# Patient Record
Sex: FEMALE | Race: WHITE
[De-identification: names, ages, dates, MRNs, and addresses within clinical notes are randomized per-mention and may not be internally consistent; named-entity substitution may affect disease eponyms.]

---

## 2017-04-11 ENCOUNTER — HOSPITAL ENCOUNTER (OUTPATIENT)
Dept: HOSPITAL 23 - CIVR | Age: 42
Discharge: HOME | End: 2017-04-11
Payer: COMMERCIAL

## 2017-04-11 DIAGNOSIS — E04.1: Primary | ICD-10-CM

## 2020-07-13 ENCOUNTER — OFFICE VISIT (OUTPATIENT)
Dept: FAMILY MEDICINE CLINIC | Facility: CLINIC | Age: 45
End: 2020-07-13

## 2020-07-13 VITALS
TEMPERATURE: 99.4 F | WEIGHT: 180.4 LBS | OXYGEN SATURATION: 97 % | SYSTOLIC BLOOD PRESSURE: 130 MMHG | HEIGHT: 64 IN | HEART RATE: 87 BPM | DIASTOLIC BLOOD PRESSURE: 94 MMHG | BODY MASS INDEX: 30.8 KG/M2

## 2020-07-13 DIAGNOSIS — Z00.00 ENCOUNTER FOR ANNUAL PHYSICAL EXAM: Primary | ICD-10-CM

## 2020-07-13 DIAGNOSIS — H61.23 BILATERAL IMPACTED CERUMEN: ICD-10-CM

## 2020-07-13 DIAGNOSIS — E89.0 POSTABLATIVE HYPOTHYROIDISM: ICD-10-CM

## 2020-07-13 DIAGNOSIS — E04.1 THYROID NODULE: Primary | ICD-10-CM

## 2020-07-13 DIAGNOSIS — F41.9 ANXIETY: ICD-10-CM

## 2020-07-13 DIAGNOSIS — E78.5 MILD HYPERLIPIDEMIA: ICD-10-CM

## 2020-07-13 DIAGNOSIS — R03.0 ELEVATED BLOOD PRESSURE READING WITHOUT DIAGNOSIS OF HYPERTENSION: ICD-10-CM

## 2020-07-13 DIAGNOSIS — R12 HEARTBURN: ICD-10-CM

## 2020-07-13 DIAGNOSIS — Z13.31 POSITIVE DEPRESSION SCREENING: ICD-10-CM

## 2020-07-13 PROCEDURE — 69209 REMOVE IMPACTED EAR WAX UNI: CPT | Performed by: NURSE PRACTITIONER

## 2020-07-13 PROCEDURE — 99386 PREV VISIT NEW AGE 40-64: CPT | Performed by: NURSE PRACTITIONER

## 2020-07-13 RX ORDER — MULTIPLE VITAMINS W/ MINERALS TAB 9MG-400MCG
1 TAB ORAL DAILY
COMMUNITY
End: 2021-07-30

## 2020-07-13 RX ORDER — ESCITALOPRAM OXALATE 10 MG/1
10 TABLET ORAL DAILY
Qty: 30 TABLET | Refills: 1 | Status: SHIPPED | OUTPATIENT
Start: 2020-07-13 | End: 2020-08-18 | Stop reason: SDUPTHER

## 2020-07-13 RX ORDER — LEVOTHYROXINE SODIUM 112 UG/1
112 TABLET ORAL DAILY
COMMUNITY
End: 2020-08-03 | Stop reason: SDUPTHER

## 2020-07-13 RX ORDER — AMPICILLIN TRIHYDRATE 250 MG
CAPSULE ORAL DAILY
COMMUNITY
End: 2021-07-30

## 2020-07-13 RX ORDER — FAMOTIDINE 20 MG/1
20 TABLET, FILM COATED ORAL 2 TIMES DAILY
Qty: 60 TABLET | Refills: 1 | Status: SHIPPED | OUTPATIENT
Start: 2020-07-13 | End: 2020-11-16

## 2020-07-13 NOTE — PATIENT INSTRUCTIONS
Start Escitalopram 1/2 tablet daily x1 week, then may increase to whole tablet if needed.  Continue to monitor your blood pressure periodically and record results. Call if your blood pressure is consistently greater than 140/90.  Continue to work on healthy diet and exercise.  Follow up pending lab results.  Follow up in 1 month, or sooner if symptoms persist or worsen.

## 2020-07-13 NOTE — PROGRESS NOTES
Subjective   Ursula Kulkarni is a 45 y.o. female.     Chief Complaint   Patient presents with   • Annual Exam     previous pcp dr sarah egan, has not seen in 2 years     • Anxiety     discuss   • Hot Flashes     all the time, chest and neck sweaty    • Hyperlipidemia     has not had labs done in 2 years        History of Present Illness   New patient, here to establish care; previous PCP Dr. 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    Also, c/o anxiety; 2 years ago, her 12 year old step son's mother passed away suddenly due to Legionaire's disease, lisa came home and she did not answer door; once she passed, he had to move to live with father full time, so could not stay at same school; he was doing counseling; once living with them full time he has seemed like a different kid; has been disrepectful and mouthy; she has 2 daughters and she has never let them act the way he dose; this is way he treated his mom per grandmother; step son just diagnosed with type 2 diabetes; trying to work with him on healthy diet; everything is a constant hung; pt frustrated that she finds self not wanting to be around him; loves him; working on getting him into behavioral specialist; pt would like medication to take edge off; has treid CBD oil and helps sleep and  take edge off some; would like alternative; has been on Paxil or Zoloft short term in past when went through divorce; would like low dose; had some GI problems with med in past, see PHQ-9 from today.    Also, c/o hot flashes, all the time, but at night on occasion will wake up soaking wet on chest and neck; after 2nd pregnancy was hot all time and was having palpitations; had labs and diagnosed with Graves disease; was on Metoprolol at the time; had radioactive iodine treatment; BP got too low on Metoprolol so stopped taking; monitors BP on occasion and typically runs 120s/70s; does not have menses due to IUD.    F/U mild Hyperlipidemia: no recent labs.    F/U Hypothyroidism: takes Levothyroxine daily 112 mcg daily, typically on empty stomach; has had some missed doses; no labs for 2 years; working on trying to get health back in order; had thyroid US in [id number] and nodule had unchanged from previous, may have been having trouble swallowing at the time.    The following portions of the patient's history were reviewed and updated as appropriate: allergies, current medications, past family history, past medical history, past social history, past surgical history and problem list.    Current Outpatient Medications on File Prior to Visit   Medication Sig   • Cinnamon 500 MG capsule Take  by mouth Daily.   • Garlic 10 MG capsule Take  by mouth.   • levothyroxine (SYNTHROID, LEVOTHROID) 112 MCG tablet Take 112 mcg by mouth Daily.   • LORATADINE PO Take 10 mg by mouth Daily.   • Multiple Vitamins-Minerals (MULTIVITAMIN WITH MINERALS) tablet tablet Take 1 tablet by mouth Daily.   • Turmeric (QC TUMERIC COMPLEX PO) Take  by mouth.     No current facility-administered medications on file prior to visit.        Past Medical History:   Diagnosis Date   • ADD (attention deficit disorder)    • Hyperlipidemia    • Hypothyroidism    • Melanoma of back (CMS/HCC)        Past Surgical History:   Procedure Laterality Date   • D&C  CERVICAL BIOPSY     • WISDOM TOOTH EXTRACTION         Family History   Problem Relation Age of Onset   • Heart disease Father    • Heart attack Father        Social History     Socioeconomic History   • Marital status:      Spouse name: Not on file   • Number of children: Not on file   • Years of education: Not on file   • Highest education level: Not on file   Tobacco Use   • Smoking status: Never Smoker   • Smokeless tobacco: Never Used   Substance and Sexual Activity   • Alcohol use: Yes     Alcohol/week: 1.0 standard drinks     Types: 1 Glasses of wine per week     Frequency: 2-4 times a month     Binge frequency: Monthly   • Drug use: Never   • Sexual activity: Yes     Partners: Male     Comment:         Review of Systems   Constitutional: Positive for fatigue. Negative for appetite change, chills, fever, unexpected weight gain and unexpected weight loss.   HENT: Positive for trouble swallowing (feels like gets choked easier than used to; food seems to get stuck in throat; will have to drink liquids to go down at times). Negative for ear pain (some in right ear this morning), postnasal drip, rhinorrhea, sinus pressure and sore throat.    Eyes: Negative for blurred vision and pain.   Respiratory: Negative for apnea (no snoring), cough, chest tightness and shortness of breath.    Cardiovascular: Negative for chest pain and leg swelling. Palpitations: on occasion, not sure if related to anxiety; has flutter sensation during episodes; drinks 1 cup coffee daily    Gastrointestinal: Positive for indigestion (has heartburn after eats randomly, no OTC treatment). Negative for abdominal pain, constipation, diarrhea and GERD.   Endocrine: Positive for heat intolerance. Negative for cold intolerance and polydipsia.   Genitourinary: Negative for dysuria and frequency.   Musculoskeletal: Negative for arthralgias, back pain and neck pain.   Skin: Negative for rash and skin lesions.   Neurological: Negative for  "dizziness, syncope and headache.   Hematological: Does not bruise/bleed easily.   Psychiatric/Behavioral: Negative for suicidal ideas.       Objective   Vitals:    07/13/20 1059   BP: 130/94   BP Location: Left arm   Patient Position: Sitting   Cuff Size: Adult   Pulse: 87   Temp: 99.4 °F (37.4 °C)   SpO2: 97%   Weight: 81.8 kg (180 lb 6.4 oz)   Height: 161.3 cm (63.5\")     Body mass index is 31.46 kg/m².    Physical Exam   Constitutional: She is oriented to person, place, and time. She appears well-developed and well-nourished. No distress.   HENT:   Head: Normocephalic and atraumatic.   Right Ear: External ear normal. cerumen impaction is present.  Left Ear: External ear normal. An impacted cerumen is present.  Nose: Nose normal. Right sinus exhibits no maxillary sinus tenderness and no frontal sinus tenderness. Left sinus exhibits no maxillary sinus tenderness and no frontal sinus tenderness.   Mouth/Throat: Uvula is midline, oropharynx is clear and moist and mucous membranes are normal. No posterior oropharyngeal erythema.   Eyes: Pupils are equal, round, and reactive to light. Conjunctivae and EOM are normal.   Neck: Normal range of motion. Neck supple. Carotid bruit is not present. No tracheal deviation present. No thyromegaly present.   Cardiovascular: Normal rate, regular rhythm, normal heart sounds and intact distal pulses.   No murmur heard.  Pulmonary/Chest: Effort normal and breath sounds normal. No respiratory distress.   Abdominal: Soft. Bowel sounds are normal. There is no hepatosplenomegaly. There is no tenderness.   Musculoskeletal: Normal range of motion. She exhibits no edema.        Cervical back: She exhibits no bony tenderness.        Thoracic back: She exhibits no bony tenderness.        Lumbar back: She exhibits no bony tenderness.   Lymphadenopathy:     She has no cervical adenopathy.   Neurological: She is alert and oriented to person, place, and time. She has normal strength and normal " reflexes. No cranial nerve deficit. Coordination and gait normal.   Skin: Skin is warm and dry. No rash noted. She is not diaphoretic.   Psychiatric: She has a normal mood and affect. Her behavior is normal. Judgment and thought content normal. Cognition and memory are normal.   Nursing note and vitals reviewed.        Ear Cerumen Removal  Date/Time: 7/13/2020 12:12 PM  Performed by: Phuong Wild APRN  Authorized by: Phuong Wild APRN     Anesthesia:  Local Anesthetic: none  Ceruminolytics applied: Ceruminolytics applied prior to the procedure.  Location details: left ear and right ear  Patient tolerance: Patient tolerated the procedure well with no immediate complications  Comments: Right TM clear, bony landmarks visualized, mild fluid behind TM; left TM clear, bony landmarks visualized  Procedure type: irrigation   Sedation:  Patient sedated: no          Assessment/Plan .  Problem List Items Addressed This Visit     Hypothyroidism    Relevant Medications    levothyroxine (SYNTHROID, LEVOTHROID) 112 MCG tablet    Other Relevant Orders    TSH    T4, Free    Anxiety    Current Assessment & Plan     Start Escitalopram 1/2 tablet daily x1-2 weeks, then may increase to whole tablet if needed.         Relevant Medications    escitalopram (Lexapro) 10 MG tablet    Mild hyperlipidemia    Current Assessment & Plan     Continue to work on healthy diet and exercise.         Relevant Orders    Lipid Panel With LDL / HDL Ratio    Heartburn    Relevant Medications    famotidine (Pepcid) 20 MG tablet    Positive depression screening    Current Assessment & Plan     Continue exercise.         Bilateral impacted cerumen    Relevant Orders    Ear Cerumen Removal      Other Visit Diagnoses     Encounter for annual physical exam    -  Primary    Relevant Orders    Comprehensive Metabolic Panel    CBC & Differential    Lipid Panel With LDL / HDL Ratio          Return in about 1 month (around 8/13/2020) for Recheck.or sooner if  symptoms persist or worsen.  Impression: Health maintenance visit.  Currently, eats a fairly healthy diet and has a regular exercise routine.  Cervical cancer screening: UTD.  Breast cancer screening: UTD.  Colorectal cancer screening: not indicated.   Screening lab work includes: CMP, lipid.  Immunizations: needs Tdap, declines today; risks and benefits of immunizations were discussed with patient.  Patient was advised to be evaluated by ophthalmology and dentist.  Advice and education were given regarding nutrition and aerobic exercise.  Has had missed doses of Levothyroxine; will recheck labs in 4-6 weeks and continue current dose of Levothyroxine as long as not over treated.  Discussed AE/BBW with Escitalopram.  Will consider referral to ENT if dysphagia persists.  Patient will drop off previous records.

## 2020-07-13 NOTE — ASSESSMENT & PLAN NOTE
Continue to monitor your blood pressure periodically and record results. Call if your blood pressure is consistently greater than 140/90.

## 2020-07-14 LAB
ALBUMIN SERPL-MCNC: 4.9 G/DL (ref 3.8–4.8)
ALBUMIN/GLOB SERPL: 2 {RATIO} (ref 1.2–2.2)
ALP SERPL-CCNC: 77 IU/L (ref 39–117)
ALT SERPL-CCNC: 28 IU/L (ref 0–32)
AST SERPL-CCNC: 23 IU/L (ref 0–40)
BASOPHILS # BLD AUTO: 0.1 X10E3/UL (ref 0–0.2)
BASOPHILS NFR BLD AUTO: 1 %
BILIRUB SERPL-MCNC: 0.7 MG/DL (ref 0–1.2)
BUN SERPL-MCNC: 21 MG/DL (ref 6–24)
BUN/CREAT SERPL: 20 (ref 9–23)
CALCIUM SERPL-MCNC: 9.5 MG/DL (ref 8.7–10.2)
CHLORIDE SERPL-SCNC: 101 MMOL/L (ref 96–106)
CHOLEST SERPL-MCNC: 210 MG/DL (ref 100–199)
CO2 SERPL-SCNC: 23 MMOL/L (ref 20–29)
CREAT SERPL-MCNC: 1.04 MG/DL (ref 0.57–1)
EOSINOPHIL # BLD AUTO: 0.3 X10E3/UL (ref 0–0.4)
EOSINOPHIL NFR BLD AUTO: 3 %
ERYTHROCYTE [DISTWIDTH] IN BLOOD BY AUTOMATED COUNT: 12.3 % (ref 11.7–15.4)
GLOBULIN SER CALC-MCNC: 2.5 G/DL (ref 1.5–4.5)
GLUCOSE SERPL-MCNC: 85 MG/DL (ref 65–99)
HCT VFR BLD AUTO: 43.4 % (ref 34–46.6)
HDLC SERPL-MCNC: 52 MG/DL
HGB BLD-MCNC: 14.9 G/DL (ref 11.1–15.9)
IMM GRANULOCYTES # BLD AUTO: 0 X10E3/UL (ref 0–0.1)
IMM GRANULOCYTES NFR BLD AUTO: 0 %
LDLC SERPL CALC-MCNC: 134 MG/DL (ref 0–99)
LDLC/HDLC SERPL: 2.6 RATIO (ref 0–3.2)
LYMPHOCYTES # BLD AUTO: 2.3 X10E3/UL (ref 0.7–3.1)
LYMPHOCYTES NFR BLD AUTO: 28 %
MCH RBC QN AUTO: 31.8 PG (ref 26.6–33)
MCHC RBC AUTO-ENTMCNC: 34.3 G/DL (ref 31.5–35.7)
MCV RBC AUTO: 93 FL (ref 79–97)
MONOCYTES # BLD AUTO: 0.4 X10E3/UL (ref 0.1–0.9)
MONOCYTES NFR BLD AUTO: 5 %
NEUTROPHILS # BLD AUTO: 5.1 X10E3/UL (ref 1.4–7)
NEUTROPHILS NFR BLD AUTO: 63 %
PLATELET # BLD AUTO: 244 X10E3/UL (ref 150–450)
POTASSIUM SERPL-SCNC: 4.3 MMOL/L (ref 3.5–5.2)
PROT SERPL-MCNC: 7.4 G/DL (ref 6–8.5)
RBC # BLD AUTO: 4.69 X10E6/UL (ref 3.77–5.28)
SODIUM SERPL-SCNC: 139 MMOL/L (ref 134–144)
T4 FREE SERPL-MCNC: 1.55 NG/DL (ref 0.82–1.77)
TRIGL SERPL-MCNC: 118 MG/DL (ref 0–149)
TSH SERPL DL<=0.005 MIU/L-ACNC: 5.74 UIU/ML (ref 0.45–4.5)
VLDLC SERPL CALC-MCNC: 24 MG/DL (ref 5–40)
WBC # BLD AUTO: 8.1 X10E3/UL (ref 3.4–10.8)

## 2020-07-31 ENCOUNTER — HOSPITAL ENCOUNTER (OUTPATIENT)
Dept: ULTRASOUND IMAGING | Facility: HOSPITAL | Age: 45
Discharge: HOME | End: 2020-07-31
Admitting: NURSE PRACTITIONER

## 2020-07-31 DIAGNOSIS — E04.1 THYROID NODULE: ICD-10-CM

## 2020-07-31 PROCEDURE — 76536 US EXAM OF HEAD AND NECK: CPT

## 2020-08-03 DIAGNOSIS — E04.1 THYROID NODULE: Primary | ICD-10-CM

## 2020-08-03 DIAGNOSIS — E89.0 POSTABLATIVE HYPOTHYROIDISM: ICD-10-CM

## 2020-08-03 DIAGNOSIS — E04.1 THYROID NODULE: ICD-10-CM

## 2020-08-03 DIAGNOSIS — E78.5 MILD HYPERLIPIDEMIA: Primary | ICD-10-CM

## 2020-08-03 RX ORDER — LEVOTHYROXINE SODIUM 112 UG/1
112 TABLET ORAL DAILY
Qty: 30 TABLET | Refills: 0 | Status: SHIPPED | OUTPATIENT
Start: 2020-08-03 | End: 2020-09-15 | Stop reason: ALTCHOICE

## 2020-08-18 ENCOUNTER — TELEMEDICINE (OUTPATIENT)
Dept: FAMILY MEDICINE CLINIC | Facility: CLINIC | Age: 45
End: 2020-08-18

## 2020-08-18 DIAGNOSIS — R03.0 ELEVATED BLOOD PRESSURE READING WITHOUT DIAGNOSIS OF HYPERTENSION: ICD-10-CM

## 2020-08-18 DIAGNOSIS — F41.9 ANXIETY: Primary | ICD-10-CM

## 2020-08-18 DIAGNOSIS — E89.0 POSTABLATIVE HYPOTHYROIDISM: ICD-10-CM

## 2020-08-18 DIAGNOSIS — R12 HEARTBURN: ICD-10-CM

## 2020-08-18 DIAGNOSIS — Z13.31 POSITIVE DEPRESSION SCREENING: ICD-10-CM

## 2020-08-18 PROCEDURE — 99213 OFFICE O/P EST LOW 20 MIN: CPT | Performed by: NURSE PRACTITIONER

## 2020-08-18 RX ORDER — ESCITALOPRAM OXALATE 10 MG/1
10 TABLET ORAL DAILY
Qty: 90 TABLET | Refills: 0 | Status: SHIPPED | OUTPATIENT
Start: 2020-08-18 | End: 2020-09-21 | Stop reason: ALTCHOICE

## 2020-08-18 NOTE — PROGRESS NOTES
[place] Kulkarni is a 45 y.o. female.     Chief Complaint   Patient presents with   • Anxiety     follow up   • Hypertension   • Heartburn     You have chosen to receive care through a telehealth visit.  Do you consent to use a video/audio connection for your medical care today? Yes    This was an audio and video enabled telemedicine encounter using [employer].    History of Present Illness   Patient presents for follow up anxiety: started Escitalopram daily and has been helping; has started trying to work out and eat healthier; has lost about 10 pounds; mild irritability, but for most part, Escitalopram has been working well; happy with current dose; has been working out at least 4 times per week; occasional trouble falling and staying asleep, typically only if has had caffeine later in day; no SI/HI; see PHQ-9 from today.    F/U elevated BP: has been monitoring BP; BP has been running 120s/70s; no orthostasis; no swelling; no headaches.    F/U dysphagia/heartburn: started Pepcid daily for heartburn and has been working well; no problems swallowing since started medication.    F/U Hypothyroidism: has been taking Levothyroxine more consistently; has only missed one dose since LOV.    The following portions of the patient's history were reviewed and updated as appropriate: allergies, current medications, past family history, past medical history, past social history, past surgical history and problem list.    Current Outpatient Medications on File Prior to Visit   Medication Sig   • Cinnamon 500 MG capsule Take  by mouth Daily.   • famotidine (Pepcid) 20 MG tablet Take 1 tablet by mouth 2 (Two) Times a Day.   • Garlic 10 MG capsule Take  by mouth.   • levothyroxine (SYNTHROID, LEVOTHROID) 112 MCG tablet Take 1 tablet by mouth Daily.   • LORATADINE PO Take 10 mg by mouth Daily.   • Multiple Vitamins-Minerals (MULTIVITAMIN WITH MINERALS) tablet tablet Take 1 tablet by mouth Daily.   • Turmeric (QC  TUMERIC COMPLEX PO) Take  by mouth.   • [DISCONTINUED] escitalopram (Lexapro) 10 MG tablet Take 1 tablet by mouth Daily.     No current facility-administered medications on file prior to visit.        Past Medical History:   Diagnosis Date   • ADD (attention deficit disorder)    • Hyperlipidemia    • Hypothyroidism    • Melanoma of back (CMS/HCC)    • Positive depression screening 7/13/2020       Past Surgical History:   Procedure Laterality Date   • D&C CERVICAL BIOPSY     • WISDOM TOOTH EXTRACTION         Family History   Problem Relation Age of Onset   • Heart disease Father    • Heart attack Father        Social History     Socioeconomic History   • Marital status:      Spouse name: Not on file   • Number of children: Not on file   • Years of education: Not on file   • Highest education level: Not on file   Tobacco Use   • Smoking status: Never Smoker   • Smokeless tobacco: Never Used   Substance and Sexual Activity   • Alcohol use: Yes     Alcohol/week: 1.0 standard drinks     Types: 1 Glasses of wine per week     Frequency: 2-4 times a month     Binge frequency: Monthly   • Drug use: Never   • Sexual activity: Yes     Partners: Male     Comment:         Review of Systems   Constitutional: Negative for activity change, fatigue, unexpected weight gain and unexpected weight loss.   Eyes: Negative for blurred vision.   Respiratory: Negative for cough, chest tightness and shortness of breath.    Cardiovascular: Negative for chest pain, palpitations and leg swelling.   Gastrointestinal: Negative for abdominal pain, GERD and indigestion.   Skin: Negative for rash.   Neurological: Negative for dizziness and headache.       PHQ-9 Depression Screening  Little interest or pleasure in doing things? 0   Feeling down, depressed, or hopeless? 0   Trouble falling or staying asleep, or sleeping too much? 1   Feeling tired or having little energy? 0   Poor appetite or overeating? 0   Feeling bad about yourself -  or that you are a failure or have let yourself or your family down? 0   Trouble concentrating on things, such as reading the newspaper or watching television? 0   Moving or speaking so slowly that other people could have noticed? Or the opposite - being so fidgety or restless that you have been moving around a lot more than usual? 0   Thoughts that you would be better off dead, or of hurting yourself in some way? 0   PHQ-9 Total Score 1   If you checked off any problems, how difficult have these problems made it for you to do your work, take care of things at home, or get along with other people?         Objective   There were no vitals filed for this visit.  There is no height or weight on file to calculate BMI.    Physical Exam   Constitutional: She is oriented to person, place, and time. She appears well-developed and well-nourished. No distress.   HENT:   Head: Normocephalic.   Eyes: Conjunctivae are normal.   Neck: Normal range of motion. Neck supple.   Pulmonary/Chest: Effort normal. No accessory muscle usage. No respiratory distress.   Neurological: She is alert and oriented to person, place, and time.   Skin: She is not diaphoretic.   Psychiatric: She has a normal mood and affect. Her behavior is normal. Judgment and thought content normal. Cognition and memory are normal.       Lab Results   Component Value Date    WBC 8.1 07/13/2020    RBC 4.69 07/13/2020    HGB 14.9 07/13/2020    HCT 43.4 07/13/2020    MCV 93 07/13/2020    MCH 31.8 07/13/2020    MCHC 34.3 07/13/2020    RDW 12.3 07/13/2020     07/13/2020    NEUTRORELPCT 63 07/13/2020    LYMPHORELPCT 28 07/13/2020    MONORELPCT 5 07/13/2020    EOSRELPCT 3 07/13/2020    BASORELPCT 1 07/13/2020    NEUTROABS 5.1 07/13/2020    LYMPHSABS 2.3 07/13/2020    MONOSABS 0.4 07/13/2020    EOSABS 0.3 07/13/2020    BASOSABS 0.1 07/13/2020     Lab Results   Component Value Date    BUN 21 07/13/2020    CREATININE 1.04 (H) 07/13/2020    EGFRIFNONA 66 07/13/2020     EGFRIFAFRI 76 07/13/2020    BCR 20 07/13/2020    K 4.3 07/13/2020    CO2 23 07/13/2020    CALCIUM 9.5 07/13/2020    PROTENTOTREF 7.4 07/13/2020    ALBUMIN 4.9 (H) 07/13/2020    LABIL2 2.0 07/13/2020    AST 23 07/13/2020    ALT 28 07/13/2020      Lab Results   Component Value Date    CHLPL 210 (H) 07/13/2020    TRIG 118 07/13/2020    HDL 52 07/13/2020    VLDL 24 07/13/2020     (H) 07/13/2020     Lab Results   Component Value Date    TSH 5.740 (H) 07/13/2020       Assessment/Plan .  Problem List Items Addressed This Visit     Hypothyroidism    Relevant Orders    TSH    T4, Free    Anxiety - Primary    Current Assessment & Plan     Improving with Escitalopram.         Relevant Medications    escitalopram (Lexapro) 10 MG tablet    Heartburn    Current Assessment & Plan     Resolved with Pepcid daily.         Elevated blood pressure reading without diagnosis of hypertension    Current Assessment & Plan     BP has been normal when has checked.         RESOLVED: Positive depression screening        Come in for thyroid labs in about 1 month.  Return in about 3 months (around 11/18/2020) for Recheck.or sooner if problems or concerns.    Time spent doing video visit, reviewing, discussing, answering questions, and educating patient was 15 minutes.

## 2020-08-18 NOTE — PATIENT INSTRUCTIONS
Come in for lab only to recheck thyroid labs in about 1 month.  Continue to monitor your blood pressure periodically and record results.  Continue to work on healthy diet and exercise.  Follow up pending lab results.  Follow up in 3-4 months, or sooner if problems or concerns.

## 2020-09-14 ENCOUNTER — LAB (OUTPATIENT)
Dept: FAMILY MEDICINE CLINIC | Facility: CLINIC | Age: 45
End: 2020-09-14

## 2020-09-14 DIAGNOSIS — E89.0 POSTABLATIVE HYPOTHYROIDISM: ICD-10-CM

## 2020-09-15 DIAGNOSIS — E89.0 POSTABLATIVE HYPOTHYROIDISM: Primary | ICD-10-CM

## 2020-09-15 LAB
T4 FREE SERPL-MCNC: 1.2 NG/DL (ref 0.82–1.77)
TSH SERPL DL<=0.005 MIU/L-ACNC: 5.71 UIU/ML (ref 0.45–4.5)

## 2020-09-15 RX ORDER — LEVOTHYROXINE SODIUM 0.12 MG/1
125 TABLET ORAL DAILY
Qty: 90 TABLET | Refills: 0 | Status: SHIPPED | OUTPATIENT
Start: 2020-09-15 | End: 2020-12-18

## 2020-09-21 DIAGNOSIS — F41.9 ANXIETY: Primary | ICD-10-CM

## 2020-09-21 RX ORDER — ESCITALOPRAM OXALATE 20 MG/1
20 TABLET ORAL DAILY
Qty: 30 TABLET | Refills: 1 | Status: SHIPPED | OUTPATIENT
Start: 2020-09-21 | End: 2020-12-14

## 2020-11-16 DIAGNOSIS — R12 HEARTBURN: ICD-10-CM

## 2020-11-16 RX ORDER — FAMOTIDINE 20 MG/1
TABLET, FILM COATED ORAL
Qty: 60 TABLET | Refills: 1 | Status: SHIPPED | OUTPATIENT
Start: 2020-11-16 | End: 2021-02-17

## 2020-12-14 DIAGNOSIS — F41.9 ANXIETY: ICD-10-CM

## 2020-12-14 RX ORDER — ESCITALOPRAM OXALATE 20 MG/1
TABLET ORAL
Qty: 30 TABLET | Refills: 0 | Status: SHIPPED | OUTPATIENT
Start: 2020-12-14 | End: 2020-12-24 | Stop reason: SDUPTHER

## 2020-12-18 DIAGNOSIS — E89.0 POSTABLATIVE HYPOTHYROIDISM: ICD-10-CM

## 2020-12-18 RX ORDER — LEVOTHYROXINE SODIUM 0.12 MG/1
TABLET ORAL
Qty: 90 TABLET | Refills: 0 | Status: SHIPPED | OUTPATIENT
Start: 2020-12-18 | End: 2020-12-24 | Stop reason: SDUPTHER

## 2020-12-23 ENCOUNTER — OFFICE VISIT (OUTPATIENT)
Dept: FAMILY MEDICINE CLINIC | Facility: CLINIC | Age: 45
End: 2020-12-23

## 2020-12-23 VITALS
HEART RATE: 71 BPM | HEIGHT: 64 IN | OXYGEN SATURATION: 100 % | TEMPERATURE: 97.5 F | SYSTOLIC BLOOD PRESSURE: 122 MMHG | DIASTOLIC BLOOD PRESSURE: 76 MMHG | BODY MASS INDEX: 28.9 KG/M2 | WEIGHT: 169.3 LBS

## 2020-12-23 DIAGNOSIS — F41.9 ANXIETY: ICD-10-CM

## 2020-12-23 DIAGNOSIS — E89.0 POSTABLATIVE HYPOTHYROIDISM: Primary | ICD-10-CM

## 2020-12-23 DIAGNOSIS — R03.0 ELEVATED BLOOD PRESSURE READING WITHOUT DIAGNOSIS OF HYPERTENSION: ICD-10-CM

## 2020-12-23 DIAGNOSIS — J30.9 ALLERGIC RHINITIS, UNSPECIFIED SEASONALITY, UNSPECIFIED TRIGGER: ICD-10-CM

## 2020-12-23 PROBLEM — H61.23 BILATERAL IMPACTED CERUMEN: Status: RESOLVED | Noted: 2020-07-13 | Resolved: 2020-12-23

## 2020-12-23 PROCEDURE — 99214 OFFICE O/P EST MOD 30 MIN: CPT | Performed by: NURSE PRACTITIONER

## 2020-12-23 NOTE — PROGRESS NOTES
Washington Kulkarni is a 45 y.o. female.     Chief Complaint   Patient presents with   • Hypothyroidism     follow up        History of Present Illness   Patient presents for follow up Hypothyroidism: takes Levothyroxine daily on empty stomach; thinks may have missed a dose a few days ago, but otherwise has taken consistently; some nights was waking up sweaty on upper chest; in last few weeks has been hot all night long; no more sweating at night, just hot all night; previously hot all day and night before started Levothyroxine; now mostly just at night; may be related to hormones.    F/U anxiety: takes Escitalopram daily and works well most days; happy with current dose; no problems with sleep; no SI/HI.    F/U elevated BP: has been monitoring BP, will be a little increased on occasion; BP typically runs 130s/80s; notes BP will be higher on days has eaten high salt foods; no headaches other than occasional tension headaches; no swelling; rare orthostasis, has history of vertigo; has lost some weight watching diet and working out.    The following portions of the patient's history were reviewed and updated as appropriate: allergies, current medications, past family history, past medical history, past social history, past surgical history and problem list.    Current Outpatient Medications on File Prior to Visit   Medication Sig   • Cinnamon 500 MG capsule Take  by mouth Daily.   • escitalopram (LEXAPRO) 20 MG tablet TAKE ONE TABLET BY MOUTH DAILY   • famotidine (PEPCID) 20 MG tablet TAKE ONE TABLET BY MOUTH TWICE A DAY (Patient taking differently: Take 20 mg by mouth Daily.)   • Garlic 10 MG capsule Take  by mouth.   • levothyroxine (SYNTHROID, LEVOTHROID) 125 MCG tablet TAKE ONE TABLET BY MOUTH DAILY   • LORATADINE PO Take 10 mg by mouth Daily.   • Multiple Vitamins-Minerals (MULTIVITAMIN WITH MINERALS) tablet tablet Take 1 tablet by mouth Daily.   • Turmeric (QC TUMERIC COMPLEX PO) Take  by mouth.     No  current facility-administered medications on file prior to visit.        Past Medical History:   Diagnosis Date   • ADD (attention deficit disorder)    • Bilateral impacted cerumen 7/13/2020   • Hyperlipidemia    • Hypothyroidism    • Melanoma of back (CMS/HCC)    • Positive depression screening 7/13/2020       Past Surgical History:   Procedure Laterality Date   • D&C CERVICAL BIOPSY     • WISDOM TOOTH EXTRACTION         Family History   Problem Relation Age of Onset   • Heart disease Father    • Heart attack Father        Social History     Socioeconomic History   • Marital status:      Spouse name: Not on file   • Number of children: Not on file   • Years of education: Not on file   • Highest education level: Not on file   Tobacco Use   • Smoking status: Never Smoker   • Smokeless tobacco: Never Used   Substance and Sexual Activity   • Alcohol use: Yes     Alcohol/week: 1.0 standard drinks     Types: 1 Glasses of wine per week     Frequency: 2-4 times a month     Binge frequency: Monthly   • Drug use: Never   • Sexual activity: Yes     Partners: Male     Comment:         Review of Systems   Constitutional: Negative for appetite change, fatigue, unexpected weight gain and unexpected weight loss.   HENT: Negative for ear pain (occasional ear aches), sore throat and trouble swallowing (no problems since has been taking Pepcid, only taking once daily at this point).    Eyes: Negative for blurred vision and pain.   Respiratory: Negative for cough, chest tightness and shortness of breath.    Cardiovascular: Negative for chest pain and palpitations.   Gastrointestinal: Negative for abdominal pain, blood in stool, constipation, diarrhea, GERD and indigestion.   Endocrine: Negative for polydipsia.   Genitourinary: Negative for dysuria and frequency.   Skin: Negative for rash.   Neurological: Negative for syncope.   Hematological: Does not bruise/bleed easily.   Psychiatric/Behavioral: Negative for suicidal  "ideas.       Objective   Vitals:    12/23/20 1356   BP: 122/76   BP Location: Left arm   Patient Position: Sitting   Cuff Size: Adult   Pulse: 71   Temp: 97.5 °F (36.4 °C)   SpO2: 100%   Weight: 76.8 kg (169 lb 4.8 oz)   Height: 161.3 cm (63.5\")     Body mass index is 29.52 kg/m².    Physical Exam  Vitals signs and nursing note reviewed.   Constitutional:       General: She is not in acute distress.     Appearance: She is well-developed and well-groomed. She is not diaphoretic.   HENT:      Head: Normocephalic.      Right Ear: External ear normal. Right ear middle ear effusion: mild. Tympanic membrane is not erythematous.      Left Ear: External ear normal. Left ear middle ear effusion: mild. Tympanic membrane is not erythematous.      Nose: Nose normal.      Right Sinus: No maxillary sinus tenderness or frontal sinus tenderness.      Left Sinus: No maxillary sinus tenderness or frontal sinus tenderness.      Mouth/Throat:      Mouth: Mucous membranes are moist.      Pharynx: Oropharynx is clear. No oropharyngeal exudate or posterior oropharyngeal erythema.   Eyes:      Conjunctiva/sclera: Conjunctivae normal.   Neck:      Musculoskeletal: Normal range of motion and neck supple.      Vascular: No carotid bruit.   Cardiovascular:      Rate and Rhythm: Normal rate and regular rhythm.      Pulses: Normal pulses.      Heart sounds: Normal heart sounds. No murmur.   Pulmonary:      Effort: Pulmonary effort is normal. No respiratory distress.      Breath sounds: Normal breath sounds.   Abdominal:      General: Bowel sounds are normal.      Palpations: Abdomen is soft. There is no hepatomegaly or splenomegaly.      Tenderness: There is no abdominal tenderness.   Musculoskeletal:      Right lower leg: No edema.      Left lower leg: No edema.   Lymphadenopathy:      Cervical: No cervical adenopathy.   Skin:     General: Skin is warm and dry.      Findings: No rash.   Neurological:      Mental Status: She is alert and " oriented to person, place, and time.      Gait: Gait is intact.   Psychiatric:         Mood and Affect: Mood normal.         Behavior: Behavior normal.         Thought Content: Thought content normal.         Cognition and Memory: Cognition normal.         Judgment: Judgment normal.         Lab Results   Component Value Date    WBC 8.1 07/13/2020    RBC 4.69 07/13/2020    HGB 14.9 07/13/2020    HCT 43.4 07/13/2020    MCV 93 07/13/2020    MCH 31.8 07/13/2020    MCHC 34.3 07/13/2020    RDW 12.3 07/13/2020     07/13/2020    NEUTRORELPCT 63 07/13/2020    LYMPHORELPCT 28 07/13/2020    MONORELPCT 5 07/13/2020    EOSRELPCT 3 07/13/2020    BASORELPCT 1 07/13/2020    NEUTROABS 5.1 07/13/2020    LYMPHSABS 2.3 07/13/2020    MONOSABS 0.4 07/13/2020    EOSABS 0.3 07/13/2020    BASOSABS 0.1 07/13/2020     Lab Results   Component Value Date    BUN 21 07/13/2020    CREATININE 1.04 (H) 07/13/2020    EGFRIFNONA 66 07/13/2020    EGFRIFAFRI 76 07/13/2020    BCR 20 07/13/2020    K 4.3 07/13/2020    CO2 23 07/13/2020    CALCIUM 9.5 07/13/2020    PROTENTOTREF 7.4 07/13/2020    ALBUMIN 4.9 (H) 07/13/2020    LABIL2 2.0 07/13/2020    AST 23 07/13/2020    ALT 28 07/13/2020      Lab Results   Component Value Date    CHLPL 210 (H) 07/13/2020    TRIG 118 07/13/2020    HDL 52 07/13/2020    VLDL 24 07/13/2020     (H) 07/13/2020     Lab Results   Component Value Date    TSH 5.710 (H) 09/14/2020       Assessment/Plan .  Problem List Items Addressed This Visit     Hypothyroidism - Primary    Relevant Orders    TSH    T4, Free    Basic Metabolic Panel    Anxiety    Current Assessment & Plan     Stable on Escitalopram daily.         Elevated blood pressure reading without diagnosis of hypertension    Current Assessment & Plan     Continue to monitor blood pressure and record results.  Call if your blood pressure is consistently greater than 140/90.         Relevant Orders    Basic Metabolic Panel    Allergic rhinitis    Current Assessment  & Plan     Resume daily allergy medicine.               Return in about 7 months (around 7/23/2021) for Annual physical, Recheck.or sooner if problems or concerns.  Will send refill of Escitalopram and Levothyroxine to local pharmacy for 90 day supply pending lab results.

## 2020-12-23 NOTE — PATIENT INSTRUCTIONS
Resume daily allergy medicine.  Continue to monitor your blood pressure periodically and record results.  Continue to work on healthy diet and exercise.  Follow up pending lab results.  Follow up in 6-7 months for physical with fasting labs, or sooner if problems or concerns.

## 2020-12-24 DIAGNOSIS — E89.0 POSTABLATIVE HYPOTHYROIDISM: ICD-10-CM

## 2020-12-24 DIAGNOSIS — F41.9 ANXIETY: ICD-10-CM

## 2020-12-24 LAB
BUN SERPL-MCNC: 3 MG/DL (ref 6–24)
BUN/CREAT SERPL: 3 (ref 9–23)
CALCIUM SERPL-MCNC: 9.5 MG/DL (ref 8.7–10.2)
CHLORIDE SERPL-SCNC: 104 MMOL/L (ref 96–106)
CO2 SERPL-SCNC: 22 MMOL/L (ref 20–29)
CREAT SERPL-MCNC: 0.92 MG/DL (ref 0.57–1)
GLUCOSE SERPL-MCNC: 82 MG/DL (ref 65–99)
POTASSIUM SERPL-SCNC: 3.9 MMOL/L (ref 3.5–5.2)
SODIUM SERPL-SCNC: 140 MMOL/L (ref 134–144)
T4 FREE SERPL-MCNC: 1.52 NG/DL (ref 0.82–1.77)
TSH SERPL DL<=0.005 MIU/L-ACNC: 2.39 UIU/ML (ref 0.45–4.5)

## 2020-12-24 RX ORDER — ESCITALOPRAM OXALATE 20 MG/1
20 TABLET ORAL DAILY
Qty: 90 TABLET | Refills: 1 | Status: SHIPPED | OUTPATIENT
Start: 2020-12-24 | End: 2021-07-29

## 2020-12-24 RX ORDER — LEVOTHYROXINE SODIUM 0.12 MG/1
125 TABLET ORAL DAILY
Qty: 90 TABLET | Refills: 1 | Status: SHIPPED | OUTPATIENT
Start: 2020-12-24 | End: 2021-07-30 | Stop reason: SDUPTHER

## 2020-12-24 NOTE — ASSESSMENT & PLAN NOTE
Continue to monitor blood pressure and record results.  Call if your blood pressure is consistently greater than 140/90.

## 2021-02-13 DIAGNOSIS — R12 HEARTBURN: ICD-10-CM

## 2021-02-17 RX ORDER — FAMOTIDINE 20 MG/1
20 TABLET, FILM COATED ORAL 2 TIMES DAILY PRN
Qty: 180 TABLET | Refills: 1 | Status: SHIPPED | OUTPATIENT
Start: 2021-02-17 | End: 2021-10-07 | Stop reason: SDUPTHER

## 2021-04-06 ENCOUNTER — BULK ORDERING (OUTPATIENT)
Dept: CASE MANAGEMENT | Facility: OTHER | Age: 46
End: 2021-04-06

## 2021-04-06 DIAGNOSIS — Z23 IMMUNIZATION DUE: ICD-10-CM

## 2021-07-28 DIAGNOSIS — F41.9 ANXIETY: ICD-10-CM

## 2021-07-29 RX ORDER — ESCITALOPRAM OXALATE 20 MG/1
TABLET ORAL
Qty: 90 TABLET | Refills: 1 | Status: SHIPPED | OUTPATIENT
Start: 2021-07-29 | End: 2021-07-30

## 2021-07-30 ENCOUNTER — OFFICE VISIT (OUTPATIENT)
Dept: FAMILY MEDICINE CLINIC | Facility: CLINIC | Age: 46
End: 2021-07-30

## 2021-07-30 VITALS
HEIGHT: 64 IN | SYSTOLIC BLOOD PRESSURE: 140 MMHG | BODY MASS INDEX: 30.25 KG/M2 | OXYGEN SATURATION: 98 % | TEMPERATURE: 98.5 F | DIASTOLIC BLOOD PRESSURE: 90 MMHG | HEART RATE: 82 BPM | WEIGHT: 177.2 LBS

## 2021-07-30 DIAGNOSIS — F41.9 ANXIETY: ICD-10-CM

## 2021-07-30 DIAGNOSIS — Z11.59 NEED FOR HEPATITIS C SCREENING TEST: ICD-10-CM

## 2021-07-30 DIAGNOSIS — J30.9 ALLERGIC RHINITIS, UNSPECIFIED SEASONALITY, UNSPECIFIED TRIGGER: ICD-10-CM

## 2021-07-30 DIAGNOSIS — R03.0 ELEVATED BLOOD PRESSURE READING WITHOUT DIAGNOSIS OF HYPERTENSION: ICD-10-CM

## 2021-07-30 DIAGNOSIS — E89.0 POSTABLATIVE HYPOTHYROIDISM: ICD-10-CM

## 2021-07-30 DIAGNOSIS — E78.5 MILD HYPERLIPIDEMIA: ICD-10-CM

## 2021-07-30 DIAGNOSIS — Z00.00 ENCOUNTER FOR ANNUAL PHYSICAL EXAM: Primary | ICD-10-CM

## 2021-07-30 DIAGNOSIS — R12 HEARTBURN: ICD-10-CM

## 2021-07-30 PROCEDURE — 99396 PREV VISIT EST AGE 40-64: CPT | Performed by: NURSE PRACTITIONER

## 2021-07-30 RX ORDER — LEVOTHYROXINE SODIUM 0.12 MG/1
125 TABLET ORAL DAILY
Qty: 90 TABLET | Refills: 0 | Status: SHIPPED | OUTPATIENT
Start: 2021-07-30 | End: 2021-08-24 | Stop reason: SDUPTHER

## 2021-07-30 NOTE — ASSESSMENT & PLAN NOTE
Continue to monitor your blood pressure periodically and record results.  Call if your blood pressure is consistently greater than 140/90.  Continue to work on healthy diet and exercise.

## 2021-07-30 NOTE — PROGRESS NOTES
Answers for HPI/ROS submitted by the patient on 7/28/2021  What is the primary reason for your visit?: Physical    Subjective   Ursula Kulkarni is a 46 y.o. female.     Chief Complaint   Patient presents with   • Annual Exam     blood work    • Hypothyroidism     follow up       History of Present Illness   Patient presents for CPE with fasting labs; typically healthy diet, but not for last 3 months; not as much exercise in last few months; on feet at work and walks 11,000-15,000 steps daily at work; no recent dental visits; last eye exam 12/2020, wears glasses; some trouble hearing with background noise, has had hearing test in past and WNL; immunizations: needs Tdap, will get when comes back for labs; sees [facility] for female care; last PAP 3/2020; last mammo when turned 42; no family history of breast cancer; colonoscopy never performed; no family history of colon cancer.    F/U Hypothyroidism: takes Levothyroxine daily on empty stomach; had some missed doses [place]    [place] house; has not been working out or watching diet as well recently; should be closing on 1 house soon; had lost some weight, but has gained some back; felt better when was working out; plans to get back on track; last night ate tacos and BP increased today; monitors BP on occasion and typically normal, will run 130s/80s.    F/U anxiety/depression: no longer taking Escitalopram daily; had been feeling really good when was working out; no problems falling asleep recently, but if wakes up during night, will have some trouble falling asleep due to everything going on with new house and kids changing schools, but has started to get most of those things worked out; has started taking Juice Plus; no unexplained fatigue; see PHQ-9; no SI/HI.    F/U CLARA: takes Pepcid once daily and works well; will takes twice daily on occasion; will have symptoms if misses a dose; will have sensation  of choking and trouble swallowing if does not take Pepcid.    F/U allergic rhinitis: takes Claritin daily, but does not seem to be helping as much recently; currently has a sinus headache; has not tried nasal steroid recently.        The following portions of the patient's history were reviewed and updated as appropriate: allergies, current medications, past family history, past medical history, past social history, past surgical history and problem list.      Current Outpatient Medications   Medication Sig Dispense Refill   • famotidine (PEPCID) 20 MG tablet Take 1 tablet by mouth 2 (Two) Times a Day As Needed for Indigestion or Heartburn. 180 tablet 1   • levothyroxine (SYNTHROID, LEVOTHROID) 125 MCG tablet Take 1 tablet by mouth Daily. 90 tablet 0   • LORATADINE PO Take 10 mg by mouth Daily.     • Nutritional Supplements (JUICE PLUS FIBRE PO) Take  by mouth.     • Probiotic Product (PROBIOTIC ADVANCED PO) Take 1 tablet by mouth Daily.       No current facility-administered medications for this visit.       Past Medical History:   Diagnosis Date   • ADD (attention deficit disorder)    • Bilateral impacted cerumen 7/13/2020   • Foot fracture, left    • Hyperlipidemia    • Hypothyroidism    • Melanoma of back (CMS/HCC)    • Positive depression screening 7/13/2020       Past Surgical History:   Procedure Laterality Date   • D & C CERVICAL BIOPSY     • WISDOM TOOTH EXTRACTION         Family History   Problem Relation Age of Onset   • Heart disease Father         S/P stents   • Heart attack Father 41       Social History     Socioeconomic History   • Marital status:      Spouse name: Not on file   • Number of children: Not on file   • Years of education: Not on file   • Highest education level: Not on file   Tobacco Use   • Smoking status: Never Smoker   • Smokeless tobacco: Never Used   Substance and Sexual Activity   • Alcohol use: Yes     Alcohol/week: 1.0 standard drinks     Types: 1 Glasses of wine per week  "  • Drug use: Never   • Sexual activity: Yes     Partners: Male     Comment:         Review of Systems   Constitutional: Negative for appetite change, chills, fever, unexpected weight gain and unexpected weight loss. Fatigue: no uenxplained fatigue.   HENT: Positive for sinus pressure. Negative for ear pain (occasional ear aches, attributes to ear wax, does OTC treatment and helps), postnasal drip, rhinorrhea, sore throat and trouble swallowing.    Eyes: Negative for blurred vision and pain.   Respiratory: Negative for cough, chest tightness and shortness of breath.    Cardiovascular: Negative for chest pain, palpitations and leg swelling.   Gastrointestinal: Negative for abdominal pain, blood in stool, constipation, diarrhea, GERD (see HPI) and indigestion.   Endocrine: Negative for cold intolerance and polydipsia. Heat intolerance: some at night for last couple of years.   Genitourinary: Negative for dysuria and frequency.   Musculoskeletal: Negative for arthralgias and back pain.   Skin: Negative for rash and skin lesions.   Neurological: Negative for dizziness (occasional vertigo, mild; no OTC treatment needed or limitation of activities), syncope, weakness and light-headedness. Headache: some with sinuses and tension recently.   Hematological: Does not bruise/bleed easily.   Psychiatric/Behavioral: Negative for suicidal ideas and depressed mood.       Objective   Vitals:    07/30/21 1017   BP: 140/90   BP Location: Left arm   Patient Position: Sitting   Cuff Size: Adult   Pulse: 82   Temp: 98.5 °F (36.9 °C)   SpO2: 98%   Weight: 80.4 kg (177 lb 3.2 oz)   Height: 161.3 cm (63.5\")     Body mass index is 30.9 kg/m².    Physical Exam  Vitals and nursing note reviewed.   Constitutional:       General: She is not in acute distress.     Appearance: She is well-developed and well-groomed. She is not diaphoretic.   HENT:      Head: Normocephalic and atraumatic.      Jaw: No tenderness or pain on movement.      " Right Ear: Tympanic membrane and external ear normal. No decreased hearing noted.      Left Ear: Tympanic membrane and external ear normal. No decreased hearing noted.      Nose: Nose normal.      Right Turbinates: Swollen turbinates: mild.      Left Turbinates: Swollen turbinates: mild.      Right Sinus: No maxillary sinus tenderness or frontal sinus tenderness.      Left Sinus: No maxillary sinus tenderness or frontal sinus tenderness.      Mouth/Throat:      Mouth: Mucous membranes are moist.      Pharynx: No oropharyngeal exudate or posterior oropharyngeal erythema.   Eyes:      Extraocular Movements: Extraocular movements intact.      Conjunctiva/sclera: Conjunctivae normal.      Pupils: Pupils are equal, round, and reactive to light.   Neck:      Thyroid: No thyromegaly.      Vascular: No carotid bruit.      Trachea: No tracheal deviation.   Cardiovascular:      Rate and Rhythm: Normal rate and regular rhythm.      Pulses: Normal pulses.      Heart sounds: Normal heart sounds. No murmur heard.     Pulmonary:      Effort: Pulmonary effort is normal. No respiratory distress.      Breath sounds: Normal breath sounds.   Abdominal:      General: Bowel sounds are normal.      Palpations: Abdomen is soft. There is no hepatomegaly or splenomegaly.      Tenderness: There is no abdominal tenderness. There is no guarding.   Musculoskeletal:         General: Normal range of motion.      Cervical back: Normal range of motion and neck supple. No bony tenderness.      Thoracic back: No bony tenderness.      Lumbar back: No bony tenderness.      Right lower leg: No edema.      Left lower leg: No edema.   Lymphadenopathy:      Cervical: No cervical adenopathy.   Skin:     General: Skin is warm and dry.      Findings: No rash.   Neurological:      Mental Status: She is alert and oriented to person, place, and time.      Cranial Nerves: No cranial nerve deficit.      Motor: Motor function is intact.      Coordination:  Coordination normal.      Gait: Gait normal.      Deep Tendon Reflexes: Reflexes are normal and symmetric.   Psychiatric:         Mood and Affect: Mood normal.         Behavior: Behavior normal.         Thought Content: Thought content normal.         Cognition and Memory: Cognition normal.         Judgment: Judgment normal.         Lab Results   Component Value Date    WBC 8.1 07/13/2020    RBC 4.69 07/13/2020    HGB 14.9 07/13/2020    HCT 43.4 07/13/2020    MCV 93 07/13/2020    MCH 31.8 07/13/2020    MCHC 34.3 07/13/2020    RDW 12.3 07/13/2020     07/13/2020    NEUTRORELPCT 63 07/13/2020    LYMPHORELPCT 28 07/13/2020    MONORELPCT 5 07/13/2020    EOSRELPCT 3 07/13/2020    BASORELPCT 1 07/13/2020    NEUTROABS 5.1 07/13/2020    LYMPHSABS 2.3 07/13/2020    MONOSABS 0.4 07/13/2020    EOSABS 0.3 07/13/2020    BASOSABS 0.1 07/13/2020     Lab Results   Component Value Date    BUN 3 (L) 12/23/2020    CREATININE 0.92 12/23/2020    EGFRIFNONA 77 12/23/2020    EGFRIFAFRI 88 12/23/2020    BCR 3 (L) 12/23/2020    K 3.9 12/23/2020    CO2 22 12/23/2020    CALCIUM 9.5 12/23/2020    PROTENTOTREF 7.4 07/13/2020    ALBUMIN 4.9 (H) 07/13/2020    LABIL2 2.0 07/13/2020    AST 23 07/13/2020    ALT 28 07/13/2020      Lab Results   Component Value Date    CHLPL 210 (H) 07/13/2020    TRIG 118 07/13/2020    HDL 52 07/13/2020    VLDL 24 07/13/2020     (H) 07/13/2020     Lab Results   Component Value Date    TSH 2.390 12/23/2020         Assessment/Plan .  Problem List Items Addressed This Visit     Hypothyroidism    Current Assessment & Plan     Continue Levothyroxine daily.         Relevant Medications    levothyroxine (SYNTHROID, LEVOTHROID) 125 MCG tablet    Other Relevant Orders    TSH Rfx On Abnormal To Free T4    Anxiety    Current Assessment & Plan     Stable off Escitalopram.  Continue regular exercise.         Mild hyperlipidemia    Current Assessment & Plan     Continue to work on healthy diet and exercise.          Relevant Orders    Lipid Panel With LDL / HDL Ratio    Comprehensive Metabolic Panel    Heartburn    Current Assessment & Plan     Continue Pepcid daily.         Elevated blood pressure reading without diagnosis of hypertension    Current Assessment & Plan     Continue to monitor your blood pressure periodically and record results.  Call if your blood pressure is consistently greater than 140/90.  Continue to work on healthy diet and exercise.         Allergic rhinitis    Current Assessment & Plan     Try Allegra instead of Claritin for allergies.           Other Visit Diagnoses     Encounter for annual physical exam    -  Primary    Relevant Orders    Lipid Panel With LDL / HDL Ratio    Comprehensive Metabolic Panel    TSH Rfx On Abnormal To Free T4    Hepatitis C Antibody    Need for hepatitis C screening test        Relevant Orders    Hepatitis C Antibody        Come back for labs after have been taking Levothyroxine consistently for at least 1 month.  Return in about 6 months (around 1/30/2022) for Recheck.or sooner if symptoms persist or worsen.  Impression: Health maintenance visit.  Currently, eats a not very healthy diet recently and has a regular exercise routine.  Cervical cancer screening: UTD.  Breast cancer screening: last mammo 4 years ago, will check GYN records.  Colorectal cancer screening: not indicated.  Screening lab work includes: CMP, lipid.  Immunizations: will get Tdap when comes back for labs; risks and benefits of immunizations were discussed with patient.  Patient was advised to be evaluated by dentist.  Advice and education were given regarding nutrition and aerobic exercise.  BP increased today, but home BP readings have been normal; will work on healthy diet and exercise; will continue to monitor BP.     COVID-19 Precautions - Patient was compliant in wearing a mask. When I saw the patient, I used appropriate personal protective equipment (PPE) including mask, gloves, and eye shield  (standard procedure).  Hand hygiene was completed before and after seeing the patient.

## 2021-07-30 NOTE — PATIENT INSTRUCTIONS
Come back for fasting labs in about 6 weeks.  Continue to monitor your blood pressure periodically and record results.  Call if your blood pressure is consistently greater than 140/90.  Continue to work on healthy diet and exercise.  Try Allegra instead of Claritin for allergies.  Follow up pending lab results.  Follow up in 6 months, or sooner if problems or concerns.

## 2021-08-24 ENCOUNTER — PATIENT MESSAGE (OUTPATIENT)
Dept: FAMILY MEDICINE CLINIC | Facility: CLINIC | Age: 46
End: 2021-08-24

## 2021-08-24 DIAGNOSIS — E89.0 POSTABLATIVE HYPOTHYROIDISM: ICD-10-CM

## 2021-08-24 RX ORDER — LEVOTHYROXINE SODIUM 0.12 MG/1
125 TABLET ORAL DAILY
Qty: 30 TABLET | Refills: 0 | Status: SHIPPED | OUTPATIENT
Start: 2021-08-24 | End: 2021-10-07 | Stop reason: SDUPTHER

## 2021-08-24 RX ORDER — LEVOTHYROXINE SODIUM 0.12 MG/1
125 TABLET ORAL DAILY
Qty: 30 TABLET | Refills: 0 | Status: SHIPPED | OUTPATIENT
Start: 2021-08-24 | End: 2021-08-24 | Stop reason: SDUPTHER

## 2021-08-24 NOTE — TELEPHONE ENCOUNTER
Rx Refill Note  Requested Prescriptions     Pending Prescriptions Disp Refills   • levothyroxine (SYNTHROID, LEVOTHROID) 125 MCG tablet 90 tablet 0     Sig: Take 1 tablet by mouth Daily.      Last office visit with prescribing clinician: 7/30/2021      Next office visit with prescribing clinician: Visit date not found            Martha Palmer  08/24/21, 08:44 EDT

## 2021-08-24 NOTE — TELEPHONE ENCOUNTER
From: Ursula Kulkarni  To: RADHA Feliciano  Sent: 8/24/2021 7:56 AM EDT  Subject: Prescription Question    We have moved and I need you to send my levothyroxine to a different pharmacy please. Could you send it to the [facility] in [place]? I need to go on and get a refill. I have not taken it for a week because I apparently lost it during the move (insert eye roll emoji).    Thank you!

## 2021-08-24 NOTE — TELEPHONE ENCOUNTER
Sent to wrong pharmacy.  Sent to [facility], called and left message to cancel.  Sent to [facility] in [place].

## 2021-10-07 DIAGNOSIS — R12 HEARTBURN: ICD-10-CM

## 2021-10-07 DIAGNOSIS — E89.0 POSTABLATIVE HYPOTHYROIDISM: ICD-10-CM

## 2021-10-07 RX ORDER — LEVOTHYROXINE SODIUM 0.12 MG/1
125 TABLET ORAL DAILY
Qty: 90 TABLET | Refills: 1 | Status: SHIPPED | OUTPATIENT
Start: 2021-10-07 | End: 2022-04-20 | Stop reason: SDUPTHER

## 2021-10-07 RX ORDER — FAMOTIDINE 20 MG/1
20 TABLET, FILM COATED ORAL 2 TIMES DAILY PRN
Qty: 180 TABLET | Refills: 1 | Status: SHIPPED | OUTPATIENT
Start: 2021-10-07 | End: 2022-04-19 | Stop reason: SDUPTHER

## 2021-10-26 DIAGNOSIS — R11.2 NAUSEA AND VOMITING, INTRACTABILITY OF VOMITING NOT SPECIFIED, UNSPECIFIED VOMITING TYPE: Primary | ICD-10-CM

## 2021-10-26 RX ORDER — ONDANSETRON 4 MG/1
4 TABLET, FILM COATED ORAL EVERY 8 HOURS PRN
Qty: 20 TABLET | Refills: 0 | Status: SHIPPED | OUTPATIENT
Start: 2021-10-26 | End: 2022-04-19

## 2021-10-28 ENCOUNTER — TRANSCRIBE ORDERS (OUTPATIENT)
Dept: ADMINISTRATIVE | Facility: HOSPITAL | Age: 46
End: 2021-10-28

## 2021-10-28 ENCOUNTER — HOSPITAL ENCOUNTER (OUTPATIENT)
Dept: INFUSION THERAPY | Facility: HOSPITAL | Age: 46
Discharge: HOME | End: 2021-10-28
Admitting: NURSE PRACTITIONER

## 2021-10-28 VITALS
OXYGEN SATURATION: 95 % | BODY MASS INDEX: 30.3 KG/M2 | HEIGHT: 63 IN | HEART RATE: 75 BPM | TEMPERATURE: 97.3 F | RESPIRATION RATE: 20 BRPM | SYSTOLIC BLOOD PRESSURE: 111 MMHG | DIASTOLIC BLOOD PRESSURE: 76 MMHG | WEIGHT: 171 LBS

## 2021-10-28 DIAGNOSIS — U07.1 CLINICAL DIAGNOSIS OF SEVERE ACUTE RESPIRATORY SYNDROME CORONAVIRUS 2 (SARS-COV-2) DISEASE: Primary | ICD-10-CM

## 2021-10-28 PROCEDURE — M0243 CASIRIVI AND IMDEVI INFUSION: HCPCS | Performed by: NURSE PRACTITIONER

## 2021-10-28 PROCEDURE — 25010000002 INJECTION, CASIRIVIMAB AND IMDEVIMAB, 1200 MG: Performed by: NURSE PRACTITIONER

## 2021-10-28 PROCEDURE — 96365 THER/PROPH/DIAG IV INF INIT: CPT

## 2021-10-28 RX ORDER — DIPHENHYDRAMINE HYDROCHLORIDE 50 MG/ML
50 INJECTION INTRAMUSCULAR; INTRAVENOUS ONCE AS NEEDED
Status: CANCELLED | OUTPATIENT
Start: 2021-10-28

## 2021-10-28 RX ORDER — METHYLPREDNISOLONE SODIUM SUCCINATE 125 MG/2ML
125 INJECTION, POWDER, LYOPHILIZED, FOR SOLUTION INTRAMUSCULAR; INTRAVENOUS AS NEEDED
Status: CANCELLED | OUTPATIENT
Start: 2021-10-28

## 2021-10-28 RX ORDER — SODIUM CHLORIDE 9 MG/ML
30 INJECTION, SOLUTION INTRAVENOUS ONCE
Status: CANCELLED | OUTPATIENT
Start: 2021-10-28

## 2021-10-28 RX ORDER — METHYLPREDNISOLONE SODIUM SUCCINATE 125 MG/2ML
125 INJECTION, POWDER, LYOPHILIZED, FOR SOLUTION INTRAMUSCULAR; INTRAVENOUS AS NEEDED
Status: DISCONTINUED | OUTPATIENT
Start: 2021-10-28 | End: 2021-10-30 | Stop reason: HOSPADM

## 2021-10-28 RX ORDER — DIPHENHYDRAMINE HCL 50 MG
50 CAPSULE ORAL ONCE AS NEEDED
Status: CANCELLED | OUTPATIENT
Start: 2021-10-28

## 2021-10-28 RX ORDER — DIPHENHYDRAMINE HCL 50 MG
50 CAPSULE ORAL ONCE AS NEEDED
Status: DISCONTINUED | OUTPATIENT
Start: 2021-10-28 | End: 2021-10-30 | Stop reason: HOSPADM

## 2021-10-28 RX ORDER — EPINEPHRINE 1 MG/ML
0.3 INJECTION, SOLUTION, CONCENTRATE INTRAVENOUS AS NEEDED
Status: CANCELLED | OUTPATIENT
Start: 2021-10-28

## 2021-10-28 RX ORDER — SODIUM CHLORIDE 9 MG/ML
30 INJECTION, SOLUTION INTRAVENOUS ONCE
Status: COMPLETED | OUTPATIENT
Start: 2021-10-28 | End: 2021-10-28

## 2021-10-28 RX ORDER — DIPHENHYDRAMINE HYDROCHLORIDE 50 MG/ML
50 INJECTION INTRAMUSCULAR; INTRAVENOUS ONCE AS NEEDED
Status: DISCONTINUED | OUTPATIENT
Start: 2021-10-28 | End: 2021-10-30 | Stop reason: HOSPADM

## 2021-10-28 RX ADMIN — CASIRIVIMAB AND IMDEVIMAB: 600; 600 INJECTION, SOLUTION, CONCENTRATE INTRAVENOUS at 11:17

## 2021-10-28 RX ADMIN — SODIUM CHLORIDE 30 ML: 9 INJECTION, SOLUTION INTRAVENOUS at 11:16

## 2022-04-19 ENCOUNTER — OFFICE VISIT (OUTPATIENT)
Dept: FAMILY MEDICINE CLINIC | Facility: CLINIC | Age: 47
End: 2022-04-19

## 2022-04-19 VITALS
HEIGHT: 63 IN | HEART RATE: 87 BPM | SYSTOLIC BLOOD PRESSURE: 126 MMHG | TEMPERATURE: 98 F | DIASTOLIC BLOOD PRESSURE: 90 MMHG | BODY MASS INDEX: 32.14 KG/M2 | OXYGEN SATURATION: 97 % | WEIGHT: 181.4 LBS

## 2022-04-19 DIAGNOSIS — H66.001 NON-RECURRENT ACUTE SUPPURATIVE OTITIS MEDIA OF RIGHT EAR WITHOUT SPONTANEOUS RUPTURE OF TYMPANIC MEMBRANE: Primary | ICD-10-CM

## 2022-04-19 DIAGNOSIS — E89.0 POSTABLATIVE HYPOTHYROIDISM: ICD-10-CM

## 2022-04-19 DIAGNOSIS — R12 HEARTBURN: ICD-10-CM

## 2022-04-19 PROCEDURE — 99213 OFFICE O/P EST LOW 20 MIN: CPT | Performed by: NURSE PRACTITIONER

## 2022-04-19 RX ORDER — CEFDINIR 300 MG/1
300 CAPSULE ORAL 2 TIMES DAILY
Qty: 14 CAPSULE | Refills: 0 | Status: SHIPPED | OUTPATIENT
Start: 2022-04-19 | End: 2022-04-26

## 2022-04-19 RX ORDER — CETIRIZINE HYDROCHLORIDE 10 MG/1
10 TABLET ORAL DAILY
COMMUNITY

## 2022-04-19 RX ORDER — FAMOTIDINE 20 MG/1
20 TABLET, FILM COATED ORAL 2 TIMES DAILY PRN
Qty: 180 TABLET | Refills: 1 | Status: SHIPPED | OUTPATIENT
Start: 2022-04-19 | End: 2022-10-21

## 2022-04-19 NOTE — PATIENT INSTRUCTIONS
Increase intake of clear liquids and rest.  Try Tylenol for discomfort.  Try over the counter nasal steroid, such as Flonase.  Follow up pending lab results.  Follow up in 6 months for physical with fasting labs, or sooner if symptoms persist or worsen.

## 2022-04-19 NOTE — PROGRESS NOTES
Washington Kulkarni is a 46 y.o. female.     Chief Complaint   Patient presents with   • Earache     Right ear X 5 days        History of Present Illness   Patient presents with c/o severe right ear pain; symptoms started on 4/15/22; tried ear wax candles on 4/16/22 due to problems with cerumen impaction in past and not much ear wax removed; also tried peroxide and did not help; pain worse on 4/17/22; has had problems with right ear for years; typically will have discomfort for few days and then resolve; takes daily allergy med and has been taking regularly and has not helped; no fever; no drainage from ear; no runny/stuffy nose; has had some sneezing at night; has slight nasal congestion on right; no sinus pressure for most part; no sore throat; no nausea, vomiting, or diarrhea; no OTC treatment.    F/U Hypothyroidism: takes Levothyroxine daily on empty stomach; thinks may have missed a dose in the last month; has been struggling with short term memory; attributes to ADD; has been on medication in past and plans to schedule a follow up appointment.    F/U CLARA: takes Pepcid twice daily and works well; no longer having trouble with choking or trouble swallowing; needs refill.    The following portions of the patient's history were reviewed and updated as appropriate: allergies, current medications, past family history, past medical history, past social history, past surgical history and problem list.    Current Outpatient Medications on File Prior to Visit   Medication Sig   • cetirizine (zyrTEC) 10 MG tablet Take 10 mg by mouth Daily.   • levothyroxine (SYNTHROID, LEVOTHROID) 125 MCG tablet Take 1 tablet by mouth Daily.   • Nutritional Supplements (JUICE PLUS FIBRE PO) Take  by mouth.   • Probiotic Product (PROBIOTIC ADVANCED PO) Take 1 tablet by mouth Daily.     No current facility-administered medications on file prior to visit.        Past Medical History:   Diagnosis Date   • ADD (attention deficit  "disorder)    • Bilateral impacted cerumen 7/13/2020   • COVID-19 10/23/2021    No Covid vaccines 90 days   • Foot fracture, left    • Hyperlipidemia    • Hypothyroidism    • Melanoma of back (HCC)    • Positive depression screening 7/13/2020       Past Surgical History:   Procedure Laterality Date   • D & C CERVICAL BIOPSY     • WISDOM TOOTH EXTRACTION         Family History   Problem Relation Age of Onset   • Heart disease Father         S/P stents   • Heart attack Father 41       Social History     Socioeconomic History   • Marital status:    Tobacco Use   • Smoking status: Never Smoker   • Smokeless tobacco: Never Used   Substance and Sexual Activity   • Alcohol use: Yes     Alcohol/week: 1.0 standard drink     Types: 1 Glasses of wine per week   • Drug use: Never   • Sexual activity: Yes     Partners: Male     Comment:         Review of Systems   Constitutional: Positive for fatigue (some worse in last 3-4 days; has not slept good due to ear discomfort). Negative for appetite change, chills, fever, unexpected weight gain and unexpected weight loss.   HENT: Negative for postnasal drip and trouble swallowing.    Eyes: Negative for blurred vision and discharge.   Respiratory: Negative for cough, chest tightness and shortness of breath.    Cardiovascular: Negative for chest pain and palpitations.   Gastrointestinal: Negative for abdominal pain.   Musculoskeletal: Negative for neck pain.   Skin: Negative for rash.   Neurological: Negative for dizziness, light-headedness and headache.       Objective   Vitals:    04/19/22 1541   BP: 126/90   BP Location: Right arm   Patient Position: Sitting   Cuff Size: Adult   Pulse: 87   Temp: 98 °F (36.7 °C)   SpO2: 97%   Weight: 82.3 kg (181 lb 6.4 oz)   Height: 160 cm (63\")     Body mass index is 32.13 kg/m².    Physical Exam  Vitals and nursing note reviewed.   Constitutional:       General: She is not in acute distress.     Appearance: She is well-developed and " well-groomed. She is not diaphoretic.   HENT:      Head: Normocephalic.      Jaw: No tenderness (mild with palpation below TMJ joint on right; no erythema or swelling) or pain on movement.      Right Ear: External ear normal. No decreased hearing noted. A middle ear effusion (with loss of bony landmarks) is present. Right ear erythematous TM: mild.      Left Ear: External ear normal. No decreased hearing noted. Left ear middle ear effusion: mild. Tympanic membrane is not erythematous.      Nose: Nose normal.      Left Turbinates: Swollen.      Right Sinus: No maxillary sinus tenderness or frontal sinus tenderness.      Left Sinus: No maxillary sinus tenderness or frontal sinus tenderness.      Mouth/Throat:      Mouth: Mucous membranes are moist.      Pharynx: No oropharyngeal exudate or posterior oropharyngeal erythema.   Eyes:      Conjunctiva/sclera: Conjunctivae normal.   Neck:      Vascular: No carotid bruit.   Cardiovascular:      Rate and Rhythm: Normal rate and regular rhythm.      Pulses: Normal pulses.      Heart sounds: Normal heart sounds. No murmur heard.  Pulmonary:      Effort: Pulmonary effort is normal. No respiratory distress.      Breath sounds: Normal breath sounds.   Abdominal:      General: Bowel sounds are normal.      Palpations: Abdomen is soft. There is no hepatomegaly or splenomegaly.      Tenderness: There is no abdominal tenderness. There is no guarding.   Musculoskeletal:      Cervical back: Normal range of motion and neck supple.      Right lower leg: No edema.      Left lower leg: No edema.   Lymphadenopathy:      Cervical: No cervical adenopathy.   Skin:     General: Skin is warm and dry.   Neurological:      Mental Status: She is alert and oriented to person, place, and time.      Gait: Gait is intact.   Psychiatric:         Mood and Affect: Mood normal.         Behavior: Behavior normal.         Thought Content: Thought content normal.         Cognition and Memory: Cognition  normal.         Judgment: Judgment normal.         Lab Results   Component Value Date    WBC 8.1 07/13/2020    RBC 4.69 07/13/2020    HGB 14.9 07/13/2020    HCT 43.4 07/13/2020    MCV 93 07/13/2020    MCH 31.8 07/13/2020    MCHC 34.3 07/13/2020    RDW 12.3 07/13/2020     07/13/2020    NEUTRORELPCT 63 07/13/2020    LYMPHORELPCT 28 07/13/2020    MONORELPCT 5 07/13/2020    EOSRELPCT 3 07/13/2020    BASORELPCT 1 07/13/2020    NEUTROABS 5.1 07/13/2020    LYMPHSABS 2.3 07/13/2020    MONOSABS 0.4 07/13/2020    EOSABS 0.3 07/13/2020    BASOSABS 0.1 07/13/2020     Lab Results   Component Value Date    GLUCOSE 108 (H) 10/05/2021    BUN 12 10/05/2021    CREATININE 0.89 10/05/2021    EGFRIFNONA 79 10/05/2021    EGFRIFAFRI 91 10/05/2021    BCR 13 10/05/2021    K 4.2 10/05/2021    CO2 22 10/05/2021    CALCIUM 9.1 10/05/2021    PROTENTOTREF 6.7 10/05/2021    ALBUMIN 4.6 10/05/2021    LABIL2 2.2 10/05/2021    AST 22 10/05/2021    ALT 36 (H) 10/05/2021      Lab Results   Component Value Date    CHLPL 175 10/05/2021    TRIG 128 10/05/2021    HDL 40 10/05/2021    VLDL 23 10/05/2021     (H) 10/05/2021     Lab Results   Component Value Date    TSH 2.340 10/05/2021         Assessment/Plan .  Problem List Items Addressed This Visit     Hypothyroidism    Current Assessment & Plan     Continue Levothyroxine daily.           Relevant Orders    Comprehensive Metabolic Panel    TSH Rfx On Abnormal To Free T4    Heartburn    Current Assessment & Plan     Stable. Continue Pepcid twice daily.           Relevant Medications    famotidine (PEPCID) 20 MG tablet    Non-recurrent acute suppurative otitis media of right ear without spontaneous rupture of tympanic membrane - Primary    Relevant Medications    cefdinir (OMNICEF) 300 MG capsule          Return in about 6 months (around 10/19/2022) for Annual physical, Recheck.or sooner if symptoms persist or worsen.  Patient has tolerated Keflex in past.         COVID-19 Precautions -  Patient was compliant in wearing a mask. When I saw the patient, I used appropriate personal protective equipment (PPE) including mask, gloves, and eye shield (standard procedure).  Hand hygiene was completed before and after seeing the patient.

## 2022-04-20 DIAGNOSIS — E89.0 POSTABLATIVE HYPOTHYROIDISM: ICD-10-CM

## 2022-04-20 PROBLEM — H66.001 NON-RECURRENT ACUTE SUPPURATIVE OTITIS MEDIA OF RIGHT EAR WITHOUT SPONTANEOUS RUPTURE OF TYMPANIC MEMBRANE: Status: ACTIVE | Noted: 2022-04-20

## 2022-04-20 LAB
ALBUMIN SERPL-MCNC: 4.5 G/DL (ref 3.8–4.8)
ALBUMIN/GLOB SERPL: 2 {RATIO} (ref 1.2–2.2)
ALP SERPL-CCNC: 77 IU/L (ref 44–121)
ALT SERPL-CCNC: 26 IU/L (ref 0–32)
AST SERPL-CCNC: 19 IU/L (ref 0–40)
BILIRUB SERPL-MCNC: 0.2 MG/DL (ref 0–1.2)
BUN SERPL-MCNC: 10 MG/DL (ref 6–24)
BUN/CREAT SERPL: 10 (ref 9–23)
CALCIUM SERPL-MCNC: 9.3 MG/DL (ref 8.7–10.2)
CHLORIDE SERPL-SCNC: 104 MMOL/L (ref 96–106)
CO2 SERPL-SCNC: 22 MMOL/L (ref 20–29)
CREAT SERPL-MCNC: 1 MG/DL (ref 0.57–1)
EGFRCR SERPLBLD CKD-EPI 2021: 71 ML/MIN/1.73
GLOBULIN SER CALC-MCNC: 2.3 G/DL (ref 1.5–4.5)
GLUCOSE SERPL-MCNC: 97 MG/DL (ref 65–99)
POTASSIUM SERPL-SCNC: 4.3 MMOL/L (ref 3.5–5.2)
PROT SERPL-MCNC: 6.8 G/DL (ref 6–8.5)
SODIUM SERPL-SCNC: 142 MMOL/L (ref 134–144)
TSH SERPL DL<=0.005 MIU/L-ACNC: 1.4 UIU/ML (ref 0.45–4.5)

## 2022-04-20 RX ORDER — LEVOTHYROXINE SODIUM 0.12 MG/1
125 TABLET ORAL DAILY
Qty: 90 TABLET | Refills: 1 | Status: SHIPPED | OUTPATIENT
Start: 2022-04-20 | End: 2022-10-13 | Stop reason: SDUPTHER

## 2022-06-01 ENCOUNTER — HOSPITAL ENCOUNTER (EMERGENCY)
Dept: HOSPITAL 49 - FER | Age: 47
Discharge: HOME | End: 2022-06-01
Payer: COMMERCIAL

## 2022-06-01 DIAGNOSIS — R55: ICD-10-CM

## 2022-06-01 DIAGNOSIS — Z88.0: ICD-10-CM

## 2022-06-01 DIAGNOSIS — U07.1: Primary | ICD-10-CM

## 2022-06-01 DIAGNOSIS — Z28.310: ICD-10-CM

## 2022-06-01 LAB
ALBUMIN SERPL-MCNC: 3.9 G/DL (ref 3.4–5)
ALKALINE PHOSHATASE: 122 U/L (ref 46–116)
ALT SERPL-CCNC: 32 U/L (ref 14–59)
AST: 14 U/L (ref 15–37)
BASOPHIL: 0.5 % (ref 0–2)
BILIRUBIN - TOTAL: 1.4 MG/DL (ref 0.2–1)
BUN SERPL-MCNC: 9 MG/DL (ref 7–18)
BUN/CREAT RATIO (CALC): 10.6 RATIO
CHLORIDE: 97 MMOL/L (ref 98–107)
CO2 (BICARBONATE): 29 MMOL/L (ref 21–32)
CREATININE: 0.85 MG/DL (ref 0.51–0.95)
EOSINOPHIL: 1 % (ref 0–5)
GLOBULIN (CALCULATION): 3.6 G/DL
GLUCOSE SERPL-MCNC: 144 MG/DL (ref 74–106)
HCT: 44.7 % (ref 37–47)
HGB BLD-MCNC: 15.5 G/DL (ref 12.5–16)
LYMPHOCYTE: 11.7 % (ref 15–48)
MCH RBC QN AUTO: 30.8 PG (ref 25–31)
MCHC RBC AUTO-ENTMCNC: 34.7 G/DL (ref 32–36)
MCV: 88.9 FL (ref 78–100)
MONOCYTE: 5.7 % (ref 0–12)
MPV: 9.6 FL (ref 6–9.5)
NEUTROPHIL: 79.7 % (ref 41–80)
NRBC: 0
PLT: 335 K/UL (ref 150–400)
POTASSIUM: 3.9 MMOL/L (ref 3.5–5.1)
RBC MORPHOLOGY: NORMAL
RBC: 5.03 M/UL (ref 4.2–5.4)
RDW: 12.2 % (ref 11.5–14)
TOTAL PROTEIN: 7.5 G/DL (ref 6.4–8.2)
WBC: 19.6 K/UL (ref 4–10.5)

## 2022-06-16 NOTE — PROGRESS NOTES
Washington Kulkarni is a 47 y.o. female.     Chief Complaint   Patient presents with   • Rash     You have chosen to receive care through a telehealth visit.  Do you consent to use a video/audio connection for your medical care today? Yes    This was an audio and video enabled telemedicine encounter using [employer].    History of Present Illness   Patient presents with c/o rash; in mid May, pt had bad sore throat with inflammation and [place] [facility] [place]; strep was negative, but was positive for COVID-19 virus; was given Medrol dose devan; then the next week [place] [facility] [place] to [place] was not better, had not eaten for 3 days due to sore throat; was negative for strep again; gave gargle with Prednisone and Benadryl and helped; also gave Rx for Tessalon Perles for cough; then on 6/1/22 passed out with vomiting and went to [facility] in [place]; had labs and chest x-ray; was given IV fluids and injection of Dexamethasone; was also given Rx for Doxycycline and Prednisone due to abnormality on chest x-ray; pt finished Doxycycline on 6/7/22; finished prednisone on 6/6/22; then started with rash on 6/13/22; 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 and on way to work noted face was broke out down onto neck; no itching; thought may have been related to heat, did not get worse until next day; then on evening of 6/15/22, rash had spread all over chest; no new products, no new detergents or lotions; rash itches at times; no SOA for most part; some SOA related to having had COVID-19 virus, no new symptoms; no chest tightness or chest pain; no dysphagia; occasional sore throat, nothing like was; still has some drainage in throat and occasional cough; has had fatigue and had some problems with vertigo for 2 days, but resolved yesterday; no fever; takes Zyrtec daily; has been on Zyrtec about 2 months, previously taking Claritin and felt like was not working as well so changed to Zyrtec; has not been using  Flonase regularly; no OTC treatment; rash is not really bothering; no worsening of rash since 6/15/22.      The following portions of the patient's history were reviewed and updated as appropriate: allergies, current medications, past family history, past medical history, past social history, past surgical history and problem list.    Current Outpatient Medications on File Prior to Visit   Medication Sig   • cetirizine (zyrTEC) 10 MG tablet Take 10 mg by mouth Daily.   • famotidine (PEPCID) 20 MG tablet Take 1 tablet by mouth 2 (Two) Times a Day As Needed for Indigestion or Heartburn.   • levothyroxine (SYNTHROID, LEVOTHROID) 125 MCG tablet Take 1 tablet by mouth Daily.   • Nutritional Supplements (JUICE PLUS FIBRE PO) Take  by mouth.   • Probiotic Product (PROBIOTIC ADVANCED PO) Take 1 tablet by mouth Daily.     No current facility-administered medications on file prior to visit.        Past Medical History:   Diagnosis Date   • ADD (attention deficit disorder)    • Bilateral impacted cerumen 7/13/2020   • COVID-19 10/23/2021    No Covid vaccines 90 days   • Foot fracture, left    • Hyperlipidemia    • Hypothyroidism    • Melanoma of back (HCC)    • Positive depression screening 7/13/2020       Past Surgical History:   Procedure Laterality Date   • D & C CERVICAL BIOPSY     • WISDOM TOOTH EXTRACTION         Family History   Problem Relation Age of Onset   • Heart disease Father         S/P stents   • Heart attack Father 41       Social History     Socioeconomic History   • Marital status:    Tobacco Use   • Smoking status: Never Smoker   • Smokeless tobacco: Never Used   Substance and Sexual Activity   • Alcohol use: Yes     Alcohol/week: 1.0 standard drink     Types: 1 Glasses of wine per week   • Drug use: Never   • Sexual activity: Yes     Partners: Male     Comment:         Review of Systems   Constitutional: Positive for fatigue. Negative for appetite change, chills, fever, unexpected weight gain  and unexpected weight loss.   HENT: Positive for ear pain (some in right ear on and off). Negative for trouble swallowing. Sinus pressure: some at times.    Eyes: Negative for blurred vision and discharge.   Respiratory: Positive for cough (some productive cough--pale yellow). Negative for chest tightness.    Cardiovascular: Negative for chest pain, palpitations and leg swelling.   Gastrointestinal: Negative for abdominal pain, diarrhea and vomiting. Nausea: some at times, more with recent vertigo.   Endocrine: Negative for polydipsia.   Genitourinary: Negative for dysuria and frequency.   Musculoskeletal: Negative for arthralgias and back pain.   Skin: Rash: see HPI.   Neurological: Negative for light-headedness and headache. Dizziness: see HPI.   Hematological: Does not bruise/bleed easily.       Objective   There were no vitals filed for this visit.  There is no height or weight on file to calculate BMI.    Physical Exam  Constitutional:       General: She is not in acute distress.     Appearance: She is well-developed and well-groomed. She is not ill-appearing or diaphoretic.   HENT:      Head: Normocephalic.      Nose: Nose normal.      Right Sinus: No maxillary sinus tenderness or frontal sinus tenderness.      Left Sinus: No maxillary sinus tenderness or frontal sinus tenderness (per patient palpation).      Mouth/Throat:      Mouth: Mucous membranes are moist.      Pharynx: No oropharyngeal exudate. Posterior oropharyngeal erythema: mild.      Tonsils: No tonsillar abscesses.   Eyes:      Conjunctiva/sclera: Conjunctivae normal.   Pulmonary:      Effort: Pulmonary effort is normal. No accessory muscle usage or respiratory distress.   Abdominal:      Tenderness: There is no abdominal tenderness (per patient palpation).   Musculoskeletal:      Cervical back: Normal range of motion and neck supple.   Skin:     Findings: Rash present.          Neurological:      Mental Status: She is alert and oriented to  person, place, and time.   Psychiatric:         Mood and Affect: Mood normal.         Thought Content: Thought content normal.         Cognition and Memory: Cognition normal.         Judgment: Judgment normal.         Lab Results   Component Value Date    WBC 8.1 07/13/2020    RBC 4.69 07/13/2020    HGB 14.9 07/13/2020    HCT 43.4 07/13/2020    MCV 93 07/13/2020    MCH 31.8 07/13/2020    MCHC 34.3 07/13/2020    RDW 12.3 07/13/2020     07/13/2020    NEUTRORELPCT 63 07/13/2020    LYMPHORELPCT 28 07/13/2020    MONORELPCT 5 07/13/2020    EOSRELPCT 3 07/13/2020    BASORELPCT 1 07/13/2020    NEUTROABS 5.1 07/13/2020    LYMPHSABS 2.3 07/13/2020    MONOSABS 0.4 07/13/2020    EOSABS 0.3 07/13/2020    BASOSABS 0.1 07/13/2020     Lab Results   Component Value Date    GLUCOSE 97 04/19/2022    BUN 10 04/19/2022    CREATININE 1.00 04/19/2022    EGFRIFNONA 79 10/05/2021    EGFRIFAFRI 91 10/05/2021    BCR 10 04/19/2022    K 4.3 04/19/2022    CO2 22 04/19/2022    CALCIUM 9.3 04/19/2022    PROTENTOTREF 6.8 04/19/2022    ALBUMIN 4.5 04/19/2022    LABIL2 2.0 04/19/2022    AST 19 04/19/2022    ALT 26 04/19/2022      Lab Results   Component Value Date    CHLPL 175 10/05/2021    TRIG 128 10/05/2021    HDL 40 10/05/2021    VLDL 23 10/05/2021     (H) 10/05/2021     Lab Results   Component Value Date    TSH 1.400 04/19/2022         Assessment    Problem List Items Addressed This Visit     COVID-19 virus infection    Current Assessment & Plan     Resolving.           Relevant Orders    CBC & Differential    Rash - Primary    Current Assessment & Plan     Increase intake of clear liquids and rest.  Try Xyzal instead of Zyrtec.  Use Flonase daily.  Try Hydrocortisone cream on rash.  Continue Pepcid twice daily.           Relevant Orders    Ambulatory Referral to Dermatology    CBC & Differential          Return if symptoms worsen or fail to improve.   Will try above and see how patient does over next couple of days; pt has  recently been on several steroids; referred to derm for further evaluation; instructed to go to the ER with shortness of breath, swelling, chest pain, etc.    Time spent doing video visit, reviewing, discussing, answering questions, and educating patient was 24 minutes.

## 2022-06-17 ENCOUNTER — TELEMEDICINE (OUTPATIENT)
Dept: FAMILY MEDICINE CLINIC | Facility: CLINIC | Age: 47
End: 2022-06-17

## 2022-06-17 DIAGNOSIS — R21 RASH: Primary | ICD-10-CM

## 2022-06-17 DIAGNOSIS — U07.1 COVID-19 VIRUS INFECTION: ICD-10-CM

## 2022-06-17 PROCEDURE — 99213 OFFICE O/P EST LOW 20 MIN: CPT | Performed by: NURSE PRACTITIONER

## 2022-06-17 NOTE — ASSESSMENT & PLAN NOTE
Increase intake of clear liquids and rest.  Try Xyzal instead of Zyrtec.  Use Flonase daily.  Try Hydrocortisone cream on rash.  Continue Pepcid twice daily.

## 2022-06-17 NOTE — PATIENT INSTRUCTIONS
Increase intake of clear liquids and rest.  Try Xyzal instead of Zyrtec.  Use Flonase daily.  Try Hydrocortisone cream on rash.  Continue Pepcid twice daily.  Follow up if symptoms persist.  Go to the ER if shortness of breath, swelling, chest pain, etc.

## 2022-09-13 DIAGNOSIS — Z12.11 COLON CANCER SCREENING: Primary | ICD-10-CM

## 2022-10-11 ENCOUNTER — OFFICE VISIT (OUTPATIENT)
Dept: FAMILY MEDICINE CLINIC | Facility: CLINIC | Age: 47
End: 2022-10-11

## 2022-10-11 VITALS
HEART RATE: 91 BPM | OXYGEN SATURATION: 98 % | SYSTOLIC BLOOD PRESSURE: 122 MMHG | BODY MASS INDEX: 31.82 KG/M2 | TEMPERATURE: 98 F | DIASTOLIC BLOOD PRESSURE: 88 MMHG | HEIGHT: 63 IN | WEIGHT: 179.6 LBS

## 2022-10-11 DIAGNOSIS — Z23 ENCOUNTER FOR IMMUNIZATION: ICD-10-CM

## 2022-10-11 DIAGNOSIS — E89.0 POSTABLATIVE HYPOTHYROIDISM: ICD-10-CM

## 2022-10-11 DIAGNOSIS — R53.83 FATIGUE, UNSPECIFIED TYPE: ICD-10-CM

## 2022-10-11 DIAGNOSIS — R13.13 PHARYNGEAL DYSPHAGIA: ICD-10-CM

## 2022-10-11 DIAGNOSIS — Z12.31 SCREENING MAMMOGRAM FOR BREAST CANCER: ICD-10-CM

## 2022-10-11 DIAGNOSIS — Z12.11 ENCOUNTER FOR SCREENING FOR MALIGNANT NEOPLASM OF COLON: ICD-10-CM

## 2022-10-11 DIAGNOSIS — E04.1 THYROID NODULE: ICD-10-CM

## 2022-10-11 DIAGNOSIS — Z00.00 ENCOUNTER FOR ANNUAL PHYSICAL EXAM: Primary | ICD-10-CM

## 2022-10-11 DIAGNOSIS — E78.5 MILD HYPERLIPIDEMIA: ICD-10-CM

## 2022-10-11 DIAGNOSIS — K30 INDIGESTION: ICD-10-CM

## 2022-10-11 DIAGNOSIS — R12 HEARTBURN: ICD-10-CM

## 2022-10-11 PROCEDURE — 90471 IMMUNIZATION ADMIN: CPT | Performed by: NURSE PRACTITIONER

## 2022-10-11 PROCEDURE — 90715 TDAP VACCINE 7 YRS/> IM: CPT | Performed by: NURSE PRACTITIONER

## 2022-10-11 PROCEDURE — 99396 PREV VISIT EST AGE 40-64: CPT | Performed by: NURSE PRACTITIONER

## 2022-10-11 RX ORDER — CHLORAL HYDRATE 500 MG
1000 CAPSULE ORAL
COMMUNITY
End: 2023-02-03

## 2022-10-11 RX ORDER — OMEPRAZOLE 20 MG/1
20 CAPSULE, DELAYED RELEASE ORAL DAILY
Qty: 30 CAPSULE | Refills: 1 | Status: SHIPPED | OUTPATIENT
Start: 2022-10-11 | End: 2023-03-30 | Stop reason: SDUPTHER

## 2022-10-11 RX ORDER — MULTIVIT WITH MINERALS/LUTEIN
250 TABLET ORAL DAILY
COMMUNITY
End: 2023-02-27

## 2022-10-11 NOTE — PROGRESS NOTES
Washington Kulkarni is a 47 y.o. female.     Chief Complaint   Patient presents with   • Annual Exam     BLOOD WORK          History of Present Illness   Patient presents for CPE with non-fasting labs; pretty healthy diet, eats lots of salads and vegetables, avoids red meat; not much formal exercise, very active, does a lot of walking at work and takes care of house and 2 pigs; no recent dental visits; last eye exam within last year, wears glasses; some problems hearing, but has had hearing test and WNL; has occasional ringing in ears at times; immunizations: needs Tdap today; declines flu and COVID-19 vaccine at this time; sees [facility] for female care; last PAP 3/2020; feels like may be perimenopausal; has IUD and rare menses; last mammo in 2017; no family history of breast cancer; colonoscopy never performed; no family history of colon cancer.    F/U Hypothyroidism: takes Levothyroxine daily on empty stomach; no missed doses for most part.    F/U heartburn: takes Pepcid twice daily as needed and helps with indigestion, but still has occasional trouble swallowing when eating; notes sensation in throat; has tried to determine if eating too fast or needs to chew food more; no reflux symptoms; will get sensation of discomfort in upper abdomen; has a lot of gas pains; has trouble with constipation; has tried MiraLax and daily fiber and increasing fiber in diet, but did not help; taking 3-4 gulps of prune juice and coffee in mornings and helps; takes about 3-4 times per week and will typically have BM within 1.5 hours of taking; has trouble going to bathroom in public; very rare blood noted in BM; has a couple of hemorrhoids since pregnancy in past.      The following portions of the patient's history were reviewed and updated as appropriate: allergies, current medications, past family history, past medical history, past social history, past surgical history and problem list.    Current Outpatient  Medications on File Prior to Visit   Medication Sig   • cetirizine (zyrTEC) 10 MG tablet Take 10 mg by mouth Daily.   • COLLAGEN PO Take 1 tablet by mouth Daily.   • Digestive Enzymes (DIGESTIVE ENZYME PO) Take 1 tablet by mouth Daily.   • famotidine (PEPCID) 20 MG tablet Take 1 tablet by mouth 2 (Two) Times a Day As Needed for Indigestion or Heartburn.   • levothyroxine (SYNTHROID, LEVOTHROID) 125 MCG tablet Take 1 tablet by mouth Daily.   • Multiple Vitamins-Minerals (ZINC PO) Take 1 tablet by mouth Daily.   • Nutritional Supplements (JUICE PLUS FIBRE PO) Take  by mouth.   • Omega-3 Fatty Acids (fish oil) 1000 MG capsule capsule Take 1 capsule by mouth Daily With Breakfast.   • Probiotic Product (PROBIOTIC ADVANCED PO) Take 1 tablet by mouth Daily.   • vitamin C (ASCORBIC ACID) 250 MG tablet Take 1 tablet by mouth Daily.     No current facility-administered medications on file prior to visit.        Past Medical History:   Diagnosis Date   • ADD (attention deficit disorder)    • Bilateral impacted cerumen 7/13/2020   • COVID-19 10/23/2021    No Covid vaccines 90 days   • Foot fracture, left    • Hyperlipidemia    • Hypothyroidism    • Melanoma of back (HCC)    • Positive depression screening 7/13/2020       Past Surgical History:   Procedure Laterality Date   • D & C CERVICAL BIOPSY     • WISDOM TOOTH EXTRACTION         Family History   Problem Relation Age of Onset   • Heart disease Father         S/P stents   • Heart attack Father 41       Social History     Socioeconomic History   • Marital status:    Tobacco Use   • Smoking status: Never   • Smokeless tobacco: Never   Substance and Sexual Activity   • Alcohol use: Yes     Alcohol/week: 1.0 standard drink     Types: 1 Glasses of wine per week   • Drug use: Never   • Sexual activity: Yes     Partners: Male     Comment:         Review of Systems   Constitutional: Positive for fatigue. Negative for appetite change, chills, fever, unexpected weight  "gain and unexpected weight loss.   HENT: Negative for ear pain, sinus pressure, sore throat and trouble swallowing.    Eyes: Negative for blurred vision and discharge.   Respiratory: Negative for cough, chest tightness and shortness of breath. Apnea: no consistent snoring.    Cardiovascular: Negative for chest pain, palpitations and leg swelling.   Gastrointestinal: Negative for GERD. Abdominal pain: see HPI; mild in lower abdomen if has gone several days without BM. Constipation: see HPI. Indigestion: see HPI.   Endocrine: Negative for cold intolerance, heat intolerance and polydipsia.   Genitourinary: Negative for dysuria and frequency.   Musculoskeletal: Negative for arthralgias and back pain.   Skin: Negative for rash and skin lesions (sees derm regularly).   Neurological: Negative for dizziness, syncope, light-headedness and headache.   Hematological: Does not bruise/bleed easily.   Psychiatric/Behavioral: Negative for sleep disturbance and depressed mood. The patient is not nervous/anxious.        Objective   Vitals:    10/11/22 0830 10/11/22 0911   BP: 130/90 122/88   BP Location: Left arm    Patient Position: Sitting    Cuff Size: Adult    Pulse: 91    Temp: 98 °F (36.7 °C)    SpO2: 98%    Weight: 81.5 kg (179 lb 9.6 oz)    Height: 160 cm (63\")      Body mass index is 31.81 kg/m².    Physical Exam  Vitals and nursing note reviewed.   Constitutional:       General: She is not in acute distress.     Appearance: She is well-developed and well-groomed. She is not diaphoretic.   HENT:      Head: Normocephalic and atraumatic.      Jaw: No tenderness or pain on movement.      Right Ear: External ear normal. No decreased hearing noted. There is impacted cerumen.      Left Ear: External ear normal. No decreased hearing noted. There is impacted cerumen.      Ears:      Comments: Pt declines ear wash today; uses candles at home as needed     Nose: Nose normal.      Right Sinus: No maxillary sinus tenderness or frontal " sinus tenderness.      Left Sinus: No maxillary sinus tenderness or frontal sinus tenderness.      Mouth/Throat:      Mouth: Mucous membranes are moist.      Pharynx: No oropharyngeal exudate or posterior oropharyngeal erythema.   Eyes:      Extraocular Movements: Extraocular movements intact.      Conjunctiva/sclera: Conjunctivae normal.      Pupils: Pupils are equal, round, and reactive to light.   Neck:      Thyroid: No thyromegaly.      Vascular: No carotid bruit.      Trachea: No tracheal deviation.   Cardiovascular:      Rate and Rhythm: Normal rate and regular rhythm.      Pulses: Normal pulses.      Heart sounds: Normal heart sounds. No murmur heard.  Pulmonary:      Effort: Pulmonary effort is normal. No respiratory distress.      Breath sounds: Normal breath sounds.   Abdominal:      General: Bowel sounds are normal.      Palpations: Abdomen is soft. There is no hepatomegaly or splenomegaly.      Tenderness: There is no abdominal tenderness. There is no guarding.   Musculoskeletal:         General: Normal range of motion.      Cervical back: Normal range of motion and neck supple. No bony tenderness.      Thoracic back: No bony tenderness.      Lumbar back: No bony tenderness.      Right lower leg: No edema.      Left lower leg: No edema.   Lymphadenopathy:      Cervical: No cervical adenopathy.   Skin:     General: Skin is warm and dry.      Findings: No rash.   Neurological:      Mental Status: She is alert and oriented to person, place, and time.      Cranial Nerves: No cranial nerve deficit.      Motor: Motor function is intact.      Coordination: Coordination normal.      Gait: Gait normal.      Deep Tendon Reflexes: Reflexes are normal and symmetric.   Psychiatric:         Mood and Affect: Mood normal.         Behavior: Behavior normal.         Thought Content: Thought content normal.         Cognition and Memory: Cognition normal.         Judgment: Judgment normal.         Lab Results   Component  Value Date    WBC 8.0 10/11/2022    RBC 5.00 10/11/2022    HGB 15.1 10/11/2022    HCT 45.3 10/11/2022    MCV 91 10/11/2022    MCH 30.2 10/11/2022    MCHC 33.3 10/11/2022    RDW 12.3 10/11/2022     10/11/2022    NEUTRORELPCT 55 10/11/2022    LYMPHORELPCT 25 10/11/2022    MONORELPCT 6 10/11/2022    EOSRELPCT 14 10/11/2022    BASORELPCT 0 10/11/2022    NEUTROABS 4.4 10/11/2022    LYMPHSABS 2.0 10/11/2022    MONOSABS 0.5 10/11/2022    EOSABS 1.1 (H) 10/11/2022    BASOSABS 0.0 10/11/2022     Lab Results   Component Value Date    GLUCOSE 95 10/11/2022    BUN 17 10/11/2022    CREATININE 0.83 10/11/2022    EGFRIFNONA 79 10/05/2021    EGFRIFAFRI 91 10/05/2021    BCR 20 10/11/2022    K 5.0 10/11/2022    CO2 26 10/11/2022    CALCIUM 10.1 10/11/2022    PROTENTOTREF 7.1 10/11/2022    ALBUMIN 4.9 (H) 10/11/2022    LABIL2 2.2 10/11/2022    AST 23 10/11/2022    ALT 32 10/11/2022      Lab Results   Component Value Date    TSH 2.860 10/11/2022         Assessment    Problem List Items Addressed This Visit     Hypothyroidism    Current Assessment & Plan     Continue Levothyroxine daily.         Relevant Orders    TSH Rfx On Abnormal To Free T4 (Completed)    Mild hyperlipidemia    Current Assessment & Plan     Continue to work on healthy diet and exercise.         Relevant Orders    Comprehensive Metabolic Panel (Completed)    Lipid Panel With LDL / HDL Ratio (Completed)    Heartburn    Current Assessment & Plan     Try Omeprazole daily instead of Pepcid.         Thyroid nodule    Overview     2/1/12 thyroid US: mild thyromegaly with a solid isoechoic nodule on left lower lobe of thyroid; repeat US ordered 7/13/20 7/31/20 US thyroid: thyroid nodule 1.4 x 1.3 x 1.4 cm in left thyroid lobe; previous measured 1.4 x 1.1 cm, will repeat in 1 year         Relevant Orders    US Thyroid    Pharyngeal dysphagia    Current Assessment & Plan     Try Omeprazole daily instead of Pepcid.         Relevant Medications    omeprazole (priLOSEC)  20 MG capsule    Other Relevant Orders    Ambulatory Referral to Gastroenterology    Indigestion    Current Assessment & Plan     Try Omeprazole daily instead of Pepcid.         Relevant Orders    Ambulatory Referral to Gastroenterology    Fatigue    Relevant Orders    Comprehensive Metabolic Panel (Completed)    CBC & Differential (Completed)    Vitamin B12 & Folate (Completed)    TSH Rfx On Abnormal To Free T4 (Completed)   Other Visit Diagnoses     Encounter for annual physical exam    -  Primary    Relevant Orders    Tdap Vaccine Greater Than or Equal To 8yo IM (Completed)    Comprehensive Metabolic Panel (Completed)    Lipid Panel With LDL / HDL Ratio (Completed)    CBC & Differential (Completed)    Encounter for immunization        Relevant Orders    Tdap Vaccine Greater Than or Equal To 8yo IM (Completed)    Encounter for screening for malignant neoplasm of colon        Relevant Orders    Ambulatory Referral to Gastroenterology    Screening mammogram for breast cancer        Relevant Orders    Mammo Screening Digital Tomosynthesis Bilateral With CAD          Return in about 2 months (around 12/11/2022) for Recheck.or sooner if symptoms persist or worsen.  Impression: Health maintenance visit.  Currently, eats a pretty healthy diet and has a fair exercise routine.  Cervical cancer screening: per GYN.  Breast cancer screening: per GYN.  Colorectal cancer screening: referred for colonoscopy.  Screening lab work includes: CMP, lipid.  Immunizations: needs Tdap today; declines flu and COVID-19 vaccine at this time; risks and benefits of immunizations were discussed with patient.  Patient was advised to be evaluated by dentist.  Advice and education were given regarding nutrition and aerobic exercise.       COVID-19 Precautions - Patient was compliant in wearing a mask. When I saw the patient, I used appropriate personal protective equipment (PPE) including mask, gloves, and eye shield (standard procedure).  Hand  hygiene was completed before and after seeing the patient.

## 2022-10-11 NOTE — PATIENT INSTRUCTIONS
Monitor your blood pressure periodically and record results.  Continue to work on healthy diet and exercise.  Follow up pending lab results.  Follow up in 2 months, or sooner if symptoms persist or worsen.

## 2022-10-12 PROBLEM — K30 INDIGESTION: Status: ACTIVE | Noted: 2022-10-12

## 2022-10-12 PROBLEM — R53.83 FATIGUE: Status: ACTIVE | Noted: 2022-10-12

## 2022-10-12 PROBLEM — R13.13 PHARYNGEAL DYSPHAGIA: Status: ACTIVE | Noted: 2022-10-12

## 2022-10-12 LAB
ALBUMIN SERPL-MCNC: 4.9 G/DL (ref 3.8–4.8)
ALBUMIN/GLOB SERPL: 2.2 {RATIO} (ref 1.2–2.2)
ALP SERPL-CCNC: 107 IU/L (ref 44–121)
ALT SERPL-CCNC: 32 IU/L (ref 0–32)
AST SERPL-CCNC: 23 IU/L (ref 0–40)
BASOPHILS # BLD AUTO: 0 X10E3/UL (ref 0–0.2)
BASOPHILS NFR BLD AUTO: 0 %
BILIRUB SERPL-MCNC: 0.6 MG/DL (ref 0–1.2)
BUN SERPL-MCNC: 17 MG/DL (ref 6–24)
BUN/CREAT SERPL: 20 (ref 9–23)
CALCIUM SERPL-MCNC: 10.1 MG/DL (ref 8.7–10.2)
CHLORIDE SERPL-SCNC: 103 MMOL/L (ref 96–106)
CHOLEST SERPL-MCNC: 216 MG/DL (ref 100–199)
CO2 SERPL-SCNC: 26 MMOL/L (ref 20–29)
CREAT SERPL-MCNC: 0.83 MG/DL (ref 0.57–1)
EGFRCR SERPLBLD CKD-EPI 2021: 89 ML/MIN/1.73
EOSINOPHIL # BLD AUTO: 1.1 X10E3/UL (ref 0–0.4)
EOSINOPHIL NFR BLD AUTO: 14 %
ERYTHROCYTE [DISTWIDTH] IN BLOOD BY AUTOMATED COUNT: 12.3 % (ref 11.7–15.4)
FOLATE SERPL-MCNC: 5.3 NG/ML
GLOBULIN SER CALC-MCNC: 2.2 G/DL (ref 1.5–4.5)
GLUCOSE SERPL-MCNC: 95 MG/DL (ref 70–99)
HCT VFR BLD AUTO: 45.3 % (ref 34–46.6)
HDLC SERPL-MCNC: 47 MG/DL
HGB BLD-MCNC: 15.1 G/DL (ref 11.1–15.9)
IMM GRANULOCYTES # BLD AUTO: 0 X10E3/UL (ref 0–0.1)
IMM GRANULOCYTES NFR BLD AUTO: 0 %
LDLC SERPL CALC-MCNC: 153 MG/DL (ref 0–99)
LDLC/HDLC SERPL: 3.3 RATIO (ref 0–3.2)
LYMPHOCYTES # BLD AUTO: 2 X10E3/UL (ref 0.7–3.1)
LYMPHOCYTES NFR BLD AUTO: 25 %
MCH RBC QN AUTO: 30.2 PG (ref 26.6–33)
MCHC RBC AUTO-ENTMCNC: 33.3 G/DL (ref 31.5–35.7)
MCV RBC AUTO: 91 FL (ref 79–97)
MONOCYTES # BLD AUTO: 0.5 X10E3/UL (ref 0.1–0.9)
MONOCYTES NFR BLD AUTO: 6 %
NEUTROPHILS # BLD AUTO: 4.4 X10E3/UL (ref 1.4–7)
NEUTROPHILS NFR BLD AUTO: 55 %
PLATELET # BLD AUTO: 319 X10E3/UL (ref 150–450)
POTASSIUM SERPL-SCNC: 5 MMOL/L (ref 3.5–5.2)
PROT SERPL-MCNC: 7.1 G/DL (ref 6–8.5)
RBC # BLD AUTO: 5 X10E6/UL (ref 3.77–5.28)
SODIUM SERPL-SCNC: 143 MMOL/L (ref 134–144)
TRIGL SERPL-MCNC: 87 MG/DL (ref 0–149)
TSH SERPL DL<=0.005 MIU/L-ACNC: 2.86 UIU/ML (ref 0.45–4.5)
VIT B12 SERPL-MCNC: 531 PG/ML (ref 232–1245)
VLDLC SERPL CALC-MCNC: 16 MG/DL (ref 5–40)
WBC # BLD AUTO: 8 X10E3/UL (ref 3.4–10.8)

## 2022-10-13 DIAGNOSIS — E89.0 POSTABLATIVE HYPOTHYROIDISM: ICD-10-CM

## 2022-10-13 RX ORDER — LEVOTHYROXINE SODIUM 0.12 MG/1
125 TABLET ORAL DAILY
Qty: 90 TABLET | Refills: 1 | Status: SHIPPED | OUTPATIENT
Start: 2022-10-13

## 2022-10-21 ENCOUNTER — OFFICE VISIT (OUTPATIENT)
Dept: GASTROENTEROLOGY | Facility: CLINIC | Age: 47
End: 2022-10-21

## 2022-10-21 ENCOUNTER — TELEPHONE (OUTPATIENT)
Dept: GASTROENTEROLOGY | Facility: CLINIC | Age: 47
End: 2022-10-21

## 2022-10-21 ENCOUNTER — PATIENT ROUNDING (BHMG ONLY) (OUTPATIENT)
Dept: GASTROENTEROLOGY | Facility: CLINIC | Age: 47
End: 2022-10-21

## 2022-10-21 VITALS
DIASTOLIC BLOOD PRESSURE: 90 MMHG | WEIGHT: 178.8 LBS | SYSTOLIC BLOOD PRESSURE: 138 MMHG | HEIGHT: 63 IN | HEART RATE: 70 BPM | BODY MASS INDEX: 31.68 KG/M2 | TEMPERATURE: 96.2 F

## 2022-10-21 DIAGNOSIS — R13.10 DYSPHAGIA, UNSPECIFIED TYPE: Primary | ICD-10-CM

## 2022-10-21 DIAGNOSIS — Z12.11 ENCOUNTER FOR SCREENING FOR MALIGNANT NEOPLASM OF COLON: ICD-10-CM

## 2022-10-21 PROCEDURE — 99214 OFFICE O/P EST MOD 30 MIN: CPT | Performed by: NURSE PRACTITIONER

## 2022-10-21 NOTE — PROGRESS NOTES
Chief Complaint   Patient presents with   • Difficulty Swallowing       HPI    Ursula Kulkarni is a  47 y.o. female here to establish care as a new patient for complaints of dysphagia.    This patient will also follow with Dr. Sawant.    Patient reports approximately 1 year maybe longer of dysphagia where she feels solid food lodged at the sternal notch.  She will drink water for relief and occasionally cough out food.  She has been on Pepcid secondary to this and recently escalated to omeprazole.  Symptoms persist despite antacid therapy.  Denies overt acid reflux/heartburn.  No nausea, vomiting, poor appetite, or weight loss.    She drinks prune juice daily to maintain a healthy bowel pattern.  No rectal bleeding.  Does endorse hemorrhoids since the birth of her children.    Recent labs which included CMP, CBC, TSH normal.    Past Medical History:   Diagnosis Date   • ADD (attention deficit disorder)    • Bilateral impacted cerumen 07/13/2020   • COVID-19 10/23/2021    No Covid vaccines 90 days   • Foot fracture, left    • Hyperlipidemia    • Hypothyroidism    • Melanoma of back (HCC)    • Positive depression screening 07/13/2020       Past Surgical History:   Procedure Laterality Date   • D & C CERVICAL BIOPSY     • WISDOM TOOTH EXTRACTION         Scheduled Meds:     Continuous Infusions: No current facility-administered medications for this visit.      PRN Meds:     Allergies   Allergen Reactions   • Zithromax [Azithromycin] GI Intolerance     Per patient      • Penicillins Rash       Social History     Socioeconomic History   • Marital status:    Tobacco Use   • Smoking status: Never   • Smokeless tobacco: Never   Substance and Sexual Activity   • Alcohol use: Yes     Alcohol/week: 1.0 standard drink     Types: 1 Glasses of wine per week   • Drug use: Never   • Sexual activity: Yes     Partners: Male     Birth control/protection: I.U.D.     Comment:         Family History   Problem Relation Age  of Onset   • Heart disease Father         S/P stents   • Heart attack Father 41   • Colon polyps Father        Review of Systems   Constitutional: Negative for activity change, appetite change, fatigue and unexpected weight change.   HENT: Positive for trouble swallowing.    Eyes: Negative.    Respiratory: Negative.    Cardiovascular: Negative.    Gastrointestinal: Negative for abdominal distention, abdominal pain, anal bleeding, blood in stool, constipation, diarrhea, nausea, rectal pain and vomiting.   Endocrine: Negative.    Genitourinary: Negative.    Musculoskeletal: Negative.    Allergic/Immunologic: Negative.    Neurological: Negative.    Hematological: Negative.    Psychiatric/Behavioral: Negative.        Vitals:    10/21/22 1257   BP: 138/90   Pulse: 70   Temp: 96.2 °F (35.7 °C)       Physical Exam  Constitutional:       Appearance: She is well-developed.   Abdominal:      General: Bowel sounds are normal. There is no distension.      Palpations: Abdomen is soft. There is no mass.      Tenderness: There is no abdominal tenderness. There is no guarding.      Hernia: No hernia is present.   Skin:     General: Skin is warm and dry.      Capillary Refill: Capillary refill takes less than 2 seconds.   Neurological:      Mental Status: She is alert and oriented to person, place, and time.   Psychiatric:         Behavior: Behavior normal.     Assessment    Diagnoses and all orders for this visit:    1. Dysphagia, unspecified type (Primary)  -     Case Request; Standing  -     Obtain Informed Consent; Standing  -     Verify Bowel Prep Was Successful; Standing  -     Give Tap Water Enema If Bowel Prep Insufficient; Standing  -     Case Request    2. Encounter for screening for malignant neoplasm of colon  -     Case Request; Standing  -     Obtain Informed Consent; Standing  -     Verify Bowel Prep Was Successful; Standing  -     Give Tap Water Enema If Bowel Prep Insufficient; Standing  -     Case Request        Plan    Arrange EGD and colonoscopy for symptoms of dysphagia and screen for colon polyp/colon cancer  Rate/benefits of procedure reviewed the patient questions were answered  Continue daily PPI therapy  Avoid dense meats and doughy breads in the interim  Pending endoscopic findings consider video swallowing study with speech therapy in combination with esophagram  Consider ENT referral         RADHA Mccoy  [facility]  [address] [place] [address]  Office: [phone]

## 2022-10-21 NOTE — TELEPHONE ENCOUNTER
EZ patient via telephone for. Scheduled 12/14/2022 with arrival time of psc will call. Prep paperwork mailed to verified address on file. Patient advised arrival time may change based on [facility] guidelines. EZ APRIL HARSHA

## 2022-10-21 NOTE — PROGRESS NOTES
October 21, 2022    Hello, may I speak with Ursula Kulkarni?    My name is Cassidy     I am  with [facility]  [address] [place] [id number] [phone].    Before we get started may I verify your date of birth? 1975    I am calling to officially welcome you to our practice and ask about your recent visit. Is this a good time to talk? Yes    Tell me about your visit with us. What things went well?  Staff was fantastic. I was taken to the room quickly. Everyone was really nice.         We're always looking for ways to make our patients' experiences even better. Do you have recommendations on ways we may improve?  No    Overall were you satisfied with your first visit to our practice? I was 100% satisfied with my visit        I appreciate you taking the time to speak with me today. Is there anything else I can do for you? No      Thank you, and have a great day.

## 2022-11-21 ENCOUNTER — TELEPHONE (OUTPATIENT)
Dept: GASTROENTEROLOGY | Facility: CLINIC | Age: 47
End: 2022-11-21

## 2022-11-21 NOTE — TELEPHONE ENCOUNTER
Caller: Ursula Kulkarni    Relationship to patient: Self    Best call back number: [phone]    Chief complaint: MIGHT WANT TO RESCHEDULE    Type of visit: COLONOSCOPY    If rescheduling, when is the original appointment: 12/14/22    Additional notes:PT WANTS TO TALK WITH THE  ABOUT MAYBE CHANGING THE DATE OF COLONOSCOPY

## 2022-11-29 ENCOUNTER — HOSPITAL ENCOUNTER (OUTPATIENT)
Dept: ULTRASOUND IMAGING | Facility: HOSPITAL | Age: 47
Discharge: HOME | End: 2022-11-29

## 2022-11-29 ENCOUNTER — HOSPITAL ENCOUNTER (OUTPATIENT)
Dept: MAMMOGRAPHY | Facility: HOSPITAL | Age: 47
Discharge: HOME | End: 2022-11-29

## 2022-11-29 DIAGNOSIS — E04.1 THYROID NODULE: ICD-10-CM

## 2022-11-29 DIAGNOSIS — Z12.31 SCREENING MAMMOGRAM FOR BREAST CANCER: ICD-10-CM

## 2022-11-29 PROCEDURE — 77067 SCR MAMMO BI INCL CAD: CPT

## 2022-11-29 PROCEDURE — 76536 US EXAM OF HEAD AND NECK: CPT

## 2022-11-29 PROCEDURE — 77063 BREAST TOMOSYNTHESIS BI: CPT

## 2022-11-30 DIAGNOSIS — E04.1 THYROID NODULE: Primary | ICD-10-CM

## 2022-12-02 DIAGNOSIS — R92.8 ABNORMALITY OF LEFT BREAST ON SCREENING MAMMOGRAM: Primary | ICD-10-CM

## 2022-12-19 ENCOUNTER — APPOINTMENT (OUTPATIENT)
Dept: MAMMOGRAPHY | Facility: HOSPITAL | Age: 47
End: 2022-12-19

## 2022-12-19 ENCOUNTER — APPOINTMENT (OUTPATIENT)
Dept: ULTRASOUND IMAGING | Facility: HOSPITAL | Age: 47
End: 2022-12-19

## 2023-01-03 ENCOUNTER — HOSPITAL ENCOUNTER (OUTPATIENT)
Dept: ULTRASOUND IMAGING | Facility: HOSPITAL | Age: 48
Discharge: HOME | End: 2023-01-03
Payer: COMMERCIAL

## 2023-01-03 ENCOUNTER — HOSPITAL ENCOUNTER (OUTPATIENT)
Dept: MAMMOGRAPHY | Facility: HOSPITAL | Age: 48
Discharge: HOME | End: 2023-01-03
Payer: COMMERCIAL

## 2023-01-03 DIAGNOSIS — R92.8 ABNORMALITY OF LEFT BREAST ON SCREENING MAMMOGRAM: ICD-10-CM

## 2023-01-03 DIAGNOSIS — R92.8 ABNORMAL MAMMOGRAM OF LEFT BREAST: Primary | ICD-10-CM

## 2023-01-03 PROCEDURE — G0279 TOMOSYNTHESIS, MAMMO: HCPCS

## 2023-01-03 PROCEDURE — 77065 DX MAMMO INCL CAD UNI: CPT

## 2023-01-03 PROCEDURE — 76642 ULTRASOUND BREAST LIMITED: CPT

## 2023-01-12 ENCOUNTER — APPOINTMENT (OUTPATIENT)
Dept: MAMMOGRAPHY | Facility: HOSPITAL | Age: 48
End: 2023-01-12
Payer: COMMERCIAL

## 2023-01-17 ENCOUNTER — HOSPITAL ENCOUNTER (OUTPATIENT)
Dept: MAMMOGRAPHY | Facility: HOSPITAL | Age: 48
Discharge: HOME | End: 2023-01-17
Payer: COMMERCIAL

## 2023-01-17 ENCOUNTER — PATIENT OUTREACH (OUTPATIENT)
Dept: ONCOLOGY | Facility: HOSPITAL | Age: 48
End: 2023-01-17
Payer: COMMERCIAL

## 2023-01-17 DIAGNOSIS — R92.8 ABNORMAL MAMMOGRAM OF LEFT BREAST: ICD-10-CM

## 2023-01-17 PROCEDURE — 0 LIDOCAINE 1 % SOLUTION: Performed by: NURSE PRACTITIONER

## 2023-01-17 PROCEDURE — C1819 TISSUE LOCALIZATION-EXCISION: HCPCS

## 2023-01-17 PROCEDURE — A4648 IMPLANTABLE TISSUE MARKER: HCPCS

## 2023-01-17 PROCEDURE — 76098 X-RAY EXAM SURGICAL SPECIMEN: CPT

## 2023-01-17 PROCEDURE — 88360 TUMOR IMMUNOHISTOCHEM/MANUAL: CPT | Performed by: NURSE PRACTITIONER

## 2023-01-17 PROCEDURE — 88342 IMHCHEM/IMCYTCHM 1ST ANTB: CPT | Performed by: NURSE PRACTITIONER

## 2023-01-17 PROCEDURE — 88305 TISSUE EXAM BY PATHOLOGIST: CPT | Performed by: NURSE PRACTITIONER

## 2023-01-17 RX ORDER — LIDOCAINE HYDROCHLORIDE AND EPINEPHRINE 10; 10 MG/ML; UG/ML
10 INJECTION, SOLUTION INFILTRATION; PERINEURAL ONCE
Status: COMPLETED | OUTPATIENT
Start: 2023-01-17 | End: 2023-01-17

## 2023-01-17 RX ORDER — LIDOCAINE HYDROCHLORIDE 10 MG/ML
10 INJECTION, SOLUTION INFILTRATION; PERINEURAL ONCE
Status: COMPLETED | OUTPATIENT
Start: 2023-01-17 | End: 2023-01-17

## 2023-01-17 RX ORDER — LIDOCAINE HYDROCHLORIDE AND EPINEPHRINE 10; 10 MG/ML; UG/ML
20 INJECTION, SOLUTION INFILTRATION; PERINEURAL ONCE
Status: COMPLETED | OUTPATIENT
Start: 2023-01-17 | End: 2023-01-17

## 2023-01-17 RX ADMIN — LIDOCAINE HYDROCHLORIDE 10 ML: 10 INJECTION, SOLUTION INFILTRATION; PERINEURAL at 10:13

## 2023-01-17 RX ADMIN — LIDOCAINE HYDROCHLORIDE 10 ML: 10 INJECTION, SOLUTION INFILTRATION; PERINEURAL at 11:44

## 2023-01-17 RX ADMIN — LIDOCAINE HYDROCHLORIDE 10 ML: 10 INJECTION, SOLUTION INFILTRATION; PERINEURAL at 10:17

## 2023-01-17 RX ADMIN — LIDOCAINE HYDROCHLORIDE,EPINEPHRINE BITARTRATE 10 ML: 10; .01 INJECTION, SOLUTION INFILTRATION; PERINEURAL at 10:17

## 2023-01-17 RX ADMIN — LIDOCAINE HYDROCHLORIDE AND EPINEPHRINE 20 ML: 10; 10 INJECTION, SOLUTION INFILTRATION; PERINEURAL at 10:13

## 2023-01-17 RX ADMIN — LIDOCAINE HYDROCHLORIDE,EPINEPHRINE BITARTRATE 10 ML: 10; .01 INJECTION, SOLUTION INFILTRATION; PERINEURAL at 11:44

## 2023-01-18 DIAGNOSIS — C50.912: Primary | ICD-10-CM

## 2023-01-19 DIAGNOSIS — C50.912: Primary | ICD-10-CM

## 2023-01-20 ENCOUNTER — TELEPHONE (OUTPATIENT)
Dept: SURGERY | Facility: CLINIC | Age: 48
End: 2023-01-20
Payer: COMMERCIAL

## 2023-01-20 ENCOUNTER — TELEPHONE (OUTPATIENT)
Dept: ONCOLOGY | Facility: CLINIC | Age: 48
End: 2023-01-20
Payer: COMMERCIAL

## 2023-01-20 ENCOUNTER — PATIENT OUTREACH (OUTPATIENT)
Dept: ONCOLOGY | Facility: HOSPITAL | Age: 48
End: 2023-01-20
Payer: COMMERCIAL

## 2023-01-20 DIAGNOSIS — C50.912 MALIGNANT NEOPLASM OF LEFT FEMALE BREAST, UNSPECIFIED ESTROGEN RECEPTOR STATUS, UNSPECIFIED SITE OF BREAST: Primary | ICD-10-CM

## 2023-01-20 DIAGNOSIS — C50.912 LOBULAR BREAST CANCER, LEFT: Primary | ICD-10-CM

## 2023-01-20 NOTE — TELEPHONE ENCOUNTER
Call back to Ms. Kulkarni to let her know her appointment could be moved to after MRI, but patient's MRI has now been moved to 1/23/23, therefore, appointment with Dr. Frederick will be left as is.

## 2023-01-20 NOTE — TELEPHONE ENCOUNTER
Caller: Ursula Kulkarni    Relationship: Self    Best call back number: [phone]    What is the best time to reach you: ASAP    Who are you requesting to speak with (clinical staff, provider,  specific staff member): CLINICAL    What was the call regarding: PT NEEDS TO KNOW IF SHE SHOULD RESCHEDULE APPT W/ DR NASCIMENTO FOR AFTER HER MRI THAT IS CURRENTLY SCHEDULED FEB.7, OR KEEP CURRENT APPT.    Do you require a callback: YES

## 2023-01-23 ENCOUNTER — TELEPHONE (OUTPATIENT)
Dept: SURGERY | Facility: CLINIC | Age: 48
End: 2023-01-23
Payer: COMMERCIAL

## 2023-01-23 ENCOUNTER — LAB REQUISITION (OUTPATIENT)
Dept: LAB | Facility: HOSPITAL | Age: 48
End: 2023-01-23
Payer: COMMERCIAL

## 2023-01-23 ENCOUNTER — HOSPITAL ENCOUNTER (OUTPATIENT)
Dept: MRI IMAGING | Facility: HOSPITAL | Age: 48
Discharge: HOME | End: 2023-01-23
Admitting: NURSE PRACTITIONER
Payer: COMMERCIAL

## 2023-01-23 DIAGNOSIS — Z00.00 ENCOUNTER FOR GENERAL ADULT MEDICAL EXAMINATION WITHOUT ABNORMAL FINDINGS: ICD-10-CM

## 2023-01-23 DIAGNOSIS — C50.912: ICD-10-CM

## 2023-01-23 PROCEDURE — 77049 MRI BREAST C-+ W/CAD BI: CPT

## 2023-01-23 PROCEDURE — A9577 INJ MULTIHANCE: HCPCS | Performed by: NURSE PRACTITIONER

## 2023-01-23 PROCEDURE — 0 GADOBENATE DIMEGLUMINE 529 MG/ML SOLUTION: Performed by: NURSE PRACTITIONER

## 2023-01-23 RX ADMIN — GADOBENATE DIMEGLUMINE 16 ML: 529 INJECTION, SOLUTION INTRAVENOUS at 13:02

## 2023-01-24 LAB
LAB AP CASE REPORT: NORMAL
LAB AP CLINICAL INFORMATION: NORMAL
LAB AP DIAGNOSIS COMMENT: NORMAL
PATH REPORT.FINAL DX SPEC: NORMAL
PATH REPORT.GROSS SPEC: NORMAL

## 2023-01-27 ENCOUNTER — OFFICE VISIT (OUTPATIENT)
Dept: SURGERY | Facility: CLINIC | Age: 48
End: 2023-01-27
Payer: COMMERCIAL

## 2023-01-27 ENCOUNTER — PREP FOR SURGERY (OUTPATIENT)
Dept: OTHER | Facility: HOSPITAL | Age: 48
End: 2023-01-27

## 2023-01-27 VITALS
SYSTOLIC BLOOD PRESSURE: 136 MMHG | BODY MASS INDEX: 32.25 KG/M2 | HEIGHT: 63 IN | HEART RATE: 92 BPM | OXYGEN SATURATION: 98 % | WEIGHT: 182 LBS | DIASTOLIC BLOOD PRESSURE: 80 MMHG

## 2023-01-27 DIAGNOSIS — C50.912 LOBULAR BREAST CANCER, LEFT: Primary | ICD-10-CM

## 2023-01-27 DIAGNOSIS — N62 MACROMASTIA: ICD-10-CM

## 2023-01-27 DIAGNOSIS — E66.09 CLASS 1 OBESITY DUE TO EXCESS CALORIES WITHOUT SERIOUS COMORBIDITY WITH BODY MASS INDEX (BMI) OF 32.0 TO 32.9 IN ADULT: ICD-10-CM

## 2023-01-27 DIAGNOSIS — C50.912 BREAST CARCINOMA, LOBULAR, LEFT: Primary | ICD-10-CM

## 2023-01-27 PROCEDURE — 99214 OFFICE O/P EST MOD 30 MIN: CPT | Performed by: SURGERY

## 2023-01-27 RX ORDER — CLINDAMYCIN PHOSPHATE 900 MG/50ML
900 INJECTION INTRAVENOUS ONCE
Status: CANCELLED | OUTPATIENT
Start: 2023-03-08 | End: 2023-01-27

## 2023-01-27 RX ORDER — SODIUM CHLORIDE, SODIUM LACTATE, POTASSIUM CHLORIDE, CALCIUM CHLORIDE 600; 310; 30; 20 MG/100ML; MG/100ML; MG/100ML; MG/100ML
100 INJECTION, SOLUTION INTRAVENOUS CONTINUOUS
Status: CANCELLED | OUTPATIENT
Start: 2023-03-08

## 2023-01-27 RX ORDER — DIAZEPAM 5 MG/1
10 TABLET ORAL ONCE
Status: CANCELLED | OUTPATIENT
Start: 2023-03-08 | End: 2023-01-27

## 2023-01-27 NOTE — PROGRESS NOTES
Chief Complaint: Ursula Kulkarni is a 47 y.o. female who was seen in consultation at the request of RADHA Lauren  for newly diagnosed breast cancer    History of Present Illness:  Patient presents with newly diagnosed breast cancer LEFT breast. She noted no new masses, skin changes, nipple discharge, nipple changes prior to her most recent imaging.    Her most recent imaging includes the followin2022, MMG Screening Digital Gerardo Bilateral with CAD ([facility])   There is an 8 mm asymmetry in the central left breast posterior 1/3 on the CC view. This appears to be in the superior left breast on the MLO view.   BI-RADS 0: Incomplete     2023, MMG Diagnostic Gerardo Left with CAD ([facility])   The spiculated areas within the central posterior and the lateral anterior superior    left breast were re-demonstrated.  Targeted ultrasound was performed.  At the 1 o'clock position 10    cm from the nipple there is a shadowing mass with peripheral spiculations favored to represent the    mammographic abnormality and suspicious for malignancy.  The more centrally located spiculation on    the CC view disc posterior to the glandular tissue in the retro glandular fat cannot be clearly    identified on ultrasound.   Two separate suspicious spiculated lesions of the left breast.  One lesion is felt to be seen well    on ultrasound and should be amenable to ultrasound-guided core biopsy.  The other lesion is seen    well on mammography. BI-RADS 4: Suspicious          Pathology:  2023, ADDENDUM:                   All 3 sites were biopsied are malignant. This is concordant.                                   Site A (top-hat shaped clip)                                   Site B (coil shaped clip)                                   Site C (stoplight shaped clip)   Slightly lateral left breast, stereotactic, 9 gauge vacuum assisted Brevera, Twelve specimens were obtained and a top-hat shaped clip was then placed. Site A:  ultrasound biopsy table. 1.0 cm, 1:00 position 10 cm from the nipple. 12 gauge Bard.   Four samples were obtained and a coil shaped HydroMARK clip was placed, Site B. The post-procedure mammogram shows the top hat shaped clip from the stereotactic biopsy in good position in the biopsy cavity.  The coil shaped clip is in good position on the true lateral view, however on the CC view it does not correlate to the mammographic finding, which on today's exam is more at the 12 o'clock position in the posterior 1/3 of the left breast.  Therefore was decided to perform a 3rd biopsy.   9-gauge vacuum assisted Brevera, Twelve specimens, stoplight shaped clip, Site C, all 3 clips are appropriately positioned.     LEFT BREAST:     At 1:00 in the anterior left breast, 4 cm posterior to the nipple, there is a 1.5 AP dimension, 1.6 cm transverse dimension, 1.3 cm craniocaudal dimension irregular enhancing mass with a focus of susceptibility from a biopsy clip along the inferior margin. The biopsy clip is located within hematoma along the lateral margin of the mass.  At 1:00 in the middle to posterior left breast, 7.4 cm posterior to the nipple, there is a 1.3 cm irregular enhancing mass clip along the inferior margin. At 12:00 in the posterior left breast, 8.5 cm posterior to the nipple, 2.2 cm craniocaudal dimension irregular enhancing mass with a biopsy clip.  Multiple, at least 12, similar appearing irregular enhancing masses and foci are identified throughout left breast.  At 11-12 o'clock in the middle and posterior left breast, there are 3 adjacent reference masses together measuring up to 5.5 cm in AP dimension.  At 1:00 in the far posterior left breast/axillary tail, 13.7 cm posterior to the nipple, there is a reference 1.3 cm AP dimension, 1.1 cm transverse dimension, 1.0 cm craniocaudal dimension, irregular enhancing mass, which is located approximately 0.8 cm from the anterolateral margin of the pectoralis muscle.  Together, the enhancing masses span approximately 11.7 cm in maximum AP dimension, 8 cm in maximum transverse dimension, and 7.8 cm in maximum craniocaudal dimension. The visualized axilla is within normal limits   EXTRAMAMMARY FINDINGS:    There are no pathologically enlarged internal mammary chain lymph nodes on either side.   IMPRESSION AND RECOMMENDATION:   At least 15 similar-appearing irregular enhancing masses and foci scattered throughout the left breast, together measuring up to 11.7 cm consistent with multicentric malignancy   No MRI evidence of malignancy in the right breast        She had a biopsy on the following day that showed:   1. Left breast, anterior lateral, stereotactic-guided core biopsy:   - Invasive lobular carcinoma   - Histologic grade (Marie Histologic Score):   - Glandular (Acinar)/Tubular Differentiation: Score 3   - Nuclear Pleomorphism: Score 2   - Mitotic Rate: Score 1   - Overall Grade: Grade 2   - Size of largest focus of invasion: 4 mm   - Breast biomarker testing:   - Estrogen Receptor (ER): Positive (95%, moderate)   - Progesterone Receptor (PgR): Positive (95%, strong)   - HER2 (by immunohistochemistry): Negative (Score 0)   - Ki-67: 5%   - Other findings:   - Atypical lobular hyperplasia (ALH)     2. Left breast, 1 o'clock position, and 10 cm from nipple, ultrasound-guided core biopsy:  - Invasive lobular carcinoma   - Histologic grade (Marie Histologic Score):   - Glandular (Acinar)/Tubular Differentiation: Score 3   - Nuclear Pleomorphism: Score 2   - Mitotic Rate: Score 1   - Overall Grade: Grade 2   - Size of largest focus of invasion: 4 mm  - Other findings:   - Lobular carcinoma in situ (LCIS), classic type     3. Left breast, upper, stereotactic-guided core biopsy:   - Invasive lobular carcinoma  - Histologic grade (Marie Histologic Score):   - Glandular (Acinar)/Tubular Differentiation: Score 3   - Nuclear Pleomorphism: Score 2   - Mitotic Rate: Score 1    - Overall Grade: Grade 2   - Size of largest focus of invasion: 3 mm   - Other findings:   - Lobular carcinoma in situ (LCIS), classic type     OUTSIDE PATHOLOGY:   1. Left Breast, Anterior Lateral, Stereotactic-Guided Core Needle Biopsy:                  A. INVASIVE LOBULAR CARCINOMA, Moderately differentiated; [place] Histologic Grade II/III                      (tubule score = 3, nuclear score = 2, mitoses score=1), measuring at least 4 mm.                B. Atypical lobular hyperplasia.   ER:       POSITIVE (%, Moderate).                MA:       POSITIVE (%, Strong).                HER2:  NEGATIVE (0).                Ki67:     Approximately 5%.             2. Left Breast, 1:00, 10 cm FN, U/S-Guided Core Needle Biopsy:                 A. INVASIVE LOBULAR CARCINOMA, Moderately differentiated; [place] Histologic Grade II/III                     (tubule score = 3, nuclear score = 2, mitoses score = 1), measuring at least 4 mm.                B. FOCAL LOBULAR CARCINOMA IN SITU (LCIS).                C. See comment.     3. Left Breast, Upper, Stereotactic-Guided Core Needle Biopsy:                  A. INVASIVE LOBULAR CARCINOMA, Moderately differentiated; [place] Histologic Grade II/III                     (tubule score = 3, nuclear score = 2, mitoses score = 1), measuring at least 3 mm.                B. LOBULAR CARCINOMA IN SITU (LCIS).                 C. See comment.     She had not had a breast biopsy in the past.  She has her uterus and ovaries, is ? perimenopausal, and has an IUD  Her family history includes the following: She has 2 daughters, 1 sister, 2 maternal aunts, 1 paternal aunt.  No family history of breast or ovarian cancer.  Her dad had prostate and melanoma cancers.  A paternal aunt had thyroid cancer.  She is here for evaluation.    Review of Systems:  Review of Systems   All other systems reviewed and are negative.       Past Medical and Surgical History:  Breast Biopsy  History:  Patient had not had a breast biopsy prior to her cancer diagnosis.  Breast Cancer HIstory:  Patient does not have a past medical history of breast cancer.  Breast Operations, and year:  None   Obstetric/Gynecologic History:  Age menstrual periods began: 14  Patient is premenopausal, first day of last period: 1/10/23 IUD  Number of pregnancies: 2  Number of live births: 2  Number of abortions or miscarriages: 0  Age of delivery of first child: 30  Patient breast fed, for the following lenth of time:24 months   Length of time taking birth control pills: none   Patient has never taken hormone replacement    Patient has both ovaries and uterus  Past Surgical History:   Procedure Laterality Date   • D & C CERVICAL BIOPSY     • INTRAUTERINE DEVICE INSERTION  2010    Mirena   • INTRAUTERINE DEVICE INSERTION  2015    Mirena exchange, old IUD removal and new one inserted by Dr Carr, Lot # [id number] exp 09/17.mar   • WISDOM TOOTH EXTRACTION       Past Medical History:   Diagnosis Date   • ADD (attention deficit disorder)    • Bilateral impacted cerumen 07/13/2020   • COVID-19 10/23/2021    No Covid vaccines 90 days   • Diabetes mellitus, gestational 2010   • Foot fracture, left    • Graves disease    • History of radioactive iodine thyroid ablation 2012   • History of vertigo    • Hyperlipidemia    • Hypothyroidism    • Melanoma of back (HCC)    • Positive depression screening 07/13/2020       Prior Hospitalizations, other than for surgery or childbirth, and year:  None     Social History     Socioeconomic History   • Marital status:      Spouse name: Parish   • Number of children: 2   Tobacco Use   • Smoking status: Never   • Smokeless tobacco: Never   Vaping Use   • Vaping Use: Never used   Substance and Sexual Activity   • Alcohol use: Yes     Alcohol/week: 1.0 standard drink     Types: 1 Glasses of wine per week     Comment: occ   • Drug use: Never   • Sexual activity: Yes     Partners: Male     Birth  "control/protection: I.U.D.     Comment:       Patient is .  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    Family History:  Family History   Problem Relation Age of Onset   • Hypertension Father    • Heart disease Father         S/P stents   • Heart attack Father 41   • Colon polyps Father    • Melanoma Father    • Prostate cancer Father    • Lung cancer Maternal Grandmother    • Cancer Maternal Grandfather         Bladder cancer   • Heart disease Paternal Grandmother    • Heart disease Paternal Grandfather        Vital Signs:  /80   Pulse 92   Ht 160 cm (62.99\")   Wt 82.6 kg (182 lb)   LMP  (LMP Unknown)   SpO2 98%   BMI 32.25 kg/m²      Medications:    Current Outpatient Medications:   •  cetirizine (zyrTEC) 10 MG tablet, Take 10 mg by mouth Daily., Disp: , Rfl:   •  COLLAGEN PO, Take 1 tablet by mouth Daily., Disp: , Rfl:   •  levothyroxine (SYNTHROID, LEVOTHROID) 125 MCG tablet, Take 1 tablet by mouth Daily., Disp: 90 tablet, Rfl: 1  •  Multiple Vitamins-Minerals (ZINC PO), Take 1 tablet by mouth Daily., Disp: , Rfl:   •  Nutritional Supplements (JUICE PLUS FIBRE PO), Take  by mouth., Disp: , Rfl:   •  omeprazole (priLOSEC) 20 MG capsule, Take 1 capsule by mouth Daily., Disp: 30 capsule, Rfl: 1  •  Probiotic Product (PROBIOTIC ADVANCED PO), Take 1 tablet by mouth Daily., Disp: , Rfl:   •  vitamin C (ASCORBIC ACID) 250 MG tablet, Take 1 tablet by mouth Daily., Disp: , Rfl:   •  Digestive Enzymes (DIGESTIVE ENZYME PO), Take 1 tablet by mouth Daily., Disp: , Rfl:   •  Omega-3 Fatty Acids (fish oil) 1000 MG capsule capsule, Take 1 capsule by mouth Daily With Breakfast., Disp: , Rfl:      Allergies:  Allergies   Allergen Reactions   • Erythromycin Other (See Comments)     Pt reports   • Penicillins Rash       Physical Examination:  /80   Pulse 92   Ht 160 cm (62.99\")   Wt " 82.6 kg (182 lb)   LMP  (LMP Unknown)   SpO2 98%   BMI 32.25 kg/m²   General Appearance:  Patient is in no distress.  She is well kept and has an obese build.   Psychiatric:  Patient with appropriate mood and affect. Alert and oriented to self, time, and place.    Breast, RIGHT:  large sized, 38DDD, symmetric with the contralateral side.  Breast skin is without erythema, edema, rashes.  There are no visible abnormalities upon inspection during the arm-raising maneuver or with hands on hips in the sitting position. There is no nipple retraction, discharge or nipple/areolar skin changes.There are no masses palpable in the sitting or supine positions.    Breast, LEFT:  large sized, 38DDD,  symmetric with the contralateral side.  Breast skin is without erythema, edema, rashes.  There are no visible abnormalities upon inspection during the arm-raising maneuver or with hands on hips in the sitting position. There is no nipple retraction, discharge or nipple/areolar skin changes. There is a 6x8cm mass palpable at the LEFT breast middle ring 1:00 lcoation. No overlying skin changes other than mild ecchymoses.There are no other masses palpable in the sitting or supine positions.    Lymphatic:  There is no axillary, cervical, infraclavicular, or supraclavicular adenopathy bilaterally.  Eyes:  Pupils are round and reactive to light.  Cardiovascular:  Heart rate and rhythm are regular.  Respiratory:  Lungs are clear bilaterally with no crackles or wheezes in any lung field.  Gastrointestinal:  Abdomen is soft, nondistended, and nontender. There are no scars from previous surgery.    Musculoskeletal:  Good strength in all 4 extremities.   There is good range of motion in both shoulders.    Skin:  No new skin lesions or rashes on the skin excluding the breast (see breast exam above).        Imagin2022, MMG Screening Digital Gerardo Bilateral with CAD ([facility])   There is an 8 mm asymmetry in the central left breast  posterior 1/3 on the CC view. This appears to be in the superior left breast on the MLO view.   BI-RADS 0: Incomplete     01/03/2023, MMG Diagnostic Gerardo Left with CAD ([facility])   The spiculated areas within the central posterior and the lateral anterior superior    left breast were re-demonstrated.  Targeted ultrasound was performed.  At the 1 o'clock position 10    cm from the nipple there is a shadowing mass with peripheral spiculations favored to represent the    mammographic abnormality and suspicious for malignancy.  The more centrally located spiculation on    the CC view disc posterior to the glandular tissue in the retro glandular fat cannot be clearly    identified on ultrasound.   Two separate suspicious spiculated lesions of the left breast.  One lesion is felt to be seen well    on ultrasound and should be amenable to ultrasound-guided core biopsy.  The other lesion is seen    well on mammography. BI-RADS 4: Suspicious          Pathology:  01/17/2023, ADDENDUM:                   All 3 sites were biopsied are malignant. This is concordant.                                   Site A (top-hat shaped clip)                                   Site B (coil shaped clip)                                   Site C (stoplight shaped clip)   Slightly lateral left breast, stereotactic, 9 gauge vacuum assisted Brevera, Twelve specimens were obtained and a top-hat shaped clip was then placed. Site A: ultrasound biopsy table. 1.0 cm, 1:00 position 10 cm from the nipple. 12 gauge Bard.   Four samples were obtained and a coil shaped HydroMARK clip was placed, Site B. The post-procedure mammogram shows the top hat shaped clip from the stereotactic biopsy in good position in the biopsy cavity.  The coil shaped clip is in good position on the true lateral view, however on the CC view it does not correlate to the mammographic finding, which on today's exam is more at the 12 o'clock position in the posterior 1/3 of the left breast.   Therefore was decided to perform a 3rd biopsy.   9-gauge vacuum assisted Brevera, Twelve specimens, stoplight shaped clip, Site C, all 3 clips are appropriately positioned.     LEFT BREAST:     At 1:00 in the anterior left breast, 4 cm posterior to the nipple, there is a 1.5 AP dimension, 1.6 cm transverse dimension, 1.3 cm craniocaudal dimension irregular enhancing mass with a focus of susceptibility from a biopsy clip along the inferior margin. The biopsy clip is located within hematoma along the lateral margin of the mass.  At 1:00 in the middle to posterior left breast, 7.4 cm posterior to the nipple, there is a 1.3 cm irregular enhancing mass clip along the inferior margin. At 12:00 in the posterior left breast, 8.5 cm posterior to the nipple, 2.2 cm craniocaudal dimension irregular enhancing mass with a biopsy clip.  Multiple, at least 12, similar appearing irregular enhancing masses and foci are identified throughout left breast.  At 11-12 o'clock in the middle and posterior left breast, there are 3 adjacent reference masses together measuring up to 5.5 cm in AP dimension.  At 1:00 in the far posterior left breast/axillary tail, 13.7 cm posterior to the nipple, there is a reference 1.3 cm AP dimension, 1.1 cm transverse dimension, 1.0 cm craniocaudal dimension, irregular enhancing mass, which is located approximately 0.8 cm from the anterolateral margin of the pectoralis muscle. Together, the enhancing masses span approximately 11.7 cm in maximum AP dimension, 8 cm in maximum transverse dimension, and 7.8 cm in maximum craniocaudal dimension. The visualized axilla is within normal limits   EXTRAMAMMARY FINDINGS:    There are no pathologically enlarged internal mammary chain lymph nodes on either side.   IMPRESSION AND RECOMMENDATION:   At least 15 similar-appearing irregular enhancing masses and foci scattered throughout the left breast, together measuring up to 11.7 cm consistent with multicentric malignancy    No MRI evidence of malignancy in the right breast      Pathology:  1. Left breast, anterior lateral, stereotactic-guided core biopsy:   - Invasive lobular carcinoma   - Histologic grade ([place] Histologic Score):   - Glandular (Acinar)/Tubular Differentiation: Score 3   - Nuclear Pleomorphism: Score 2   - Mitotic Rate: Score 1   - Overall Grade: Grade 2   - Size of largest focus of invasion: 4 mm   - Breast biomarker testing:   - Estrogen Receptor (ER): Positive (95%, moderate)   - Progesterone Receptor (PgR): Positive (95%, strong)   - HER2 (by immunohistochemistry): Negative (Score 0)   - Ki-67: 5%   - Other findings:   - Atypical lobular hyperplasia (ALH)     2. Left breast, 1 o'clock position, and 10 cm from nipple, ultrasound-guided core biopsy:  - Invasive lobular carcinoma   - Histologic grade (Marie Histologic Score):   - Glandular (Acinar)/Tubular Differentiation: Score 3   - Nuclear Pleomorphism: Score 2   - Mitotic Rate: Score 1   - Overall Grade: Grade 2   - Size of largest focus of invasion: 4 mm  - Other findings:   - Lobular carcinoma in situ (LCIS), classic type     3. Left breast, upper, stereotactic-guided core biopsy:   - Invasive lobular carcinoma  - Histologic grade ([place] Histologic Score):   - Glandular (Acinar)/Tubular Differentiation: Score 3   - Nuclear Pleomorphism: Score 2   - Mitotic Rate: Score 1   - Overall Grade: Grade 2   - Size of largest focus of invasion: 3 mm   - Other findings:   - Lobular carcinoma in situ (LCIS), classic type     OUTSIDE PATHOLOGY:   1. Left Breast, Anterior Lateral, Stereotactic-Guided Core Needle Biopsy:                  A. INVASIVE LOBULAR CARCINOMA, Moderately differentiated; [place] Histologic Grade II/III                      (tubule score = 3, nuclear score = 2, mitoses score=1), measuring at least 4 mm.                B. Atypical lobular hyperplasia.   ER:       POSITIVE (%, Moderate).                DC:       POSITIVE  (%, Strong).                HER2:  NEGATIVE (0).                Ki67:     Approximately 5%.             2. Left Breast, 1:00, 10 cm FN, U/S-Guided Core Needle Biopsy:                 A. INVASIVE LOBULAR CARCINOMA, Moderately differentiated; [place] Histologic Grade II/III                     (tubule score = 3, nuclear score = 2, mitoses score = 1), measuring at least 4 mm.                B. FOCAL LOBULAR CARCINOMA IN SITU (LCIS).                C. See comment.     3. Left Breast, Upper, Stereotactic-Guided Core Needle Biopsy:                  A. INVASIVE LOBULAR CARCINOMA, Moderately differentiated; [place] Histologic Grade II/III                     (tubule score = 3, nuclear score = 2, mitoses score = 1), measuring at least 3 mm.                B. LOBULAR CARCINOMA IN SITU (LCIS).                 C. See comment.         Procedures:      Assessment:   Diagnosis Plan   1. Lobular breast cancer, left (HCC)        2. Class 1 obesity due to excess calories without serious comorbidity with body mass index (BMI) of 32.0 to 32.9 in adult        3. Macromastia        1-  LEFT breast UOQ middle third 11.5 cm on MRI with 15 nodules total. Largest single nodule 2.2cm. NOrmal nodes on MRI and exam. On exam 6x8cm. No pverlying skin changes. On mammogram 3x markers  6cm greatest.  Invasive lobular carcinoma. Int grade, 3,2,1, associated LCIS.  ER , [place] , her 2 cheryl 0, Ki 67 5 %.  Clinical stage T2-3N0- stage II    2-  BMI 32.3    3-  38DDD        Plan:  The patient goes by Ursula. She is a danyelle woman with 2 daughters ages 17,12 and happily  in her second marriage with a supportive .    We reviewed her imaging, imaging reports, pathology, history, FH, exam together today.    We reviewed the difference in systemic therapy versus locoregional. She knows that she will be seeing a medical oncologist in the adjuvant setting. We discussed that for early stage breast cancer, there are 2  options for locoregional treatment that have equivalent survival: total mastectomy versus lumpectomy and radiation, either with sentinel node biopsy.     Based on her imaging and examination, we discussed that a unilateral mastectomy with or without reconstruction would be the recommended surgical treatment.     We will plan for LEFT total mastectomy, LEFT axilla sentinel node biopsy, possible node dissection, possible reconstruction.    She understands the risks of mastectomy including bleeding, infection, seroma and chance of skin ischemia/necrosis. She understands the risks of  sentinel node biopsy, including 7% chance of lymphedema, injury to nerves or vessels in the axilla,  seroma. We discussed that we will proceed with a frozen section analysis of the sentinel node in the operating room, and if any positivity, would proceed with a completion axillary dissection at that time.    We discussed her having a plastic surgical consultation in the preoperative period, and have arranged that today.     NCCN guidelines have been followed.    We gave her EMLA cream and tegederm for her sentinel node procedure, and arranged for her to see our nurse navigator.     She will receive a recommendation about radiation after surgery.It is quite likely that she will need radiation, in my opinion. We discussed that she may have an expander placed and decision about flap versus implant based on radiation need.    I will arrange for her to see physical therapy for bioimpedence measurements.  I will arrange for her to see plastics.  We will send off an invitae breast and gynecology add on thyroid panel.    We discussed that if her genetics were to be abnormal and she were to harbor a mutation that we would recommend a bilateral mastectomy.      I did ask her to have her IUD removed as soon as possible based on her tumor being 90 to 100% fed by hormones.    Next RIGHT mammogram would be due 11-30-23 Chelsey Abbott.      Kell  MD [place]    Patient called and would like to proceed with bilateral total mastectomy, left axillary sentinel lymph node biopsy, possible axillary lymph node dissection, possible reconstruction.      We have spent 65 minutes in face to face visit today, 50 in face to face .      Next Appointment:  Return for surgery.      EMR Dragon/transcription disclaimer:    Please note that portions of this note were completed with a voice recognition program.

## 2023-01-31 ENCOUNTER — TELEPHONE (OUTPATIENT)
Dept: SURGERY | Facility: CLINIC | Age: 48
End: 2023-01-31
Payer: COMMERCIAL

## 2023-01-31 NOTE — TELEPHONE ENCOUNTER
Akira Rutherford at Dr. Stone's pt consult Tues 2/14/23 at 1:30 pm.    Spoke with pt who vu.    MEB

## 2023-02-01 ENCOUNTER — TELEPHONE (OUTPATIENT)
Dept: SURGERY | Facility: HOSPITAL | Age: 48
End: 2023-02-01
Payer: COMMERCIAL

## 2023-02-01 DIAGNOSIS — C50.912: Primary | ICD-10-CM

## 2023-02-01 NOTE — TELEPHONE ENCOUNTER
[facility] breast [facility] gynecology add on pancreas and prostate- 2-1-23 report resulted as a VUS in [facility] [id number]  - no mutation  We will let her know and arrange for genetics

## 2023-02-02 ENCOUNTER — TELEPHONE (OUTPATIENT)
Dept: SURGERY | Facility: CLINIC | Age: 48
End: 2023-02-02
Payer: COMMERCIAL

## 2023-02-02 NOTE — TELEPHONE ENCOUNTER
I let her know that her genetics showed no mutations.   That there was a variant that is of no clinical significance for her surgery but that we'd like the genetics folks to discuss with her at her convenience.

## 2023-02-03 ENCOUNTER — CONSULT (OUTPATIENT)
Dept: ONCOLOGY | Facility: CLINIC | Age: 48
End: 2023-02-03
Payer: COMMERCIAL

## 2023-02-03 ENCOUNTER — LAB (OUTPATIENT)
Dept: LAB | Facility: HOSPITAL | Age: 48
End: 2023-02-03
Payer: COMMERCIAL

## 2023-02-03 VITALS
OXYGEN SATURATION: 99 % | TEMPERATURE: 97.3 F | RESPIRATION RATE: 18 BRPM | DIASTOLIC BLOOD PRESSURE: 87 MMHG | WEIGHT: 178.6 LBS | HEIGHT: 63 IN | BODY MASS INDEX: 31.64 KG/M2 | SYSTOLIC BLOOD PRESSURE: 123 MMHG | HEART RATE: 77 BPM

## 2023-02-03 DIAGNOSIS — C50.412 MALIGNANT NEOPLASM OF UPPER-OUTER QUADRANT OF LEFT BREAST IN FEMALE, ESTROGEN RECEPTOR POSITIVE: Primary | ICD-10-CM

## 2023-02-03 DIAGNOSIS — C50.919 MALIGNANT NEOPLASM OF FEMALE BREAST, UNSPECIFIED ESTROGEN RECEPTOR STATUS, UNSPECIFIED LATERALITY, UNSPECIFIED SITE OF BREAST: Primary | ICD-10-CM

## 2023-02-03 DIAGNOSIS — Z17.0 MALIGNANT NEOPLASM OF UPPER-OUTER QUADRANT OF LEFT BREAST IN FEMALE, ESTROGEN RECEPTOR POSITIVE: Primary | ICD-10-CM

## 2023-02-03 LAB
BASOPHILS # BLD AUTO: 0.07 10*3/MM3 (ref 0–0.2)
BASOPHILS NFR BLD AUTO: 0.7 % (ref 0–1.5)
DEPRECATED RDW RBC AUTO: 39.2 FL (ref 37–54)
EOSINOPHIL # BLD AUTO: 0.28 10*3/MM3 (ref 0–0.4)
EOSINOPHIL NFR BLD AUTO: 2.8 % (ref 0.3–6.2)
ERYTHROCYTE [DISTWIDTH] IN BLOOD BY AUTOMATED COUNT: 12.2 % (ref 12.3–15.4)
HCT VFR BLD AUTO: 44.6 % (ref 34–46.6)
HGB BLD-MCNC: 15.7 G/DL (ref 12–15.9)
IMM GRANULOCYTES # BLD AUTO: 0.04 10*3/MM3 (ref 0–0.05)
IMM GRANULOCYTES NFR BLD AUTO: 0.4 % (ref 0–0.5)
LYMPHOCYTES # BLD AUTO: 3.31 10*3/MM3 (ref 0.7–3.1)
LYMPHOCYTES NFR BLD AUTO: 33.5 % (ref 19.6–45.3)
MCH RBC QN AUTO: 30.7 PG (ref 26.6–33)
MCHC RBC AUTO-ENTMCNC: 35.2 G/DL (ref 31.5–35.7)
MCV RBC AUTO: 87.1 FL (ref 79–97)
MONOCYTES # BLD AUTO: 0.6 10*3/MM3 (ref 0.1–0.9)
MONOCYTES NFR BLD AUTO: 6.1 % (ref 5–12)
NEUTROPHILS NFR BLD AUTO: 5.58 10*3/MM3 (ref 1.7–7)
NEUTROPHILS NFR BLD AUTO: 56.5 % (ref 42.7–76)
NRBC BLD AUTO-RTO: 0 /100 WBC (ref 0–0.2)
PLATELET # BLD AUTO: 211 10*3/MM3 (ref 140–450)
PMV BLD AUTO: 10.8 FL (ref 6–12)
RBC # BLD AUTO: 5.12 10*6/MM3 (ref 3.77–5.28)
WBC NRBC COR # BLD: 9.88 10*3/MM3 (ref 3.4–10.8)

## 2023-02-03 PROCEDURE — 85025 COMPLETE CBC W/AUTO DIFF WBC: CPT

## 2023-02-03 PROCEDURE — 99205 OFFICE O/P NEW HI 60 MIN: CPT | Performed by: INTERNAL MEDICINE

## 2023-02-03 PROCEDURE — 36415 COLL VENOUS BLD VENIPUNCTURE: CPT

## 2023-02-03 NOTE — PROGRESS NOTES
Subjective     REASON FOR CONSULTATION: Newly diagnosed left breast cancer  Provide an opinion on any further workup or treatment                             REQUESTING PHYSICIAN: Dr. Kell Hi    RECORDS OBTAINED:  Records of the patients history including those obtained from the referring provider were reviewed and summarized in detail.    HISTORY OF PRESENT ILLNESS:  The patient is a 47 y.o. year old female who is here for an opinion about the above issue.    There is no family history of breast or ovarian cancer.  Her father had prostate cancer and melanoma.  A paternal aunt had thyroid cancer.    History of Present Illness   Patient is a 47-year-old female who has been recently diagnosed with left breast cancer.  Patient has been compliant with her mammograms.    Details are as follows.    November 29, 2022: Screening bilateral mammogram showed 8 mm asymmetry in the central left breast posterior one third.  This appears to be in the superior left breast on the MLO view.  Right breast was negative.  A left diagnostic mammogram and ultrasound was recommended.    January January 3, 2023: Left breast diagnostic mammogram showed the spiculated areas within the central posterior and lateral anterior superior left breast where redemonstrated.  Targeted ultrasound was done.  At 1 o'clock position 10 cm from the nipple there is a shadowing mass with peripheral spiculations which is concordant with the mammographic abnormality and suspicious for malignancy.  The more centrally located spiculation on the cc view posterior to the glandular tissue cannot be clearly identified on the ultrasound.    Impression was 2 separate suspicious spiculated lesion in the left breast.  One of the lesions is seen well on the ultrasound and should be amenable to ultrasound-guided core biopsy.  The other lesion which was seen in the CC projection was amicable to stereotactic guided tissue sampling.    January 3, 2023 patient  underwent ultrasound-guided left breast biopsy    The ultrasound-guided biopsy of left breast 10 cm from the nipple at 1 o'clock position showed invasive lobular carcinoma moderately differentiated with a [place] grade of 2 and score of 6 measuring 4 mm.  There was focal lobular carcinoma in situ.    Left breast anterior lateral stereotactic core needle biopsy showed invasive lobular carcinoma moderately differentiated grade 2 with a [place] score of 6 measuring 4 mm.  There was atypical lobular hyperplasia.  Estrogen receptor was %, progesterone receptor was %, HER2/cheryl 0, Ki-67 5%.    Left breast upper stereotactic guided core biopsy showed invasive lobular carcinoma moderately differentiated grade 2 with Marie score of 6 with measuring 3 mm with lobular carcinoma in situ present.  I do not see the ER/GA or HER2 on the ultrasound-guided biopsy of the left breast lesion as well as the left upper stereotactic biopsy.  We will check with pathologist    January 23, 2023: Patient underwent  MRI of the breast bilateral    Right breast: Negative    Left breast: At 1:00 anterior left breast 4 cm from the nipple there is a 1.5 x 1.6 x 1.3 cm irregular enhancing mass which is biopsy-proven malignancy.  The biopsy clip is located within 0.9 x 1 x 1.1 cm postbiopsy hematoma along the inferior margin of the mass.  And there is additional hematoma along the lateral margin of the mass which measures 1.2 x 1.9 x 0.9 cm.    At 1:00 in the middle to posterior left breast 7.4 cm from the nipple there is a 1.3 x 1 cm x 1 cm irregular enhancing mass mass with a focus from a biopsy clip along the inferior margin which also represents the biopsy-proven malignancy    At 12:00 in the posterior left breast 8.5 cm from the nipple there is a 1.9 x 1.8 x 2.2 cm irregular enhancing mass with a corresponding 2.9 cm biopsy cavity with a biopsy clip which represents the biopsy-proven malignancy    In addition there are  multiple at least 12 similar-appearing irregular enhancing masses and foci are identified throughout the left breast predominantly involving the upper breast but also involving the lower outer quadrant.  At 11-12 o'clock in the middle and posterior left breast there are 3 adjacent reference masses together measuring 5.5 cm but individually measuring 1.2 x 0.9 x 1 cm.  At 1:00 in the far posterior left breast/axillary tail 13.7 cm posterior to the nipple is a 1.3 x 1.1 x 1 cm irregular enhancing mass which is located 0.8 cm from the anterolateral margin of the pectoralis Muscle.  Together the enhancing masses span approximately 11.7 x 8 x 7.8 cm.  There is no suspicious enhancement in the left nipple or chest wall.  Focal areas of skin enhancement likely reflect skin entry point from recent biopsies.  There are no pathologically enlarged internal mammary chain lymph nodes.    Impression    .  At least 15 similar-appearing irregular enhancing masses and foci  scattered throughout the left breast, together measuring up to 11.7 cm  in maximum dimension, encompass 3 sites of biopsy-proven malignancy and  are consistent with multicentric malignancy. Oncologic and surgical  management are recommended.  2.  No MRI evidence of malignancy in the right breast.     Patient has seen Dr. Hi with plans to do left mastectomy with sentinel lymph node surgery mid March    Past Medical History:   Diagnosis Date   • ADD (attention deficit disorder)    • Bilateral impacted cerumen 07/13/2020   • COVID-19 10/23/2021    No Covid vaccines 90 days   • Diabetes mellitus, gestational 2010   • Foot fracture, left    • Graves disease    • History of radioactive iodine thyroid ablation 2012   • History of vertigo    • Hyperlipidemia    • Hypothyroidism    • Melanoma of back (HCC)    • Positive depression screening 07/13/2020        Past Surgical History:   Procedure Laterality Date   • D & C CERVICAL BIOPSY     • INTRAUTERINE DEVICE  INSERTION  2010    Mirena   • INTRAUTERINE DEVICE INSERTION  2015    Mirena exchange, old IUD removal and new one inserted by Dr Carr, Lot # [id number] exp 09/17.mar   • WISDOM TOOTH EXTRACTION          Current Outpatient Medications on File Prior to Visit   Medication Sig Dispense Refill   • cetirizine (zyrTEC) 10 MG tablet Take 10 mg by mouth Daily.     • COLLAGEN PO Take 1 tablet by mouth Daily.     • levothyroxine (SYNTHROID, LEVOTHROID) 125 MCG tablet Take 1 tablet by mouth Daily. 90 tablet 1   • Multiple Vitamins-Minerals (ZINC PO) Take 1 tablet by mouth Daily.     • Nutritional Supplements (JUICE PLUS FIBRE PO) Take  by mouth.     • omeprazole (priLOSEC) 20 MG capsule Take 1 capsule by mouth Daily. 30 capsule 1   • Probiotic Product (PROBIOTIC ADVANCED PO) Take 1 tablet by mouth Daily.     • vitamin C (ASCORBIC ACID) 250 MG tablet Take 1 tablet by mouth Daily.     • [DISCONTINUED] Digestive Enzymes (DIGESTIVE ENZYME PO) Take 1 tablet by mouth Daily.     • [DISCONTINUED] Omega-3 Fatty Acids (fish oil) 1000 MG capsule capsule Take 1 capsule by mouth Daily With Breakfast.       No current facility-administered medications on file prior to visit.        ALLERGIES:    Allergies   Allergen Reactions   • Erythromycin Other (See Comments)     Pt reports   • Penicillins Rash        Social History     Socioeconomic History   • Marital status:      Spouse name: Parish   • Number of children: 2   Tobacco Use   • Smoking status: Never   • Smokeless tobacco: Never   Vaping Use   • Vaping Use: Never used   Substance and Sexual Activity   • Alcohol use: Yes     Alcohol/week: 1.0 standard drink     Types: 1 Glasses of wine per week     Comment: occ   • Drug use: Never   • Sexual activity: Yes     Partners: Male     Birth control/protection: I.U.D.     Comment:          Family History   Problem Relation Age of Onset   • Hypertension Father    • Heart disease Father         S/P stents   • Heart attack Father 41   •  "Colon polyps Father    • Melanoma Father    • Prostate cancer Father    • Lung cancer Maternal Grandmother    • Cancer Maternal Grandfather         Bladder cancer   • Heart disease Paternal Grandmother    • Heart disease Paternal Grandfather       Family history: Maternal great aunt had breast cancer, unsure of the age otherwise there is no malignancy in the family    Past medical history is consistent with Graves' disease    OB/GYN history:  Age of menarche: 14  Patient is premenopausal and had an IUD which has been now removed   2 para 2 no miscarriages  Age at first childbirth 30  Patient did breast-feed  Unsure of the birth control pills            Review of Systems   Constitutional: Negative for appetite change, chills, diaphoresis, fatigue, fever and unexpected weight change.   HENT: Negative for hearing loss, sore throat and trouble swallowing.    Respiratory: Negative for cough, chest tightness, shortness of breath and wheezing.    Cardiovascular: Negative for chest pain, palpitations and leg swelling.   Gastrointestinal: Negative for abdominal distention, abdominal pain, constipation, diarrhea, nausea and vomiting.   Genitourinary: Negative for dysuria, frequency, hematuria and urgency.   Musculoskeletal: Negative for joint swelling.        No muscle weakness.   Skin: Negative for rash and wound.   Neurological: Negative for seizures, syncope, speech difficulty, weakness, numbness and headaches.   Hematological: Negative for adenopathy. Does not bruise/bleed easily.   Psychiatric/Behavioral: Negative for behavioral problems, confusion and suicidal ideas.          Objective     Vitals:    23 1111   BP: 123/87   Pulse: 77   Resp: 18   Temp: 97.3 °F (36.3 °C)   TempSrc: Temporal   SpO2: 99%   Weight: 81 kg (178 lb 9.6 oz)   Height: 160 cm (62.99\")   PainSc: 0-No pain     Current Status 2/3/2023   ECOG score 0       Physical Exam      CONSTITUTIONAL:  Vital signs reviewed.  No distress, looks " comfortable.  EYES:  Conjunctivae and lids unremarkable.  PERRLA  EARS,NOSE,MOUTH,THROAT:  Ears and nose appear unremarkable.  Lips, teeth, gums appear unremarkable.  RESPIRATORY:  Normal respiratory effort.  Lungs clear to auscultation bilaterally.  BREAST: Right breast: No skin changes, no evidence of breast mass, no nipple discharge, no evidence of any right axillary adenopathy or right supraclavicular adenopathy  Left breast: No evidence of any skin changes, no evidence of any left breast mass and no evidence of left nipple discharge as well as no left axillary adenopathy or left supraclavicular adenopathy.  Hypercalcemia  CARDIOVASCULAR:  Normal S1, S2.  No murmurs rubs or gallops.  No significant lower extremity edema.  GASTROINTESTINAL: Abdomen appears unremarkable.  Nontender.  No hepatomegaly.  No splenomegaly.  LYMPHATIC:  No cervical, supraclavicular, axillary lymphadenopathy.  SKIN:  Warm.  No rashes.  PSYCHIATRIC:  Normal judgment and insight.  Normal mood and affect.    RECENT LABS:  Hematology WBC   Date Value Ref Range Status   02/03/2023 9.88 3.40 - 10.80 10*3/mm3 Final   10/11/2022 8.0 3.4 - 10.8 x10E3/uL Final     RBC   Date Value Ref Range Status   02/03/2023 5.12 3.77 - 5.28 10*6/mm3 Final   10/11/2022 5.00 3.77 - 5.28 x10E6/uL Final     Hemoglobin   Date Value Ref Range Status   02/03/2023 15.7 12.0 - 15.9 g/dL Final     Hematocrit   Date Value Ref Range Status   02/03/2023 44.6 34.0 - 46.6 % Final     Platelets   Date Value Ref Range Status   02/03/2023 211 140 - 450 10*3/mm3 Final          Assessment & Plan     *Invasive lobular carcinoma of the left breast recently diagnosed  · 11/29/2022 bilateral screening mammogram showed 8 mm asymmetry in the left breast posterior one third.  · January 3, 2023: Left diagnostic mammogram and ultrasound showed 2 separate suspicious spiculated lesion in the left breast one of the lesion was seen well on the ultrasound and could be amenable for  ultrasound-guided biopsy and the second lesion was seen on mammography and hence stereotactic guided biopsy was recommended.  So this was at 1 o'clock position 10 cm from the nipple there was a shadowing mass with peripheral spiculations which was concerning for malignancy.  The more centrally located spiculation was not seen on the ultrasound  · January 17, 2023:  · 1.  Left breast anterolateral stereotactic core biopsy is consistent with invasive lobular carcinoma, grade 2 with Marie score of 6, 4 mm ER 95%, LA 95%, HER2/cheryl 0 with Ki-67 of 5%.  Also there was atypical lobular hyperplasia  ·   · 2.  Left breast 1 o'clock position 10 cm from the nipple ultrasound-guided core biopsy showed invasive lobular carcinoma, grade 2, 4 mm with lobular carcinoma in situ  ·   · 3.  Left breast upper stereotactic core guided core biopsy is positive for invasive lobular carcinoma grade 2 and the size is 3 mm with lobular carcinoma in situ.    · Discussed with the pathologist at [facility] with plans to do ER/LA and HER2 on the 2 other lesions to make sure its not a heterogeneous tumor.  On discussing with the pathologist all of the 3 lesions look exactly the same.  · January 23, 2023: MRI of the bilateral breast: Right breast negative.  Left breast 1 o'clock position there is 1.5 x 1.6 x 1.3 cm irregular mass, there is hematoma around that.  At 1:00 middle posterior left breast there is a 1.3 x 1 x 1 cm irregular enhancing mass.  At 12:00 posterior left breast 8.5 cm from the nipple is a 1.9 x 1.8 x 2.2 cm lesion.  Multiple at least 12 similar-appearing irregular enhancing masses are present.  At the 11 to 12 o'clock position in the middle and posterior left breast there are 3 adjacent reference masses measuring 5.5 cm  · January 27, 2023: Patient was seen by Dr. Hi with plans to do a left mastectomy with sentinel lymph node surgery.  Patient has never had any lymph nodes in the axilla patient will be scheduled  in the middle of March.  · We will plan to bring her back end of March to make a decision about further treatment options  · We discussed the multidisciplinary approach in this patient's and I also discussed in length that that invasive lobular carcinomas do benefit from endocrine therapy and discussed in length about side effects of all endocrine therapies  · Pending the results of the surgery she may need to be a candidate for chemotherapy as well and subsequently radiation if the tumor is large locally  · Patient is keen on getting bilateral oophorectomy after her breast surgery is done and we discussed about medical oophorectomy with Lupron as well    *S/p  IUD removal    *History of Graves' disease for which she was followed by endocrinology and had to take iodine treatments  Currently stable    Plan  · Reviewed the results of mammogram, MRI and pathology in length with the patient  · Patient is being scheduled for left mastectomy with sentinel lymph node by Dr. Hi mid-March  · Will await the final pathology to make further decisions of chemotherapy/endocrine therapy  · Will likely need referral to radiation in future pending the size of the tumor and lymph node involvement    I spent 60 total minutes, face-to-face, caring for Ursula today.  Greater than 50% of this time involved counseling and/or coordination of care as documented within this note.    MD Dr. Kell Sandoval

## 2023-02-08 ENCOUNTER — TELEPHONE (OUTPATIENT)
Dept: SURGERY | Facility: CLINIC | Age: 48
End: 2023-02-08
Payer: COMMERCIAL

## 2023-02-08 PROBLEM — C50.912: Status: ACTIVE | Noted: 2023-02-08

## 2023-02-08 NOTE — TELEPHONE ENCOUNTER
emla 30 g tube no refills  Dis #1  Apply topically to affected nipple, outer edge of affected nipple and cover day of surgery before leaving home.     [facility] [address] [place] [address] LILIANA LIPSCOMB [facility] - [phone]  - [phone] FX Phone:  [phone]   Fax:  [phone]

## 2023-02-08 NOTE — TELEPHONE ENCOUNTER
Sx Wed 3/8/23 at 8 am, arrival 5:15 am.    SI @ 7:00 am.    PAT Mon 2/27/23 at 8:00 am.    Post Op Mon 3/20/23 at 8:30 am.    Spoke with pt clarence torres.    Mailing sx packet today.    MEB

## 2023-02-09 ENCOUNTER — HOSPITAL ENCOUNTER (OUTPATIENT)
Dept: PHYSICAL THERAPY | Facility: HOSPITAL | Age: 48
Setting detail: THERAPIES SERIES
Discharge: HOME | End: 2023-02-09
Payer: COMMERCIAL

## 2023-02-09 DIAGNOSIS — Z91.89 AT RISK FOR LYMPHEDEMA: Primary | ICD-10-CM

## 2023-02-09 DIAGNOSIS — Z01.818 PREOPERATIVE EXAMINATION: ICD-10-CM

## 2023-02-09 PROCEDURE — 93702 BIS XTRACELL FLUID ANALYSIS: CPT

## 2023-02-09 PROCEDURE — 97161 PT EVAL LOW COMPLEX 20 MIN: CPT

## 2023-02-09 PROCEDURE — 97535 SELF CARE MNGMENT TRAINING: CPT

## 2023-02-09 NOTE — THERAPY EVALUATION
Physical Therapy Lymphedema Initial Evaluation  [facility]     Patient Name: Ursula Kulkarni  : 1975  MRN: [id number]  Today's Date: 2023      Visit Date: 2023    Visit Dx:    ICD-10-CM ICD-9-CM   1. At risk for lymphedema  Z91.89 V49.89   2. Preoperative examination  Z01.81 V72.84       Patient Active Problem List   Diagnosis   • Hypothyroidism   • Anxiety   • Mild hyperlipidemia   • Heartburn   • Elevated blood pressure reading without diagnosis of hypertension   • Thyroid nodule   • Allergic rhinitis   • COVID-19 virus infection   • Non-recurrent acute suppurative otitis media of right ear without spontaneous rupture of tympanic membrane   • Rash   • Pharyngeal dysphagia   • Indigestion   • Fatigue   • Abnormal mammogram of left breast   • Malignant neoplasm of upper-outer quadrant of left breast in female, estrogen receptor positive (HCC)   • Breast carcinoma, lobular, left (HCC)        Past Medical History:   Diagnosis Date   • ADD (attention deficit disorder)    • Bilateral impacted cerumen 2020   • COVID-19 10/23/2021    No Covid vaccines 90 days   • Diabetes mellitus, gestational    • Foot fracture, left    • Graves disease    • History of radioactive iodine thyroid ablation    • History of vertigo    • Hyperlipidemia    • Hypothyroidism    • Melanoma of back (HCC)    • Positive depression screening 2020        Past Surgical History:   Procedure Laterality Date   • D & C CERVICAL BIOPSY     • INTRAUTERINE DEVICE INSERTION      Mirena   • INTRAUTERINE DEVICE INSERTION      Mirena exchange, old IUD removal and new one inserted by Dr Carr, Lot # [id number] exp .mar   • WISDOM TOOTH EXTRACTION         Visit Dx:    ICD-10-CM ICD-9-CM   1. At risk for lymphedema  Z91.89 V49.89   2. Preoperative examination  Z01.818 V72.84        Patient History     Row Name 23 1100             History    Chief Complaint --  none  -LB      Date Current Problem(s) Began  03/08/23  -LB      Brief Description of Current Complaint Pt scheduled for B mastectomy, L SLNB, possible axillary dissection 3/8/23  with Dr. Hi. 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 Pt plans to have reeconstruction but unsure as to final plans. She had recent fall on LUE and is sore. ROM is WFL, no other injury to LUE.  -LB      Hand Dominance right-handed  -LB      Patient seeing anyone else for problem(s)? yes  -LB      History of Previous Related Injuries none  -LB         Pain     Is your sleep disturbed? No  -LB         Fall Risk Assessment    Any falls in the past year: Yes  -LB      Number of falls reported in the last 12 months 1  -LB      Factors that contributed to the fall: Slippery surface  -LB      Does patient have a fear of falling No  -LB         Services    Prior Rehab/Home Health Experiences No  -LB      Are you currently receiving Home Health services No  -LB      Do you plan to receive Home Health services in the near future No  -LB         Daily Activities    Primary Language English  -LB      How does patient learn best? Listening;Reading;Demonstration  -LB      Barriers to learning None  -LB      Pt Participated in POC and Goals Yes  -LB         Safety    Are you being hurt, hit, or frightened by anyone at home or in your life? No  -LB      Are you being neglected by a caregiver No  -LB      Have you had any of the following issues with N/A  -LB            User Key  (r) = Recorded By, (t) = Taken By, (c) = Cosigned By    Initials Name Provider Type    LB Rocio Daniels, [place] Physical Therapist                 Lymphedema     Row Name 02/09/23 1100             Subjective Pain    Able to rate subjective pain? yes  -LB      Pre-Treatment Pain Level 2  -LB         Subjective Comments    Subjective Comments My elbow is sore, otherwise no issues.  -LB         Lymphedema Assessment    Lymphedema Classification LUE:;at risk/stage 0  -LB       Lymphedema Cancer Related Sx bilateral;modified radical mastectomy  -LB      Lymphedema Surgery Comments upcoming sx 3/8  -LB         General ROM    GENERAL ROM COMMENTS BUE WFL  -LB         MMT (Manual Muscle Testing)    General MMT Comments BUE WFL  -LB         Lymphedema Edema Assessment    Edema Assessment Comment no obvious edema  -LB         Skin Changes/Observations    Skin Observations Comment pre-operative  -LB         Lymphedema Sensation    Lymphedema Sensation Comments intact  -LB         Lymphedema Pulses/Capillary Refill    Lymph Pulses Capillary Refill Comments normal  -LB         LUE Quick Girth (cm)    Axilla 36.4 cm  -LB      Mid upper arm 32.3 cm  -LB      Elbow 25.8 cm  -LB      Mid forearm 24.5 cm  -LB      Wrist crease 16 cm  -LB      Web space 19.2 cm  -LB      Met-heads 19 cm  -LB      Other 1 --  length 44  -LB      LUE Quick Girth Total 173.2  -LB         Compression/Skin Care    Compression/Skin Care Comments discussed compression garment for prevention  -LB         L-Dex Bioimpedence Screening    L-Dex Measurement Extremity LUE  -LB      L-Dex Patient Position Standing  -LB      L-Dex UE Dominate Side Right  -LB      L-Dex UE At Risk Side Left  -LB      L-Dex UE Pre Surgical Value Yes  -LB      L-Dex UE Score 2.4  -LB      L-Dex UE Baseline Score 2.4  -LB      L-Dex UE Value Change 0  -LB      L-Dex UE Comment WNL  -LB      $ L-Dex Charge yes  -LB            User Key  (r) = Recorded By, (t) = Taken By, (c) = Cosigned By    Initials Name Provider Type    Rocio Biggs PT Physical Therapist                                Therapy Education  Education Details: discussed s/s of lymphedema, discussed bioimpedance results and interpretation, compression garment for prevention, post-op rehab expectations  Given: Symptoms/condition management, Edema management  Program: New  How Provided: Verbal  Provided to: Patient  Level of Understanding: Verbalized  15996 - PT Self Care/Mgmt Minutes:  15       OP Exercises     Row Name 02/09/23 1100             Subjective Comments    Subjective Comments My elbow is sore, otherwise no issues.  -LB         Subjective Pain    Able to rate subjective pain? yes  -LB      Pre-Treatment Pain Level 2  -LB            User Key  (r) = Recorded By, (t) = Taken By, (c) = Cosigned By    Initials Name Provider Type    Rocio Biggs, PT Physical Therapist                             PT OP Goals     Row Name 02/09/23 1100          Long Term Goals    LTG Date to Achieve 03/11/23  -LB     LTG 1 Pt will maintain LDex bioimpedance WNL.  -LB     LTG 1 Progress New  -LB     LTG 2 Pt will demonstrate full AROM BUE post-operatively.  -LB     LTG 2 Progress New  -LB     LTG 3 Pt will acquire appropriately fitted compression garment and verbalize appropriate wear schedule.  -LB     LTG 3 Progress New  -LB        Time Calculation    PT Goal Re-Cert Due Date 05/10/23  -LB           User Key  (r) = Recorded By, (t) = Taken By, (c) = Cosigned By    Initials Name Provider Type    Rocio Biggs, PT Physical Therapist                 PT Assessment/Plan     Row Name 02/09/23 1124          PT Assessment    Functional Limitations Other (comment)  at risk for lymphedema  -LB     Impairments Impaired flexibility;Impaired lymphatic circulation  -LB     Assessment Comments Pt is 48 yo female who presents for pre-operative examination. She is scheduled for B mastectomy, SLNB, possible axillary dissection 3/8/23 with Dr. Hi. She plans to have reconstruction. She is R handed and works at [email or web address]. She has full AROM and normal strength BUE. We obtained baseline bioimpedance LDex measurements with score of 2.4 and measured circumferentially to order compression garment for prevention of lymphedema. Pt's self reported QuickDASh disability score is 4% disability. She is appropriate for continued skilled PT services to address deficits and monitor for lymphedema post-operatively.   -LB     Rehab Potential Good  -LB     Patient/caregiver participated in establishment of treatment plan and goals Yes  -LB     Patient would benefit from skilled therapy intervention Yes  -LB        PT Plan    PT Frequency Other (comment)  -LB     Predicted Duration of Therapy Intervention (PT) return post-operatively and assess  -LB     Planned CPT's? PT EVAL LOW COMPLEXITY: 21054;PT RE-EVAL: 83272;PT THER PROC EA 15 MIN: 85613;PT THER ACT EA 15 MIN: 04674;PT MANUAL THERAPY EA 15 MIN: 17509;PT SELF CARE/HOME MGMT/TRAIN EA 15: 40959;PT BIS XTRACELL FLUID ANALYSIS: 18454  -LB     PT Plan Comments repeat bioimpedance, compression garment fit?, assess UE ROM, post-op concerns  -LB           User Key  (r) = Recorded By, (t) = Taken By, (c) = Cosigned By    Initials Name Provider Type    Rocio Biggs, [place] Physical Therapist                   Outcome Measure Options: Quick DASH  Quick DASH  Open a tight or new jar.: No Difficulty  Do heavy household chores (e.g., wash walls, wash floors): No Difficulty  Carry a shopping bag or briefcase: No Difficulty  Wash your back: No Difficulty  Use a knife to cut food: No Difficulty  Recreational activities in which you take some force or impact through your arm, should or hand (e.g. golf, hammering, tennis, etc.): No Difficulty  During the past week, to what extent has your arm, shoulder, or hand problem interfered with your normal social activites with family, friends, neighbors or groups?: Not at all  During the past week, were you limited in your work or other regular daily activities as a result of your arm, shoulder or hand problem?: Not limited at all  Arm, Shoulder, or hand pain: Mild  Tingling (pins and needles) in your arm, shoulder, or hand: Mild  During the past week, how much difficulty have you had sleeping because of the pain in your arm, shoulder or hand?: No difficulty  Number of Questions Answered: 11  Quick DASH Score: 4.55         Time Calculation:   Start  Time: 1045  Stop Time: 1130  Time Calculation (min): 45 min  Total Timed Code Minutes- PT: 15 minute(s)  Timed Charges  22379 - PT Self Care/Mgmt Minutes: 15  Total Minutes  Timed Charges Total Minutes: 15   Total Minutes: 15   Therapy Charges for Today     Code Description Service Date Service Provider Modifiers Qty    27124893516 HC PT BIS XTRACELL FLUID ANALYSIS 2/9/2023 Rocio Daniels, PT  1    39482615049 HC PT SELF CARE/MGMT/TRAIN EA 15 MIN 2/9/2023 Rocio Daniels, PT GP 1    33128991925 HC PT EVAL LOW COMPLEXITY 1 2/9/2023 Rocio Daniels, PT GP 1          PT G-Codes  Outcome Measure Options: Quick DASH  Quick DASH Score: 4.55         Rocio Daniels PT  2/9/2023

## 2023-02-10 ENCOUNTER — PATIENT OUTREACH (OUTPATIENT)
Dept: OTHER | Facility: HOSPITAL | Age: 48
End: 2023-02-10
Payer: COMMERCIAL

## 2023-02-10 NOTE — PROGRESS NOTES
Referral received from Dr. Hi's office. I called Ms. Kulkarni and introduced myself and navigational services. She stated the consult with Dr. Hi went well and she is leaning toward a bilateral mastectomy with reconstruction. She has a good understanding of her pathology and treatment options. She was able to verbalize teach back on her plan of care.      She stated she has a wonderful support system with  , family, and [place] family. She stated she feels comfortable talking to them about needs or issues.      She stated she has some financial concerns and was interested in a financial aid application. That will be mailed. Otherwise she has no resource needs or ongoing concerns at this time.      She stated she has been anxious about her diagnosis and we discussed that can be normal. Also discussed we have support options if the need arises. She was thankful for the information.      We discussed integrative therapies and [facility]. She will received a navigation folder with the following information in the mail:     [facility], Cancer and Restorative Exercise (CARE), [employer] Exercise program, Guide for the Newly Diagnosed, Bioimpedance, [facility], Massage Therapy, Reiki Therapy, [employer] [place], Cancer Nutrition, and Survivorship [facility].     She verbalized appreciation for navigational services and she has my contact information and will call with any questions that arise.

## 2023-02-15 LAB
CYTO UR: NORMAL
LAB AP CASE REPORT: NORMAL
LAB AP CLINICAL INFORMATION: NORMAL
LAB AP SPECIAL STAINS: NORMAL
Lab: NORMAL
PATH REPORT.ADDENDUM SPEC: NORMAL
PATH REPORT.FINAL DX SPEC: NORMAL
PATH REPORT.GROSS SPEC: NORMAL

## 2023-02-17 ENCOUNTER — LAB (OUTPATIENT)
Dept: LAB | Facility: HOSPITAL | Age: 48
End: 2023-02-17
Payer: COMMERCIAL

## 2023-02-17 ENCOUNTER — OFFICE VISIT (OUTPATIENT)
Dept: ONCOLOGY | Facility: CLINIC | Age: 48
End: 2023-02-17
Payer: COMMERCIAL

## 2023-02-17 VITALS
DIASTOLIC BLOOD PRESSURE: 95 MMHG | TEMPERATURE: 97.7 F | SYSTOLIC BLOOD PRESSURE: 131 MMHG | RESPIRATION RATE: 18 BRPM | WEIGHT: 178.8 LBS | BODY MASS INDEX: 31.68 KG/M2 | HEIGHT: 63 IN | HEART RATE: 81 BPM | OXYGEN SATURATION: 97 %

## 2023-02-17 DIAGNOSIS — Z17.0 MALIGNANT NEOPLASM OF UPPER-OUTER QUADRANT OF LEFT BREAST IN FEMALE, ESTROGEN RECEPTOR POSITIVE: ICD-10-CM

## 2023-02-17 DIAGNOSIS — C50.412 MALIGNANT NEOPLASM OF UPPER-OUTER QUADRANT OF LEFT BREAST IN FEMALE, ESTROGEN RECEPTOR POSITIVE: Primary | ICD-10-CM

## 2023-02-17 DIAGNOSIS — Z17.0 MALIGNANT NEOPLASM OF UPPER-OUTER QUADRANT OF LEFT BREAST IN FEMALE, ESTROGEN RECEPTOR POSITIVE: Primary | ICD-10-CM

## 2023-02-17 DIAGNOSIS — C50.412 MALIGNANT NEOPLASM OF UPPER-OUTER QUADRANT OF LEFT BREAST IN FEMALE, ESTROGEN RECEPTOR POSITIVE: ICD-10-CM

## 2023-02-17 LAB
ALBUMIN SERPL-MCNC: 4.5 G/DL (ref 3.5–5.2)
ALBUMIN/GLOB SERPL: 1.7 G/DL (ref 1.1–2.4)
ALP SERPL-CCNC: 102 U/L (ref 38–116)
ALT SERPL W P-5'-P-CCNC: 46 U/L (ref 0–33)
ANION GAP SERPL CALCULATED.3IONS-SCNC: 12.1 MMOL/L (ref 5–15)
AST SERPL-CCNC: 32 U/L (ref 0–32)
BASOPHILS # BLD AUTO: 0.03 10*3/MM3 (ref 0–0.2)
BASOPHILS NFR BLD AUTO: 0.6 % (ref 0–1.5)
BILIRUB SERPL-MCNC: 0.5 MG/DL (ref 0.2–1.2)
BUN SERPL-MCNC: 11 MG/DL (ref 6–20)
BUN/CREAT SERPL: 12.5 (ref 7.3–30)
CALCIUM SPEC-SCNC: 9.5 MG/DL (ref 8.5–10.2)
CHLORIDE SERPL-SCNC: 104 MMOL/L (ref 98–107)
CO2 SERPL-SCNC: 23.9 MMOL/L (ref 22–29)
CREAT SERPL-MCNC: 0.88 MG/DL (ref 0.6–1.1)
DEPRECATED RDW RBC AUTO: 39.8 FL (ref 37–54)
EGFRCR SERPLBLD CKD-EPI 2021: 82.7 ML/MIN/1.73
EOSINOPHIL # BLD AUTO: 0.18 10*3/MM3 (ref 0–0.4)
EOSINOPHIL NFR BLD AUTO: 3.4 % (ref 0.3–6.2)
ERYTHROCYTE [DISTWIDTH] IN BLOOD BY AUTOMATED COUNT: 12.4 % (ref 12.3–15.4)
GLOBULIN UR ELPH-MCNC: 2.6 GM/DL (ref 1.8–3.5)
GLUCOSE SERPL-MCNC: 108 MG/DL (ref 74–124)
HCT VFR BLD AUTO: 40.9 % (ref 34–46.6)
HGB BLD-MCNC: 14.2 G/DL (ref 12–15.9)
IMM GRANULOCYTES # BLD AUTO: 0.03 10*3/MM3 (ref 0–0.05)
IMM GRANULOCYTES NFR BLD AUTO: 0.6 % (ref 0–0.5)
LYMPHOCYTES # BLD AUTO: 1.96 10*3/MM3 (ref 0.7–3.1)
LYMPHOCYTES NFR BLD AUTO: 37.4 % (ref 19.6–45.3)
MCH RBC QN AUTO: 30.6 PG (ref 26.6–33)
MCHC RBC AUTO-ENTMCNC: 34.7 G/DL (ref 31.5–35.7)
MCV RBC AUTO: 88.1 FL (ref 79–97)
MONOCYTES # BLD AUTO: 0.42 10*3/MM3 (ref 0.1–0.9)
MONOCYTES NFR BLD AUTO: 8 % (ref 5–12)
NEUTROPHILS NFR BLD AUTO: 2.62 10*3/MM3 (ref 1.7–7)
NEUTROPHILS NFR BLD AUTO: 50 % (ref 42.7–76)
NRBC BLD AUTO-RTO: 0 /100 WBC (ref 0–0.2)
PLATELET # BLD AUTO: 239 10*3/MM3 (ref 140–450)
PMV BLD AUTO: 9.7 FL (ref 6–12)
POTASSIUM SERPL-SCNC: 4.3 MMOL/L (ref 3.5–4.7)
PROT SERPL-MCNC: 7.1 G/DL (ref 6.3–8)
RBC # BLD AUTO: 4.64 10*6/MM3 (ref 3.77–5.28)
SODIUM SERPL-SCNC: 140 MMOL/L (ref 134–145)
T4 FREE SERPL-MCNC: 1.56 NG/DL (ref 0.93–1.7)
TSH SERPL DL<=0.05 MIU/L-ACNC: 1.87 UIU/ML (ref 0.27–4.2)
WBC NRBC COR # BLD: 5.24 10*3/MM3 (ref 3.4–10.8)

## 2023-02-17 PROCEDURE — 36415 COLL VENOUS BLD VENIPUNCTURE: CPT

## 2023-02-17 PROCEDURE — 99215 OFFICE O/P EST HI 40 MIN: CPT | Performed by: INTERNAL MEDICINE

## 2023-02-17 PROCEDURE — 80050 GENERAL HEALTH PANEL: CPT

## 2023-02-17 PROCEDURE — 84439 ASSAY OF FREE THYROXINE: CPT | Performed by: INTERNAL MEDICINE

## 2023-02-17 RX ORDER — LIDOCAINE AND PRILOCAINE 25; 25 MG/G; MG/G
1 CREAM TOPICAL TAKE AS DIRECTED
COMMUNITY
Start: 2023-02-08 | End: 2023-03-09 | Stop reason: HOSPADM

## 2023-02-17 NOTE — PROGRESS NOTES
Subjective     REASON FOR follow up  1.  Invasive lobular carcinoma multifocal,  · Left breast anterolateral stereotactic biopsy invasive lobular carcinoma, [place] score of 6, grade 2, 4 mm, ER 95%, GA 95%, HER2/cheryl 0 negative with Ki-67 of 5%.  Positive for atypical lobular hyperplasia  · Left breast 1 o'clock position 10 cm from the nipple ultrasound-guided biopsy consistent with invasive lobular carcinoma grade 2 Marie score of 6, 4 mm with lobular carcinoma in situ, ER 90%, %, HER2/cheryl 0  · Left breast upper stereotactic guided core biopsy invasive lobular carcinoma grade 2 Marie score of six 3 mm with lobular carcinoma in situ, ER 90%, %, HER2/cheryl equivocal 2+, FISH shows her to 5.7 with CEP 1.6 and ratio HER2 nu/KEIRA-17 ratio of 3.5.  It is amplified.                                 HISTORY OF PRESENT ILLNESS:      Patient is a 47-year-old female with multifocal invasive lobular carcinoma who on screening mammogram showed 8 mm asymmetry in the central left breast posterior one third.  In the cc view.  It appears to be superior left breast on the MLO view.  There was also architectural distortion in the lateral left breast one third on the left cc view.    On diagnostic mammogram the spiculated areas within the central posterior and the lateral anterior superior left breast where redemonstrated.  Targeted ultrasound was done.  At 1 o'clock position 10 cm from the nipple there is a mass with spiculations which is suspicious.  The more centrally located spiculation on the cc view.  The more centrally located spiculation on the cc view cannot be clearly identified on ultrasound.  So there impression was there were 2 separate suspicious spiculated lesion in the left breast.  1 was seen well on the ultrasound and amenable to ultrasound-guided biopsy the other is seen well on mammography and a stereotactic guided biopsy suggested.    Patient subsequently underwent stereotactic biopsy of 1  lesion and ultrasound-guided biopsy of the 1 cm mass at 1 o'clock position 10 cm from the nipple.  A total of 3 different areas of the left breast were biopsied and specimens were sent to pathology.  The third biopsy was performed at 12 o'clock position in the posterior one third of the left breast.  All of the 3 sites were consistent with invasive lobular carcinoma at site A, B, and C.    MRI of the breast showed at 1 o'clock position of the left breast anteriorly 4 cm posterior to the nipple there is a 1.5 x 1.6 x 1.3 cm irregular enhancing mass.  There is a small hematoma along the inferior margin of the mass.  An additional hematoma along the lateral margin of the mass which is measuring 1.2 x 0.9 x 0.9 cm    At 1:00 in the middle to posterior left breast 7.4 cm posterior to the nipple there is a 1.3 x 1 x 1 cm enhancing mass with a biopsy clip.    At 12:00 posterior left breast 8.5 cm posterior to the nipple there is a 1.5 x 1.8 x 2.2 cm irregular enhancing mass with a corresponding 2.9 cm biopsy cavity with a clip which represents the biopsy site malignancy    Multiple at least 12 similar-appearing irregular enhancing masses and foci are identified throughout the left breast predominantly involving the upper breast but also involving the lower outer quadrant.  At 11-12 o'clock in the middle and posterior left breast there is a 3 adjacent reference masses measuring 5.5 cm in AP dimension and individually measuring 1.2 x 0.9 x 1 cm.  At 1:00 in the far posterior left breast axillary tail 13.7 cm from the nipple there is a 1.3 x 1.1 x 1 cm craniocaudal dimension irregular enhancing mass which is located 0.8 cm from the anterolateral margin of the pectoralis muscle.     Per the MRI at least 15 similar-appearing irregular enhancing masses and foci scattered throughout the left breast together measuring 11.7 cm in maximum dimension which encompasses 3 sites of biopsy-proven malignancy and a consistent with  multicentric malignancy.  MRI of the right breast is negative    [facility]AE genetic testing showed variation of uncertain significance of LUCAS  Gene.    Patient was suggested left total mastectomy, left axillary sentinel lymph node biopsy possible node dissection and possible reconstruction.    However patient called Dr. Hi and decided to go for upfront bilateral total mastectomy, left axillary sentinel lymph node biopsy and possible axillary lymph node dissection and reconstruction.    Oncology history:  Patient is a 47-year-old female who has been recently diagnosed with left breast cancer.  Patient has been compliant with her mammograms.    There is no family history of breast or ovarian cancer.  Her father had prostate cancer and melanoma.  A paternal aunt had thyroid cancer.      Details are as follows.    November 29, 2022: Screening bilateral mammogram showed 8 mm asymmetry in the central left breast posterior one third.  This appears to be in the superior left breast on the MLO view.  Right breast was negative.  A left diagnostic mammogram and ultrasound was recommended.    January January 3, 2023: Left breast diagnostic mammogram showed the spiculated areas within the central posterior and lateral anterior superior left breast where redemonstrated.  Targeted ultrasound was done.  At 1 o'clock position 10 cm from the nipple there is a shadowing mass with peripheral spiculations which is concordant with the mammographic abnormality and suspicious for malignancy.  The more centrally located spiculation on the cc view posterior to the glandular tissue cannot be clearly identified on the ultrasound.    Impression was 2 separate suspicious spiculated lesion in the left breast.  One of the lesions is seen well on the ultrasound and should be amenable to ultrasound-guided core biopsy.  The other lesion which was seen in the CC projection was amicable to stereotactic guided tissue sampling.    January 3, 2023  patient underwent ultrasound-guided left breast biopsy    The ultrasound-guided biopsy of left breast 10 cm from the nipple at 1 o'clock position showed invasive lobular carcinoma moderately differentiated with a Marie grade of 2 and score of 6 measuring 4 mm.  There was focal lobular carcinoma in situ.    Left breast anterior lateral stereotactic core needle biopsy showed invasive lobular carcinoma moderately differentiated grade 2 with a Marie score of 6 measuring 4 mm.  There was atypical lobular hyperplasia.  Estrogen receptor was %, progesterone receptor was %, HER2/cheryl 0, Ki-67 5%.    Left breast upper stereotactic guided core biopsy showed invasive lobular carcinoma moderately differentiated grade 2 with [place] score of 6 with measuring 3 mm with lobular carcinoma in situ present.  I do not see the ER/AK or HER2 on the ultrasound-guided biopsy of the left breast lesion as well as the left upper stereotactic biopsy.  We will check with pathologist    January 23, 2023: Patient underwent  MRI of the breast bilateral    Right breast: Negative    Left breast: At 1:00 anterior left breast 4 cm from the nipple there is a 1.5 x 1.6 x 1.3 cm irregular enhancing mass which is biopsy-proven malignancy.  The biopsy clip is located within 0.9 x 1 x 1.1 cm postbiopsy hematoma along the inferior margin of the mass.  And there is additional hematoma along the lateral margin of the mass which measures 1.2 x 1.9 x 0.9 cm.    At 1:00 in the middle to posterior left breast 7.4 cm from the nipple there is a 1.3 x 1 cm x 1 cm irregular enhancing mass mass with a focus from a biopsy clip along the inferior margin which also represents the biopsy-proven malignancy    At 12:00 in the posterior left breast 8.5 cm from the nipple there is a 1.9 x 1.8 x 2.2 cm irregular enhancing mass with a corresponding 2.9 cm biopsy cavity with a biopsy clip which represents the biopsy-proven malignancy    In addition there  are multiple at least 12 similar-appearing irregular enhancing masses and foci are identified throughout the left breast predominantly involving the upper breast but also involving the lower outer quadrant.  At 11-12 o'clock in the middle and posterior left breast there are 3 adjacent reference masses together measuring 5.5 cm but individually measuring 1.2 x 0.9 x 1 cm.  At 1:00 in the far posterior left breast/axillary tail 13.7 cm posterior to the nipple is a 1.3 x 1.1 x 1 cm irregular enhancing mass which is located 0.8 cm from the anterolateral margin of the pectoralis Muscle.  Together the enhancing masses span approximately 11.7 x 8 x 7.8 cm.  There is no suspicious enhancement in the left nipple or chest wall.  Focal areas of skin enhancement likely reflect skin entry point from recent biopsies.  There are no pathologically enlarged internal mammary chain lymph nodes.    Impression    .  At least 15 similar-appearing irregular enhancing masses and foci  scattered throughout the left breast, together measuring up to 11.7 cm  in maximum dimension, encompass 3 sites of biopsy-proven malignancy and  are consistent with multicentric malignancy. Oncologic and surgical  management are recommended.  2.  No MRI evidence of malignancy in the right breast.     Patient has seen Dr. Hi with plans to do left mastectomy with sentinel lymph node surgery mid March    Past Medical History:   Diagnosis Date   • ADD (attention deficit disorder)    • Bilateral impacted cerumen 07/13/2020   • COVID-19 10/23/2021    No Covid vaccines 90 days   • Diabetes mellitus, gestational 2010   • Foot fracture, left    • Graves disease    • History of radioactive iodine thyroid ablation 2012   • History of vertigo    • Hyperlipidemia    • Hypothyroidism    • Melanoma of back (HCC)    • Positive depression screening 07/13/2020        Past Surgical History:   Procedure Laterality Date   • D & C CERVICAL BIOPSY  01/2006   • INTRAUTERINE  DEVICE INSERTION  2010    Mirena   • INTRAUTERINE DEVICE INSERTION  2015    Mirena exchange, old IUD removal and new one inserted by Dr Carr, Lot # [id number] 09/17.mar   • WISDOM TOOTH EXTRACTION          Current Outpatient Medications on File Prior to Visit   Medication Sig Dispense Refill   • cetirizine (zyrTEC) 10 MG tablet Take 10 mg by mouth Daily.     • COLLAGEN PO Take 1 tablet by mouth Daily.     • levothyroxine (SYNTHROID, LEVOTHROID) 125 MCG tablet Take 1 tablet by mouth Daily. 90 tablet 1   • lidocaine-prilocaine (EMLA) 2.5-2.5 % cream APPLY TOPICALLY ONCE FOR 1 DOSE THE MORNING OF SURGERY.     • Multiple Vitamins-Minerals (ZINC PO) Take 1 tablet by mouth Daily.     • Nutritional Supplements (JUICE PLUS FIBRE PO) Take  by mouth.     • omeprazole (priLOSEC) 20 MG capsule Take 1 capsule by mouth Daily. 30 capsule 1   • Probiotic Product (PROBIOTIC ADVANCED PO) Take 1 tablet by mouth Daily.     • vitamin C (ASCORBIC ACID) 250 MG tablet Take 1 tablet by mouth Daily.       No current facility-administered medications on file prior to visit.        ALLERGIES:    Allergies   Allergen Reactions   • Erythromycin Other (See Comments)     Pt reports   • Penicillins Rash        Social History     Socioeconomic History   • Marital status:      Spouse name: Parish   • Number of children: 2   Tobacco Use   • Smoking status: Never   • Smokeless tobacco: Never   Vaping Use   • Vaping Use: Never used   Substance and Sexual Activity   • Alcohol use: Yes     Alcohol/week: 1.0 standard drink     Types: 1 Glasses of wine per week     Comment: occ   • Drug use: Never   • Sexual activity: Yes     Partners: Male     Birth control/protection: I.U.D.     Comment:          Family History   Problem Relation Age of Onset   • Hyperlipidemia Father    • Arthritis Father    • Hypertension Father    • Heart disease Father         S/P stents   • Heart attack Father 41   • Colon polyps Father    • Melanoma Father    •  Prostate cancer Father    • Hyperlipidemia Sister    • Hypertension Sister    • Heart attack Sister    • Thyroid cancer Paternal Aunt    • Lung cancer Maternal Grandmother    • Cancer Maternal Grandfather         Bladder cancer   • Lung cancer Maternal Grandfather    • Heart disease Paternal Grandmother    • Cancer Paternal Grandfather         Bladder   • Heart disease Paternal Grandfather       Family history: Maternal great aunt had breast cancer, unsure of the age .  Father had melanoma and prostate cancer, paternal aunt had thyroid cancer, paternal grandfather had bladder cancer maternal grandfather had bladder cancer maternal grandmother had lung cancer    Past medical history is consistent with Graves' disease    OB/GYN history:  Age of menarche: 14  Patient is premenopausal and had an IUD which has been now removed   2 para 2 no miscarriages  Age at first childbirth 30  Patient did breast-feed 24 months  Length of taking birth control pills none              Review of Systems   Constitutional: Negative for appetite change, chills, diaphoresis, fatigue, fever and unexpected weight change.   HENT: Negative for hearing loss, sore throat and trouble swallowing.    Respiratory: Negative for cough, chest tightness, shortness of breath and wheezing.    Cardiovascular: Negative for chest pain, palpitations and leg swelling.   Gastrointestinal: Negative for abdominal distention, abdominal pain, constipation, diarrhea, nausea and vomiting.   Genitourinary: Negative for dysuria, frequency, hematuria and urgency.   Musculoskeletal: Negative for joint swelling.        No muscle weakness.   Skin: Negative for rash and wound.   Neurological: Negative for seizures, syncope, speech difficulty, weakness, numbness and headaches.   Hematological: Negative for adenopathy. Does not bruise/bleed easily.   Psychiatric/Behavioral: Negative for behavioral problems, confusion and suicidal ideas.       I have reviewed and  "confirmed the accuracy of the ROS as documented by the MA/LPN/RN Kathrin Frederick MD        Objective     Vitals:    02/17/23 0810   BP: 131/95   Pulse: 81   Resp: 18   Temp: 97.7 °F (36.5 °C)   TempSrc: Temporal   SpO2: 97%   Weight: 81.1 kg (178 lb 12.8 oz)   Height: 160 cm (62.99\")   PainSc: 0-No pain     Current Status 2/17/2023   ECOG score 0       Physical Exam      CONSTITUTIONAL:  Vital signs reviewed.  No distress, looks comfortable.  EYES:  Conjunctivae and lids unremarkable.  PERRLA  EARS,NOSE,MOUTH,THROAT:  Ears and nose appear unremarkable.  Lips, teeth, gums appear unremarkable.  RESPIRATORY:  Normal respiratory effort.  Lungs clear to auscultation bilaterally.  BREAST: Right breast: No skin changes, no evidence of breast mass, no nipple discharge, no evidence of any right axillary adenopathy or right supraclavicular adenopathy  Left breast: No evidence of any skin changes, no evidence of any left breast mass and no evidence of left nipple discharge as well as no left axillary adenopathy or left supraclavicular adenopathy.    There is a 6 x 8 cm mass palpable at the left breast middle ring 1 o'clock position per Dr. Hi, though it is vague and difficult to appreciate    CARDIOVASCULAR:  Normal S1, S2.  No murmurs rubs or gallops.  No significant lower extremity edema.  GASTROINTESTINAL: Abdomen appears unremarkable.  Nontender.  No hepatomegaly.  No splenomegaly.  LYMPHATIC:  No cervical, supraclavicular, axillary lymphadenopathy.  SKIN:  Warm.  No rashes.  PSYCHIATRIC:  Normal judgment and insight.  Normal mood and affect.  I have reexamined the patient and the results are consistent with the previously documented exam. Kathrin Frederick MD         RECENT LABS:  Hematology WBC   Date Value Ref Range Status   02/17/2023 5.24 3.40 - 10.80 10*3/mm3 Final   10/11/2022 8.0 3.4 - 10.8 x10E3/uL Final     RBC   Date Value Ref Range Status   02/17/2023 4.64 3.77 - 5.28 10*6/mm3 Final   10/11/2022 5.00 3.77 - " 5.28 x10E6/uL Final     Hemoglobin   Date Value Ref Range Status   02/17/2023 14.2 12.0 - 15.9 g/dL Final     Hematocrit   Date Value Ref Range Status   02/17/2023 40.9 34.0 - 46.6 % Final     Platelets   Date Value Ref Range Status   02/17/2023 239 140 - 450 10*3/mm3 Final          Assessment & Plan     *Invasive lobular carcinoma of the left breast recently diagnosed, multifocal with 3 lesions biopsied in the left breast.  Patient has multicentric disease with 15 similar-appearing irregular enhancing masses in the left breast together measuring 11.7 cm in maximum dimension which encompasses 3 of the 3 biopsy proven malignancies.  All the 3 very invasive lobular carcinoma, grade 2 with [place] score of 6 but 2 of the lesions where ER/IN positive HER2 negative and the third lesion was ER/IN positive HER2 2+ with FISH showing HER2 amplified.      1.  Estrogen receptor 95%, progesterone receptor 95%, HER2/cheryl 0, Ki-67 5%    2 left breast 10 o'clock position 10 cm from the nipple ultrasound core guided biopsy showed ER 90%, %, HER2/cheryl 0    3.  Left breast upper stereotactic core biopsy showed estrogen receptor 90%, progesterone receptor 100%, HER2/cheryl equivocal 2+  FISH testing showed her to as 5.7 with a KEIRA 17 of 1.6 with a ratio HER2/KEIRA 17 of        · 11/29/2022 bilateral screening mammogram showed 8 mm asymmetry in the left breast posterior one third.  · January 3, 2023: Left diagnostic mammogram and ultrasound showed 2 separate suspicious spiculated lesion in the left breast one of the lesion was seen well on the ultrasound and could be amenable for ultrasound-guided biopsy and the second lesion was seen on mammography and hence stereotactic guided biopsy was recommended.  So this was at 1 o'clock position 10 cm from the nipple there was a shadowing mass with peripheral spiculations which was concerning for malignancy.  The more centrally located spiculation was not seen on the ultrasound  · January 17,  2023:  · 1.  Left breast anterolateral stereotactic core biopsy is consistent with invasive lobular carcinoma, grade 2 with [place] score of 6, 4 mm ER 95%, DE 95%, HER2/cheryl 0 with Ki-67 of 5%.  Also there was atypical lobular hyperplasia    · 2.  Left breast 1 o'clock position 10 cm from the nipple ultrasound-guided core biopsy showed invasive lobular carcinoma, grade 2, 4 mm with lobular carcinoma in situ    · 3.  Left breast upper stereotactic core guided core biopsy is positive for invasive lobular carcinoma grade 2 and the size is 3 mm with lobular carcinoma in situ.    · Discussed with the pathologist at [facility] with plans to do ER/DE and HER2 on the 2 other lesions to make sure its not a heterogeneous tumor.  On discussing with the pathologist all of the 3 lesions look exactly the same.  · January 23, 2023: MRI of the bilateral breast: Right breast negative.  Left breast 1 o'clock position there is 1.5 x 1.6 x 1.3 cm irregular mass, there is hematoma around that.  At 1:00 middle posterior left breast there is a 1.3 x 1 x 1 cm irregular enhancing mass.  At 12:00 posterior left breast 8.5 cm from the nipple is a 1.9 x 1.8 x 2.2 cm lesion.  Multiple at least 12 similar-appearing irregular enhancing masses are present.  At the 11 to 12 o'clock position in the middle and posterior left breast there are 3 adjacent reference masses measuring 5.5 cm  · January 27, 2023: Patient was seen by Dr. Hi with plans to do a left mastectomy with sentinel lymph node surgery.  Patient has never had any lymph nodes in the axilla patient will be scheduled in the middle of March.  · INV[facility]AE genetic testing showed variation of uncertain significance of LUCAS gene  · We discussed the multidisciplinary approach in this patient's and I also discussed in length that that invasive lobular carcinomas do benefit from endocrine therapy and discussed in length about side effects of all endocrine therapies  · Pending the  results of the surgery she may need to be a candidate for chemotherapy as well and subsequently radiation if the tumor is large locally  · Patient is keen on getting bilateral oophorectomy after her breast surgery is done and we discussed about medical oophorectomy with Lupron as well    *S/p  IUD removal    *History of Graves' disease for which she was followed by endocrinology and had to take iodine treatments  Currently stable    Plan  · Reviewed the results of the ER MD HER2 on the 3 biopsies.  · 2 of the biopsies are ER/MD positive HER2/cheryl negative.  But one of the biopsy is ER positive MD positive but HER2/cheryl 2+ equivocal and FISH shows that HER2 is amplified with HER2 of 5.7, with a ratio of HER2 KEIRA 17 of 3.5.  · Patient is a heterogeneous tumor though not common for invasive lobular carcinomas to be HER2 amplified.  · Patient was to be scheduled for left total mastectomy with sentinel lymph node and possible axillary dissection and reconstruction but patient called Dr. Hi back and preferred bilateral total mastectomy with left axillary sentinel lymph node/axillary dissection  · We will present her in the breast cancer conference next Friday.  · Patient is scheduled for surgery March 8, 2023.  · Follow-up with me February 24 , and we will present her in the breast cancer conference on February 24.    I spent 50 total minutes, face-to-face, caring for Ursula today.  Greater than 50% of this time involved counseling and/or coordination of care as documented within this note.              MD Dr. Kell Sandoval

## 2023-02-24 ENCOUNTER — OFFICE VISIT (OUTPATIENT)
Dept: ONCOLOGY | Facility: CLINIC | Age: 48
End: 2023-02-24
Payer: COMMERCIAL

## 2023-02-24 VITALS
SYSTOLIC BLOOD PRESSURE: 130 MMHG | RESPIRATION RATE: 18 BRPM | BODY MASS INDEX: 31.59 KG/M2 | HEART RATE: 73 BPM | WEIGHT: 178.3 LBS | HEIGHT: 63 IN | TEMPERATURE: 97.1 F | DIASTOLIC BLOOD PRESSURE: 69 MMHG | OXYGEN SATURATION: 98 %

## 2023-02-24 DIAGNOSIS — C50.912 MALIGNANT NEOPLASM OF LEFT BREAST IN FEMALE, ESTROGEN RECEPTOR POSITIVE, UNSPECIFIED SITE OF BREAST: Primary | ICD-10-CM

## 2023-02-24 DIAGNOSIS — Z17.0 MALIGNANT NEOPLASM OF LEFT BREAST IN FEMALE, ESTROGEN RECEPTOR POSITIVE, UNSPECIFIED SITE OF BREAST: Primary | ICD-10-CM

## 2023-02-24 PROCEDURE — 99215 OFFICE O/P EST HI 40 MIN: CPT | Performed by: INTERNAL MEDICINE

## 2023-02-24 NOTE — PROGRESS NOTES
Subjective     REASON FOR follow up  1.  Invasive lobular carcinoma multifocal,  · Left breast anterolateral stereotactic biopsy invasive lobular carcinoma, [place] score of 6, grade 2, 4 mm, ER 95%, SC 95%, HER2/cheyrl 0 negative with Ki-67 of 5%.  Positive for atypical lobular hyperplasia  · Left breast 1 o'clock position 10 cm from the nipple ultrasound-guided biopsy consistent with invasive lobular carcinoma grade 2 Marie score of 6, 4 mm with lobular carcinoma in situ, ER 90%, %, HER2/cheryl 0  · Left breast upper stereotactic guided core biopsy invasive lobular carcinoma grade 2 Marie score of six 3 mm with lobular carcinoma in situ, ER 90%, %, HER2/cheryl equivocal 2+, FISH shows her to 5.7 with CEP 1.6 and ratio HER2 nu/KEIRA-17 ratio of 3.5.  It is amplified.                                 HISTORY OF PRESENT ILLNESS:      Patient is a 47-year-old female with multifocal invasive lobular carcinoma who on screening mammogram showed 8 mm asymmetry in the central left breast posterior one third.  In the cc view.  It appears to be superior left breast on the MLO view.  There was also architectural distortion in the lateral left breast one third on the left cc view.    On diagnostic mammogram the spiculated areas within the central posterior and the lateral anterior superior left breast where redemonstrated.  Targeted ultrasound was done.  At 1 o'clock position 10 cm from the nipple there is a mass with spiculations which is suspicious.  The more centrally located spiculation on the cc view.  The more centrally located spiculation on the cc view cannot be clearly identified on ultrasound.  So there impression was there were 2 separate suspicious spiculated lesion in the left breast.  1 was seen well on the ultrasound and amenable to ultrasound-guided biopsy the other is seen well on mammography and a stereotactic guided biopsy suggested.    Patient subsequently underwent stereotactic biopsy of 1  lesion and ultrasound-guided biopsy of the 1 cm mass at 1 o'clock position 10 cm from the nipple.  A total of 3 different areas of the left breast were biopsied and specimens were sent to pathology.  The third biopsy was performed at 12 o'clock position in the posterior one third of the left breast.  All of the 3 sites were consistent with invasive lobular carcinoma at site A, B, and C.    MRI of the breast showed at 1 o'clock position of the left breast anteriorly 4 cm posterior to the nipple there is a 1.5 x 1.6 x 1.3 cm irregular enhancing mass.  There is a small hematoma along the inferior margin of the mass.  An additional hematoma along the lateral margin of the mass which is measuring 1.2 x 0.9 x 0.9 cm    At 1:00 in the middle to posterior left breast 7.4 cm posterior to the nipple there is a 1.3 x 1 x 1 cm enhancing mass with a biopsy clip.    At 12:00 posterior left breast 8.5 cm posterior to the nipple there is a 1.5 x 1.8 x 2.2 cm irregular enhancing mass with a corresponding 2.9 cm biopsy cavity with a clip which represents the biopsy site malignancy    Multiple at least 12 similar-appearing irregular enhancing masses and foci are identified throughout the left breast predominantly involving the upper breast but also involving the lower outer quadrant.  At 11-12 o'clock in the middle and posterior left breast there is a 3 adjacent reference masses measuring 5.5 cm in AP dimension and individually measuring 1.2 x 0.9 x 1 cm.  At 1:00 in the far posterior left breast axillary tail 13.7 cm from the nipple there is a 1.3 x 1.1 x 1 cm craniocaudal dimension irregular enhancing mass which is located 0.8 cm from the anterolateral margin of the pectoralis muscle.     Per the MRI at least 15 similar-appearing irregular enhancing masses and foci scattered throughout the left breast together measuring 11.7 cm in maximum dimension which encompasses 3 sites of biopsy-proven malignancy and a consistent with  multicentric malignancy.  MRI of the right breast is negative    INVITAE genetic testing showed variation of uncertain significance of LUCAS  Gene.    Patient was suggested left total mastectomy, left axillary sentinel lymph node biopsy possible node dissection and possible reconstruction.    However patient called Dr. Hi and decided to go for upfront bilateral total mastectomy, left axillary sentinel lymph node biopsy and possible axillary lymph node dissection and reconstruction.    Interval history: I presented the case in the breast cancer conference today.  Patient had multiple nodules per MRI on the left breast Dr. Todd looked at it and felt there were multiple nodules and the largest one was 2.2 cm.  The discussion was to go ahead and upfront do the surgery and subsequently treat with chemo/ endocrine therapy as needed.  The left breast MRI findings are slightly atypical and that it is not contiguous involvement over 11.5 cm area but multiple nodules.  Surgical pathology will clarify.  If it is 1 big lesion or multiple small nodules      Oncology history:  Patient is a 47-year-old female who has been recently diagnosed with left breast cancer.  Patient has been compliant with her mammograms.    There is no family history of breast or ovarian cancer.  Her father had prostate cancer and melanoma.  A paternal aunt had thyroid cancer.      Details are as follows.    November 29, 2022: Screening bilateral mammogram showed 8 mm asymmetry in the central left breast posterior one third.  This appears to be in the superior left breast on the MLO view.  Right breast was negative.  A left diagnostic mammogram and ultrasound was recommended.    January January 3, 2023: Left breast diagnostic mammogram showed the spiculated areas within the central posterior and lateral anterior superior left breast where redemonstrated.  Targeted ultrasound was done.  At 1 o'clock position 10 cm from the nipple there is a shadowing  mass with peripheral spiculations which is concordant with the mammographic abnormality and suspicious for malignancy.  The more centrally located spiculation on the cc view posterior to the glandular tissue cannot be clearly identified on the ultrasound.    Impression was 2 separate suspicious spiculated lesion in the left breast.  One of the lesions is seen well on the ultrasound and should be amenable to ultrasound-guided core biopsy.  The other lesion which was seen in the CC projection was amicable to stereotactic guided tissue sampling.    January 3, 2023 patient underwent ultrasound-guided left breast biopsy    The ultrasound-guided biopsy of left breast 10 cm from the nipple at 1 o'clock position showed invasive lobular carcinoma moderately differentiated with a Marie grade of 2 and score of 6 measuring 4 mm.  There was focal lobular carcinoma in situ.    Left breast anterior lateral stereotactic core needle biopsy showed invasive lobular carcinoma moderately differentiated grade 2 with a Marie score of 6 measuring 4 mm.  There was atypical lobular hyperplasia.  Estrogen receptor was %, progesterone receptor was %, HER2/cheryl 0, Ki-67 5%.    Left breast upper stereotactic guided core biopsy showed invasive lobular carcinoma moderately differentiated grade 2 with Marie score of 6 with measuring 3 mm with lobular carcinoma in situ present.  I do not see the ER/MN or HER2 on the ultrasound-guided biopsy of the left breast lesion as well as the left upper stereotactic biopsy.  We will check with pathologist    January 23, 2023: Patient underwent  MRI of the breast bilateral    Right breast: Negative    Left breast: At 1:00 anterior left breast 4 cm from the nipple there is a 1.5 x 1.6 x 1.3 cm irregular enhancing mass which is biopsy-proven malignancy.  The biopsy clip is located within 0.9 x 1 x 1.1 cm postbiopsy hematoma along the inferior margin of the mass.  And there is additional  hematoma along the lateral margin of the mass which measures 1.2 x 1.9 x 0.9 cm.    At 1:00 in the middle to posterior left breast 7.4 cm from the nipple there is a 1.3 x 1 cm x 1 cm irregular enhancing mass mass with a focus from a biopsy clip along the inferior margin which also represents the biopsy-proven malignancy    At 12:00 in the posterior left breast 8.5 cm from the nipple there is a 1.9 x 1.8 x 2.2 cm irregular enhancing mass with a corresponding 2.9 cm biopsy cavity with a biopsy clip which represents the biopsy-proven malignancy    In addition there are multiple at least 12 similar-appearing irregular enhancing masses and foci are identified throughout the left breast predominantly involving the upper breast but also involving the lower outer quadrant.  At 11-12 o'clock in the middle and posterior left breast there are 3 adjacent reference masses together measuring 5.5 cm but individually measuring 1.2 x 0.9 x 1 cm.  At 1:00 in the far posterior left breast/axillary tail 13.7 cm posterior to the nipple is a 1.3 x 1.1 x 1 cm irregular enhancing mass which is located 0.8 cm from the anterolateral margin of the pectoralis Muscle.  Together the enhancing masses span approximately 11.7 x 8 x 7.8 cm.  There is no suspicious enhancement in the left nipple or chest wall.  Focal areas of skin enhancement likely reflect skin entry point from recent biopsies.  There are no pathologically enlarged internal mammary chain lymph nodes.    Impression    .  At least 15 similar-appearing irregular enhancing masses and foci  scattered throughout the left breast, together measuring up to 11.7 cm  in maximum dimension, encompass 3 sites of biopsy-proven malignancy and  are consistent with multicentric malignancy. Oncologic and surgical  management are recommended.  2.  No MRI evidence of malignancy in the right breast.     Patient has seen Dr. Hi with plans to do left mastectomy with sentinel lymph node surgery mid  March    Past Medical History:   Diagnosis Date   • ADD (attention deficit disorder)    • Bilateral impacted cerumen 07/13/2020   • COVID-19 10/23/2021    No Covid vaccines 90 days   • Diabetes mellitus, gestational 2010   • Foot fracture, left    • Graves disease    • History of radioactive iodine thyroid ablation 2012   • History of vertigo    • Hyperlipidemia    • Hypothyroidism    • Melanoma of back (HCC)    • Positive depression screening 07/13/2020        Past Surgical History:   Procedure Laterality Date   • D & C CERVICAL BIOPSY  01/2006   • INTRAUTERINE DEVICE INSERTION  2010    Mirena   • INTRAUTERINE DEVICE INSERTION  2015    Mirena exchange, old IUD removal and new one inserted by Dr Carr, Lot # [id number] exp 09/17.mar   • WISDOM TOOTH EXTRACTION          Current Outpatient Medications on File Prior to Visit   Medication Sig Dispense Refill   • cetirizine (zyrTEC) 10 MG tablet Take 10 mg by mouth Daily.     • COLLAGEN PO Take 1 tablet by mouth Daily.     • levothyroxine (SYNTHROID, LEVOTHROID) 125 MCG tablet Take 1 tablet by mouth Daily. 90 tablet 1   • lidocaine-prilocaine (EMLA) 2.5-2.5 % cream APPLY TOPICALLY ONCE FOR 1 DOSE THE MORNING OF SURGERY.     • Multiple Vitamins-Minerals (ZINC PO) Take 1 tablet by mouth Daily.     • Nutritional Supplements (JUICE PLUS FIBRE PO) Take  by mouth.     • omeprazole (priLOSEC) 20 MG capsule Take 1 capsule by mouth Daily. 30 capsule 1   • Probiotic Product (PROBIOTIC ADVANCED PO) Take 1 tablet by mouth Daily.     • vitamin C (ASCORBIC ACID) 250 MG tablet Take 1 tablet by mouth Daily.       No current facility-administered medications on file prior to visit.        ALLERGIES:    Allergies   Allergen Reactions   • Erythromycin Other (See Comments)     Pt reports   • Penicillins Rash        Social History     Socioeconomic History   • Marital status:      Spouse name: Parish   • Number of children: 2   Tobacco Use   • Smoking status: Never   • Smokeless tobacco:  Never   Vaping Use   • Vaping Use: Never used   Substance and Sexual Activity   • Alcohol use: Yes     Alcohol/week: 1.0 standard drink     Types: 1 Glasses of wine per week     Comment: occ   • Drug use: Never   • Sexual activity: Yes     Partners: Male     Birth control/protection: I.U.D.     Comment:          Family History   Problem Relation Age of Onset   • Hyperlipidemia Father    • Arthritis Father    • Hypertension Father    • Heart disease Father         S/P stents   • Heart attack Father 41   • Colon polyps Father    • Melanoma Father    • Prostate cancer Father    • Hyperlipidemia Sister    • Hypertension Sister    • Heart attack Sister    • Thyroid cancer Paternal Aunt    • Lung cancer Maternal Grandmother    • Cancer Maternal Grandfather         Bladder cancer   • Lung cancer Maternal Grandfather    • Heart disease Paternal Grandmother    • Cancer Paternal Grandfather         Bladder   • Heart disease Paternal Grandfather       Family history: Maternal great aunt had breast cancer, unsure of the age .  Father had melanoma and prostate cancer, paternal aunt had thyroid cancer, paternal grandfather had bladder cancer maternal grandfather had bladder cancer maternal grandmother had lung cancer    Past medical history is consistent with Graves' disease    OB/GYN history:  Age of menarche: 14  Patient is premenopausal and had an IUD which has been now removed   2 para 2 no miscarriages  Age at first childbirth 30  Patient did breast-feed 24 months  Length of taking birth control pills none              Review of Systems   Constitutional: Negative for appetite change, chills, diaphoresis, fatigue, fever and unexpected weight change.   HENT: Negative for hearing loss, sore throat and trouble swallowing.    Respiratory: Negative for cough, chest tightness, shortness of breath and wheezing.    Cardiovascular: Negative for chest pain, palpitations and leg swelling.   Gastrointestinal: Negative for  "abdominal distention, abdominal pain, constipation, diarrhea, nausea and vomiting.   Genitourinary: Negative for dysuria, frequency, hematuria and urgency.   Musculoskeletal: Negative for joint swelling.        No muscle weakness.   Skin: Negative for rash and wound.   Neurological: Negative for seizures, syncope, speech difficulty, weakness, numbness and headaches.   Hematological: Negative for adenopathy. Does not bruise/bleed easily.   Psychiatric/Behavioral: Negative for behavioral problems, confusion and suicidal ideas.       I have reviewed and confirmed the accuracy of the ROS as documented by the MA/LPN/RN Kathrin Frederick MD        Objective     Vitals:    02/24/23 0808   BP: 130/69   Pulse: 73   Resp: 18   Temp: 97.1 °F (36.2 °C)   TempSrc: Temporal   SpO2: 98%   Weight: 80.9 kg (178 lb 4.8 oz)   Height: 160 cm (62.99\")   PainSc: 0-No pain     Current Status 2/24/2023   ECOG score 0       Physical Exam      CONSTITUTIONAL:  Vital signs reviewed.  No distress, looks comfortable.  EYES:  Conjunctivae and lids unremarkable.  PERRLA  EARS,NOSE,MOUTH,THROAT:  Ears and nose appear unremarkable.  Lips, teeth, gums appear unremarkable.  RESPIRATORY:  Normal respiratory effort.  Lungs clear to auscultation bilaterally.  BREAST: Right breast: No skin changes, no evidence of breast mass, no nipple discharge, no evidence of any right axillary adenopathy or right supraclavicular adenopathy  Left breast: It is difficult to measure a mass in the left breast as it is very weak and I cannot differentiate if it is breast tissue and I cannot feel the edges.  But there is resistance in the upper outer quadrant of the left breast slightly more compared to the rest of the breast no evidence of any skin changes, no evidence of any left breast mass and no evidence of left nipple discharge as well as no left axillary adenopathy or left supraclavicular adenopathy.        CARDIOVASCULAR:  Normal S1, S2.  No murmurs rubs or gallops.  " No significant lower extremity edema.  GASTROINTESTINAL: Abdomen appears unremarkable.  Nontender.  No hepatomegaly.  No splenomegaly.  LYMPHATIC:  No cervical, supraclavicular, axillary lymphadenopathy.  SKIN:  Warm.  No rashes.  PSYCHIATRIC:  Normal judgment and insight.  Normal mood and affect.  I have reexamined the patient and the results are consistent with the previously documented exam. Kathrin Frederick MD         RECENT LABS:  Hematology WBC   Date Value Ref Range Status   02/17/2023 5.24 3.40 - 10.80 10*3/mm3 Final   10/11/2022 8.0 3.4 - 10.8 x10E3/uL Final     RBC   Date Value Ref Range Status   02/17/2023 4.64 3.77 - 5.28 10*6/mm3 Final   10/11/2022 5.00 3.77 - 5.28 x10E6/uL Final     Hemoglobin   Date Value Ref Range Status   02/17/2023 14.2 12.0 - 15.9 g/dL Final     Hematocrit   Date Value Ref Range Status   02/17/2023 40.9 34.0 - 46.6 % Final     Platelets   Date Value Ref Range Status   02/17/2023 239 140 - 450 10*3/mm3 Final          Assessment & Plan     *Invasive lobular carcinoma of the left breast recently diagnosed, multifocal with 3 lesions biopsied in the left breast.  Patient has multicentric disease with 15 similar-appearing irregular enhancing masses in the left breast together measuring 11.7 cm in maximum dimension which encompasses 3 of the 3 biopsy proven malignancies.  All the 3 very invasive lobular carcinoma, grade 2 with Marie score of 6 but 2 of the lesions where ER/NY positive HER2 negative and the third lesion was ER/NY positive HER2 2+ with FISH showing HER2 amplified.      1.  Estrogen receptor 95%, progesterone receptor 95%, HER2/cheryl 0, Ki-67 5%    2 left breast 10 o'clock position 10 cm from the nipple ultrasound core guided biopsy showed ER 90%, %, HER2/cheryl 0    3.  Left breast upper stereotactic core biopsy showed estrogen receptor 90%, progesterone receptor 100%, HER2/cheryl equivocal 2+  FISH testing showed her to as 5.7 with a KEIRA 17 of 1.6 with a ratio HER2/KEIRA  17 of        · 11/29/2022 bilateral screening mammogram showed 8 mm asymmetry in the left breast posterior one third.  · January 3, 2023: Left diagnostic mammogram and ultrasound showed 2 separate suspicious spiculated lesion in the left breast one of the lesion was seen well on the ultrasound and could be amenable for ultrasound-guided biopsy and the second lesion was seen on mammography and hence stereotactic guided biopsy was recommended.  So this was at 1 o'clock position 10 cm from the nipple there was a shadowing mass with peripheral spiculations which was concerning for malignancy.  The more centrally located spiculation was not seen on the ultrasound  · January 17, 2023:  · 1.  Left breast anterolateral stereotactic core biopsy is consistent with invasive lobular carcinoma, grade 2 with [place] score of 6, 4 mm ER 95%, ME 95%, HER2/cheryl 0 with Ki-67 of 5%.  Also there was atypical lobular hyperplasia    · 2.  Left breast 1 o'clock position 10 cm from the nipple ultrasound-guided core biopsy showed invasive lobular carcinoma, grade 2, 4 mm with lobular carcinoma in situ    · 3.  Left breast upper stereotactic core guided core biopsy is positive for invasive lobular carcinoma grade 2 and the size is 3 mm with lobular carcinoma in situ.    · Discussed with the pathologist at [facility] with plans to do ER/ME and HER2 on the 2 other lesions to make sure its not a heterogeneous tumor.  On discussing with the pathologist all of the 3 lesions look exactly the same.  · January 23, 2023: MRI of the bilateral breast: Right breast negative.  Left breast 1 o'clock position there is 1.5 x 1.6 x 1.3 cm irregular mass, there is hematoma around that.  At 1:00 middle posterior left breast there is a 1.3 x 1 x 1 cm irregular enhancing mass.  At 12:00 posterior left breast 8.5 cm from the nipple is a 1.9 x 1.8 x 2.2 cm lesion.  Multiple at least 12 similar-appearing irregular enhancing masses are present.  At the 11 to  12 o'clock position in the middle and posterior left breast there are 3 adjacent reference masses measuring 5.5 cm  · January 27, 2023: Patient was seen by Dr. Hi with plans to do a left mastectomy with sentinel lymph node surgery.  Patient has never had any lymph nodes in the axilla patient will be scheduled in the middle of March.  · INVITAE genetic testing showed variation of uncertain significance of LUCAS gene  · We discussed the multidisciplinary approach in this patient's and I also discussed in length that that invasive lobular carcinomas do benefit from endocrine therapy and discussed in length about side effects of all endocrine therapies  · Pending the results of the surgery she may need to be a candidate for chemotherapy as well and subsequently radiation if the tumor is large locally  · Patient is keen on getting bilateral oophorectomy after her breast surgery is done and we discussed about medical oophorectomy with Lupron as well  · February 24, 2023: Presented patient in the breast cancer conference.  Even though it appears to be a heterogeneous tumor, since it is difficult to really appreciate by clinical exam and the fact that it is invasive lobular carcinoma, it was felt best to do upfront surgery.    *S/p  IUD removal    *History of Graves' disease for which she was followed by endocrinology and had to take iodine treatments  Currently stable    Plan  · Reviewed the results of the ER MD HER2 on the 3 biopsies.  · 2 of the biopsies are ER/MD positive HER2/cheryl negative.  But one of the biopsy is ER positive MD positive but HER2/cheryl 2+ equivocal and FISH shows that HER2 is amplified with HER2 of 5.7, with a ratio of HER2 KEIRA 17 of 3.5.  · Patient is a heterogeneous tumor though not common for invasive lobular carcinomas to be HER2 amplified.  · Patient was to be scheduled for left total mastectomy with sentinel lymph node and possible axillary dissection and reconstruction but patient called   [place] back and preferred bilateral total mastectomy with left axillary sentinel lymph node/axillary dissection  · Patient was presented in the breast cancer conference February 24, 2023 and consensus was to go ahead with surgery upfront.  Further recommendations will be done based on what is seen at pathology.  · Patient is scheduled for surgery March 8, 2023.  · Follow-up with me March 31, 2023 for evaluation after surgery.  When further recommendations will be made.    I spent 40 total minutes, face-to-face, caring for Ursula today.  Greater than 50% of this time involved counseling and/or coordination of care as documented within this note.    MD Dr. Kell Sandoval

## 2023-02-27 ENCOUNTER — PRE-ADMISSION TESTING (OUTPATIENT)
Dept: PREADMISSION TESTING | Facility: HOSPITAL | Age: 48
End: 2023-02-27
Payer: COMMERCIAL

## 2023-02-27 ENCOUNTER — HOSPITAL ENCOUNTER (OUTPATIENT)
Dept: GENERAL RADIOLOGY | Facility: HOSPITAL | Age: 48
Discharge: HOME | End: 2023-02-27
Payer: COMMERCIAL

## 2023-02-27 ENCOUNTER — TELEPHONE (OUTPATIENT)
Dept: SURGERY | Facility: CLINIC | Age: 48
End: 2023-02-27
Payer: COMMERCIAL

## 2023-02-27 VITALS
TEMPERATURE: 98.2 F | OXYGEN SATURATION: 98 % | HEIGHT: 64 IN | HEART RATE: 78 BPM | SYSTOLIC BLOOD PRESSURE: 132 MMHG | DIASTOLIC BLOOD PRESSURE: 85 MMHG | RESPIRATION RATE: 18 BRPM | WEIGHT: 180 LBS | BODY MASS INDEX: 30.73 KG/M2

## 2023-02-27 DIAGNOSIS — C50.912 BREAST CARCINOMA, LOBULAR, LEFT: ICD-10-CM

## 2023-02-27 LAB
B-HCG UR QL: NEGATIVE
QT INTERVAL: 386 MS

## 2023-02-27 PROCEDURE — 93010 ELECTROCARDIOGRAM REPORT: CPT | Performed by: INTERNAL MEDICINE

## 2023-02-27 PROCEDURE — 93005 ELECTROCARDIOGRAM TRACING: CPT

## 2023-02-27 PROCEDURE — 71046 X-RAY EXAM CHEST 2 VIEWS: CPT

## 2023-02-27 PROCEDURE — 81025 URINE PREGNANCY TEST: CPT

## 2023-02-27 RX ORDER — CHLORHEXIDINE GLUCONATE 500 MG/1
1 CLOTH TOPICAL TAKE AS DIRECTED
COMMUNITY
End: 2023-03-09 | Stop reason: HOSPADM

## 2023-02-27 NOTE — TELEPHONE ENCOUNTER
[facility] Dr Frederick-  She has checked the her 2 cheryl on 2 other lesions and the third was triple positive. The second was triple negative.    She agreed with surgery upfront.

## 2023-02-28 ENCOUNTER — TELEPHONE (OUTPATIENT)
Dept: SURGERY | Facility: CLINIC | Age: 48
End: 2023-02-28
Payer: COMMERCIAL

## 2023-02-28 NOTE — TELEPHONE ENCOUNTER
Clinicals sent for review for added right breast mastectomy:  Nat fax: [phone]  Nat provider line: [phone]    CMA

## 2023-03-08 ENCOUNTER — ANESTHESIA EVENT (OUTPATIENT)
Dept: PERIOP | Facility: HOSPITAL | Age: 48
End: 2023-03-08
Payer: COMMERCIAL

## 2023-03-08 ENCOUNTER — HOSPITAL ENCOUNTER (OUTPATIENT)
Dept: NUCLEAR MEDICINE | Facility: HOSPITAL | Age: 48
Discharge: HOME | End: 2023-03-08
Payer: COMMERCIAL

## 2023-03-08 ENCOUNTER — ANESTHESIA (OUTPATIENT)
Dept: PERIOP | Facility: HOSPITAL | Age: 48
End: 2023-03-08
Payer: COMMERCIAL

## 2023-03-08 ENCOUNTER — HOSPITAL ENCOUNTER (OUTPATIENT)
Facility: HOSPITAL | Age: 48
Discharge: HOME | End: 2023-03-09
Attending: SURGERY | Admitting: SURGERY
Payer: COMMERCIAL

## 2023-03-08 DIAGNOSIS — C50.412 MALIGNANT NEOPLASM OF UPPER-OUTER QUADRANT OF LEFT BREAST IN FEMALE, ESTROGEN RECEPTOR POSITIVE: Primary | ICD-10-CM

## 2023-03-08 DIAGNOSIS — C50.912 BREAST CARCINOMA, LOBULAR, LEFT: ICD-10-CM

## 2023-03-08 DIAGNOSIS — Z17.0 MALIGNANT NEOPLASM OF UPPER-OUTER QUADRANT OF LEFT BREAST IN FEMALE, ESTROGEN RECEPTOR POSITIVE: Primary | ICD-10-CM

## 2023-03-08 LAB
B-HCG UR QL: NEGATIVE
EXPIRATION DATE: NORMAL
INTERNAL NEGATIVE CONTROL: NEGATIVE
INTERNAL POSITIVE CONTROL: POSITIVE
Lab: NORMAL

## 2023-03-08 PROCEDURE — 25010000002 HYDROMORPHONE 1 MG/ML SOLUTION: Performed by: NURSE ANESTHETIST, CERTIFIED REGISTERED

## 2023-03-08 PROCEDURE — 25010000002 PROPOFOL 10 MG/ML EMULSION: Performed by: NURSE ANESTHETIST, CERTIFIED REGISTERED

## 2023-03-08 PROCEDURE — 38792 RA TRACER ID OF SENTINL NODE: CPT

## 2023-03-08 PROCEDURE — 25010000002 MIDAZOLAM PER 1 MG: Performed by: NURSE ANESTHETIST, CERTIFIED REGISTERED

## 2023-03-08 PROCEDURE — 19303 MAST SIMPLE COMPLETE: CPT | Performed by: SPECIALIST/TECHNOLOGIST, OTHER

## 2023-03-08 PROCEDURE — 25010000002 EPINEPHRINE PER 0.1 MG: Performed by: SURGERY

## 2023-03-08 PROCEDURE — 38525 BIOPSY/REMOVAL LYMPH NODES: CPT | Performed by: SURGERY

## 2023-03-08 PROCEDURE — 25010000002 VANCOMYCIN PER 500 MG: Performed by: SURGERY

## 2023-03-08 PROCEDURE — G0378 HOSPITAL OBSERVATION PER HR: HCPCS

## 2023-03-08 PROCEDURE — 25010000002 FENTANYL CITRATE (PF) 50 MCG/ML SOLUTION: Performed by: NURSE ANESTHETIST, CERTIFIED REGISTERED

## 2023-03-08 PROCEDURE — 78195 LYMPH SYSTEM IMAGING: CPT | Performed by: SURGERY

## 2023-03-08 PROCEDURE — A9520 TC99 TILMANOCEPT DIAG 0.5MCI: HCPCS | Performed by: SURGERY

## 2023-03-08 PROCEDURE — C1789 PROSTHESIS, BREAST, IMP: HCPCS | Performed by: SURGERY

## 2023-03-08 PROCEDURE — 19303 MAST SIMPLE COMPLETE: CPT | Performed by: SURGERY

## 2023-03-08 PROCEDURE — 25010000002 PHENYLEPHRINE 10 MG/ML SOLUTION: Performed by: NURSE ANESTHETIST, CERTIFIED REGISTERED

## 2023-03-08 PROCEDURE — 81025 URINE PREGNANCY TEST: CPT | Performed by: ANESTHESIOLOGY

## 2023-03-08 PROCEDURE — 0 TECHETIUM TC99M TILMANOCEPT: Performed by: SURGERY

## 2023-03-08 PROCEDURE — 88360 TUMOR IMMUNOHISTOCHEM/MANUAL: CPT | Performed by: SURGERY

## 2023-03-08 PROCEDURE — 88331 PATH CONSLTJ SURG 1 BLK 1SPC: CPT | Performed by: SURGERY

## 2023-03-08 PROCEDURE — 25010000002 ONDANSETRON PER 1 MG: Performed by: NURSE ANESTHETIST, CERTIFIED REGISTERED

## 2023-03-08 PROCEDURE — 25010000002 DEXAMETHASONE SODIUM PHOSPHATE 20 MG/5ML SOLUTION: Performed by: NURSE ANESTHETIST, CERTIFIED REGISTERED

## 2023-03-08 PROCEDURE — 88307 TISSUE EXAM BY PATHOLOGIST: CPT | Performed by: SURGERY

## 2023-03-08 PROCEDURE — 88342 IMHCHEM/IMCYTCHM 1ST ANTB: CPT | Performed by: SURGERY

## 2023-03-08 PROCEDURE — 25010000002 ROPIVACAINE PER 1 MG: Performed by: PLASTIC SURGERY

## 2023-03-08 PROCEDURE — 25010000002 GENTAMICIN PER 80 MG: Performed by: PLASTIC SURGERY

## 2023-03-08 PROCEDURE — 25010000002 MAGNESIUM SULFATE PER 500 MG OF MAGNESIUM: Performed by: NURSE ANESTHETIST, CERTIFIED REGISTERED

## 2023-03-08 PROCEDURE — 63710000001 ONDANSETRON PER 8 MG: Performed by: SURGERY

## 2023-03-08 PROCEDURE — S0260 H&P FOR SURGERY: HCPCS | Performed by: SURGERY

## 2023-03-08 DEVICE — CLIP LIGAT VASC HORIZON TI SM/WD RD 6CT: Type: IMPLANTABLE DEVICE | Site: BREAST | Status: FUNCTIONAL

## 2023-03-08 DEVICE — CLIP LIGAT VASC HORIZON TI MD BLU 24CT: Type: IMPLANTABLE DEVICE | Site: BREAST | Status: FUNCTIONAL

## 2023-03-08 DEVICE — EXPNDR TISS BRST MODHT XPROJ STL 133S MX/T 700CC: Type: IMPLANTABLE DEVICE | Site: BREAST | Status: FUNCTIONAL

## 2023-03-08 DEVICE — GRFT TISS ALLODERM RTM PERF 16X20MM: Type: IMPLANTABLE DEVICE | Site: BREAST | Status: FUNCTIONAL

## 2023-03-08 DEVICE — CLIP LIGAT VASC HORIZON TI SM/WD RED 24CT: Type: IMPLANTABLE DEVICE | Site: BREAST | Status: FUNCTIONAL

## 2023-03-08 RX ORDER — GABAPENTIN 100 MG/1
100 CAPSULE ORAL EVERY 8 HOURS SCHEDULED
Status: DISCONTINUED | OUTPATIENT
Start: 2023-03-08 | End: 2023-03-09 | Stop reason: HOSPADM

## 2023-03-08 RX ORDER — EPHEDRINE SULFATE 50 MG/ML
INJECTION INTRAVENOUS AS NEEDED
Status: DISCONTINUED | OUTPATIENT
Start: 2023-03-08 | End: 2023-03-08 | Stop reason: SURG

## 2023-03-08 RX ORDER — SODIUM CHLORIDE, SODIUM LACTATE, POTASSIUM CHLORIDE, CALCIUM CHLORIDE 600; 310; 30; 20 MG/100ML; MG/100ML; MG/100ML; MG/100ML
100 INJECTION, SOLUTION INTRAVENOUS CONTINUOUS
Status: DISCONTINUED | OUTPATIENT
Start: 2023-03-08 | End: 2023-03-08

## 2023-03-08 RX ORDER — DROPERIDOL 2.5 MG/ML
0.62 INJECTION, SOLUTION INTRAMUSCULAR; INTRAVENOUS
Status: DISCONTINUED | OUTPATIENT
Start: 2023-03-08 | End: 2023-03-08 | Stop reason: HOSPADM

## 2023-03-08 RX ORDER — OXYCODONE HYDROCHLORIDE 5 MG/1
5 TABLET ORAL EVERY 4 HOURS PRN
Status: DISCONTINUED | OUTPATIENT
Start: 2023-03-08 | End: 2023-03-09 | Stop reason: HOSPADM

## 2023-03-08 RX ORDER — ONDANSETRON 2 MG/ML
4 INJECTION INTRAMUSCULAR; INTRAVENOUS ONCE AS NEEDED
Status: DISCONTINUED | OUTPATIENT
Start: 2023-03-08 | End: 2023-03-08 | Stop reason: HOSPADM

## 2023-03-08 RX ORDER — GABAPENTIN 300 MG/1
300 CAPSULE ORAL ONCE
Status: COMPLETED | OUTPATIENT
Start: 2023-03-08 | End: 2023-03-08

## 2023-03-08 RX ORDER — GINSENG 100 MG
CAPSULE ORAL AS NEEDED
Status: DISCONTINUED | OUTPATIENT
Start: 2023-03-08 | End: 2023-03-08 | Stop reason: HOSPADM

## 2023-03-08 RX ORDER — CLINDAMYCIN PHOSPHATE 900 MG/50ML
900 INJECTION INTRAVENOUS ONCE
Status: DISCONTINUED | OUTPATIENT
Start: 2023-03-08 | End: 2023-03-08

## 2023-03-08 RX ORDER — MAGNESIUM HYDROXIDE 1200 MG/15ML
LIQUID ORAL AS NEEDED
Status: DISCONTINUED | OUTPATIENT
Start: 2023-03-08 | End: 2023-03-08 | Stop reason: HOSPADM

## 2023-03-08 RX ORDER — LIDOCAINE HYDROCHLORIDE 10 MG/ML
0.5 INJECTION, SOLUTION EPIDURAL; INFILTRATION; INTRACAUDAL; PERINEURAL ONCE AS NEEDED
Status: DISCONTINUED | OUTPATIENT
Start: 2023-03-08 | End: 2023-03-08 | Stop reason: HOSPADM

## 2023-03-08 RX ORDER — HYDROCODONE BITARTRATE AND ACETAMINOPHEN 5; 325 MG/1; MG/1
1 TABLET ORAL ONCE AS NEEDED
Status: DISCONTINUED | OUTPATIENT
Start: 2023-03-08 | End: 2023-03-08 | Stop reason: HOSPADM

## 2023-03-08 RX ORDER — LIDOCAINE AND PRILOCAINE 25; 25 MG/G; MG/G
1 CREAM TOPICAL ONCE
Status: COMPLETED | OUTPATIENT
Start: 2023-03-08 | End: 2023-03-08

## 2023-03-08 RX ORDER — MIDAZOLAM HYDROCHLORIDE 1 MG/ML
1 INJECTION INTRAMUSCULAR; INTRAVENOUS
Status: DISCONTINUED | OUTPATIENT
Start: 2023-03-08 | End: 2023-03-08 | Stop reason: HOSPADM

## 2023-03-08 RX ORDER — SODIUM CHLORIDE, SODIUM LACTATE, POTASSIUM CHLORIDE, CALCIUM CHLORIDE 600; 310; 30; 20 MG/100ML; MG/100ML; MG/100ML; MG/100ML
125 INJECTION, SOLUTION INTRAVENOUS CONTINUOUS
Status: DISCONTINUED | OUTPATIENT
Start: 2023-03-08 | End: 2023-03-08

## 2023-03-08 RX ORDER — SODIUM CHLORIDE, SODIUM LACTATE, POTASSIUM CHLORIDE, CALCIUM CHLORIDE 600; 310; 30; 20 MG/100ML; MG/100ML; MG/100ML; MG/100ML
9 INJECTION, SOLUTION INTRAVENOUS CONTINUOUS
Status: DISCONTINUED | OUTPATIENT
Start: 2023-03-08 | End: 2023-03-08

## 2023-03-08 RX ORDER — FENTANYL CITRATE 50 UG/ML
INJECTION, SOLUTION INTRAMUSCULAR; INTRAVENOUS AS NEEDED
Status: DISCONTINUED | OUTPATIENT
Start: 2023-03-08 | End: 2023-03-08 | Stop reason: SURG

## 2023-03-08 RX ORDER — ONDANSETRON HYDROCHLORIDE 8 MG/1
8 TABLET, FILM COATED ORAL ONCE
Status: COMPLETED | OUTPATIENT
Start: 2023-03-08 | End: 2023-03-08

## 2023-03-08 RX ORDER — DEXAMETHASONE SODIUM PHOSPHATE 4 MG/ML
INJECTION, SOLUTION INTRA-ARTICULAR; INTRALESIONAL; INTRAMUSCULAR; INTRAVENOUS; SOFT TISSUE AS NEEDED
Status: DISCONTINUED | OUTPATIENT
Start: 2023-03-08 | End: 2023-03-08 | Stop reason: SURG

## 2023-03-08 RX ORDER — MAGNESIUM SULFATE HEPTAHYDRATE 500 MG/ML
INJECTION, SOLUTION INTRAMUSCULAR; INTRAVENOUS AS NEEDED
Status: DISCONTINUED | OUTPATIENT
Start: 2023-03-08 | End: 2023-03-08 | Stop reason: SURG

## 2023-03-08 RX ORDER — NALOXONE HCL 0.4 MG/ML
0.1 VIAL (ML) INJECTION
Status: DISCONTINUED | OUTPATIENT
Start: 2023-03-08 | End: 2023-03-09 | Stop reason: HOSPADM

## 2023-03-08 RX ORDER — TRANEXAMIC ACID 100 MG/ML
INJECTION, SOLUTION INTRAVENOUS AS NEEDED
Status: DISCONTINUED | OUTPATIENT
Start: 2023-03-08 | End: 2023-03-08 | Stop reason: HOSPADM

## 2023-03-08 RX ORDER — LEVOTHYROXINE SODIUM 0.12 MG/1
125 TABLET ORAL DAILY
Status: DISCONTINUED | OUTPATIENT
Start: 2023-03-08 | End: 2023-03-09 | Stop reason: HOSPADM

## 2023-03-08 RX ORDER — FENTANYL CITRATE 50 UG/ML
25 INJECTION, SOLUTION INTRAMUSCULAR; INTRAVENOUS
Status: DISCONTINUED | OUTPATIENT
Start: 2023-03-08 | End: 2023-03-08 | Stop reason: HOSPADM

## 2023-03-08 RX ORDER — DIPHENHYDRAMINE HYDROCHLORIDE 50 MG/ML
12.5 INJECTION INTRAMUSCULAR; INTRAVENOUS
Status: DISCONTINUED | OUTPATIENT
Start: 2023-03-08 | End: 2023-03-08 | Stop reason: HOSPADM

## 2023-03-08 RX ORDER — FLUMAZENIL 0.1 MG/ML
0.2 INJECTION INTRAVENOUS AS NEEDED
Status: DISCONTINUED | OUTPATIENT
Start: 2023-03-08 | End: 2023-03-08 | Stop reason: HOSPADM

## 2023-03-08 RX ORDER — ROPIVACAINE HYDROCHLORIDE 5 MG/ML
INJECTION, SOLUTION EPIDURAL; INFILTRATION; PERINEURAL AS NEEDED
Status: DISCONTINUED | OUTPATIENT
Start: 2023-03-08 | End: 2023-03-08 | Stop reason: HOSPADM

## 2023-03-08 RX ORDER — PROPOFOL 10 MG/ML
VIAL (ML) INTRAVENOUS AS NEEDED
Status: DISCONTINUED | OUTPATIENT
Start: 2023-03-08 | End: 2023-03-08 | Stop reason: SURG

## 2023-03-08 RX ORDER — VANCOMYCIN HYDROCHLORIDE 1 G/200ML
1000 INJECTION, SOLUTION INTRAVENOUS ONCE
Status: COMPLETED | OUTPATIENT
Start: 2023-03-08 | End: 2023-03-08

## 2023-03-08 RX ORDER — CELECOXIB 200 MG/1
400 CAPSULE ORAL ONCE
Status: COMPLETED | OUTPATIENT
Start: 2023-03-08 | End: 2023-03-08

## 2023-03-08 RX ORDER — PANTOPRAZOLE SODIUM 40 MG/1
40 TABLET, DELAYED RELEASE ORAL
Status: DISCONTINUED | OUTPATIENT
Start: 2023-03-09 | End: 2023-03-09 | Stop reason: HOSPADM

## 2023-03-08 RX ORDER — PROMETHAZINE HYDROCHLORIDE 25 MG/1
25 SUPPOSITORY RECTAL ONCE AS NEEDED
Status: DISCONTINUED | OUTPATIENT
Start: 2023-03-08 | End: 2023-03-08 | Stop reason: HOSPADM

## 2023-03-08 RX ORDER — FENTANYL CITRATE 50 UG/ML
50 INJECTION, SOLUTION INTRAMUSCULAR; INTRAVENOUS
Status: DISCONTINUED | OUTPATIENT
Start: 2023-03-08 | End: 2023-03-08 | Stop reason: HOSPADM

## 2023-03-08 RX ORDER — SODIUM CHLORIDE 0.9 % (FLUSH) 0.9 %
3 SYRINGE (ML) INJECTION EVERY 12 HOURS SCHEDULED
Status: DISCONTINUED | OUTPATIENT
Start: 2023-03-08 | End: 2023-03-08 | Stop reason: HOSPADM

## 2023-03-08 RX ORDER — SODIUM CHLORIDE 0.9 % (FLUSH) 0.9 %
3-10 SYRINGE (ML) INJECTION AS NEEDED
Status: DISCONTINUED | OUTPATIENT
Start: 2023-03-08 | End: 2023-03-08 | Stop reason: HOSPADM

## 2023-03-08 RX ORDER — PROMETHAZINE HYDROCHLORIDE 25 MG/1
25 TABLET ORAL ONCE AS NEEDED
Status: DISCONTINUED | OUTPATIENT
Start: 2023-03-08 | End: 2023-03-08 | Stop reason: HOSPADM

## 2023-03-08 RX ORDER — ONDANSETRON 2 MG/ML
INJECTION INTRAMUSCULAR; INTRAVENOUS AS NEEDED
Status: DISCONTINUED | OUTPATIENT
Start: 2023-03-08 | End: 2023-03-08 | Stop reason: SURG

## 2023-03-08 RX ORDER — HYDROMORPHONE HYDROCHLORIDE 1 MG/ML
0.25 INJECTION, SOLUTION INTRAMUSCULAR; INTRAVENOUS; SUBCUTANEOUS
Status: DISCONTINUED | OUTPATIENT
Start: 2023-03-08 | End: 2023-03-08 | Stop reason: HOSPADM

## 2023-03-08 RX ORDER — VANCOMYCIN HYDROCHLORIDE 1 G/20ML
1 INJECTION, POWDER, LYOPHILIZED, FOR SOLUTION INTRAVENOUS ONCE
Status: DISCONTINUED | OUTPATIENT
Start: 2023-03-08 | End: 2023-03-08

## 2023-03-08 RX ORDER — ACETAMINOPHEN 500 MG
1000 TABLET ORAL ONCE
Status: COMPLETED | OUTPATIENT
Start: 2023-03-08 | End: 2023-03-08

## 2023-03-08 RX ORDER — EPHEDRINE SULFATE 50 MG/ML
5 INJECTION, SOLUTION INTRAVENOUS ONCE AS NEEDED
Status: DISCONTINUED | OUTPATIENT
Start: 2023-03-08 | End: 2023-03-08 | Stop reason: HOSPADM

## 2023-03-08 RX ORDER — ENOXAPARIN SODIUM 100 MG/ML
40 INJECTION SUBCUTANEOUS DAILY
Status: DISCONTINUED | OUTPATIENT
Start: 2023-03-09 | End: 2023-03-09 | Stop reason: HOSPADM

## 2023-03-08 RX ORDER — ROCURONIUM BROMIDE 10 MG/ML
INJECTION, SOLUTION INTRAVENOUS AS NEEDED
Status: DISCONTINUED | OUTPATIENT
Start: 2023-03-08 | End: 2023-03-08 | Stop reason: SURG

## 2023-03-08 RX ORDER — SODIUM CHLORIDE 0.9 % (FLUSH) 0.9 %
3 SYRINGE (ML) INJECTION EVERY 12 HOURS SCHEDULED
Status: DISCONTINUED | OUTPATIENT
Start: 2023-03-08 | End: 2023-03-09 | Stop reason: HOSPADM

## 2023-03-08 RX ORDER — PHENYLEPHRINE HYDROCHLORIDE 10 MG/ML
INJECTION INTRAVENOUS AS NEEDED
Status: DISCONTINUED | OUTPATIENT
Start: 2023-03-08 | End: 2023-03-08 | Stop reason: SURG

## 2023-03-08 RX ORDER — NALOXONE HCL 0.4 MG/ML
0.2 VIAL (ML) INJECTION AS NEEDED
Status: DISCONTINUED | OUTPATIENT
Start: 2023-03-08 | End: 2023-03-08 | Stop reason: HOSPADM

## 2023-03-08 RX ORDER — DIAZEPAM 5 MG/1
10 TABLET ORAL ONCE
Status: COMPLETED | OUTPATIENT
Start: 2023-03-08 | End: 2023-03-08

## 2023-03-08 RX ORDER — OXYCODONE HYDROCHLORIDE 5 MG/1
10 TABLET ORAL ONCE
Status: COMPLETED | OUTPATIENT
Start: 2023-03-08 | End: 2023-03-08

## 2023-03-08 RX ORDER — LABETALOL HYDROCHLORIDE 5 MG/ML
5 INJECTION, SOLUTION INTRAVENOUS
Status: DISCONTINUED | OUTPATIENT
Start: 2023-03-08 | End: 2023-03-08 | Stop reason: HOSPADM

## 2023-03-08 RX ORDER — HYDROCODONE BITARTRATE AND ACETAMINOPHEN 7.5; 325 MG/1; MG/1
1 TABLET ORAL EVERY 4 HOURS PRN
Status: DISCONTINUED | OUTPATIENT
Start: 2023-03-08 | End: 2023-03-08 | Stop reason: HOSPADM

## 2023-03-08 RX ORDER — SODIUM CHLORIDE 0.9 % (FLUSH) 0.9 %
3-10 SYRINGE (ML) INJECTION AS NEEDED
Status: DISCONTINUED | OUTPATIENT
Start: 2023-03-08 | End: 2023-03-09 | Stop reason: HOSPADM

## 2023-03-08 RX ORDER — KETAMINE HCL IN NACL, ISO-OSM 100MG/10ML
SYRINGE (ML) INJECTION AS NEEDED
Status: DISCONTINUED | OUTPATIENT
Start: 2023-03-08 | End: 2023-03-08 | Stop reason: SURG

## 2023-03-08 RX ORDER — DOXYCYCLINE 100 MG/1
100 CAPSULE ORAL EVERY 12 HOURS SCHEDULED
Status: DISCONTINUED | OUTPATIENT
Start: 2023-03-08 | End: 2023-03-09 | Stop reason: HOSPADM

## 2023-03-08 RX ORDER — HYDROMORPHONE HYDROCHLORIDE 1 MG/ML
0.25 INJECTION, SOLUTION INTRAMUSCULAR; INTRAVENOUS; SUBCUTANEOUS
Status: DISCONTINUED | OUTPATIENT
Start: 2023-03-08 | End: 2023-03-09 | Stop reason: HOSPADM

## 2023-03-08 RX ORDER — ONDANSETRON 8 MG/1
8 TABLET, ORALLY DISINTEGRATING ORAL EVERY 6 HOURS PRN
Status: DISCONTINUED | OUTPATIENT
Start: 2023-03-08 | End: 2023-03-09 | Stop reason: HOSPADM

## 2023-03-08 RX ORDER — FAMOTIDINE 10 MG/ML
20 INJECTION, SOLUTION INTRAVENOUS ONCE
Status: COMPLETED | OUTPATIENT
Start: 2023-03-08 | End: 2023-03-08

## 2023-03-08 RX ORDER — MIDAZOLAM HYDROCHLORIDE 1 MG/ML
INJECTION INTRAMUSCULAR; INTRAVENOUS AS NEEDED
Status: DISCONTINUED | OUTPATIENT
Start: 2023-03-08 | End: 2023-03-08 | Stop reason: SURG

## 2023-03-08 RX ORDER — HYDRALAZINE HYDROCHLORIDE 20 MG/ML
5 INJECTION INTRAMUSCULAR; INTRAVENOUS
Status: DISCONTINUED | OUTPATIENT
Start: 2023-03-08 | End: 2023-03-08 | Stop reason: HOSPADM

## 2023-03-08 RX ORDER — LIDOCAINE HYDROCHLORIDE 20 MG/ML
INJECTION, SOLUTION INFILTRATION; PERINEURAL AS NEEDED
Status: DISCONTINUED | OUTPATIENT
Start: 2023-03-08 | End: 2023-03-08 | Stop reason: SURG

## 2023-03-08 RX ORDER — SCOLOPAMINE TRANSDERMAL SYSTEM 1 MG/1
1 PATCH, EXTENDED RELEASE TRANSDERMAL ONCE
Status: DISCONTINUED | OUTPATIENT
Start: 2023-03-08 | End: 2023-03-08

## 2023-03-08 RX ORDER — DOXYCYCLINE 100 MG/1
200 CAPSULE ORAL ONCE
Status: COMPLETED | OUTPATIENT
Start: 2023-03-08 | End: 2023-03-08

## 2023-03-08 RX ORDER — ONDANSETRON 2 MG/ML
4 INJECTION INTRAMUSCULAR; INTRAVENOUS EVERY 6 HOURS PRN
Status: DISCONTINUED | OUTPATIENT
Start: 2023-03-08 | End: 2023-03-09 | Stop reason: HOSPADM

## 2023-03-08 RX ORDER — SODIUM CHLORIDE 9 MG/ML
40 INJECTION, SOLUTION INTRAVENOUS AS NEEDED
Status: DISCONTINUED | OUTPATIENT
Start: 2023-03-08 | End: 2023-03-09 | Stop reason: HOSPADM

## 2023-03-08 RX ORDER — IPRATROPIUM BROMIDE AND ALBUTEROL SULFATE 2.5; .5 MG/3ML; MG/3ML
3 SOLUTION RESPIRATORY (INHALATION) ONCE AS NEEDED
Status: DISCONTINUED | OUTPATIENT
Start: 2023-03-08 | End: 2023-03-08 | Stop reason: HOSPADM

## 2023-03-08 RX ORDER — DEXMEDETOMIDINE HYDROCHLORIDE 100 UG/ML
INJECTION, SOLUTION INTRAVENOUS AS NEEDED
Status: DISCONTINUED | OUTPATIENT
Start: 2023-03-08 | End: 2023-03-08 | Stop reason: SURG

## 2023-03-08 RX ORDER — ONDANSETRON 4 MG/1
4 TABLET, FILM COATED ORAL EVERY 6 HOURS PRN
Status: DISCONTINUED | OUTPATIENT
Start: 2023-03-08 | End: 2023-03-09 | Stop reason: HOSPADM

## 2023-03-08 RX ORDER — ACETAMINOPHEN 325 MG/1
650 TABLET ORAL EVERY 4 HOURS PRN
Status: DISCONTINUED | OUTPATIENT
Start: 2023-03-08 | End: 2023-03-09 | Stop reason: HOSPADM

## 2023-03-08 RX ADMIN — ONDANSETRON HYDROCHLORIDE 8 MG: 8 TABLET, FILM COATED ORAL at 06:41

## 2023-03-08 RX ADMIN — FAMOTIDINE 20 MG: 10 INJECTION INTRAVENOUS at 06:41

## 2023-03-08 RX ADMIN — SODIUM CHLORIDE, POTASSIUM CHLORIDE, SODIUM LACTATE AND CALCIUM CHLORIDE 125 ML/HR: 600; 310; 30; 20 INJECTION, SOLUTION INTRAVENOUS at 06:45

## 2023-03-08 RX ADMIN — LIDOCAINE AND PRILOCAINE 1 APPLICATION: 25; 25 CREAM TOPICAL at 06:30

## 2023-03-08 RX ADMIN — Medication 3 ML: at 20:53

## 2023-03-08 RX ADMIN — OXYCODONE HYDROCHLORIDE 5 MG: 5 TABLET ORAL at 19:46

## 2023-03-08 RX ADMIN — DIAZEPAM 10 MG: 5 TABLET ORAL at 06:41

## 2023-03-08 RX ADMIN — EPHEDRINE SULFATE 10 MG: 50 INJECTION INTRAVENOUS at 12:11

## 2023-03-08 RX ADMIN — FENTANYL CITRATE 50 MCG: 50 INJECTION, SOLUTION INTRAMUSCULAR; INTRAVENOUS at 08:01

## 2023-03-08 RX ADMIN — PHENYLEPHRINE HYDROCHLORIDE 200 MCG: 10 INJECTION, SOLUTION INTRAVENOUS at 08:59

## 2023-03-08 RX ADMIN — DEXMEDETOMIDINE 10 MCG: 100 INJECTION, SOLUTION, CONCENTRATE INTRAVENOUS at 12:32

## 2023-03-08 RX ADMIN — PROPOFOL 50 MCG/KG/MIN: 10 INJECTION, EMULSION INTRAVENOUS at 10:20

## 2023-03-08 RX ADMIN — ACETAMINOPHEN 1000 MG: 500 TABLET ORAL at 06:41

## 2023-03-08 RX ADMIN — TILMANOCEPT 1 DOSE: KIT at 07:25

## 2023-03-08 RX ADMIN — GABAPENTIN 100 MG: 100 CAPSULE ORAL at 17:24

## 2023-03-08 RX ADMIN — SCOPALAMINE 1 PATCH: 1 PATCH, EXTENDED RELEASE TRANSDERMAL at 06:41

## 2023-03-08 RX ADMIN — EPHEDRINE SULFATE 10 MG: 50 INJECTION INTRAVENOUS at 09:51

## 2023-03-08 RX ADMIN — HYDROMORPHONE HYDROCHLORIDE 0.5 MG: 1 INJECTION, SOLUTION INTRAMUSCULAR; INTRAVENOUS; SUBCUTANEOUS at 12:24

## 2023-03-08 RX ADMIN — ONDANSETRON 4 MG: 2 INJECTION INTRAMUSCULAR; INTRAVENOUS at 12:18

## 2023-03-08 RX ADMIN — PROPOFOL 150 MG: 10 INJECTION, EMULSION INTRAVENOUS at 08:08

## 2023-03-08 RX ADMIN — CELECOXIB 400 MG: 200 CAPSULE ORAL at 06:41

## 2023-03-08 RX ADMIN — LIDOCAINE HYDROCHLORIDE 100 MG: 20 INJECTION, SOLUTION INFILTRATION; PERINEURAL at 08:08

## 2023-03-08 RX ADMIN — DOXYCYCLINE 100 MG: 100 CAPSULE ORAL at 20:52

## 2023-03-08 RX ADMIN — SODIUM CHLORIDE, POTASSIUM CHLORIDE, SODIUM LACTATE AND CALCIUM CHLORIDE: 600; 310; 30; 20 INJECTION, SOLUTION INTRAVENOUS at 10:32

## 2023-03-08 RX ADMIN — ROCURONIUM BROMIDE 50 MG: 10 INJECTION, SOLUTION INTRAVENOUS at 08:09

## 2023-03-08 RX ADMIN — PHENYLEPHRINE HYDROCHLORIDE 200 MCG: 10 INJECTION, SOLUTION INTRAVENOUS at 09:12

## 2023-03-08 RX ADMIN — DEXAMETHASONE SODIUM PHOSPHATE 8 MG: 4 INJECTION, SOLUTION INTRAMUSCULAR; INTRAVENOUS at 10:17

## 2023-03-08 RX ADMIN — VANCOMYCIN HYDROCHLORIDE 1000 MG: 1 INJECTION, SOLUTION INTRAVENOUS at 07:49

## 2023-03-08 RX ADMIN — OXYCODONE HYDROCHLORIDE 10 MG: 5 TABLET ORAL at 07:44

## 2023-03-08 RX ADMIN — GABAPENTIN 300 MG: 300 CAPSULE ORAL at 07:44

## 2023-03-08 RX ADMIN — Medication 30 MG: at 08:33

## 2023-03-08 RX ADMIN — DOXYCYCLINE 200 MG: 100 CAPSULE ORAL at 06:41

## 2023-03-08 RX ADMIN — DEXMEDETOMIDINE 10 MCG: 100 INJECTION, SOLUTION, CONCENTRATE INTRAVENOUS at 12:26

## 2023-03-08 RX ADMIN — PHENYLEPHRINE HYDROCHLORIDE 200 MCG: 10 INJECTION, SOLUTION INTRAVENOUS at 11:53

## 2023-03-08 RX ADMIN — Medication 20 MG: at 10:05

## 2023-03-08 RX ADMIN — MIDAZOLAM 2 MG: 1 INJECTION, SOLUTION INTRAMUSCULAR; INTRAVENOUS at 08:28

## 2023-03-08 RX ADMIN — MAGNESIUM SULFATE HEPTAHYDRATE 2 G: 500 INJECTION, SOLUTION INTRAMUSCULAR; INTRAVENOUS at 08:01

## 2023-03-08 RX ADMIN — EPHEDRINE SULFATE 10 MG: 50 INJECTION INTRAVENOUS at 09:55

## 2023-03-08 RX ADMIN — PHENYLEPHRINE HYDROCHLORIDE 200 MCG: 10 INJECTION, SOLUTION INTRAVENOUS at 11:39

## 2023-03-08 RX ADMIN — PHENYLEPHRINE HYDROCHLORIDE 200 MCG: 10 INJECTION, SOLUTION INTRAVENOUS at 09:35

## 2023-03-08 NOTE — PLAN OF CARE
Goal Outcome Evaluation:           Progress: no change  Outcome Evaluation: admit from pacu. resting well . reports minimal pain. vss, leesa liquids well not hungary for food yet. dressing c/d/i drains patent with sero/sang drainage

## 2023-03-08 NOTE — ANESTHESIA PROCEDURE NOTES
Airway  Urgency: elective    Date/Time: 3/8/2023 8:11 AM  Airway not difficult    General Information and Staff    Patient location during procedure: OR  Anesthesiologist: Jose Mcqueen MD  CRNA/CAA: Brittaney Arnold CRNA    Indications and Patient Condition  Indications for airway management: airway protection    Preoxygenated: yes  Mask difficulty assessment: 1 - vent by mask    Final Airway Details  Final airway type: endotracheal airway      Successful airway: ETT  Cuffed: yes   Successful intubation technique: direct laryngoscopy  Facilitating devices/methods: intubating stylet and cricoid pressure  Endotracheal tube insertion site: oral  Blade: Nolan  Blade size: 2  ETT size (mm): 7.0  Cormack-Lehane Classification: grade I - full view of glottis  Placement verified by: chest auscultation and capnometry   Cuff volume (mL): 5  Measured from: lips  ETT/EBT  to lips (cm): 21  Number of attempts at approach: 1  Assessment: lips, teeth, and gum same as pre-op    Additional Comments  EBBS: ETCO2+: Atraumatic intubation

## 2023-03-08 NOTE — ANESTHESIA PREPROCEDURE EVALUATION
Anesthesia Evaluation     Patient summary reviewed and Nursing notes reviewed   no history of anesthetic complications:  NPO Solid Status: > 8 hours  NPO Liquid Status: > 4 hours           Airway   Mallampati: II  Dental - normal exam     Pulmonary - negative pulmonary ROS and normal exam   Cardiovascular - normal exam    (+) hyperlipidemia,       Neuro/Psych  (+) psychiatric history Anxiety and ADD,    GI/Hepatic/Renal/Endo    (+) obesity,   thyroid problem     Musculoskeletal     Abdominal    Substance History      OB/GYN          Other      history of cancer active                    Anesthesia Plan    ASA 2     general     intravenous induction     Anesthetic plan, risks, benefits, and alternatives have been provided, discussed and informed consent has been obtained with: patient.        CODE STATUS:

## 2023-03-08 NOTE — OP NOTE
Pre-Operative Diagnosis: Acquired absence bilateral breast     Post-Operative Diagnosis: Same     Procedure Performed:   1.  Bilateral placement of prepectoral tissue expanders for reconstruction ([id number])  2.  Bilateral placement AlloDerm acellular dermal matrix (10 x 16 in each breast)     Surgeon: GONZALEZ Stone MD     Assistant: None     Anesthesia: General     Estimated Blood Loss: 20     Specimens: None     Complications: None     Indications: She presented after diagnosis of left breast cancer. She discussed her treatment with  and decided on bilateral mastectomy.    We discussed risks, benefits and alternatives including but not limited to: bleeding, infection, asymmetry, poor or slow wound healing, need for further surgery, possible recurrence.  The patient elected to proceed.     Description of Procedure: The patient was met in the preoperative holding area.  All questions were answered and informed consent was assured.       She was marked in a standing position of the breast landmarks were drawn and fusiform mastectomy incisions were designed around the nipple areolar complexes preserving the inferior skin.  She was transferred to the operating placed supine on table with arms abducted and padded.     After induction of appropriate anesthesia, a timeout was performed correctly identifying the patient, operative site, and procedure to be performed.  All present were in agreement.     After completion of the mastectomies the skin flaps did appear viable but cap refill was unreliable secondary to epinephrine infiltration. The superior flap was transposed over the inferior flap loosely and this overlap was marked.  The intervening skin was then de-epithelialized. Both breasts were copiously irrigated and immaculate hemostasis was achieved.  50% Betadine solution was left to dwell for several minutes and rinsed clear with antibiotic solution.  Gloves were changed and the AlloDerm and  expanders were brought on the field and the large AlloDerm sheet was split and then additionally fenestrated and anterior wraps created around both expanders.  Expanders were placed in the pockets after changing gloves and reprepped and the skin.  The expanders were sutured in 4 locations in the inferior de-epithelialized flap transposed and secured on the anterior surface of the expander. 2 drains were placed on both sides.  Topical TXA was placed throughout the pockets. Tthen closure performed with 2-0 Vicryl in the subcutaneous layer anchoring the de-epithelialized flap to the underside of the superior flap, 3-0 Monocryl deep dermal layer and 4-0 Monocryl in the intracuticular.       Skin was dressed with skin glue and Dermabond and Nitropaste applied and she was placed in a well-padded Ace wrap.     The patient was then aroused from anesthesia with ease and transferred to the postoperative care area in good condition. All sponge, needle, and instrument counts were correct.

## 2023-03-08 NOTE — H&P
Allergies  PENICILLINS   Medications  levothyroxine 125 mcg tablet  TAKE 1 TABLET BY MOUTH ONCE DAILY  12/24/22   filled surescripts  lidocaine-prilocaine 2.5 %-2.5 % topical cream  02/08/23   filled surescripts  omeprazole 20 mg capsule,delayed release  TAKE 1 CAPSULE BY MOUTH ONCE DAILY  02/09/23   filled surescripts  Problems  None recorded.  Family History  Mother - Arthritis    - Obesity  Father - Myocardial infarction    - Arthritis    - Hypertensive disorder    - Malignant tumor of prostate    - Heart disease    - Diabetes mellitus  Brother - Substance abuse  Sister - Chronic obstructive lung disease    - Myocardial infarction    - Hypertensive disorder    - Obesity    - Emphysema    - Heart disease    - Diabetes mellitus  Social History  Substance Use  Do you or have you ever smoked tobacco?: Never smoker  Do you or have you ever used smokeless tobacco?: Never used smokeless tobacco  Have you used IV drugs?: No  How much tobacco do you smoke?: None  How much tobacco do you chew?: none  Do you or have you ever used e-cigarettes or vape?: Never used electronic cigarettes  What is your level of alcohol consumption?: Occasional  Diet and Exercise  What type of diet are you following?: Regular  Education and Occupation  Are you currently employed?: Yes  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 : No  Public Health and Travel  Have you been to an area known to be high risk for COVID-19?: No  In the 14 days before symptom onset, have you had close contact with a person who is under investigation for COVID-19 while that person was ill?: No  Marriage and Sexuality  What is your relationship status?:   Surgical History  Other - 01/07/2005  Past Medical History  Cancer: Y  Thyroid Disease Explain____________: Y  Screening  None recorded.  HPI  Patient presents for  pre-op evaluation. Procedure: INSERT TEXT HERE. New patient symptoms: INSERT TEXT HERE.    Star 46 yo lady with recent diagnosis of left breast LCIS. She has discussed with Dr. Hi left mastectoym and right risk reducing mastectomy. Wearing a DDD bra and is ok with being smaller after recostruction.    Otherwise healthy. Does not smoke. Works for an ophthalmologist.  ROS  Patient reports breast lump but reports no non-healing areas. She reports no fever, no night sweats, and no significant weight gain. She reports no dry eyes and no vision change. She reports no difficulty hearing and no ear pain. She reports no frequent nosebleeds, no nose problems, and no sinus problems. She reports no sore throat, no bleeding gums, and no dry mouth. She reports no chest pain, no arm pain on exertion, and no shortness of breath when walking. She reports no cough, no wheezing, and no shortness of breath. She reports no abdominal pain, no nausea, and no vomiting. She reports no incontinence. She reports no muscle aches, no muscle weakness, and no arthralgias/joint pain. She reports no loss of consciousness, no gait dysfunction, and no paralysis. She reports no agitation, no dementia, and no delirium.  Physical Exam  Chaperone: Chaperone: present.    Constitutional: General Appearance: healthy-appearing, well-nourished, and well-developed. Level of Distress: NAD. Ambulation: ambulating normally.    Psychiatric: Mental Status: normal mood and affect and active and alert.    Head: Head: normocephalic and atraumatic.    Eyes: Lids and Conjunctivae: non-injected. Pupils: PERRLA. EOM: EOMI.    Cardiovascular: Heart Auscultation: RRR.    Musculoskeletal:: Motor Strength and Tone: normal tone and motor strength. Joints, Bones, and Muscles: normal movement of all extremities. Extremities: no cyanosis or edema.    Neurologic: Gait and Station: normal gait and station.    Skin: Inspection and palpation: no rash or lesions.  Assessment  / Plan  Patient presents for pre-op evaluation. History and physical exam indicate: INSERT TEXT HERE.  She is a good candidate for immediate tissue expander-based reconstruction with acellular dermal matrix in a prepectoral position. We discussed placement of an expander in a position above the muscle to reduce postoperative pain and eliminate animation deformity. We also discussed the use of acellular dermal matrix for its role in supporting the soft tissues after removal of the breast and reducing the risk of capsular contracture. Discussed that this is a two-stage approach with placement of an expander at the time of mastectomy followed by sequential expansion over the several weeks following surgery and then a second surgery to remove the expander and place a permanent implant. Given that this is foreign material under her skin we did discuss the risks of infection certainly exist and that this may require additional surgery to washout the space or remove the expander if infection cannot be controlled. There are always risks of bleeding as well after surgery. The goals of reconstruction were discussed and the ultimate goal is to achieve enough symmetry so that she is able to wear normal clothing and appear symmetrical in this and that if we can achieve symmetry that is good in revealing clothing and swimwear that is an added bonus. We discussed risks of poor wound healing of the mastectomy incision and that this may require revision as well.    This will be an overnight stay in the hospital with the use of 2 drains in each breast. Limitations after surgery were discussed and the importance of minimizing arm movement to provide consolidation of the dead space and good integration of the ADM were stressed. She was given a copy of the [facility] consent form and my postoperative protocol and will be in touch for scheduling.      Visit was performed with all precautions regarding patient and provider protections during  the Covid 19 pandemic.

## 2023-03-08 NOTE — H&P
There are no changes in the history or physical exam, aside from any that are listed as an addendum at the bottom of this document.   Today, patient will go for the surgery listed in the plan below.    Still no changes    Chief Complaint: Ursula Kulkarni is a 47 y.o. female who was seen in consultation at the request of RADHA Lauren  for newly diagnosed breast cancer    History of Present Illness:  Patient presents with newly diagnosed breast cancer LEFT breast. She noted no new masses, skin changes, nipple discharge, nipple changes prior to her most recent imaging.    Her most recent imaging includes the followin2022, MMG Screening Digital Gerardo Bilateral with CAD ([facility])   There is an 8 mm asymmetry in the central left breast posterior 1/3 on the CC view. This appears to be in the superior left breast on the MLO view.   BI-RADS 0: Incomplete     2023, MMG Diagnostic Gerardo Left with CAD ([facility])   The spiculated areas within the central posterior and the lateral anterior superior    left breast were re-demonstrated.  Targeted ultrasound was performed.  At the 1 o'clock position 10    cm from the nipple there is a shadowing mass with peripheral spiculations favored to represent the    mammographic abnormality and suspicious for malignancy.  The more centrally located spiculation on    the CC view disc posterior to the glandular tissue in the retro glandular fat cannot be clearly    identified on ultrasound.   Two separate suspicious spiculated lesions of the left breast.  One lesion is felt to be seen well    on ultrasound and should be amenable to ultrasound-guided core biopsy.  The other lesion is seen    well on mammography. BI-RADS 4: Suspicious          Pathology:  2023, ADDENDUM:                   All 3 sites were biopsied are malignant. This is concordant.                                   Site A (top-hat shaped clip)                                   Site B (coil shaped clip)                                    Site C (stoplight shaped clip)   Slightly lateral left breast, stereotactic, 9 gauge vacuum assisted Brevera, Twelve specimens were obtained and a top-hat shaped clip was then placed. Site A: ultrasound biopsy table. 1.0 cm, 1:00 position 10 cm from the nipple. 12 gauge Bard.   Four samples were obtained and a coil shaped HydroMARK clip was placed, Site B. The post-procedure mammogram shows the top hat shaped clip from the stereotactic biopsy in good position in the biopsy cavity.  The coil shaped clip is in good position on the true lateral view, however on the CC view it does not correlate to the mammographic finding, which on today's exam is more at the 12 o'clock position in the posterior 1/3 of the left breast.  Therefore was decided to perform a 3rd biopsy.   9-gauge vacuum assisted Brevera, Twelve specimens, stoplight shaped clip, Site C, all 3 clips are appropriately positioned.     LEFT BREAST:     At 1:00 in the anterior left breast, 4 cm posterior to the nipple, there is a 1.5 AP dimension, 1.6 cm transverse dimension, 1.3 cm craniocaudal dimension irregular enhancing mass with a focus of susceptibility from a biopsy clip along the inferior margin. The biopsy clip is located within hematoma along the lateral margin of the mass.  At 1:00 in the middle to posterior left breast, 7.4 cm posterior to the nipple, there is a 1.3 cm irregular enhancing mass clip along the inferior margin. At 12:00 in the posterior left breast, 8.5 cm posterior to the nipple, 2.2 cm craniocaudal dimension irregular enhancing mass with a biopsy clip.  Multiple, at least 12, similar appearing irregular enhancing masses and foci are identified throughout left breast.  At 11-12 o'clock in the middle and posterior left breast, there are 3 adjacent reference masses together measuring up to 5.5 cm in AP dimension.  At 1:00 in the far posterior left breast/axillary tail, 13.7 cm posterior to the nipple, there is a  reference 1.3 cm AP dimension, 1.1 cm transverse dimension, 1.0 cm craniocaudal dimension, irregular enhancing mass, which is located approximately 0.8 cm from the anterolateral margin of the pectoralis muscle. Together, the enhancing masses span approximately 11.7 cm in maximum AP dimension, 8 cm in maximum transverse dimension, and 7.8 cm in maximum craniocaudal dimension. The visualized axilla is within normal limits   EXTRAMAMMARY FINDINGS:    There are no pathologically enlarged internal mammary chain lymph nodes on either side.   IMPRESSION AND RECOMMENDATION:   At least 15 similar-appearing irregular enhancing masses and foci scattered throughout the left breast, together measuring up to 11.7 cm consistent with multicentric malignancy   No MRI evidence of malignancy in the right breast        She had a biopsy on the following day that showed:   1. Left breast, anterior lateral, stereotactic-guided core biopsy:   - Invasive lobular carcinoma   - Histologic grade (Rose Histologic Score):   - Glandular (Acinar)/Tubular Differentiation: Score 3   - Nuclear Pleomorphism: Score 2   - Mitotic Rate: Score 1   - Overall Grade: Grade 2   - Size of largest focus of invasion: 4 mm   - Breast biomarker testing:   - Estrogen Receptor (ER): Positive (95%, moderate)   - Progesterone Receptor (PgR): Positive (95%, strong)   - HER2 (by immunohistochemistry): Negative (Score 0)   - Ki-67: 5%   - Other findings:   - Atypical lobular hyperplasia (ALH)     2. Left breast, 1 o'clock position, and 10 cm from nipple, ultrasound-guided core biopsy:  - Invasive lobular carcinoma   - Histologic grade (Rose Histologic Score):   - Glandular (Acinar)/Tubular Differentiation: Score 3   - Nuclear Pleomorphism: Score 2   - Mitotic Rate: Score 1   - Overall Grade: Grade 2   - Size of largest focus of invasion: 4 mm  - Other findings:   - Lobular carcinoma in situ (LCIS), classic type     3. Left breast, upper, stereotactic-guided  core biopsy:   - Invasive lobular carcinoma  - Histologic grade (Sontag Histologic Score):   - Glandular (Acinar)/Tubular Differentiation: Score 3   - Nuclear Pleomorphism: Score 2   - Mitotic Rate: Score 1   - Overall Grade: Grade 2   - Size of largest focus of invasion: 3 mm   - Other findings:   - Lobular carcinoma in situ (LCIS), classic type     OUTSIDE PATHOLOGY:   1. Left Breast, Anterior Lateral, Stereotactic-Guided Core Needle Biopsy:                  A. INVASIVE LOBULAR CARCINOMA, Moderately differentiated; Marie Histologic Grade II/III                      (tubule score = 3, nuclear score = 2, mitoses score=1), measuring at least 4 mm.                B. Atypical lobular hyperplasia.   ER:       POSITIVE (%, Moderate).                MD:       POSITIVE (%, Strong).                HER2:  NEGATIVE (0).                Ki67:     Approximately 5%.             2. Left Breast, 1:00, 10 cm FN, U/S-Guided Core Needle Biopsy:                 A. INVASIVE LOBULAR CARCINOMA, Moderately differentiated; Marie Histologic Grade II/III                     (tubule score = 3, nuclear score = 2, mitoses score = 1), measuring at least 4 mm.                B. FOCAL LOBULAR CARCINOMA IN SITU (LCIS).                C. See comment.     3. Left Breast, Upper, Stereotactic-Guided Core Needle Biopsy:                  A. INVASIVE LOBULAR CARCINOMA, Moderately differentiated; Sontag Histologic Grade II/III                     (tubule score = 3, nuclear score = 2, mitoses score = 1), measuring at least 3 mm.                B. LOBULAR CARCINOMA IN SITU (LCIS).                 C. See comment.     She had not had a breast biopsy in the past.  She has her uterus and ovaries, is ? perimenopausal, and has an IUD  Her family history includes the following: She has 2 daughters, 1 sister, 2 maternal aunts, 1 paternal aunt.  No family history of breast or ovarian cancer.  Her dad had prostate and melanoma cancers.  A  paternal aunt had thyroid cancer.  She is here for evaluation.    Review of Systems:  Review of Systems   All other systems reviewed and are negative.       Past Medical and Surgical History:  Breast Biopsy History:  Patient had not had a breast biopsy prior to her cancer diagnosis.  Breast Cancer HIstory:  Patient does not have a past medical history of breast cancer.  Breast Operations, and year:  None   Obstetric/Gynecologic History:  Age menstrual periods began: 14  Patient is premenopausal, first day of last period: 1/10/23 IUD  Number of pregnancies: 2  Number of live births: 2  Number of abortions or miscarriages: 0  Age of delivery of first child: 30  Patient breast fed, for the following lenth of time:24 months   Length of time taking birth control pills: none   Patient has never taken hormone replacement    Patient has both ovaries and uterus  Past Surgical History:   Procedure Laterality Date   • D & C CERVICAL BIOPSY     • INTRAUTERINE DEVICE INSERTION  2010    Mirena   • INTRAUTERINE DEVICE INSERTION  2015    Mirena exchange, old IUD removal and new one inserted by Dr Carr, Lot # [id number] exp 09/17.mar   • WISDOM TOOTH EXTRACTION       Past Medical History:   Diagnosis Date   • ADD (attention deficit disorder)    • Bilateral impacted cerumen 07/13/2020   • COVID-19 10/23/2021    No Covid vaccines 90 days   • Diabetes mellitus, gestational 2010   • Foot fracture, left    • Graves disease    • History of radioactive iodine thyroid ablation 2012   • History of vertigo    • Hyperlipidemia    • Hypothyroidism    • Melanoma of back (HCC)    • Positive depression screening 07/13/2020       Prior Hospitalizations, other than for surgery or childbirth, and year:  None     Social History     Socioeconomic History   • Marital status:      Spouse name: Parish   • Number of children: 2   Tobacco Use   • Smoking status: Never   • Smokeless tobacco: Never   Vaping Use   • Vaping Use: Never used   Substance and  "Sexual Activity   • Alcohol use: Yes     Alcohol/week: 1.0 standard drink     Types: 1 Glasses of wine per week     Comment: occ   • Drug use: Never   • Sexual activity: Yes     Partners: Male     Birth control/protection: I.U.D.     Comment:       Patient is .  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    Family History:  Family History   Problem Relation Age of Onset   • Hypertension Father    • Heart disease Father         S/P stents   • Heart attack Father 41   • Colon polyps Father    • Melanoma Father    • Prostate cancer Father    • Lung cancer Maternal Grandmother    • Cancer Maternal Grandfather         Bladder cancer   • Heart disease Paternal Grandmother    • Heart disease Paternal Grandfather        Vital Signs:  /80   Pulse 92   Ht 160 cm (62.99\")   Wt 82.6 kg (182 lb)   LMP  (LMP Unknown)   SpO2 98%   BMI 32.25 kg/m²      Medications:    Current Outpatient Medications:   •  cetirizine (zyrTEC) 10 MG tablet, Take 10 mg by mouth Daily., Disp: , Rfl:   •  COLLAGEN PO, Take 1 tablet by mouth Daily., Disp: , Rfl:   •  levothyroxine (SYNTHROID, LEVOTHROID) 125 MCG tablet, Take 1 tablet by mouth Daily., Disp: 90 tablet, Rfl: 1  •  Multiple Vitamins-Minerals (ZINC PO), Take 1 tablet by mouth Daily., Disp: , Rfl:   •  Nutritional Supplements (JUICE PLUS FIBRE PO), Take  by mouth., Disp: , Rfl:   •  omeprazole (priLOSEC) 20 MG capsule, Take 1 capsule by mouth Daily., Disp: 30 capsule, Rfl: 1  •  Probiotic Product (PROBIOTIC ADVANCED PO), Take 1 tablet by mouth Daily., Disp: , Rfl:   •  vitamin C (ASCORBIC ACID) 250 MG tablet, Take 1 tablet by mouth Daily., Disp: , Rfl:   •  Digestive Enzymes (DIGESTIVE ENZYME PO), Take 1 tablet by mouth Daily., Disp: , Rfl:   •  Omega-3 Fatty Acids (fish oil) 1000 MG capsule capsule, Take 1 capsule by mouth Daily With Breakfast., Disp: , " "Rfl:      Allergies:  Allergies   Allergen Reactions   • Erythromycin Other (See Comments)     Pt reports   • Penicillins Rash       Physical Examination:  /80   Pulse 92   Ht 160 cm (62.99\")   Wt 82.6 kg (182 lb)   LMP  (LMP Unknown)   SpO2 98%   BMI 32.25 kg/m²   General Appearance:  Patient is in no distress.  She is well kept and has an obese build.   Psychiatric:  Patient with appropriate mood and affect. Alert and oriented to self, time, and place.    Breast, RIGHT:  large sized, 38DDD, symmetric with the contralateral side.  Breast skin is without erythema, edema, rashes.  There are no visible abnormalities upon inspection during the arm-raising maneuver or with hands on hips in the sitting position. There is no nipple retraction, discharge or nipple/areolar skin changes.There are no masses palpable in the sitting or supine positions.    Breast, LEFT:  large sized, 38DDD,  symmetric with the contralateral side.  Breast skin is without erythema, edema, rashes.  There are no visible abnormalities upon inspection during the arm-raising maneuver or with hands on hips in the sitting position. There is no nipple retraction, discharge or nipple/areolar skin changes. There is a 6x8cm mass palpable at the LEFT breast middle ring 1:00 lcoation. No overlying skin changes other than mild ecchymoses.There are no other masses palpable in the sitting or supine positions.    Lymphatic:  There is no axillary, cervical, infraclavicular, or supraclavicular adenopathy bilaterally.  Eyes:  Pupils are round and reactive to light.  Cardiovascular:  Heart rate and rhythm are regular.  Respiratory:  Lungs are clear bilaterally with no crackles or wheezes in any lung field.  Gastrointestinal:  Abdomen is soft, nondistended, and nontender. There are no scars from previous surgery.    Musculoskeletal:  Good strength in all 4 extremities.   There is good range of motion in both shoulders.    Skin:  No new skin lesions or " rashes on the skin excluding the breast (see breast exam above).        Imagin2022, MMG Screening Digital Gerardo Bilateral with CAD ([facility])   There is an 8 mm asymmetry in the central left breast posterior 1/3 on the CC view. This appears to be in the superior left breast on the MLO view.   BI-RADS 0: Incomplete     2023, MMG Diagnostic Gerardo Left with CAD ([facility])   The spiculated areas within the central posterior and the lateral anterior superior    left breast were re-demonstrated.  Targeted ultrasound was performed.  At the 1 o'clock position 10    cm from the nipple there is a shadowing mass with peripheral spiculations favored to represent the    mammographic abnormality and suspicious for malignancy.  The more centrally located spiculation on    the CC view disc posterior to the glandular tissue in the retro glandular fat cannot be clearly    identified on ultrasound.   Two separate suspicious spiculated lesions of the left breast.  One lesion is felt to be seen well    on ultrasound and should be amenable to ultrasound-guided core biopsy.  The other lesion is seen    well on mammography. BI-RADS 4: Suspicious          Pathology:  2023, ADDENDUM:                   All 3 sites were biopsied are malignant. This is concordant.                                   Site A (top-hat shaped clip)                                   Site B (coil shaped clip)                                   Site C (stoplight shaped clip)   Slightly lateral left breast, stereotactic, 9 gauge vacuum assisted Brevera, Twelve specimens were obtained and a top-hat shaped clip was then placed. Site A: ultrasound biopsy table. 1.0 cm, 1:00 position 10 cm from the nipple. 12 gauge Bard.   Four samples were obtained and a coil shaped HydroMARK clip was placed, Site B. The post-procedure mammogram shows the top hat shaped clip from the stereotactic biopsy in good position in the biopsy cavity.  The coil shaped clip is in good  position on the true lateral view, however on the CC view it does not correlate to the mammographic finding, which on today's exam is more at the 12 o'clock position in the posterior 1/3 of the left breast.  Therefore was decided to perform a 3rd biopsy.   9-gauge vacuum assisted Brevera, Twelve specimens, stoplight shaped clip, Site C, all 3 clips are appropriately positioned.     LEFT BREAST:     At 1:00 in the anterior left breast, 4 cm posterior to the nipple, there is a 1.5 AP dimension, 1.6 cm transverse dimension, 1.3 cm craniocaudal dimension irregular enhancing mass with a focus of susceptibility from a biopsy clip along the inferior margin. The biopsy clip is located within hematoma along the lateral margin of the mass.  At 1:00 in the middle to posterior left breast, 7.4 cm posterior to the nipple, there is a 1.3 cm irregular enhancing mass clip along the inferior margin. At 12:00 in the posterior left breast, 8.5 cm posterior to the nipple, 2.2 cm craniocaudal dimension irregular enhancing mass with a biopsy clip.  Multiple, at least 12, similar appearing irregular enhancing masses and foci are identified throughout left breast.  At 11-12 o'clock in the middle and posterior left breast, there are 3 adjacent reference masses together measuring up to 5.5 cm in AP dimension.  At 1:00 in the far posterior left breast/axillary tail, 13.7 cm posterior to the nipple, there is a reference 1.3 cm AP dimension, 1.1 cm transverse dimension, 1.0 cm craniocaudal dimension, irregular enhancing mass, which is located approximately 0.8 cm from the anterolateral margin of the pectoralis muscle. Together, the enhancing masses span approximately 11.7 cm in maximum AP dimension, 8 cm in maximum transverse dimension, and 7.8 cm in maximum craniocaudal dimension. The visualized axilla is within normal limits   EXTRAMAMMARY FINDINGS:    There are no pathologically enlarged internal mammary chain lymph nodes on either side.    IMPRESSION AND RECOMMENDATION:   At least 15 similar-appearing irregular enhancing masses and foci scattered throughout the left breast, together measuring up to 11.7 cm consistent with multicentric malignancy   No MRI evidence of malignancy in the right breast      Pathology:  1. Left breast, anterior lateral, stereotactic-guided core biopsy:   - Invasive lobular carcinoma   - Histologic grade (Marie Histologic Score):   - Glandular (Acinar)/Tubular Differentiation: Score 3   - Nuclear Pleomorphism: Score 2   - Mitotic Rate: Score 1   - Overall Grade: Grade 2   - Size of largest focus of invasion: 4 mm   - Breast biomarker testing:   - Estrogen Receptor (ER): Positive (95%, moderate)   - Progesterone Receptor (PgR): Positive (95%, strong)   - HER2 (by immunohistochemistry): Negative (Score 0)   - Ki-67: 5%   - Other findings:   - Atypical lobular hyperplasia (ALH)     2. Left breast, 1 o'clock position, and 10 cm from nipple, ultrasound-guided core biopsy:  - Invasive lobular carcinoma   - Histologic grade (Marie Histologic Score):   - Glandular (Acinar)/Tubular Differentiation: Score 3   - Nuclear Pleomorphism: Score 2   - Mitotic Rate: Score 1   - Overall Grade: Grade 2   - Size of largest focus of invasion: 4 mm  - Other findings:   - Lobular carcinoma in situ (LCIS), classic type     3. Left breast, upper, stereotactic-guided core biopsy:   - Invasive lobular carcinoma  - Histologic grade (Marie Histologic Score):   - Glandular (Acinar)/Tubular Differentiation: Score 3   - Nuclear Pleomorphism: Score 2   - Mitotic Rate: Score 1   - Overall Grade: Grade 2   - Size of largest focus of invasion: 3 mm   - Other findings:   - Lobular carcinoma in situ (LCIS), classic type     OUTSIDE PATHOLOGY:   1. Left Breast, Anterior Lateral, Stereotactic-Guided Core Needle Biopsy:                  A. INVASIVE LOBULAR CARCINOMA, Moderately differentiated; Marie Histologic Grade II/III                       (tubule score = 3, nuclear score = 2, mitoses score=1), measuring at least 4 mm.                B. Atypical lobular hyperplasia.   ER:       POSITIVE (%, Moderate).                OR:       POSITIVE (%, Strong).                HER2:  NEGATIVE (0).                Ki67:     Approximately 5%.             2. Left Breast, 1:00, 10 cm FN, U/S-Guided Core Needle Biopsy:                 A. INVASIVE LOBULAR CARCINOMA, Moderately differentiated; Marie Histologic Grade II/III                     (tubule score = 3, nuclear score = 2, mitoses score = 1), measuring at least 4 mm.                B. FOCAL LOBULAR CARCINOMA IN SITU (LCIS).                C. See comment.     3. Left Breast, Upper, Stereotactic-Guided Core Needle Biopsy:                  A. INVASIVE LOBULAR CARCINOMA, Moderately differentiated; Marie Histologic Grade II/III                     (tubule score = 3, nuclear score = 2, mitoses score = 1), measuring at least 3 mm.                B. LOBULAR CARCINOMA IN SITU (LCIS).                 C. See comment.         Procedures:      Assessment:   Diagnosis Plan   1. Lobular breast cancer, left (HCC)        2. Class 1 obesity due to excess calories without serious comorbidity with body mass index (BMI) of 32.0 to 32.9 in adult        3. Macromastia        1-  LEFT breast UOQ middle third 11.5 cm on MRI with 15 nodules total. Largest single nodule 2.2cm. NOrmal nodes on MRI and exam. On exam 6x8cm. No pverlying skin changes. On mammogram 3x markers  6cm greatest.  Invasive lobular carcinoma. Int grade, 3,2,1, associated LCIS.  ER , OR , her 2 cheryl 0, Ki 67 5 %.  Clinical stage T2-3N0- stage II    2-  BMI 32.3    3-  38DDD        Plan:  The patient goes by Ursula. She is a danyelle woman with 2 daughters ages 17,12 and happily  in her second marriage with a supportive .    We reviewed her imaging, imaging reports, pathology, history, FH, exam together today.    We  reviewed the difference in systemic therapy versus locoregional. She knows that she will be seeing a medical oncologist in the adjuvant setting. We discussed that for early stage breast cancer, there are 2 options for locoregional treatment that have equivalent survival: total mastectomy versus lumpectomy and radiation, either with sentinel node biopsy.     Based on her imaging and examination, we discussed that a unilateral mastectomy with or without reconstruction would be the recommended surgical treatment.     We will plan for LEFT total mastectomy, LEFT axilla sentinel node biopsy, possible node dissection, possible reconstruction.    She understands the risks of mastectomy including bleeding, infection, seroma and chance of skin ischemia/necrosis. She understands the risks of  sentinel node biopsy, including 7% chance of lymphedema, injury to nerves or vessels in the axilla,  seroma. We discussed that we will proceed with a frozen section analysis of the sentinel node in the operating room, and if any positivity, would proceed with a completion axillary dissection at that time.    We discussed her having a plastic surgical consultation in the preoperative period, and have arranged that today.     NCCN guidelines have been followed.    We gave her EMLA cream and tegederm for her sentinel node procedure, and arranged for her to see our nurse navigator.     She will receive a recommendation about radiation after surgery.It is quite likely that she will need radiation, in my opinion. We discussed that she may have an expander placed and decision about flap versus implant based on radiation need.    I will arrange for her to see physical therapy for bioimpedence measurements.  I will arrange for her to see plastics.  We will send off an invitae breast and gynecology add on thyroid panel.    We discussed that if her genetics were to be abnormal and she were to harbor a mutation that we would recommend a bilateral  mastectomy.      I did ask her to have her IUD removed as soon as possible based on her tumor being 90 to 100% fed by hormones.    Next RIGHT mammogram would be due 11-30-23 Chelsey Abbott.      Kell Hi MD      Patient has called and she would like to proceed with Bilateral total mastectomy, LEFT axilla sentinel node biopsy, possible node dissection, possible reconstruction    We have spent 65 minutes in face to face visit today, 50 in face to face .      Next Appointment:  Return for surgery.      EMR Dragon/transcription disclaimer:    Please note that portions of this note were completed with a voice recognition program.

## 2023-03-08 NOTE — OP NOTE
Operative note    Preoperative Diagnosis: Breast cancer     Postoperative Diagnosis: Breast cancer    Procedure Performed: bilateral mastectomy and left DEEP axillary sentinel node biopsy with lymphoscintographic guidance. Reconstruction immediately following, tissue expanders with AlloDerm.    Dictating physician and surgeon: Kell Hi MD    Plastic Surgeon: Preet Santo asst: Tasha Manning      was responsible for performing the following activities: Retraction, Suction, Irrigation and Suturing and their skilled assistance was necessary for the success of this case.       EBL:30cc    [place] Node Biopsy for Breast Cancer - Left  Operation performed with curative intent. Yes   Tracer(s) used to identify sentinel nodes in the upfront surgery (non-neoadjuvant) setting (select all that apply). Radioactive tracer   Tracer(s) used to identify sentinel nodes in the neoadjuvant setting (select all that apply). N/A   All nodes (colored or non-colored) present at the end of a dye-filled lymphatic channel were removed. N/A   All significantly radioactive nodes were removed. Yes   All palpably suspicious nodes were removed. Yes   Biopsy-proven positive nodes marked with clips prior to chemotherapy were identified and removed. N/A             FINDINGS AND DESCRIPTION OF PROCEDURE:     The patient was brought to the operating room and placed on the table in the supine position. After adequate general ETT anesthesia was obtained, we sterilely prepped and draped the breast and axilla. We did a time out to identify correct patient and correct operative site.  She did receive IV antibiotic within an hour of and prior to incision.   The order of the case was :  LEFT TM, LEFT SLNB, RIGHT TM    For each mastectomy, I performed the following procedure:  I tumesced the mastectomy flaps using 2240 mL of 1:500,000 epinephrine in normal saline.  I tumesced superiorly to the clavicle, inferiorly to the inframammary fold,  medially to the sternum and laterally to the anterior axillary line.  I then incised a skin sparing ellipse of skin surrounding the nipple areolar complex using a 10 blade. I then sharply dissected using a 10 blade and a long curved Mark scissors superiorly to the clavicle, inferiorly to the inframammary fold, medially to the sternum and laterally to the anterior axillary line. I then scored the perimeter of the specimen, the pectoral fascia, circumferentially. I then lifted the pectoral fascia off of the pectoralis major, as the posterior margin of the specimen.    I then labelled each specimen stitch marks 12:00 and passed it from the field.    Fr the sentinel node biopsy, I performed the following:. I used the Neoprobe at the start of the case to ensure that the radiotracer had migrated to the axilla, which it had.  I used Bovie electrocautery for dissection and the gamma probe for guidance, to remove the lymph nodes demonstrating radiopharmaceutical uptake.     There were 3 sentinel nodes removed. Counts 60, 709, 184. All were grossly normal. These were sent for frozen section pathology and were found to be benign.    I then irrigated, assured hemostasis and plastics then came in to do the reconstructive portion of the case.    She tolerated the procedure well, there were no immediate complications, and all counts were correct at the end of the case.

## 2023-03-09 ENCOUNTER — TELEPHONE (OUTPATIENT)
Dept: SURGERY | Facility: CLINIC | Age: 48
End: 2023-03-09
Payer: COMMERCIAL

## 2023-03-09 ENCOUNTER — READMISSION MANAGEMENT (OUTPATIENT)
Dept: CALL CENTER | Facility: HOSPITAL | Age: 48
End: 2023-03-09
Payer: COMMERCIAL

## 2023-03-09 VITALS
BODY MASS INDEX: 30.52 KG/M2 | HEART RATE: 65 BPM | TEMPERATURE: 97.8 F | RESPIRATION RATE: 16 BRPM | WEIGHT: 178.79 LBS | DIASTOLIC BLOOD PRESSURE: 67 MMHG | OXYGEN SATURATION: 97 % | HEIGHT: 64 IN | SYSTOLIC BLOOD PRESSURE: 109 MMHG

## 2023-03-09 DIAGNOSIS — C50.412 CARCINOMA OF UPPER-OUTER QUADRANT OF FEMALE BREAST, LEFT: Primary | ICD-10-CM

## 2023-03-09 PROCEDURE — 25010000002 ENOXAPARIN PER 10 MG: Performed by: PLASTIC SURGERY

## 2023-03-09 PROCEDURE — G0378 HOSPITAL OBSERVATION PER HR: HCPCS

## 2023-03-09 PROCEDURE — 99024 POSTOP FOLLOW-UP VISIT: CPT | Performed by: SURGERY

## 2023-03-09 RX ORDER — ONDANSETRON 8 MG/1
8 TABLET, ORALLY DISINTEGRATING ORAL EVERY 8 HOURS PRN
Qty: 10 TABLET | Refills: 1 | Status: SHIPPED | OUTPATIENT
Start: 2023-03-09 | End: 2023-03-31

## 2023-03-09 RX ORDER — HYDROCODONE BITARTRATE AND ACETAMINOPHEN 5; 325 MG/1; MG/1
1-2 TABLET ORAL EVERY 4 HOURS PRN
Qty: 20 TABLET | Refills: 0 | Status: SHIPPED | OUTPATIENT
Start: 2023-03-09 | End: 2023-03-31

## 2023-03-09 RX ORDER — DOXYCYCLINE 100 MG/1
100 CAPSULE ORAL 2 TIMES DAILY
Qty: 10 CAPSULE | Refills: 0 | Status: SHIPPED | OUTPATIENT
Start: 2023-03-09 | End: 2023-03-14

## 2023-03-09 RX ORDER — AMOXICILLIN 250 MG
2 CAPSULE ORAL DAILY PRN
Qty: 30 TABLET | Refills: 1 | Status: SHIPPED | OUTPATIENT
Start: 2023-03-09 | End: 2023-03-31

## 2023-03-09 RX ORDER — ACETAMINOPHEN 325 MG/1
650 TABLET ORAL EVERY 4 HOURS PRN
Qty: 60 TABLET | Refills: 0 | Status: ON HOLD | OUTPATIENT
Start: 2023-03-09 | End: 2023-04-03 | Stop reason: SDUPTHER

## 2023-03-09 RX ORDER — GABAPENTIN 100 MG/1
100 CAPSULE ORAL EVERY 8 HOURS
Qty: 60 CAPSULE | Refills: 2 | Status: SHIPPED | OUTPATIENT
Start: 2023-03-09 | End: 2023-03-31

## 2023-03-09 RX ORDER — DOCUSATE SODIUM 250 MG
250 CAPSULE ORAL 2 TIMES DAILY PRN
Qty: 60 CAPSULE | Refills: 1 | Status: SHIPPED | OUTPATIENT
Start: 2023-03-09 | End: 2023-03-31

## 2023-03-09 RX ADMIN — PANTOPRAZOLE SODIUM 40 MG: 40 TABLET, DELAYED RELEASE ORAL at 05:47

## 2023-03-09 RX ADMIN — DOXYCYCLINE 100 MG: 100 CAPSULE ORAL at 09:16

## 2023-03-09 RX ADMIN — LEVOTHYROXINE SODIUM 125 MCG: 0.12 TABLET ORAL at 09:16

## 2023-03-09 RX ADMIN — OXYCODONE HYDROCHLORIDE 5 MG: 5 TABLET ORAL at 05:47

## 2023-03-09 RX ADMIN — OXYCODONE HYDROCHLORIDE 5 MG: 5 TABLET ORAL at 01:49

## 2023-03-09 RX ADMIN — ENOXAPARIN SODIUM 40 MG: 100 INJECTION SUBCUTANEOUS at 09:16

## 2023-03-09 RX ADMIN — GABAPENTIN 100 MG: 100 CAPSULE ORAL at 09:16

## 2023-03-09 RX ADMIN — OXYCODONE HYDROCHLORIDE 5 MG: 5 TABLET ORAL at 10:16

## 2023-03-09 RX ADMIN — GABAPENTIN 100 MG: 100 CAPSULE ORAL at 01:49

## 2023-03-09 NOTE — PLAN OF CARE
Goal Outcome Evaluation:  Plan of Care Reviewed With: patient           Outcome Evaluation: VSS, walked unit, IVF infusing, dressing on chest CDI, no drainage, skin blanchable, warm and soft to touch, some bruising notes, 4 DANIELLE drains, with serosangous fluid out, mother at bed side, tolerating diet, no nausea and Roxicodone given for c/o pain.

## 2023-03-09 NOTE — PROGRESS NOTES
SUBJECTIVE:   Did well overnight, has walked and voided, tolerating PO intake, no nausea.    OBJECTIVE:  Vitals:    03/09/23 0505   BP: 109/67   Pulse: 65   Resp: 16   Temp: 97.8 °F (36.6 °C)   SpO2: 97%     Physical Exam  Resting in bed, nad  Rrr  No dyspnea  Bilateral breast incisions c/d/i, some mild bruising of both superior flaps, no breakdown or maureen necrosis  Drains serosanguinous    PLAN:  Pod bilateral ssm, immediate prepectoral expander placement  - doing well, ok for dc  - minimal activity  - leave dressing in place  - f/u with me next week    Dc Dx: left breast cancer  Dc Condition: good

## 2023-03-09 NOTE — PROGRESS NOTES
Case Management Discharge Note      Final Note: Discharged home. Cristina Hunt, KEAGAN              Transportation Services  Private: Car    Final Discharge Disposition Code: 01 - home or self-care

## 2023-03-09 NOTE — TELEPHONE ENCOUNTER
3-8-23 pathology from bilateral total mastectomy and LEFT sentinel node biopsy with reconstruction Dr Stone returned as :      Left total mastectomy, multifocal invasive lobular carcinoma, intermediate grade, 3, 2, 2, 3 clips and biopsy changes identified.  Dimensions of the tumors 6 cm, 2 cm, 2 mm, 1 mm.  Margins are all clear.  0 of 4 sentinel lymph nodes.  Pathologic stage T3N0 stage IIb.  Estrogen , progesterone , HER2/cheryl 2+ .  I will arrange for her to see medical oncology and radiation oncology.  I will call and let her know.    I will send off an oncotype on her.    I cancelled her oncotype as her FISh returned as positive.    Final Diagnosis   1. Lymph Node, Left [place] #1, Excision (H&E, AE1/AE3):               A. Two benign lymph nodes (0/2).     2. Lymph Node, Left [place] #2, Excision (H&E, AE1/AE3):               A. One benign lymph node (0/1).     3. Lymph Node, Left [place] #3, Excision (H&E, AE1/AE3):               A. One benign lymph node with pigment (0/1).     4. Breast, Left, Mastectomy (1,377 Grams):               A. Multifocal invasive lobular carcinoma, [place] histologic grade II (tubules = 3, nuclei = 2, mitoses = 2).               B. Three clips and biopsy change identified.               C. See synoptic template for complete tumor details.     5. Breast, Right, Mastectomy (1,473 Grams):               A. Benign skin, subcutaneous tissue and breast parenchyma with fibroadenomatoid change.      [facility]/cl               Electronically signed by Radha Serrano MD on 3/9/2023 at 1122   Synoptic Checklist   INVASIVE CARCINOMA OF THE BREAST: Resection  8th Edition - Protocol posted: 12/14/2022  INVASIVE CARCINOMA OF THE BREAST: COMPLETE EXCISION - 1, 2, 3, 4  SPECIMEN   Procedure  Total mastectomy    Specimen Laterality  Left    TUMOR   Tumor Site  Upper outer quadrant      Upper inner quadrant    Histologic Type  Invasive lobular carcinoma    Histologic Grade ([place]  Histologic Score)     Glandular (Acinar) / Tubular Differentiation  Score 3    Nuclear Pleomorphism  Score 2    Mitotic Rate  Score 2    Overall Grade  Grade 2 (scores of 6 or 7)    Tumor Size  Greatest dimension of largest invasive focus (Millimeters): 60 mm   Tumor Focality  Multiple foci of invasive carcinoma    Number of Foci  At least: 4    Sizes of Individual Foci in Millimeters (mm)  20 mm; 2 mm; 1mm    Ductal Carcinoma In Situ (DCIS)  Not identified    Lobular Carcinoma In Situ (LCIS)  Present    Lymphatic and / or Vascular Invasion  Not identified    Dermal Lymphovascular Invasion  Not identified    Treatment Effect in the Breast  No known presurgical therapy    MARGINS   Margin Status for Invasive Carcinoma  All margins negative for invasive carcinoma    Distance from Invasive Carcinoma to Closest Margin  10 mm   Closest Margin(s) to Invasive Carcinoma  Anterior    REGIONAL LYMPH NODES   Regional Lymph Node Status  All regional lymph nodes negative for tumor    Total Number of Lymph Nodes Examined (sentinel and non-sentinel)  4    Number of [place] Nodes Examined  4    pTNM CLASSIFICATION (AJCC 8th Edition)   Reporting of pT, pN, and (when applicable) pM categories is based on information available to the pathologist at the time the report is issued. As per the AJCC (Chapter 1, 8th Ed.) it is the managing physician’s responsibility to establish the final pathologic stage based upon all pertinent information, including but potentially not limited to this pathology report.   pT Category  pT3    T Suffix  (m)    pN Category  pN0    N Suffix  (sn)    SPECIAL STUDIES        Estrogen Receptor (ER) Status  Positive (greater than 10% of cells demonstrate nuclear positivity)    Percentage of Cells with Nuclear Positivity  %         Progesterone Receptor (PgR) Status  Positive    Percentage of Cells with Nuclear Positivity  %         HER2 (by immunohistochemistry)  Equivocal (Score 2+)         HER2 (by  in situ hybridization)  Positive (amplified)         Ki-67 Percentage of Positive Nuclei  5 %   Testing Performed on Case Number  [id number]; see comment    .      Comment    The patient's original left breast core biopsy material was performed at [facility] ([id number]) and was reviewed in-house as [id number].  The slide is pulled and reviewed for comparison. No discordance is noted.     All three of the core biopsies are stained with biomarkers.  The left anterior lateral and left 1:00 o'clock core fragments were ER and DC positive, Her2 IHC negative. The left upper core biopsy was ER and DC positive with Her2 2+ equivocal by IHC and Her2 FISH positive.      Mec/kds

## 2023-03-09 NOTE — PROGRESS NOTES
Continued Stay Note  [facility]     Patient Name: Ursula Kulkarni  MRN: [id number]  Today's Date: 3/9/2023    Admit Date: 3/8/2023    Plan: Home no needs   Discharge Plan     Row Name 03/09/23 1012       Plan    Plan Home no needs    Plan Comments Discharge order noted. Met with patient who confirmed DC plan is home. Stated her spouse will assist as needed and will provide transportation at DC.  Denies any needs/equipment.               Discharge Codes    No documentation.               Expected Discharge Date and Time     Expected Discharge Date Expected Discharge Time    Mar 9, 2023             Cristina Hunt RN

## 2023-03-09 NOTE — PROGRESS NOTES
INPATIENT ROUNDING NOTE    Postoperative day: 1    Overnight events:     Patient has done well overnight.    no nausea  She reports that her pain is responding favorably to the prescribed meds.  She has voided since her catheter has been removed.  She has ambulated.    Exam:  Temp:  [97.1 °F (36.2 °C)-98.8 °F (37.1 °C)] 97.8 °F (36.6 °C)  Heart Rate:  [65-95] 65  Resp:  [16-18] 16  BP: ()/(52-74) 109/67     UOP adequate, has voided since removal of andino  JPs serosanguinous      On examination, her incisions are dressed and dressings clean dry and intact.  Her mastectomy flaps are well perfused with minimal ecchymoses.   There are no undrained fluid collections.      Assessment and plan:    Breast cancer, postoperative day 1.  Doing well.  HLIV.  Likely home today after drain teaching and breakfast and/or lunch.    She has an appointment with me in the office already scheduled for her postop visit.

## 2023-03-10 ENCOUNTER — TRANSITIONAL CARE MANAGEMENT TELEPHONE ENCOUNTER (OUTPATIENT)
Dept: CALL CENTER | Facility: HOSPITAL | Age: 48
End: 2023-03-10
Payer: COMMERCIAL

## 2023-03-10 NOTE — OUTREACH NOTE
Prep Survey    Flowsheet Row Responses   [facility] facility patient discharged from? [place]   Is LACE score < 7 ? Yes   Eligibility [facility]   Date of Admission 03/08/23   Date of Discharge 03/09/23   Discharge Disposition Home or Self Care   Discharge diagnosis Breast carcinoma-bilateral mastectomy   Does the patient have one of the following disease processes/diagnoses(primary or secondary)? General Surgery   Does the patient have Home health ordered? No   Is there a DME ordered? No   Prep survey completed? Yes          POLO RUBIO - Registered Nurse

## 2023-03-10 NOTE — OUTREACH NOTE
Call Center TCM Note    Flowsheet Row Responses   [facility] patient discharged from? [place]   Does the patient have one of the following disease processes/diagnoses(primary or secondary)? General Surgery   TCM attempt successful? Yes   Call start time 0851   Call end time 0856   Discharge diagnosis Breast carcinoma-bilateral mastectomy   Person spoke with today (if not patient) and relationship Patient   Meds reviewed with patient/caregiver? Yes   Does the patient have all medications related to this admission filled (includes all antibiotics, pain medications, etc.) Yes   Is the patient taking all medications as directed (includes completed medication regime)? Yes   Medication comments Patient reports has all meds, no med questions or needs.    Comments PCP Phuong GARCIA. Patient declined to schedule PCP appt with call today. Will contact office with any needs.    Does the patient have an appointment with their PCP within 7 days of discharge? No   Nursing Interventions Patient desires to follow up with specialty only   Has home health visited the patient within 72 hours of discharge? N/A   Psychosocial issues? No   Did the patient receive a copy of their discharge instructions? Yes   Nursing interventions Reviewed instructions with patient   What is the patient's perception of their health status since discharge? Improving   Is the patient /caregiver able to teach back basic post-op care? Keep incision areas clean,dry and protected   Is the patient/caregiver able to teach back signs and symptoms of incisional infection? Increased redness, swelling or pain at the incisonal site, Increased drainage or bleeding, Fever   Is the patient/caregiver able to teach back steps to recovery at home? Set small, achievable goals for return to baseline health   If the patient is a current smoker, are they able to teach back resources for cessation? Not a smoker   Is the patient/caregiver able to teach back the  hierarchy of who to call/visit for symptoms/problems? PCP, Specialist, Home health nurse, Urgent Care, ED, 911 Yes   TCM call completed? Yes   Wrap up additional comments Patient has her post op appts scheduled with surgeons. Denies any needs from primary care at this time.    Call end time 0856   Would this patient benefit from a Referral to [facility] Social Work? No   Is the patient interested in additional calls from an ambulatory ?  NOTE:  applies to high risk patients requiring additional follow-up. No          Yen Jimenez RN    3/10/2023, 08:57 EST

## 2023-03-14 ENCOUNTER — PATIENT OUTREACH (OUTPATIENT)
Dept: OTHER | Facility: HOSPITAL | Age: 48
End: 2023-03-14
Payer: COMMERCIAL

## 2023-03-14 ENCOUNTER — TELEPHONE (OUTPATIENT)
Dept: GENETICS | Facility: HOSPITAL | Age: 48
End: 2023-03-14
Payer: COMMERCIAL

## 2023-03-14 NOTE — PROGRESS NOTES
Called MsJack Cayla to see how she was doing. She stated she is recovering well from surgery and has no needs at this time. She will meet with radiation oncology and medical oncology in the coming weeks to determine next steps. She was thankful for the call and will reach out if any questions or needs arise.

## 2023-03-14 NOTE — TELEPHONE ENCOUNTER
Called patient to gather family history prior to genetic appt on Monday. Pt asked me to call her back tomorrow. She is aware her appt on [place] is by phone.

## 2023-03-15 NOTE — TELEPHONE ENCOUNTER
Called patient and gathered family history prior to genetic appt on Monday. Pt is aware this appt is by phone.

## 2023-03-20 ENCOUNTER — CLINICAL SUPPORT (OUTPATIENT)
Dept: GENETICS | Facility: HOSPITAL | Age: 48
End: 2023-03-20
Payer: COMMERCIAL

## 2023-03-20 ENCOUNTER — OFFICE VISIT (OUTPATIENT)
Dept: SURGERY | Facility: CLINIC | Age: 48
End: 2023-03-20
Payer: COMMERCIAL

## 2023-03-20 VITALS
HEART RATE: 64 BPM | DIASTOLIC BLOOD PRESSURE: 86 MMHG | HEIGHT: 64 IN | BODY MASS INDEX: 29.53 KG/M2 | SYSTOLIC BLOOD PRESSURE: 116 MMHG | OXYGEN SATURATION: 100 % | WEIGHT: 173 LBS

## 2023-03-20 DIAGNOSIS — Z13.79 GENETIC TESTING: Primary | ICD-10-CM

## 2023-03-20 DIAGNOSIS — C50.912 BREAST CARCINOMA, LOBULAR, LEFT: ICD-10-CM

## 2023-03-20 DIAGNOSIS — C50.912 LOBULAR BREAST CANCER, LEFT: Primary | ICD-10-CM

## 2023-03-20 DIAGNOSIS — E66.09 CLASS 1 OBESITY DUE TO EXCESS CALORIES WITHOUT SERIOUS COMORBIDITY WITH BODY MASS INDEX (BMI) OF 32.0 TO 32.9 IN ADULT: ICD-10-CM

## 2023-03-20 DIAGNOSIS — C43.9 MALIGNANT MELANOMA, UNSPECIFIED SITE: ICD-10-CM

## 2023-03-20 PROCEDURE — 99024 POSTOP FOLLOW-UP VISIT: CPT | Performed by: SURGERY

## 2023-03-20 NOTE — PROGRESS NOTES
Ursula Kulkarni is a 47-year-old female who was seen for genetic counseling to discuss prior genetic results. Genetic counseling was provided via telephone. Ms. Kulkarni confirmed her name, date of birth, and that she was in [place] at the time of the appointment. She was recently diagnosed with a left breast cancer in January 2023. She had a double mastectomy earlier this month. She will require radiation and is uncertain about chemotherapy at this point in time. She was also diagnosed with a melanoma at age 37. Ms. Kulkarni is pre-menopausal and retains her uterus and ovaries. She has not had a colonoscopy yet but plans to have that done this year. Ms. Kulkarni had genetic testing performed in Dr. Kell Hi’s office previously due to her personal history of breast cancer. Testing was performed through [facility] and analyzed 41 genes associated with an increased risk for cancer. Genetic testing was negative for known deleterious/pathogenic (harmful) mutations by sequencing and rearrangement testing for the genes on this panel. 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 VUSs are changes in DNA that may or may not affect the function of the gene. VUSs are frequently identified through multigene panel testing, given the number of genes being evaluated and the presence of genetic variation in the population. The majority (estimated to be >90%) of VUS’s are eventually reclassified as benign gene changes. It is not recommended that any unaffected relatives be tested for a VUS at this time, and management should not be altered based on a VUS. Ms. Kulkarni was interested in discussing the information related to this finding.       PERTINENT FAMILY HISTORY: (See attached pedigree)  Father:   Melanoma     Prostate cancer, 61     Basal cell carcinoma  Pat Aunt:  Thyroid cancer  Pat Grandmother: Skin cancer, 60s  Mat  Grandfather: Bladder cancer, 60s  Mat Grandmother: Lung cancer  Mat Great Aunt: Breast cancer    Records were not able to be obtained to confirm these cancer diagnoses.      RISK ASSESSMENT:  At this time, Ms. Kulkarni’s lifetime risk for other cancers, such as ovarian or colon, should be based on her personal health and family history.     GENETIC COUNSELING (30 minutes): We reviewed the family history information in detail. Cases of breast cancer follow three general patterns: sporadic, familial, and hereditary. While most cancer is sporadic, some cases appear to occur in family clusters. These cases are said to be familial and account for 10-20% of breast cancer cases. Familial cases may be due to a combination of shared genes and environmental factors among family members. In even fewer cases (5-10%), the risk for cancer is inherited, and the genes responsible for the increased cancer risk are known.       Family histories typical of hereditary cancer syndromes usually include multiple first- and second-degree relatives diagnosed with cancer types that define a syndrome. These cases tend to be diagnosed at younger-than-expected ages and can be bilateral or multifocal. The cancer in these families follows an autosomal dominant inheritance pattern, which indicates the likely presence of a mutation in a cancer susceptibility gene. Children and siblings of an individual believed to carry this mutation have a 50% chance of inheriting that mutation, thereby inheriting the increased risk to develop cancer. These mutations can be passed down from the maternal or the paternal lineage.     Hereditary breast cancer accounts for 5-10% of all cases of breast cancer. A significant proportion of hereditary breast cancer can be attributed to mutations in the BRCA1 and BRCA2 genes. Mutations in these genes confer an increased risk for breast cancer, ovarian cancer, male breast cancer, prostate cancer and pancreatic cancer.  There are other genes that are known to be associated with an increased risk for breast cancer and other cancers. In order to get as much information as possible regarding Ms. Kulkarni personal risks and potential risks for her family, testing was pursued through a multigene panel that would look at several other genes known to increase the risk for breast cancer and other cancers.     GENETIC TESTING:  The risks, benefits and limitations of genetic testing and implications for clinical management following testing were reviewed. DNA test results can influence decisions regarding screening and prevention. Genetic testing can have significant psychological implications for both individuals and families.     We reviewed the possible results of genetic testing and the implications of both positive and negative findings. We discussed the possibility of identifying a variant of uncertain significance (VUS) via testing as well.     TEST RESULTS: Genetic testing was negative for known deleterious mutations by sequencing and rearrangement testing of the 41 genes with testing at [facility]. This negative result greatly lowers the risk of a hereditary cancer syndrome for Ms. Kulkarni.    A VUS was identified in the LUCAS gene. A VUS is a difference within a gene that may or may not impact the function of the gene. VUSs are not clinically actionable findings, and therefore this result does not impact management in any way. The majority of VUSs are ultimately reclassified to benign variants. The identification of a VUS is common in multigene panel testing, given the number of genes being evaluated and the presence of genetic variation in the population. If this variant is ever reclassified, a new report will be issued by the laboratory and released directly to the ordering physician. This assessment is based on the information provided at the time of the consultation.     CANCER PREVENTION:  Despite the negative genetic test  results, Ms. Kulkarni’s female relatives may have a somewhat increased lifetime risk for breast cancer based on family history. Female relatives could have a risk assessment performed using a family history-based model, such as the Tyrer-Cuzick model, to determine their individual risks. Given the increased risk, options available to individuals with an elevated lifetime risk for breast cancer were briefly discussed. Surveillance for relatives found to have an elevated lifetime risk of breast cancer (>20%, versus the average risk of 12%), based on NCCN guidelines, would consist of semi-annual clinical breast exams and monthly self-breast exams starting by age 18 and annual mammography starting 10 years younger than the earliest diagnosis in a close relative, or by age 40. According to an American Cancer Society expert panel and NCCN guidelines, annual breast MRI should be offered to women whose lifetime risk of breast cancer is 20-25 percent or more, also starting 10 years prior to the earliest diagnosis in the family, or by age 40.    PLAN: Genetic counseling remains available to Ms. Kulkarni and her family. If she has any questions or concerns in the future, she is welcome to contact our genetics team at [phone].     Ely Peres MS, [facility], [facility]  Licensed Certified Genetic Counselor      Cc: Ursula Hi MD

## 2023-03-22 ENCOUNTER — TELEPHONE (OUTPATIENT)
Dept: SURGERY | Facility: CLINIC | Age: 48
End: 2023-03-22
Payer: COMMERCIAL

## 2023-03-23 ENCOUNTER — TELEPHONE (OUTPATIENT)
Dept: SURGERY | Facility: CLINIC | Age: 48
End: 2023-03-23
Payer: COMMERCIAL

## 2023-03-23 NOTE — TELEPHONE ENCOUNTER
Patient had genetic testing March 20, 2023 for a variant of uncertain significance in the LUCAS gene.

## 2023-03-30 DIAGNOSIS — R13.13 PHARYNGEAL DYSPHAGIA: ICD-10-CM

## 2023-03-30 RX ORDER — OMEPRAZOLE 20 MG/1
20 CAPSULE, DELAYED RELEASE ORAL DAILY
Qty: 90 CAPSULE | Refills: 1 | Status: SHIPPED | OUTPATIENT
Start: 2023-03-30

## 2023-03-31 ENCOUNTER — LAB (OUTPATIENT)
Dept: LAB | Facility: HOSPITAL | Age: 48
End: 2023-03-31
Payer: COMMERCIAL

## 2023-03-31 ENCOUNTER — OFFICE VISIT (OUTPATIENT)
Dept: ONCOLOGY | Facility: CLINIC | Age: 48
End: 2023-03-31
Payer: COMMERCIAL

## 2023-03-31 VITALS
OXYGEN SATURATION: 97 % | BODY MASS INDEX: 30.37 KG/M2 | HEIGHT: 64 IN | HEART RATE: 81 BPM | WEIGHT: 177.9 LBS | TEMPERATURE: 96.6 F | RESPIRATION RATE: 18 BRPM | SYSTOLIC BLOOD PRESSURE: 124 MMHG | DIASTOLIC BLOOD PRESSURE: 82 MMHG

## 2023-03-31 DIAGNOSIS — C50.912 MALIGNANT NEOPLASM OF LEFT BREAST IN FEMALE, ESTROGEN RECEPTOR POSITIVE, UNSPECIFIED SITE OF BREAST: ICD-10-CM

## 2023-03-31 DIAGNOSIS — Z17.0 MALIGNANT NEOPLASM OF LEFT BREAST IN FEMALE, ESTROGEN RECEPTOR POSITIVE, UNSPECIFIED SITE OF BREAST: Primary | ICD-10-CM

## 2023-03-31 DIAGNOSIS — C50.912 MALIGNANT NEOPLASM OF LEFT BREAST IN FEMALE, ESTROGEN RECEPTOR POSITIVE, UNSPECIFIED SITE OF BREAST: Primary | ICD-10-CM

## 2023-03-31 DIAGNOSIS — Z17.0 MALIGNANT NEOPLASM OF LEFT BREAST IN FEMALE, ESTROGEN RECEPTOR POSITIVE, UNSPECIFIED SITE OF BREAST: ICD-10-CM

## 2023-03-31 DIAGNOSIS — Z79.899 HIGH RISK MEDICATION USE: ICD-10-CM

## 2023-03-31 LAB
ALBUMIN SERPL-MCNC: 4.4 G/DL (ref 3.5–5.2)
ALBUMIN/GLOB SERPL: 1.6 G/DL (ref 1.1–2.4)
ALP SERPL-CCNC: 158 U/L (ref 38–116)
ALT SERPL W P-5'-P-CCNC: 61 U/L (ref 0–33)
ANION GAP SERPL CALCULATED.3IONS-SCNC: 10.6 MMOL/L (ref 5–15)
AST SERPL-CCNC: 31 U/L (ref 0–32)
BASOPHILS # BLD AUTO: 0.06 10*3/MM3 (ref 0–0.2)
BASOPHILS NFR BLD AUTO: 0.8 % (ref 0–1.5)
BILIRUB SERPL-MCNC: 0.3 MG/DL (ref 0.2–1.2)
BUN SERPL-MCNC: 17 MG/DL (ref 6–20)
BUN/CREAT SERPL: 19.5 (ref 7.3–30)
CALCIUM SPEC-SCNC: 9.8 MG/DL (ref 8.5–10.2)
CHLORIDE SERPL-SCNC: 102 MMOL/L (ref 98–107)
CO2 SERPL-SCNC: 24.4 MMOL/L (ref 22–29)
CREAT SERPL-MCNC: 0.87 MG/DL (ref 0.6–1.1)
DEPRECATED RDW RBC AUTO: 41 FL (ref 37–54)
EGFRCR SERPLBLD CKD-EPI 2021: 83.9 ML/MIN/1.73
EOSINOPHIL # BLD AUTO: 0.38 10*3/MM3 (ref 0–0.4)
EOSINOPHIL NFR BLD AUTO: 4.8 % (ref 0.3–6.2)
ERYTHROCYTE [DISTWIDTH] IN BLOOD BY AUTOMATED COUNT: 12.4 % (ref 12.3–15.4)
GLOBULIN UR ELPH-MCNC: 2.8 GM/DL (ref 1.8–3.5)
GLUCOSE SERPL-MCNC: 126 MG/DL (ref 74–124)
HCT VFR BLD AUTO: 40.7 % (ref 34–46.6)
HGB BLD-MCNC: 13.8 G/DL (ref 12–15.9)
IMM GRANULOCYTES # BLD AUTO: 0.03 10*3/MM3 (ref 0–0.05)
IMM GRANULOCYTES NFR BLD AUTO: 0.4 % (ref 0–0.5)
LYMPHOCYTES # BLD AUTO: 2.16 10*3/MM3 (ref 0.7–3.1)
LYMPHOCYTES NFR BLD AUTO: 27.5 % (ref 19.6–45.3)
MCH RBC QN AUTO: 30.5 PG (ref 26.6–33)
MCHC RBC AUTO-ENTMCNC: 33.9 G/DL (ref 31.5–35.7)
MCV RBC AUTO: 89.8 FL (ref 79–97)
MONOCYTES # BLD AUTO: 0.45 10*3/MM3 (ref 0.1–0.9)
MONOCYTES NFR BLD AUTO: 5.7 % (ref 5–12)
NEUTROPHILS NFR BLD AUTO: 4.77 10*3/MM3 (ref 1.7–7)
NEUTROPHILS NFR BLD AUTO: 60.8 % (ref 42.7–76)
NRBC BLD AUTO-RTO: 0 /100 WBC (ref 0–0.2)
PLATELET # BLD AUTO: 286 10*3/MM3 (ref 140–450)
PMV BLD AUTO: 9.5 FL (ref 6–12)
POTASSIUM SERPL-SCNC: 4.4 MMOL/L (ref 3.5–4.7)
PROT SERPL-MCNC: 7.2 G/DL (ref 6.3–8)
RBC # BLD AUTO: 4.53 10*6/MM3 (ref 3.77–5.28)
SODIUM SERPL-SCNC: 137 MMOL/L (ref 134–145)
WBC NRBC COR # BLD: 7.85 10*3/MM3 (ref 3.4–10.8)

## 2023-03-31 PROCEDURE — 80053 COMPREHEN METABOLIC PANEL: CPT

## 2023-03-31 PROCEDURE — 36415 COLL VENOUS BLD VENIPUNCTURE: CPT

## 2023-03-31 PROCEDURE — 85025 COMPLETE CBC W/AUTO DIFF WBC: CPT

## 2023-03-31 PROCEDURE — 99215 OFFICE O/P EST HI 40 MIN: CPT | Performed by: INTERNAL MEDICINE

## 2023-04-03 ENCOUNTER — ANESTHESIA EVENT (OUTPATIENT)
Dept: PERIOP | Facility: HOSPITAL | Age: 48
End: 2023-04-03
Payer: COMMERCIAL

## 2023-04-03 ENCOUNTER — ANESTHESIA (OUTPATIENT)
Dept: PERIOP | Facility: HOSPITAL | Age: 48
End: 2023-04-03
Payer: COMMERCIAL

## 2023-04-03 ENCOUNTER — HOSPITAL ENCOUNTER (OUTPATIENT)
Facility: HOSPITAL | Age: 48
Setting detail: HOSPITAL OUTPATIENT SURGERY
Discharge: HOME | End: 2023-04-03
Attending: PLASTIC SURGERY | Admitting: PLASTIC SURGERY
Payer: COMMERCIAL

## 2023-04-03 ENCOUNTER — TELEPHONE (OUTPATIENT)
Dept: SURGERY | Facility: CLINIC | Age: 48
End: 2023-04-03
Payer: COMMERCIAL

## 2023-04-03 ENCOUNTER — PREP FOR SURGERY (OUTPATIENT)
Dept: OTHER | Facility: HOSPITAL | Age: 48
End: 2023-04-03
Payer: COMMERCIAL

## 2023-04-03 VITALS
HEART RATE: 69 BPM | WEIGHT: 174 LBS | BODY MASS INDEX: 30.83 KG/M2 | SYSTOLIC BLOOD PRESSURE: 135 MMHG | DIASTOLIC BLOOD PRESSURE: 88 MMHG | TEMPERATURE: 97.9 F | OXYGEN SATURATION: 100 % | HEIGHT: 63 IN | RESPIRATION RATE: 16 BRPM

## 2023-04-03 DIAGNOSIS — C50.412 MALIGNANT NEOPLASM OF UPPER-OUTER QUADRANT OF LEFT FEMALE BREAST, UNSPECIFIED ESTROGEN RECEPTOR STATUS: Primary | ICD-10-CM

## 2023-04-03 DIAGNOSIS — C50.919 BREAST CANCER: ICD-10-CM

## 2023-04-03 PROCEDURE — 25010000002 DEXAMETHASONE SODIUM PHOSPHATE 20 MG/5ML SOLUTION

## 2023-04-03 PROCEDURE — 25010000002 PROPOFOL 10 MG/ML EMULSION

## 2023-04-03 PROCEDURE — 81025 URINE PREGNANCY TEST: CPT | Performed by: ANESTHESIOLOGY

## 2023-04-03 PROCEDURE — 25010000002 ONDANSETRON PER 1 MG

## 2023-04-03 PROCEDURE — 25010000002 GENTAMICIN PER 80 MG: Performed by: PLASTIC SURGERY

## 2023-04-03 PROCEDURE — 88304 TISSUE EXAM BY PATHOLOGIST: CPT | Performed by: PLASTIC SURGERY

## 2023-04-03 PROCEDURE — 25010000002 HYDROMORPHONE 1 MG/ML SOLUTION

## 2023-04-03 PROCEDURE — 25010000002 FENTANYL CITRATE (PF) 50 MCG/ML SOLUTION

## 2023-04-03 PROCEDURE — 63710000001 ONDANSETRON PER 8 MG: Performed by: PLASTIC SURGERY

## 2023-04-03 PROCEDURE — 25010000002 MIDAZOLAM PER 1 MG: Performed by: ANESTHESIOLOGY

## 2023-04-03 RX ORDER — DROPERIDOL 2.5 MG/ML
0.62 INJECTION, SOLUTION INTRAMUSCULAR; INTRAVENOUS
Status: DISCONTINUED | OUTPATIENT
Start: 2023-04-03 | End: 2023-04-03 | Stop reason: HOSPADM

## 2023-04-03 RX ORDER — ONDANSETRON 2 MG/ML
INJECTION INTRAMUSCULAR; INTRAVENOUS AS NEEDED
Status: DISCONTINUED | OUTPATIENT
Start: 2023-04-03 | End: 2023-04-03 | Stop reason: SURG

## 2023-04-03 RX ORDER — GABAPENTIN 300 MG/1
300 CAPSULE ORAL ONCE
Status: COMPLETED | OUTPATIENT
Start: 2023-04-03 | End: 2023-04-03

## 2023-04-03 RX ORDER — FLUMAZENIL 0.1 MG/ML
0.2 INJECTION INTRAVENOUS AS NEEDED
Status: DISCONTINUED | OUTPATIENT
Start: 2023-04-03 | End: 2023-04-03 | Stop reason: HOSPADM

## 2023-04-03 RX ORDER — EPHEDRINE SULFATE 50 MG/ML
5 INJECTION, SOLUTION INTRAVENOUS ONCE AS NEEDED
Status: DISCONTINUED | OUTPATIENT
Start: 2023-04-03 | End: 2023-04-03 | Stop reason: HOSPADM

## 2023-04-03 RX ORDER — IPRATROPIUM BROMIDE AND ALBUTEROL SULFATE 2.5; .5 MG/3ML; MG/3ML
3 SOLUTION RESPIRATORY (INHALATION) ONCE AS NEEDED
Status: DISCONTINUED | OUTPATIENT
Start: 2023-04-03 | End: 2023-04-03 | Stop reason: HOSPADM

## 2023-04-03 RX ORDER — SCOLOPAMINE TRANSDERMAL SYSTEM 1 MG/1
1 PATCH, EXTENDED RELEASE TRANSDERMAL ONCE
Status: DISCONTINUED | OUTPATIENT
Start: 2023-04-03 | End: 2023-04-03 | Stop reason: HOSPADM

## 2023-04-03 RX ORDER — ONDANSETRON 2 MG/ML
4 INJECTION INTRAMUSCULAR; INTRAVENOUS ONCE AS NEEDED
Status: DISCONTINUED | OUTPATIENT
Start: 2023-04-03 | End: 2023-04-03 | Stop reason: HOSPADM

## 2023-04-03 RX ORDER — CELECOXIB 200 MG/1
400 CAPSULE ORAL ONCE
Status: COMPLETED | OUTPATIENT
Start: 2023-04-03 | End: 2023-04-03

## 2023-04-03 RX ORDER — NALOXONE HCL 0.4 MG/ML
0.2 VIAL (ML) INJECTION AS NEEDED
Status: DISCONTINUED | OUTPATIENT
Start: 2023-04-03 | End: 2023-04-03 | Stop reason: HOSPADM

## 2023-04-03 RX ORDER — FAMOTIDINE 10 MG/ML
20 INJECTION, SOLUTION INTRAVENOUS ONCE
Status: COMPLETED | OUTPATIENT
Start: 2023-04-03 | End: 2023-04-03

## 2023-04-03 RX ORDER — MIDAZOLAM HYDROCHLORIDE 1 MG/ML
1 INJECTION INTRAMUSCULAR; INTRAVENOUS
Status: DISCONTINUED | OUTPATIENT
Start: 2023-04-03 | End: 2023-04-03 | Stop reason: HOSPADM

## 2023-04-03 RX ORDER — GLYCOPYRROLATE 0.2 MG/ML
INJECTION INTRAMUSCULAR; INTRAVENOUS AS NEEDED
Status: DISCONTINUED | OUTPATIENT
Start: 2023-04-03 | End: 2023-04-03 | Stop reason: SURG

## 2023-04-03 RX ORDER — HYDROCODONE BITARTRATE AND ACETAMINOPHEN 5; 325 MG/1; MG/1
1-2 TABLET ORAL EVERY 4 HOURS PRN
Qty: 20 TABLET | Refills: 0 | Status: SHIPPED | OUTPATIENT
Start: 2023-04-03 | End: 2023-04-10 | Stop reason: ALTCHOICE

## 2023-04-03 RX ORDER — PROPOFOL 10 MG/ML
VIAL (ML) INTRAVENOUS AS NEEDED
Status: DISCONTINUED | OUTPATIENT
Start: 2023-04-03 | End: 2023-04-03 | Stop reason: SURG

## 2023-04-03 RX ORDER — ACETAMINOPHEN 500 MG
1000 TABLET ORAL ONCE
Status: COMPLETED | OUTPATIENT
Start: 2023-04-03 | End: 2023-04-03

## 2023-04-03 RX ORDER — HYDRALAZINE HYDROCHLORIDE 20 MG/ML
5 INJECTION INTRAMUSCULAR; INTRAVENOUS
Status: DISCONTINUED | OUTPATIENT
Start: 2023-04-03 | End: 2023-04-03 | Stop reason: HOSPADM

## 2023-04-03 RX ORDER — DOCUSATE SODIUM 250 MG
250 CAPSULE ORAL 2 TIMES DAILY PRN
Qty: 60 CAPSULE | Refills: 1 | Status: SHIPPED | OUTPATIENT
Start: 2023-04-03 | End: 2023-04-10 | Stop reason: ALTCHOICE

## 2023-04-03 RX ORDER — KETAMINE HCL IN NACL, ISO-OSM 100MG/10ML
SYRINGE (ML) INJECTION AS NEEDED
Status: DISCONTINUED | OUTPATIENT
Start: 2023-04-03 | End: 2023-04-03 | Stop reason: SURG

## 2023-04-03 RX ORDER — LIDOCAINE HYDROCHLORIDE 10 MG/ML
0.5 INJECTION, SOLUTION EPIDURAL; INFILTRATION; INTRACAUDAL; PERINEURAL ONCE AS NEEDED
Status: DISCONTINUED | OUTPATIENT
Start: 2023-04-03 | End: 2023-04-03 | Stop reason: HOSPADM

## 2023-04-03 RX ORDER — FENTANYL CITRATE 50 UG/ML
50 INJECTION, SOLUTION INTRAMUSCULAR; INTRAVENOUS
Status: DISCONTINUED | OUTPATIENT
Start: 2023-04-03 | End: 2023-04-03 | Stop reason: HOSPADM

## 2023-04-03 RX ORDER — SODIUM CHLORIDE 0.9 % (FLUSH) 0.9 %
3-10 SYRINGE (ML) INJECTION AS NEEDED
Status: DISCONTINUED | OUTPATIENT
Start: 2023-04-03 | End: 2023-04-03 | Stop reason: HOSPADM

## 2023-04-03 RX ORDER — HYDROMORPHONE HYDROCHLORIDE 1 MG/ML
0.5 INJECTION, SOLUTION INTRAMUSCULAR; INTRAVENOUS; SUBCUTANEOUS
Status: DISCONTINUED | OUTPATIENT
Start: 2023-04-03 | End: 2023-04-03 | Stop reason: HOSPADM

## 2023-04-03 RX ORDER — PHENYLEPHRINE HCL IN 0.9% NACL 1 MG/10 ML
SYRINGE (ML) INTRAVENOUS AS NEEDED
Status: DISCONTINUED | OUTPATIENT
Start: 2023-04-03 | End: 2023-04-03 | Stop reason: SURG

## 2023-04-03 RX ORDER — PROMETHAZINE HYDROCHLORIDE 25 MG/1
25 SUPPOSITORY RECTAL ONCE AS NEEDED
Status: DISCONTINUED | OUTPATIENT
Start: 2023-04-03 | End: 2023-04-03 | Stop reason: HOSPADM

## 2023-04-03 RX ORDER — DOXYCYCLINE 100 MG/1
100 CAPSULE ORAL 2 TIMES DAILY
Qty: 14 CAPSULE | Refills: 0 | Status: SHIPPED | OUTPATIENT
Start: 2023-04-03 | End: 2023-04-10

## 2023-04-03 RX ORDER — ACETAMINOPHEN 325 MG/1
650 TABLET ORAL EVERY 4 HOURS PRN
Qty: 60 TABLET | Refills: 0 | Status: SHIPPED | OUTPATIENT
Start: 2023-04-03

## 2023-04-03 RX ORDER — SODIUM CHLORIDE, SODIUM LACTATE, POTASSIUM CHLORIDE, CALCIUM CHLORIDE 600; 310; 30; 20 MG/100ML; MG/100ML; MG/100ML; MG/100ML
9 INJECTION, SOLUTION INTRAVENOUS CONTINUOUS
Status: DISCONTINUED | OUTPATIENT
Start: 2023-04-03 | End: 2023-04-03 | Stop reason: HOSPADM

## 2023-04-03 RX ORDER — SODIUM CHLORIDE 0.9 % (FLUSH) 0.9 %
3 SYRINGE (ML) INJECTION EVERY 12 HOURS SCHEDULED
Status: DISCONTINUED | OUTPATIENT
Start: 2023-04-03 | End: 2023-04-03 | Stop reason: HOSPADM

## 2023-04-03 RX ORDER — PROMETHAZINE HYDROCHLORIDE 25 MG/1
25 TABLET ORAL ONCE AS NEEDED
Status: DISCONTINUED | OUTPATIENT
Start: 2023-04-03 | End: 2023-04-03 | Stop reason: HOSPADM

## 2023-04-03 RX ORDER — DIPHENHYDRAMINE HYDROCHLORIDE 50 MG/ML
12.5 INJECTION INTRAMUSCULAR; INTRAVENOUS
Status: DISCONTINUED | OUTPATIENT
Start: 2023-04-03 | End: 2023-04-03 | Stop reason: HOSPADM

## 2023-04-03 RX ORDER — ROCURONIUM BROMIDE 10 MG/ML
INJECTION, SOLUTION INTRAVENOUS AS NEEDED
Status: DISCONTINUED | OUTPATIENT
Start: 2023-04-03 | End: 2023-04-03 | Stop reason: SURG

## 2023-04-03 RX ORDER — CLINDAMYCIN PHOSPHATE 900 MG/50ML
900 INJECTION INTRAVENOUS ONCE
Status: CANCELLED | OUTPATIENT
Start: 2023-04-13 | End: 2023-04-03

## 2023-04-03 RX ORDER — ONDANSETRON 8 MG/1
8 TABLET, ORALLY DISINTEGRATING ORAL EVERY 8 HOURS PRN
Qty: 10 TABLET | Refills: 1 | Status: SHIPPED | OUTPATIENT
Start: 2023-04-03 | End: 2023-04-10 | Stop reason: ALTCHOICE

## 2023-04-03 RX ORDER — HYDROCODONE BITARTRATE AND ACETAMINOPHEN 7.5; 325 MG/1; MG/1
1 TABLET ORAL ONCE AS NEEDED
Status: DISCONTINUED | OUTPATIENT
Start: 2023-04-03 | End: 2023-04-03 | Stop reason: HOSPADM

## 2023-04-03 RX ORDER — DEXAMETHASONE SODIUM PHOSPHATE 4 MG/ML
INJECTION, SOLUTION INTRA-ARTICULAR; INTRALESIONAL; INTRAMUSCULAR; INTRAVENOUS; SOFT TISSUE AS NEEDED
Status: DISCONTINUED | OUTPATIENT
Start: 2023-04-03 | End: 2023-04-03 | Stop reason: SURG

## 2023-04-03 RX ORDER — SODIUM CHLORIDE, SODIUM LACTATE, POTASSIUM CHLORIDE, CALCIUM CHLORIDE 600; 310; 30; 20 MG/100ML; MG/100ML; MG/100ML; MG/100ML
100 INJECTION, SOLUTION INTRAVENOUS CONTINUOUS
Status: CANCELLED | OUTPATIENT
Start: 2023-04-13

## 2023-04-03 RX ORDER — AMOXICILLIN 250 MG
2 CAPSULE ORAL DAILY PRN
Qty: 30 TABLET | Refills: 1 | Status: SHIPPED | OUTPATIENT
Start: 2023-04-03 | End: 2023-04-10 | Stop reason: ALTCHOICE

## 2023-04-03 RX ORDER — OXYCODONE AND ACETAMINOPHEN 7.5; 325 MG/1; MG/1
1 TABLET ORAL EVERY 4 HOURS PRN
Status: DISCONTINUED | OUTPATIENT
Start: 2023-04-03 | End: 2023-04-03 | Stop reason: HOSPADM

## 2023-04-03 RX ORDER — LABETALOL HYDROCHLORIDE 5 MG/ML
5 INJECTION, SOLUTION INTRAVENOUS
Status: DISCONTINUED | OUTPATIENT
Start: 2023-04-03 | End: 2023-04-03 | Stop reason: HOSPADM

## 2023-04-03 RX ORDER — SODIUM CHLORIDE, SODIUM LACTATE, POTASSIUM CHLORIDE, CALCIUM CHLORIDE 600; 310; 30; 20 MG/100ML; MG/100ML; MG/100ML; MG/100ML
125 INJECTION, SOLUTION INTRAVENOUS CONTINUOUS
Status: DISCONTINUED | OUTPATIENT
Start: 2023-04-03 | End: 2023-04-03 | Stop reason: HOSPADM

## 2023-04-03 RX ORDER — ONDANSETRON HYDROCHLORIDE 8 MG/1
8 TABLET, FILM COATED ORAL ONCE
Status: COMPLETED | OUTPATIENT
Start: 2023-04-03 | End: 2023-04-03

## 2023-04-03 RX ORDER — FENTANYL CITRATE 50 UG/ML
INJECTION, SOLUTION INTRAMUSCULAR; INTRAVENOUS AS NEEDED
Status: DISCONTINUED | OUTPATIENT
Start: 2023-04-03 | End: 2023-04-03 | Stop reason: SURG

## 2023-04-03 RX ORDER — GINSENG 100 MG
CAPSULE ORAL AS NEEDED
Status: DISCONTINUED | OUTPATIENT
Start: 2023-04-03 | End: 2023-04-03 | Stop reason: HOSPADM

## 2023-04-03 RX ORDER — GABAPENTIN 100 MG/1
100 CAPSULE ORAL EVERY 8 HOURS
Qty: 60 CAPSULE | Refills: 2 | Status: SHIPPED | OUTPATIENT
Start: 2023-04-03 | End: 2023-04-10 | Stop reason: ALTCHOICE

## 2023-04-03 RX ORDER — DOXYCYCLINE 100 MG/1
200 CAPSULE ORAL ONCE
Status: COMPLETED | OUTPATIENT
Start: 2023-04-03 | End: 2023-04-03

## 2023-04-03 RX ADMIN — Medication 30 MG: at 07:40

## 2023-04-03 RX ADMIN — PROPOFOL 200 MG: 10 INJECTION, EMULSION INTRAVENOUS at 07:32

## 2023-04-03 RX ADMIN — ONDANSETRON 4 MG: 2 INJECTION INTRAMUSCULAR; INTRAVENOUS at 08:15

## 2023-04-03 RX ADMIN — Medication 50 MCG: at 08:13

## 2023-04-03 RX ADMIN — MIDAZOLAM 1 MG: 1 INJECTION INTRAMUSCULAR; INTRAVENOUS at 07:02

## 2023-04-03 RX ADMIN — SUGAMMADEX 200 MG: 100 INJECTION, SOLUTION INTRAVENOUS at 08:15

## 2023-04-03 RX ADMIN — DOXYCYCLINE 200 MG: 100 CAPSULE ORAL at 06:29

## 2023-04-03 RX ADMIN — ROCURONIUM BROMIDE 40 MG: 10 INJECTION, SOLUTION INTRAVENOUS at 07:33

## 2023-04-03 RX ADMIN — DEXAMETHASONE SODIUM PHOSPHATE 8 MG: 4 INJECTION, SOLUTION INTRAMUSCULAR; INTRAVENOUS at 07:40

## 2023-04-03 RX ADMIN — CELECOXIB 400 MG: 200 CAPSULE ORAL at 06:29

## 2023-04-03 RX ADMIN — ONDANSETRON HYDROCHLORIDE 8 MG: 8 TABLET, FILM COATED ORAL at 06:29

## 2023-04-03 RX ADMIN — ACETAMINOPHEN 1000 MG: 500 TABLET, FILM COATED ORAL at 06:28

## 2023-04-03 RX ADMIN — GABAPENTIN 300 MG: 300 CAPSULE ORAL at 06:34

## 2023-04-03 RX ADMIN — Medication 50 MCG: at 08:01

## 2023-04-03 RX ADMIN — FAMOTIDINE 20 MG: 10 INJECTION INTRAVENOUS at 07:02

## 2023-04-03 RX ADMIN — FENTANYL CITRATE 50 MCG: 50 INJECTION, SOLUTION INTRAMUSCULAR; INTRAVENOUS at 07:32

## 2023-04-03 RX ADMIN — GLYCOPYRROLATE 0.1 MG: 1 INJECTION INTRAMUSCULAR; INTRAVENOUS at 07:40

## 2023-04-03 RX ADMIN — FENTANYL CITRATE 50 MCG: 50 INJECTION, SOLUTION INTRAMUSCULAR; INTRAVENOUS at 07:40

## 2023-04-03 RX ADMIN — SCOPALAMINE 1 PATCH: 1 PATCH, EXTENDED RELEASE TRANSDERMAL at 06:28

## 2023-04-03 RX ADMIN — HYDROMORPHONE HYDROCHLORIDE 0.5 MG: 1 INJECTION, SOLUTION INTRAMUSCULAR; INTRAVENOUS; SUBCUTANEOUS at 08:29

## 2023-04-03 RX ADMIN — SODIUM CHLORIDE, POTASSIUM CHLORIDE, SODIUM LACTATE AND CALCIUM CHLORIDE 125 ML/HR: 600; 310; 30; 20 INJECTION, SOLUTION INTRAVENOUS at 06:29

## 2023-04-03 RX ADMIN — PROPOFOL 25 MCG/KG/MIN: 10 INJECTION, EMULSION INTRAVENOUS at 07:40

## 2023-04-03 NOTE — TELEPHONE ENCOUNTER
3-31-23 note from Dr Frederick-   Plans to treat her with [facility]P vs AC followed by [facility].  Requesting port- we will plan for RIGHT port placement.

## 2023-04-03 NOTE — OP NOTE
Pre-Operative Diagnosis: right breast mastectomy edge necrosis    Post-Operative Diagnosis: Same    Procedure Performed:   1. excisional Debridement right breast skin 4x10cm  2. Adjacent tissue rearrangement right breast 50 cm sq    Surgeon: GONZALEZ Stone MD    Assistant: None    Anesthesia: General    Estimated Blood Loss: <5cc    Specimens: right breast skin    Complications: None immediate    Indications: She has had delayed healing of the right mastectomy with a large area of necrosis of the superior flap.  Discussed the need for revision of this to be able to proceed with expansion.     We discussed risks, benefits and alternatives including but not limited to: bleeding, infection, asymmetry, poor or slow wound healing, need for further surgery, possible recurrence.  The patient elected to proceed.    Description of Procedure: The patient was met in the preoperative holding area.  All questions were answered and informed consent was assured.      The right breast was marked and fusiform area of excision was marked. Transferred to OR.    After induction of appropriate anesthesia, a timeout was performed correctly identifying the patient, operative site, and procedure to be performed.  All present were in agreement.    Local anesthetic infiltrated and chest prepped and draped.  The incision was reopened with foreceps and the plane between the superior and inferior flaps identified.  The inferior flap was viable.  The marked superior skin was sharply excised with a knife back to bleeding edge.  A small rim of the inferior flap was deepithelialized to created a fresh edge and then the entirety of the inferior flap was reelevated from the superior flap while maintaining the subdermal plexus vascular supply based inferiorly to be able to advance superiorly beneath the superior flap and allow the new edge of the superior flap to reach the inferior deepithelialized edge.  The pocket was copiously irrigated and  there was a small periprosthetic seroma which was evacuated.  A drain was placed.  The pocket was irrigated with betadine solution and closed with 2-0 vicryl anchoring the inferior flap to the deep surface of the superior and taking some of the tension.  3-0 pds in the deep dermal layer, 4-0 monocryl in the intracuticular.  Incision dressed with dermabond and she was placed in well padded ace wrap.    The patient was then aroused from anesthesia with ease and transferred to the postoperative care area in good condition. All sponge, needle, and instrument counts were correct.

## 2023-04-03 NOTE — ANESTHESIA PREPROCEDURE EVALUATION
Anesthesia Evaluation     Patient summary reviewed and Nursing notes reviewed                Airway   Mallampati: I  TM distance: >3 FB  Neck ROM: full  Dental      Pulmonary - negative pulmonary ROS   Cardiovascular     ECG reviewed  Rhythm: regular  Rate: normal    (+) hyperlipidemia,       Neuro/Psych  (+) psychiatric history Anxiety and ADD,    GI/Hepatic/Renal/Endo    (+)   thyroid problem hypothyroidism    Musculoskeletal (-) negative ROS    Abdominal    Substance History - negative use     OB/GYN negative ob/gyn ROS         Other      history of cancer                    Anesthesia Plan    ASA 2     general     (I have reviewed the patient's history with the patient and the chart, including all pertinent laboratory results and imaging. I have explained the risks of anesthesia including but not limited to dental damage, corneal abrasion, nerve injury, MI, stroke, and death. Questions asked and answered. Anesthetic plan discussed with patient and team as indicated. Patient expressed understanding of the above.  )  intravenous induction     Anesthetic plan, risks, benefits, and alternatives have been provided, discussed and informed consent has been obtained with: patient.        CODE STATUS:

## 2023-04-03 NOTE — ANESTHESIA POSTPROCEDURE EVALUATION
Patient: Ursula Kulkarni    Procedure Summary     Date: 04/03/23 Room / Location: [facility]  / [facility] OR    Anesthesia Start: 0724 Anesthesia Stop: 0835    Procedure: RIGHT MASTECTOMY REVISION (Right: Breast) Diagnosis:     Surgeons: Eamon Stone MD Provider: Kane Merino MD    Anesthesia Type: general ASA Status: 2          Anesthesia Type: general    Vitals  Vitals Value Taken Time   /89 04/03/23 0846   Temp 36.6 °C (97.9 °F) 04/03/23 0834   Pulse 68 04/03/23 0853   Resp 16 04/03/23 0845   SpO2 100 % 04/03/23 0853   Vitals shown include unvalidated device data.        Post Anesthesia Care and Evaluation    Patient location during evaluation: PACU  Patient participation: complete - patient participated  Level of consciousness: awake and alert  Pain management: adequate    Airway patency: patent  Anesthetic complications: No anesthetic complications    Cardiovascular status: acceptable  Respiratory status: acceptable  Hydration status: acceptable    Comments: --------------------            04/03/23               0845     --------------------   BP:       120/89     Pulse:      76       Resp:       16       Temp:                SpO2:      100%     --------------------

## 2023-04-03 NOTE — H&P
Allergies  Reviewed Allergies  PENICILLINS   Medications  Reviewed Medications  acetaminophen 325 mg tablet  03/09/23   filled surescripts  docusate sodium 250 mg capsule  03/09/23   filled surescripts  doxycycline hyclate 100 mg tablet  TAKE 1 TABLET BY MOUTH TWICE DAILY FOR 7 DAYS  03/22/23   filled surescripts  doxycycline monohydrate 100 mg capsule  03/09/23   filled surescripts  gabapentin 100 mg capsule  03/09/23   filled surescripts  HYDROcodone 5 mg-acetaminophen 325 mg tablet  03/09/23   filled surescripts  levothyroxine 125 mcg tablet  TAKE 1 TABLET BY MOUTH ONCE DAILY  03/22/23   filled surescripts  lidocaine-prilocaine 2.5 %-2.5 % topical cream  02/08/23   filled surescripts  mupirocin 2 % topical ointment  APPLY A SMALL AMOUNT TO THE AFFECTED AREA THREE TIMES DAILY  03/22/23   filled surescripts  omeprazole 20 mg capsule,delayed release  TAKE 1 CAPSULE BY MOUTH ONCE DAILY  02/09/23   filled surescripts  Stool Softener-Laxative 8.6 mg-50 mg tablet  03/09/23   filled surescripts  Problems  Reviewed Problems  Family History  Reviewed Family History  Mother - Arthritis    - Obesity  Father - Myocardial infarction    - Arthritis    - Hypertensive disorder    - Malignant tumor of prostate    - Heart disease    - Diabetes mellitus  Brother - Substance abuse  Sister - Chronic obstructive lung disease    - Myocardial infarction    - Hypertensive disorder    - Obesity    - Emphysema    - Heart disease    - Diabetes mellitus  Social History  Reviewed Social History  Substance Use  Do you or have you ever smoked tobacco?: Never smoker  Do you or have you ever used smokeless tobacco?: Never used smokeless tobacco  Have you used IV drugs?: No  How much tobacco do you smoke?: None  How much tobacco do you chew?: none  Do you or have you ever used e-cigarettes or vape?: Never used electronic cigarettes  What is your level of alcohol consumption?: Occasional  Diet and Exercise  What type of diet are you following?:  Regular  Education and Occupation  Are you currently employed?: Yes  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 : No  Public Health and Travel  Have you been to an area known to be high risk for COVID-19?: No  In the 14 days before symptom onset, have you had close contact with a person who is under investigation for COVID-19 while that person was ill?: No  Marriage and Sexuality  What is your relationship status?:   Surgical History  Reviewed Surgical History  Other - 01/07/2005  Past Medical History  Reviewed Past Medical History  Cancer: Y  Thyroid Disease Explain____________: Y  Screening  None recorded.  HPI  Has been diligent with the ointment. She is not had any worsening redness or fever.  ROS  Patient reports non-healing areas but reports no abnormal mole. She reports no fever, no night sweats, no significant weight gain, and no significant weight loss. She reports no dry eyes and no vision change. She reports no difficulty hearing. She reports no frequent nosebleeds. She reports no sore throat and no bleeding gums. She reports no chest pain, no arm pain on exertion, and no shortness of breath when walking. She reports no cough, no wheezing, and no shortness of breath. She reports no abdominal pain, no nausea, and no vomiting. She reports no muscle aches and no muscle weakness. She reports no gait dysfunction and no paralysis. She reports no dementia and no delirium.  Physical Exam  Chaperone: Chaperone: present.    Constitutional: General Appearance: healthy-appearing, well-nourished, and well-developed. Level of Distress: NAD. Ambulation: ambulating normally.    Psychiatric: Mental Status: normal mood and affect and active and alert.    Head: Head: normocephalic and atraumatic.    Eyes: Lids and Conjunctivae: non-injected. Pupils: PERRLA. EOM:  EOMI.    Cardiovascular: Heart Auscultation: RRR.    Breast: Breast Exam: Right mastectomy incision with well demarcated 3 x 4 cm area of mastectomy necrosis, no breakdown, no fluid collection, left incision with just some pinpoint areas of edge necrosis but no areas threatening exposure.    Musculoskeletal:: Motor Strength and Tone: normal tone and motor strength. Joints, Bones, and Muscles: normal movement of all extremities. Extremities: no cyanosis or edema.    Neurologic: Gait and Station: normal gait and station.    Skin: Inspection and palpation: no rash or lesions.  Assessment / Plan  Discussed need to return to the operating for revision of this mastectomy necrosis. Discussed risk of bleeding, infection, poor wound healing. She will continue the mupirocin ointment until then and continue limited activity.

## 2023-04-03 NOTE — ANESTHESIA PROCEDURE NOTES
Airway  Urgency: elective    Date/Time: 4/3/2023 7:36 AM  Airway not difficult    General Information and Staff    Patient location during procedure: OR  Anesthesiologist: Kane Merino MD  CRNA/CAA: Nata Sawyer CRNA    Indications and Patient Condition  Indications for airway management: airway protection    Preoxygenated: yes  MILS not maintained throughout  Mask difficulty assessment: 1 - vent by mask    Final Airway Details  Final airway type: endotracheal airway      Successful airway: ETT  Cuffed: yes   Successful intubation technique: direct laryngoscopy  Facilitating devices/methods: cricoid pressure and intubating stylet  Endotracheal tube insertion site: oral  Blade: Lisa  Blade size: 3  ETT size (mm): 7.0  Cormack-Lehane Classification: grade IIa - partial view of glottis  Placement verified by: chest auscultation and capnometry   Cuff volume (mL): 6  Measured from: lips  ETT/EBT  to lips (cm): 21  Number of attempts at approach: 1  Assessment: lips, teeth, and gum same as pre-op and atraumatic intubation    Additional Comments  Airway exam prior to DL, teeth/lips inspected. Preoxygenated with 100% O2; sniffing position, easy mask ventilation. Eyes taped. Atraumatic intubation. Lips and teeth intact, no damage. ETT connected to vent. Confirmed EBBS, +EtCO2.

## 2023-04-03 NOTE — H&P
There are no changes in the history or physical exam, aside from any that are listed as an addendum at the bottom of this document.   Today, patient will go for the surgery listed in the plan below.    Chief Complaint: Ursula Kulkarni is a 47 y.o. female who was seen in consultation at the request of RADHA Lauren  for newly diagnosed breast cancer and a postoperative visit    History of Present Illness:  Patient presents with newly diagnosed breast cancer LEFT breast. She noted no new masses, skin changes, nipple discharge, nipple changes prior to her most recent imaging.    Her most recent imaging includes the followin2022, MMG Screening Digital Gerardo Bilateral with CAD ([facility])   There is an 8 mm asymmetry in the central left breast posterior 1/3 on the CC view. This appears to be in the superior left breast on the MLO view.   BI-RADS 0: Incomplete     2023, MMG Diagnostic Gerardo Left with CAD ([facility])   The spiculated areas within the central posterior and the lateral anterior superior    left breast were re-demonstrated.  Targeted ultrasound was performed.  At the 1 o'clock position 10    cm from the nipple there is a shadowing mass with peripheral spiculations favored to represent the    mammographic abnormality and suspicious for malignancy.  The more centrally located spiculation on    the CC view disc posterior to the glandular tissue in the retro glandular fat cannot be clearly    identified on ultrasound.   Two separate suspicious spiculated lesions of the left breast.  One lesion is felt to be seen well    on ultrasound and should be amenable to ultrasound-guided core biopsy.  The other lesion is seen    well on mammography. BI-RADS 4: Suspicious          Pathology:  2023, ADDENDUM:                   All 3 sites were biopsied are malignant. This is concordant.                                   Site A (top-hat shaped clip)                                   Site B (coil shaped clip)                                    Site C (stoplight shaped clip)   Slightly lateral left breast, stereotactic, 9 gauge vacuum assisted Brevera, Twelve specimens were obtained and a top-hat shaped clip was then placed. Site A: ultrasound biopsy table. 1.0 cm, 1:00 position 10 cm from the nipple. 12 gauge Bard.   Four samples were obtained and a coil shaped HydroMARK clip was placed, Site B. The post-procedure mammogram shows the top hat shaped clip from the stereotactic biopsy in good position in the biopsy cavity.  The coil shaped clip is in good position on the true lateral view, however on the CC view it does not correlate to the mammographic finding, which on today's exam is more at the 12 o'clock position in the posterior 1/3 of the left breast.  Therefore was decided to perform a 3rd biopsy.   9-gauge vacuum assisted Brevera, Twelve specimens, stoplight shaped clip, Site C, all 3 clips are appropriately positioned.     LEFT BREAST:     At 1:00 in the anterior left breast, 4 cm posterior to the nipple, there is a 1.5 AP dimension, 1.6 cm transverse dimension, 1.3 cm craniocaudal dimension irregular enhancing mass with a focus of susceptibility from a biopsy clip along the inferior margin. The biopsy clip is located within hematoma along the lateral margin of the mass.  At 1:00 in the middle to posterior left breast, 7.4 cm posterior to the nipple, there is a 1.3 cm irregular enhancing mass clip along the inferior margin. At 12:00 in the posterior left breast, 8.5 cm posterior to the nipple, 2.2 cm craniocaudal dimension irregular enhancing mass with a biopsy clip.  Multiple, at least 12, similar appearing irregular enhancing masses and foci are identified throughout left breast.  At 11-12 o'clock in the middle and posterior left breast, there are 3 adjacent reference masses together measuring up to 5.5 cm in AP dimension.  At 1:00 in the far posterior left breast/axillary tail, 13.7 cm posterior to the nipple,  there is a reference 1.3 cm AP dimension, 1.1 cm transverse dimension, 1.0 cm craniocaudal dimension, irregular enhancing mass, which is located approximately 0.8 cm from the anterolateral margin of the pectoralis muscle. Together, the enhancing masses span approximately 11.7 cm in maximum AP dimension, 8 cm in maximum transverse dimension, and 7.8 cm in maximum craniocaudal dimension. The visualized axilla is within normal limits   EXTRAMAMMARY FINDINGS:    There are no pathologically enlarged internal mammary chain lymph nodes on either side.   IMPRESSION AND RECOMMENDATION:   At least 15 similar-appearing irregular enhancing masses and foci scattered throughout the left breast, together measuring up to 11.7 cm consistent with multicentric malignancy   No MRI evidence of malignancy in the right breast        She had a biopsy on the following day that showed:   1. Left breast, anterior lateral, stereotactic-guided core biopsy:   - Invasive lobular carcinoma   - Histologic grade (Marie Histologic Score):   - Glandular (Acinar)/Tubular Differentiation: Score 3   - Nuclear Pleomorphism: Score 2   - Mitotic Rate: Score 1   - Overall Grade: Grade 2   - Size of largest focus of invasion: 4 mm   - Breast biomarker testing:   - Estrogen Receptor (ER): Positive (95%, moderate)   - Progesterone Receptor (PgR): Positive (95%, strong)   - HER2 (by immunohistochemistry): Negative (Score 0)   - Ki-67: 5%   - Other findings:   - Atypical lobular hyperplasia (ALH)     2. Left breast, 1 o'clock position, and 10 cm from nipple, ultrasound-guided core biopsy:  - Invasive lobular carcinoma   - Histologic grade (Marie Histologic Score):   - Glandular (Acinar)/Tubular Differentiation: Score 3   - Nuclear Pleomorphism: Score 2   - Mitotic Rate: Score 1   - Overall Grade: Grade 2   - Size of largest focus of invasion: 4 mm  - Other findings:   - Lobular carcinoma in situ (LCIS), classic type     3. Left breast, upper,  stereotactic-guided core biopsy:   - Invasive lobular carcinoma  - Histologic grade ([place] Histologic Score):   - Glandular (Acinar)/Tubular Differentiation: Score 3   - Nuclear Pleomorphism: Score 2   - Mitotic Rate: Score 1   - Overall Grade: Grade 2   - Size of largest focus of invasion: 3 mm   - Other findings:   - Lobular carcinoma in situ (LCIS), classic type     OUTSIDE PATHOLOGY:   1. Left Breast, Anterior Lateral, Stereotactic-Guided Core Needle Biopsy:                  A. INVASIVE LOBULAR CARCINOMA, Moderately differentiated; [place] Histologic Grade II/III                      (tubule score = 3, nuclear score = 2, mitoses score=1), measuring at least 4 mm.                B. Atypical lobular hyperplasia.   ER:       POSITIVE (%, Moderate).                MI:       POSITIVE (%, Strong).                HER2:  NEGATIVE (0).                Ki67:     Approximately 5%.             2. Left Breast, 1:00, 10 cm FN, U/S-Guided Core Needle Biopsy:                 A. INVASIVE LOBULAR CARCINOMA, Moderately differentiated; [place] Histologic Grade II/III                     (tubule score = 3, nuclear score = 2, mitoses score = 1), measuring at least 4 mm.                B. FOCAL LOBULAR CARCINOMA IN SITU (LCIS).                C. See comment.     3. Left Breast, Upper, Stereotactic-Guided Core Needle Biopsy:                  A. INVASIVE LOBULAR CARCINOMA, Moderately differentiated; [place] Histologic Grade II/III                     (tubule score = 3, nuclear score = 2, mitoses score = 1), measuring at least 3 mm.                B. LOBULAR CARCINOMA IN SITU (LCIS).                 C. See comment.     She had not had a breast biopsy in the past.  She has her uterus and ovaries, is ? perimenopausal, and has an IUD  Her family history includes the following: She has 2 daughters, 1 sister, 2 maternal aunts, 1 paternal aunt.  No family history of breast or ovarian cancer.  Her dad had prostate and  melanoma cancers.  A paternal aunt had thyroid cancer.      Interval HIstory:  3-8-23 pathology from bilateral total mastectomy and LEFT sentinel node biopsy with reconstruction Dr Stone returned as :        Left total mastectomy, multifocal invasive lobular carcinoma, intermediate grade, 3, 2, 2, 3 clips and biopsy changes identified.  Dimensions of the tumors 6 cm, 2 cm, 2 mm, 1 mm.  Margins are all clear.  0 of 4 sentinel lymph nodes.  Pathologic stage T3N0 stage IIb.  Estrogen , progesterone , HER2/cheryl 2+ .    Has had 2 of 4 drains removed.  Reports expected discomfort.  Denies drainage from incisions      Review of Systems:  Review of Systems   Constitutional: Negative for unexpected weight change (9 lb wt loss since last visit).   All other systems reviewed and are negative.       Past Medical and Surgical History:  Breast Biopsy History:  Patient had not had a breast biopsy prior to her cancer diagnosis.  Breast Cancer HIstory:  Patient does not have a past medical history of breast cancer.  Breast Operations, and year:  None   Obstetric/Gynecologic History:  Age menstrual periods began: 14  Patient is premenopausal, first day of last period: 1/10/23 IUD  Number of pregnancies: 2  Number of live births: 2  Number of abortions or miscarriages: 0  Age of delivery of first child: 30  Patient breast fed, for the following lenth of time:24 months   Length of time taking birth control pills: none   Patient has never taken hormone replacement    Patient has both ovaries and uterus  Past Surgical History:   Procedure Laterality Date   • BREAST RECONSTRUCTION Bilateral 3/8/2023    Procedure: BILATERAL PLACEMENT  TISSUE EXPANDER AND ALLODERM;  Surgeon: Eamon Stone MD;  Location: [facility];  Service: Plastics;  Laterality: Bilateral;   • D & C CERVICAL BIOPSY  01/2006   • INTRAUTERINE DEVICE INSERTION  2010    Mirena   • INTRAUTERINE DEVICE INSERTION  2015    Mirena exchange, old IUD  removal and new one inserted by Dr Carr, Lot # [id number] 09/17.mar   • MASTECTOMY W/ SENTINEL NODE BIOPSY Bilateral 3/8/2023    Procedure: Bilateral total mastectomy, left axillary sentinel lymph node biopsy;  Surgeon: Kell Hi MD;  Location: [facility];  Service: General;  Laterality: Bilateral;   • WISDOM TOOTH EXTRACTION       Past Medical History:   Diagnosis Date   • ADD (attention deficit disorder)    • Bilateral impacted cerumen 07/13/2020   • Breast cancer (HCC)    • Foot fracture, left    • History of COVID-19 2022   • History of gestational diabetes 2010   • History of Graves' disease 2012   • History of radioactive iodine thyroid ablation 2012   • History of vertigo    • Hyperlipidemia    • Hypothyroidism    • Melanoma of back (HCC)    • Positive depression screening 07/13/2020       Prior Hospitalizations, other than for surgery or childbirth, and year:  None     Social History     Socioeconomic History   • Marital status:      Spouse name: Parish   • Number of children: 2   Tobacco Use   • Smoking status: Never   • Smokeless tobacco: Never   Vaping Use   • Vaping Use: Never used   Substance and Sexual Activity   • Alcohol use: Yes     Alcohol/week: 1.0 standard drink     Types: 1 Glasses of wine per week     Comment: occ   • Drug use: Never   • Sexual activity: Yes     Partners: Male     Birth control/protection: I.U.D.     Comment:       Patient is .  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    Family History:  Family History   Problem Relation Age of Onset   • Hyperlipidemia Father    • Arthritis Father    • Hypertension Father    • Heart disease Father         S/P stents   • Heart attack Father 41   • Colon polyps Father    • Melanoma Father    • Prostate cancer Father    • Hyperlipidemia Sister    • Hypertension Sister    • Heart attack Sister   "  • Thyroid cancer Paternal Aunt    • Lung cancer Maternal Grandmother    • Cancer Maternal Grandfather         Bladder cancer   • Lung cancer Maternal Grandfather    • Heart disease Paternal Grandmother    • Cancer Paternal Grandfather         Bladder   • Heart disease Paternal Grandfather    • Malig Hyperthermia Neg Hx        Vital Signs:  /86   Pulse 64   Ht 161.3 cm (63.5\")   Wt 78.5 kg (173 lb)   LMP  (LMP Unknown)   SpO2 100%   BMI 30.16 kg/m²      Medications:    Current Outpatient Medications:   •  acetaminophen (TYLENOL) 325 MG tablet, Take 2 tablets by mouth Every 4 (Four) Hours As Needed for Mild Pain., Disp: 60 tablet, Rfl: 0  •  cetirizine (zyrTEC) 10 MG tablet, Take 1 tablet by mouth Daily., Disp: , Rfl:   •  COLLAGEN PO, Take 1 tablet by mouth Daily., Disp: , Rfl:   •  gabapentin (Neurontin) 100 MG capsule, Take 1 capsule by mouth Every 8 (Eight) Hours., Disp: 60 capsule, Rfl: 2  •  levothyroxine (SYNTHROID, LEVOTHROID) 125 MCG tablet, Take 1 tablet by mouth Daily., Disp: 90 tablet, Rfl: 1  •  omeprazole (priLOSEC) 20 MG capsule, Take 1 capsule by mouth Daily., Disp: 30 capsule, Rfl: 1  •  Probiotic Product (PROBIOTIC ADVANCED PO), Take 1 tablet by mouth Daily., Disp: , Rfl:   •  docusate sodium (COLACE) 250 MG capsule, Take 1 capsule by mouth 2 (Two) Times a Day As Needed for Constipation., Disp: 60 capsule, Rfl: 1  •  HYDROcodone-acetaminophen (NORCO) 5-325 MG per tablet, Take 1-2 tablets by mouth Every 4 (Four) Hours As Needed (Pain)., Disp: 20 tablet, Rfl: 0  •  ondansetron ODT (ZOFRAN-ODT) 8 MG disintegrating tablet, Place 1 tablet on the tongue Every 8 (Eight) Hours As Needed for Nausea or Vomiting., Disp: 10 tablet, Rfl: 1  •  sennosides-docusate (PERICOLACE) 8.6-50 MG per tablet, Take 2 tablets by mouth Daily As Needed for Constipation., Disp: 30 tablet, Rfl: 1     Allergies:  Allergies   Allergen Reactions   • Erythromycin GI Intolerance     Pt reports   • Penicillins Rash " "      Physical Examination:  /86   Pulse 64   Ht 161.3 cm (63.5\")   Wt 78.5 kg (173 lb)   LMP  (LMP Unknown)   SpO2 100%   BMI 30.16 kg/m²   General Appearance:  Patient is in no distress.  She is well kept and has an obese build.   Psychiatric:  Patient with appropriate mood and affect. Alert and oriented to self, time, and place.    Breast, RIGHT: Surgically absent with well-healing transverse incision.  On the right superior mastectomy flap there is an area of blistering that looks like epidermal lysis with some reactive hyperemia.  The drains are in place and serosanguineous.  No other skin changes.    Breast, LEFT: Surgically absent with well-healing transverse incision.  On the right superior mastectomy flap there is an area of blistering that looks like epidermal lysis with some reactive hyperemia.  The drains are in place and serosanguineous.  No other skin changes.    Lymphatic:  There is no axillary, cervical, infraclavicular, or supraclavicular adenopathy bilaterally.  Eyes:  Pupils are round and reactive to light.  Cardiovascular:  Heart rate and rhythm are regular.  Respiratory:  Lungs are clear bilaterally with no crackles or wheezes in any lung field.  Gastrointestinal:  Abdomen is soft, nondistended, and nontender. There are no scars from previous surgery.    Musculoskeletal:  Good strength in all 4 extremities.   There is good range of motion in both shoulders.    Skin:  No new skin lesions or rashes on the skin excluding the breast (see breast exam above).        Imagin2022, MMG Screening Digital Gerardo Bilateral with CAD ([facility])   There is an 8 mm asymmetry in the central left breast posterior 1/3 on the CC view. This appears to be in the superior left breast on the MLO view.   BI-RADS 0: Incomplete     2023, MMG Diagnostic Gerardo Left with CAD ([facility])   The spiculated areas within the central posterior and the lateral anterior superior    left breast were re-demonstrated.  " Targeted ultrasound was performed.  At the 1 o'clock position 10    cm from the nipple there is a shadowing mass with peripheral spiculations favored to represent the    mammographic abnormality and suspicious for malignancy.  The more centrally located spiculation on    the CC view disc posterior to the glandular tissue in the retro glandular fat cannot be clearly    identified on ultrasound.   Two separate suspicious spiculated lesions of the left breast.  One lesion is felt to be seen well    on ultrasound and should be amenable to ultrasound-guided core biopsy.  The other lesion is seen    well on mammography. BI-RADS 4: Suspicious          Pathology:  01/17/2023, ADDENDUM:                   All 3 sites were biopsied are malignant. This is concordant.                                   Site A (top-hat shaped clip)                                   Site B (coil shaped clip)                                   Site C (stoplight shaped clip)   Slightly lateral left breast, stereotactic, 9 gauge vacuum assisted Brevera, Twelve specimens were obtained and a top-hat shaped clip was then placed. Site A: ultrasound biopsy table. 1.0 cm, 1:00 position 10 cm from the nipple. 12 gauge Bard.   Four samples were obtained and a coil shaped HydroMARK clip was placed, Site B. The post-procedure mammogram shows the top hat shaped clip from the stereotactic biopsy in good position in the biopsy cavity.  The coil shaped clip is in good position on the true lateral view, however on the CC view it does not correlate to the mammographic finding, which on today's exam is more at the 12 o'clock position in the posterior 1/3 of the left breast.  Therefore was decided to perform a 3rd biopsy.   9-gauge vacuum assisted Brevera, Twelve specimens, stoplight shaped clip, Site C, all 3 clips are appropriately positioned.     LEFT BREAST:     At 1:00 in the anterior left breast, 4 cm posterior to the nipple, there is a 1.5 AP dimension, 1.6 cm  transverse dimension, 1.3 cm craniocaudal dimension irregular enhancing mass with a focus of susceptibility from a biopsy clip along the inferior margin. The biopsy clip is located within hematoma along the lateral margin of the mass.  At 1:00 in the middle to posterior left breast, 7.4 cm posterior to the nipple, there is a 1.3 cm irregular enhancing mass clip along the inferior margin. At 12:00 in the posterior left breast, 8.5 cm posterior to the nipple, 2.2 cm craniocaudal dimension irregular enhancing mass with a biopsy clip.  Multiple, at least 12, similar appearing irregular enhancing masses and foci are identified throughout left breast.  At 11-12 o'clock in the middle and posterior left breast, there are 3 adjacent reference masses together measuring up to 5.5 cm in AP dimension.  At 1:00 in the far posterior left breast/axillary tail, 13.7 cm posterior to the nipple, there is a reference 1.3 cm AP dimension, 1.1 cm transverse dimension, 1.0 cm craniocaudal dimension, irregular enhancing mass, which is located approximately 0.8 cm from the anterolateral margin of the pectoralis muscle. Together, the enhancing masses span approximately 11.7 cm in maximum AP dimension, 8 cm in maximum transverse dimension, and 7.8 cm in maximum craniocaudal dimension. The visualized axilla is within normal limits   EXTRAMAMMARY FINDINGS:    There are no pathologically enlarged internal mammary chain lymph nodes on either side.   IMPRESSION AND RECOMMENDATION:   At least 15 similar-appearing irregular enhancing masses and foci scattered throughout the left breast, together measuring up to 11.7 cm consistent with multicentric malignancy   No MRI evidence of malignancy in the right breast      Pathology:  1. Left breast, anterior lateral, stereotactic-guided core biopsy:   - Invasive lobular carcinoma   - Histologic grade (Marie Histologic Score):   - Glandular (Acinar)/Tubular Differentiation: Score 3   - Nuclear  Pleomorphism: Score 2   - Mitotic Rate: Score 1   - Overall Grade: Grade 2   - Size of largest focus of invasion: 4 mm   - Breast biomarker testing:   - Estrogen Receptor (ER): Positive (95%, moderate)   - Progesterone Receptor (PgR): Positive (95%, strong)   - HER2 (by immunohistochemistry): Negative (Score 0)   - Ki-67: 5%   - Other findings:   - Atypical lobular hyperplasia (ALH)     2. Left breast, 1 o'clock position, and 10 cm from nipple, ultrasound-guided core biopsy:  - Invasive lobular carcinoma   - Histologic grade (Marie Histologic Score):   - Glandular (Acinar)/Tubular Differentiation: Score 3   - Nuclear Pleomorphism: Score 2   - Mitotic Rate: Score 1   - Overall Grade: Grade 2   - Size of largest focus of invasion: 4 mm  - Other findings:   - Lobular carcinoma in situ (LCIS), classic type     3. Left breast, upper, stereotactic-guided core biopsy:   - Invasive lobular carcinoma  - Histologic grade (Marie Histologic Score):   - Glandular (Acinar)/Tubular Differentiation: Score 3   - Nuclear Pleomorphism: Score 2   - Mitotic Rate: Score 1   - Overall Grade: Grade 2   - Size of largest focus of invasion: 3 mm   - Other findings:   - Lobular carcinoma in situ (LCIS), classic type     OUTSIDE PATHOLOGY:   1. Left Breast, Anterior Lateral, Stereotactic-Guided Core Needle Biopsy:                  A. INVASIVE LOBULAR CARCINOMA, Moderately differentiated; Marie Histologic Grade II/III                      (tubule score = 3, nuclear score = 2, mitoses score=1), measuring at least 4 mm.                B. Atypical lobular hyperplasia.   ER:       POSITIVE (%, Moderate).                MT:       POSITIVE (%, Strong).                HER2:  NEGATIVE (0).                Ki67:     Approximately 5%.             2. Left Breast, 1:00, 10 cm FN, U/S-Guided Core Needle Biopsy:                 A. INVASIVE LOBULAR CARCINOMA, Moderately differentiated; Marie Histologic Grade II/III                      (tubule score = 3, nuclear score = 2, mitoses score = 1), measuring at least 4 mm.                B. FOCAL LOBULAR CARCINOMA IN SITU (LCIS).                C. See comment.     3. Left Breast, Upper, Stereotactic-Guided Core Needle Biopsy:                  A. INVASIVE LOBULAR CARCINOMA, Moderately differentiated; Marie Histologic Grade II/III                     (tubule score = 3, nuclear score = 2, mitoses score = 1), measuring at least 3 mm.                B. LOBULAR CARCINOMA IN SITU (LCIS).                 C. See comment.     3-8-23 pathology from bilateral total mastectomy and LEFT sentinel node biopsy with reconstruction Dr Stone returned as :        Left total mastectomy, multifocal invasive lobular carcinoma, intermediate grade, 3, 2, 2, 3 clips and biopsy changes identified.  Dimensions of the tumors 6 cm, 2 cm, 2 mm, 1 mm.  Margins are all clear.  0 of 4 sentinel lymph nodes.  Pathologic stage T3N0 stage IIb.  Estrogen , progesterone , HER2/cheryl 2+ with FISH positive.            Final Diagnosis    1. Lymph Node, Left [place] #1, Excision (H&E, AE1/AE3):               A. Two benign lymph nodes (0/2).     2. Lymph Node, Left [place] #2, Excision (H&E, AE1/AE3):               A. One benign lymph node (0/1).     3. Lymph Node, Left [place] #3, Excision (H&E, AE1/AE3):               A. One benign lymph node with pigment (0/1).     4. Breast, Left, Mastectomy (1,377 Grams):               A. Multifocal invasive lobular carcinoma, Marie histologic grade II (tubules = 3, nuclei = 2, mitoses = 2).               B. Three clips and biopsy change identified.               C. See synoptic template for complete tumor details.     5. Breast, Right, Mastectomy (1,473 Grams):               A. Benign skin, subcutaneous tissue and breast parenchyma with fibroadenomatoid change.      [facility]/clm                Electronically signed by Radha Serrano MD on 3/9/2023 at 1122    Synoptic Checklist     INVASIVE CARCINOMA OF THE BREAST: Resection  8th Edition - Protocol posted: 12/14/2022  INVASIVE CARCINOMA OF THE BREAST: COMPLETE EXCISION - 1, 2, 3, 4       SPECIMEN   Procedure   Total mastectomy    Specimen Laterality   Left    TUMOR   Tumor Site   Upper outer quadrant        Upper inner quadrant    Histologic Type   Invasive lobular carcinoma    Histologic Grade ([place] Histologic Score)       Glandular (Acinar) / Tubular Differentiation   Score 3    Nuclear Pleomorphism   Score 2    Mitotic Rate   Score 2    Overall Grade   Grade 2 (scores of 6 or 7)    Tumor Size   Greatest dimension of largest invasive focus (Millimeters): 60 mm   Tumor Focality   Multiple foci of invasive carcinoma    Number of Foci   At least: 4    Sizes of Individual Foci in Millimeters (mm)   20 mm; 2 mm; 1mm    Ductal Carcinoma In Situ (DCIS)   Not identified    Lobular Carcinoma In Situ (LCIS)   Present    Lymphatic and / or Vascular Invasion   Not identified    Dermal Lymphovascular Invasion   Not identified    Treatment Effect in the Breast   No known presurgical therapy    MARGINS   Margin Status for Invasive Carcinoma   All margins negative for invasive carcinoma    Distance from Invasive Carcinoma to Closest Margin   10 mm   Closest Margin(s) to Invasive Carcinoma   Anterior    REGIONAL LYMPH NODES   Regional Lymph Node Status   All regional lymph nodes negative for tumor    Total Number of Lymph Nodes Examined (sentinel and non-sentinel)   4    Number of [place] Nodes Examined   4    pTNM CLASSIFICATION (AJCC 8th Edition)   Reporting of pT, pN, and (when applicable) pM categories is based on information available to the pathologist at the time the report is issued. As per the AJCC (Chapter 1, 8th Ed.) it is the managing physician’s responsibility to establish the final pathologic stage based upon all pertinent information, including but potentially not limited to this pathology report.   pT Category   pT3    T Suffix   (m)     pN Category   pN0    N Suffix   (sn)    SPECIAL STUDIES           Estrogen Receptor (ER) Status   Positive (greater than 10% of cells demonstrate nuclear positivity)    Percentage of Cells with Nuclear Positivity   %            Progesterone Receptor (PgR) Status   Positive    Percentage of Cells with Nuclear Positivity   %            HER2 (by immunohistochemistry)   Equivocal (Score 2+)            HER2 (by in situ hybridization)   Positive (amplified)            Ki-67 Percentage of Positive Nuclei   5 %   Testing Performed on Case Number   [id number]; see comment    .      Comment      The patient's original left breast core biopsy material was performed at [facility] ([id number]) and was reviewed in-house as [id number].  The slide is pulled and reviewed for comparison. No discordance is noted.     All three of the core biopsies are stained with biomarkers.  The left anterior lateral and left 1:00 o'clock core fragments were ER and SD positive, Her2 IHC negative. The left upper core biopsy was ER and SD positive with Her2 2+ equivocal by IHC and Her2 FISH positive.      Mec/kds                    Correspondence frmo Dr Frederick-  She has checked the her 2 cheryl on 2 other lesions and the third was triple positive. The second was triple negative.     She agreed with surgery upfront.    Procedures:      Assessment:   Diagnosis Plan   1. Lobular breast cancer, left (HCC)        2. Class 1 obesity due to excess calories without serious comorbidity with body mass index (BMI) of 32.0 to 32.9 in adult        1-  LEFT breast UOQ middle third 11.5 cm on MRI with 15 nodules total. Largest single nodule 2.2cm. NOrmal nodes on MRI and exam. On exam 6x8cm. No pverlying skin changes. On mammogram 3x markers  6cm greatest.  Invasive lobular carcinoma. Int grade, 3,2,1, associated LCIS.  ER , SD , her 2 cheryl 0, Ki 67 5 %.  Clinical stage T2-3N0- stage II  3-8-23 pathology from bilateral total  mastectomy and LEFT sentinel node biopsy with reconstruction Dr Stone returned as :        Left total mastectomy, multifocal invasive lobular carcinoma, intermediate grade, 3, 2, 2, 3 clips and biopsy changes identified.  Dimensions of the tumors 6 cm, 2 cm, 2 mm, 1 mm.  Margins are all clear.  0 of 4 sentinel lymph nodes.  Pathologic stage T3N0 stage IIb.  Estrogen , progesterone , HER2/cheryl 2+ .    - area of epidermolysis superior RIGHT mastectomy flap at postop  - oncotype sent and then cancelled due to FISH returning as positive on her surgical specimen      2-  BMI 32.3    3-  38DDD        Plan:  The patient goes by Ursula. She is a danyelle woman with 2 daughters ages 17,12 and happily  in her second marriage with a supportive .      We reviewed her pathology report, her genetics report, her examination together today.  She has an appointment with the  to discuss the variant of uncertain significance.  She has an appointment with Dr. Stone to get the last 2 of her 4 drains out this week.  I sent him a picture of the area of presumed epidermal lysis right superior mastectomy flap today to see if he had any intervention for this in terms of antibiotic.  She is seeing Dr. Frederick back on Friday the 31st and radiation oncology on April 14.  We did a consent for a right port placement today and discussed the risks of bleeding, infection, thrombosis, malfunction, pneumothorax.  We will see if Dr. Frederick request this.  Otherwise I will plan to see her back for routine follow-up in 6 to 9 months.  Exam only.        Previously, I arranged for her to see physical therapy for bioimpedence measurements.    Kell Hi MD    Next Appointment:  Return in about 8 months (around 11/20/2023) for no imaging; port placement if indicated.      EMR Dragon/transcription disclaimer:    Please note that portions of this note were completed with a voice recognition  program.                Answers for HPI/ROS submitted by the patient on 3/13/2023  Please describe your symptoms.: s/p bilateral mastectomy 3/8/23  Have you had these symptoms before?: No  How long have you been having these symptoms?: 1-2 weeks  What is the primary reason for your visit?: Other

## 2023-04-04 LAB
LAB AP CASE REPORT: NORMAL
PATH REPORT.FINAL DX SPEC: NORMAL
PATH REPORT.GROSS SPEC: NORMAL

## 2023-04-05 LAB
LAB AP CASE REPORT: NORMAL
LAB AP CLINICAL INFORMATION: NORMAL
LAB AP DIAGNOSIS COMMENT: NORMAL
LAB AP SYNOPTIC CHECKLIST: NORMAL
Lab: NORMAL
Lab: NORMAL
PATH REPORT.ADDENDUM SPEC: NORMAL
PATH REPORT.FINAL DX SPEC: NORMAL
PATH REPORT.GROSS SPEC: NORMAL

## 2023-04-06 ENCOUNTER — TELEPHONE (OUTPATIENT)
Dept: SURGERY | Facility: CLINIC | Age: 48
End: 2023-04-06
Payer: COMMERCIAL

## 2023-04-06 NOTE — TELEPHONE ENCOUNTER
Pt notified Right port placement is scheduled on 04/13/2023 arrive at 10:30.    PAT will be on 04/10/2023 per pt request at 8:00.    Suture removal with MA is scheduled on 4/27/2023 at 10:30.    Pt verbalized understanding.     CMA

## 2023-04-10 ENCOUNTER — HOSPITAL ENCOUNTER (OUTPATIENT)
Dept: CARDIOLOGY | Facility: HOSPITAL | Age: 48
Discharge: HOME | End: 2023-04-10
Payer: COMMERCIAL

## 2023-04-10 ENCOUNTER — HOSPITAL ENCOUNTER (OUTPATIENT)
Dept: CARDIOLOGY | Facility: HOSPITAL | Age: 48
Discharge: HOME | End: 2023-04-10
Admitting: INTERNAL MEDICINE
Payer: COMMERCIAL

## 2023-04-10 ENCOUNTER — TELEPHONE (OUTPATIENT)
Dept: CARDIOLOGY | Facility: CLINIC | Age: 48
End: 2023-04-10

## 2023-04-10 ENCOUNTER — PRE-ADMISSION TESTING (OUTPATIENT)
Dept: PREADMISSION TESTING | Facility: HOSPITAL | Age: 48
End: 2023-04-10
Payer: COMMERCIAL

## 2023-04-10 ENCOUNTER — HOSPITAL ENCOUNTER (OUTPATIENT)
Dept: PHYSICAL THERAPY | Facility: HOSPITAL | Age: 48
Setting detail: THERAPIES SERIES
Discharge: HOME | End: 2023-04-10
Payer: COMMERCIAL

## 2023-04-10 ENCOUNTER — OFFICE VISIT (OUTPATIENT)
Dept: CARDIOLOGY | Facility: CLINIC | Age: 48
End: 2023-04-10
Payer: COMMERCIAL

## 2023-04-10 VITALS
DIASTOLIC BLOOD PRESSURE: 70 MMHG | BODY MASS INDEX: 30.65 KG/M2 | HEART RATE: 81 BPM | HEIGHT: 63 IN | SYSTOLIC BLOOD PRESSURE: 110 MMHG | WEIGHT: 173 LBS

## 2023-04-10 VITALS
TEMPERATURE: 98.5 F | RESPIRATION RATE: 16 BRPM | DIASTOLIC BLOOD PRESSURE: 89 MMHG | BODY MASS INDEX: 30.74 KG/M2 | WEIGHT: 173.5 LBS | HEIGHT: 63 IN | SYSTOLIC BLOOD PRESSURE: 127 MMHG | HEART RATE: 76 BPM | OXYGEN SATURATION: 98 %

## 2023-04-10 DIAGNOSIS — Z51.81 ENCOUNTER FOR MONITORING CARDIOTOXIC DRUG THERAPY: ICD-10-CM

## 2023-04-10 DIAGNOSIS — E78.5 MILD HYPERLIPIDEMIA: ICD-10-CM

## 2023-04-10 DIAGNOSIS — Z91.89 AT RISK FOR LYMPHEDEMA: Primary | ICD-10-CM

## 2023-04-10 DIAGNOSIS — C50.912 BREAST CARCINOMA, LOBULAR, LEFT: ICD-10-CM

## 2023-04-10 DIAGNOSIS — Z79.899 ENCOUNTER FOR MONITORING CARDIOTOXIC DRUG THERAPY: ICD-10-CM

## 2023-04-10 DIAGNOSIS — R03.0 ELEVATED BLOOD PRESSURE READING WITHOUT DIAGNOSIS OF HYPERTENSION: ICD-10-CM

## 2023-04-10 DIAGNOSIS — Z51.81 ENCOUNTER FOR MONITORING CARDIOTOXIC DRUG THERAPY: Primary | ICD-10-CM

## 2023-04-10 DIAGNOSIS — Z90.13 S/P BILATERAL MASTECTOMY: ICD-10-CM

## 2023-04-10 DIAGNOSIS — Z79.899 ENCOUNTER FOR MONITORING CARDIOTOXIC DRUG THERAPY: Primary | ICD-10-CM

## 2023-04-10 LAB
AORTIC ARCH: 2 CM
ASCENDING AORTA: 3.1 CM
BH CV ECHO LEFT VENTRICLE GLOBAL LONGITUDINAL STRAIN: -21 %
BH CV ECHO MEAS - ACS: 2.06 CM
BH CV ECHO MEAS - AO MAX PG: 4.6 MMHG
BH CV ECHO MEAS - AO MEAN PG: 2.7 MMHG
BH CV ECHO MEAS - AO ROOT DIAM: 3 CM
BH CV ECHO MEAS - AO V2 MAX: 107 CM/SEC
BH CV ECHO MEAS - AO V2 VTI: 16.9 CM
BH CV ECHO MEAS - AVA(I,D): 2.8 CM2
BH CV ECHO MEAS - EDV(CUBED): 60.6 ML
BH CV ECHO MEAS - EDV(MOD-SP2): 48 ML
BH CV ECHO MEAS - EDV(MOD-SP4): 64 ML
BH CV ECHO MEAS - EF(MOD-BP): 64.7 %
BH CV ECHO MEAS - EF(MOD-SP2): 64.6 %
BH CV ECHO MEAS - EF(MOD-SP4): 65.6 %
BH CV ECHO MEAS - ESV(CUBED): 11.1 ML
BH CV ECHO MEAS - ESV(MOD-SP2): 17 ML
BH CV ECHO MEAS - ESV(MOD-SP4): 22 ML
BH CV ECHO MEAS - FS: 43.3 %
BH CV ECHO MEAS - IVS/LVPW: 0.91 CM
BH CV ECHO MEAS - IVSD: 0.94 CM
BH CV ECHO MEAS - LAT PEAK E' VEL: 10.2 CM/SEC
BH CV ECHO MEAS - LV DIASTOLIC VOL/BSA (35-75): 35.2 CM2
BH CV ECHO MEAS - LV MASS(C)D: 121.7 GRAMS
BH CV ECHO MEAS - LV MAX PG: 4.7 MMHG
BH CV ECHO MEAS - LV MEAN PG: 2.37 MMHG
BH CV ECHO MEAS - LV SYSTOLIC VOL/BSA (12-30): 12.1 CM2
BH CV ECHO MEAS - LV V1 MAX: 108 CM/SEC
BH CV ECHO MEAS - LV V1 VTI: 16.8 CM
BH CV ECHO MEAS - LVIDD: 3.9 CM
BH CV ECHO MEAS - LVIDS: 2.23 CM
BH CV ECHO MEAS - LVOT AREA: 2.8 CM2
BH CV ECHO MEAS - LVOT DIAM: 1.88 CM
BH CV ECHO MEAS - LVPWD: 1.04 CM
BH CV ECHO MEAS - MED PEAK E' VEL: 8.9 CM/SEC
BH CV ECHO MEAS - MV A DUR: 0.1 SEC
BH CV ECHO MEAS - MV A MAX VEL: 47.6 CM/SEC
BH CV ECHO MEAS - MV DEC SLOPE: 314.1 CM/SEC2
BH CV ECHO MEAS - MV DEC TIME: 0.21 MSEC
BH CV ECHO MEAS - MV E MAX VEL: 45.8 CM/SEC
BH CV ECHO MEAS - MV E/A: 0.96
BH CV ECHO MEAS - MV MAX PG: 1.5 MMHG
BH CV ECHO MEAS - MV MEAN PG: 0.66 MMHG
BH CV ECHO MEAS - MV P1/2T: 57.9 MSEC
BH CV ECHO MEAS - MV V2 VTI: 18.5 CM
BH CV ECHO MEAS - MVA(P1/2T): 3.8 CM2
BH CV ECHO MEAS - MVA(VTI): 2.5 CM2
BH CV ECHO MEAS - PA ACC TIME: 0.12 SEC
BH CV ECHO MEAS - PA PR(ACCEL): 25.5 MMHG
BH CV ECHO MEAS - PA V2 MAX: 93.1 CM/SEC
BH CV ECHO MEAS - PULM A REVS DUR: 0.11 SEC
BH CV ECHO MEAS - PULM A REVS VEL: 33.4 CM/SEC
BH CV ECHO MEAS - PULM DIAS VEL: 42.4 CM/SEC
BH CV ECHO MEAS - PULM S/D: 0.97
BH CV ECHO MEAS - PULM SYS VEL: 41.1 CM/SEC
BH CV ECHO MEAS - QP/QS: 0.65
BH CV ECHO MEAS - RV MAX PG: 2.1 MMHG
BH CV ECHO MEAS - RV V1 MAX: 72.4 CM/SEC
BH CV ECHO MEAS - RV V1 VTI: 12.3 CM
BH CV ECHO MEAS - RVOT DIAM: 1.78 CM
BH CV ECHO MEAS - SI(MOD-SP2): 17 ML/M2
BH CV ECHO MEAS - SI(MOD-SP4): 23.1 ML/M2
BH CV ECHO MEAS - SUP REN AO DIAM: 1.8 CM
BH CV ECHO MEAS - SV(LVOT): 46.7 ML
BH CV ECHO MEAS - SV(MOD-SP2): 31 ML
BH CV ECHO MEAS - SV(MOD-SP4): 42 ML
BH CV ECHO MEAS - SV(RVOT): 30.6 ML
BH CV ECHO MEAS - TAPSE (>1.6): 2 CM
BH CV ECHO MEAS - TR MAX PG: 9.3 MMHG
BH CV ECHO MEAS - TR MAX VEL: 152.8 CM/SEC
BH CV ECHO MEASUREMENTS AVERAGE E/E' RATIO: 4.8
BH CV XLRA - RV BASE: 2.9 CM
BH CV XLRA - RV LENGTH: 6.4 CM
BH CV XLRA - RV MID: 2.6 CM
BH CV XLRA - TDI S': 13.8 CM/SEC
LEFT ATRIUM VOLUME INDEX: 20.8 ML/M2
MAXIMAL PREDICTED HEART RATE: 175 BPM
NT-PROBNP SERPL-MCNC: 16.3 PG/ML (ref 0–450)
SINUS: 3.2 CM
STJ: 3 CM
STRESS TARGET HR: 149 BPM
TROPONIN T SERPL HS-MCNC: 7 NG/L

## 2023-04-10 PROCEDURE — 83880 ASSAY OF NATRIURETIC PEPTIDE: CPT | Performed by: INTERNAL MEDICINE

## 2023-04-10 PROCEDURE — 36415 COLL VENOUS BLD VENIPUNCTURE: CPT

## 2023-04-10 PROCEDURE — 93306 TTE W/DOPPLER COMPLETE: CPT | Performed by: INTERNAL MEDICINE

## 2023-04-10 PROCEDURE — 97164 PT RE-EVAL EST PLAN CARE: CPT

## 2023-04-10 PROCEDURE — 93306 TTE W/DOPPLER COMPLETE: CPT

## 2023-04-10 PROCEDURE — 97535 SELF CARE MNGMENT TRAINING: CPT

## 2023-04-10 PROCEDURE — 25510000001 PERFLUTREN (DEFINITY) 8.476 MG IN SODIUM CHLORIDE (PF) 0.9 % 10 ML INJECTION: Performed by: INTERNAL MEDICINE

## 2023-04-10 PROCEDURE — 84484 ASSAY OF TROPONIN QUANT: CPT | Performed by: INTERNAL MEDICINE

## 2023-04-10 RX ADMIN — PERFLUTREN 1.5 ML: 6.52 INJECTION, SUSPENSION INTRAVENOUS at 12:45

## 2023-04-10 NOTE — THERAPY RE-EVALUATION
Physical Therapy Lymphedema Re-Evaluation  [facility]     Patient Name: Ursula Kulkarni  : 1975  MRN: [id number]  Today's Date: 4/10/2023      Visit Date: 04/10/2023    Visit Dx:    ICD-10-CM ICD-9-CM   1. At risk for lymphedema  Z91.89 V49.89   2. S/P bilateral mastectomy  Z90.13 V45.71   3. Breast carcinoma, lobular, left  C50.912 174.9       Patient Active Problem List   Diagnosis   • Hypothyroidism   • Anxiety   • Mild hyperlipidemia   • Heartburn   • Elevated blood pressure reading without diagnosis of hypertension   • Thyroid nodule   • Allergic rhinitis   • COVID-19 virus infection   • Non-recurrent acute suppurative otitis media of right ear without spontaneous rupture of tympanic membrane   • Rash   • Pharyngeal dysphagia   • Indigestion   • Fatigue   • Abnormal mammogram of left breast   • Malignant neoplasm of left breast in female, estrogen receptor positive   • Breast carcinoma, lobular, left   • High risk medication use        Past Medical History:   Diagnosis Date   • ADD (attention deficit disorder)    • Bilateral impacted cerumen 2020   • Breast cancer    • Foot fracture, left    • History of COVID-19    • History of gestational diabetes    • History of Graves' disease    • History of radioactive iodine thyroid ablation    • History of vertigo    • Hyperlipidemia    • Hypothyroidism    • Melanoma of back    • Positive depression screening 2020        Past Surgical History:   Procedure Laterality Date   • BREAST RECONSTRUCTION Bilateral 3/8/2023    Procedure: BILATERAL PLACEMENT  TISSUE EXPANDER AND ALLODERM;  Surgeon: aEmon Stone MD;  Location: [facility];  Service: Plastics;  Laterality: Bilateral;   • D & C CERVICAL BIOPSY  2006   • INTRAUTERINE DEVICE INSERTION      Mirena   • INTRAUTERINE DEVICE INSERTION      Mirena exchange, old IUD removal and new one inserted by Dr Carr, Lot # [id number] exp .mar   • MASTECTOMY W/  SENTINEL NODE BIOPSY Bilateral 3/8/2023    Procedure: Bilateral total mastectomy, left axillary sentinel lymph node biopsy;  Surgeon: Kell Hi MD;  Location: [facility] OR;  Service: General;  Laterality: Bilateral;   • SCAR REVISION BREAST Right 4/3/2023    Procedure: RIGHT MASTECTOMY REVISION;  Surgeon: Eamon Stone MD;  Location: [facility] OR;  Service: Plastics;  Laterality: Right;   • WISDOM TOOTH EXTRACTION         Visit Dx:    ICD-10-CM ICD-9-CM   1. At risk for lymphedema  Z91.89 V49.89   2. S/P bilateral mastectomy  Z90.13 V45.71   3. Breast carcinoma, lobular, left  C50.912 174.9        Patient History     Row Name 04/10/23 0700             History    Chief Complaint Other 1 (comment)  none  -LB      Date Current Problem(s) Began 03/08/23  -LB      Brief Description of Current Complaint Pt now s/p B mastectomy, L SLNB (0/4 LNs removed), B expander placement, R re-excision 4/3/23. She currently has R drain intact but sees plastic surgery today. She states good recovery from surgery and she has resumed housework but is trying to reduce UE movement above her shoulders. She is R handed. She will have port placed Thursday and begin chemotherapy 4/20/23. She will have radiation following chemotherapy. She has obtained compression sleeve but has not tried on. She plans to return to work from home week of chemotherapy and then in office next 2 weeks through chemo therapy treatment.  -LB      Previous treatment for THIS PROBLEM Surgery  -LB      Surgery Date: 03/08/23  re-excision R 4/3/23  -LB      Patient/Caregiver Goals Return to prior level of function;Know what to do to help the symptoms  -LB      Hand Dominance right-handed  -LB      Occupation/sports/leisure activities Assisting with in office procedures,  computer work, cooking/ cleaning  -LB      Patient seeing anyone else for problem(s)? yes  -LB      How has patient tried to help current problem? avoiding UE movement above  shoulder height  -LB      History of Previous Related Injuries none  -LB         Pain     Pain at Present 0  -LB      Pain at Best 0  -LB      Pain at Worst 0  -LB      Is your sleep disturbed? No  -LB      Difficulties at work? working from home  -LB      Difficulties with ADL's? able to perform with modifications  -LB      Difficulties with recreational activities? has not resumed  -LB         Fall Risk Assessment    Any falls in the past year: Yes  -LB      Number of falls reported in the last 12 months 1  -LB      Factors that contributed to the fall: Slippery surface  -LB      Does patient have a fear of falling No  -LB         Services    Prior Rehab/Home Health Experiences No  -LB      Are you currently receiving Home Health services No  -LB      Do you plan to receive Home Health services in the near future No  -LB         Daily Activities    Primary Language English  -LB      Pt Participated in POC and Goals Yes  -LB         Safety    Are you being hurt, hit, or frightened by anyone at home or in your life? No  -LB      Are you being neglected by a caregiver No  -LB      Have you had any of the following issues with N/A  -LB            User Key  (r) = Recorded By, (t) = Taken By, (c) = Cosigned By    Initials Name Provider Type    LB Rocio Daniels PT Physical Therapist                 Lymphedema     Row Name 04/10/23 0700             Subjective Pain    Able to rate subjective pain? yes  -LB      Pre-Treatment Pain Level 0  -LB         Subjective Comments    Subjective Comments I am doing well. i see plastic surgeon today. I have my drain on the R side still.  -LB         Lymphedema Assessment    Lymphedema Classification LUE:;at risk/stage 0  -LB      Lymphedema Cancer Related Sx bilateral;modified radical mastectomy  -LB      Lymphedema Surgery Comments 3/8/23; re-excision 4/3/23  -LB      Lymph Nodes Removed # 4  -LB      Positive Lymph Nodes # 0  -LB      Chemo Received yes  -LB      Chemo Treatments  #/Timeframe upcoming; port placement 4/13/23  -LB      Radiation Therapy Received yes  -LB      Radiation Treatments #/Timeframe upcoming  -LB      Infections or Cellulitis? no  -LB         Posture/Observations    Posture/Observations Comments forward head, rounded shoulders  -LB         General ROM    GENERAL ROM COMMENTS LUE 90 deg flexion, held RUE due to drain intact  -LB         MMT (Manual Muscle Testing)    General MMT Comments dec BUE strength; held formal testing due to drain intact  -LB         Lymphedema Edema Assessment    Edema Assessment Comment no obvious edema  -LB         Skin Changes/Observations    Skin Observations Comment well healing incisions  -LB         Lymphedema Sensation    Lymphedema Sensation Comments dec sensation B breasts  -LB         Lymphedema Pulses/Capillary Refill    Lymph Pulses Capillary Refill Comments intact  -LB         Compression/Skin Care    Compression/Skin Care Comments pt has compression sleeve, will try on once drains removed  -LB         L-Dex Bioimpedence Screening    L-Dex Measurement Extremity --  held due to drain intact; will perform Friday  -LB            User Key  (r) = Recorded By, (t) = Taken By, (c) = Cosigned By    Initials Name Provider Type    Rocio Biggs PT Physical Therapist                                Therapy Education  Education Details: issued HEP to begin once cleared by plastic surgeon, reviewed s/s of lymphedema, discussed slow return to household tasks, exercise, reviewed lymphedema precautions  Given: Symptoms/condition management, Edema management, HEP, Mobility training  Program: Reinforced  How Provided: Verbal  Provided to: Patient  Level of Understanding: Verbalized  88516 - PT Self Care/Mgmt Minutes: 15       OP Exercises     Row Name 04/10/23 0700             Subjective Comments    Subjective Comments I am doing well. i see plastic surgeon today. I have my drain on the R side still.  -LB         Subjective Pain    Able to rate  subjective pain? yes  -LB      Pre-Treatment Pain Level 0  -LB         Exercise 1    Exercise Name 1 issued HEP to begin once cleared by plastic surgeon  -LB            User Key  (r) = Recorded By, (t) = Taken By, (c) = Cosigned By    Initials Name Provider Type    Rocio Biggs, PT Physical Therapist                             PT OP Goals     Row Name 04/10/23 0700          Long Term Goals    LTG Date to Achieve 03/11/23  -LB     LTG 1 Pt will maintain LDex bioimpedance WNL.  -LB     LTG 1 Progress Ongoing  -LB     LTG 1 Progress Comments will repeat once drain removed  -LB     LTG 2 Pt will demonstrate full AROM BUE post-operatively.  -LB     LTG 2 Progress Ongoing  -LB     LTG 2 Progress Comments will formally reassess once drains are removed  -LB     LTG 3 Pt will acquire appropriately fitted compression garment and verbalize appropriate wear schedule.  -LB     LTG 3 Progress Partially Met  -LB     LTG 3 Progress Comments pt has acquired, will try on once drain removed  -LB           User Key  (r) = Recorded By, (t) = Taken By, (c) = Cosigned By    Initials Name Provider Type    Rocio Biggs, PT Physical Therapist                 PT Assessment/Plan     Row Name 04/10/23 0737          PT Assessment    Functional Limitations Other (comment);Limitations in functional capacity and performance;Limitations in community activities;Performance in work activities;Performance in self-care ADL;Performance in leisure activities  -LB     Impairments Impaired flexibility;Impaired lymphatic circulation;Muscle strength;Range of motion;Sensation  -LB     Assessment Comments Pt returns for re-evaluation. She is now s/p B mastectomy, L SLNB with 0/4 LNs removed, B expander 3/8/23, re-excision 4/3/23 R with R drain remaining intact today. Pt states she is avoiding UE movement above her shoulder but has resumed household tasks such as vacuuming and low level dishes. She is tolerating well. She will have port placed this week  and begin chemotherapy 4/20/23. She will have radiation following chemotherapy. She states pain levels are low and she is working from home. We held on formal ROM, strength, bioimpedance testing today due to drains intact. We will resassess at next visit. Pt has partially met 1/3 LTGs. She remains appropriate for continued skilled PT services to address LUE ROM deficits, monitor bioimpedance, and address deficits as needed.  -LB     Rehab Potential Good  -LB     Patient/caregiver participated in establishment of treatment plan and goals Yes  -LB     Patient would benefit from skilled therapy intervention Yes  -LB        PT Plan    PT Frequency Other (comment)  -LB     Predicted Duration of Therapy Intervention (PT) 4-6 visits  -LB     Planned CPT's? PT EVAL LOW COMPLEXITY: 29141;PT RE-EVAL: 68560;PT THER PROC EA 15 MIN: 46943;PT THER ACT EA 15 MIN: 73275;PT MANUAL THERAPY EA 15 MIN: 76242;PT SELF CARE/HOME MGMT/TRAIN EA 15: 94019;PT BIS XTRACELL FLUID ANALYSIS: 23104  -LB     PT Plan Comments repeat bioimpedance once drain is removed, compression garment fit, assess UE ROM, review radiation instructions for sleeve use at July appt.  -LB           User Key  (r) = Recorded By, (t) = Taken By, (c) = Cosigned By    Initials Name Provider Type    Rocio Biggs PT Physical Therapist                             Time Calculation:   Start Time: 0700  Stop Time: 0745  Time Calculation (min): 45 min  Timed Charges  50017 - PT Self Care/Mgmt Minutes: 15  Total Minutes  Timed Charges Total Minutes: 15   Total Minutes: 15   Therapy Charges for Today     Code Description Service Date Service Provider Modifiers Qty    55426034506  PT SELF CARE/MGMT/TRAIN EA 15 MIN 4/10/2023 Rocio Daniels, PT GP 1    [id number] HC PT RE-EVAL ESTABLISHED PLAN 2 4/10/2023 Rocio Daniels, PT GP 1                    Rocio Daniels PT  4/10/2023

## 2023-04-10 NOTE — DISCHARGE INSTRUCTIONS
Take the following medications the morning of surgery:    SYNTHROID, PRILOSEC    If you are on prescription narcotic pain medication to control your pain you may also take that medication the morning of surgery.    General Instructions:  Do not eat solid food after midnight the night before surgery.  You may drink clear liquids day of surgery but must stop at least one hour before your hospital arrival time.  It is beneficial for you to have a clear drink that contains carbohydrates the day of surgery.  We suggest a 12 to 20 ounce bottle of Gatorade or Powerade for non-diabetic patients     Clear liquids are liquids you can see through.  Nothing red in color.     Plain water                               Sports drinks  Sodas                                   Gelatin (Jell-O)  Fruit juices without pulp such as white grape juice and apple juice  Popsicles that contain no fruit or yogurt  Tea or coffee (no cream or milk added)  Gatorade / Powerade  G2 / Powerade Zero      Bring any papers given to you in the doctor’s office.  Wear clean comfortable clothes.  Do not wear contact lenses, false eyelashes or make-up.  Bring a case for your glasses.   Remove all piercings.  Leave jewelry and any other valuables at home.  Hair extensions with metal clips must be removed prior to surgery.  The Pre-Admission Testing nurse will instruct you to bring medications if unable to obtain an accurate list in Pre-Admission Testing.   REPORT TO SURGERY ENTRANCE ON  4- AT 1030 AM         Preventing a Surgical Site Infection:  For 2 to 3 days before surgery, avoid shaving with a razor because the razor can irritate skin and make it easier to develop an infection.    Any areas of open skin can increase the risk of a post-operative wound infection by allowing bacteria to enter and travel throughout the body.  Notify your surgeon if you have any skin wounds / rashes even if it is not near the expected surgical site.  The area will need  assessed to determine if surgery should be delayed until it is healed.  The night prior to surgery shower using a fresh bar of anti-bacterial soap (such as Dial) and clean washcloth.  Sleep in a clean bed with clean clothing.  Do not allow pets to sleep with you.  Shower on the morning of surgery using a fresh bar of anti-bacterial soap (such as Dial) and clean washcloth.  Dry with a clean towel and dress in clean clothing.  Ask your surgeon if you will be receiving antibiotics prior to surgery.  Make sure you, your family, and all healthcare providers clean their hands with soap and water or an alcohol based hand  before caring for you or your wound.    Day of surgery:  Your arrival time is approximately two hours before your scheduled surgery time.  Upon arrival, a Pre-op nurse and Anesthesiologist will review your health history, obtain vital signs, and answer questions you may have.  The only belongings needed at this time will be a list of your home medications and if applicable your C-PAP/BI-PAP machine.  A Pre-op nurse will start an IV and you may receive medication in preparation for surgery, including something to help you relax.     Please be aware that surgery does come with discomfort.  We want to make every effort to control your discomfort so please discuss any uncontrolled symptoms with your nurse.   Your doctor will most likely have prescribed pain medications.      If you are going home after surgery you will receive individualized written care instructions before being discharged.  A responsible adult must drive you to and from the hospital on the day of your surgery and stay with you for 24 hours.  Discharge prescriptions can be filled by the hospital pharmacy during regular pharmacy hours.  If you are having surgery late in the day/evening your prescription may be e-prescribed to your pharmacy.  Please verify your pharmacy hours or chose a 24 hour pharmacy to avoid not having access to  your prescription because your pharmacy has closed for the day      CHLORHEXIDINE CLOTH INSTRUCTIONS  The morning of surgery follow these instructions using the Chlorhexidine cloths you've been given.  These steps reduce bacteria on the body.  Do not use the cloths near your eyes, ears mouth, genitalia or on open wounds.  Throw the cloths away after use but do not try to flush them down a toilet.      Open and remove one cloth at a time from the package.    Leave the cloth unfolded and begin the bathing.  Massage the skin with the cloths using gentle pressure to remove bacteria.  Do not scrub harshly.   Follow the steps below with one 2% CHG cloth per area (6 total cloths).  One cloth for neck, shoulders and chest.  One cloth for both arms, hands, fingers and underarms (do underarms last).  One cloth for the abdomen followed by groin.  One cloth for right leg and foot including between the toes.  One cloth for left leg and foot including between the toes.  The last cloth is to be used for the back of the neck, back and buttocks.    Allow the CHG to air dry 3 minutes on the skin which will give it time to work and decrease the chance of irritation.  The skin may feel sticky until it is dry.  Do not rinse with water or any other liquid or you will lose the beneficial effects of the CHG.  If mild skin irritation occurs, do rinse the skin to remove the CHG.  Report this to the nurse at time of admission.  Do not apply lotions, creams, ointments, deodorants or perfumes after using the clothes. Dress in clean clothes before coming to the hospital.     If you have any questions please call Pre-Admission Testing at [phone].  Deductibles and co-payments are collected on the day of service. Please be prepared to pay the required co-pay, deductible or deposit on the day of service as defined by your plan.    Call your surgeon immediately if you experience any of the following symptoms:  Sore Throat  Shortness of Breath  or difficulty breathing  Cough  Chills  Body soreness or muscle pain  Headache  Fever  New loss of taste or smell  Do not arrive for your surgery ill.  Your procedure will need to be rescheduled to another time.  You will need to call your physician before the day of surgery to avoid any unnecessary exposure to hospital staff as well as other patients.

## 2023-04-11 NOTE — TELEPHONE ENCOUNTER
Please let patient know troponin proBNP normal echo was normal.  Have her track her blood pressure the next week and if systolic pressures greater than 110 mmHg will add low-dose lisinopril 2.5 mg a day week later.

## 2023-04-11 NOTE — TELEPHONE ENCOUNTER
Reviewed results and recommendations with Ursula Kulkarni.  Patient verbalized understanding of results and recommendations.    Patient stated she has to purchase a BP machine, but plans to do it today/tomorrow.  Requested patient keep BP/HR log over the next week (twice daily, around the same time each day) and provide to office via MyChart or phone.  Patient stated she will do this.    Please let me know if there is anything else you would like me to do for this patient.    Thank you,  Italia CABRAL RN  Triage Nurse [facility] [place]

## 2023-04-13 ENCOUNTER — HOSPITAL ENCOUNTER (OUTPATIENT)
Facility: HOSPITAL | Age: 48
Setting detail: HOSPITAL OUTPATIENT SURGERY
Discharge: HOME | End: 2023-04-13
Attending: SURGERY | Admitting: SURGERY
Payer: COMMERCIAL

## 2023-04-13 ENCOUNTER — ANESTHESIA EVENT (OUTPATIENT)
Dept: PERIOP | Facility: HOSPITAL | Age: 48
End: 2023-04-13
Payer: COMMERCIAL

## 2023-04-13 ENCOUNTER — ANESTHESIA (OUTPATIENT)
Dept: PERIOP | Facility: HOSPITAL | Age: 48
End: 2023-04-13
Payer: COMMERCIAL

## 2023-04-13 ENCOUNTER — APPOINTMENT (OUTPATIENT)
Dept: GENERAL RADIOLOGY | Facility: HOSPITAL | Age: 48
End: 2023-04-13
Payer: COMMERCIAL

## 2023-04-13 VITALS
DIASTOLIC BLOOD PRESSURE: 74 MMHG | BODY MASS INDEX: 30.66 KG/M2 | SYSTOLIC BLOOD PRESSURE: 132 MMHG | WEIGHT: 173.06 LBS | HEART RATE: 70 BPM | RESPIRATION RATE: 16 BRPM | HEIGHT: 63 IN | TEMPERATURE: 97.7 F | OXYGEN SATURATION: 95 %

## 2023-04-13 DIAGNOSIS — C50.412 MALIGNANT NEOPLASM OF UPPER-OUTER QUADRANT OF LEFT FEMALE BREAST, UNSPECIFIED ESTROGEN RECEPTOR STATUS: ICD-10-CM

## 2023-04-13 PROCEDURE — 25010000002 ONDANSETRON PER 1 MG: Performed by: NURSE ANESTHETIST, CERTIFIED REGISTERED

## 2023-04-13 PROCEDURE — 25010000002 HEPARIN (PORCINE) PER 1000 UNITS: Performed by: SURGERY

## 2023-04-13 PROCEDURE — 25010000002 MIDAZOLAM PER 1 MG: Performed by: ANESTHESIOLOGY

## 2023-04-13 PROCEDURE — 25010000002 FENTANYL CITRATE (PF) 50 MCG/ML SOLUTION: Performed by: NURSE ANESTHETIST, CERTIFIED REGISTERED

## 2023-04-13 PROCEDURE — 77001 FLUOROGUIDE FOR VEIN DEVICE: CPT | Performed by: SURGERY

## 2023-04-13 PROCEDURE — 76000 FLUOROSCOPY <1 HR PHYS/QHP: CPT

## 2023-04-13 PROCEDURE — C1788 PORT, INDWELLING, IMP: HCPCS | Performed by: SURGERY

## 2023-04-13 PROCEDURE — 25010000002 PROPOFOL 10 MG/ML EMULSION: Performed by: NURSE ANESTHETIST, CERTIFIED REGISTERED

## 2023-04-13 PROCEDURE — 25010000002 DEXAMETHASONE SODIUM PHOSPHATE 20 MG/5ML SOLUTION: Performed by: NURSE ANESTHETIST, CERTIFIED REGISTERED

## 2023-04-13 PROCEDURE — 0 LIDOCAINE 1 % SOLUTION 20 ML VIAL: Performed by: SURGERY

## 2023-04-13 PROCEDURE — 36561 INSERT TUNNELED CV CATH: CPT | Performed by: SURGERY

## 2023-04-13 PROCEDURE — 81025 URINE PREGNANCY TEST: CPT | Performed by: ANESTHESIOLOGY

## 2023-04-13 DEVICE — POWERPORT M.R.I. IMPLANTABLE PORT WITH ATTACHABLE 8F CHRONOFLEX OPEN-ENDED SINGLE-LUMEN VENOUS CATHETER INTERMEDIATE KIT (WITH SUTURE PLUGS)
Type: IMPLANTABLE DEVICE | Site: SUBCLAVIAN | Status: FUNCTIONAL
Brand: POWERPORT M.R.I., CHRONOFLEX

## 2023-04-13 RX ORDER — HYDROCODONE BITARTRATE AND ACETAMINOPHEN 7.5; 325 MG/1; MG/1
1 TABLET ORAL EVERY 4 HOURS PRN
Status: DISCONTINUED | OUTPATIENT
Start: 2023-04-13 | End: 2023-04-13 | Stop reason: HOSPADM

## 2023-04-13 RX ORDER — HYDROMORPHONE HYDROCHLORIDE 1 MG/ML
0.25 INJECTION, SOLUTION INTRAMUSCULAR; INTRAVENOUS; SUBCUTANEOUS
Status: DISCONTINUED | OUTPATIENT
Start: 2023-04-13 | End: 2023-04-13 | Stop reason: HOSPADM

## 2023-04-13 RX ORDER — SODIUM CHLORIDE 0.9 % (FLUSH) 0.9 %
3 SYRINGE (ML) INJECTION EVERY 12 HOURS SCHEDULED
Status: DISCONTINUED | OUTPATIENT
Start: 2023-04-13 | End: 2023-04-13 | Stop reason: HOSPADM

## 2023-04-13 RX ORDER — ONDANSETRON 2 MG/ML
4 INJECTION INTRAMUSCULAR; INTRAVENOUS ONCE AS NEEDED
Status: COMPLETED | OUTPATIENT
Start: 2023-04-13 | End: 2023-04-13

## 2023-04-13 RX ORDER — ONDANSETRON 2 MG/ML
INJECTION INTRAMUSCULAR; INTRAVENOUS AS NEEDED
Status: DISCONTINUED | OUTPATIENT
Start: 2023-04-13 | End: 2023-04-13 | Stop reason: SURG

## 2023-04-13 RX ORDER — CLINDAMYCIN PHOSPHATE 900 MG/50ML
900 INJECTION INTRAVENOUS ONCE
Status: COMPLETED | OUTPATIENT
Start: 2023-04-13 | End: 2023-04-13

## 2023-04-13 RX ORDER — MIDAZOLAM HYDROCHLORIDE 1 MG/ML
1 INJECTION INTRAMUSCULAR; INTRAVENOUS
Status: COMPLETED | OUTPATIENT
Start: 2023-04-13 | End: 2023-04-13

## 2023-04-13 RX ORDER — FLUMAZENIL 0.1 MG/ML
0.2 INJECTION INTRAVENOUS AS NEEDED
Status: DISCONTINUED | OUTPATIENT
Start: 2023-04-13 | End: 2023-04-13 | Stop reason: HOSPADM

## 2023-04-13 RX ORDER — FAMOTIDINE 10 MG/ML
20 INJECTION, SOLUTION INTRAVENOUS ONCE
Status: COMPLETED | OUTPATIENT
Start: 2023-04-13 | End: 2023-04-13

## 2023-04-13 RX ORDER — DROPERIDOL 2.5 MG/ML
0.62 INJECTION, SOLUTION INTRAMUSCULAR; INTRAVENOUS
Status: DISCONTINUED | OUTPATIENT
Start: 2023-04-13 | End: 2023-04-13 | Stop reason: HOSPADM

## 2023-04-13 RX ORDER — EPHEDRINE SULFATE 50 MG/ML
5 INJECTION, SOLUTION INTRAVENOUS ONCE AS NEEDED
Status: DISCONTINUED | OUTPATIENT
Start: 2023-04-13 | End: 2023-04-13 | Stop reason: HOSPADM

## 2023-04-13 RX ORDER — LIDOCAINE HYDROCHLORIDE 20 MG/ML
INJECTION, SOLUTION INFILTRATION; PERINEURAL AS NEEDED
Status: DISCONTINUED | OUTPATIENT
Start: 2023-04-13 | End: 2023-04-13 | Stop reason: SURG

## 2023-04-13 RX ORDER — FENTANYL CITRATE 50 UG/ML
50 INJECTION, SOLUTION INTRAMUSCULAR; INTRAVENOUS
Status: DISCONTINUED | OUTPATIENT
Start: 2023-04-13 | End: 2023-04-13 | Stop reason: HOSPADM

## 2023-04-13 RX ORDER — DEXAMETHASONE SODIUM PHOSPHATE 4 MG/ML
INJECTION, SOLUTION INTRA-ARTICULAR; INTRALESIONAL; INTRAMUSCULAR; INTRAVENOUS; SOFT TISSUE AS NEEDED
Status: DISCONTINUED | OUTPATIENT
Start: 2023-04-13 | End: 2023-04-13 | Stop reason: SURG

## 2023-04-13 RX ORDER — NALOXONE HCL 0.4 MG/ML
0.2 VIAL (ML) INJECTION AS NEEDED
Status: DISCONTINUED | OUTPATIENT
Start: 2023-04-13 | End: 2023-04-13 | Stop reason: HOSPADM

## 2023-04-13 RX ORDER — LIDOCAINE HYDROCHLORIDE 10 MG/ML
0.5 INJECTION, SOLUTION EPIDURAL; INFILTRATION; INTRACAUDAL; PERINEURAL ONCE AS NEEDED
Status: DISCONTINUED | OUTPATIENT
Start: 2023-04-13 | End: 2023-04-13 | Stop reason: HOSPADM

## 2023-04-13 RX ORDER — HYDROCODONE BITARTRATE AND ACETAMINOPHEN 5; 325 MG/1; MG/1
1 TABLET ORAL ONCE AS NEEDED
Status: DISCONTINUED | OUTPATIENT
Start: 2023-04-13 | End: 2023-04-13 | Stop reason: HOSPADM

## 2023-04-13 RX ORDER — SODIUM CHLORIDE, SODIUM LACTATE, POTASSIUM CHLORIDE, CALCIUM CHLORIDE 600; 310; 30; 20 MG/100ML; MG/100ML; MG/100ML; MG/100ML
9 INJECTION, SOLUTION INTRAVENOUS CONTINUOUS
Status: DISCONTINUED | OUTPATIENT
Start: 2023-04-13 | End: 2023-04-13 | Stop reason: HOSPADM

## 2023-04-13 RX ORDER — SODIUM CHLORIDE, SODIUM LACTATE, POTASSIUM CHLORIDE, CALCIUM CHLORIDE 600; 310; 30; 20 MG/100ML; MG/100ML; MG/100ML; MG/100ML
100 INJECTION, SOLUTION INTRAVENOUS CONTINUOUS
Status: DISCONTINUED | OUTPATIENT
Start: 2023-04-13 | End: 2023-04-13 | Stop reason: HOSPADM

## 2023-04-13 RX ORDER — GLYCOPYRROLATE 0.2 MG/ML
INJECTION INTRAMUSCULAR; INTRAVENOUS AS NEEDED
Status: DISCONTINUED | OUTPATIENT
Start: 2023-04-13 | End: 2023-04-13 | Stop reason: SURG

## 2023-04-13 RX ORDER — DIPHENHYDRAMINE HYDROCHLORIDE 50 MG/ML
12.5 INJECTION INTRAMUSCULAR; INTRAVENOUS
Status: DISCONTINUED | OUTPATIENT
Start: 2023-04-13 | End: 2023-04-13 | Stop reason: HOSPADM

## 2023-04-13 RX ORDER — FENTANYL CITRATE 50 UG/ML
25 INJECTION, SOLUTION INTRAMUSCULAR; INTRAVENOUS
Status: DISCONTINUED | OUTPATIENT
Start: 2023-04-13 | End: 2023-04-13 | Stop reason: HOSPADM

## 2023-04-13 RX ORDER — LABETALOL HYDROCHLORIDE 5 MG/ML
5 INJECTION, SOLUTION INTRAVENOUS
Status: DISCONTINUED | OUTPATIENT
Start: 2023-04-13 | End: 2023-04-13 | Stop reason: HOSPADM

## 2023-04-13 RX ORDER — PROPOFOL 10 MG/ML
VIAL (ML) INTRAVENOUS AS NEEDED
Status: DISCONTINUED | OUTPATIENT
Start: 2023-04-13 | End: 2023-04-13 | Stop reason: SURG

## 2023-04-13 RX ORDER — SODIUM CHLORIDE 0.9 % (FLUSH) 0.9 %
3-10 SYRINGE (ML) INJECTION AS NEEDED
Status: DISCONTINUED | OUTPATIENT
Start: 2023-04-13 | End: 2023-04-13 | Stop reason: HOSPADM

## 2023-04-13 RX ORDER — PROMETHAZINE HYDROCHLORIDE 25 MG/1
25 SUPPOSITORY RECTAL ONCE AS NEEDED
Status: DISCONTINUED | OUTPATIENT
Start: 2023-04-13 | End: 2023-04-13 | Stop reason: HOSPADM

## 2023-04-13 RX ORDER — FENTANYL CITRATE 50 UG/ML
INJECTION, SOLUTION INTRAMUSCULAR; INTRAVENOUS AS NEEDED
Status: DISCONTINUED | OUTPATIENT
Start: 2023-04-13 | End: 2023-04-13 | Stop reason: SURG

## 2023-04-13 RX ORDER — IPRATROPIUM BROMIDE AND ALBUTEROL SULFATE 2.5; .5 MG/3ML; MG/3ML
3 SOLUTION RESPIRATORY (INHALATION) ONCE AS NEEDED
Status: DISCONTINUED | OUTPATIENT
Start: 2023-04-13 | End: 2023-04-13 | Stop reason: HOSPADM

## 2023-04-13 RX ORDER — PROMETHAZINE HYDROCHLORIDE 25 MG/1
25 TABLET ORAL ONCE AS NEEDED
Status: DISCONTINUED | OUTPATIENT
Start: 2023-04-13 | End: 2023-04-13 | Stop reason: HOSPADM

## 2023-04-13 RX ORDER — HYDRALAZINE HYDROCHLORIDE 20 MG/ML
5 INJECTION INTRAMUSCULAR; INTRAVENOUS
Status: DISCONTINUED | OUTPATIENT
Start: 2023-04-13 | End: 2023-04-13 | Stop reason: HOSPADM

## 2023-04-13 RX ADMIN — MIDAZOLAM 1 MG: 1 INJECTION INTRAMUSCULAR; INTRAVENOUS at 11:45

## 2023-04-13 RX ADMIN — MIDAZOLAM 1 MG: 1 INJECTION INTRAMUSCULAR; INTRAVENOUS at 11:35

## 2023-04-13 RX ADMIN — LIDOCAINE HYDROCHLORIDE 60 MG: 20 INJECTION, SOLUTION INFILTRATION; PERINEURAL at 12:13

## 2023-04-13 RX ADMIN — HYDROCODONE BITARTRATE AND ACETAMINOPHEN 1 TABLET: 7.5; 325 TABLET ORAL at 13:56

## 2023-04-13 RX ADMIN — FENTANYL CITRATE 25 MCG: 50 INJECTION, SOLUTION INTRAMUSCULAR; INTRAVENOUS at 14:00

## 2023-04-13 RX ADMIN — DEXAMETHASONE SODIUM PHOSPHATE 8 MG: 4 INJECTION, SOLUTION INTRAMUSCULAR; INTRAVENOUS at 12:13

## 2023-04-13 RX ADMIN — FENTANYL CITRATE 25 MCG: 50 INJECTION, SOLUTION INTRAMUSCULAR; INTRAVENOUS at 13:54

## 2023-04-13 RX ADMIN — ONDANSETRON 4 MG: 2 INJECTION INTRAMUSCULAR; INTRAVENOUS at 13:54

## 2023-04-13 RX ADMIN — FAMOTIDINE 20 MG: 10 INJECTION INTRAVENOUS at 11:35

## 2023-04-13 RX ADMIN — FENTANYL CITRATE 50 MCG: 50 INJECTION, SOLUTION INTRAMUSCULAR; INTRAVENOUS at 12:13

## 2023-04-13 RX ADMIN — ONDANSETRON 4 MG: 2 INJECTION INTRAMUSCULAR; INTRAVENOUS at 12:13

## 2023-04-13 RX ADMIN — SODIUM CHLORIDE, POTASSIUM CHLORIDE, SODIUM LACTATE AND CALCIUM CHLORIDE 9 ML/HR: 600; 310; 30; 20 INJECTION, SOLUTION INTRAVENOUS at 11:35

## 2023-04-13 RX ADMIN — PROPOFOL 200 MG: 10 INJECTION, EMULSION INTRAVENOUS at 12:13

## 2023-04-13 RX ADMIN — CLINDAMYCIN PHOSPHATE 900 MG: 900 INJECTION, SOLUTION INTRAVENOUS at 12:02

## 2023-04-13 RX ADMIN — GLYCOPYRROLATE 0.1 MG: 1 INJECTION INTRAMUSCULAR; INTRAVENOUS at 12:13

## 2023-04-13 NOTE — ANESTHESIA PROCEDURE NOTES
Airway  Urgency: elective    Date/Time: 4/13/2023 12:15 PM  Airway not difficult    General Information and Staff    Patient location during procedure: OR  Anesthesiologist: Roberto Esquivel DO  CRNA/CAA: Rocio Valiente CRNA    Indications and Patient Condition  Indications for airway management: airway protection    Preoxygenated: yes  Mask difficulty assessment: 0 - not attempted    Final Airway Details  Final airway type: supraglottic airway      Successful airway: unique  Size 4     Number of attempts at approach: 1  Assessment: lips, teeth, and gum same as pre-op

## 2023-04-13 NOTE — OP NOTE
Operative note    Preoperative Diagnosis:  Breast cancer    Postoperative Diagnosis: Breast cancer    Procedure Performed: right cephalic vein cutdown port placement    Dictating physician and surgeon: Kell Hi MD    EBL:5cc    FINDINGS AND DESCRIPTION OF PROCEDURE:       The patient was brought to the operating room and placed on the table in the supine position. After adequate general anesthesia was obtained, we sterilely prepped and draped bilateral anterior chest wall.   We did a time out to identify correct patient and correct operative site.  She did receive IV antibiotic within an hour of and prior to incision.     I made an oblique incision in the deltopectoral groove using a 15 blade and dissected down using bovie electrocautery to open the clavipectoral fascia.   I identified the cephalic vein and dissected this out circumferentially and ligated the proximal portion closest to the arm using a 3-0 silk suture.  I left the distal end patient. I then flushed the port after assembling it, using 10 unit per cc heparainized saline.    I used the following port: 8Fr power port     I made a pocket on the pectoral fascia using bovie electrocautery I then made a venotomy with the 11 blade, and threaded the catheter proximally. This threaded with ease. I then used intraoperative fluoroscopy to ensure that the catheter tip was in good position near the atriocaval junction.    I then secured the catheter in the proximal cephalic vein using the 3-0 silk.    I  I then sewed the port down to the pectoral fascia using 3 x 2-0 prolene sutures.    I then flushed and shannon from the port using heparinized saline 10 unit per cc to ensure that the port worked properly, which it did.    I then irrigated, assured hemostasis and closed the skin in 2 layers. the first being an interrupted 3-0 vicryl layer, followed by a running 4-0 monocryl.    I then applied lengthwise ½-inch steri-strips, 2x2s and a Tegaderm.    She will  have a CXR in the recovery room.    She tolerated the procedure well, there were no immediate complications, and all counts were correct at the end of the case.

## 2023-04-13 NOTE — DISCHARGE INSTRUCTIONS
Dr. Kell Hi  [address]  [phone]    Lumpectomy, [place] Node Biopsy, Excisional Breast Biopsy, Port Placement  Postoperative Discharge Instructions         Keep 6 inch ace wrap in place and dressings dry postoperatively for a total of 72 hours. May then remove dressings and ACE wrap. Can shower and get affected area wet after dressings are removed. Prefer no soaking in the tub for one week. Leave steri-strips in place.   A responsible adult should accompany the patient on discharge and for 24 hours following surgery.   No heavy lifting (>5 lbs) or strenuous upper arm activity, and no raising of arms over the head until followup appointment.   For 24 hours postoperatively, or while taking pain or nausea medications - Do not drive, operate machinery or drink alcohol.   Call the office for any of the following symptoms:    Persistent bleeding   Excessive bruising or swelling   Excessive sleepiness or trouble breathing   Severe pain   Persistent nausea or vomiting   Fever greater than 101.   Patient should keep the postoperative visit that was scheduled for him/her in [facility]. If the patient has forgotten this date, please call the clinic for a reminder of the appointment time. If it is after hours, the patient can call the clinic the next business day for this reminder. [facility] phone number is [phone].

## 2023-04-13 NOTE — PROGRESS NOTES
[facility] Hematology/Oncology Treatment Plan Summary    Name: Ursula Kulkarni  [facility]# [id number]  MD: Dr. Frederick    Diagnosis:     ICD-10-CM ICD-9-CM   1. Malignant neoplasm of left breast in female, estrogen receptor positive, unspecified site of breast  C50.912 174.9    Z17.0 V86.0   2. Malignant neoplasm of overlapping sites of left breast in female, estrogen receptor positive  C50.812 174.8    Z17.0 V86.0     Goal of treatment: adjuvant and curative intent    Treatment Medication(s):   1. Docetaxel  2. Carboplatin  3. Herceptin  4. Perjeta    Frequency: every 3 weeks    Number of cycles: 6    Starting on: 4/20/2023    Items for home use: Senokot-S (for constipation), Imodium AD (for diarrhea) and Thermometer    Rx written for: [x] Nausea    [x] Pre-Treatment   dexamethasone 4 mg by mouth twice daily on the day before, day of, and day after treatment, olanzapine 5mg nightly on days as directed and ondansetron 8 mg by mouth every 8 hours as needed for nausea    Notes: Pegfilgrastim injection on the 2nd day following treatment.     Next Steps: PORT and MUGA/ECHO     Completing Provider: RADHA Harris           Date/time: 04/14/2023      Please note: You will be seen by a provider frequently with your treatment plan. This plan may change depending on many factors, if so, this will be discussed with you by your physician.  Last update 03/2022.

## 2023-04-13 NOTE — ANESTHESIA POSTPROCEDURE EVALUATION
"Patient: Ursula Kulkarni    Procedure Summary     Date: 04/13/23 Room / Location: [facility] OR 09 / [facility] MAIN OR    Anesthesia Start: 1207 Anesthesia Stop: 1319    Procedure: right port placement (Right) Diagnosis:       Malignant neoplasm of upper-outer quadrant of left female breast, unspecified estrogen receptor status      (Malignant neoplasm of upper-outer quadrant of left female breast, unspecified estrogen receptor status [C50.412])    Surgeons: Kell Hi MD Provider: Roberto Esquivel DO    Anesthesia Type: general ASA Status: 2          Anesthesia Type: general    Vitals  Vitals Value Taken Time   /78 04/13/23 1405   Temp 36.5 °C (97.7 °F) 04/13/23 1405   Pulse 88 04/13/23 1422   Resp 16 04/13/23 1405   SpO2 98 % 04/13/23 1422   Vitals shown include unvalidated device data.        Post Anesthesia Care and Evaluation    Patient location during evaluation: bedside  Patient participation: complete - patient participated  Level of consciousness: awake and alert  Pain score: 0  Pain management: adequate    Airway patency: patent  Anesthetic complications: No anesthetic complications    Cardiovascular status: acceptable  Respiratory status: acceptable  Hydration status: acceptable    Comments: /74   Pulse 70   Temp 36.5 °C (97.7 °F) (Oral)   Resp 16   Ht 160 cm (63\")   Wt 78.5 kg (173 lb 1 oz)   LMP 03/27/2023 (Exact Date)   SpO2 95%   BMI 30.66 kg/m²       "

## 2023-04-13 NOTE — ANESTHESIA PREPROCEDURE EVALUATION
Anesthesia Evaluation     Patient summary reviewed   no history of anesthetic complications:  NPO Solid Status: > 8 hours  NPO Liquid Status: > 2 hours           Airway   Mallampati: I  TM distance: >3 FB  Neck ROM: full  No difficulty expected  Dental - normal exam     Pulmonary     breath sounds clear to auscultation  (-) shortness of breath, recent URI, not a smoker  Cardiovascular   Exercise tolerance: good (4-7 METS)    ECG reviewed  Rhythm: regular  Rate: normal    (+) hyperlipidemia,   (-) past MI, dysrhythmias, angina    ROS comment: 4/10/23 TTE - Narrative  •  Left ventricular systolic function is normal. Calculated left   ventricular EF = 64.7% Normal global longitudinal LV strain (GLS) =   -21.0%. Left ventricle strain data was reviewed by the physician and found   to be accurate. Normal left ventricular cavity size and wall thickness   noted. All left ventricular wall segments contract normally. Left   ventricular diastolic function was normal  •  Trace tricuspid valve regurgitation is present. Insufficient TR   velocity profile to estimate the right ventricular systolic pressure.  Less    Neuro/Psych  (+) psychiatric history Anxiety and ADD,    (-) seizures, CVA  GI/Hepatic/Renal/Endo    (+) obesity,   thyroid problem hypothyroidism  (-) no renal disease, diabetes    Musculoskeletal     (-) neck stiffness  Abdominal    Substance History      OB/GYN          Other      history of cancer (L Breast; melenoma)                      Anesthesia Plan    ASA 2     general     intravenous induction     Anesthetic plan, risks, benefits, and alternatives have been provided, discussed and informed consent has been obtained with: patient.    Use of blood products discussed with patient .   Plan discussed with CRNA.        CODE STATUS:

## 2023-04-14 ENCOUNTER — HOSPITAL ENCOUNTER (OUTPATIENT)
Dept: RADIATION ONCOLOGY | Facility: HOSPITAL | Age: 48
Setting detail: RADIATION/ONCOLOGY SERIES
End: 2023-04-14
Payer: COMMERCIAL

## 2023-04-14 ENCOUNTER — OFFICE VISIT (OUTPATIENT)
Dept: ONCOLOGY | Facility: CLINIC | Age: 48
End: 2023-04-14
Payer: COMMERCIAL

## 2023-04-14 ENCOUNTER — CONSULT (OUTPATIENT)
Dept: RADIATION ONCOLOGY | Facility: HOSPITAL | Age: 48
End: 2023-04-14
Payer: COMMERCIAL

## 2023-04-14 ENCOUNTER — HOSPITAL ENCOUNTER (OUTPATIENT)
Dept: PHYSICAL THERAPY | Facility: HOSPITAL | Age: 48
Setting detail: THERAPIES SERIES
Discharge: HOME | End: 2023-04-14
Payer: COMMERCIAL

## 2023-04-14 ENCOUNTER — PATIENT OUTREACH (OUTPATIENT)
Dept: OTHER | Facility: HOSPITAL | Age: 48
End: 2023-04-14
Payer: COMMERCIAL

## 2023-04-14 VITALS
DIASTOLIC BLOOD PRESSURE: 76 MMHG | SYSTOLIC BLOOD PRESSURE: 114 MMHG | TEMPERATURE: 96.9 F | OXYGEN SATURATION: 99 % | RESPIRATION RATE: 16 BRPM | WEIGHT: 173.4 LBS | HEIGHT: 63 IN | HEART RATE: 84 BPM | BODY MASS INDEX: 30.72 KG/M2

## 2023-04-14 VITALS
DIASTOLIC BLOOD PRESSURE: 88 MMHG | BODY MASS INDEX: 30.86 KG/M2 | WEIGHT: 174.2 LBS | SYSTOLIC BLOOD PRESSURE: 130 MMHG | HEART RATE: 82 BPM | OXYGEN SATURATION: 97 %

## 2023-04-14 DIAGNOSIS — Z17.0 MALIGNANT NEOPLASM OF OVERLAPPING SITES OF LEFT BREAST IN FEMALE, ESTROGEN RECEPTOR POSITIVE: ICD-10-CM

## 2023-04-14 DIAGNOSIS — Z91.89 AT RISK FOR LYMPHEDEMA: Primary | ICD-10-CM

## 2023-04-14 DIAGNOSIS — Z17.0 MALIGNANT NEOPLASM OF LEFT BREAST IN FEMALE, ESTROGEN RECEPTOR POSITIVE, UNSPECIFIED SITE OF BREAST: Primary | ICD-10-CM

## 2023-04-14 DIAGNOSIS — C50.812 MALIGNANT NEOPLASM OF OVERLAPPING SITES OF LEFT BREAST IN FEMALE, ESTROGEN RECEPTOR POSITIVE: ICD-10-CM

## 2023-04-14 DIAGNOSIS — Z90.13 S/P BILATERAL MASTECTOMY: ICD-10-CM

## 2023-04-14 DIAGNOSIS — C50.912 MALIGNANT NEOPLASM OF LEFT BREAST IN FEMALE, ESTROGEN RECEPTOR POSITIVE, UNSPECIFIED SITE OF BREAST: Primary | ICD-10-CM

## 2023-04-14 DIAGNOSIS — C50.812 MALIGNANT NEOPLASM OF OVERLAPPING SITES OF LEFT BREAST IN FEMALE, ESTROGEN RECEPTOR POSITIVE: Primary | ICD-10-CM

## 2023-04-14 DIAGNOSIS — Z17.0 MALIGNANT NEOPLASM OF OVERLAPPING SITES OF LEFT BREAST IN FEMALE, ESTROGEN RECEPTOR POSITIVE: Primary | ICD-10-CM

## 2023-04-14 DIAGNOSIS — C50.912 BREAST CARCINOMA, LOBULAR, LEFT: ICD-10-CM

## 2023-04-14 PROBLEM — Z45.2 ENCOUNTER FOR ADJUSTMENT OR MANAGEMENT OF VASCULAR ACCESS DEVICE: Status: ACTIVE | Noted: 2023-04-14

## 2023-04-14 PROCEDURE — 99205 OFFICE O/P NEW HI 60 MIN: CPT | Performed by: RADIOLOGY

## 2023-04-14 PROCEDURE — G0463 HOSPITAL OUTPT CLINIC VISIT: HCPCS | Performed by: RADIOLOGY

## 2023-04-14 PROCEDURE — 93702 BIS XTRACELL FLUID ANALYSIS: CPT

## 2023-04-14 RX ORDER — DEXAMETHASONE 4 MG/1
8 TABLET ORAL 2 TIMES DAILY
Qty: 12 TABLET | Refills: 0 | Status: SHIPPED | OUTPATIENT
Start: 2023-04-14 | End: 2023-04-17

## 2023-04-14 RX ORDER — HEPARIN SODIUM (PORCINE) LOCK FLUSH IV SOLN 100 UNIT/ML 100 UNIT/ML
500 SOLUTION INTRAVENOUS AS NEEDED
Status: CANCELLED | OUTPATIENT
Start: 2023-04-14

## 2023-04-14 RX ORDER — LIDOCAINE AND PRILOCAINE 25; 25 MG/G; MG/G
1 CREAM TOPICAL AS NEEDED
Qty: 30 G | Refills: 3 | Status: SHIPPED | OUTPATIENT
Start: 2023-04-14

## 2023-04-14 RX ORDER — OLANZAPINE 5 MG/1
5 TABLET ORAL NIGHTLY
Qty: 3 TABLET | Refills: 5 | Status: SHIPPED | OUTPATIENT
Start: 2023-04-14 | End: 2023-04-19 | Stop reason: SDUPTHER

## 2023-04-14 RX ORDER — SODIUM CHLORIDE 0.9 % (FLUSH) 0.9 %
10 SYRINGE (ML) INJECTION AS NEEDED
Status: CANCELLED | OUTPATIENT
Start: 2023-04-14

## 2023-04-14 RX ORDER — ONDANSETRON HYDROCHLORIDE 8 MG/1
8 TABLET, FILM COATED ORAL 3 TIMES DAILY PRN
Qty: 30 TABLET | Refills: 5 | Status: SHIPPED | OUTPATIENT
Start: 2023-04-14 | End: 2023-04-19 | Stop reason: SDUPTHER

## 2023-04-14 NOTE — PROGRESS NOTES
Subjective     Kell Hi MD    Cancer Staging   No matching staging information was found for the patient.    CC: left breast cancer mpT3N0 ER ME pos Her positive ki67 5%                             Dear Kell Hi MD    I had the pleasure of seeing Ursula Kulkarni  today in [facility].   The patient is a 47 y.o. female with recently diagnosed left breast cancer.  She had a screening mammogram 11/29/22 which showed a 8mm asymmetry in the central left posterior breast birads 0.  She had a diagnostic left mammogram on 1/3/23 which showed the spiculated areas within the central posterior and the lateral anterior superior  left breast were redemonstrated.  Targeted ultrasound was performed.  At the 1 o'clock position 10 cm from the nipple there is a shadowing mass with peripheral spiculations favored to represent the mammographic abnormality and suspicious for malignancy.  The more centrally located spiculation on   the CC view disc posterior to the glandular tissue in the retro glandular fat cannot be clearly identified on ultrasound.    She underwent a left breast stereotactic biopsy on 1/17/23 with pathology revealing invasive lobular carcinoma, grade 2, ER 95% ME 95% Her 2 neg and ki67 5% from the anterior lateral region as well as the left upper breast.  Ultrasound guided biopsy of the left breast 10 oclock region revealed invasive lobular carcinoma, grade 2,  4mm, ER 90% % Her 2 positive on FISH.    She underwent a breast mri on 1/23/23 which revealed at least 15 similar-appearing irregular enhancing masses and foci  scattered throughout the left breast, together measuring up to 11.7 cm in maximum dimension, encompass 3 sites of biopsy-proven malignancy and are consistent with multicentric malignancy.  No MRI evidence of malignancy in the right breast.    She underwent bilateral total mastectomies and left sentinel node biopsy on 3/8/23 with Dr. Hi with pathology  from the left revealing multifocal invasive lobular carcinoma, grade II, involving the upper outer and upper inner quadrant, with the largest foci measuring 60mm.  A total of 4 foci were identified measuring 60mm, 20mm, 2mm, 1mm with 4 benign sentinel lymph nodes. All margins were negative the closest anterior margin 10mm.  Her pathologic stage was mpT3N0.  She had prepectoral expanders placed by Dr. Stone at the time of surgery.    Pathology from the right breast was benign.  She underwent reexicison of right breast skin and tissue on 4/3/23     She is  with menarche age 14 and first childbirth age 30.  She  for 24 months, has never taken oral contraceptives or hormone replacement therapy.  She is premenopausal and has an IUD.  Her family hx includes a maternal great aunt with breast cancer.  She has a hx of Graves disease treated with radioactive iodine as well as a melanoma of the skin of her back. 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     She met with Dr. Frederick and she has recommended adjuvant chemo consisting of taxol, cytoxan and herceptin but may consider TCHP or AC depending on consensus recommendation from [facility].    She is recovering well from surgery and referred today for evaluation for adjuvant postmastectomy radiation.       Review of Systems   Constitutional: Negative.    HENT: Negative.    Respiratory: Negative.    Cardiovascular: Negative.    Gastrointestinal: Negative.    Genitourinary: Negative.    Musculoskeletal: Negative.    Skin: Negative.    Neurological: Negative.    Psychiatric/Behavioral: Negative.          Past Medical History:   Diagnosis Date   • ADD (attention deficit disorder)    • Bilateral impacted cerumen 2020   • Breast cancer 2023    LUCIO MASTECTOMY   • Foot fracture, left    • History of COVID-19    • History of gestational diabetes    • History of Graves' disease    • History of radioactive iodine thyroid  ablation 2012   • History of vertigo    • Hyperlipidemia    • Hypothyroidism    • Melanoma of back    • Positive depression screening 07/13/2020         Past Surgical History:   Procedure Laterality Date   • BREAST RECONSTRUCTION Bilateral 3/8/2023    Procedure: BILATERAL PLACEMENT  TISSUE EXPANDER AND ALLODERM;  Surgeon: Eamon Stone MD;  Location: [facility] MAIN OR;  Service: Plastics;  Laterality: Bilateral;   • D & C CERVICAL BIOPSY  01/2006   • INTRAUTERINE DEVICE INSERTION  2010    Mirena   • INTRAUTERINE DEVICE INSERTION  2015    Mirena exchange, old IUD removal and new one inserted by Dr Carr, Lot # [id number] 09/17.mar   • MASTECTOMY W/ SENTINEL NODE BIOPSY Bilateral 3/8/2023    Procedure: Bilateral total mastectomy, left axillary sentinel lymph node biopsy;  Surgeon: Kell Hi MD;  Location: [facility] MAIN OR;  Service: General;  Laterality: Bilateral;   • SCAR REVISION BREAST Right 4/3/2023    Procedure: RIGHT MASTECTOMY REVISION;  Surgeon: Eamon Stone MD;  Location: [facility] MAIN OR;  Service: Plastics;  Laterality: Right;   • WISDOM TOOTH EXTRACTION           Social History     Socioeconomic History   • Marital status:      Spouse name: Parish   • Number of children: 2   Tobacco Use   • Smoking status: Never   • Smokeless tobacco: Never   Vaping Use   • Vaping Use: Never used   Substance and Sexual Activity   • Alcohol use: Not Currently     Alcohol/week: 1.0 standard drink     Types: 1 Glasses of wine per week     Comment: Daily caffeine use   • Drug use: Never   • Sexual activity: Yes     Partners: Male     Comment:           Family History   Problem Relation Age of Onset   • Diabetes Father    • Hyperlipidemia Father    • Arthritis Father    • Hypertension Father    • Heart disease Father         S/P stents   • Heart attack Father 41   • Colon polyps Father    • Melanoma Father    • Prostate cancer Father    • Hyperlipidemia Sister    • Hypertension Sister    •  Heart attack Sister    • Diabetes Sister    • Thyroid cancer Paternal Aunt    • Lung cancer Maternal Grandmother    • Cancer Maternal Grandfather         Bladder cancer   • Lung cancer Maternal Grandfather    • Heart failure Paternal Grandmother    • Transient ischemic attack Paternal Grandmother    • Heart disease Paternal Grandfather    • Malig Hyperthermia Neg Hx           Objective    Physical Exam  Constitutional:       Appearance: Normal appearance.   HENT:      Head: Atraumatic.   Pulmonary:      Effort: Pulmonary effort is normal.   Chest:          Comments: Bilateral breasts are surgically absent, expanders in place, healing incisions, no suspicious lesions or nodules, no axillary adenopathy  Musculoskeletal:      Cervical back: Normal range of motion.   Neurological:      General: No focal deficit present.      Mental Status: She is alert.   Psychiatric:         Mood and Affect: Mood normal.           Current Outpatient Medications on File Prior to Visit   Medication Sig Dispense Refill   • acetaminophen (TYLENOL) 325 MG tablet Take 2 tablets by mouth Every 4 (Four) Hours As Needed for Mild Pain. 60 tablet 0   • cetirizine (zyrTEC) 10 MG tablet Take 1 tablet by mouth Daily.     • COLLAGEN PO Take 1 tablet by mouth Daily.     • levothyroxine (SYNTHROID, LEVOTHROID) 125 MCG tablet Take 1 tablet by mouth Daily. 90 tablet 1   • omeprazole (priLOSEC) 20 MG capsule Take 1 capsule by mouth Daily. 90 capsule 1   • Probiotic Product (PROBIOTIC ADVANCED PO) Take 1 tablet by mouth Daily.       Current Facility-Administered Medications on File Prior to Visit   Medication Dose Route Frequency Provider Last Rate Last Admin   • [COMPLETED] clindamycin (CLEOCIN) 900 mg in dextrose 5% 50 mL IVPB (premix)  900 mg Intravenous Once Kell Hi MD   Stopped at 04/13/23 1224   • [COMPLETED] famotidine (PEPCID) injection 20 mg  20 mg Intravenous Once Roberto Esquivel DO   20 mg at 04/13/23 1135   • [COMPLETED]  midazolam (VERSED) injection 1 mg  1 mg Intravenous Q10 Min PRN Roberto Esquivel DO   1 mg at 04/13/23 1145   • [COMPLETED] ondansetron (ZOFRAN) injection 4 mg  4 mg Intravenous Once PRN Day, Rocio PIERCE CRNA   4 mg at 04/13/23 1354   • [DISCONTINUED] bupivacaine (PF) 0.25 % 5 mL, lidocaine 1 % 5 mL mixture    PRN Kell Hi MD   1 mL at 04/13/23 1230   • [DISCONTINUED] dexamethasone sodium phosphate injection   Intravenous PRN Day, Rocio PIERCE CRNA   8 mg at 04/13/23 1213   • [DISCONTINUED] diphenhydrAMINE (BENADRYL) injection 12.5 mg  12.5 mg Intravenous Q15 Min PRN Day, Rocio PIERCE CRNA       • [DISCONTINUED] droperidol (INAPSINE) injection 0.625 mg  0.625 mg Intravenous Q20 Min PRN Day, Rocio PIERCE CRNA       • [DISCONTINUED] droperidol (INAPSINE) injection 0.625 mg  0.625 mg Intramuscular Q20 Min PRN Day, Rocio PIERCE CRNA       • [DISCONTINUED] ePHEDrine injection 5 mg  5 mg Intravenous Once PRN Day, Rocio PIERCE CRNA       • [DISCONTINUED] fentaNYL citrate (PF) (SUBLIMAZE) injection 25 mcg  25 mcg Intravenous Q5 Min PRN Day, Rocio PIERCE CRNA   25 mcg at 04/13/23 1400   • [DISCONTINUED] fentaNYL citrate (PF) (SUBLIMAZE) injection 50 mcg  50 mcg Intravenous Q10 Min PRN Roberto Esquivel DO       • [DISCONTINUED] fentaNYL citrate (PF) (SUBLIMAZE) injection   Intravenous PRN Day, Rocio PIERCE CRNA   50 mcg at 04/13/23 1213   • [DISCONTINUED] flumazenil (ROMAZICON) injection 0.2 mg  0.2 mg Intravenous PRN Day, Rocio PIERCE CRNA       • [DISCONTINUED] glycopyrrolate (ROBINUL) injection   Intravenous PRN Day, Rocio PIERCE CRNA   0.1 mg at 04/13/23 1213   • [DISCONTINUED] hydrALAZINE (APRESOLINE) injection 5 mg  5 mg Intravenous Q10 Min PRN Day, Rocio PIERCE CRNA       • [DISCONTINUED] HYDROcodone-acetaminophen (NORCO) 5-325 MG per tablet 1 tablet  1 tablet Oral Once PRN Day, Rocio PIERCE CRNA       • [DISCONTINUED] HYDROcodone-acetaminophen (NORCO) 7.5-325 MG per tablet 1 tablet  1 tablet Oral Q4H PRN Day, Rocio PIERCE CRNA    1 tablet at 04/13/23 1356   • [DISCONTINUED] HYDROmorphone (DILAUDID) injection 0.25 mg  0.25 mg Intravenous Q5 Min PRN Day, Rocio PIERCE CRNA       • [DISCONTINUED] ipratropium-albuterol (DUO-NEB) nebulizer solution 3 mL  3 mL Nebulization Once PRN Day, Rocio PIERCE CRNA       • [DISCONTINUED] labetalol (NORMODYNE,TRANDATE) injection 5 mg  5 mg Intravenous Q5 Min PRN Day, Rocio PIERCE CRNA       • [DISCONTINUED] lactated ringers infusion  100 mL/hr Intravenous Continuous Kell Hi MD       • [DISCONTINUED] lactated ringers infusion  9 mL/hr Intravenous Continuous Roberto Esquivel DO 9 mL/hr at 04/13/23 1207 Restarted at 04/13/23 1213   • [DISCONTINUED] lidocaine (XYLOCAINE) 2% injection   Infiltration PRN Day, Rocio IPERCE CRNA   60 mg at 04/13/23 1213   • [DISCONTINUED] lidocaine PF 1% (XYLOCAINE) injection 0.5 mL  0.5 mL Injection Once PRN Roberto Esquivel DO       • [DISCONTINUED] naloxone (NARCAN) injection 0.2 mg  0.2 mg Intravenous PRN Day, Rocio PIERCE CRNA       • [DISCONTINUED] ondansetron (ZOFRAN) injection   Intravenous PRN Day, Rocio PIERCE CRNA   4 mg at 04/13/23 1213   • [DISCONTINUED] promethazine (PHENERGAN) suppository 25 mg  25 mg Rectal Once PRN Day, Rocio PIERCE CRNA       • [DISCONTINUED] promethazine (PHENERGAN) tablet 25 mg  25 mg Oral Once PRN Day, Rocio PIERCE CRNA       • [DISCONTINUED] Propofol (DIPRIVAN) injection   Intravenous PRN Day, Rocio PIERCE CRNA   200 mg at 04/13/23 1213   • [DISCONTINUED] sodium chloride 0.9 % flush 3 mL  3 mL Intravenous Q12H Roberto Esquivel DO       • [DISCONTINUED] sodium chloride 0.9 % flush 3-10 mL  3-10 mL Intravenous PRN Roberto Esquivel DO       • [DISCONTINUED] sodium chloride 100 mL with heparin (porcine) 1,000 Units mixture    PRN Kell Hi MD   100 mL at 04/13/23 1229   • [DISCONTINUED] sterile water 1,000 mL with neomycin-polymyxin b 1 mL irrigation    PRN Kell Hi MD   1,000 mL at 04/13/23 1226        ALLERGIES:    Allergies   Allergen Reactions   • Erythromycin GI Intolerance     Pt reports   • Penicillins Rash       LMP 03/27/2023 (Exact Date)          3/31/2023     9:32 AM   Current Status   ECOG score 0         Assessment & Plan     47 year old female with mpT3N0 invasive lobular carcinoma of left breast, ER MD positive Her 2 positive in the larger tumor with the largest tumor measuring 60mm.  I discussed with her my recommendation for post-mastectomy radiation therapy to the left chest wall/reconstructed breast to decrease the risk of local recurrence.      I discussed with her in detail the risks, benefits and rationale of radiation therapy to the left breast to include but not limited to the following:    Acute: skin erythema, breakdown/moist desquamation, swelling or discomfort of the breast, fatigue, pneumonitis resulting in shortness of breath, cough or pain    Late: Permanent skin changes including hyperpigmentation, telangiectasias, fibrosis of the breast resulting in smaller size or poor cosmetic outcome, late edema or cellulitis, late rib fracture, late pulmonary fibrosis and the remote risk of late cardiac damage resulting in increased risk of heart attack or second malignancies.      She voiced understanding and was given an opportunity to ask questions which I believe were answered to her satisfaction.  She is going to proceed with chemotherapy and I will see her back once she completes.  I explained we would like to start radiation 3 weeks after the last dose of chemotherapy.  She knows to call for this appointment and will be referred back by Dr. Frederick as well.     I personally spent greater than 60 minutes today assessing, managing, discussing and documenting my visit with the patient. That time includes review of records, imaging and pathology reports, obtaining my own history, performing a medically appropriate evaluation, counseling and educating the patient, discussing goals,  logistics, alternatives and risks of my recommendations, surveillance options and potential outcomes. It also includes the time documenting the clinical information in the EMR and communicating my recommendations to the other involved physicians.                 Thank you very much for allowing me to participate in the care of this very pleasant patient.    Sincerely,      Fay Hines MD     Subjective

## 2023-04-14 NOTE — PROGRESS NOTES
TREATMENT  PREPARATION    Ursula Kulkarni  [id number]  1975    Chief Complaint: Treatment preparation and needs assessment    History of present illness:  Ursula Kulkarni is a 47 y.o. year old female who is here today for treatment preparation and needs assessment.  The patient has been diagnosed with   Encounter Diagnosis   Name Primary?   • Malignant neoplasm of left breast in female, estrogen receptor positive, unspecified site of breast Yes    and is scheduled to begin treatment with:     Oncology History:    Oncology/Hematology History   Malignant neoplasm of left breast in female, estrogen receptor positive   2/3/2023 Initial Diagnosis    Malignant neoplasm of left breast in female, estrogen receptor positive     4/14/2023 -  Chemotherapy    OP CENTRAL VENOUS ACCESS DEVICE ACCESS, CARE, AND MAINTENANCE (CVAD)     4/20/2023 -  Chemotherapy    OP BREAST TCH-P DOCEtaxel / CARBOplatin AUC=6 / Trastuzumab / Pertuzumab          The current medication list and allergy list were reviewed and reconciled.     Past Medical History, Past Surgical History, Social History, Family History have been reviewed and are without significant changes except as mentioned.    Physical Exam:    Vitals:    04/14/23 1245   BP: 114/76   Pulse: 84   Resp: 16   Temp: 96.9 °F (36.1 °C)   SpO2: 99%     Vitals:    04/14/23 1245   PainSc: 2  Comment: port placement 4/13/2023        ECOG score: 0             Physical Exam  HENT:      Head: Normocephalic and atraumatic.   Eyes:      Extraocular Movements: Extraocular movements intact.      Conjunctiva/sclera: Conjunctivae normal.   Pulmonary:      Effort: Pulmonary effort is normal. No respiratory distress.   Neurological:      General: No focal deficit present.      Mental Status: She is alert and oriented to person, place, and time.   Psychiatric:         Mood and Affect: Mood normal.         Behavior: Behavior normal.           NEEDS ASSESSMENTS    Genetics  The patient's new  diagnosis and family history have been reviewed for genetic counseling needs. The patient will be referred..     Psychosocial and Barriers to care  The patient has completed a PHQ-9 Depression Screening and the Distress Thermometer (DT) today.  PHQ-9 results show PHQ-2 Total Score: 0 PHQ-9 Total Score: PHQ-9 Total Score: 0     The patient scored their distress today as Distress Level: 0-No distress on a scale of 0-10 with 0 being no distress and 10 being extreme distress. Problems marked by the patient as being an issue for them within the last week include   .      Results were reviewed along with psychosocial resources offered by our cancer center.  Our Supportive Oncology team will be flagged for a score of 4 or above, and a same day call will be made for a score of 9 or 10.  A mental health referral is offered at that time. Patients who score less than 4 have been educated on our support services and can be referred to our  upon request.  The patient will not be referred to our .       Nutrition  The patient has completed the malnutrition screening today. They scored Malnutrition Screening Tool  Have you recently lost weight without trying?  If yes, how much weight have you lost?: 0--> No  Have you been eating poorly because of a decreased appetite?: 0--> No  MST score: 0   with a score of 0-1 meaning not at risk in a score of 2 or greater meaning at risk.  Patients with a score of 3 or higher will be referred to our oncology dietitian for support. Patients beginning at risk treatment regimens or who have dietary concerns will also be referred to our oncology dietitian. The patient will not be referred.    Functional Assessment  Persons who are age 70 or greater will be screened for qualification of a comprehensive geriatric assessment by our survivorship nurse practitioner.  Older adults with cancer face unique challenges. These may include an increased risk of drug reactions,  "financial burdens, and caregiver stress. The patient scored   . Patients scoring 14 or lower will referred for an older adult functional assessment with the survivorship advanced practice registered nurse to ensure all needed support is provided as patients plan for their treatments. NOT APPLICABLE    Intravenous Access Assessment  The patient and I discussed planned intravenous chemo/biotherapy as well as other IV treatments that are often needed throughout the course of treatment. These may include, but are not limited to blood transfusions, antibiotics, and IV hydration. Discussed that depending on selected treatment and vein assessment, patient may require venous access device (VAD) which could include but not limited to a Mediport or PICC line. Risks and benefits of VADs reviewed. The patient will be treated via Port.    Reproductive/Sexual Activity   People should avoid becoming pregnant and should not get a partner pregnant while undergoing chemo/biotherapy.  People of childbearing age should use effective contraception during active therapy. The best recommendation for all people is to use a barrier method for a minimum of 1 week after the last infusion of chemo/biotherapy to prevent your partner being exposed to byproducts from treatment medications in bodily fluids. Effective contraception should be discussed with your oncology team to make sure it is safe to take based on your diagnosis. Possible options include oral contraceptives, barrier methods. Chemo/biotherapy can change your ability to reproduce children in the future.  There are options for fertility preservation. NOT APPLICABLE    Advanced Care Planning  Advance Care Planning   The patient and I discussed advanced care planning, \"Conversations that Matter\".   This service is offered for development of advance directives with a certified ACP facilitator.  The patient does not have an up-to-date advanced directive. This document is not on file with " our office. The patient is not interested in an appointment with one of our facilitators to create or update their advanced directives.     Smoking cessation  Tobacco Use: Low Risk    • Smoking Tobacco Use: Never   • Smokeless Tobacco Use: Never   • Passive Exposure: Not on file       Patient and I discussed their tobacco use history. Referral will not be made for smoking cessation.      Palliative Care  When appropriate, the patient and I discussed the availability palliative care services and when appropriate Hospice care. Palliative care is not the same as Hospice care which was explained to the patient.NOT APPLICABLE.    Survivorship   When appropriate, we discussed that we will refer the patient to survivorship clinic to discuss next steps following completion of planned treatment.  Reviewed this visit will include assessment of your physical, psychological, functional, and spiritual needs as a survivor and the need at attend this visit when scheduled.    TREATMENT EDUCATION    Today I met with the patient to discuss the chemo/biotherapy regimen recommended for treatment of Malignant neoplasm of left breast in female, estrogen receptor positive, unspecified site of breast  .  The patient was given explanation of treatment premed side effects including office policy that prohibits patients to drive if sedating medications are administered, MD explanation given regarding benefits, side effects, toxicities and goals of treatment.  The patient received a Chemotherapy/Biotherapy Plan Summary including diagnosis and explanation of specific treatment plan.    SIDE EFFECTS:  Common side effects were discussed with the patient and/or significant other.  Discussion included where applicable hair loss/discoloration, anemia/fatigue, infection/chills/fever, appetite, bleeding risk/precautions, constipation, diarrhea, mouth sores, taste alteration, loss of appetite, nausea/vomiting, peripheral neuropathy, skin/nail changes,  rash, muscle aches/weakness, photosensitivity, weight gain/loss, hearing loss, dizziness, menopausal symptoms, menstrual irregularity, sterility, high blood pressure, heart damage, liver damage, lung damage, kidney damage, DVT/PE risk, fluid retention, pleural/pericardial effusion, somnolence, electrolyte/LFT imbalance, vein exercises and/or the possible need for vascular access/port placement.  The patient was advised that although uncommon, leakage of an infused medication from the vein or venous access device may lead to skin breakdown and/or other tissue damage.  The patient was advised that he/she may have pain, bleeding, and/or bruising from the insertion of a needle in their vein or venous access device (port).  The patient was further advised that, in spite of proper technique, infection with redness and irritation may rarely occur at the site where the needle was inserted.  The patient was advised that if complications occur, additional medical treatment is available.  Finally, where applicable we have reviewed rare but potential immune mediated side effects including shortness of breath, cough, chest pain (pneumonitis), abdominal pain, diarrhea (colitis), thyroiditis (hypothyroid or hyperthyroid), hepatitis and liver dysfunction, nephritis and renal dysfunction.    Discussion also included side effects specific to drugs in the treatment plan, specifically:    Treatment Plans     Name Type Plan Dates Plan Provider         Active    OP BREAST TCH-P DOCEtaxel / CARBOplatin AUC=6 / Trastuzumab / Pertuzumab  ONCOLOGY TREATMENT  4/19/2023 - Present Kathrin Frederick MD                    Questions answered and additional information discussed on topics including:  Anemia, Thrombocytopenia, Neutropenia, Nutrition and appetite changes, Constipation, Diarrhea, Nausea & vomiting, Mouth sores, Alopecia, Intimate activity, Nervous system changes, Pain, Skin & nail changes and Organ toxicities       Assessment and Plan:     Diagnoses and all orders for this visit:    1. Malignant neoplasm of left breast in female, estrogen receptor positive, unspecified site of breast (Primary)      No orders of the defined types were placed in this encounter.        1. The patient and I have reviewed their diagnosis and scheduled treatment plan. Needs assessment was completed where applicable including genetics, psychosocial needs, barriers to care, VAD evaluation, advanced care planning, survivorship, and palliative care services where indicated. Referrals have been ordered as appropriate based upon evaluation today and patient desires.   2. Chemo/biotherapy teaching was completed today and consent obtained. See separate documentation for further details.  3. Adequate time was given to answer questions.  Patient made aware of their care team members and contact information if they have questions or problems throughout the treatment course.  4. Discussion held and written information provided describing frequency of office visits and ongoing monitoring throughout the treatment plan.     5. Reviewed with patient any prescribed medication sent to pharmacy.  Education provided regarding proper storage, safe handling, and proper disposal of unused medication.  6. Proper handling of body fluids and waste discussed and written information provided.  7. If appropriate, patient had pretreatment labs drawn today.    Learning assessment completed at initial patient encounter. See separate flowsheet. Chemo/biotherapy education comprehension assessed at today's visit.    I spent 60 minutes caring for Ursula on this date of service. This time includes time spent by me in the following activities: preparing for the visit, reviewing tests, performing a medically appropriate examination and/or evaluation, counseling and educating the patient/family/caregiver, ordering medications, tests, or procedures, referring and communicating with other health care  professionals, documenting information in the medical record and care coordination.     Radha Jackson, APRN   04/14/23

## 2023-04-14 NOTE — PROGRESS NOTES
Called Ms. Kulkarni to see how she was doing. She stated she is feeling tired from surgery yesterday but is doing ok. We discussed our resource center and she was interested in stopping by next time she is here. Otherwise, she has no needs at this time. She was thankful for the call and will reach out if any questions or needs arise.

## 2023-04-14 NOTE — THERAPY TREATMENT NOTE
Outpatient Physical Therapy Lymphedema Treatment Note  [facility]     Patient Name: Ursula Kulkarni  : 1975  MRN: [id number]  Today's Date: 2023        Visit Date: 2023    Visit Dx:    ICD-10-CM ICD-9-CM   1. At risk for lymphedema  Z91.89 V49.89   2. S/P bilateral mastectomy  Z90.13 V45.71   3. Breast carcinoma, lobular, left  C50.912 174.9       Patient Active Problem List   Diagnosis   • Hypothyroidism   • Anxiety   • Mild hyperlipidemia   • Heartburn   • Elevated blood pressure reading without diagnosis of hypertension   • Thyroid nodule   • Allergic rhinitis   • COVID-19 virus infection   • Non-recurrent acute suppurative otitis media of right ear without spontaneous rupture of tympanic membrane   • Rash   • Pharyngeal dysphagia   • Indigestion   • Fatigue   • Abnormal mammogram of left breast   • Malignant neoplasm of left breast in female, estrogen receptor positive   • Breast carcinoma, lobular, left   • High risk medication use   • Encounter for adjustment or management of vascular access device         Lymphedema     Row Name 23 1200             Subjective Pain    Able to rate subjective pain? yes  -LB      Pre-Treatment Pain Level 2  -LB         Subjective Comments    Subjective Comments I am sore from my port, doing well otherwise, plastics is still holding ROM for a few more weeks. I have my HEP. I start radiation after chemotherapy.  -LB         L-Dex Bioimpedence Screening    L-Dex Measurement Extremity LUE  -LB      L-Dex Patient Position Standing  -LB      L-Dex UE Dominate Side Right  -LB      L-Dex UE At Risk Side Left  -LB      L-Dex UE Pre Surgical Value Yes  -LB      L-Dex UE Score -1.1  -LB      L-Dex UE Baseline Score 2.4  -LB      L-Dex UE Value Change -3.5  -LB      L-Dex UE Comment WNL; stable; port intact today  -LB      $ L-Dex Charge yes  -LB            User Key  (r) = Recorded By, (t) = Taken By, (c) = Cosigned By    Initials Name Provider Type    LB  Rocio Daniels, PT Physical Therapist                               PT Assessment/Plan     Row Name 04/14/23 1214          PT Assessment    Assessment Comments Pt returns for bioimpedance only today as last visit she had drain remaining. LDex bioimpedance remains stable and WNL at -1.1. She will proceed with chemotherapy and we will repeat bioimpedance in 3 months prior to radiation.  -LB        PT Plan    PT Plan Comments repeat bioimpedance in 3 months, radiation compression advice/instructions, skincare, UE ROM  -LB           User Key  (r) = Recorded By, (t) = Taken By, (c) = Cosigned By    Initials Name Provider Type    Rocio Biggs, PT Physical Therapist                    OP Exercises     Row Name 04/14/23 1200             Subjective Comments    Subjective Comments I am sore from my port, doing well otherwise, plastics is still holding ROM for a few more weeks. I have my HEP. I start radiation after chemotherapy.  -LB         Subjective Pain    Able to rate subjective pain? yes  -LB      Pre-Treatment Pain Level 2  -LB            User Key  (r) = Recorded By, (t) = Taken By, (c) = Cosigned By    Initials Name Provider Type    Rocio Biggs, PT Physical Therapist                                               Time Calculation:   Start Time: 1145  Stop Time: 1200  Time Calculation (min): 15 min   Therapy Charges for Today     Code Description Service Date Service Provider Modifiers Qty    [id number] HC PT BIS XTRACELL FLUID ANALYSIS 4/14/2023 Rocio Daniels, PT  1                    Rocio Daniels PT  4/14/2023

## 2023-04-19 ENCOUNTER — TELEPHONE (OUTPATIENT)
Dept: CARDIOLOGY | Facility: CLINIC | Age: 48
End: 2023-04-19
Payer: COMMERCIAL

## 2023-04-19 ENCOUNTER — TELEPHONE (OUTPATIENT)
Dept: ONCOLOGY | Facility: CLINIC | Age: 48
End: 2023-04-19
Payer: COMMERCIAL

## 2023-04-19 DIAGNOSIS — C50.812 MALIGNANT NEOPLASM OF OVERLAPPING SITES OF LEFT BREAST IN FEMALE, ESTROGEN RECEPTOR POSITIVE: ICD-10-CM

## 2023-04-19 DIAGNOSIS — Z17.0 MALIGNANT NEOPLASM OF OVERLAPPING SITES OF LEFT BREAST IN FEMALE, ESTROGEN RECEPTOR POSITIVE: ICD-10-CM

## 2023-04-19 DIAGNOSIS — Z17.0 MALIGNANT NEOPLASM OF OVERLAPPING SITES OF LEFT BREAST IN FEMALE, ESTROGEN RECEPTOR POSITIVE: Primary | ICD-10-CM

## 2023-04-19 DIAGNOSIS — C50.812 MALIGNANT NEOPLASM OF OVERLAPPING SITES OF LEFT BREAST IN FEMALE, ESTROGEN RECEPTOR POSITIVE: Primary | ICD-10-CM

## 2023-04-19 RX ORDER — DEXAMETHASONE 4 MG/1
TABLET ORAL
Qty: 12 TABLET | Refills: 0 | Status: SHIPPED | OUTPATIENT
Start: 2023-04-19 | End: 2023-04-20

## 2023-04-19 RX ORDER — OLANZAPINE 5 MG/1
5 TABLET ORAL NIGHTLY
Qty: 3 TABLET | Refills: 5 | Status: SHIPPED | OUTPATIENT
Start: 2023-04-19

## 2023-04-19 RX ORDER — ONDANSETRON HYDROCHLORIDE 8 MG/1
8 TABLET, FILM COATED ORAL 3 TIMES DAILY PRN
Qty: 30 TABLET | Refills: 5 | Status: SHIPPED | OUTPATIENT
Start: 2023-04-19

## 2023-04-19 NOTE — TELEPHONE ENCOUNTER
Call to Ms. Kulkarni to let her know the treatment has not been approved yet.  Let her know that Dr. Frederick will still see her, and it will possibly be approved, as it has been escalated.  Patient is very pleasant and understanding.  She is fine if it does have to be started the following Thursday.

## 2023-04-19 NOTE — TELEPHONE ENCOUNTER
BP appears borderline, do you think we could try 2.5 mg lisinopril daily (you wanted to start low dose ACEi at last appointment)? Also, when should she follow up?

## 2023-04-19 NOTE — TELEPHONE ENCOUNTER
----- Message from Ursula Kulkarni sent at 4/19/2023  8:59 AM EDT -----  Regarding: BP  Contact: [phone]  Good morning,     Hope you are having a great week so far!  Here are my BP readings since last Wed. I did forget to take it Sat and Sun PM but other than that, I did get twice daily and tried to do as close to the same time everyday as possible.     Date   Time          BP       HR  4/12     5.30A   107/67    85                5.30P   102/72    93  4/13    6.00A    109/79    87                6.00P   104/74    83  4/14    6.00A    117/84    83                5.00P   104/74    83  4/15     9.30A   115/83    71                did not take PM  4/16     7.30A     110/82    81               did not take PM  4/17      6.00A   108/84    83                 6.00P    119/88   98  4/18     8.00A    119/92   75                6.00P    115/78   96  4/19    5.30A     124/81  75    Thank you,   MARIAM Kulkarni

## 2023-04-20 ENCOUNTER — INFUSION (OUTPATIENT)
Dept: ONCOLOGY | Facility: HOSPITAL | Age: 48
End: 2023-04-20
Payer: COMMERCIAL

## 2023-04-20 ENCOUNTER — OFFICE VISIT (OUTPATIENT)
Dept: ONCOLOGY | Facility: CLINIC | Age: 48
End: 2023-04-20
Payer: COMMERCIAL

## 2023-04-20 ENCOUNTER — TELEPHONE (OUTPATIENT)
Dept: CARDIOLOGY | Facility: CLINIC | Age: 48
End: 2023-04-20
Payer: COMMERCIAL

## 2023-04-20 VITALS
DIASTOLIC BLOOD PRESSURE: 97 MMHG | BODY MASS INDEX: 30.72 KG/M2 | HEIGHT: 63 IN | TEMPERATURE: 98.2 F | OXYGEN SATURATION: 97 % | HEART RATE: 101 BPM | SYSTOLIC BLOOD PRESSURE: 137 MMHG | RESPIRATION RATE: 16 BRPM | WEIGHT: 173.4 LBS

## 2023-04-20 DIAGNOSIS — Z17.0 MALIGNANT NEOPLASM OF OVERLAPPING SITES OF LEFT BREAST IN FEMALE, ESTROGEN RECEPTOR POSITIVE: ICD-10-CM

## 2023-04-20 DIAGNOSIS — C50.812 MALIGNANT NEOPLASM OF OVERLAPPING SITES OF LEFT BREAST IN FEMALE, ESTROGEN RECEPTOR POSITIVE: Primary | ICD-10-CM

## 2023-04-20 DIAGNOSIS — Z17.0 MALIGNANT NEOPLASM OF OVERLAPPING SITES OF LEFT BREAST IN FEMALE, ESTROGEN RECEPTOR POSITIVE: Primary | ICD-10-CM

## 2023-04-20 DIAGNOSIS — Z51.81 ENCOUNTER FOR MONITORING CARDIOTOXIC DRUG THERAPY: Primary | ICD-10-CM

## 2023-04-20 DIAGNOSIS — U07.1 COVID-19 VIRUS INFECTION: ICD-10-CM

## 2023-04-20 DIAGNOSIS — Z79.899 ENCOUNTER FOR MONITORING CARDIOTOXIC DRUG THERAPY: Primary | ICD-10-CM

## 2023-04-20 DIAGNOSIS — R74.8 ELEVATED ALKALINE PHOSPHATASE LEVEL: ICD-10-CM

## 2023-04-20 DIAGNOSIS — R74.8 ELEVATED LIVER ENZYMES: Primary | ICD-10-CM

## 2023-04-20 DIAGNOSIS — Z45.2 ENCOUNTER FOR ADJUSTMENT OR MANAGEMENT OF VASCULAR ACCESS DEVICE: ICD-10-CM

## 2023-04-20 DIAGNOSIS — C50.812 MALIGNANT NEOPLASM OF OVERLAPPING SITES OF LEFT BREAST IN FEMALE, ESTROGEN RECEPTOR POSITIVE: ICD-10-CM

## 2023-04-20 LAB
ALBUMIN SERPL-MCNC: 4.8 G/DL (ref 3.5–5.2)
ALBUMIN/GLOB SERPL: 1.6 G/DL (ref 1.1–2.4)
ALP SERPL-CCNC: 160 U/L (ref 38–116)
ALT SERPL W P-5'-P-CCNC: 224 U/L (ref 0–33)
ANION GAP SERPL CALCULATED.3IONS-SCNC: 15.4 MMOL/L (ref 5–15)
AST SERPL-CCNC: 102 U/L (ref 0–32)
B-HCG UR QL: NEGATIVE
BASOPHILS # BLD AUTO: 0.01 10*3/MM3 (ref 0–0.2)
BASOPHILS NFR BLD AUTO: 0.1 % (ref 0–1.5)
BILIRUB SERPL-MCNC: 0.5 MG/DL (ref 0.2–1.2)
BUN SERPL-MCNC: 13 MG/DL (ref 6–20)
BUN/CREAT SERPL: 16.7 (ref 7.3–30)
CALCIUM SPEC-SCNC: 9.8 MG/DL (ref 8.5–10.2)
CHLORIDE SERPL-SCNC: 102 MMOL/L (ref 98–107)
CO2 SERPL-SCNC: 20.6 MMOL/L (ref 22–29)
CREAT SERPL-MCNC: 0.78 MG/DL (ref 0.6–1.1)
DEPRECATED RDW RBC AUTO: 39.4 FL (ref 37–54)
EGFRCR SERPLBLD CKD-EPI 2021: 95.6 ML/MIN/1.73
EOSINOPHIL # BLD AUTO: 0 10*3/MM3 (ref 0–0.4)
EOSINOPHIL NFR BLD AUTO: 0 % (ref 0.3–6.2)
ERYTHROCYTE [DISTWIDTH] IN BLOOD BY AUTOMATED COUNT: 12.3 % (ref 12.3–15.4)
GLOBULIN UR ELPH-MCNC: 3 GM/DL (ref 1.8–3.5)
GLUCOSE SERPL-MCNC: 218 MG/DL (ref 74–124)
HCT VFR BLD AUTO: 43.2 % (ref 34–46.6)
HGB BLD-MCNC: 14.9 G/DL (ref 12–15.9)
IMM GRANULOCYTES # BLD AUTO: 0.05 10*3/MM3 (ref 0–0.05)
IMM GRANULOCYTES NFR BLD AUTO: 0.5 % (ref 0–0.5)
LYMPHOCYTES # BLD AUTO: 1.07 10*3/MM3 (ref 0.7–3.1)
LYMPHOCYTES NFR BLD AUTO: 11.6 % (ref 19.6–45.3)
MCH RBC QN AUTO: 30.3 PG (ref 26.6–33)
MCHC RBC AUTO-ENTMCNC: 34.5 G/DL (ref 31.5–35.7)
MCV RBC AUTO: 87.8 FL (ref 79–97)
MONOCYTES # BLD AUTO: 0.07 10*3/MM3 (ref 0.1–0.9)
MONOCYTES NFR BLD AUTO: 0.8 % (ref 5–12)
NEUTROPHILS NFR BLD AUTO: 8.05 10*3/MM3 (ref 1.7–7)
NEUTROPHILS NFR BLD AUTO: 87 % (ref 42.7–76)
NRBC BLD AUTO-RTO: 0 /100 WBC (ref 0–0.2)
PLATELET # BLD AUTO: 269 10*3/MM3 (ref 140–450)
PMV BLD AUTO: 9.8 FL (ref 6–12)
POTASSIUM SERPL-SCNC: 4.1 MMOL/L (ref 3.5–4.7)
PROT SERPL-MCNC: 7.8 G/DL (ref 6.3–8)
RBC # BLD AUTO: 4.92 10*6/MM3 (ref 3.77–5.28)
SODIUM SERPL-SCNC: 138 MMOL/L (ref 134–145)
WBC NRBC COR # BLD: 9.25 10*3/MM3 (ref 3.4–10.8)

## 2023-04-20 PROCEDURE — 85025 COMPLETE CBC W/AUTO DIFF WBC: CPT

## 2023-04-20 PROCEDURE — 81025 URINE PREGNANCY TEST: CPT | Performed by: INTERNAL MEDICINE

## 2023-04-20 PROCEDURE — 80053 COMPREHEN METABOLIC PANEL: CPT

## 2023-04-20 PROCEDURE — 36591 DRAW BLOOD OFF VENOUS DEVICE: CPT

## 2023-04-20 PROCEDURE — 25010000002 HEPARIN LOCK FLUSH PER 10 UNITS: Performed by: INTERNAL MEDICINE

## 2023-04-20 RX ORDER — HEPARIN SODIUM (PORCINE) LOCK FLUSH IV SOLN 100 UNIT/ML 100 UNIT/ML
500 SOLUTION INTRAVENOUS AS NEEDED
OUTPATIENT
Start: 2023-04-20

## 2023-04-20 RX ORDER — HEPARIN SODIUM (PORCINE) LOCK FLUSH IV SOLN 100 UNIT/ML 100 UNIT/ML
500 SOLUTION INTRAVENOUS AS NEEDED
Status: DISCONTINUED | OUTPATIENT
Start: 2023-04-20 | End: 2023-04-20 | Stop reason: HOSPADM

## 2023-04-20 RX ORDER — LORATADINE 10 MG/1
TABLET ORAL
COMMUNITY
Start: 2023-04-19

## 2023-04-20 RX ORDER — SODIUM CHLORIDE 0.9 % (FLUSH) 0.9 %
10 SYRINGE (ML) INJECTION AS NEEDED
Status: DISCONTINUED | OUTPATIENT
Start: 2023-04-20 | End: 2023-04-20 | Stop reason: HOSPADM

## 2023-04-20 RX ORDER — DEXAMETHASONE 4 MG/1
TABLET ORAL
Qty: 12 TABLET | Refills: 6 | Status: SHIPPED | OUTPATIENT
Start: 2023-04-20 | End: 2023-04-22

## 2023-04-20 RX ORDER — SODIUM CHLORIDE 0.9 % (FLUSH) 0.9 %
10 SYRINGE (ML) INJECTION AS NEEDED
OUTPATIENT
Start: 2023-04-20

## 2023-04-20 RX ADMIN — Medication 500 UNITS: at 09:27

## 2023-04-20 RX ADMIN — Medication 10 ML: at 09:27

## 2023-04-20 NOTE — TELEPHONE ENCOUNTER
I talked to Dr Frederick regarding patient's need for lisinopril.  Blood pressure today in her office was 137/97 a pulse of 101 but she had significant alcohol consumption recently and in fact liver function test were quite high and chemotherapy was not pursued given this week.  Plans are to reconsult us next week.  Patient had also sent in blood pressure readings much lower at home several of these below 110 mmHg.  With this in mind we will hold off on lisinopril for now and see how she is tolerates chemotherapy first.  We will have her return back to see me in 3 months with echo as long as she does well.  We will have her call earlier if systolic pressures remain greater than 130/80 mmHg.    Nurses, please let patient know recommendations to not start lisinopril now but monitor pressure closely off alcohol.  Significant alcohol intake will increase blood pressure given salt load.  As her home readings have been much lower we will observe for now but will have her call back if pressures remain greater than 130/80 mmHg.    Schedulers, please arrange a follow-up appointment with me and echo in 3 months.  Order placed for echo.

## 2023-04-20 NOTE — PROGRESS NOTES
Subjective     REASON FOR follow up  1.  Invasive lobular carcinoma multifocal,  · Left breast anterolateral stereotactic biopsy invasive lobular carcinoma, Stanton score of 6, grade 2, 4 mm, ER 95%, VA 95%, HER2/cheryl 0 negative with Ki-67 of 5%.  Positive for atypical lobular hyperplasia  · Left breast 1 o'clock position 10 cm from the nipple ultrasound-guided biopsy consistent with invasive lobular carcinoma grade 2 Stanton score of 6, 4 mm with lobular carcinoma in situ, ER 90%, %, HER2/cheryl 0  · Left breast upper stereotactic guided core biopsy invasive lobular carcinoma grade 2 Marie score of six 3 mm with lobular carcinoma in situ, ER 90%, %, HER2/cheryl equivocal 2+, FISH shows her to 5.7 with CEP 1.6 and ratio HER2 nu/KEIRA-17 ratio of 3.5.  It is amplified.    2.  March 8, 2023:p T3 N0 M0 invasive lobular carcinoma s/p bilateral mastectomy with left deep axillary sentinel lymph node biopsy, with bilateral placement of prepectoral tissue expanders for reconstruction and bilateral placement of AlloDerm.  · Pathology shows multifocal tumor in  upper outer quadrant and upper inner quadrant, invasive lobular carcinoma grade 2 with a Stanton score  score of 7  , total of 4 foci were seen, largest tumor being 60 mm, 20 mm, 2 mm, 1 mm.  No DCIS identified.  Lobular carcinoma in situ present.  No lymphatic or vascular channel invasion, no dermal lymphovascular space invasion all margins were negative for invasive carcinoma 4 lymph nodes were all negative for malignancy its pathologic T3N0  ·   · ER %, VA %, HER2/cheryl 2+ with Ki-67 of 5%  · Previous biopsies had shown 2 of the tumors were ER/VA positive HER2/cheryl negative and one of them was ER/VA positive HER2 positive  · On discussing with pathologist Dr. Serrano, she thinks that the larger tumor was ER VA positive and HER2 positive but unsure if it was the 60 mm tumor or 20 mm tumor.  Either way it was not the smaller tumors which was  HER2 positive.  ·   · All tumors look-alike per pathologist and they are invasive lobular carcinoma.                               HISTORY OF PRESENT ILLNESS:    Patient is a 47-year-old female with history of multifocal left breast cancer with MRI of the breast showing almost 15 lesions.  Biopsies on 3 different lesions were done and they were all consistent with invasive lobular carcinoma.  2 of the lesions where ER/WA positive HER2/cheryl negative and one of the larger lesion was ER/WA positive HER2 2+ equivocal.  FISH testing was done and the HER2 copy number was 5.7 with HER2/CEP ratio of 3.5.  The FISH was amplified.    Patient underwent bilateral mastectomy    With left sentinel lymph node surgery..  The pathology shows tumor to be present in the left upper outer quadrant invasive lobular carcinoma with a Marie score of 7, grade 2.  There were 4 lesions the greatest size was 6 0 mm, 20 mm, 2 mm and 1 mm in size.  Lobular carcinoma in situ is present.  All margins are negative for invasive carcinoma.  4 lymph nodes negative.  Patient has T3N0 invasive lobular carcinoma.      Discussed with pathology in length Dr. Serrano, one of the larger lesions which was 60 mm was heterogeneous it was biopsied at 2 separate spots.  The left anterior lateral and left 1:00 core fragment was ER/WA positive HER2/cheryl negative the left upper core biopsy was ER/WA positive HER2/cheryl 2+ equivocal but HER2/cheryl FISH was positive.    On discussion with pathology the 60 mm tumor showed ER and WA positive and HER2 positive by FISH with a Ki-67 of 40% at the Top-Hat clip.  The HER2/cheryl copy number was 5.7 with a HER2/cheryl by CEP ratio 3.5 which is positive.  The Ki-67 on the lower part was 13%.  The 20 mm tumor had a Ki-67 of 9%.  So the larger lesion was triple positive and heterogeneous as 2 biopsies were done on the larger lesions and that was both triple positive and at the second biopsy was ER/WA positive HER2/cheryl negative so it was a  heterogeneous tumor.  The 20 mm lesion was ER/AZ positive HER2/cheryl negative.    Given that she had a large tumor with high Ki-67 and premenopausal status we discussed about giving Taxotere carboplatin Herceptin Perjeta.    Oncology history:  Patient is a 47-year-old female who has been recently diagnosed with left breast cancer.  Patient has been compliant with her mammograms.    There is no family history of breast or ovarian cancer.  Her father had prostate cancer and melanoma.  A paternal aunt had thyroid cancer.      Details are as follows.    November 29, 2022: Screening bilateral mammogram showed 8 mm asymmetry in the central left breast posterior one third.  This appears to be in the superior left breast on the MLO view.  Right breast was negative.  A left diagnostic mammogram and ultrasound was recommended.    January January 3, 2023: Left breast diagnostic mammogram showed the spiculated areas within the central posterior and lateral anterior superior left breast where redemonstrated.  Targeted ultrasound was done.  At 1 o'clock position 10 cm from the nipple there is a shadowing mass with peripheral spiculations which is concordant with the mammographic abnormality and suspicious for malignancy.  The more centrally located spiculation on the cc view posterior to the glandular tissue cannot be clearly identified on the ultrasound.    Impression was 2 separate suspicious spiculated lesion in the left breast.  One of the lesions is seen well on the ultrasound and should be amenable to ultrasound-guided core biopsy.  The other lesion which was seen in the CC projection was amicable to stereotactic guided tissue sampling.    January 3, 2023 patient underwent ultrasound-guided left breast biopsy    The ultrasound-guided biopsy of left breast 10 cm from the nipple at 1 o'clock position showed invasive lobular carcinoma moderately differentiated with a Marie grade of 2 and score of 6 measuring 4 mm.  There  was focal lobular carcinoma in situ.    Left breast anterior lateral stereotactic core needle biopsy showed invasive lobular carcinoma moderately differentiated grade 2 with a [place] score of 6 measuring 4 mm.  There was atypical lobular hyperplasia.  Estrogen receptor was %, progesterone receptor was %, HER2/cheryl 0, Ki-67 5%.    Left breast upper stereotactic guided core biopsy showed invasive lobular carcinoma moderately differentiated grade 2 with [place] score of 6 with measuring 3 mm with lobular carcinoma in situ present.  I do not see the ER/LA or HER2 on the ultrasound-guided biopsy of the left breast lesion as well as the left upper stereotactic biopsy.  We will check with pathologist    January 23, 2023: Patient underwent  MRI of the breast bilateral    Right breast: Negative    Left breast: At 1:00 anterior left breast 4 cm from the nipple there is a 1.5 x 1.6 x 1.3 cm irregular enhancing mass which is biopsy-proven malignancy.  The biopsy clip is located within 0.9 x 1 x 1.1 cm postbiopsy hematoma along the inferior margin of the mass.  And there is additional hematoma along the lateral margin of the mass which measures 1.2 x 1.9 x 0.9 cm.    At 1:00 in the middle to posterior left breast 7.4 cm from the nipple there is a 1.3 x 1 cm x 1 cm irregular enhancing mass mass with a focus from a biopsy clip along the inferior margin which also represents the biopsy-proven malignancy    At 12:00 in the posterior left breast 8.5 cm from the nipple there is a 1.9 x 1.8 x 2.2 cm irregular enhancing mass with a corresponding 2.9 cm biopsy cavity with a biopsy clip which represents the biopsy-proven malignancy    In addition there are multiple at least 12 similar-appearing irregular enhancing masses and foci are identified throughout the left breast predominantly involving the upper breast but also involving the lower outer quadrant.  At 11-12 o'clock in the middle and posterior left breast there  are 3 adjacent reference masses together measuring 5.5 cm but individually measuring 1.2 x 0.9 x 1 cm.  At 1:00 in the far posterior left breast/axillary tail 13.7 cm posterior to the nipple is a 1.3 x 1.1 x 1 cm irregular enhancing mass which is located 0.8 cm from the anterolateral margin of the pectoralis Muscle.  Together the enhancing masses span approximately 11.7 x 8 x 7.8 cm.  There is no suspicious enhancement in the left nipple or chest wall.  Focal areas of skin enhancement likely reflect skin entry point from recent biopsies.  There are no pathologically enlarged internal mammary chain lymph nodes.    Impression    .  At least 15 similar-appearing irregular enhancing masses and foci  scattered throughout the left breast, together measuring up to 11.7 cm  in maximum dimension, encompass 3 sites of biopsy-proven malignancy and  are consistent with multicentric malignancy. Oncologic and surgical  management are recommended.  2.  No MRI evidence of malignancy in the right breast.    Patient is a 47-year-old female with multifocal invasive lobular carcinoma who on screening mammogram showed 8 mm asymmetry in the central left breast posterior one third.  In the cc view.  It appears to be superior left breast on the MLO view.  There was also architectural distortion in the lateral left breast one third on the left cc view.    On diagnostic mammogram the spiculated areas within the central posterior and the lateral anterior superior left breast where redemonstrated.  Targeted ultrasound was done.  At 1 o'clock position 10 cm from the nipple there is a mass with spiculations which is suspicious.  The more centrally located spiculation on the cc view.  The more centrally located spiculation on the cc view cannot be clearly identified on ultrasound.  So there impression was there were 2 separate suspicious spiculated lesion in the left breast.  1 was seen well on the ultrasound and amenable to ultrasound-guided  biopsy the other is seen well on mammography and a stereotactic guided biopsy suggested.    Patient subsequently underwent stereotactic biopsy of 1 lesion and ultrasound-guided biopsy of the 1 cm mass at 1 o'clock position 10 cm from the nipple.  A total of 3 different areas of the left breast were biopsied and specimens were sent to pathology.  The third biopsy was performed at 12 o'clock position in the posterior one third of the left breast.  All of the 3 sites were consistent with invasive lobular carcinoma at site A, B, and C.    MRI of the breast showed at 1 o'clock position of the left breast anteriorly 4 cm posterior to the nipple there is a 1.5 x 1.6 x 1.3 cm irregular enhancing mass.  There is a small hematoma along the inferior margin of the mass.  An additional hematoma along the lateral margin of the mass which is measuring 1.2 x 0.9 x 0.9 cm    At 1:00 in the middle to posterior left breast 7.4 cm posterior to the nipple there is a 1.3 x 1 x 1 cm enhancing mass with a biopsy clip.    At 12:00 posterior left breast 8.5 cm posterior to the nipple there is a 1.5 x 1.8 x 2.2 cm irregular enhancing mass with a corresponding 2.9 cm biopsy cavity with a clip which represents the biopsy site malignancy    Multiple at least 12 similar-appearing irregular enhancing masses and foci are identified throughout the left breast predominantly involving the upper breast but also involving the lower outer quadrant.  At 11-12 o'clock in the middle and posterior left breast there is a 3 adjacent reference masses measuring 5.5 cm in AP dimension and individually measuring 1.2 x 0.9 x 1 cm.  At 1:00 in the far posterior left breast axillary tail 13.7 cm from the nipple there is a 1.3 x 1.1 x 1 cm craniocaudal dimension irregular enhancing mass which is located 0.8 cm from the anterolateral margin of the pectoralis muscle.     Per the MRI at least 15 similar-appearing irregular enhancing masses and foci scattered throughout  the left breast together measuring 11.7 cm in maximum dimension which encompasses 3 sites of biopsy-proven malignancy and a consistent with multicentric malignancy.  MRI of the right breast is negative    INVITAE genetic testing showed variation of uncertain significance of LUCAS  Gene.    Patient was suggested left total mastectomy, left axillary sentinel lymph node biopsy possible node dissection and possible reconstruction.    However patient called Dr. Hi and decided to go for upfront bilateral total mastectomy, left axillary sentinel lymph node biopsy and possible axillary lymph node dissection and reconstruction.     I presented the case in the breast cancer conference today.  Patient had multiple nodules per MRI on the left breast Dr. Todd looked at it and felt there were multiple nodules and the largest one was 2.2 cm.  The discussion was to go ahead and upfront do the surgery and subsequently treat with chemo/ endocrine therapy as needed.  The left breast MRI findings are slightly atypical and that it is not contiguous involvement over 11.5 cm area but multiple nodules.  Surgical pathology will clarify.  If it is 1 big lesion or multiple sma      Past Medical History:   Diagnosis Date   • ADD (attention deficit disorder)    • Bilateral impacted cerumen 07/13/2020   • Breast cancer 03/08/2023    LUCIO MASTECTOMY   • Foot fracture, left    • History of COVID-19 2022   • History of gestational diabetes 2010   • History of Graves' disease 2012   • History of radioactive iodine thyroid ablation 2012   • History of vertigo    • Hyperlipidemia    • Hypothyroidism    • Melanoma of back    • Positive depression screening 07/13/2020        Past Surgical History:   Procedure Laterality Date   • BREAST RECONSTRUCTION Bilateral 3/8/2023    Procedure: BILATERAL PLACEMENT  TISSUE EXPANDER AND ALLODERM;  Surgeon: Eamon Stone MD;  Location: [facility];  Service: Plastics;  Laterality: Bilateral;   • D & C  CERVICAL BIOPSY  01/2006   • INTRAUTERINE DEVICE INSERTION  2010    Mirena   • INTRAUTERINE DEVICE INSERTION  2015    Mirena exchange, old IUD removal and new one inserted by Dr Carr, Lot # [id number] exp 09/17.mar   • MASTECTOMY W/ SENTINEL NODE BIOPSY Bilateral 3/8/2023    Procedure: Bilateral total mastectomy, left axillary sentinel lymph node biopsy;  Surgeon: Kell Hi MD;  Location: [facility] MAIN OR;  Service: General;  Laterality: Bilateral;   • SCAR REVISION BREAST Right 4/3/2023    Procedure: RIGHT MASTECTOMY REVISION;  Surgeon: Eamon Stone MD;  Location: [facility] MAIN OR;  Service: Plastics;  Laterality: Right;   • VENOUS ACCESS DEVICE (PORT) INSERTION Right 4/13/2023    Procedure: right port placement;  Surgeon: Kell Hi MD;  Location: [facility] OR;  Service: General;  Laterality: Right;   • WISDOM TOOTH EXTRACTION          Current Outpatient Medications on File Prior to Visit   Medication Sig Dispense Refill   • acetaminophen (TYLENOL) 325 MG tablet Take 2 tablets by mouth Every 4 (Four) Hours As Needed for Mild Pain. 60 tablet 0   • COLLAGEN PO Take 1 tablet by mouth Daily.     • levothyroxine (SYNTHROID, LEVOTHROID) 125 MCG tablet Take 1 tablet by mouth Daily. 90 tablet 1   • lidocaine-prilocaine (EMLA) 2.5-2.5 % cream Apply 1 application topically to the appropriate area as directed As Needed for Mild Pain (apply pea-sized amount to port site 30 minutes prior to port being accessed). 30 g 3   • loratadine (CLARITIN) 10 MG tablet      • OLANZapine (ZyPREXA) 5 MG tablet Take 1 tablet by mouth Every Night. Take on days 2, 3 and 4 after chemotherapy. 3 tablet 5   • omeprazole (priLOSEC) 20 MG capsule Take 1 capsule by mouth Daily. 90 capsule 1   • ondansetron (ZOFRAN) 8 MG tablet Take 1 tablet by mouth 3 (Three) Times a Day As Needed for Nausea or Vomiting. 30 tablet 5   • Probiotic Product (PROBIOTIC ADVANCED PO) Take 1 tablet by mouth Daily.       No current  facility-administered medications on file prior to visit.        ALLERGIES:    Allergies   Allergen Reactions   • Erythromycin GI Intolerance     Pt reports   • Penicillins Rash        Social History     Socioeconomic History   • Marital status:      Spouse name: Parish   • Number of children: 2   Tobacco Use   • Smoking status: Never   • Smokeless tobacco: Never   Vaping Use   • Vaping Use: Never used   Substance and Sexual Activity   • Alcohol use: Not Currently     Alcohol/week: 1.0 standard drink     Types: 1 Glasses of wine per week     Comment: Daily caffeine use   • Drug use: Never   • Sexual activity: Yes     Partners: Male     Comment:          Family History   Problem Relation Age of Onset   • Diabetes Father    • Hyperlipidemia Father    • Arthritis Father    • Hypertension Father    • Heart disease Father         S/P stents   • Heart attack Father 41   • Colon polyps Father    • Melanoma Father    • Prostate cancer Father    • Hyperlipidemia Sister    • Hypertension Sister    • Heart attack Sister    • Diabetes Sister    • Thyroid cancer Paternal Aunt    • Lung cancer Maternal Grandmother    • Cancer Maternal Grandfather         Bladder cancer   • Lung cancer Maternal Grandfather    • Heart failure Paternal Grandmother    • Transient ischemic attack Paternal Grandmother    • Heart disease Paternal Grandfather    • Malig Hyperthermia Neg Hx       Family history: Maternal great aunt had breast cancer, unsure of the age .  Father had melanoma and prostate cancer, paternal aunt had thyroid cancer, paternal grandfather had bladder cancer maternal grandfather had bladder cancer maternal grandmother had lung cancer    Past medical history is consistent with Graves' disease    OB/GYN history:  Age of menarche: 14  Patient is premenopausal and had an IUD which has been now removed   2 para 2 no miscarriages  Age at first childbirth 30  Patient did breast-feed 24 months  Length of taking  "birth control pills none              Review of Systems   Constitutional: Negative for appetite change, chills, diaphoresis, fatigue, fever and unexpected weight change.   HENT: Negative for hearing loss, sore throat and trouble swallowing.    Respiratory: Negative for cough, chest tightness, shortness of breath and wheezing.    Cardiovascular: Negative for chest pain, palpitations and leg swelling.   Gastrointestinal: Negative for abdominal distention, abdominal pain, constipation, diarrhea, nausea and vomiting.   Genitourinary: Negative for dysuria, frequency, hematuria and urgency.   Musculoskeletal: Negative for joint swelling.        No muscle weakness.   Skin: Negative for rash and wound.   Neurological: Negative for seizures, syncope, speech difficulty, weakness, numbness and headaches.   Hematological: Negative for adenopathy. Does not bruise/bleed easily.   Psychiatric/Behavioral: Negative for behavioral problems, confusion and suicidal ideas.       I have reviewed and confirmed the accuracy of the ROS as documented by the MA/LPN/RN Kathrin Frederick MD        Objective     Vitals:    04/20/23 0827   BP: 137/97   Pulse: 101   Resp: 16   Temp: 98.2 °F (36.8 °C)   TempSrc: Temporal   SpO2: 97%   Weight: 78.7 kg (173 lb 6.4 oz)   Height: 160 cm (62.99\")   PainSc: 0-No pain         4/20/2023     8:24 AM   Current Status   ECOG score 0       Physical Exam      CONSTITUTIONAL:  Vital signs reviewed.  No distress, looks comfortable.  EYES:  Conjunctivae and lids unremarkable.  PERRLA  EARS,NOSE,MOUTH,THROAT:  Ears and nose appear unremarkable.  Lips, teeth, gums appear unremarkable.  RESPIRATORY:  Normal respiratory effort.  Lungs clear to auscultation bilaterally.  BREAST: Right breast: No skin changes, no evidence of breast mass, no nipple discharge, no evidence of any right axillary adenopathy or right supraclavicular adenopathy  Left breast: It is difficult to measure a mass in the left breast as it is very weak " and I cannot differentiate if it is breast tissue and I cannot feel the edges.  But there is resistance in the upper outer quadrant of the left breast slightly more compared to the rest of the breast no evidence of any skin changes, no evidence of any left breast mass and no evidence of left nipple discharge as well as no left axillary adenopathy or left supraclavicular adenopathy.        CARDIOVASCULAR:  Normal S1, S2.  No murmurs rubs or gallops.  No significant lower extremity edema.  GASTROINTESTINAL: Abdomen appears unremarkable.  Nontender.  No hepatomegaly.  No splenomegaly.  LYMPHATIC:  No cervical, supraclavicular, axillary lymphadenopathy.  SKIN:  Warm.  No rashes.  PSYCHIATRIC:  Normal judgment and insight.  Normal mood and affect.  I have reexamined the patient and the results are consistent with the previously documented exam. Kathrin Frederick MD         RECENT LABS:  Hematology WBC   Date Value Ref Range Status   04/20/2023 9.25 3.40 - 10.80 10*3/mm3 Final   10/11/2022 8.0 3.4 - 10.8 x10E3/uL Final     RBC   Date Value Ref Range Status   04/20/2023 4.92 3.77 - 5.28 10*6/mm3 Final   10/11/2022 5.00 3.77 - 5.28 x10E6/uL Final     Hemoglobin   Date Value Ref Range Status   04/20/2023 14.9 12.0 - 15.9 g/dL Final     Hematocrit   Date Value Ref Range Status   04/20/2023 43.2 34.0 - 46.6 % Final     Platelets   Date Value Ref Range Status   04/20/2023 269 140 - 450 10*3/mm3 Final          Assessment & Plan     *Pathologic T3N0 invasive lobular carcinoma of the left breast, upper inner and upper outer quadrant, multifocal, 60 mm, 20 mm, 2 mm, 1 mm, grade 2, Marie score of 7, 2 of them are ER/NH positive, HER2/cheryl negative.  One of the larger lesions was ER/NH positive HER2 positive.  Ki-67 is very low 5%,    · Invasive lobular carcinoma of the left breast recently diagnosed, multifocal with 3 lesions biopsied in the left breast.  Patient has multicentric disease with 15 similar-appearing irregular  enhancing masses in the left breast together measuring 11.7 cm in maximum dimension which encompasses 3 of the 3 biopsy proven malignancies.  All the 3 very invasive lobular carcinoma, grade 2 with Marie score of 6 but 2 of the lesions where ER/SD positive HER2 negative and the third lesion was ER/SD positive HER2 2+ with FISH showing HER2 amplified.      1.  Estrogen receptor 95%, progesterone receptor 95%, HER2/cheryl 0, Ki-67 5%    2 left breast 10 o'clock position 10 cm from the nipple ultrasound core guided biopsy showed ER 90%, %, HER2/cheryl 0    3.  Left breast upper stereotactic core biopsy showed estrogen receptor 90%, progesterone receptor 100%, HER2/cheryl equivocal 2+  FISH testing showed her to as 5.7 with a KEIRA 17 of 1.6 with a ratio HER2/KEIRA 17 of        · INVITAE genetic testing showed variation of uncertain significance of LUCAS gene  · We discussed the multidisciplinary approach in this patient's and I also discussed in length that that invasive lobular carcinomas do benefit from endocrine therapy and discussed in length about side effects of all endocrine therapies  · Pending the results of the surgery she may need to be a candidate for chemotherapy as well and subsequently radiation if the tumor is large locally  · Patient is keen on getting bilateral oophorectomy after her breast surgery is done and we discussed about medical oophorectomy with Lupron as well  · February 24, 2023: Presented patient in the breast cancer conference.  Even though it appears to be a heterogeneous tumor, since it is difficult to really appreciate by clinical exam and the fact that it is invasive lobular carcinoma, it was felt best to do upfront surgery.  · March 8, 2023:p T3 N0 M0 invasive lobular carcinoma s/p bilateral mastectomy with left deep axillary sentinel lymph node biopsy, with bilateral placement of prepectoral tissue expanders for reconstruction and bilateral placement of AlloDerm.  · Pathology shows  multifocal tumor in  upper outer quadrant and upper inner quadrant, invasive lobular carcinoma grade 2 with a Marie score  score of 7  , total of 4 foci were seen, largest tumor being 60 mm, 20 mm, 2 mm, 1 mm.  No DCIS identified.  Lobular carcinoma in situ present.  No lymphatic or vascular channel invasion, no dermal lymphovascular space invasion all margins were negative for invasive carcinoma 4 lymph nodes were all negative for malignancy its pathologic T3N0  ·   · ER %, UT %, HER2/cheryl 2+ with Ki-67 of 5%  · Previous biopsies had shown 2 of the tumors were ER/UT positive HER2/cheryl negative and one of them was ER/UT positive HER2 positive  · On discussing with pathologist Dr. Serrano, she thinks that the larger tumor was ER UT positive and HER2 positive.  HER2 copy number of 5.7 with HER2/CEP ratio of 3.5 positive and this was on the larger tumor.  · Given that patient has heterogeneous tumor with the larger lesion being ER/UT positive HER2 positive and a Ki-67 of 40%, being premenopausal we will treat with Taxotere carboplatin Herceptin Perjeta.  · April 20, 2023: Patient was to start cycle 1 today but unfortunately her liver function tests were extremely high.  Discussed about alcohol and she had drank and taken Tylenol.  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    *Significant anxiety, referred to [facility]    Plan  · Patient is s/p bilateral mastectomy with sentinel lymph node surgery  · Reviewed pathology in length with patient and she has multifocal tumor, 60, 20, 2, 1 mm lesions  · 2 of the lesions where ER/UT positive HER2 negative  · One of the larger lesions was ER/UT positive HER2 positive  · She will certainly require chemotherapy, we discussed in length and we will present her in  the breast cancer conference  · Patient is s/p port placement  · Patient was to start cycle 1 Taxotere carboplatinum Herceptin Perjeta today but held because of significantly increased liver function secondary to alcohol  · Discussion done with Dr. Romero GI physician who has reviewed her chart and felt this is all alcohol-related and not [employer] hepatic steatosis though her baseline AST has been 60.  · Reviewed echocardiogram which is normal and discussed with Dr. Fermin today on the phone and she is fine on starting treatment  · We will obtain CT scan of the chest abdomen pelvis and bone scan  · Lengthy discussion about not drinking alcohol or taking Tylenol  · Follow-up in 1 week to get started with cycle 1 Taxotere carboplatin Herceptin Perjeta.  She is already educated with nurse practitioner.  · Will refer patient to [facility]    I spent 50 total minutes, face-to-face, caring for Ursula today. Greater than 50% of this time involved counseling and/or coordination of care as documented within this note.      MD Dr. Kell Sandoval

## 2023-04-20 NOTE — TELEPHONE ENCOUNTER
Reviewed recommendations with Ursula Kulkarni and the patient verbalized understanding of the recommendations.      Thank you,  Italia CABRAL RN  Triage Nurse [facility] [place]

## 2023-04-22 PROBLEM — R74.8 ELEVATED LIVER ENZYMES: Status: ACTIVE | Noted: 2023-04-22

## 2023-04-25 ENCOUNTER — HOSPITAL ENCOUNTER (OUTPATIENT)
Dept: CT IMAGING | Facility: HOSPITAL | Age: 48
Discharge: HOME | End: 2023-04-25
Admitting: INTERNAL MEDICINE
Payer: COMMERCIAL

## 2023-04-25 ENCOUNTER — HOSPITAL ENCOUNTER (OUTPATIENT)
Dept: NUCLEAR MEDICINE | Facility: HOSPITAL | Age: 48
Discharge: HOME | End: 2023-04-25
Payer: COMMERCIAL

## 2023-04-25 DIAGNOSIS — Z17.0 MALIGNANT NEOPLASM OF OVERLAPPING SITES OF LEFT BREAST IN FEMALE, ESTROGEN RECEPTOR POSITIVE: ICD-10-CM

## 2023-04-25 DIAGNOSIS — C50.812 MALIGNANT NEOPLASM OF OVERLAPPING SITES OF LEFT BREAST IN FEMALE, ESTROGEN RECEPTOR POSITIVE: ICD-10-CM

## 2023-04-25 DIAGNOSIS — R74.8 ELEVATED LIVER ENZYMES: ICD-10-CM

## 2023-04-25 DIAGNOSIS — R74.8 ELEVATED ALKALINE PHOSPHATASE LEVEL: ICD-10-CM

## 2023-04-25 LAB — CREAT BLDA-MCNC: 0.9 MG/DL (ref 0.6–1.3)

## 2023-04-25 PROCEDURE — 78306 BONE IMAGING WHOLE BODY: CPT

## 2023-04-25 PROCEDURE — 25510000001 IOPAMIDOL 61 % SOLUTION: Performed by: INTERNAL MEDICINE

## 2023-04-25 PROCEDURE — 0 TECHNETIUM MEDRONATE KIT: Performed by: INTERNAL MEDICINE

## 2023-04-25 PROCEDURE — 71260 CT THORAX DX C+: CPT

## 2023-04-25 PROCEDURE — 74177 CT ABD & PELVIS W/CONTRAST: CPT

## 2023-04-25 PROCEDURE — 82565 ASSAY OF CREATININE: CPT

## 2023-04-25 PROCEDURE — A9503 TC99M MEDRONATE: HCPCS | Performed by: INTERNAL MEDICINE

## 2023-04-25 PROCEDURE — 70491 CT SOFT TISSUE NECK W/DYE: CPT

## 2023-04-25 RX ORDER — TC 99M MEDRONATE 20 MG/10ML
23 INJECTION, POWDER, LYOPHILIZED, FOR SOLUTION INTRAVENOUS
Status: COMPLETED | OUTPATIENT
Start: 2023-04-25 | End: 2023-04-25

## 2023-04-25 RX ADMIN — Medication 23 MILLICURIE: at 10:06

## 2023-04-25 RX ADMIN — IOPAMIDOL 100 ML: 612 INJECTION, SOLUTION INTRAVENOUS at 11:32

## 2023-04-26 ENCOUNTER — OFFICE VISIT (OUTPATIENT)
Dept: PSYCHIATRY | Facility: HOSPITAL | Age: 48
End: 2023-04-26
Payer: COMMERCIAL

## 2023-04-26 DIAGNOSIS — C50.812 MALIGNANT NEOPLASM OF OVERLAPPING SITES OF LEFT BREAST IN FEMALE, ESTROGEN RECEPTOR POSITIVE: ICD-10-CM

## 2023-04-26 DIAGNOSIS — F41.1 GENERALIZED ANXIETY DISORDER: Primary | ICD-10-CM

## 2023-04-26 DIAGNOSIS — Z17.0 MALIGNANT NEOPLASM OF OVERLAPPING SITES OF LEFT BREAST IN FEMALE, ESTROGEN RECEPTOR POSITIVE: ICD-10-CM

## 2023-04-26 NOTE — PROGRESS NOTES
"In Person  Provider Location: [facility]    Pt prefers to be called \"Destinee\"    Chief Complaint: Anxiety    Subjective  Patient ID: Ursula Kulkarni is a 47 y.o. female who presents for initial consultation through the [facility] at the request of Kathrin Frederick MD     PHQ9  1  JULIANN 7 2       HPI:  Pt is a 47 year old female who presents to behavioral oncology clinic alongside exacerbation of anxiety alongside diagnosis of breast cancer, continued need for treatment, elevated LFTs potentially related to alcohol. At this time, pt feels able to manage current needs and sx. Identifies goals of control, allowance of frederick, permission to see how she reponds to treatment. Pt reports benefit of current routine, staring morning early and maintaining this sense of schedule and purpose.  Continues to got to bed between 8-9 PM, up appx 5. Sleep is easy to initiate although fragmented. Occasionally restorative; drinks coffee in AM, with 1 PM caffeine curfew. Very able to function on this pattern. Questions RLS. Does not snore. Denies dry mouth or headaches. Pt reports significant appreciation of work. Likes to remain busy. Sees this important for personal wellbeing and financial security. Appreciates open communication with team, support pf personal needs. At this time, pt reports ability to perform current work, appreciating ability to do many things from home.     Behavioral health history reviewed; pt reports hx of xituational anxiety alongside dissolution of marriage, role strain/ disrupted family dynamics. Discussed anticipation of anxiety with PCP with initiation of paxil, celexa (felt like had the flu), xanax (took one, concerned would not be able to care for kids if needed), buspirone (limited efficacy). Pt identifies high caution surrounding use of medications, limiting use when possible. Pt finds most anxiety has been self limited, assisted by prayer " and mantra that she can change situation or attitude, and in this case, she can only choose to change attitude. Current PHQ 9 = 1. Pt consideres role strain, challenges managing roles as mom, employee, colleague, etc. JULIANN 7 = 2 with some consideration of worrying to much about different things, feeling restless.    Substance use considered, given discussion of liver impact. Pt reports recent alcohol use of 6 drinks per week, max of 3 drinks (White Claw) on one day. Hx higher alcohol use in early twenties on weekends, never impactful to relationships, legal concerns, etc. Discontinued with first pregnancy. Pt does confirm high stres alongside work events, recent diagnosis/ treatment discussions, and recognition of anger, grief, disruption of plans with need for systemic treatment. Pt does report drinking 6 white claws alongside this event which she feels led to acute increase in LFTs. Is no longer drinking at all. Pt shares addiction is very prevalent in family, and this is not a usual coping strategy for her. Pt does share concern that LFTs have been increasing over recent months and identifies concern that this is not all alcohol related despite work up per nephrology.     Social History  Marital Status:  to supportive  of almost 8 years; second marriage. Challenging relationship with prior .  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  Alcohol Use: occasional/rare use  Marijuana/ Other drug Use: denied.   Janene preference: Mandaeism    Medical History  Psychiatric History: See HPI    Family  History  Family Psychiatric History: Family with substance use disorders, depression, anxiety    The following portions of the patient's history were reviewed and updated as appropriate: She  has a past medical history of ADD (attention deficit disorder), Bilateral impacted cerumen (07/13/2020), Breast cancer (03/08/2023), Foot fracture, left, History of COVID-19 (2022), History of gestational diabetes (2010), History of Graves' disease (2012), History of radioactive iodine thyroid ablation (2012), History of vertigo, Hyperlipidemia, Hypothyroidism, Melanoma of back, and Positive depression screening (07/13/2020).  She  has a past surgical history that includes d & c cervical biopsy (01/2006); [place] tooth extraction; Intrauterine device insertion (2010); Intrauterine device insertion (2015); Mastectomy w/ sentinel node biopsy (Bilateral, 3/8/2023); Breast reconstruction (Bilateral, 3/8/2023); Scar Revision Breast (Right, 4/3/2023); and Venous Access Device (Port) (Right, 4/13/2023).  Her family history includes Arthritis in her father; Cancer in her maternal grandfather; Colon polyps in her father; Diabetes in her father and sister; Heart attack in her sister; Heart attack (age of onset: 41) in her father; Heart disease in her father and paternal grandfather; Heart failure in her paternal grandmother; Hyperlipidemia in her father and sister; Hypertension in her father and sister; Lung cancer in her maternal grandfather and maternal grandmother; Melanoma in her father; Prostate cancer in her father; Thyroid cancer in her paternal aunt; Transient ischemic attack in her paternal grandmother.  She  reports that she has never smoked. She has never used smokeless tobacco. She reports that she does not currently use alcohol after a past usage of about 1.0 standard drink per week. She reports that she does not use drugs.  Current Outpatient Medications   Medication Sig Dispense Refill   • acetaminophen (TYLENOL) 325 MG  tablet Take 2 tablets by mouth Every 4 (Four) Hours As Needed for Mild Pain. 60 tablet 0   • COLLAGEN PO Take 1 tablet by mouth Daily.     • levothyroxine (SYNTHROID, LEVOTHROID) 125 MCG tablet Take 1 tablet by mouth Daily. 90 tablet 1   • lidocaine-prilocaine (EMLA) 2.5-2.5 % cream Apply 1 application topically to the appropriate area as directed As Needed for Mild Pain (apply pea-sized amount to port site 30 minutes prior to port being accessed). 30 g 3   • lisinopril (PRINIVIL,ZESTRIL) 2.5 MG tablet Take 1 tablet by mouth Daily. 30 tablet 2   • loratadine (CLARITIN) 10 MG tablet      • OLANZapine (ZyPREXA) 5 MG tablet Take 1 tablet by mouth Every Night. Take on days 2, 3 and 4 after chemotherapy. 3 tablet 5   • omeprazole (priLOSEC) 20 MG capsule Take 1 capsule by mouth Daily. 90 capsule 1   • ondansetron (ZOFRAN) 8 MG tablet Take 1 tablet by mouth 3 (Three) Times a Day As Needed for Nausea or Vomiting. 30 tablet 5   • Probiotic Product (PROBIOTIC ADVANCED PO) Take 1 tablet by mouth Daily.       No current facility-administered medications for this visit.     Current Outpatient Medications on File Prior to Visit   Medication Sig   • acetaminophen (TYLENOL) 325 MG tablet Take 2 tablets by mouth Every 4 (Four) Hours As Needed for Mild Pain.   • COLLAGEN PO Take 1 tablet by mouth Daily.   • levothyroxine (SYNTHROID, LEVOTHROID) 125 MCG tablet Take 1 tablet by mouth Daily.   • lidocaine-prilocaine (EMLA) 2.5-2.5 % cream Apply 1 application topically to the appropriate area as directed As Needed for Mild Pain (apply pea-sized amount to port site 30 minutes prior to port being accessed).   • lisinopril (PRINIVIL,ZESTRIL) 2.5 MG tablet Take 1 tablet by mouth Daily.   • loratadine (CLARITIN) 10 MG tablet    • OLANZapine (ZyPREXA) 5 MG tablet Take 1 tablet by mouth Every Night. Take on days 2, 3 and 4 after chemotherapy.   • omeprazole (priLOSEC) 20 MG capsule Take 1 capsule by mouth Daily.   • ondansetron (ZOFRAN) 8 MG  tablet Take 1 tablet by mouth 3 (Three) Times a Day As Needed for Nausea or Vomiting.   • Probiotic Product (PROBIOTIC ADVANCED PO) Take 1 tablet by mouth Daily.     No current facility-administered medications on file prior to visit.     She is allergic to erythromycin and penicillins..    Objective   Mental Status Exam  Appearance:  clean and casually dressed, appropriate  Attitude toward clinician:  cooperative and agreeable   Speech:    Rate:  regular rate and rhythm   Volume:  normal  Motor:  no abnormal movements present  Mood:  euthymic  Affect:  euthymic and mood congruent  Thought Processes:  linear, logical, and goal directed  Thought Content:  normal  Suicidal Thoughts:  absent  Homicidal Thoughts:  absent  Perceptual Disturbance: no perceptual disturbance  Attention and Concentration:  good  Insight and Judgement:  good  Memory:  memory appears to be intact    Lab Review:   Hospital Outpatient Visit on 04/25/2023   Component Date Value   • Creatinine 04/25/2023 0.90    Infusion on 04/20/2023   Component Date Value   • Glucose 04/20/2023 218 (H)    • BUN 04/20/2023 13    • Creatinine 04/20/2023 0.78    • Sodium 04/20/2023 138    • Potassium 04/20/2023 4.1    • Chloride 04/20/2023 102    • CO2 04/20/2023 20.6 (L)    • Calcium 04/20/2023 9.8    • Total Protein 04/20/2023 7.8    • Albumin 04/20/2023 4.8    • ALT (SGPT) 04/20/2023 224 (C)    • AST (SGOT) 04/20/2023 102 (C)    • Alkaline Phosphatase 04/20/2023 160 (H)    • Total Bilirubin 04/20/2023 0.5    • Globulin 04/20/2023 3.0    • A/G Ratio 04/20/2023 1.6    • BUN/Creatinine Ratio 04/20/2023 16.7    • Anion Gap 04/20/2023 15.4 (H)    • eGFR 04/20/2023 95.6    • HCG, Urine QL 04/20/2023 Negative    • WBC 04/20/2023 9.25    • RBC 04/20/2023 4.92    • Hemoglobin 04/20/2023 14.9    • Hematocrit 04/20/2023 43.2    • MCV 04/20/2023 87.8    • MCH 04/20/2023 30.3    • MCHC 04/20/2023 34.5    • RDW 04/20/2023 12.3    • RDW-SD 04/20/2023 39.4    • MPV 04/20/2023  9.8    • Platelets 04/20/2023 269    • Neutrophil % 04/20/2023 87.0 (H)    • Lymphocyte % 04/20/2023 11.6 (L)    • Monocyte % 04/20/2023 0.8 (L)    • Eosinophil % 04/20/2023 0.0 (L)    • Basophil % 04/20/2023 0.1    • Immature Grans % 04/20/2023 0.5    • Neutrophils, Absolute 04/20/2023 8.05 (H)    • Lymphocytes, Absolute 04/20/2023 1.07    • Monocytes, Absolute 04/20/2023 0.07 (L)    • Eosinophils, Absolute 04/20/2023 0.00    • Basophils, Absolute 04/20/2023 0.01    • Immature Grans, Absolute 04/20/2023 0.05    • nRBC 04/20/2023 0.0    Admission on 04/13/2023, Discharged on 04/13/2023   Component Date Value   • HCG, Urine, QL 04/13/2023 Negative    • Lot Number 04/13/2023 [id number]    • Internal Positive Control 04/13/2023 Positive    • Internal Negative Control 04/13/2023 Negative    • Expiration Date 04/13/2023 04/30/2024    Hospital Outpatient Visit on 04/10/2023   Component Date Value   • Target HR (85%) 04/10/2023 149    • Max. Pred. HR (100%) 04/10/2023 175    • EF(MOD-bp) 04/10/2023 64.7    • LV GLOBAL STRAIN  04/10/2023 -21.0    • LVIDd 04/10/2023 3.9    • LVIDs 04/10/2023 2.23    • IVSd 04/10/2023 0.94    • LVPWd 04/10/2023 1.04    • FS 04/10/2023 43.3    • IVS/LVPW 04/10/2023 0.91    • ESV(cubed) 04/10/2023 11.1    • LV Sys Vol (BSA correcte* 04/10/2023 12.1    • EDV(cubed) 04/10/2023 60.6    • LV Michaud Vol (BSA correct* 04/10/2023 35.2    • LVOT area 04/10/2023 2.8    • LV mass(C)d 04/10/2023 121.7    • LVOT diam 04/10/2023 1.88    • EDV(MOD-sp2) 04/10/2023 48.0    • EDV(MOD-sp4) 04/10/2023 64.0    • ESV(MOD-sp2) 04/10/2023 17.0    • ESV(MOD-sp4) 04/10/2023 22.0    • SV(MOD-sp2) 04/10/2023 31.0    • SV(MOD-sp4) 04/10/2023 42.0    • SI(MOD-sp2) 04/10/2023 17.0    • SI(MOD-sp4) 04/10/2023 23.1    • EF(MOD-sp2) 04/10/2023 64.6    • EF(MOD-sp4) 04/10/2023 65.6    • MV E max kristin 04/10/2023 45.8    • MV A max kristin 04/10/2023 47.6    • MV dec time 04/10/2023 0.21    • MV E/A 04/10/2023 0.96    • Pulm A Revs Dur  04/10/2023 0.11    • MV A dur 04/10/2023 0.10    • LA ESV Index (BP) 04/10/2023 20.8    • Med Peak E' Yaya 04/10/2023 8.9    • Lat Peak E' Yaya 04/10/2023 10.2    • Avg E/e' ratio 04/10/2023 4.80    • SV(LVOT) 04/10/2023 46.7    • SV(RVOT) 04/10/2023 30.6    • Qp/Qs 04/10/2023 0.65    • RV Base 04/10/2023 2.9    • RV Mid 04/10/2023 2.6    • RV Length 04/10/2023 6.4    • TAPSE (>1.6) 04/10/2023 2.00    • RV S' 04/10/2023 13.8    • Pulm Sys Yaya 04/10/2023 41.1    • Pulm Michaud Yaya 04/10/2023 42.4    • Pulm S/D 04/10/2023 0.97    • Pulm A Revs Yaya 04/10/2023 33.4    • LV V1 max 04/10/2023 108.0    • LV V1 max PG 04/10/2023 4.7    • LV V1 mean PG 04/10/2023 2.37    • LV V1 VTI 04/10/2023 16.8    • Ao pk yaya 04/10/2023 107.0    • Ao max PG 04/10/2023 4.6    • Ao mean PG 04/10/2023 2.7    • Ao V2 VTI 04/10/2023 16.9    • SELVIN(I,D) 04/10/2023 2.8    • MV max PG 04/10/2023 1.50    • MV mean PG 04/10/2023 0.66    • MV V2 VTI 04/10/2023 18.5    • MV P1/2t 04/10/2023 57.9    • MVA(P1/2t) 04/10/2023 3.8    • MVA(VTI) 04/10/2023 2.5    • MV dec slope 04/10/2023 314.1    • TR max yaya 04/10/2023 152.8    • TR max PG 04/10/2023 9.3    • RVOT diam 04/10/2023 1.78    • RV V1 max PG 04/10/2023 2.10    • RV V1 max 04/10/2023 72.4    • RV V1 VTI 04/10/2023 12.3    • PA V2 max 04/10/2023 93.1    • PA acc time 04/10/2023 0.12    • PA pr(Accel) 04/10/2023 25.5    • Ao root diam 04/10/2023 3.0    • ACS 04/10/2023 2.06    • Sinus 04/10/2023 3.2    • STJ 04/10/2023 3.0    • Ascending aorta 04/10/2023 3.1    • Aortic arch 04/10/2023 2.0    • Abdo Ao Diam 04/10/2023 1.8    Hospital Outpatient Visit on 04/10/2023   Component Date Value   • HS Troponin T 04/10/2023 7    • proBNP 04/10/2023 16.3    Admission on 04/03/2023, Discharged on 04/03/2023   Component Date Value   • HCG, Urine, QL 04/03/2023 Negative    • Lot Number 04/03/2023 [id number]    • Internal Positive Control 04/03/2023 Positive    • Internal Negative Control 04/03/2023 Negative     • Expiration Date 04/03/2023 4/30/24    • Case Report 04/03/2023                      Value:Surgical Pathology Report                         Case: [id number]                                  Authorizing Provider:  Eamon Stone MD  Collected:           04/03/2023 07:52 AM          Ordering Location:     [facility]  Received:            04/03/2023 08:25 AM                                 MAIN OR                                                                      Pathologist:           Radha Serrano MD                                                          Specimen:    Breast, Right, right breast skin, history of breast cancer                                • Final Diagnosis 04/03/2023                      Value:This result contains rich text formatting which cannot be displayed here.   • Gross Description 04/03/2023                      Value:This result contains rich text formatting which cannot be displayed here.   Lab on 03/31/2023   Component Date Value   • Glucose 03/31/2023 126 (H)    • BUN 03/31/2023 17    • Creatinine 03/31/2023 0.87    • Sodium 03/31/2023 137    • Potassium 03/31/2023 4.4    • Chloride 03/31/2023 102    • CO2 03/31/2023 24.4    • Calcium 03/31/2023 9.8    • Total Protein 03/31/2023 7.2    • Albumin 03/31/2023 4.4    • ALT (SGPT) 03/31/2023 61 (H)    • AST (SGOT) 03/31/2023 31    • Alkaline Phosphatase 03/31/2023 158 (H)    • Total Bilirubin 03/31/2023 0.3    • Globulin 03/31/2023 2.8    • A/G Ratio 03/31/2023 1.6    • BUN/Creatinine Ratio 03/31/2023 19.5    • Anion Gap 03/31/2023 10.6    • eGFR 03/31/2023 83.9    • WBC 03/31/2023 7.85    • RBC 03/31/2023 4.53    • Hemoglobin 03/31/2023 13.8    • Hematocrit 03/31/2023 40.7    • MCV 03/31/2023 89.8    • MCH 03/31/2023 30.5    • MCHC 03/31/2023 33.9    • RDW 03/31/2023 12.4    • RDW-SD 03/31/2023 41.0    • MPV 03/31/2023 9.5    • Platelets 03/31/2023 286    • Neutrophil % 03/31/2023 60.8    • Lymphocyte %  03/31/2023 27.5    • Monocyte % 03/31/2023 5.7    • Eosinophil % 03/31/2023 4.8    • Basophil % 03/31/2023 0.8    • Immature Grans % 03/31/2023 0.4    • Neutrophils, Absolute 03/31/2023 4.77    • Lymphocytes, Absolute 03/31/2023 2.16    • Monocytes, Absolute 03/31/2023 0.45    • Eosinophils, Absolute 03/31/2023 0.38    • Basophils, Absolute 03/31/2023 0.06    • Immature Grans, Absolute 03/31/2023 0.03    • nRBC 03/31/2023 0.0    Admission on 03/08/2023, Discharged on 03/09/2023   Component Date Value   • HCG, Urine, QL 03/08/2023 Negative    • Lot Number 03/08/2023 [id number]    • Internal Positive Control 03/08/2023 Positive    • Internal Negative Control 03/08/2023 Negative    • Expiration Date 03/08/2023 2024-04-30    • Addendum 2 03/08/2023                      Value:This result contains rich text formatting which cannot be displayed here.   • Addendum 03/08/2023                      Value:This result contains rich text formatting which cannot be displayed here.   • Case Report 03/08/2023                      Value:Surgical Pathology Report                         Case: [id number]                                  Authorizing Provider:  Kell Hi MD    Collected:           03/08/2023 09:17 AM          Ordering Location:     [facility]  Received:            03/08/2023 09:48 AM                                 MAIN OR                                                                      Pathologist:           Radha Serrano MD                                                          Specimens:   1) - [place] Lymph Node, Left sentinel node #1                                                     2) - [place] Lymph Node, Left sentinel node #2                                                     3) - [place] Lymph Node, Left sentinel node #3                                                     4) - Breast, Left, left breast total mastectomy, stitch marks 12 oclock                              5) - Breast, Right, Right breast total mastectomy, stitch marks 12:00                     • Clinical Information 03/08/2023                      Value:This result contains rich text formatting which cannot be displayed here.   • Final Diagnosis 03/08/2023                      Value:This result contains rich text formatting which cannot be displayed here.   • Synoptic Checklist 03/08/2023                      Value:INVASIVE CARCINOMA OF THE BREAST: Resection                            INVASIVE CARCINOMA OF THE BREAST: COMPLETE EXCISION - 1, 2, 3, 4                            8th Edition - Protocol posted: 12/14/2022                                                        SPECIMEN                               Procedure:    Total mastectomy                                Specimen Laterality:    Left                                                         TUMOR                               Tumor Site:    Upper outer quadrant                                Tumor Site:    Upper inner quadrant                                Histologic Type:    Invasive lobular carcinoma                                Histologic Grade ([place] Histologic Score):                                     Glandular (Acinar) / Tubular Differentiation:    Score 3                                  Nuclear Pleomorphism:    Score 2                                  Mitotic Rate:    Score 2                                  Overall Grade:    Grade 2 (scores of 6 or 7)                                Tumor Size:    Greatest dimension of largest invasive focus (Millimeters): 60 mm                               Tumor Focality:    Multiple foci of invasive carcinoma                                  Number of Foci:    At least: 4                                  Sizes of Individual Foci in Millimeters (mm):    20 mm; 2 mm; 1mm                                Ductal Carcinoma In Situ (DCIS):    Not identified                                Lobular Carcinoma In  Situ (LCIS):    Present                                Lymphatic and / or Vascular Invasion:    Not identified                                Dermal Lymphovascular Invasion:    Not identified                                Treatment Effect in the Breast:    No known presurgical therapy                                                         MARGINS                               Margin Status for Invasive Carcinoma:    All margins negative for invasive carcinoma                                  Distance from Invasive Carcinoma to Closest Margin:    10 mm                                 Closest Margin(s) to Invasive Carcinoma:    Anterior                                                         REGIONAL LYMPH NODES                               Regional Lymph Node Status:                                     :    All regional lymph nodes negative for tumor                                  Total Number of Lymph Nodes Examined (sentinel and non-sentinel):    4                                  Number of [place] Nodes Examined:    4                                                         pTNM CLASSIFICATION (AJCC 8th Edition)                               Reporting of pT, pN, and (when applicable) pM categories is based on information available to the pathologist at the time the report is issued. As per the AJCC (Chapter 1, 8th Ed.) it is the managing physician’s responsibility to establish the final pathologic stage based upon all pertinent information, including but potentially not limited to this pathology report.                               pT Category:    pT3                                T Suffix:    (m)                                pN Category:    pN0                                N Suffix:    (sn)                                                         SPECIAL STUDIES                               Estrogen Receptor (ER) Status:    Positive (greater than 10% of cells demonstrate nuclear positivity)                                   Percentage of Cells with Nuclear Positivity:    %                                Progesterone Receptor (PgR) Status:    Positive                                  Percentage of Cells with Nuclear Positivity:    %                                HER2 (by immunohistochemistry):    Equivocal (Score 2+)                                HER2 (by in situ hybridization):    Positive (amplified)                                Ki-67 Percentage of Positive Nuclei:    5 %                               Testing Performed on Case Number:    [id number]; see comment      • Comment 03/08/2023                      Value:This result contains rich text formatting which cannot be displayed here.   • Intraoperative Consultat* 03/08/2023                      Value:This result contains rich text formatting which cannot be displayed here.   • Gross Description 03/08/2023                      Value:This result contains rich text formatting which cannot be displayed here.   Pre-Admission Testing on 02/27/2023   Component Date Value   • HCG, Urine QL 02/27/2023 Negative    • QT Interval 02/27/2023 386    Labs Reviewed    Plan of Care  Considered intermittent anxiety with previous tolerability of gabapentin, allowing PRN use of this for ruminative worry and anxiety.  PRN follow up offered; pt does not have schedule and prefers to call as needed.  Significant discussion surrounding alcohol use, substance use, impact on organs. Pt aware services remain available in behavioral onc clinic, although does not seem apparent at this time.  No follow up has been scheduled at this time.    Diagnoses and all orders for this visit:    1. Generalized anxiety disorder (Primary)    2. Malignant neoplasm of overlapping sites of left breast in female, estrogen receptor positive

## 2023-04-27 ENCOUNTER — INFUSION (OUTPATIENT)
Dept: ONCOLOGY | Facility: HOSPITAL | Age: 48
End: 2023-04-27
Payer: COMMERCIAL

## 2023-04-27 ENCOUNTER — OFFICE VISIT (OUTPATIENT)
Dept: ONCOLOGY | Facility: CLINIC | Age: 48
End: 2023-04-27
Payer: COMMERCIAL

## 2023-04-27 ENCOUNTER — TELEPHONE (OUTPATIENT)
Dept: ONCOLOGY | Facility: HOSPITAL | Age: 48
End: 2023-04-27
Payer: COMMERCIAL

## 2023-04-27 VITALS
OXYGEN SATURATION: 97 % | SYSTOLIC BLOOD PRESSURE: 124 MMHG | HEIGHT: 63 IN | HEART RATE: 88 BPM | TEMPERATURE: 96.9 F | RESPIRATION RATE: 16 BRPM | BODY MASS INDEX: 30.21 KG/M2 | WEIGHT: 170.5 LBS | DIASTOLIC BLOOD PRESSURE: 90 MMHG

## 2023-04-27 VITALS — HEART RATE: 82 BPM | SYSTOLIC BLOOD PRESSURE: 125 MMHG | DIASTOLIC BLOOD PRESSURE: 75 MMHG

## 2023-04-27 DIAGNOSIS — Z17.0 MALIGNANT NEOPLASM OF OVERLAPPING SITES OF LEFT BREAST IN FEMALE, ESTROGEN RECEPTOR POSITIVE: Primary | ICD-10-CM

## 2023-04-27 DIAGNOSIS — C50.812 MALIGNANT NEOPLASM OF OVERLAPPING SITES OF LEFT BREAST IN FEMALE, ESTROGEN RECEPTOR POSITIVE: ICD-10-CM

## 2023-04-27 DIAGNOSIS — U07.1 COVID-19 VIRUS INFECTION: ICD-10-CM

## 2023-04-27 DIAGNOSIS — C50.812 MALIGNANT NEOPLASM OF OVERLAPPING SITES OF LEFT BREAST IN FEMALE, ESTROGEN RECEPTOR POSITIVE: Primary | ICD-10-CM

## 2023-04-27 DIAGNOSIS — Z45.2 ENCOUNTER FOR ADJUSTMENT OR MANAGEMENT OF VASCULAR ACCESS DEVICE: ICD-10-CM

## 2023-04-27 DIAGNOSIS — Z17.0 MALIGNANT NEOPLASM OF OVERLAPPING SITES OF LEFT BREAST IN FEMALE, ESTROGEN RECEPTOR POSITIVE: ICD-10-CM

## 2023-04-27 LAB
ALBUMIN SERPL-MCNC: 4.8 G/DL (ref 3.5–5.2)
ALBUMIN/GLOB SERPL: 1.7 G/DL (ref 1.1–2.4)
ALP SERPL-CCNC: 112 U/L (ref 38–116)
ALT SERPL W P-5'-P-CCNC: 45 U/L (ref 0–33)
ANION GAP SERPL CALCULATED.3IONS-SCNC: 14.9 MMOL/L (ref 5–15)
AST SERPL-CCNC: 23 U/L (ref 0–32)
B-HCG UR QL: NEGATIVE
BASOPHILS # BLD AUTO: 0.03 10*3/MM3 (ref 0–0.2)
BASOPHILS NFR BLD AUTO: 0.2 % (ref 0–1.5)
BILIRUB SERPL-MCNC: 0.7 MG/DL (ref 0.2–1.2)
BUN SERPL-MCNC: 13 MG/DL (ref 6–20)
BUN/CREAT SERPL: 16.5 (ref 7.3–30)
CALCIUM SPEC-SCNC: 10.2 MG/DL (ref 8.5–10.2)
CHLORIDE SERPL-SCNC: 103 MMOL/L (ref 98–107)
CO2 SERPL-SCNC: 21.1 MMOL/L (ref 22–29)
CREAT SERPL-MCNC: 0.79 MG/DL (ref 0.6–1.1)
DEPRECATED RDW RBC AUTO: 40.6 FL (ref 37–54)
EGFRCR SERPLBLD CKD-EPI 2021: 94.1 ML/MIN/1.73
EOSINOPHIL # BLD AUTO: 0 10*3/MM3 (ref 0–0.4)
EOSINOPHIL NFR BLD AUTO: 0 % (ref 0.3–6.2)
ERYTHROCYTE [DISTWIDTH] IN BLOOD BY AUTOMATED COUNT: 12.4 % (ref 12.3–15.4)
GLOBULIN UR ELPH-MCNC: 2.9 GM/DL (ref 1.8–3.5)
GLUCOSE SERPL-MCNC: 155 MG/DL (ref 74–124)
HCT VFR BLD AUTO: 44.4 % (ref 34–46.6)
HGB BLD-MCNC: 14.9 G/DL (ref 12–15.9)
IMM GRANULOCYTES # BLD AUTO: 0.23 10*3/MM3 (ref 0–0.05)
IMM GRANULOCYTES NFR BLD AUTO: 1.4 % (ref 0–0.5)
LYMPHOCYTES # BLD AUTO: 1.39 10*3/MM3 (ref 0.7–3.1)
LYMPHOCYTES NFR BLD AUTO: 8.2 % (ref 19.6–45.3)
MCH RBC QN AUTO: 30 PG (ref 26.6–33)
MCHC RBC AUTO-ENTMCNC: 33.6 G/DL (ref 31.5–35.7)
MCV RBC AUTO: 89.5 FL (ref 79–97)
MONOCYTES # BLD AUTO: 0.22 10*3/MM3 (ref 0.1–0.9)
MONOCYTES NFR BLD AUTO: 1.3 % (ref 5–12)
NEUTROPHILS NFR BLD AUTO: 15.16 10*3/MM3 (ref 1.7–7)
NEUTROPHILS NFR BLD AUTO: 88.9 % (ref 42.7–76)
NRBC BLD AUTO-RTO: 0 /100 WBC (ref 0–0.2)
PLATELET # BLD AUTO: 329 10*3/MM3 (ref 140–450)
PMV BLD AUTO: 9.6 FL (ref 6–12)
POTASSIUM SERPL-SCNC: 4.3 MMOL/L (ref 3.5–4.7)
PROT SERPL-MCNC: 7.7 G/DL (ref 6.3–8)
RBC # BLD AUTO: 4.96 10*6/MM3 (ref 3.77–5.28)
SODIUM SERPL-SCNC: 139 MMOL/L (ref 134–145)
WBC NRBC COR # BLD: 17.03 10*3/MM3 (ref 3.4–10.8)

## 2023-04-27 PROCEDURE — 25010000002 TRASTUZUMAB PER 10 MG: Performed by: INTERNAL MEDICINE

## 2023-04-27 PROCEDURE — 96413 CHEMO IV INFUSION 1 HR: CPT

## 2023-04-27 PROCEDURE — 96415 CHEMO IV INFUSION ADDL HR: CPT

## 2023-04-27 PROCEDURE — 25010000002 PALONOSETRON PER 25 MCG: Performed by: INTERNAL MEDICINE

## 2023-04-27 PROCEDURE — 96375 TX/PRO/DX INJ NEW DRUG ADDON: CPT

## 2023-04-27 PROCEDURE — 25010000002 HEPARIN LOCK FLUSH PER 10 UNITS: Performed by: INTERNAL MEDICINE

## 2023-04-27 PROCEDURE — 85025 COMPLETE CBC W/AUTO DIFF WBC: CPT

## 2023-04-27 PROCEDURE — 81025 URINE PREGNANCY TEST: CPT | Performed by: INTERNAL MEDICINE

## 2023-04-27 PROCEDURE — 25010000002 FOSAPREPITANT PER 1 MG: Performed by: INTERNAL MEDICINE

## 2023-04-27 PROCEDURE — 25010000002 DIPHENHYDRAMINE PER 50 MG: Performed by: INTERNAL MEDICINE

## 2023-04-27 PROCEDURE — 25010000002 PERTUZUMAB 420 MG/14ML SOLUTION 420 MG VIAL: Performed by: INTERNAL MEDICINE

## 2023-04-27 PROCEDURE — 96417 CHEMO IV INFUS EACH ADDL SEQ: CPT

## 2023-04-27 PROCEDURE — 25010000002 CARBOPLATIN PER 50 MG: Performed by: INTERNAL MEDICINE

## 2023-04-27 PROCEDURE — 25010000002 DOCETAXEL 10 MG/ML SOLUTION 8 ML VIAL: Performed by: INTERNAL MEDICINE

## 2023-04-27 PROCEDURE — 80053 COMPREHEN METABOLIC PANEL: CPT

## 2023-04-27 PROCEDURE — 96367 TX/PROPH/DG ADDL SEQ IV INF: CPT

## 2023-04-27 RX ORDER — SODIUM CHLORIDE 0.9 % (FLUSH) 0.9 %
10 SYRINGE (ML) INJECTION AS NEEDED
Status: CANCELLED | OUTPATIENT
Start: 2023-04-27

## 2023-04-27 RX ORDER — HEPARIN SODIUM (PORCINE) LOCK FLUSH IV SOLN 100 UNIT/ML 100 UNIT/ML
500 SOLUTION INTRAVENOUS AS NEEDED
Status: DISCONTINUED | OUTPATIENT
Start: 2023-04-27 | End: 2023-04-27 | Stop reason: HOSPADM

## 2023-04-27 RX ORDER — OLANZAPINE 5 MG/1
5 TABLET ORAL ONCE
Status: COMPLETED | OUTPATIENT
Start: 2023-04-27 | End: 2023-04-27

## 2023-04-27 RX ORDER — OLANZAPINE 5 MG/1
5 TABLET ORAL ONCE
Status: CANCELLED | OUTPATIENT
Start: 2023-04-27 | End: 2023-04-27

## 2023-04-27 RX ORDER — HEPARIN SODIUM (PORCINE) LOCK FLUSH IV SOLN 100 UNIT/ML 100 UNIT/ML
500 SOLUTION INTRAVENOUS AS NEEDED
Status: CANCELLED | OUTPATIENT
Start: 2023-04-27

## 2023-04-27 RX ORDER — DIPHENHYDRAMINE HYDROCHLORIDE 50 MG/ML
50 INJECTION INTRAMUSCULAR; INTRAVENOUS AS NEEDED
Status: CANCELLED | OUTPATIENT
Start: 2023-04-27

## 2023-04-27 RX ORDER — FAMOTIDINE 10 MG/ML
20 INJECTION, SOLUTION INTRAVENOUS AS NEEDED
Status: CANCELLED | OUTPATIENT
Start: 2023-04-27

## 2023-04-27 RX ORDER — SODIUM CHLORIDE 9 MG/ML
250 INJECTION, SOLUTION INTRAVENOUS ONCE
Status: CANCELLED | OUTPATIENT
Start: 2023-04-27

## 2023-04-27 RX ORDER — FAMOTIDINE 10 MG/ML
20 INJECTION, SOLUTION INTRAVENOUS ONCE
Status: COMPLETED | OUTPATIENT
Start: 2023-04-27 | End: 2023-04-27

## 2023-04-27 RX ORDER — SODIUM CHLORIDE 0.9 % (FLUSH) 0.9 %
10 SYRINGE (ML) INJECTION AS NEEDED
Status: DISCONTINUED | OUTPATIENT
Start: 2023-04-27 | End: 2023-04-27 | Stop reason: HOSPADM

## 2023-04-27 RX ORDER — PALONOSETRON 0.05 MG/ML
0.25 INJECTION, SOLUTION INTRAVENOUS ONCE
Status: COMPLETED | OUTPATIENT
Start: 2023-04-27 | End: 2023-04-27

## 2023-04-27 RX ORDER — FAMOTIDINE 10 MG/ML
20 INJECTION, SOLUTION INTRAVENOUS ONCE
Status: CANCELLED | OUTPATIENT
Start: 2023-04-27

## 2023-04-27 RX ORDER — PALONOSETRON 0.05 MG/ML
0.25 INJECTION, SOLUTION INTRAVENOUS ONCE
Status: CANCELLED | OUTPATIENT
Start: 2023-04-27

## 2023-04-27 RX ORDER — SODIUM CHLORIDE 9 MG/ML
250 INJECTION, SOLUTION INTRAVENOUS ONCE
Status: COMPLETED | OUTPATIENT
Start: 2023-04-27 | End: 2023-04-27

## 2023-04-27 RX ADMIN — SODIUM CHLORIDE 250 ML: 900 INJECTION, SOLUTION INTRAVENOUS at 08:56

## 2023-04-27 RX ADMIN — FOSAPREPITANT DIMEGLUMINE 100 ML: 150 INJECTION, POWDER, LYOPHILIZED, FOR SOLUTION INTRAVENOUS at 09:18

## 2023-04-27 RX ADMIN — CARBOPLATIN 810 MG: 10 INJECTION, SOLUTION INTRAVENOUS at 14:54

## 2023-04-27 RX ADMIN — FAMOTIDINE 20 MG: 10 INJECTION INTRAVENOUS at 08:56

## 2023-04-27 RX ADMIN — DIPHENHYDRAMINE HYDROCHLORIDE 50 MG: 50 INJECTION, SOLUTION INTRAMUSCULAR; INTRAVENOUS at 08:58

## 2023-04-27 RX ADMIN — SODIUM CHLORIDE 135 MG: 9 INJECTION, SOLUTION INTRAVENOUS at 13:38

## 2023-04-27 RX ADMIN — PERTUZUMAB 840 MG: 30 INJECTION, SOLUTION, CONCENTRATE INTRAVENOUS at 09:54

## 2023-04-27 RX ADMIN — OLANZAPINE 5 MG: 5 TABLET, FILM COATED ORAL at 10:54

## 2023-04-27 RX ADMIN — PALONOSETRON 0.25 MG: 0.05 INJECTION, SOLUTION INTRAVENOUS at 13:31

## 2023-04-27 RX ADMIN — Medication 500 UNITS: at 15:37

## 2023-04-27 RX ADMIN — Medication 10 ML: at 15:37

## 2023-04-27 RX ADMIN — TRASTUZUMAB 630 MG: 150 INJECTION, POWDER, LYOPHILIZED, FOR SOLUTION INTRAVENOUS at 11:53

## 2023-04-27 NOTE — NURSING NOTE
Pt to infusion room for first [facility] infusion.   Pt seen by Dr Rebolledo this morning..  All labs resulted and seen. Chemo education and teaching previously done.    Reviewed pt treatment plan for today , potential side effects.  Pt denies any questions or concerns at this time.  Pt reports she is taking dexamethasone as prescribed , started taking yesterday.    1450  taxotere finished , face and neck area red, flushed .  Face has been flushed since pt arrived this morning  VSS . Pt denies itching or any discomfort.  Discussed with Jagdish NP   ,  Jagdish came to pt bedside to assess.  No intervention needed .   Okay to continue with treatment    1550 Pt finished remainder of treatment without issues.  Discharged home stable .   Pt will return tomorrow for undenyca and possible IVF's

## 2023-04-27 NOTE — PROGRESS NOTES
Subjective     REASON FOR follow up  1.  Invasive lobular carcinoma multifocal,  · Left breast anterolateral stereotactic biopsy invasive lobular carcinoma, Jerusalem score of 6, grade 2, 4 mm, ER 95%, SD 95%, HER2/cheryl 0 negative with Ki-67 of 5%.  Positive for atypical lobular hyperplasia  · Left breast 1 o'clock position 10 cm from the nipple ultrasound-guided biopsy consistent with invasive lobular carcinoma grade 2 Jerusalem score of 6, 4 mm with lobular carcinoma in situ, ER 90%, %, HER2/cheryl 0  · Left breast upper stereotactic guided core biopsy invasive lobular carcinoma grade 2 Marie score of six 3 mm with lobular carcinoma in situ, ER 90%, %, HER2/cheryl equivocal 2+, FISH shows her to 5.7 with CEP 1.6 and ratio HER2 nu/KEIRA-17 ratio of 3.5.  It is amplified.    2.  March 8, 2023:p T3 N0 M0 invasive lobular carcinoma s/p bilateral mastectomy with left deep axillary sentinel lymph node biopsy, with bilateral placement of prepectoral tissue expanders for reconstruction and bilateral placement of AlloDerm.  · Pathology shows multifocal tumor in  upper outer quadrant and upper inner quadrant, invasive lobular carcinoma grade 2 with a Jerusalem score  score of 7  , total of 4 foci were seen, largest tumor being 60 mm, 20 mm, 2 mm, 1 mm.  No DCIS identified.  Lobular carcinoma in situ present.  No lymphatic or vascular channel invasion, no dermal lymphovascular space invasion all margins were negative for invasive carcinoma 4 lymph nodes were all negative for malignancy its pathologic T3N0  ·   · ER %, SD %, HER2/cheryl 2+ with Ki-67 of 5%  · Previous biopsies had shown 2 of the tumors were ER/SD positive HER2/cheryl negative and one of them was ER/SD positive HER2 positive  · On discussing with pathologist Dr. Serrano, she thinks that the larger tumor was ER SD positive and HER2 positive but unsure if it was the 60 mm tumor or 20 mm tumor.  Either way it was not the smaller tumors which was  HER2 positive.  ·   · All tumors look-alike per pathologist and they are invasive lobular carcinoma.                               HISTORY OF PRESENT ILLNESS:    Patient is a 47-year-old female with history of multifocal left breast cancer with MRI of the breast showing almost 15 lesions.  Biopsies on 3 different lesions were done and they were all consistent with invasive lobular carcinoma.  2 of the lesions where ER/WI positive HER2/cheryl negative and one of the larger lesion was ER/WI positive HER2 2+ equivocal.  FISH testing was done and the HER2 copy number was 5.7 with HER2/CEP ratio of 3.5.  The FISH was amplified.    Patient underwent bilateral mastectomy    With left sentinel lymph node surgery..  The pathology shows tumor to be present in the left upper outer quadrant invasive lobular carcinoma with a Marie score of 7, grade 2.  There were 4 lesions the greatest size was 6 0 mm, 20 mm, 2 mm and 1 mm in size.  Lobular carcinoma in situ is present.  All margins are negative for invasive carcinoma.  4 lymph nodes negative.  Patient has T3N0 invasive lobular carcinoma.      Discussed with pathology in length Dr. Serrano, one of the larger lesions which was 60 mm was heterogeneous it was biopsied at 2 separate spots.  The left anterior lateral and left 1:00 core fragment was ER/WI positive HER2/cheryl negative the left upper core biopsy was ER/WI positive HER2/cheryl 2+ equivocal but HER2/cheryl FISH was positive.    On discussion with pathology the 60 mm tumor showed ER and WI positive and HER2 positive by FISH with a Ki-67 of 40% at the Top-Hat clip.  The HER2/cheryl copy number was 5.7 with a HER2/cheryl by CEP ratio 3.5 which is positive.  The Ki-67 on the lower part was 13%.  The 20 mm tumor had a Ki-67 of 9%.  So the larger lesion was triple positive and heterogeneous as 2 biopsies were done on the larger lesions and that was both triple positive and at the second biopsy was ER/WI positive HER2/cheryl negative so it was a  heterogeneous tumor.  The 20 mm lesion was ER/PA positive HER2/cheryl negative.    Given that she had a large tumor with high Ki-67 and premenopausal status we discussed about giving Taxotere carboplatin Herceptin Perjeta.    Interval history:    Patient is here to start TCHP chemotherapy.  Her liver function tests are completely normalized now.  She quit drinking.  She also went to [facility].  She had a CT scan which is negative for any metastasis.  Bone scan suggested a single lesion on the clavicle which they are unsure if it is degenerative or metastatic.  On discussion with radiology he thinks it is more than likely degenerative and patient does not have any pain on the clavicle.  She is s/p port placement and has been educated about chemotherapy.  She is here to start cycle 1 chemo with TCHP.      Oncology history:  Patient is a 47-year-old female who has been recently diagnosed with left breast cancer.  Patient has been compliant with her mammograms.    There is no family history of breast or ovarian cancer.  Her father had prostate cancer and melanoma.  A paternal aunt had thyroid cancer.      Details are as follows.    November 29, 2022: Screening bilateral mammogram showed 8 mm asymmetry in the central left breast posterior one third.  This appears to be in the superior left breast on the MLO view.  Right breast was negative.  A left diagnostic mammogram and ultrasound was recommended.    January January 3, 2023: Left breast diagnostic mammogram showed the spiculated areas within the central posterior and lateral anterior superior left breast where redemonstrated.  Targeted ultrasound was done.  At 1 o'clock position 10 cm from the nipple there is a shadowing mass with peripheral spiculations which is concordant with the mammographic abnormality and suspicious for malignancy.  The more centrally located spiculation on the cc view posterior to the glandular tissue cannot be clearly identified on the  ultrasound.    Impression was 2 separate suspicious spiculated lesion in the left breast.  One of the lesions is seen well on the ultrasound and should be amenable to ultrasound-guided core biopsy.  The other lesion which was seen in the CC projection was amicable to stereotactic guided tissue sampling.    January 3, 2023 patient underwent ultrasound-guided left breast biopsy    The ultrasound-guided biopsy of left breast 10 cm from the nipple at 1 o'clock position showed invasive lobular carcinoma moderately differentiated with a Marie grade of 2 and score of 6 measuring 4 mm.  There was focal lobular carcinoma in situ.    Left breast anterior lateral stereotactic core needle biopsy showed invasive lobular carcinoma moderately differentiated grade 2 with a Marie score of 6 measuring 4 mm.  There was atypical lobular hyperplasia.  Estrogen receptor was %, progesterone receptor was %, HER2/cheryl 0, Ki-67 5%.    Left breast upper stereotactic guided core biopsy showed invasive lobular carcinoma moderately differentiated grade 2 with Columbus score of 6 with measuring 3 mm with lobular carcinoma in situ present.  I do not see the ER/IA or HER2 on the ultrasound-guided biopsy of the left breast lesion as well as the left upper stereotactic biopsy.  We will check with pathologist    January 23, 2023: Patient underwent  MRI of the breast bilateral    Right breast: Negative    Left breast: At 1:00 anterior left breast 4 cm from the nipple there is a 1.5 x 1.6 x 1.3 cm irregular enhancing mass which is biopsy-proven malignancy.  The biopsy clip is located within 0.9 x 1 x 1.1 cm postbiopsy hematoma along the inferior margin of the mass.  And there is additional hematoma along the lateral margin of the mass which measures 1.2 x 1.9 x 0.9 cm.    At 1:00 in the middle to posterior left breast 7.4 cm from the nipple there is a 1.3 x 1 cm x 1 cm irregular enhancing mass mass with a focus from a biopsy clip  along the inferior margin which also represents the biopsy-proven malignancy    At 12:00 in the posterior left breast 8.5 cm from the nipple there is a 1.9 x 1.8 x 2.2 cm irregular enhancing mass with a corresponding 2.9 cm biopsy cavity with a biopsy clip which represents the biopsy-proven malignancy    In addition there are multiple at least 12 similar-appearing irregular enhancing masses and foci are identified throughout the left breast predominantly involving the upper breast but also involving the lower outer quadrant.  At 11-12 o'clock in the middle and posterior left breast there are 3 adjacent reference masses together measuring 5.5 cm but individually measuring 1.2 x 0.9 x 1 cm.  At 1:00 in the far posterior left breast/axillary tail 13.7 cm posterior to the nipple is a 1.3 x 1.1 x 1 cm irregular enhancing mass which is located 0.8 cm from the anterolateral margin of the pectoralis Muscle.  Together the enhancing masses span approximately 11.7 x 8 x 7.8 cm.  There is no suspicious enhancement in the left nipple or chest wall.  Focal areas of skin enhancement likely reflect skin entry point from recent biopsies.  There are no pathologically enlarged internal mammary chain lymph nodes.    Impression    .  At least 15 similar-appearing irregular enhancing masses and foci  scattered throughout the left breast, together measuring up to 11.7 cm  in maximum dimension, encompass 3 sites of biopsy-proven malignancy and  are consistent with multicentric malignancy. Oncologic and surgical  management are recommended.  2.  No MRI evidence of malignancy in the right breast.    Patient is a 47-year-old female with multifocal invasive lobular carcinoma who on screening mammogram showed 8 mm asymmetry in the central left breast posterior one third.  In the cc view.  It appears to be superior left breast on the MLO view.  There was also architectural distortion in the lateral left breast one third on the left cc  view.    On diagnostic mammogram the spiculated areas within the central posterior and the lateral anterior superior left breast where redemonstrated.  Targeted ultrasound was done.  At 1 o'clock position 10 cm from the nipple there is a mass with spiculations which is suspicious.  The more centrally located spiculation on the cc view.  The more centrally located spiculation on the cc view cannot be clearly identified on ultrasound.  So there impression was there were 2 separate suspicious spiculated lesion in the left breast.  1 was seen well on the ultrasound and amenable to ultrasound-guided biopsy the other is seen well on mammography and a stereotactic guided biopsy suggested.    Patient subsequently underwent stereotactic biopsy of 1 lesion and ultrasound-guided biopsy of the 1 cm mass at 1 o'clock position 10 cm from the nipple.  A total of 3 different areas of the left breast were biopsied and specimens were sent to pathology.  The third biopsy was performed at 12 o'clock position in the posterior one third of the left breast.  All of the 3 sites were consistent with invasive lobular carcinoma at site A, B, and C.    MRI of the breast showed at 1 o'clock position of the left breast anteriorly 4 cm posterior to the nipple there is a 1.5 x 1.6 x 1.3 cm irregular enhancing mass.  There is a small hematoma along the inferior margin of the mass.  An additional hematoma along the lateral margin of the mass which is measuring 1.2 x 0.9 x 0.9 cm    At 1:00 in the middle to posterior left breast 7.4 cm posterior to the nipple there is a 1.3 x 1 x 1 cm enhancing mass with a biopsy clip.    At 12:00 posterior left breast 8.5 cm posterior to the nipple there is a 1.5 x 1.8 x 2.2 cm irregular enhancing mass with a corresponding 2.9 cm biopsy cavity with a clip which represents the biopsy site malignancy    Multiple at least 12 similar-appearing irregular enhancing masses and foci are identified throughout the left breast  predominantly involving the upper breast but also involving the lower outer quadrant.  At 11-12 o'clock in the middle and posterior left breast there is a 3 adjacent reference masses measuring 5.5 cm in AP dimension and individually measuring 1.2 x 0.9 x 1 cm.  At 1:00 in the far posterior left breast axillary tail 13.7 cm from the nipple there is a 1.3 x 1.1 x 1 cm craniocaudal dimension irregular enhancing mass which is located 0.8 cm from the anterolateral margin of the pectoralis muscle.     Per the MRI at least 15 similar-appearing irregular enhancing masses and foci scattered throughout the left breast together measuring 11.7 cm in maximum dimension which encompasses 3 sites of biopsy-proven malignancy and a consistent with multicentric malignancy.  MRI of the right breast is negative    INVITAE genetic testing showed variation of uncertain significance of LUCAS  Gene.    Patient was suggested left total mastectomy, left axillary sentinel lymph node biopsy possible node dissection and possible reconstruction.    However patient called Dr. Hi and decided to go for upfront bilateral total mastectomy, left axillary sentinel lymph node biopsy and possible axillary lymph node dissection and reconstruction.     I presented the case in the breast cancer conference today.  Patient had multiple nodules per MRI on the left breast Dr. Todd looked at it and felt there were multiple nodules and the largest one was 2.2 cm.  The discussion was to go ahead and upfront do the surgery and subsequently treat with chemo/ endocrine therapy as needed.  The left breast MRI findings are slightly atypical and that it is not contiguous involvement over 11.5 cm area but multiple nodules.  Surgical pathology will clarify.  If it is 1 big lesion or multiple sma      Past Medical History:   Diagnosis Date   • ADD (attention deficit disorder)    • Bilateral impacted cerumen 07/13/2020   • Breast cancer 03/08/2023    LUCIO MASTECTOMY   •  Foot fracture, left    • History of COVID-19 2022   • History of gestational diabetes 2010   • History of Graves' disease 2012   • History of radioactive iodine thyroid ablation 2012   • History of vertigo    • Hyperlipidemia    • Hypothyroidism    • Melanoma of back    • Positive depression screening 07/13/2020        Past Surgical History:   Procedure Laterality Date   • BREAST RECONSTRUCTION Bilateral 3/8/2023    Procedure: BILATERAL PLACEMENT  TISSUE EXPANDER AND ALLODERM;  Surgeon: Eamon Stone MD;  Location: [facility] MAIN OR;  Service: Plastics;  Laterality: Bilateral;   • D & C CERVICAL BIOPSY  01/2006   • INTRAUTERINE DEVICE INSERTION  2010    Mirena   • INTRAUTERINE DEVICE INSERTION  2015    Mirena exchange, old IUD removal and new one inserted by Dr Carr, Lot # [id number] 09/17.mar   • MASTECTOMY W/ SENTINEL NODE BIOPSY Bilateral 3/8/2023    Procedure: Bilateral total mastectomy, left axillary sentinel lymph node biopsy;  Surgeon: Kell Hi MD;  Location: [facility] OR;  Service: General;  Laterality: Bilateral;   • SCAR REVISION BREAST Right 4/3/2023    Procedure: RIGHT MASTECTOMY REVISION;  Surgeon: Eamon Stone MD;  Location: [facility] OR;  Service: Plastics;  Laterality: Right;   • VENOUS ACCESS DEVICE (PORT) INSERTION Right 4/13/2023    Procedure: right port placement;  Surgeon: Kell Hi MD;  Location: [facility] OR;  Service: General;  Laterality: Right;   • WISDOM TOOTH EXTRACTION          Current Outpatient Medications on File Prior to Visit   Medication Sig Dispense Refill   • acetaminophen (TYLENOL) 325 MG tablet Take 2 tablets by mouth Every 4 (Four) Hours As Needed for Mild Pain. 60 tablet 0   • COLLAGEN PO Take 1 tablet by mouth Daily.     • levothyroxine (SYNTHROID, LEVOTHROID) 125 MCG tablet Take 1 tablet by mouth Daily. 90 tablet 1   • lidocaine-prilocaine (EMLA) 2.5-2.5 % cream Apply 1 application topically to the appropriate area as directed  As Needed for Mild Pain (apply pea-sized amount to port site 30 minutes prior to port being accessed). 30 g 3   • lisinopril (PRINIVIL,ZESTRIL) 2.5 MG tablet Take 1 tablet by mouth Daily. 30 tablet 2   • loratadine (CLARITIN) 10 MG tablet      • OLANZapine (ZyPREXA) 5 MG tablet Take 1 tablet by mouth Every Night. Take on days 2, 3 and 4 after chemotherapy. 3 tablet 5   • omeprazole (priLOSEC) 20 MG capsule Take 1 capsule by mouth Daily. 90 capsule 1   • ondansetron (ZOFRAN) 8 MG tablet Take 1 tablet by mouth 3 (Three) Times a Day As Needed for Nausea or Vomiting. 30 tablet 5   • Probiotic Product (PROBIOTIC ADVANCED PO) Take 1 tablet by mouth Daily.       No current facility-administered medications on file prior to visit.        ALLERGIES:    Allergies   Allergen Reactions   • Erythromycin GI Intolerance     Pt reports   • Penicillins Rash        Social History     Socioeconomic History   • Marital status:      Spouse name: Parish   • Number of children: 2   Tobacco Use   • Smoking status: Never   • Smokeless tobacco: Never   Vaping Use   • Vaping Use: Never used   Substance and Sexual Activity   • Alcohol use: Not Currently     Alcohol/week: 1.0 standard drink     Types: 1 Glasses of wine per week     Comment: Daily caffeine use   • Drug use: Never   • Sexual activity: Yes     Partners: Male     Comment:          Family History   Problem Relation Age of Onset   • Diabetes Father    • Hyperlipidemia Father    • Arthritis Father    • Hypertension Father    • Heart disease Father         S/P stents   • Heart attack Father 41   • Colon polyps Father    • Melanoma Father    • Prostate cancer Father    • Hyperlipidemia Sister    • Hypertension Sister    • Heart attack Sister    • Diabetes Sister    • Thyroid cancer Paternal Aunt    • Lung cancer Maternal Grandmother    • Cancer Maternal Grandfather         Bladder cancer   • Lung cancer Maternal Grandfather    • Heart failure Paternal Grandmother    •  "Transient ischemic attack Paternal Grandmother    • Heart disease Paternal Grandfather    • Malig Hyperthermia Neg Hx       Family history: Maternal great aunt had breast cancer, unsure of the age .  Father had melanoma and prostate cancer, paternal aunt had thyroid cancer, paternal grandfather had bladder cancer maternal grandfather had bladder cancer maternal grandmother had lung cancer    Past medical history is consistent with Graves' disease    OB/GYN history:  Age of menarche: 14  Patient is premenopausal and had an IUD which has been now removed   2 para 2 no miscarriages  Age at first childbirth 30  Patient did breast-feed 24 months  Length of taking birth control pills none              Review of Systems   Constitutional: Negative for appetite change, chills, diaphoresis, fatigue, fever and unexpected weight change.   HENT: Negative for hearing loss, sore throat and trouble swallowing.    Respiratory: Negative for cough, chest tightness, shortness of breath and wheezing.    Cardiovascular: Negative for chest pain, palpitations and leg swelling.   Gastrointestinal: Negative for abdominal distention, abdominal pain, constipation, diarrhea, nausea and vomiting.   Genitourinary: Negative for dysuria, frequency, hematuria and urgency.   Musculoskeletal: Negative for joint swelling.        No muscle weakness.   Skin: Negative for rash and wound.   Neurological: Negative for seizures, syncope, speech difficulty, weakness, numbness and headaches.   Hematological: Negative for adenopathy. Does not bruise/bleed easily.   Psychiatric/Behavioral: Negative for behavioral problems, confusion and suicidal ideas.       I have reviewed and confirmed the accuracy of the ROS as documented by the MA/LPN/RN Kathrin Frederick MD        Objective     Vitals:    23 0814   BP: 124/90   Pulse: 88   Resp: 16   Temp: 96.9 °F (36.1 °C)   TempSrc: Temporal   SpO2: 97%   Weight: 77.3 kg (170 lb 8 oz)   Height: 160 cm (62.99\") "   PainSc: 0-No pain         4/27/2023     7:57 AM   Current Status   ECOG score 0       Physical Exam      CONSTITUTIONAL:  Vital signs reviewed.  No distress, looks comfortable.  EYES:  Conjunctivae and lids unremarkable.  PERRLA  EARS,NOSE,MOUTH,THROAT:  Ears and nose appear unremarkable.  Lips, teeth, gums appear unremarkable.  RESPIRATORY:  Normal respiratory effort.  Lungs clear to auscultation bilaterally.  CARDIOVASCULAR:  Normal S1, S2.  No murmurs rubs or gallops.  No significant lower extremity edema.  GASTROINTESTINAL: Abdomen appears unremarkable.  Nontender.  No hepatomegaly.  No splenomegaly.  LYMPHATIC:  No cervical, supraclavicular, axillary lymphadenopathy.  SKIN:  Warm.  No rashes.  PSYCHIATRIC:  Normal judgment and insight.  Normal mood and affect.  I have reexamined the patient and the results are consistent with the previously documented exam. Kathrin Frederick MD         RECENT LABS:  Hematology WBC   Date Value Ref Range Status   04/27/2023 17.03 (H) 3.40 - 10.80 10*3/mm3 Final   10/11/2022 8.0 3.4 - 10.8 x10E3/uL Final     RBC   Date Value Ref Range Status   04/27/2023 4.96 3.77 - 5.28 10*6/mm3 Final   10/11/2022 5.00 3.77 - 5.28 x10E6/uL Final     Hemoglobin   Date Value Ref Range Status   04/27/2023 14.9 12.0 - 15.9 g/dL Final     Hematocrit   Date Value Ref Range Status   04/27/2023 44.4 34.0 - 46.6 % Final     Platelets   Date Value Ref Range Status   04/27/2023 329 140 - 450 10*3/mm3 Final          Assessment & Plan     *Pathologic T3N0 invasive lobular carcinoma of the left breast, upper inner and upper outer quadrant, multifocal, 60 mm, 20 mm, 2 mm, 1 mm, grade 2, Idamay score of 7, 2 of them are ER/HI positive, HER2/cheryl negative.  One of the larger lesions was ER/HI positive HER2 positive.  Ki-67 is very low 5%,    · Invasive lobular carcinoma of the left breast recently diagnosed, multifocal with 3 lesions biopsied in the left breast.  Patient has multicentric disease with 15  similar-appearing irregular enhancing masses in the left breast together measuring 11.7 cm in maximum dimension which encompasses 3 of the 3 biopsy proven malignancies.  All the 3 very invasive lobular carcinoma, grade 2 with Austin score of 6 but 2 of the lesions where ER/KY positive HER2 negative and the third lesion was ER/KY positive HER2 2+ with FISH showing HER2 amplified.      1.  Estrogen receptor 95%, progesterone receptor 95%, HER2/cheryl 0, Ki-67 5%    2 left breast 10 o'clock position 10 cm from the nipple ultrasound core guided biopsy showed ER 90%, %, HER2/cheryl 0    3.  Left breast upper stereotactic core biopsy showed estrogen receptor 90%, progesterone receptor 100%, HER2/cheryl equivocal 2+  FISH testing showed her to as 5.7 with a KEIRA 17 of 1.6 with a ratio HER2/KEIRA 17 of        · INVITAE genetic testing showed variation of uncertain significance of LUCAS gene  · We discussed the multidisciplinary approach in this patient's and I also discussed in length that that invasive lobular carcinomas do benefit from endocrine therapy and discussed in length about side effects of all endocrine therapies  · Pending the results of the surgery she may need to be a candidate for chemotherapy as well and subsequently radiation if the tumor is large locally  · Patient is keen on getting bilateral oophorectomy after her breast surgery is done and we discussed about medical oophorectomy with Lupron as well  · February 24, 2023: Presented patient in the breast cancer conference.  Even though it appears to be a heterogeneous tumor, since it is difficult to really appreciate by clinical exam and the fact that it is invasive lobular carcinoma, it was felt best to do upfront surgery.  · March 8, 2023:p T3 N0 M0 invasive lobular carcinoma s/p bilateral mastectomy with left deep axillary sentinel lymph node biopsy, with bilateral placement of prepectoral tissue expanders for reconstruction and bilateral placement of  AlloDerm.  · Pathology shows multifocal tumor in  upper outer quadrant and upper inner quadrant, invasive lobular carcinoma grade 2 with a Marie score  score of 7  , total of 4 foci were seen, largest tumor being 60 mm, 20 mm, 2 mm, 1 mm.  No DCIS identified.  Lobular carcinoma in situ present.  No lymphatic or vascular channel invasion, no dermal lymphovascular space invasion all margins were negative for invasive carcinoma 4 lymph nodes were all negative for malignancy its pathologic T3N0  ·   · ER %, WA %, HER2/cheryl 2+ with Ki-67 of 5%  · Previous biopsies had shown 2 of the tumors were ER/WA positive HER2/cheryl negative and one of them was ER/WA positive HER2 positive  · On discussing with pathologist Dr. 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  HER2 copy number of 5.7 with HER2/CEP ratio of 3.5 positive and this was on the larger tumor.  · Given that patient has heterogeneous tumor with the larger lesion being ER/WA positive HER2 positive and a Ki-67 of 40%, being premenopausal we will treat with Taxotere carboplatin Herceptin Perjeta.  · April 20, 2023: Patient was to start cycle 1 today but unfortunately her liver function tests were extremely high.  Discussed about alcohol and she had drank and taken Tylenol.  Will hold chemo today  · April 28, 2023: Reviewed her liver function test which are completely normalized.  Discussed with her not to drink alcohol.  Here for cycle 1 day 1 TCHP chemotherapy.  Reviewed echo which showed ejection fraction of 64% with strain pattern of -21.  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 Greater than 50% of this time involved counseling and/or coordination of care as documented within this note.      MD Dr. Kell Sandoval

## 2023-04-28 ENCOUNTER — INFUSION (OUTPATIENT)
Dept: ONCOLOGY | Facility: HOSPITAL | Age: 48
End: 2023-04-28
Payer: COMMERCIAL

## 2023-04-28 VITALS
WEIGHT: 174 LBS | TEMPERATURE: 97.8 F | BODY MASS INDEX: 30.83 KG/M2 | SYSTOLIC BLOOD PRESSURE: 137 MMHG | HEART RATE: 85 BPM | RESPIRATION RATE: 18 BRPM | OXYGEN SATURATION: 100 % | DIASTOLIC BLOOD PRESSURE: 88 MMHG

## 2023-04-28 DIAGNOSIS — Z17.0 MALIGNANT NEOPLASM OF OVERLAPPING SITES OF LEFT BREAST IN FEMALE, ESTROGEN RECEPTOR POSITIVE: Primary | ICD-10-CM

## 2023-04-28 DIAGNOSIS — C50.812 MALIGNANT NEOPLASM OF OVERLAPPING SITES OF LEFT BREAST IN FEMALE, ESTROGEN RECEPTOR POSITIVE: Primary | ICD-10-CM

## 2023-04-28 PROCEDURE — 96360 HYDRATION IV INFUSION INIT: CPT

## 2023-04-28 PROCEDURE — 25010000002 PEGFILGRASTIM-CBQV 6 MG/0.6ML SOLUTION PREFILLED SYRINGE: Performed by: INTERNAL MEDICINE

## 2023-04-28 PROCEDURE — 96372 THER/PROPH/DIAG INJ SC/IM: CPT

## 2023-04-28 RX ORDER — SODIUM CHLORIDE 9 MG/ML
1000 INJECTION, SOLUTION INTRAVENOUS ONCE
Status: COMPLETED | OUTPATIENT
Start: 2023-04-28 | End: 2023-04-28

## 2023-04-28 RX ADMIN — PEGFILGRASTIM-CBQV 6 MG: 6 INJECTION, SOLUTION SUBCUTANEOUS at 16:20

## 2023-04-28 RX ADMIN — SODIUM CHLORIDE 1000 ML: 9 INJECTION, SOLUTION INTRAVENOUS at 15:36

## 2023-04-28 NOTE — NURSING NOTE
"Pt presents for IV fluids and Udenyca after first treatment yesterday. She reports feeling \"great\". Reinforced use of Claritin to help avoid bone pain from Udenyca. Pt questioned need for IV fluids as she returns 2 days next week for IV fluids as well. Pt encouraged to get throught this first cycle of treatment and supportive care to see how she's tolerating it as she's still experiencing the benefit of steroids w/ treatment. If she's still feeling good and not experiencing N/V/D and taking PO well, advised pt that this was something she could discuss w/ the MD or NP. Pt v/u of all and will return Monday for IV fluids as scheduled.    "

## 2023-05-01 ENCOUNTER — INFUSION (OUTPATIENT)
Dept: ONCOLOGY | Facility: HOSPITAL | Age: 48
End: 2023-05-01
Payer: COMMERCIAL

## 2023-05-01 VITALS
RESPIRATION RATE: 16 BRPM | WEIGHT: 169 LBS | SYSTOLIC BLOOD PRESSURE: 105 MMHG | DIASTOLIC BLOOD PRESSURE: 73 MMHG | OXYGEN SATURATION: 98 % | BODY MASS INDEX: 29.94 KG/M2 | TEMPERATURE: 98.4 F | HEART RATE: 84 BPM

## 2023-05-01 DIAGNOSIS — C50.812 MALIGNANT NEOPLASM OF OVERLAPPING SITES OF LEFT BREAST IN FEMALE, ESTROGEN RECEPTOR POSITIVE: Primary | ICD-10-CM

## 2023-05-01 DIAGNOSIS — Z17.0 MALIGNANT NEOPLASM OF OVERLAPPING SITES OF LEFT BREAST IN FEMALE, ESTROGEN RECEPTOR POSITIVE: Primary | ICD-10-CM

## 2023-05-01 DIAGNOSIS — Z45.2 ENCOUNTER FOR ADJUSTMENT OR MANAGEMENT OF VASCULAR ACCESS DEVICE: ICD-10-CM

## 2023-05-01 DIAGNOSIS — U07.1 COVID-19 VIRUS INFECTION: ICD-10-CM

## 2023-05-01 LAB
ALBUMIN SERPL-MCNC: 4.1 G/DL (ref 3.5–5.2)
ALBUMIN/GLOB SERPL: 1.6 G/DL (ref 1.1–2.4)
ALP SERPL-CCNC: 95 U/L (ref 38–116)
ALT SERPL W P-5'-P-CCNC: 18 U/L (ref 0–33)
ANION GAP SERPL CALCULATED.3IONS-SCNC: 11.3 MMOL/L (ref 5–15)
AST SERPL-CCNC: 12 U/L (ref 0–32)
BASOPHILS # BLD AUTO: 0.02 10*3/MM3 (ref 0–0.2)
BASOPHILS NFR BLD AUTO: 0.1 % (ref 0–1.5)
BILIRUB SERPL-MCNC: 1.3 MG/DL (ref 0.2–1.2)
BUN SERPL-MCNC: 13 MG/DL (ref 6–20)
BUN/CREAT SERPL: 18.6 (ref 7.3–30)
CALCIUM SPEC-SCNC: 9 MG/DL (ref 8.5–10.2)
CHLORIDE SERPL-SCNC: 103 MMOL/L (ref 98–107)
CO2 SERPL-SCNC: 22.7 MMOL/L (ref 22–29)
CREAT SERPL-MCNC: 0.7 MG/DL (ref 0.6–1.1)
DEPRECATED RDW RBC AUTO: 42.6 FL (ref 37–54)
EGFRCR SERPLBLD CKD-EPI 2021: 108.8 ML/MIN/1.73
EOSINOPHIL # BLD AUTO: 0.11 10*3/MM3 (ref 0–0.4)
EOSINOPHIL NFR BLD AUTO: 0.7 % (ref 0.3–6.2)
ERYTHROCYTE [DISTWIDTH] IN BLOOD BY AUTOMATED COUNT: 12.6 % (ref 12.3–15.4)
GLOBULIN UR ELPH-MCNC: 2.6 GM/DL (ref 1.8–3.5)
GLUCOSE SERPL-MCNC: 87 MG/DL (ref 74–124)
HCT VFR BLD AUTO: 41.2 % (ref 34–46.6)
HGB BLD-MCNC: 13.8 G/DL (ref 12–15.9)
IMM GRANULOCYTES # BLD AUTO: 2.14 10*3/MM3 (ref 0–0.05)
IMM GRANULOCYTES NFR BLD AUTO: 13.8 % (ref 0–0.5)
LYMPHOCYTES # BLD AUTO: 1.9 10*3/MM3 (ref 0.7–3.1)
LYMPHOCYTES NFR BLD AUTO: 12.2 % (ref 19.6–45.3)
MAGNESIUM SERPL-MCNC: 2.1 MG/DL (ref 1.8–2.5)
MCH RBC QN AUTO: 30.7 PG (ref 26.6–33)
MCHC RBC AUTO-ENTMCNC: 33.5 G/DL (ref 31.5–35.7)
MCV RBC AUTO: 91.6 FL (ref 79–97)
MONOCYTES # BLD AUTO: 0.11 10*3/MM3 (ref 0.1–0.9)
MONOCYTES NFR BLD AUTO: 0.7 % (ref 5–12)
NEUTROPHILS NFR BLD AUTO: 11.28 10*3/MM3 (ref 1.7–7)
NEUTROPHILS NFR BLD AUTO: 72.5 % (ref 42.7–76)
NRBC BLD AUTO-RTO: 0 /100 WBC (ref 0–0.2)
PLATELET # BLD AUTO: 133 10*3/MM3 (ref 140–450)
PMV BLD AUTO: 11.1 FL (ref 6–12)
POTASSIUM SERPL-SCNC: 3.6 MMOL/L (ref 3.5–4.7)
PROT SERPL-MCNC: 6.7 G/DL (ref 6.3–8)
RBC # BLD AUTO: 4.5 10*6/MM3 (ref 3.77–5.28)
SODIUM SERPL-SCNC: 137 MMOL/L (ref 134–145)
WBC NRBC COR # BLD: 15.56 10*3/MM3 (ref 3.4–10.8)

## 2023-05-01 PROCEDURE — 25010000002 HEPARIN LOCK FLUSH PER 10 UNITS: Performed by: INTERNAL MEDICINE

## 2023-05-01 PROCEDURE — 80053 COMPREHEN METABOLIC PANEL: CPT

## 2023-05-01 PROCEDURE — 96360 HYDRATION IV INFUSION INIT: CPT

## 2023-05-01 PROCEDURE — 83735 ASSAY OF MAGNESIUM: CPT | Performed by: NURSE PRACTITIONER

## 2023-05-01 PROCEDURE — 85025 COMPLETE CBC W/AUTO DIFF WBC: CPT

## 2023-05-01 PROCEDURE — 96361 HYDRATE IV INFUSION ADD-ON: CPT

## 2023-05-01 RX ORDER — SODIUM CHLORIDE 0.9 % (FLUSH) 0.9 %
10 SYRINGE (ML) INJECTION AS NEEDED
OUTPATIENT
Start: 2023-05-01

## 2023-05-01 RX ORDER — HEPARIN SODIUM (PORCINE) LOCK FLUSH IV SOLN 100 UNIT/ML 100 UNIT/ML
500 SOLUTION INTRAVENOUS AS NEEDED
Status: DISCONTINUED | OUTPATIENT
Start: 2023-05-01 | End: 2023-05-01 | Stop reason: HOSPADM

## 2023-05-01 RX ORDER — HEPARIN SODIUM (PORCINE) LOCK FLUSH IV SOLN 100 UNIT/ML 100 UNIT/ML
500 SOLUTION INTRAVENOUS AS NEEDED
OUTPATIENT
Start: 2023-05-01

## 2023-05-01 RX ORDER — SODIUM CHLORIDE 9 MG/ML
1000 INJECTION, SOLUTION INTRAVENOUS ONCE
Status: COMPLETED | OUTPATIENT
Start: 2023-05-01 | End: 2023-05-01

## 2023-05-01 RX ORDER — SODIUM CHLORIDE 0.9 % (FLUSH) 0.9 %
10 SYRINGE (ML) INJECTION AS NEEDED
Status: DISCONTINUED | OUTPATIENT
Start: 2023-05-01 | End: 2023-05-01 | Stop reason: HOSPADM

## 2023-05-01 RX ADMIN — Medication 10 ML: at 17:31

## 2023-05-01 RX ADMIN — Medication 500 UNITS: at 17:31

## 2023-05-01 RX ADMIN — SODIUM CHLORIDE 1000 ML: 9 INJECTION, SOLUTION INTRAVENOUS at 15:57

## 2023-05-01 NOTE — NURSING NOTE
"Pt to infusion room for labs and IVF's .  Pt c/os \"feeling terrible\". Pt having bone pain, received udenyca on Friday.   Pt is taking claritin. Pt c/o \"bad episode of diarrhea early this morning from 2 -4:30 , had to lay on floor because I though I was going to pass out\"    No further diarrhea since them . Pt has nausea , took zofran ;  No vominting  Pt c/o sore throat , trouble eating D/T sore throat  Throat appears reddened , no sores noted.   Red raised rash noted on front of chest and some on back  PT denies itching or irritation from rash  Discussed all of above with Verenice carlton mouthwash called in [facility] pharmacy   Pt instructed to obtain over the counter 1% hrdrocortisone cream to rash area as needed and if worsens to call us.  Pt checks temperature at home and has had no fevers.    Pt has follow up appointment with NP on Thursday  Pt instructed to call for any further worsening of symptoms or concerns. Pt V/U      "

## 2023-05-01 NOTE — NURSING NOTE
Yen's magic mouthwash called into [facility] [place] pharmacy. Orders received per Verenice HERNÁNDEZ  10 cc every 4-6 hrs PRN as needed mouth pain

## 2023-05-03 NOTE — PROGRESS NOTES
Subjective     REASON FOR follow up  1.  Invasive lobular carcinoma multifocal,  · Left breast anterolateral stereotactic biopsy invasive lobular carcinoma, Farwell score of 6, grade 2, 4 mm, ER 95%, ID 95%, HER2/cheryl 0 negative with Ki-67 of 5%.  Positive for atypical lobular hyperplasia  · Left breast 1 o'clock position 10 cm from the nipple ultrasound-guided biopsy consistent with invasive lobular carcinoma grade 2 Farwell score of 6, 4 mm with lobular carcinoma in situ, ER 90%, %, HER2/cheryl 0  · Left breast upper stereotactic guided core biopsy invasive lobular carcinoma grade 2 Marie score of six 3 mm with lobular carcinoma in situ, ER 90%, %, HER2/cheryl equivocal 2+, FISH shows her to 5.7 with CEP 1.6 and ratio HER2 nu/KEIRA-17 ratio of 3.5.  It is amplified.    2.  March 8, 2023:p T3 N0 M0 invasive lobular carcinoma s/p bilateral mastectomy with left deep axillary sentinel lymph node biopsy, with bilateral placement of prepectoral tissue expanders for reconstruction and bilateral placement of AlloDerm.  · Pathology shows multifocal tumor in  upper outer quadrant and upper inner quadrant, invasive lobular carcinoma grade 2 with a Farwell score  score of 7  , total of 4 foci were seen, largest tumor being 60 mm, 20 mm, 2 mm, 1 mm.  No DCIS identified.  Lobular carcinoma in situ present.  No lymphatic or vascular channel invasion, no dermal lymphovascular space invasion all margins were negative for invasive carcinoma 4 lymph nodes were all negative for malignancy its pathologic T3N0  ·   · ER %, ID %, HER2/cheryl 2+ with Ki-67 of 5%  · Previous biopsies had shown 2 of the tumors were ER/ID positive HER2/cheryl negative and one of them was ER/ID positive HER2 positive  · On discussing with pathologist Dr. Serrano, she thinks that the larger tumor was ER ID positive and HER2 positive but unsure if it was the 60 mm tumor or 20 mm tumor.  Either way it was not the smaller tumors which was  HER2 positive.  ·   · All tumors look-alike per pathologist and they are invasive lobular carcinoma.                               HISTORY OF PRESENT ILLNESS:    Patient is a 47-year-old female with history of multifocal left breast cancer with MRI of the breast showing almost 15 lesions.  Biopsies on 3 different lesions were done and they were all consistent with invasive lobular carcinoma.  2 of the lesions where ER/KY positive HER2/cheryl negative and one of the larger lesion was ER/KY positive HER2 2+ equivocal.  FISH testing was done and the HER2 copy number was 5.7 with HER2/CEP ratio of 3.5.  The FISH was amplified.    Patient underwent bilateral mastectomy    With left sentinel lymph node surgery..  The pathology shows tumor to be present in the left upper outer quadrant invasive lobular carcinoma with a Marie score of 7, grade 2.  There were 4 lesions the greatest size was 6 0 mm, 20 mm, 2 mm and 1 mm in size.  Lobular carcinoma in situ is present.  All margins are negative for invasive carcinoma.  4 lymph nodes negative.  Patient has T3N0 invasive lobular carcinoma.      Discussed with pathology in length Dr. Serrano, one of the larger lesions which was 60 mm was heterogeneous it was biopsied at 2 separate spots.  The left anterior lateral and left 1:00 core fragment was ER/KY positive HER2/cheryl negative the left upper core biopsy was ER/KY positive HER2/cheryl 2+ equivocal but HER2/cheryl FISH was positive.    On discussion with pathology the 60 mm tumor showed ER and KY positive and HER2 positive by FISH with a Ki-67 of 40% at the Top-Hat clip.  The HER2/cheryl copy number was 5.7 with a HER2/cheryl by CEP ratio 3.5 which is positive.  The Ki-67 on the lower part was 13%.  The 20 mm tumor had a Ki-67 of 9%.  So the larger lesion was triple positive and heterogeneous as 2 biopsies were done on the larger lesions and that was both triple positive and at the second biopsy was ER/KY positive HER2/cheryl negative so it was a  heterogeneous tumor.  The 20 mm lesion was ER/KY positive HER2/cheryl negative.    Given that she had a large tumor with high Ki-67 and premenopausal status we discussed about giving Taxotere carboplatin Herceptin Perjeta.    Interval history:  Patient returns the office today for follow-up and labs after completing first cycle of TCHP chemotherapy on 4/27/2023.  She is accompanied by her mother.  Patient reports overall she is feeling a lot better today and symptoms have remained tolerable.  Arthralgias from the Neulasta have almost completely resolved.  She started to have worsening symptoms on Sunday following her infusion that continued into Monday.  Symptoms have continued to improve since Monday.  She was having diarrhea, nausea, fatigue, and overall malaise.  She has utilized Imodium and ondansetron that have been helpful with controlling her symptoms.  She has started to notice some sensitivity to her scalp since treatment.  She does plan to start utilizing a cold To help with this discomfort.  The rash on her chest continues to remain stable and is not bothersome to the patient.  She has utilized hydrocortisone cream at times.  She has felt like her acid reflux might be worse.  She is currently on omeprazole 20 mg daily and was curious if this could be increased.  She has not been able to  her Yen's Magic mouthwash from the [facility] pharmacy but plans to pick that up today.  She did notice white patching on her tongue this morning.     She denies any chest pain, vomiting, constipation, increased edema, or neuropathy.  She denies any signs of bleeding or excessive bruising.  Pain is well managed at this time and she rarely requires any as needed medication.  She has followed up with RADHA Contreras in the behavioral oncology department.  She did encourage her to start using her gabapentin daily to help with her irritability.  She does note that the steroids do make her more  irritable and angry but this usually resolves within a couple days.  No other concerns noted at this time.      Oncology history:  Patient is a 47-year-old female who has been recently diagnosed with left breast cancer.  Patient has been compliant with her mammograms.    There is no family history of breast or ovarian cancer.  Her father had prostate cancer and melanoma.  A paternal aunt had thyroid cancer.      Details are as follows.    November 29, 2022: Screening bilateral mammogram showed 8 mm asymmetry in the central left breast posterior one third.  This appears to be in the superior left breast on the MLO view.  Right breast was negative.  A left diagnostic mammogram and ultrasound was recommended.    January January 3, 2023: Left breast diagnostic mammogram showed the spiculated areas within the central posterior and lateral anterior superior left breast where redemonstrated.  Targeted ultrasound was done.  At 1 o'clock position 10 cm from the nipple there is a shadowing mass with peripheral spiculations which is concordant with the mammographic abnormality and suspicious for malignancy.  The more centrally located spiculation on the cc view posterior to the glandular tissue cannot be clearly identified on the ultrasound.    Impression was 2 separate suspicious spiculated lesion in the left breast.  One of the lesions is seen well on the ultrasound and should be amenable to ultrasound-guided core biopsy.  The other lesion which was seen in the CC projection was amicable to stereotactic guided tissue sampling.    January 3, 2023 patient underwent ultrasound-guided left breast biopsy    The ultrasound-guided biopsy of left breast 10 cm from the nipple at 1 o'clock position showed invasive lobular carcinoma moderately differentiated with a [place] grade of 2 and score of 6 measuring 4 mm.  There was focal lobular carcinoma in situ.    Left breast anterior lateral stereotactic core needle biopsy showed  invasive lobular carcinoma moderately differentiated grade 2 with a Bigelow score of 6 measuring 4 mm.  There was atypical lobular hyperplasia.  Estrogen receptor was %, progesterone receptor was %, HER2/cheryl 0, Ki-67 5%.    Left breast upper stereotactic guided core biopsy showed invasive lobular carcinoma moderately differentiated grade 2 with Marie score of 6 with measuring 3 mm with lobular carcinoma in situ present.  I do not see the ER/FL or HER2 on the ultrasound-guided biopsy of the left breast lesion as well as the left upper stereotactic biopsy.  We will check with pathologist    January 23, 2023: Patient underwent  MRI of the breast bilateral    Right breast: Negative    Left breast: At 1:00 anterior left breast 4 cm from the nipple there is a 1.5 x 1.6 x 1.3 cm irregular enhancing mass which is biopsy-proven malignancy.  The biopsy clip is located within 0.9 x 1 x 1.1 cm postbiopsy hematoma along the inferior margin of the mass.  And there is additional hematoma along the lateral margin of the mass which measures 1.2 x 1.9 x 0.9 cm.    At 1:00 in the middle to posterior left breast 7.4 cm from the nipple there is a 1.3 x 1 cm x 1 cm irregular enhancing mass mass with a focus from a biopsy clip along the inferior margin which also represents the biopsy-proven malignancy    At 12:00 in the posterior left breast 8.5 cm from the nipple there is a 1.9 x 1.8 x 2.2 cm irregular enhancing mass with a corresponding 2.9 cm biopsy cavity with a biopsy clip which represents the biopsy-proven malignancy    In addition there are multiple at least 12 similar-appearing irregular enhancing masses and foci are identified throughout the left breast predominantly involving the upper breast but also involving the lower outer quadrant.  At 11-12 o'clock in the middle and posterior left breast there are 3 adjacent reference masses together measuring 5.5 cm but individually measuring 1.2 x 0.9 x 1 cm.  At  1:00 in the far posterior left breast/axillary tail 13.7 cm posterior to the nipple is a 1.3 x 1.1 x 1 cm irregular enhancing mass which is located 0.8 cm from the anterolateral margin of the pectoralis Muscle.  Together the enhancing masses span approximately 11.7 x 8 x 7.8 cm.  There is no suspicious enhancement in the left nipple or chest wall.  Focal areas of skin enhancement likely reflect skin entry point from recent biopsies.  There are no pathologically enlarged internal mammary chain lymph nodes.    Impression    .  At least 15 similar-appearing irregular enhancing masses and foci  scattered throughout the left breast, together measuring up to 11.7 cm  in maximum dimension, encompass 3 sites of biopsy-proven malignancy and  are consistent with multicentric malignancy. Oncologic and surgical  management are recommended.  2.  No MRI evidence of malignancy in the right breast.    Patient is a 47-year-old female with multifocal invasive lobular carcinoma who on screening mammogram showed 8 mm asymmetry in the central left breast posterior one third.  In the cc view.  It appears to be superior left breast on the MLO view.  There was also architectural distortion in the lateral left breast one third on the left cc view.    On diagnostic mammogram the spiculated areas within the central posterior and the lateral anterior superior left breast where redemonstrated.  Targeted ultrasound was done.  At 1 o'clock position 10 cm from the nipple there is a mass with spiculations which is suspicious.  The more centrally located spiculation on the cc view.  The more centrally located spiculation on the cc view cannot be clearly identified on ultrasound.  So there impression was there were 2 separate suspicious spiculated lesion in the left breast.  1 was seen well on the ultrasound and amenable to ultrasound-guided biopsy the other is seen well on mammography and a stereotactic guided biopsy suggested.    Patient  subsequently underwent stereotactic biopsy of 1 lesion and ultrasound-guided biopsy of the 1 cm mass at 1 o'clock position 10 cm from the nipple.  A total of 3 different areas of the left breast were biopsied and specimens were sent to pathology.  The third biopsy was performed at 12 o'clock position in the posterior one third of the left breast.  All of the 3 sites were consistent with invasive lobular carcinoma at site A, B, and C.    MRI of the breast showed at 1 o'clock position of the left breast anteriorly 4 cm posterior to the nipple there is a 1.5 x 1.6 x 1.3 cm irregular enhancing mass.  There is a small hematoma along the inferior margin of the mass.  An additional hematoma along the lateral margin of the mass which is measuring 1.2 x 0.9 x 0.9 cm    At 1:00 in the middle to posterior left breast 7.4 cm posterior to the nipple there is a 1.3 x 1 x 1 cm enhancing mass with a biopsy clip.    At 12:00 posterior left breast 8.5 cm posterior to the nipple there is a 1.5 x 1.8 x 2.2 cm irregular enhancing mass with a corresponding 2.9 cm biopsy cavity with a clip which represents the biopsy site malignancy    Multiple at least 12 similar-appearing irregular enhancing masses and foci are identified throughout the left breast predominantly involving the upper breast but also involving the lower outer quadrant.  At 11-12 o'clock in the middle and posterior left breast there is a 3 adjacent reference masses measuring 5.5 cm in AP dimension and individually measuring 1.2 x 0.9 x 1 cm.  At 1:00 in the far posterior left breast axillary tail 13.7 cm from the nipple there is a 1.3 x 1.1 x 1 cm craniocaudal dimension irregular enhancing mass which is located 0.8 cm from the anterolateral margin of the pectoralis muscle.     Per the MRI at least 15 similar-appearing irregular enhancing masses and foci scattered throughout the left breast together measuring 11.7 cm in maximum dimension which encompasses 3 sites of  biopsy-proven malignancy and a consistent with multicentric malignancy.  MRI of the right breast is negative    INVITAE genetic testing showed variation of uncertain significance of LUCAS  Gene.    Patient was suggested left total mastectomy, left axillary sentinel lymph node biopsy possible node dissection and possible reconstruction.    However patient called Dr. Hi and decided to go for upfront bilateral total mastectomy, left axillary sentinel lymph node biopsy and possible axillary lymph node dissection and reconstruction.     I presented the case in the breast cancer conference today.  Patient had multiple nodules per MRI on the left breast Dr. Todd looked at it and felt there were multiple nodules and the largest one was 2.2 cm.  The discussion was to go ahead and upfront do the surgery and subsequently treat with chemo/ endocrine therapy as needed.  The left breast MRI findings are slightly atypical and that it is not contiguous involvement over 11.5 cm area but multiple nodules.  Surgical pathology will clarify.  If it is 1 big lesion or multiple sma      Past Medical History:   Diagnosis Date   • ADD (attention deficit disorder)    • Bilateral impacted cerumen 07/13/2020   • Breast cancer 03/08/2023    LUCIO MASTECTOMY   • Foot fracture, left    • History of COVID-19 2022   • History of gestational diabetes 2010   • History of Graves' disease 2012   • History of radioactive iodine thyroid ablation 2012   • History of vertigo    • Hyperlipidemia    • Hypothyroidism    • Melanoma of back    • Positive depression screening 07/13/2020        Past Surgical History:   Procedure Laterality Date   • BREAST RECONSTRUCTION Bilateral 3/8/2023    Procedure: BILATERAL PLACEMENT  TISSUE EXPANDER AND ALLODERM;  Surgeon: Eamon Stone MD;  Location: [facility];  Service: Plastics;  Laterality: Bilateral;   • D & C CERVICAL BIOPSY  01/2006   • INTRAUTERINE DEVICE INSERTION  2010    Mirena   • INTRAUTERINE  DEVICE INSERTION  2015    Mirena exchange, old IUD removal and new one inserted by Dr Carr, Lot # [id number] exp 09/17.mar   • MASTECTOMY W/ SENTINEL NODE BIOPSY Bilateral 3/8/2023    Procedure: Bilateral total mastectomy, left axillary sentinel lymph node biopsy;  Surgeon: Kell Hi MD;  Location: [facility];  Service: General;  Laterality: Bilateral;   • SCAR REVISION BREAST Right 4/3/2023    Procedure: RIGHT MASTECTOMY REVISION;  Surgeon: Eamon Stone MD;  Location: [facility];  Service: Plastics;  Laterality: Right;   • VENOUS ACCESS DEVICE (PORT) INSERTION Right 4/13/2023    Procedure: right port placement;  Surgeon: Kell Hi MD;  Location: [facility];  Service: General;  Laterality: Right;   • WISDOM TOOTH EXTRACTION          Current Outpatient Medications on File Prior to Visit   Medication Sig Dispense Refill   • acetaminophen (TYLENOL) 325 MG tablet Take 2 tablets by mouth Every 4 (Four) Hours As Needed for Mild Pain. 60 tablet 0   • COLLAGEN PO Take 1 tablet by mouth Daily.     • levothyroxine (SYNTHROID, LEVOTHROID) 125 MCG tablet Take 1 tablet by mouth Daily. 90 tablet 1   • lidocaine-prilocaine (EMLA) 2.5-2.5 % cream Apply 1 application topically to the appropriate area as directed As Needed for Mild Pain (apply pea-sized amount to port site 30 minutes prior to port being accessed). 30 g 3   • lisinopril (PRINIVIL,ZESTRIL) 2.5 MG tablet Take 1 tablet by mouth Daily. 30 tablet 2   • loratadine (CLARITIN) 10 MG tablet      • Nystatin (magic mouthwash) Swish and spit 10 mL Every 6 (Six) Hours As Needed (mouth pain). 1 bottle 0   • nystatin in aluminum-magnesium hydroxide-simethicone suspension-diphenhydrAMINE liquid-Lidocaine Viscous HCl solution Swish and spit 10 mL by mouth Every 4 (Four) to 6 (Six) Hours As Needed for mouth sores. 410 mL 0   • OLANZapine (ZyPREXA) 5 MG tablet Take 1 tablet by mouth Every Night. Take on days 2, 3 and 4 after chemotherapy. 3  tablet 5   • omeprazole (priLOSEC) 20 MG capsule Take 1 capsule by mouth Daily. 90 capsule 1   • ondansetron (ZOFRAN) 8 MG tablet Take 1 tablet by mouth 3 (Three) Times a Day As Needed for Nausea or Vomiting. 30 tablet 5   • Probiotic Product (PROBIOTIC ADVANCED PO) Take 1 tablet by mouth Daily.       No current facility-administered medications on file prior to visit.        ALLERGIES:    Allergies   Allergen Reactions   • Erythromycin GI Intolerance     Pt reports   • Penicillins Rash        Social History     Socioeconomic History   • Marital status:      Spouse name: Parish   • Number of children: 2   Tobacco Use   • Smoking status: Never   • Smokeless tobacco: Never   Vaping Use   • Vaping Use: Never used   Substance and Sexual Activity   • Alcohol use: Not Currently     Alcohol/week: 1.0 standard drink     Types: 1 Glasses of wine per week     Comment: Daily caffeine use   • Drug use: Never   • Sexual activity: Yes     Partners: Male     Comment:          Family History   Problem Relation Age of Onset   • Diabetes Father    • Hyperlipidemia Father    • Arthritis Father    • Hypertension Father    • Heart disease Father         S/P stents   • Heart attack Father 41   • Colon polyps Father    • Melanoma Father    • Prostate cancer Father    • Hyperlipidemia Sister    • Hypertension Sister    • Heart attack Sister    • Diabetes Sister    • Thyroid cancer Paternal Aunt    • Lung cancer Maternal Grandmother    • Cancer Maternal Grandfather         Bladder cancer   • Lung cancer Maternal Grandfather    • Heart failure Paternal Grandmother    • Transient ischemic attack Paternal Grandmother    • Heart disease Paternal Grandfather    • Malig Hyperthermia Neg Hx       Family history: Maternal great aunt had breast cancer, unsure of the age .  Father had melanoma and prostate cancer, paternal aunt had thyroid cancer, paternal grandfather had bladder cancer maternal grandfather had bladder cancer maternal  "grandmother had lung cancer    Past medical history is consistent with Graves' disease    OB/GYN history:  Age of menarche: 14  Patient is premenopausal and had an IUD which has been now removed   2 para 2 no miscarriages  Age at first childbirth 30  Patient did breast-feed 24 months  Length of taking birth control pills none      Objective     Vitals:    23 1032   BP: 121/88   Pulse: 88   Temp: 98.6 °F (37 °C)   TempSrc: Temporal   SpO2: 99%   Weight: 75.7 kg (166 lb 12.8 oz)   Height: 160 cm (62.99\")   PainSc: 0-No pain         2023    10:28 AM   Current Status   ECOG score 0       Physical Exam      CONSTITUTIONAL:  Vital signs reviewed.  No distress, looks comfortable.  EYES:  Conjunctivae and lids unremarkable.  PERRLA  EARS,NOSE,MOUTH,THROAT:  Ears and nose appear unremarkable.  Lips, teeth, gums appear unremarkable. Tongue appears inflamed with white plaquing   RESPIRATORY:  Normal respiratory effort.  Lungs clear to auscultation bilaterally.  CARDIOVASCULAR:  Normal S1, S2.  No murmurs rubs or gallops.  No significant lower extremity edema.  GASTROINTESTINAL: Abdomen appears unremarkable.  Nontender.  No hepatomegaly.  No splenomegaly.  LYMPHATIC:  No cervical, supraclavicular, axillary lymphadenopathy.  SKIN:  Warm.  Mild maculopapular rash to chest wall.   PSYCHIATRIC:  Normal judgment and insight.  Normal mood and affect.            RECENT LABS:  Hematology WBC   Date Value Ref Range Status   2023 11.78 (H) 3.40 - 10.80 10*3/mm3 Final   10/11/2022 8.0 3.4 - 10.8 x10E3/uL Final     RBC   Date Value Ref Range Status   2023 4.91 3.77 - 5.28 10*6/mm3 Final   10/11/2022 5.00 3.77 - 5.28 x10E6/uL Final     Hemoglobin   Date Value Ref Range Status   2023 15.0 12.0 - 15.9 g/dL Final     Hematocrit   Date Value Ref Range Status   2023 43.8 34.0 - 46.6 % Final     Platelets   Date Value Ref Range Status   2023 174 140 - 450 10*3/mm3 Final          Assessment & Plan "     *Pathologic T3N0 invasive lobular carcinoma of the left breast, upper inner and upper outer quadrant, multifocal, 60 mm, 20 mm, 2 mm, 1 mm, grade 2, Maire score of 7, 2 of them are ER/NV positive, HER2/cheryl negative.  One of the larger lesions was ER/NV positive HER2 positive.  Ki-67 is very low 5%,    · Invasive lobular carcinoma of the left breast recently diagnosed, multifocal with 3 lesions biopsied in the left breast.  Patient has multicentric disease with 15 similar-appearing irregular enhancing masses in the left breast together measuring 11.7 cm in maximum dimension which encompasses 3 of the 3 biopsy proven malignancies.  All the 3 very invasive lobular carcinoma, grade 2 with [place] score of 6 but 2 of the lesions where ER/NV positive HER2 negative and the third lesion was ER/NV positive HER2 2+ with FISH showing HER2 amplified.      1.  Estrogen receptor 95%, progesterone receptor 95%, HER2/cheryl 0, Ki-67 5%    2 left breast 10 o'clock position 10 cm from the nipple ultrasound core guided biopsy showed ER 90%, %, HER2/cheryl 0    3.  Left breast upper stereotactic core biopsy showed estrogen receptor 90%, progesterone receptor 100%, HER2/cheryl equivocal 2+  FISH testing showed her to as 5.7 with a KEIRA 17 of 1.6 with a ratio HER2/KEIRA 17 of        · INVITAE genetic testing showed variation of uncertain significance of LUCAS gene  · We discussed the multidisciplinary approach in this patient's and I also discussed in length that that invasive lobular carcinomas do benefit from endocrine therapy and discussed in length about side effects of all endocrine therapies  · Pending the results of the surgery she may need to be a candidate for chemotherapy as well and subsequently radiation if the tumor is large locally  · Patient is keen on getting bilateral oophorectomy after her breast surgery is done and we discussed about medical oophorectomy with Lupron as well  · February 24, 2023: Presented patient  in the breast cancer conference.  Even though it appears to be a heterogeneous tumor, since it is difficult to really appreciate by clinical exam and the fact that it is invasive lobular carcinoma, it was felt best to do upfront surgery.  · March 8, 2023:p T3 N0 M0 invasive lobular carcinoma s/p bilateral mastectomy with left deep axillary sentinel lymph node biopsy, with bilateral placement of prepectoral tissue expanders for reconstruction and bilateral placement of AlloDerm.  · Pathology shows multifocal tumor in  upper outer quadrant and upper inner quadrant, invasive lobular carcinoma grade 2 with a [place] score  score of 7  , total of 4 foci were seen, largest tumor being 60 mm, 20 mm, 2 mm, 1 mm.  No DCIS identified.  Lobular carcinoma in situ present.  No lymphatic or vascular channel invasion, no dermal lymphovascular space invasion all margins were negative for invasive carcinoma 4 lymph nodes were all negative for malignancy its pathologic T3N0  ·   · ER %, NH %, HER2/cheryl 2+ with Ki-67 of 5%  · Previous biopsies had shown 2 of the tumors were ER/NH positive HER2/cheryl negative and one of them was ER/NH positive HER2 positive  · On discussing with pathologist Dr. Serrano, she thinks that the larger tumor was ER NH positive and HER2 positive.  HER2 copy number of 5.7 with HER2/CEP ratio of 3.5 positive and this was on the larger tumor.  · Given that patient has heterogeneous tumor with the larger lesion being ER/NH positive HER2 positive and a Ki-67 of 40%, being premenopausal we will treat with Taxotere carboplatin Herceptin Perjeta.  · April 20, 2023: Patient was to start cycle 1 today but unfortunately her liver function tests were extremely high.  Discussed about alcohol and she had drank and taken Tylenol.  Will hold chemo today  · April 28, 2023: Reviewed her liver function test which are completely normalized.  Discussed with her not to drink alcohol.  Here for cycle 1 day 1 TCHP  chemotherapy.  Reviewed echo which showed ejection fraction of 64% with strain pattern of -21.  Patient understands all the side effects\  · 5/4/2023: Patient tolerated Cycle 1 TCHP well. She has had diarrhea, nausea and fatigue that have been well managed.       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 She did recommend she start taking her gabapentin again to help with her anxiety and irritability/anger from her decadron with treatment. Patient is encouraged to continue to follow with Cierra.     *Oral Thrush and Chemotherapy induced mucositis  · 5/4/2023: I am concerned patient is starting into chemotherapy induced mucositis. She is reporting sore throat at times, possibly worsened reflux, mouth and tongue changes. She noticed white plaquing on her tongue this morning. She has not been able to  her Yen's Magic Mouthwash until today because of pharmacy supplies. She is currently on omeprazole 20 mg daily. She can increase this to 40 mg daily if needed. Prescription sent to pharmacy today to start Fluconazole 200 mg on day and 100 mg on day 2-7. We will continue to monitor symptoms and watch liver enzymes closely. ALT 34 and AST 24.    *Maculopapular rash on chest wall  · 5/4/2023: Continue to monitor. Appear acne-like in nature. She is encouraged to moisturizer her chest wall well with an Aquaphor or CeraVe moisturizing ointment. We will continue to monitor. She may continue hydrocortisone as needed if she is having itching.     Plan  · Start Fluconazole 200 mg on day one and then 100 mg on day 2-7. Prescription sent to pharmacy.   · Start Yen's Magic Mouth wash. Will  today from pharmacy.   · Patient may  increase her dose of Omeprazole from 20 mg daily to 40 mg daily if needed.   · Moisturize chest wall and continue to monitor.   · Continue to follow with [facility] and their recommendations.   · Return in 1 week with Dr. Frederick for labs and toxicity check.     Patient remains on high risk medication and requires close monitoring for toxicity.    RADHA Harris Dr.

## 2023-05-04 ENCOUNTER — LAB (OUTPATIENT)
Dept: LAB | Facility: HOSPITAL | Age: 48
End: 2023-05-04
Payer: COMMERCIAL

## 2023-05-04 ENCOUNTER — OFFICE VISIT (OUTPATIENT)
Dept: ONCOLOGY | Facility: CLINIC | Age: 48
End: 2023-05-04
Payer: COMMERCIAL

## 2023-05-04 VITALS
DIASTOLIC BLOOD PRESSURE: 88 MMHG | BODY MASS INDEX: 29.55 KG/M2 | OXYGEN SATURATION: 99 % | TEMPERATURE: 98.6 F | HEART RATE: 88 BPM | SYSTOLIC BLOOD PRESSURE: 121 MMHG | WEIGHT: 166.8 LBS | HEIGHT: 63 IN

## 2023-05-04 DIAGNOSIS — C50.812 MALIGNANT NEOPLASM OF OVERLAPPING SITES OF LEFT BREAST IN FEMALE, ESTROGEN RECEPTOR POSITIVE: Primary | ICD-10-CM

## 2023-05-04 DIAGNOSIS — Z79.899 HIGH RISK MEDICATION USE: ICD-10-CM

## 2023-05-04 DIAGNOSIS — C50.812 MALIGNANT NEOPLASM OF OVERLAPPING SITES OF LEFT BREAST IN FEMALE, ESTROGEN RECEPTOR POSITIVE: ICD-10-CM

## 2023-05-04 DIAGNOSIS — Z17.0 MALIGNANT NEOPLASM OF OVERLAPPING SITES OF LEFT BREAST IN FEMALE, ESTROGEN RECEPTOR POSITIVE: ICD-10-CM

## 2023-05-04 DIAGNOSIS — Z17.0 MALIGNANT NEOPLASM OF OVERLAPPING SITES OF LEFT BREAST IN FEMALE, ESTROGEN RECEPTOR POSITIVE: Primary | ICD-10-CM

## 2023-05-04 DIAGNOSIS — B37.0 ORAL THRUSH: ICD-10-CM

## 2023-05-04 LAB
ALBUMIN SERPL-MCNC: 4.5 G/DL (ref 3.5–5.2)
ALBUMIN/GLOB SERPL: 1.6 G/DL (ref 1.1–2.4)
ALP SERPL-CCNC: 113 U/L (ref 38–116)
ALT SERPL W P-5'-P-CCNC: 34 U/L (ref 0–33)
ANION GAP SERPL CALCULATED.3IONS-SCNC: 12 MMOL/L (ref 5–15)
AST SERPL-CCNC: 24 U/L (ref 0–32)
BASOPHILS # BLD AUTO: 0.01 10*3/MM3 (ref 0–0.2)
BASOPHILS NFR BLD AUTO: 0.1 % (ref 0–1.5)
BILIRUB SERPL-MCNC: 0.5 MG/DL (ref 0.2–1.2)
BUN SERPL-MCNC: 11 MG/DL (ref 6–20)
BUN/CREAT SERPL: 12.4 (ref 7.3–30)
CALCIUM SPEC-SCNC: 9.6 MG/DL (ref 8.5–10.2)
CHLORIDE SERPL-SCNC: 101 MMOL/L (ref 98–107)
CO2 SERPL-SCNC: 26 MMOL/L (ref 22–29)
CREAT SERPL-MCNC: 0.89 MG/DL (ref 0.6–1.1)
DEPRECATED RDW RBC AUTO: 40.9 FL (ref 37–54)
EGFRCR SERPLBLD CKD-EPI 2021: 81.6 ML/MIN/1.73
EOSINOPHIL # BLD AUTO: 0.07 10*3/MM3 (ref 0–0.4)
EOSINOPHIL NFR BLD AUTO: 0.6 % (ref 0.3–6.2)
ERYTHROCYTE [DISTWIDTH] IN BLOOD BY AUTOMATED COUNT: 12.3 % (ref 12.3–15.4)
GLOBULIN UR ELPH-MCNC: 2.9 GM/DL (ref 1.8–3.5)
GLUCOSE SERPL-MCNC: 115 MG/DL (ref 74–124)
HCT VFR BLD AUTO: 43.8 % (ref 34–46.6)
HGB BLD-MCNC: 15 G/DL (ref 12–15.9)
IMM GRANULOCYTES # BLD AUTO: 1.32 10*3/MM3 (ref 0–0.05)
IMM GRANULOCYTES NFR BLD AUTO: 11.2 % (ref 0–0.5)
LYMPHOCYTES # BLD AUTO: 2.56 10*3/MM3 (ref 0.7–3.1)
LYMPHOCYTES NFR BLD AUTO: 21.7 % (ref 19.6–45.3)
MAGNESIUM SERPL-MCNC: 2.1 MG/DL (ref 1.8–2.5)
MCH RBC QN AUTO: 30.5 PG (ref 26.6–33)
MCHC RBC AUTO-ENTMCNC: 34.2 G/DL (ref 31.5–35.7)
MCV RBC AUTO: 89.2 FL (ref 79–97)
MONOCYTES # BLD AUTO: 1.56 10*3/MM3 (ref 0.1–0.9)
MONOCYTES NFR BLD AUTO: 13.2 % (ref 5–12)
NEUTROPHILS NFR BLD AUTO: 53.2 % (ref 42.7–76)
NEUTROPHILS NFR BLD AUTO: 6.26 10*3/MM3 (ref 1.7–7)
NRBC BLD AUTO-RTO: 0 /100 WBC (ref 0–0.2)
PLATELET # BLD AUTO: 174 10*3/MM3 (ref 140–450)
PMV BLD AUTO: 10.5 FL (ref 6–12)
POTASSIUM SERPL-SCNC: 4.5 MMOL/L (ref 3.5–4.7)
PROT SERPL-MCNC: 7.4 G/DL (ref 6.3–8)
RBC # BLD AUTO: 4.91 10*6/MM3 (ref 3.77–5.28)
SODIUM SERPL-SCNC: 139 MMOL/L (ref 134–145)
WBC NRBC COR # BLD: 11.78 10*3/MM3 (ref 3.4–10.8)

## 2023-05-04 PROCEDURE — 83735 ASSAY OF MAGNESIUM: CPT

## 2023-05-04 PROCEDURE — 36415 COLL VENOUS BLD VENIPUNCTURE: CPT

## 2023-05-04 PROCEDURE — 80053 COMPREHEN METABOLIC PANEL: CPT

## 2023-05-04 PROCEDURE — 85025 COMPLETE CBC W/AUTO DIFF WBC: CPT

## 2023-05-04 RX ORDER — FLUCONAZOLE 100 MG/1
TABLET ORAL
Qty: 8 TABLET | Refills: 0 | Status: SHIPPED | OUTPATIENT
Start: 2023-05-04

## 2023-05-05 ENCOUNTER — PATIENT OUTREACH (OUTPATIENT)
Dept: OTHER | Facility: HOSPITAL | Age: 48
End: 2023-05-05
Payer: COMMERCIAL

## 2023-05-05 ENCOUNTER — TELEPHONE (OUTPATIENT)
Dept: ONCOLOGY | Facility: CLINIC | Age: 48
End: 2023-05-05
Payer: COMMERCIAL

## 2023-05-05 DIAGNOSIS — Z17.0 MALIGNANT NEOPLASM OF OVERLAPPING SITES OF LEFT BREAST IN FEMALE, ESTROGEN RECEPTOR POSITIVE: Primary | ICD-10-CM

## 2023-05-05 DIAGNOSIS — C50.812 MALIGNANT NEOPLASM OF OVERLAPPING SITES OF LEFT BREAST IN FEMALE, ESTROGEN RECEPTOR POSITIVE: Primary | ICD-10-CM

## 2023-05-05 DIAGNOSIS — Z79.899 HIGH RISK MEDICATION USE: ICD-10-CM

## 2023-05-05 DIAGNOSIS — R94.8 ABNORMAL RADIONUCLIDE BONE SCAN: ICD-10-CM

## 2023-05-05 DIAGNOSIS — M89.9 BONE LESION: ICD-10-CM

## 2023-05-05 NOTE — PROGRESS NOTES
Order entered for CT Guided Deep Bone biopsy of Medial Right Clavicle re: abnormal bone scan showing lesion, to be done next week DANISHA Frederick

## 2023-05-05 NOTE — PROGRESS NOTES
Called Ms. Kulkarni to see how she was doing. She stated she is recovering from her treatment well and is still able to work. She stated her symptoms are mild and she is doing well.  She stated she has no needs at this time. She was thankful for the call and will reach out if any questions or needs arise.

## 2023-05-05 NOTE — TELEPHONE ENCOUNTER
----- Message from Leslie Rogers RN sent at 5/5/2023  8:52 AM EDT -----  Order entered for CT Guided Deep Bone Biopsy of Medial Right Clavicle.  Dr. Frederick wants this to be done next week, as that will be a good time to have it done in her chemotherapy cycle prior to the next cycle.    Dr. Frederick said she has talked to the patient, and she is aware you will be calling with set up.      Thank you,  Leslie

## 2023-05-06 NOTE — PROGRESS NOTES
05/15/23 0001   Pre-Procedure Phone Call   Procedure Time Verified Yes   Arrival Time 1100   Procedure Location Verified Yes   Medical History Reviewed No   NPO Status Reinforced Yes   Ride and Caregiver Arranged Yes   Patient Knows to Bring Current Medications No   Bring Outside Films Requested No

## 2023-05-09 ENCOUNTER — OFFICE VISIT (OUTPATIENT)
Dept: ONCOLOGY | Facility: CLINIC | Age: 48
End: 2023-05-09
Payer: COMMERCIAL

## 2023-05-09 ENCOUNTER — LAB (OUTPATIENT)
Dept: LAB | Facility: HOSPITAL | Age: 48
End: 2023-05-09
Payer: COMMERCIAL

## 2023-05-09 VITALS
SYSTOLIC BLOOD PRESSURE: 127 MMHG | HEIGHT: 63 IN | HEART RATE: 78 BPM | TEMPERATURE: 96.6 F | DIASTOLIC BLOOD PRESSURE: 82 MMHG | WEIGHT: 170.6 LBS | OXYGEN SATURATION: 100 % | RESPIRATION RATE: 16 BRPM | BODY MASS INDEX: 30.23 KG/M2

## 2023-05-09 DIAGNOSIS — C50.812 MALIGNANT NEOPLASM OF OVERLAPPING SITES OF LEFT BREAST IN FEMALE, ESTROGEN RECEPTOR POSITIVE: Primary | ICD-10-CM

## 2023-05-09 DIAGNOSIS — Z79.899 HIGH RISK MEDICATION USE: ICD-10-CM

## 2023-05-09 DIAGNOSIS — C50.812 MALIGNANT NEOPLASM OF OVERLAPPING SITES OF LEFT BREAST IN FEMALE, ESTROGEN RECEPTOR POSITIVE: ICD-10-CM

## 2023-05-09 DIAGNOSIS — Z17.0 MALIGNANT NEOPLASM OF OVERLAPPING SITES OF LEFT BREAST IN FEMALE, ESTROGEN RECEPTOR POSITIVE: Primary | ICD-10-CM

## 2023-05-09 DIAGNOSIS — F41.9 ANXIETY: Primary | ICD-10-CM

## 2023-05-09 DIAGNOSIS — Z17.0 MALIGNANT NEOPLASM OF OVERLAPPING SITES OF LEFT BREAST IN FEMALE, ESTROGEN RECEPTOR POSITIVE: ICD-10-CM

## 2023-05-09 LAB
ALBUMIN SERPL-MCNC: 4.4 G/DL (ref 3.5–5.2)
ALBUMIN/GLOB SERPL: 1.6 G/DL (ref 1.1–2.4)
ALP SERPL-CCNC: 145 U/L (ref 38–116)
ALT SERPL W P-5'-P-CCNC: 42 U/L (ref 0–33)
ANION GAP SERPL CALCULATED.3IONS-SCNC: 12.8 MMOL/L (ref 5–15)
AST SERPL-CCNC: 30 U/L (ref 0–32)
BASOPHILS # BLD AUTO: 0.06 10*3/MM3 (ref 0–0.2)
BASOPHILS NFR BLD AUTO: 0.4 % (ref 0–1.5)
BILIRUB SERPL-MCNC: 0.3 MG/DL (ref 0.2–1.2)
BUN SERPL-MCNC: 13 MG/DL (ref 6–20)
BUN/CREAT SERPL: 14.1 (ref 7.3–30)
CALCIUM SPEC-SCNC: 9.3 MG/DL (ref 8.5–10.2)
CHLORIDE SERPL-SCNC: 104 MMOL/L (ref 98–107)
CO2 SERPL-SCNC: 23.2 MMOL/L (ref 22–29)
CREAT SERPL-MCNC: 0.92 MG/DL (ref 0.6–1.1)
DEPRECATED RDW RBC AUTO: 43.3 FL (ref 37–54)
EGFRCR SERPLBLD CKD-EPI 2021: 78.4 ML/MIN/1.73
EOSINOPHIL # BLD AUTO: 0.05 10*3/MM3 (ref 0–0.4)
EOSINOPHIL NFR BLD AUTO: 0.3 % (ref 0.3–6.2)
ERYTHROCYTE [DISTWIDTH] IN BLOOD BY AUTOMATED COUNT: 13.2 % (ref 12.3–15.4)
GLOBULIN UR ELPH-MCNC: 2.7 GM/DL (ref 1.8–3.5)
GLUCOSE SERPL-MCNC: 126 MG/DL (ref 74–124)
HCT VFR BLD AUTO: 41 % (ref 34–46.6)
HGB BLD-MCNC: 13.4 G/DL (ref 12–15.9)
IMM GRANULOCYTES # BLD AUTO: 1.53 10*3/MM3 (ref 0–0.05)
IMM GRANULOCYTES NFR BLD AUTO: 10.5 % (ref 0–0.5)
LYMPHOCYTES # BLD AUTO: 2.24 10*3/MM3 (ref 0.7–3.1)
LYMPHOCYTES NFR BLD AUTO: 15.4 % (ref 19.6–45.3)
MAGNESIUM SERPL-MCNC: 2 MG/DL (ref 1.8–2.5)
MCH RBC QN AUTO: 29.7 PG (ref 26.6–33)
MCHC RBC AUTO-ENTMCNC: 32.7 G/DL (ref 31.5–35.7)
MCV RBC AUTO: 90.9 FL (ref 79–97)
MONOCYTES # BLD AUTO: 0.6 10*3/MM3 (ref 0.1–0.9)
MONOCYTES NFR BLD AUTO: 4.1 % (ref 5–12)
NEUTROPHILS NFR BLD AUTO: 10.07 10*3/MM3 (ref 1.7–7)
NEUTROPHILS NFR BLD AUTO: 69.3 % (ref 42.7–76)
NRBC BLD AUTO-RTO: 0 /100 WBC (ref 0–0.2)
PLATELET # BLD AUTO: 201 10*3/MM3 (ref 140–450)
PMV BLD AUTO: 9.8 FL (ref 6–12)
POTASSIUM SERPL-SCNC: 4.1 MMOL/L (ref 3.5–4.7)
PROT SERPL-MCNC: 7.1 G/DL (ref 6.3–8)
RBC # BLD AUTO: 4.51 10*6/MM3 (ref 3.77–5.28)
SODIUM SERPL-SCNC: 140 MMOL/L (ref 134–145)
WBC NRBC COR # BLD: 14.55 10*3/MM3 (ref 3.4–10.8)

## 2023-05-09 PROCEDURE — 85025 COMPLETE CBC W/AUTO DIFF WBC: CPT

## 2023-05-09 PROCEDURE — 36415 COLL VENOUS BLD VENIPUNCTURE: CPT

## 2023-05-09 PROCEDURE — 80053 COMPREHEN METABOLIC PANEL: CPT

## 2023-05-09 PROCEDURE — 83735 ASSAY OF MAGNESIUM: CPT

## 2023-05-09 RX ORDER — ESCITALOPRAM OXALATE 10 MG/1
10 TABLET ORAL DAILY
Qty: 30 TABLET | Refills: 1 | Status: SHIPPED | OUTPATIENT
Start: 2023-05-09

## 2023-05-09 RX ORDER — LORAZEPAM 0.5 MG/1
0.5 TABLET ORAL NIGHTLY PRN
Qty: 30 TABLET | Refills: 0 | Status: SHIPPED | OUTPATIENT
Start: 2023-05-09

## 2023-05-09 NOTE — H&P (VIEW-ONLY)
Subjective     REASON FOR follow up  1.  Invasive lobular carcinoma multifocal,  · Left breast anterolateral stereotactic biopsy invasive lobular carcinoma, [place] score of 6, grade 2, 4 mm, ER 95%, MA 95%, HER2/cheryl 0 negative with Ki-67 of 5%.  Positive for atypical lobular hyperplasia  · Left breast 1 o'clock position 10 cm from the nipple ultrasound-guided biopsy consistent with invasive lobular carcinoma grade 2 [place] score of 6, 4 mm with lobular carcinoma in situ, ER 90%, %, HER2/cheryl 0  · Left breast upper stereotactic guided core biopsy invasive lobular carcinoma grade 2 Marie score of six 3 mm with lobular carcinoma in situ, ER 90%, %, HER2/cheryl equivocal 2+, FISH shows her to 5.7 with CEP 1.6 and ratio HER2 nu/KEIRA-17 ratio of 3.5.  It is amplified.    2.  March 8, 2023:p T3 N0 M0 invasive lobular carcinoma s/p bilateral mastectomy with left deep axillary sentinel lymph node biopsy, with bilateral placement of prepectoral tissue expanders for reconstruction and bilateral placement of AlloDerm.  · Pathology shows multifocal tumor in  upper outer quadrant and upper inner quadrant, invasive lobular carcinoma grade 2 with a [place] score  score of 7  , total of 4 foci were seen, largest tumor being 60 mm, 20 mm, 2 mm, 1 mm.  No DCIS identified.  Lobular carcinoma in situ present.  No lymphatic or vascular channel invasion, no dermal lymphovascular space invasion all margins were negative for invasive carcinoma 4 lymph nodes were all negative for malignancy its pathologic T3N0  ·   · ER %, MA %, HER2/cheryl 2+ with Ki-67 of 5%  · Previous biopsies had shown 2 of the tumors were ER/MA positive HER2/cheryl negative and one of them was ER/MA positive HER2 positive  · On discussing with pathologist Dr. Serrano, she thinks that the larger tumor was ER MA positive and HER2 positive but unsure if it was the 60 mm tumor or 20 mm tumor.  Either way it was not the smaller tumors which was  HER2 positive.  ·   · All tumors look-alike per pathologist and they are invasive lobular carcinoma.                               HISTORY OF PRESENT ILLNESS:    Patient is a 47-year-old female with history of multifocal left breast cancer with MRI of the breast showing almost 15 lesions.  Biopsies on 3 different lesions were done and they were all consistent with invasive lobular carcinoma.  2 of the lesions where ER/NH positive HER2/cheryl negative and one of the larger lesion was ER/NH positive HER2 2+ equivocal.  FISH testing was done and the HER2 copy number was 5.7 with HER2/CEP ratio of 3.5.  The FISH was amplified.    Patient underwent bilateral mastectomy    With left sentinel lymph node surgery..  The pathology shows tumor to be present in the left upper outer quadrant invasive lobular carcinoma with a Marie score of 7, grade 2.  There were 4 lesions the greatest size was 6 0 mm, 20 mm, 2 mm and 1 mm in size.  Lobular carcinoma in situ is present.  All margins are negative for invasive carcinoma.  4 lymph nodes negative.  Patient has T3N0 invasive lobular carcinoma.      Discussed with pathology in length Dr. Serrano, one of the larger lesions which was 60 mm was heterogeneous it was biopsied at 2 separate spots.  The left anterior lateral and left 1:00 core fragment was ER/NH positive HER2/cheryl negative the left upper core biopsy was ER/NH positive HER2/cheryl 2+ equivocal but HER2/cheryl FISH was positive.    On discussion with pathology the 60 mm tumor showed ER and NH positive and HER2 positive by FISH with a Ki-67 of 40% at the Top-Hat clip.  The HER2/cheryl copy number was 5.7 with a HER2/cheryl by CEP ratio 3.5 which is positive.  The Ki-67 on the lower part was 13%.  The 20 mm tumor had a Ki-67 of 9%.  So the larger lesion was triple positive and heterogeneous as 2 biopsies were done on the larger lesions and that was both triple positive and at the second biopsy was ER/NH positive HER2/cheryl negative so it was a  heterogeneous tumor.  The 20 mm lesion was ER/OH positive HER2/cheryl negative.    Given that she had a large tumor with high Ki-67 and premenopausal status we discussed about giving Taxotere carboplatin Herceptin Perjeta.    Interval history:  Patient today is doing better though is tired.  She did have diarrhea on the Monday after chemotherapy which lasted 2 days.  She thinks the fluids did help her.  The nausea vomiting was not significant.  She did not have fever or chills.  She has been irritable.  This certainly could be secondary to steroids and we may need to decrease the dose of her steroids prior to TCHP.  We discussed about cutting the dose down to 4 mg twice daily on the dexamethasone the day prior the night of and the next day.  The morning of chemo she still takes the 8 mg.  We discussed about placing her on Ativan for the lack of sleep as well as restlessness with steroids.  She was given fluconazole for a week and her liver function mildly increased.  Fluconazole can also increase liver function.  We will try to avoid fluconazole and try Yen's Magic potion proactively starting the day of chemo.  She will require echocardiogram every 3 months.  No chills.  Patient has already met with Cierra Degroot.  She does have anxiety.  No depression no suicidal or homicidal ideation.  Her workplace is flexible and hence she can rest if needed.        Oncology history:  Patient is a 47-year-old female who has been recently diagnosed with left breast cancer.  Patient has been compliant with her mammograms.    There is no family history of breast or ovarian cancer.  Her father had prostate cancer and melanoma.  A paternal aunt had thyroid cancer.      Details are as follows.    November 29, 2022: Screening bilateral mammogram showed 8 mm asymmetry in the central left breast posterior one third.  This appears to be in the superior left breast on the MLO view.  Right breast was negative.  A left diagnostic mammogram  and ultrasound was recommended.    January January 3, 2023: Left breast diagnostic mammogram showed the spiculated areas within the central posterior and lateral anterior superior left breast where redemonstrated.  Targeted ultrasound was done.  At 1 o'clock position 10 cm from the nipple there is a shadowing mass with peripheral spiculations which is concordant with the mammographic abnormality and suspicious for malignancy.  The more centrally located spiculation on the cc view posterior to the glandular tissue cannot be clearly identified on the ultrasound.    Impression was 2 separate suspicious spiculated lesion in the left breast.  One of the lesions is seen well on the ultrasound and should be amenable to ultrasound-guided core biopsy.  The other lesion which was seen in the CC projection was amicable to stereotactic guided tissue sampling.    January 3, 2023 patient underwent ultrasound-guided left breast biopsy    The ultrasound-guided biopsy of left breast 10 cm from the nipple at 1 o'clock position showed invasive lobular carcinoma moderately differentiated with a [place] grade of 2 and score of 6 measuring 4 mm.  There was focal lobular carcinoma in situ.    Left breast anterior lateral stereotactic core needle biopsy showed invasive lobular carcinoma moderately differentiated grade 2 with a [place] score of 6 measuring 4 mm.  There was atypical lobular hyperplasia.  Estrogen receptor was %, progesterone receptor was %, HER2/cheryl 0, Ki-67 5%.    Left breast upper stereotactic guided core biopsy showed invasive lobular carcinoma moderately differentiated grade 2 with [place] score of 6 with measuring 3 mm with lobular carcinoma in situ present.  I do not see the ER/MD or HER2 on the ultrasound-guided biopsy of the left breast lesion as well as the left upper stereotactic biopsy.  We will check with pathologist    January 23, 2023: Patient underwent  MRI of the breast bilateral    Right  breast: Negative    Left breast: At 1:00 anterior left breast 4 cm from the nipple there is a 1.5 x 1.6 x 1.3 cm irregular enhancing mass which is biopsy-proven malignancy.  The biopsy clip is located within 0.9 x 1 x 1.1 cm postbiopsy hematoma along the inferior margin of the mass.  And there is additional hematoma along the lateral margin of the mass which measures 1.2 x 1.9 x 0.9 cm.    At 1:00 in the middle to posterior left breast 7.4 cm from the nipple there is a 1.3 x 1 cm x 1 cm irregular enhancing mass mass with a focus from a biopsy clip along the inferior margin which also represents the biopsy-proven malignancy    At 12:00 in the posterior left breast 8.5 cm from the nipple there is a 1.9 x 1.8 x 2.2 cm irregular enhancing mass with a corresponding 2.9 cm biopsy cavity with a biopsy clip which represents the biopsy-proven malignancy    In addition there are multiple at least 12 similar-appearing irregular enhancing masses and foci are identified throughout the left breast predominantly involving the upper breast but also involving the lower outer quadrant.  At 11-12 o'clock in the middle and posterior left breast there are 3 adjacent reference masses together measuring 5.5 cm but individually measuring 1.2 x 0.9 x 1 cm.  At 1:00 in the far posterior left breast/axillary tail 13.7 cm posterior to the nipple is a 1.3 x 1.1 x 1 cm irregular enhancing mass which is located 0.8 cm from the anterolateral margin of the pectoralis Muscle.  Together the enhancing masses span approximately 11.7 x 8 x 7.8 cm.  There is no suspicious enhancement in the left nipple or chest wall.  Focal areas of skin enhancement likely reflect skin entry point from recent biopsies.  There are no pathologically enlarged internal mammary chain lymph nodes.    Impression    .  At least 15 similar-appearing irregular enhancing masses and foci  scattered throughout the left breast, together measuring up to 11.7 cm  in maximum dimension,  encompass 3 sites of biopsy-proven malignancy and  are consistent with multicentric malignancy. Oncologic and surgical  management are recommended.  2.  No MRI evidence of malignancy in the right breast.    Patient is a 47-year-old female with multifocal invasive lobular carcinoma who on screening mammogram showed 8 mm asymmetry in the central left breast posterior one third.  In the cc view.  It appears to be superior left breast on the MLO view.  There was also architectural distortion in the lateral left breast one third on the left cc view.    On diagnostic mammogram the spiculated areas within the central posterior and the lateral anterior superior left breast where redemonstrated.  Targeted ultrasound was done.  At 1 o'clock position 10 cm from the nipple there is a mass with spiculations which is suspicious.  The more centrally located spiculation on the cc view.  The more centrally located spiculation on the cc view cannot be clearly identified on ultrasound.  So there impression was there were 2 separate suspicious spiculated lesion in the left breast.  1 was seen well on the ultrasound and amenable to ultrasound-guided biopsy the other is seen well on mammography and a stereotactic guided biopsy suggested.    Patient subsequently underwent stereotactic biopsy of 1 lesion and ultrasound-guided biopsy of the 1 cm mass at 1 o'clock position 10 cm from the nipple.  A total of 3 different areas of the left breast were biopsied and specimens were sent to pathology.  The third biopsy was performed at 12 o'clock position in the posterior one third of the left breast.  All of the 3 sites were consistent with invasive lobular carcinoma at site A, B, and C.    MRI of the breast showed at 1 o'clock position of the left breast anteriorly 4 cm posterior to the nipple there is a 1.5 x 1.6 x 1.3 cm irregular enhancing mass.  There is a small hematoma along the inferior margin of the mass.  An additional hematoma along the  lateral margin of the mass which is measuring 1.2 x 0.9 x 0.9 cm    At 1:00 in the middle to posterior left breast 7.4 cm posterior to the nipple there is a 1.3 x 1 x 1 cm enhancing mass with a biopsy clip.    At 12:00 posterior left breast 8.5 cm posterior to the nipple there is a 1.5 x 1.8 x 2.2 cm irregular enhancing mass with a corresponding 2.9 cm biopsy cavity with a clip which represents the biopsy site malignancy    Multiple at least 12 similar-appearing irregular enhancing masses and foci are identified throughout the left breast predominantly involving the upper breast but also involving the lower outer quadrant.  At 11-12 o'clock in the middle and posterior left breast there is a 3 adjacent reference masses measuring 5.5 cm in AP dimension and individually measuring 1.2 x 0.9 x 1 cm.  At 1:00 in the far posterior left breast axillary tail 13.7 cm from the nipple there is a 1.3 x 1.1 x 1 cm craniocaudal dimension irregular enhancing mass which is located 0.8 cm from the anterolateral margin of the pectoralis muscle.     Per the MRI at least 15 similar-appearing irregular enhancing masses and foci scattered throughout the left breast together measuring 11.7 cm in maximum dimension which encompasses 3 sites of biopsy-proven malignancy and a consistent with multicentric malignancy.  MRI of the right breast is negative    INVITAE genetic testing showed variation of uncertain significance of LUCAS  Gene.    Patient was suggested left total mastectomy, left axillary sentinel lymph node biopsy possible node dissection and possible reconstruction.    However patient called Dr. Hi and decided to go for upfront bilateral total mastectomy, left axillary sentinel lymph node biopsy and possible axillary lymph node dissection and reconstruction.     I presented the case in the breast cancer conference today.  Patient had multiple nodules per MRI on the left breast Dr. Todd looked at it and felt there were multiple  nodules and the largest one was 2.2 cm.  The discussion was to go ahead and upfront do the surgery and subsequently treat with chemo/ endocrine therapy as needed.  The left breast MRI findings are slightly atypical and that it is not contiguous involvement over 11.5 cm area but multiple nodules.  Surgical pathology will clarify.  If it is 1 big lesion or multiple sma      Past Medical History:   Diagnosis Date   • ADD (attention deficit disorder)    • Bilateral impacted cerumen 07/13/2020   • Breast cancer 03/08/2023    LUCIO MASTECTOMY   • Foot fracture, left    • History of COVID-19 2022   • History of gestational diabetes 2010   • History of Graves' disease 2012   • History of radioactive iodine thyroid ablation 2012   • History of vertigo    • Hyperlipidemia    • Hypothyroidism    • Melanoma of back    • Positive depression screening 07/13/2020        Past Surgical History:   Procedure Laterality Date   • BREAST RECONSTRUCTION Bilateral 3/8/2023    Procedure: BILATERAL PLACEMENT  TISSUE EXPANDER AND ALLODERM;  Surgeon: Eamon Stone MD;  Location: [facility];  Service: Plastics;  Laterality: Bilateral;   • D & C CERVICAL BIOPSY  01/2006   • INTRAUTERINE DEVICE INSERTION  2010    Mirena   • INTRAUTERINE DEVICE INSERTION  2015    Mirena exchange, old IUD removal and new one inserted by Dr Carr, Lot # [id number] 09/17.mar   • MASTECTOMY W/ SENTINEL NODE BIOPSY Bilateral 3/8/2023    Procedure: Bilateral total mastectomy, left axillary sentinel lymph node biopsy;  Surgeon: Kell Hi MD;  Location: [facility] OR;  Service: General;  Laterality: Bilateral;   • SCAR REVISION BREAST Right 4/3/2023    Procedure: RIGHT MASTECTOMY REVISION;  Surgeon: Eamon Stone MD;  Location: [facility] OR;  Service: Plastics;  Laterality: Right;   • VENOUS ACCESS DEVICE (PORT) INSERTION Right 4/13/2023    Procedure: right port placement;  Surgeon: Kell Hi MD;  Location: [facility] OR;   Service: General;  Laterality: Right;   • WISDOM TOOTH EXTRACTION          Current Outpatient Medications on File Prior to Visit   Medication Sig Dispense Refill   • acetaminophen (TYLENOL) 325 MG tablet Take 2 tablets by mouth Every 4 (Four) Hours As Needed for Mild Pain. 60 tablet 0   • COLLAGEN PO Take 1 tablet by mouth Daily.     • fluconazole (DIFLUCAN) 100 MG tablet Take 200 mg by mouth on day 1 and then 100 mg by mouth for the remaining 6 days. 8 tablet 0   • levothyroxine (SYNTHROID, LEVOTHROID) 125 MCG tablet Take 1 tablet by mouth Daily. 90 tablet 1   • lidocaine-prilocaine (EMLA) 2.5-2.5 % cream Apply 1 application topically to the appropriate area as directed As Needed for Mild Pain (apply pea-sized amount to port site 30 minutes prior to port being accessed). 30 g 3   • lisinopril (PRINIVIL,ZESTRIL) 2.5 MG tablet Take 1 tablet by mouth Daily. 30 tablet 2   • loratadine (CLARITIN) 10 MG tablet      • Nystatin (magic mouthwash) Swish and spit 10 mL Every 6 (Six) Hours As Needed (mouth pain). 1 bottle 0   • nystatin in aluminum-magnesium hydroxide-simethicone suspension-diphenhydrAMINE liquid-Lidocaine Viscous HCl solution Swish and spit 10 mL by mouth Every 4 (Four) to 6 (Six) Hours As Needed for mouth sores. 410 mL 0   • OLANZapine (ZyPREXA) 5 MG tablet Take 1 tablet by mouth Every Night. Take on days 2, 3 and 4 after chemotherapy. 3 tablet 5   • omeprazole (priLOSEC) 20 MG capsule Take 1 capsule by mouth Daily. 90 capsule 1   • ondansetron (ZOFRAN) 8 MG tablet Take 1 tablet by mouth 3 (Three) Times a Day As Needed for Nausea or Vomiting. 30 tablet 5   • Probiotic Product (PROBIOTIC ADVANCED PO) Take 1 tablet by mouth Daily.       No current facility-administered medications on file prior to visit.        ALLERGIES:    Allergies   Allergen Reactions   • Erythromycin GI Intolerance     Pt reports   • Penicillins Rash        Social History     Socioeconomic History   • Marital status:      Spouse  "name: Parish   • Number of children: 2   Tobacco Use   • Smoking status: Never   • Smokeless tobacco: Never   Vaping Use   • Vaping Use: Never used   Substance and Sexual Activity   • Alcohol use: Not Currently     Alcohol/week: 1.0 standard drink     Types: 1 Glasses of wine per week     Comment: Daily caffeine use   • Drug use: Never   • Sexual activity: Yes     Partners: Male     Comment:          Family History   Problem Relation Age of Onset   • Diabetes Father    • Hyperlipidemia Father    • Arthritis Father    • Hypertension Father    • Heart disease Father         S/P stents   • Heart attack Father 41   • Colon polyps Father    • Melanoma Father    • Prostate cancer Father    • Hyperlipidemia Sister    • Hypertension Sister    • Heart attack Sister    • Diabetes Sister    • Thyroid cancer Paternal Aunt    • Lung cancer Maternal Grandmother    • Cancer Maternal Grandfather         Bladder cancer   • Lung cancer Maternal Grandfather    • Heart failure Paternal Grandmother    • Transient ischemic attack Paternal Grandmother    • Heart disease Paternal Grandfather    • Malig Hyperthermia Neg Hx       Family history: Maternal great aunt had breast cancer, unsure of the age .  Father had melanoma and prostate cancer, paternal aunt had thyroid cancer, paternal grandfather had bladder cancer maternal grandfather had bladder cancer maternal grandmother had lung cancer    Past medical history is consistent with Graves' disease    OB/GYN history:  Age of menarche: 14  Patient is premenopausal and had an IUD which has been now removed   2 para 2 no miscarriages  Age at first childbirth 30  Patient did breast-feed 24 months  Length of taking birth control pills none      Objective     Vitals:    23 0811   BP: 127/82   Pulse: 78   Resp: 16   Temp: 96.6 °F (35.9 °C)   TempSrc: Temporal   SpO2: 100%   Weight: 77.4 kg (170 lb 9.6 oz)   Height: 160 cm (62.99\")   PainSc: 0-No pain         2023     8:06 " AM   Current Status   ECOG score 0       Physical Exam      CONSTITUTIONAL:  Vital signs reviewed.  No distress, looks comfortable.  RESPIRATORY:  Normal respiratory effort.  Lungs clear to auscultation bilaterally.  CARDIOVASCULAR:  Normal S1, S2.  No murmurs rubs or gallops.  No significant lower extremity edema.  GASTROINTESTINAL: Abdomen appears unremarkable.  Nontender.  No hepatomegaly.  No splenomegaly.  LYMPHATIC:  No cervical, supraclavicular, axillary lymphadenopathy.  SKIN:  Warm.  No rashes.  PSYCHIATRIC:  Normal judgment and insight.  Normal mood and affect.  No evidence of rash today on the skin.        RECENT LABS:  Hematology WBC   Date Value Ref Range Status   05/09/2023 14.55 (H) 3.40 - 10.80 10*3/mm3 Final   10/11/2022 8.0 3.4 - 10.8 x10E3/uL Final     RBC   Date Value Ref Range Status   05/09/2023 4.51 3.77 - 5.28 10*6/mm3 Final   10/11/2022 5.00 3.77 - 5.28 x10E6/uL Final     Hemoglobin   Date Value Ref Range Status   05/09/2023 13.4 12.0 - 15.9 g/dL Final     Hematocrit   Date Value Ref Range Status   05/09/2023 41.0 34.0 - 46.6 % Final     Platelets   Date Value Ref Range Status   05/09/2023 201 140 - 450 10*3/mm3 Final          Assessment & Plan     *Pathologic T3N0 invasive lobular carcinoma of the left breast, upper inner and upper outer quadrant, multifocal, 60 mm, 20 mm, 2 mm, 1 mm, grade 2, [place] score of 7, 2 of them are ER/AL positive, HER2/cheryl negative.  One of the larger lesions was ER/AL positive HER2 positive.  Ki-67 is very low 5%,    · Invasive lobular carcinoma of the left breast recently diagnosed, multifocal with 3 lesions biopsied in the left breast.  Patient has multicentric disease with 15 similar-appearing irregular enhancing masses in the left breast together measuring 11.7 cm in maximum dimension which encompasses 3 of the 3 biopsy proven malignancies.  All the 3 very invasive lobular carcinoma, grade 2 with Marie score of 6 but 2 of the lesions where ER/AL  positive HER2 negative and the third lesion was ER/MT positive HER2 2+ with FISH showing HER2 amplified.      1.  Estrogen receptor 95%, progesterone receptor 95%, HER2/cheryl 0, Ki-67 5%    2 left breast 10 o'clock position 10 cm from the nipple ultrasound core guided biopsy showed ER 90%, %, HER2/cheryl 0    3.  Left breast upper stereotactic core biopsy showed estrogen receptor 90%, progesterone receptor 100%, HER2/cheryl equivocal 2+  FISH testing showed her to as 5.7 with a KEIRA 17 of 1.6 with a ratio HER2/KEIRA 17 of        · INVITAE genetic testing showed variation of uncertain significance of LUCAS gene  · We discussed the multidisciplinary approach in this patient's and I also discussed in length that that invasive lobular carcinomas do benefit from endocrine therapy and discussed in length about side effects of all endocrine therapies  · Pending the results of the surgery she may need to be a candidate for chemotherapy as well and subsequently radiation if the tumor is large locally  · Patient is keen on getting bilateral oophorectomy after her breast surgery is done and we discussed about medical oophorectomy with Lupron as well  · February 24, 2023: Presented patient in the breast cancer conference.  Even though it appears to be a heterogeneous tumor, since it is difficult to really appreciate by clinical exam and the fact that it is invasive lobular carcinoma, it was felt best to do upfront surgery.  · March 8, 2023:p T3 N0 M0 invasive lobular carcinoma s/p bilateral mastectomy with left deep axillary sentinel lymph node biopsy, with bilateral placement of prepectoral tissue expanders for reconstruction and bilateral placement of AlloDerm.  · Pathology shows multifocal tumor in  upper outer quadrant and upper inner quadrant, invasive lobular carcinoma grade 2 with a Marie score  score of 7  , total of 4 foci were seen, largest tumor being 60 mm, 20 mm, 2 mm, 1 mm.  No DCIS identified.  Lobular carcinoma in  situ present.  No lymphatic or vascular channel invasion, no dermal lymphovascular space invasion all margins were negative for invasive carcinoma 4 lymph nodes were all negative for malignancy its pathologic T3N0  ·   · ER %, NM %, HER2/cheryl 2+ with Ki-67 of 5%  · Previous biopsies had shown 2 of the tumors were ER/NM positive HER2/cheryl negative and one of them was ER/NM positive HER2 positive  · On discussing with pathologist Dr. Serrano, she thinks that the larger tumor was ER NM positive and HER2 positive.  HER2 copy number of 5.7 with HER2/CEP ratio of 3.5 positive and this was on the larger tumor.  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  · April 20, 2023: Patient was to start cycle 1 today but unfortunately her liver function tests were extremely high.  Discussed about alcohol and she had drank and taken Tylenol.  Will hold chemo today  · April 28, 2023: Reviewed her liver function test which are completely normalized.  Discussed with her not to drink alcohol.  Here for cycle 1 day 1 TCHP chemotherapy.  Reviewed echo which showed ejection fraction of 64% with strain pattern of -21.  Patient understands all the side effects\  · 5/4/2023: Patient tolerated Cycle 1 TCHP well. She has had diarrhea, nausea and fatigue that have been well managed.   · May 9, 2023: Patient is here for toxicity check.  She did have diarrhea on day 4 on Monday at which time she got IV fluids and that helped.  She also had thrush and hence was started on fluconazole.  But liver function test slightly increased today and could be secondary to fluconazole.  We will try to do Yen's Magic potion proactively so she does not require fluconazole swish and spit 4 times a day with next chemo.  Also patient becomes very irritable and could be secondary to steroids and with told  to decrease the steroids to dexamethasone 4 mg twice daily starting the day before chemo and to continue it for 3 days.  On the day of the chemo she will take 8 mg prior to the chemotherapy on dexamethasone.      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 She did recommend she start taking her gabapentin again to help with her anxiety and irritability/anger from her decadron with treatment. Patient is encouraged to continue to follow with Cierra.     *Oral Thrush and Chemotherapy induced mucositis  · 5/4/2023: I am concerned patient is starting into chemotherapy induced mucositis. She is reporting sore throat at times, possibly worsened reflux, mouth and tongue changes. She noticed white plaquing on her tongue this morning. She has not been able to  her Yen's Magic Mouthwash until today because of pharmacy supplies. She is currently on omeprazole 20 mg daily. She can increase this to 40 mg daily if needed. Prescription sent to pharmacy today to start Fluconazole 200 mg on day and 100 mg on day 2-7. We will continue to monitor symptoms and watch liver enzymes closely. ALT 34 and AST 24.  · Liver function test mildly elevated today, unsure if that is the effect of fluconazole.  · Next time we will try and avoid fluconazole and consider just Yen's Magic potion Marek actively    *Maculopapular rash on chest wall  · 5/4/2023: Continue to monitor. Appear acne-like in nature. She is encouraged to moisturizer her chest wall well with an Aquaphor or CeraVe moisturizing ointment. We will continue to monitor. She may continue hydrocortisone as needed if she is having itching.   · Resolution of the rash    Plan  · Discontinue  fluconazole and we will try only Yen's Magic potion swish and spit 4 times daily starting the day of chemo  · Given elevated liver function test which is slightly worse today compared to before we will try and avoid fluconazole.  If really needed we can consider fluconazole only if the Yen's Magic potion does not help and give only 100 mg daily for 5 days that is only if needed  · Patient will come on May 18 for cycle 2 TCHP  · She is scheduled for biopsy of the right clavicle to see if there is any malignancy in the bones as the bone biopsy was not done prior to cycle 1 as patient had refused.  Currently willing to undergo bone biopsy  · Patient will come for Neulasta on the 19th  · Will decrease dose of dexamethasone as patient has irritability related to steroids to 4 mg twice daily starting the day before chemo.  On the day of chemo she will take 8 mg dexamethasone the morning  · We will add Ativan 0.5 mg at bedtime to see if that helps both the nausea the irritability related to steroids and help sleep.  · Follow-up with me on 18 May when she is due for cycle 2 TCHP  Patient remains on high risk medication and requires close monitoring for toxicity.      I spent 45 total minutes, face-to-face, caring for Ursula today. Greater than 50% of this time involved counseling and/or coordination of care as documented within this note.    MD Dr. Kell Sandoval

## 2023-05-09 NOTE — PROGRESS NOTES
Subjective     REASON FOR follow up  1.  Invasive lobular carcinoma multifocal,  · Left breast anterolateral stereotactic biopsy invasive lobular carcinoma, Vallonia score of 6, grade 2, 4 mm, ER 95%, WI 95%, HER2/cheryl 0 negative with Ki-67 of 5%.  Positive for atypical lobular hyperplasia  · Left breast 1 o'clock position 10 cm from the nipple ultrasound-guided biopsy consistent with invasive lobular carcinoma grade 2 Vallonia score of 6, 4 mm with lobular carcinoma in situ, ER 90%, %, HER2/cheryl 0  · Left breast upper stereotactic guided core biopsy invasive lobular carcinoma grade 2 Marie score of six 3 mm with lobular carcinoma in situ, ER 90%, %, HER2/cheryl equivocal 2+, FISH shows her to 5.7 with CEP 1.6 and ratio HER2 nu/KEIRA-17 ratio of 3.5.  It is amplified.    2.  March 8, 2023:p T3 N0 M0 invasive lobular carcinoma s/p bilateral mastectomy with left deep axillary sentinel lymph node biopsy, with bilateral placement of prepectoral tissue expanders for reconstruction and bilateral placement of AlloDerm.  · Pathology shows multifocal tumor in  upper outer quadrant and upper inner quadrant, invasive lobular carcinoma grade 2 with a Vallonia score  score of 7  , total of 4 foci were seen, largest tumor being 60 mm, 20 mm, 2 mm, 1 mm.  No DCIS identified.  Lobular carcinoma in situ present.  No lymphatic or vascular channel invasion, no dermal lymphovascular space invasion all margins were negative for invasive carcinoma 4 lymph nodes were all negative for malignancy its pathologic T3N0  ·   · ER %, WI %, HER2/cheryl 2+ with Ki-67 of 5%  · Previous biopsies had shown 2 of the tumors were ER/WI positive HER2/cheryl negative and one of them was ER/WI positive HER2 positive  · On discussing with pathologist Dr. Serrano, she thinks that the larger tumor was ER WI positive and HER2 positive but unsure if it was the 60 mm tumor or 20 mm tumor.  Either way it was not the smaller tumors which was  HER2 positive.  ·   · All tumors look-alike per pathologist and they are invasive lobular carcinoma.                               HISTORY OF PRESENT ILLNESS:    Patient is a 47-year-old female with history of multifocal left breast cancer with MRI of the breast showing almost 15 lesions.  Biopsies on 3 different lesions were done and they were all consistent with invasive lobular carcinoma.  2 of the lesions where ER/AL positive HER2/cheryl negative and one of the larger lesion was ER/AL positive HER2 2+ equivocal.  FISH testing was done and the HER2 copy number was 5.7 with HER2/CEP ratio of 3.5.  The FISH was amplified.    Patient underwent bilateral mastectomy    With left sentinel lymph node surgery..  The pathology shows tumor to be present in the left upper outer quadrant invasive lobular carcinoma with a Marie score of 7, grade 2.  There were 4 lesions the greatest size was 6 0 mm, 20 mm, 2 mm and 1 mm in size.  Lobular carcinoma in situ is present.  All margins are negative for invasive carcinoma.  4 lymph nodes negative.  Patient has T3N0 invasive lobular carcinoma.      Discussed with pathology in length Dr. Serrano, one of the larger lesions which was 60 mm was heterogeneous it was biopsied at 2 separate spots.  The left anterior lateral and left 1:00 core fragment was ER/AL positive HER2/cheryl negative the left upper core biopsy was ER/AL positive HER2/cheryl 2+ equivocal but HER2/cheryl FISH was positive.    On discussion with pathology the 60 mm tumor showed ER and AL positive and HER2 positive by FISH with a Ki-67 of 40% at the Top-Hat clip.  The HER2/cheryl copy number was 5.7 with a HER2/cheryl by CEP ratio 3.5 which is positive.  The Ki-67 on the lower part was 13%.  The 20 mm tumor had a Ki-67 of 9%.  So the larger lesion was triple positive and heterogeneous as 2 biopsies were done on the larger lesions and that was both triple positive and at the second biopsy was ER/AL positive HER2/cheryl negative so it was a  heterogeneous tumor.  The 20 mm lesion was ER/TX positive HER2/cheryl negative.    Given that she had a large tumor with high Ki-67 and premenopausal status we discussed about giving Taxotere carboplatin Herceptin Perjeta.    Interval history:  Patient today is doing better though is tired.  She did have diarrhea on the Monday after chemotherapy which lasted 2 days.  She thinks the fluids did help her.  The nausea vomiting was not significant.  She did not have fever or chills.  She has been irritable.  This certainly could be secondary to steroids and we may need to decrease the dose of her steroids prior to TCHP.  We discussed about cutting the dose down to 4 mg twice daily on the dexamethasone the day prior the night of and the next day.  The morning of chemo she still takes the 8 mg.  We discussed about placing her on Ativan for the lack of sleep as well as restlessness with steroids.  She was given fluconazole for a week and her liver function mildly increased.  Fluconazole can also increase liver function.  We will try to avoid fluconazole and try Yen's Magic potion proactively starting the day of chemo.  She will require echocardiogram every 3 months.  No chills.  Patient has already met with Cierra Degroot.  She does have anxiety.  No depression no suicidal or homicidal ideation.  Her workplace is flexible and hence she can rest if needed.        Oncology history:  Patient is a 47-year-old female who has been recently diagnosed with left breast cancer.  Patient has been compliant with her mammograms.    There is no family history of breast or ovarian cancer.  Her father had prostate cancer and melanoma.  A paternal aunt had thyroid cancer.      Details are as follows.    November 29, 2022: Screening bilateral mammogram showed 8 mm asymmetry in the central left breast posterior one third.  This appears to be in the superior left breast on the MLO view.  Right breast was negative.  A left diagnostic mammogram  and ultrasound was recommended.    January January 3, 2023: Left breast diagnostic mammogram showed the spiculated areas within the central posterior and lateral anterior superior left breast where redemonstrated.  Targeted ultrasound was done.  At 1 o'clock position 10 cm from the nipple there is a shadowing mass with peripheral spiculations which is concordant with the mammographic abnormality and suspicious for malignancy.  The more centrally located spiculation on the cc view posterior to the glandular tissue cannot be clearly identified on the ultrasound.    Impression was 2 separate suspicious spiculated lesion in the left breast.  One of the lesions is seen well on the ultrasound and should be amenable to ultrasound-guided core biopsy.  The other lesion which was seen in the CC projection was amicable to stereotactic guided tissue sampling.    January 3, 2023 patient underwent ultrasound-guided left breast biopsy    The ultrasound-guided biopsy of left breast 10 cm from the nipple at 1 o'clock position showed invasive lobular carcinoma moderately differentiated with a Middleton grade of 2 and score of 6 measuring 4 mm.  There was focal lobular carcinoma in situ.    Left breast anterior lateral stereotactic core needle biopsy showed invasive lobular carcinoma moderately differentiated grade 2 with a Middleton score of 6 measuring 4 mm.  There was atypical lobular hyperplasia.  Estrogen receptor was %, progesterone receptor was %, HER2/cheryl 0, Ki-67 5%.    Left breast upper stereotactic guided core biopsy showed invasive lobular carcinoma moderately differentiated grade 2 with Middleton score of 6 with measuring 3 mm with lobular carcinoma in situ present.  I do not see the ER/MO or HER2 on the ultrasound-guided biopsy of the left breast lesion as well as the left upper stereotactic biopsy.  We will check with pathologist    January 23, 2023: Patient underwent  MRI of the breast bilateral    Right  breast: Negative    Left breast: At 1:00 anterior left breast 4 cm from the nipple there is a 1.5 x 1.6 x 1.3 cm irregular enhancing mass which is biopsy-proven malignancy.  The biopsy clip is located within 0.9 x 1 x 1.1 cm postbiopsy hematoma along the inferior margin of the mass.  And there is additional hematoma along the lateral margin of the mass which measures 1.2 x 1.9 x 0.9 cm.    At 1:00 in the middle to posterior left breast 7.4 cm from the nipple there is a 1.3 x 1 cm x 1 cm irregular enhancing mass mass with a focus from a biopsy clip along the inferior margin which also represents the biopsy-proven malignancy    At 12:00 in the posterior left breast 8.5 cm from the nipple there is a 1.9 x 1.8 x 2.2 cm irregular enhancing mass with a corresponding 2.9 cm biopsy cavity with a biopsy clip which represents the biopsy-proven malignancy    In addition there are multiple at least 12 similar-appearing irregular enhancing masses and foci are identified throughout the left breast predominantly involving the upper breast but also involving the lower outer quadrant.  At 11-12 o'clock in the middle and posterior left breast there are 3 adjacent reference masses together measuring 5.5 cm but individually measuring 1.2 x 0.9 x 1 cm.  At 1:00 in the far posterior left breast/axillary tail 13.7 cm posterior to the nipple is a 1.3 x 1.1 x 1 cm irregular enhancing mass which is located 0.8 cm from the anterolateral margin of the pectoralis Muscle.  Together the enhancing masses span approximately 11.7 x 8 x 7.8 cm.  There is no suspicious enhancement in the left nipple or chest wall.  Focal areas of skin enhancement likely reflect skin entry point from recent biopsies.  There are no pathologically enlarged internal mammary chain lymph nodes.    Impression    .  At least 15 similar-appearing irregular enhancing masses and foci  scattered throughout the left breast, together measuring up to 11.7 cm  in maximum dimension,  encompass 3 sites of biopsy-proven malignancy and  are consistent with multicentric malignancy. Oncologic and surgical  management are recommended.  2.  No MRI evidence of malignancy in the right breast.    Patient is a 47-year-old female with multifocal invasive lobular carcinoma who on screening mammogram showed 8 mm asymmetry in the central left breast posterior one third.  In the cc view.  It appears to be superior left breast on the MLO view.  There was also architectural distortion in the lateral left breast one third on the left cc view.    On diagnostic mammogram the spiculated areas within the central posterior and the lateral anterior superior left breast where redemonstrated.  Targeted ultrasound was done.  At 1 o'clock position 10 cm from the nipple there is a mass with spiculations which is suspicious.  The more centrally located spiculation on the cc view.  The more centrally located spiculation on the cc view cannot be clearly identified on ultrasound.  So there impression was there were 2 separate suspicious spiculated lesion in the left breast.  1 was seen well on the ultrasound and amenable to ultrasound-guided biopsy the other is seen well on mammography and a stereotactic guided biopsy suggested.    Patient subsequently underwent stereotactic biopsy of 1 lesion and ultrasound-guided biopsy of the 1 cm mass at 1 o'clock position 10 cm from the nipple.  A total of 3 different areas of the left breast were biopsied and specimens were sent to pathology.  The third biopsy was performed at 12 o'clock position in the posterior one third of the left breast.  All of the 3 sites were consistent with invasive lobular carcinoma at site A, B, and C.    MRI of the breast showed at 1 o'clock position of the left breast anteriorly 4 cm posterior to the nipple there is a 1.5 x 1.6 x 1.3 cm irregular enhancing mass.  There is a small hematoma along the inferior margin of the mass.  An additional hematoma along the  lateral margin of the mass which is measuring 1.2 x 0.9 x 0.9 cm    At 1:00 in the middle to posterior left breast 7.4 cm posterior to the nipple there is a 1.3 x 1 x 1 cm enhancing mass with a biopsy clip.    At 12:00 posterior left breast 8.5 cm posterior to the nipple there is a 1.5 x 1.8 x 2.2 cm irregular enhancing mass with a corresponding 2.9 cm biopsy cavity with a clip which represents the biopsy site malignancy    Multiple at least 12 similar-appearing irregular enhancing masses and foci are identified throughout the left breast predominantly involving the upper breast but also involving the lower outer quadrant.  At 11-12 o'clock in the middle and posterior left breast there is a 3 adjacent reference masses measuring 5.5 cm in AP dimension and individually measuring 1.2 x 0.9 x 1 cm.  At 1:00 in the far posterior left breast axillary tail 13.7 cm from the nipple there is a 1.3 x 1.1 x 1 cm craniocaudal dimension irregular enhancing mass which is located 0.8 cm from the anterolateral margin of the pectoralis muscle.     Per the MRI at least 15 similar-appearing irregular enhancing masses and foci scattered throughout the left breast together measuring 11.7 cm in maximum dimension which encompasses 3 sites of biopsy-proven malignancy and a consistent with multicentric malignancy.  MRI of the right breast is negative    INVITAE genetic testing showed variation of uncertain significance of LUCAS  Gene.    Patient was suggested left total mastectomy, left axillary sentinel lymph node biopsy possible node dissection and possible reconstruction.    However patient called Dr. Hi and decided to go for upfront bilateral total mastectomy, left axillary sentinel lymph node biopsy and possible axillary lymph node dissection and reconstruction.     I presented the case in the breast cancer conference today.  Patient had multiple nodules per MRI on the left breast Dr. Todd looked at it and felt there were multiple  nodules and the largest one was 2.2 cm.  The discussion was to go ahead and upfront do the surgery and subsequently treat with chemo/ endocrine therapy as needed.  The left breast MRI findings are slightly atypical and that it is not contiguous involvement over 11.5 cm area but multiple nodules.  Surgical pathology will clarify.  If it is 1 big lesion or multiple sma      Past Medical History:   Diagnosis Date   • ADD (attention deficit disorder)    • Bilateral impacted cerumen 07/13/2020   • Breast cancer 03/08/2023    LUCIO MASTECTOMY   • Foot fracture, left    • History of COVID-19 2022   • History of gestational diabetes 2010   • History of Graves' disease 2012   • History of radioactive iodine thyroid ablation 2012   • History of vertigo    • Hyperlipidemia    • Hypothyroidism    • Melanoma of back    • Positive depression screening 07/13/2020        Past Surgical History:   Procedure Laterality Date   • BREAST RECONSTRUCTION Bilateral 3/8/2023    Procedure: BILATERAL PLACEMENT  TISSUE EXPANDER AND ALLODERM;  Surgeon: Eamon Stone MD;  Location: [facility];  Service: Plastics;  Laterality: Bilateral;   • D & C CERVICAL BIOPSY  01/2006   • INTRAUTERINE DEVICE INSERTION  2010    Mirena   • INTRAUTERINE DEVICE INSERTION  2015    Mirena exchange, old IUD removal and new one inserted by Dr Carr, Lot # [id number] 09/17.mar   • MASTECTOMY W/ SENTINEL NODE BIOPSY Bilateral 3/8/2023    Procedure: Bilateral total mastectomy, left axillary sentinel lymph node biopsy;  Surgeon: Kell Hi MD;  Location: [facility] OR;  Service: General;  Laterality: Bilateral;   • SCAR REVISION BREAST Right 4/3/2023    Procedure: RIGHT MASTECTOMY REVISION;  Surgeon: Eamon Stone MD;  Location: [facility] OR;  Service: Plastics;  Laterality: Right;   • VENOUS ACCESS DEVICE (PORT) INSERTION Right 4/13/2023    Procedure: right port placement;  Surgeon: Kell Hi MD;  Location: [facility] OR;   Service: General;  Laterality: Right;   • WISDOM TOOTH EXTRACTION          Current Outpatient Medications on File Prior to Visit   Medication Sig Dispense Refill   • acetaminophen (TYLENOL) 325 MG tablet Take 2 tablets by mouth Every 4 (Four) Hours As Needed for Mild Pain. 60 tablet 0   • COLLAGEN PO Take 1 tablet by mouth Daily.     • fluconazole (DIFLUCAN) 100 MG tablet Take 200 mg by mouth on day 1 and then 100 mg by mouth for the remaining 6 days. 8 tablet 0   • levothyroxine (SYNTHROID, LEVOTHROID) 125 MCG tablet Take 1 tablet by mouth Daily. 90 tablet 1   • lidocaine-prilocaine (EMLA) 2.5-2.5 % cream Apply 1 application topically to the appropriate area as directed As Needed for Mild Pain (apply pea-sized amount to port site 30 minutes prior to port being accessed). 30 g 3   • lisinopril (PRINIVIL,ZESTRIL) 2.5 MG tablet Take 1 tablet by mouth Daily. 30 tablet 2   • loratadine (CLARITIN) 10 MG tablet      • Nystatin (magic mouthwash) Swish and spit 10 mL Every 6 (Six) Hours As Needed (mouth pain). 1 bottle 0   • nystatin in aluminum-magnesium hydroxide-simethicone suspension-diphenhydrAMINE liquid-Lidocaine Viscous HCl solution Swish and spit 10 mL by mouth Every 4 (Four) to 6 (Six) Hours As Needed for mouth sores. 410 mL 0   • OLANZapine (ZyPREXA) 5 MG tablet Take 1 tablet by mouth Every Night. Take on days 2, 3 and 4 after chemotherapy. 3 tablet 5   • omeprazole (priLOSEC) 20 MG capsule Take 1 capsule by mouth Daily. 90 capsule 1   • ondansetron (ZOFRAN) 8 MG tablet Take 1 tablet by mouth 3 (Three) Times a Day As Needed for Nausea or Vomiting. 30 tablet 5   • Probiotic Product (PROBIOTIC ADVANCED PO) Take 1 tablet by mouth Daily.       No current facility-administered medications on file prior to visit.        ALLERGIES:    Allergies   Allergen Reactions   • Erythromycin GI Intolerance     Pt reports   • Penicillins Rash        Social History     Socioeconomic History   • Marital status:      Spouse  "name: Parish   • Number of children: 2   Tobacco Use   • Smoking status: Never   • Smokeless tobacco: Never   Vaping Use   • Vaping Use: Never used   Substance and Sexual Activity   • Alcohol use: Not Currently     Alcohol/week: 1.0 standard drink     Types: 1 Glasses of wine per week     Comment: Daily caffeine use   • Drug use: Never   • Sexual activity: Yes     Partners: Male     Comment:          Family History   Problem Relation Age of Onset   • Diabetes Father    • Hyperlipidemia Father    • Arthritis Father    • Hypertension Father    • Heart disease Father         S/P stents   • Heart attack Father 41   • Colon polyps Father    • Melanoma Father    • Prostate cancer Father    • Hyperlipidemia Sister    • Hypertension Sister    • Heart attack Sister    • Diabetes Sister    • Thyroid cancer Paternal Aunt    • Lung cancer Maternal Grandmother    • Cancer Maternal Grandfather         Bladder cancer   • Lung cancer Maternal Grandfather    • Heart failure Paternal Grandmother    • Transient ischemic attack Paternal Grandmother    • Heart disease Paternal Grandfather    • Malig Hyperthermia Neg Hx       Family history: Maternal great aunt had breast cancer, unsure of the age .  Father had melanoma and prostate cancer, paternal aunt had thyroid cancer, paternal grandfather had bladder cancer maternal grandfather had bladder cancer maternal grandmother had lung cancer    Past medical history is consistent with Graves' disease    OB/GYN history:  Age of menarche: 14  Patient is premenopausal and had an IUD which has been now removed   2 para 2 no miscarriages  Age at first childbirth 30  Patient did breast-feed 24 months  Length of taking birth control pills none      Objective     Vitals:    23 0811   BP: 127/82   Pulse: 78   Resp: 16   Temp: 96.6 °F (35.9 °C)   TempSrc: Temporal   SpO2: 100%   Weight: 77.4 kg (170 lb 9.6 oz)   Height: 160 cm (62.99\")   PainSc: 0-No pain         2023     8:06 " AM   Current Status   ECOG score 0       Physical Exam      CONSTITUTIONAL:  Vital signs reviewed.  No distress, looks comfortable.  RESPIRATORY:  Normal respiratory effort.  Lungs clear to auscultation bilaterally.  CARDIOVASCULAR:  Normal S1, S2.  No murmurs rubs or gallops.  No significant lower extremity edema.  GASTROINTESTINAL: Abdomen appears unremarkable.  Nontender.  No hepatomegaly.  No splenomegaly.  LYMPHATIC:  No cervical, supraclavicular, axillary lymphadenopathy.  SKIN:  Warm.  No rashes.  PSYCHIATRIC:  Normal judgment and insight.  Normal mood and affect.  No evidence of rash today on the skin.        RECENT LABS:  Hematology WBC   Date Value Ref Range Status   05/09/2023 14.55 (H) 3.40 - 10.80 10*3/mm3 Final   10/11/2022 8.0 3.4 - 10.8 x10E3/uL Final     RBC   Date Value Ref Range Status   05/09/2023 4.51 3.77 - 5.28 10*6/mm3 Final   10/11/2022 5.00 3.77 - 5.28 x10E6/uL Final     Hemoglobin   Date Value Ref Range Status   05/09/2023 13.4 12.0 - 15.9 g/dL Final     Hematocrit   Date Value Ref Range Status   05/09/2023 41.0 34.0 - 46.6 % Final     Platelets   Date Value Ref Range Status   05/09/2023 201 140 - 450 10*3/mm3 Final          Assessment & Plan     *Pathologic T3N0 invasive lobular carcinoma of the left breast, upper inner and upper outer quadrant, multifocal, 60 mm, 20 mm, 2 mm, 1 mm, grade 2, Westphal[place] score of 7, 2 of them are ER/UT positive, HER2/cheryl negative.  One of the larger lesions was ER/UT positive HER2 positive.  Ki-67 is very low 5%,    · Invasive lobular carcinoma of the left breast recently diagnosed, multifocal with 3 lesions biopsied in the left breast.  Patient has multicentric disease with 15 similar-appearing irregular enhancing masses in the left breast together measuring 11.7 cm in maximum dimension which encompasses 3 of the 3 biopsy proven malignancies.  All the 3 very invasive lobular carcinoma, grade 2 with Marie score of 6 but 2 of the lesions where ER/UT  positive HER2 negative and the third lesion was ER/ME positive HER2 2+ with FISH showing HER2 amplified.      1.  Estrogen receptor 95%, progesterone receptor 95%, HER2/cheryl 0, Ki-67 5%    2 left breast 10 o'clock position 10 cm from the nipple ultrasound core guided biopsy showed ER 90%, %, HER2/cheryl 0    3.  Left breast upper stereotactic core biopsy showed estrogen receptor 90%, progesterone receptor 100%, HER2/cheryl equivocal 2+  FISH testing showed her to as 5.7 with a KEIRA 17 of 1.6 with a ratio HER2/KEIRA 17 of        · INVITAE genetic testing showed variation of uncertain significance of LUCAS gene  · We discussed the multidisciplinary approach in this patient's and I also discussed in length that that invasive lobular carcinomas do benefit from endocrine therapy and discussed in length about side effects of all endocrine therapies  · Pending the results of the surgery she may need to be a candidate for chemotherapy as well and subsequently radiation if the tumor is large locally  · Patient is keen on getting bilateral oophorectomy after her breast surgery is done and we discussed about medical oophorectomy with Lupron as well  · February 24, 2023: Presented patient in the breast cancer conference.  Even though it appears to be a heterogeneous tumor, since it is difficult to really appreciate by clinical exam and the fact that it is invasive lobular carcinoma, it was felt best to do upfront surgery.  · March 8, 2023:p T3 N0 M0 invasive lobular carcinoma s/p bilateral mastectomy with left deep axillary sentinel lymph node biopsy, with bilateral placement of prepectoral tissue expanders for reconstruction and bilateral placement of AlloDerm.  · Pathology shows multifocal tumor in  upper outer quadrant and upper inner quadrant, invasive lobular carcinoma grade 2 with a Marie score  score of 7  , total of 4 foci were seen, largest tumor being 60 mm, 20 mm, 2 mm, 1 mm.  No DCIS identified.  Lobular carcinoma in  situ present.  No lymphatic or vascular channel invasion, no dermal lymphovascular space invasion all margins were negative for invasive carcinoma 4 lymph nodes were all negative for malignancy its pathologic T3N0  ·   · ER %, OK %, HER2/cheryl 2+ with Ki-67 of 5%  · Previous biopsies had shown 2 of the tumors were ER/OK positive HER2/cheryl negative and one of them was ER/OK positive HER2 positive  · On discussing with pathologist Dr. Serrano, she thinks that the larger tumor was ER OK positive and HER2 positive.  HER2 copy number of 5.7 with HER2/CEP ratio of 3.5 positive and this was on the larger tumor.  · Given that patient has heterogeneous tumor with the larger lesion being ER/OK positive HER2 positive and a Ki-67 of 40%, being premenopausal we will treat with Taxotere carboplatin Herceptin Perjeta.  · April 20, 2023: Patient was to start cycle 1 today but unfortunately her liver function tests were extremely high.  Discussed about alcohol and she had drank and taken Tylenol.  Will hold chemo today  · April 28, 2023: Reviewed her liver function test which are completely normalized.  Discussed with her not to drink alcohol.  Here for cycle 1 day 1 TCHP chemotherapy.  Reviewed echo which showed ejection fraction of 64% with strain pattern of -21.  Patient understands all the side effects\  · 5/4/2023: Patient tolerated Cycle 1 TCHP well. She has had diarrhea, nausea and fatigue that have been well managed.   · May 9, 2023: Patient is here for toxicity check.  She did have diarrhea on day 4 on Monday at which time she got IV fluids and that helped.  She also had thrush and hence was started on fluconazole.  But liver function test slightly increased today and could be secondary to fluconazole.  We will try to do Yen's Magic potion proactively so she does not require fluconazole swish and spit 4 times a day with next chemo.  Also patient becomes very irritable and could be secondary to steroids and with told  to decrease the steroids to dexamethasone 4 mg twice daily starting the day before chemo and to continue it for 3 days.  On the day of the chemo she will take 8 mg prior to the chemotherapy on dexamethasone.      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 She did recommend she start taking her gabapentin again to help with her anxiety and irritability/anger from her decadron with treatment. Patient is encouraged to continue to follow with Cierra.     *Oral Thrush and Chemotherapy induced mucositis  · 5/4/2023: I am concerned patient is starting into chemotherapy induced mucositis. She is reporting sore throat at times, possibly worsened reflux, mouth and tongue changes. She noticed white plaquing on her tongue this morning. She has not been able to  her Yen's Magic Mouthwash until today because of pharmacy supplies. She is currently on omeprazole 20 mg daily. She can increase this to 40 mg daily if needed. Prescription sent to pharmacy today to start Fluconazole 200 mg on day and 100 mg on day 2-7. We will continue to monitor symptoms and watch liver enzymes closely. ALT 34 and AST 24.  · Liver function test mildly elevated today, unsure if that is the effect of fluconazole.  · Next time we will try and avoid fluconazole and consider just Yen's Magic potion Marek actively    *Maculopapular rash on chest wall  · 5/4/2023: Continue to monitor. Appear acne-like in nature. She is encouraged to moisturizer her chest wall well with an Aquaphor or CeraVe moisturizing ointment. We will continue to monitor. She may continue hydrocortisone as needed if she is having itching.   · Resolution of the rash    Plan  · Discontinue  fluconazole and we will try only Yen's Magic potion swish and spit 4 times daily starting the day of chemo  · Given elevated liver function test which is slightly worse today compared to before we will try and avoid fluconazole.  If really needed we can consider fluconazole only if the Yen's Magic potion does not help and give only 100 mg daily for 5 days that is only if needed  · Patient will come on May 18 for cycle 2 TCHP  · She is scheduled for biopsy of the right clavicle to see if there is any malignancy in the bones as the bone biopsy was not done prior to cycle 1 as patient had refused.  Currently willing to undergo bone biopsy  · Patient will come for Neulasta on the 19th  · Will decrease dose of dexamethasone as patient has irritability related to steroids to 4 mg twice daily starting the day before chemo.  On the day of chemo she will take 8 mg dexamethasone the morning  · We will add Ativan 0.5 mg at bedtime to see if that helps both the nausea the irritability related to steroids and help sleep.  · Follow-up with me on 18 May when she is due for cycle 2 TCHP  Patient remains on high risk medication and requires close monitoring for toxicity.      I spent 45 total minutes, face-to-face, caring for Ursula today. Greater than 50% of this time involved counseling and/or coordination of care as documented within this note.    MD Dr. Kell Sandoval

## 2023-05-15 ENCOUNTER — HOSPITAL ENCOUNTER (OUTPATIENT)
Dept: CT IMAGING | Facility: HOSPITAL | Age: 48
Discharge: HOME | End: 2023-05-15
Payer: COMMERCIAL

## 2023-05-15 VITALS
TEMPERATURE: 97.5 F | HEART RATE: 85 BPM | WEIGHT: 170 LBS | DIASTOLIC BLOOD PRESSURE: 72 MMHG | HEIGHT: 63 IN | BODY MASS INDEX: 30.12 KG/M2 | RESPIRATION RATE: 15 BRPM | SYSTOLIC BLOOD PRESSURE: 103 MMHG | OXYGEN SATURATION: 100 %

## 2023-05-15 DIAGNOSIS — M89.9 BONE LESION: ICD-10-CM

## 2023-05-15 DIAGNOSIS — Z79.899 HIGH RISK MEDICATION USE: ICD-10-CM

## 2023-05-15 DIAGNOSIS — C50.812 MALIGNANT NEOPLASM OF OVERLAPPING SITES OF LEFT BREAST IN FEMALE, ESTROGEN RECEPTOR POSITIVE: ICD-10-CM

## 2023-05-15 DIAGNOSIS — R94.8 ABNORMAL RADIONUCLIDE BONE SCAN: ICD-10-CM

## 2023-05-15 DIAGNOSIS — Z17.0 MALIGNANT NEOPLASM OF OVERLAPPING SITES OF LEFT BREAST IN FEMALE, ESTROGEN RECEPTOR POSITIVE: ICD-10-CM

## 2023-05-15 LAB
INR PPP: 1.1 (ref 0.8–1.2)
PLATELET # BLD AUTO: 261 10*3/MM3 (ref 140–450)
PROTHROMBIN TIME: 13.1 SECONDS (ref 12.8–15.2)

## 2023-05-15 PROCEDURE — 0 LIDOCAINE 1 % SOLUTION: Performed by: RADIOLOGY

## 2023-05-15 PROCEDURE — 85049 AUTOMATED PLATELET COUNT: CPT | Performed by: RADIOLOGY

## 2023-05-15 PROCEDURE — 88342 IMHCHEM/IMCYTCHM 1ST ANTB: CPT | Performed by: INTERNAL MEDICINE

## 2023-05-15 PROCEDURE — 85610 PROTHROMBIN TIME: CPT

## 2023-05-15 PROCEDURE — 99153 MOD SED SAME PHYS/QHP EA: CPT

## 2023-05-15 PROCEDURE — 99152 MOD SED SAME PHYS/QHP 5/>YRS: CPT

## 2023-05-15 PROCEDURE — 88341 IMHCHEM/IMCYTCHM EA ADD ANTB: CPT | Performed by: INTERNAL MEDICINE

## 2023-05-15 PROCEDURE — 77012 CT SCAN FOR NEEDLE BIOPSY: CPT

## 2023-05-15 PROCEDURE — 25010000002 MIDAZOLAM PER 1 MG: Performed by: RADIOLOGY

## 2023-05-15 PROCEDURE — 25010000002 FENTANYL CITRATE (PF) 50 MCG/ML SOLUTION: Performed by: RADIOLOGY

## 2023-05-15 PROCEDURE — 88307 TISSUE EXAM BY PATHOLOGIST: CPT | Performed by: INTERNAL MEDICINE

## 2023-05-15 RX ORDER — LIDOCAINE HYDROCHLORIDE 10 MG/ML
20 INJECTION, SOLUTION INFILTRATION; PERINEURAL ONCE
Status: COMPLETED | OUTPATIENT
Start: 2023-05-15 | End: 2023-05-15

## 2023-05-15 RX ORDER — SODIUM CHLORIDE 9 MG/ML
25 INJECTION, SOLUTION INTRAVENOUS ONCE
Status: COMPLETED | OUTPATIENT
Start: 2023-05-15 | End: 2023-05-15

## 2023-05-15 RX ORDER — SODIUM CHLORIDE 0.9 % (FLUSH) 0.9 %
3 SYRINGE (ML) INJECTION EVERY 12 HOURS SCHEDULED
Status: DISCONTINUED | OUTPATIENT
Start: 2023-05-15 | End: 2023-05-16 | Stop reason: HOSPADM

## 2023-05-15 RX ORDER — FENTANYL CITRATE 50 UG/ML
INJECTION, SOLUTION INTRAMUSCULAR; INTRAVENOUS AS NEEDED
Status: COMPLETED | OUTPATIENT
Start: 2023-05-15 | End: 2023-05-15

## 2023-05-15 RX ORDER — MIDAZOLAM HYDROCHLORIDE 1 MG/ML
INJECTION INTRAMUSCULAR; INTRAVENOUS AS NEEDED
Status: COMPLETED | OUTPATIENT
Start: 2023-05-15 | End: 2023-05-15

## 2023-05-15 RX ORDER — SODIUM CHLORIDE 0.9 % (FLUSH) 0.9 %
10 SYRINGE (ML) INJECTION AS NEEDED
Status: DISCONTINUED | OUTPATIENT
Start: 2023-05-15 | End: 2023-05-16 | Stop reason: HOSPADM

## 2023-05-15 RX ORDER — SODIUM CHLORIDE 9 MG/ML
40 INJECTION, SOLUTION INTRAVENOUS AS NEEDED
Status: DISCONTINUED | OUTPATIENT
Start: 2023-05-15 | End: 2023-05-16 | Stop reason: HOSPADM

## 2023-05-15 RX ADMIN — FENTANYL CITRATE 25 MCG: 50 INJECTION INTRAMUSCULAR; INTRAVENOUS at 12:58

## 2023-05-15 RX ADMIN — MIDAZOLAM 0.5 MG: 1 INJECTION INTRAMUSCULAR; INTRAVENOUS at 12:58

## 2023-05-15 RX ADMIN — SODIUM CHLORIDE 25 ML/HR: 9 INJECTION, SOLUTION INTRAVENOUS at 12:34

## 2023-05-15 RX ADMIN — LIDOCAINE HYDROCHLORIDE 20 ML: 10 INJECTION, SOLUTION INFILTRATION; PERINEURAL at 12:51

## 2023-05-15 RX ADMIN — FENTANYL CITRATE 25 MCG: 50 INJECTION INTRAMUSCULAR; INTRAVENOUS at 12:54

## 2023-05-15 RX ADMIN — FENTANYL CITRATE 25 MCG: 50 INJECTION INTRAMUSCULAR; INTRAVENOUS at 12:50

## 2023-05-15 RX ADMIN — MIDAZOLAM 1 MG: 1 INJECTION INTRAMUSCULAR; INTRAVENOUS at 12:50

## 2023-05-15 RX ADMIN — Medication 3 ML: at 12:34

## 2023-05-15 RX ADMIN — FENTANYL CITRATE 25 MCG: 50 INJECTION INTRAMUSCULAR; INTRAVENOUS at 13:09

## 2023-05-15 NOTE — DISCHARGE INSTRUCTIONS

## 2023-05-15 NOTE — POST-PROCEDURE NOTE
POST PROCEDURE NOTE    Procedure: bone biopsy    Pre-Procedure Diagnosis: right clavicle lesion    Post-procedure Diagnosis: same    Findings: successful bx    Complications: none    Blood loss: min    Specimen Removed: 4 passes    Disposition:   Discharge home

## 2023-05-16 ENCOUNTER — TELEPHONE (OUTPATIENT)
Dept: INTERVENTIONAL RADIOLOGY/VASCULAR | Facility: HOSPITAL | Age: 48
End: 2023-05-16
Payer: COMMERCIAL

## 2023-05-16 LAB
LAB AP CASE REPORT: NORMAL
LAB AP CLINICAL INFORMATION: NORMAL
LAB AP SPECIAL STAINS: NORMAL
PATH REPORT.FINAL DX SPEC: NORMAL
PATH REPORT.GROSS SPEC: NORMAL

## 2023-05-18 ENCOUNTER — OFFICE VISIT (OUTPATIENT)
Dept: ONCOLOGY | Facility: CLINIC | Age: 48
End: 2023-05-18
Payer: COMMERCIAL

## 2023-05-18 ENCOUNTER — INFUSION (OUTPATIENT)
Dept: ONCOLOGY | Facility: HOSPITAL | Age: 48
End: 2023-05-18
Payer: COMMERCIAL

## 2023-05-18 ENCOUNTER — OFFICE VISIT (OUTPATIENT)
Dept: OTHER | Facility: HOSPITAL | Age: 48
End: 2023-05-18
Payer: COMMERCIAL

## 2023-05-18 VITALS
TEMPERATURE: 97.7 F | HEART RATE: 79 BPM | SYSTOLIC BLOOD PRESSURE: 113 MMHG | OXYGEN SATURATION: 98 % | HEIGHT: 63 IN | DIASTOLIC BLOOD PRESSURE: 41 MMHG | WEIGHT: 171.3 LBS | RESPIRATION RATE: 16 BRPM | BODY MASS INDEX: 30.35 KG/M2

## 2023-05-18 DIAGNOSIS — Z17.0 MALIGNANT NEOPLASM OF OVERLAPPING SITES OF LEFT BREAST IN FEMALE, ESTROGEN RECEPTOR POSITIVE: Primary | ICD-10-CM

## 2023-05-18 DIAGNOSIS — U07.1 COVID-19 VIRUS INFECTION: ICD-10-CM

## 2023-05-18 DIAGNOSIS — C50.812 MALIGNANT NEOPLASM OF OVERLAPPING SITES OF LEFT BREAST IN FEMALE, ESTROGEN RECEPTOR POSITIVE: Primary | ICD-10-CM

## 2023-05-18 DIAGNOSIS — Z17.0 MALIGNANT NEOPLASM OF OVERLAPPING SITES OF LEFT BREAST IN FEMALE, ESTROGEN RECEPTOR POSITIVE: ICD-10-CM

## 2023-05-18 DIAGNOSIS — Z45.2 ENCOUNTER FOR ADJUSTMENT OR MANAGEMENT OF VASCULAR ACCESS DEVICE: ICD-10-CM

## 2023-05-18 DIAGNOSIS — C50.812 MALIGNANT NEOPLASM OF OVERLAPPING SITES OF LEFT BREAST IN FEMALE, ESTROGEN RECEPTOR POSITIVE: ICD-10-CM

## 2023-05-18 LAB
ALBUMIN SERPL-MCNC: 4.5 G/DL (ref 3.5–5.2)
ALBUMIN/GLOB SERPL: 1.6 G/DL (ref 1.1–2.4)
ALP SERPL-CCNC: 126 U/L (ref 38–116)
ALT SERPL W P-5'-P-CCNC: 39 U/L (ref 0–33)
ANION GAP SERPL CALCULATED.3IONS-SCNC: 13 MMOL/L (ref 5–15)
AST SERPL-CCNC: 27 U/L (ref 0–32)
BASOPHILS # BLD AUTO: 0.06 10*3/MM3 (ref 0–0.2)
BASOPHILS NFR BLD AUTO: 0.3 % (ref 0–1.5)
BILIRUB SERPL-MCNC: 0.5 MG/DL (ref 0.2–1.2)
BUN SERPL-MCNC: 14 MG/DL (ref 6–20)
BUN/CREAT SERPL: 18.7 (ref 7.3–30)
CALCIUM SPEC-SCNC: 9.9 MG/DL (ref 8.5–10.2)
CHLORIDE SERPL-SCNC: 105 MMOL/L (ref 98–107)
CO2 SERPL-SCNC: 22 MMOL/L (ref 22–29)
CREAT SERPL-MCNC: 0.75 MG/DL (ref 0.6–1.1)
DEPRECATED RDW RBC AUTO: 44.1 FL (ref 37–54)
EGFRCR SERPLBLD CKD-EPI 2021: 100.2 ML/MIN/1.73
EOSINOPHIL # BLD AUTO: 0 10*3/MM3 (ref 0–0.4)
EOSINOPHIL NFR BLD AUTO: 0 % (ref 0.3–6.2)
ERYTHROCYTE [DISTWIDTH] IN BLOOD BY AUTOMATED COUNT: 13.7 % (ref 12.3–15.4)
GLOBULIN UR ELPH-MCNC: 2.8 GM/DL (ref 1.8–3.5)
GLUCOSE SERPL-MCNC: 135 MG/DL (ref 74–124)
HCT VFR BLD AUTO: 36.8 % (ref 34–46.6)
HGB BLD-MCNC: 12.5 G/DL (ref 12–15.9)
IMM GRANULOCYTES # BLD AUTO: 0.64 10*3/MM3 (ref 0–0.05)
IMM GRANULOCYTES NFR BLD AUTO: 3.5 % (ref 0–0.5)
LYMPHOCYTES # BLD AUTO: 1.94 10*3/MM3 (ref 0.7–3.1)
LYMPHOCYTES NFR BLD AUTO: 10.5 % (ref 19.6–45.3)
MCH RBC QN AUTO: 30.4 PG (ref 26.6–33)
MCHC RBC AUTO-ENTMCNC: 34 G/DL (ref 31.5–35.7)
MCV RBC AUTO: 89.5 FL (ref 79–97)
MONOCYTES # BLD AUTO: 0.61 10*3/MM3 (ref 0.1–0.9)
MONOCYTES NFR BLD AUTO: 3.3 % (ref 5–12)
NEUTROPHILS NFR BLD AUTO: 15.16 10*3/MM3 (ref 1.7–7)
NEUTROPHILS NFR BLD AUTO: 82.4 % (ref 42.7–76)
NRBC BLD AUTO-RTO: 0 /100 WBC (ref 0–0.2)
PLATELET # BLD AUTO: 357 10*3/MM3 (ref 140–450)
PMV BLD AUTO: 9.8 FL (ref 6–12)
POTASSIUM SERPL-SCNC: 4.1 MMOL/L (ref 3.5–4.7)
PROT SERPL-MCNC: 7.3 G/DL (ref 6.3–8)
RBC # BLD AUTO: 4.11 10*6/MM3 (ref 3.77–5.28)
SODIUM SERPL-SCNC: 140 MMOL/L (ref 134–145)
WBC NRBC COR # BLD: 18.41 10*3/MM3 (ref 3.4–10.8)

## 2023-05-18 PROCEDURE — 96375 TX/PRO/DX INJ NEW DRUG ADDON: CPT

## 2023-05-18 PROCEDURE — 85025 COMPLETE CBC W/AUTO DIFF WBC: CPT

## 2023-05-18 PROCEDURE — 25010000002 FOSAPREPITANT PER 1 MG: Performed by: INTERNAL MEDICINE

## 2023-05-18 PROCEDURE — 25010000002 TRASTUZUMAB PER 10 MG: Performed by: INTERNAL MEDICINE

## 2023-05-18 PROCEDURE — 25010000002 PALONOSETRON PER 25 MCG: Performed by: INTERNAL MEDICINE

## 2023-05-18 PROCEDURE — 96367 TX/PROPH/DG ADDL SEQ IV INF: CPT

## 2023-05-18 PROCEDURE — 96413 CHEMO IV INFUSION 1 HR: CPT

## 2023-05-18 PROCEDURE — 96417 CHEMO IV INFUS EACH ADDL SEQ: CPT

## 2023-05-18 PROCEDURE — 25010000002 DIPHENHYDRAMINE PER 50 MG: Performed by: INTERNAL MEDICINE

## 2023-05-18 PROCEDURE — 25010000002 CARBOPLATIN PER 50 MG: Performed by: INTERNAL MEDICINE

## 2023-05-18 PROCEDURE — 80053 COMPREHEN METABOLIC PANEL: CPT

## 2023-05-18 PROCEDURE — 25010000002 DOCETAXEL 10 MG/ML SOLUTION 8 ML VIAL: Performed by: INTERNAL MEDICINE

## 2023-05-18 PROCEDURE — 25010000002 HEPARIN LOCK FLUSH PER 10 UNITS: Performed by: INTERNAL MEDICINE

## 2023-05-18 PROCEDURE — 25010000002 PERTUZUMAB 420 MG/14ML SOLUTION 420 MG VIAL: Performed by: INTERNAL MEDICINE

## 2023-05-18 RX ORDER — HEPARIN SODIUM (PORCINE) LOCK FLUSH IV SOLN 100 UNIT/ML 100 UNIT/ML
500 SOLUTION INTRAVENOUS AS NEEDED
Status: CANCELLED | OUTPATIENT
Start: 2023-05-18

## 2023-05-18 RX ORDER — PALONOSETRON 0.05 MG/ML
0.25 INJECTION, SOLUTION INTRAVENOUS ONCE
Status: COMPLETED | OUTPATIENT
Start: 2023-05-18 | End: 2023-05-18

## 2023-05-18 RX ORDER — SODIUM CHLORIDE 9 MG/ML
250 INJECTION, SOLUTION INTRAVENOUS ONCE
Status: COMPLETED | OUTPATIENT
Start: 2023-05-18 | End: 2023-05-18

## 2023-05-18 RX ORDER — SODIUM CHLORIDE 9 MG/ML
250 INJECTION, SOLUTION INTRAVENOUS ONCE
Status: CANCELLED | OUTPATIENT
Start: 2023-05-18

## 2023-05-18 RX ORDER — OLANZAPINE 5 MG/1
5 TABLET ORAL ONCE
Status: COMPLETED | OUTPATIENT
Start: 2023-05-18 | End: 2023-05-18

## 2023-05-18 RX ORDER — SODIUM CHLORIDE 9 MG/ML
1000 INJECTION, SOLUTION INTRAVENOUS ONCE
Status: CANCELLED
Start: 2023-05-22 | End: 2023-05-22

## 2023-05-18 RX ORDER — FAMOTIDINE 10 MG/ML
20 INJECTION, SOLUTION INTRAVENOUS AS NEEDED
Status: CANCELLED | OUTPATIENT
Start: 2023-05-18

## 2023-05-18 RX ORDER — PALONOSETRON 0.05 MG/ML
0.25 INJECTION, SOLUTION INTRAVENOUS ONCE
Status: CANCELLED | OUTPATIENT
Start: 2023-05-18

## 2023-05-18 RX ORDER — OLANZAPINE 5 MG/1
5 TABLET ORAL ONCE
Status: CANCELLED | OUTPATIENT
Start: 2023-05-18 | End: 2023-05-18

## 2023-05-18 RX ORDER — SODIUM CHLORIDE 0.9 % (FLUSH) 0.9 %
10 SYRINGE (ML) INJECTION AS NEEDED
Status: DISCONTINUED | OUTPATIENT
Start: 2023-05-18 | End: 2023-05-18 | Stop reason: HOSPADM

## 2023-05-18 RX ORDER — DIPHENHYDRAMINE HYDROCHLORIDE 50 MG/ML
50 INJECTION INTRAMUSCULAR; INTRAVENOUS AS NEEDED
Status: CANCELLED | OUTPATIENT
Start: 2023-05-18

## 2023-05-18 RX ORDER — SODIUM CHLORIDE 0.9 % (FLUSH) 0.9 %
10 SYRINGE (ML) INJECTION AS NEEDED
Status: CANCELLED | OUTPATIENT
Start: 2023-05-18

## 2023-05-18 RX ORDER — FAMOTIDINE 10 MG/ML
20 INJECTION, SOLUTION INTRAVENOUS ONCE
Status: CANCELLED | OUTPATIENT
Start: 2023-05-18

## 2023-05-18 RX ORDER — HEPARIN SODIUM (PORCINE) LOCK FLUSH IV SOLN 100 UNIT/ML 100 UNIT/ML
500 SOLUTION INTRAVENOUS AS NEEDED
Status: DISCONTINUED | OUTPATIENT
Start: 2023-05-18 | End: 2023-05-18 | Stop reason: HOSPADM

## 2023-05-18 RX ORDER — FAMOTIDINE 10 MG/ML
20 INJECTION, SOLUTION INTRAVENOUS ONCE
Status: COMPLETED | OUTPATIENT
Start: 2023-05-18 | End: 2023-05-18

## 2023-05-18 RX ADMIN — SODIUM CHLORIDE 135 MG: 9 INJECTION, SOLUTION INTRAVENOUS at 11:15

## 2023-05-18 RX ADMIN — Medication 10 ML: at 12:56

## 2023-05-18 RX ADMIN — DIPHENHYDRAMINE HYDROCHLORIDE 25 MG: 50 INJECTION, SOLUTION INTRAMUSCULAR; INTRAVENOUS at 09:05

## 2023-05-18 RX ADMIN — SODIUM CHLORIDE 100 ML: 9 INJECTION, SOLUTION INTRAVENOUS at 10:02

## 2023-05-18 RX ADMIN — FAMOTIDINE 20 MG: 10 INJECTION INTRAVENOUS at 09:04

## 2023-05-18 RX ADMIN — PERTUZUMAB 420 MG: 30 INJECTION, SOLUTION, CONCENTRATE INTRAVENOUS at 09:27

## 2023-05-18 RX ADMIN — PALONOSETRON 0.25 MG: 0.05 INJECTION, SOLUTION INTRAVENOUS at 09:23

## 2023-05-18 RX ADMIN — Medication 500 UNITS: at 12:56

## 2023-05-18 RX ADMIN — OLANZAPINE 5 MG: 5 TABLET, FILM COATED ORAL at 09:07

## 2023-05-18 RX ADMIN — CARBOPLATIN 850 MG: 10 INJECTION INTRAVENOUS at 12:21

## 2023-05-18 RX ADMIN — TRASTUZUMAB 470 MG: 150 INJECTION, POWDER, LYOPHILIZED, FOR SOLUTION INTRAVENOUS at 10:35

## 2023-05-18 RX ADMIN — SODIUM CHLORIDE 250 ML: 9 INJECTION, SOLUTION INTRAVENOUS at 09:02

## 2023-05-18 NOTE — PROGRESS NOTES
OUTPATIENT ONCOLOGY NUTRITION ASSESSMENT    Patient Name: Ursula Kulkarni  YOB: 1975  MRN: [id number]  Assessment Date: 5/18/2023    COMMENTS: Met patient today in the infusion area. Receiving cycle two TCHP. The patient reports having a small amount of diarrhea for two days, no nausea. She is prone to constipation. Reports having a good appetite.   Provided patient with the [facility] nutrition packet with my contact information. Will be available as needed.         Reason for Assessment Education - new chemotherapy patient     Diagnosis/Problem   Invasive lobular carcinoma multifocal ER+CT+   Treatment Plan Chemotherapy  TCHP   Frequency Every 3 weeks   Goal of cancer treatment Currative, Adjuvant   Comments:        Encounter Information        Nutrition/Diet History:     Oral Nutrition Supplements:    Factors/Symptoms Affecting Intake: No factors at this time   Comments:      Medical/Surgical History Past Medical History:   Diagnosis Date   • ADD (attention deficit disorder)    • Bilateral impacted cerumen 07/13/2020   • Breast cancer 03/08/2023    LUCIO MASTECTOMY   • Foot fracture, left    • History of COVID-19 2022   • History of gestational diabetes 2010   • History of Graves' disease 2012   • History of radioactive iodine thyroid ablation 2012   • History of vertigo    • Hyperlipidemia    • Hypothyroidism    • Melanoma of back    • Positive depression screening 07/13/2020       Past Surgical History:   Procedure Laterality Date   • BREAST RECONSTRUCTION Bilateral 3/8/2023    Procedure: BILATERAL PLACEMENT  TISSUE EXPANDER AND ALLODERM;  Surgeon: Eamon Stone MD;  Location: [facility];  Service: Plastics;  Laterality: Bilateral;   • D & C CERVICAL BIOPSY  01/2006   • INTRAUTERINE DEVICE INSERTION  2010    Mirena   • INTRAUTERINE DEVICE INSERTION  2015    Mirena exchange, old IUD removal and new one inserted by Dr Carr, Lot # [id number] exp 09/17.mar   • MASTECTOMY W/ SENTINEL NODE BIOPSY  "Bilateral 3/8/2023    Procedure: Bilateral total mastectomy, left axillary sentinel lymph node biopsy;  Surgeon: Kell Hi MD;  Location: [facility] MAIN OR;  Service: General;  Laterality: Bilateral;   • SCAR REVISION BREAST Right 4/3/2023    Procedure: RIGHT MASTECTOMY REVISION;  Surgeon: Eamon Stone MD;  Location: [facility] MAIN OR;  Service: Plastics;  Laterality: Right;   • VENOUS ACCESS DEVICE (PORT) INSERTION Right 4/13/2023    Procedure: right port placement;  Surgeon: Kell Hi MD;  Location: [facility] MAIN OR;  Service: General;  Laterality: Right;   • WISDOM TOOTH EXTRACTION            Anthropometrics        Current Height Ht Readings from Last 1 Encounters:   05/18/23 160 cm (62.99\")      Current Weight Wt Readings from Last 1 Encounters:   05/18/23 77.7 kg (171 lb 4.8 oz)      BMI  30   Ideal Body Weight (IBW) 115 lb   Usual Body Weight (UBW) 173 lb   Weight Change/Trend Stable   Weight History Wt Readings from Last 30 Encounters:   05/18/23 0806 77.7 kg (171 lb 4.8 oz)   05/15/23 1118 77.1 kg (170 lb)   05/09/23 0811 77.4 kg (170 lb 9.6 oz)   05/04/23 1032 75.7 kg (166 lb 12.8 oz)   05/01/23 1541 76.7 kg (169 lb)   04/28/23 1529 78.9 kg (174 lb)   04/27/23 0814 77.3 kg (170 lb 8 oz)   04/20/23 0827 78.7 kg (173 lb 6.4 oz)   04/14/23 1245 78.7 kg (173 lb 6.4 oz)   04/14/23 0906 79 kg (174 lb 3.2 oz)   04/13/23 1115 78.5 kg (173 lb 1 oz)   04/10/23 1019 78.5 kg (173 lb)   04/10/23 0755 78.7 kg (173 lb 8 oz)   04/03/23 0611 78.9 kg (174 lb)   03/31/23 0931 80.7 kg (177 lb 14.4 oz)   03/20/23 0842 78.5 kg (173 lb)   03/08/23 0605 81.1 kg (178 lb 12.7 oz)   02/27/23 0842 81.6 kg (180 lb)   02/24/23 0808 80.9 kg (178 lb 4.8 oz)   02/17/23 0810 81.1 kg (178 lb 12.8 oz)   02/03/23 1111 81 kg (178 lb 9.6 oz)   01/27/23 1008 82.6 kg (182 lb)   10/21/22 1257 81.1 kg (178 lb 12.8 oz)   10/11/22 0830 81.5 kg (179 lb 9.6 oz)   04/19/22 1541 82.3 kg (181 lb 6.4 oz)   10/28/21 1054 77.6 kg (171 " lb)   07/30/21 1017 80.4 kg (177 lb 3.2 oz)   12/23/20 1356 76.8 kg (169 lb 4.8 oz)   07/13/20 1059 81.8 kg (180 lb 6.4 oz)          Medications           Current medications: Collagen, LORazepam, OLANZapine, Probiotic Product, acetaminophen, escitalopram, fluconazole, levothyroxine, lidocaine-prilocaine, lisinopril, loratadine, magic mouthwash, nystatin in aluminum-magnesium hydroxide-simethicone suspension-diphenhydrAMINE liquid-Lidocaine Viscous HCl solution, omeprazole, and ondansetron                 Tests/Procedures        Tests/Procedures Chemotherapy     Labs       Pertinent Labs    Results from last 7 days   Lab Units 05/18/23  0734   SODIUM mmol/L 140   POTASSIUM mmol/L 4.1   CHLORIDE mmol/L 105   CO2 mmol/L 22.0   BUN mg/dL 14   CREATININE mg/dL 0.75   CALCIUM mg/dL 9.9   BILIRUBIN mg/dL 0.5   ALK PHOS U/L 126*   ALT (SGPT) U/L 39*   AST (SGOT) U/L 27   GLUCOSE mg/dL 135*     Results from last 7 days   Lab Units 05/18/23  0734   HEMOGLOBIN g/dL 12.5   HEMATOCRIT % 36.8   WBC 10*3/mm3 18.41*   ALBUMIN g/dL 4.5     Results from last 7 days   Lab Units 05/18/23  0734 05/15/23  1127   INR   --  1.1   PLATELETS 10*3/mm3 357 261     No results found for: COVID19  No results found for: HGBA1C       Physical Findings        Physical Appearance alert     Edema  no edema   Gastrointestinal None   Tubes/Drains none   Oral/Mouth Cavity WNL   Skin            PES STATEMENT / NUTRITION DIAGNOSIS      Nutrition Dx Problem Problem:    No Nutrition Diagnosis at this Time    Etiology:  Medical diagnosis: Breast cancer  Factors affecting nutrition: Appetite      Signs/Symptoms:  Information Deficit    Comment:      NUTRITION INTERVENTION / PLAN OF CARE      Intervention Goal(s) Maintain nutrition status         RD Intervention/Action Other:follow as needed         Recommendations:       PO Diet       Supplements       Snacks    --      Monitor/Evaluation Follow up as needed   Education Education provided   --    RD to follow  per protocol.    Electronically signed by:  Rakel Helton RD, LD  05/18/23 13:09 EDT

## 2023-05-18 NOTE — PROGRESS NOTES
Subjective     REASON FOR follow up  1.  Invasive lobular carcinoma multifocal,  · Left breast anterolateral stereotactic biopsy invasive lobular carcinoma, Clubb score of 6, grade 2, 4 mm, ER 95%, NC 95%, HER2/cheryl 0 negative with Ki-67 of 5%.  Positive for atypical lobular hyperplasia  · Left breast 1 o'clock position 10 cm from the nipple ultrasound-guided biopsy consistent with invasive lobular carcinoma grade 2 Clubb score of 6, 4 mm with lobular carcinoma in situ, ER 90%, %, HER2/cheryl 0  · Left breast upper stereotactic guided core biopsy invasive lobular carcinoma grade 2 Marie score of six 3 mm with lobular carcinoma in situ, ER 90%, %, HER2/cheryl equivocal 2+, FISH shows her to 5.7 with CEP 1.6 and ratio HER2 nu/KEIRA-17 ratio of 3.5.  It is amplified.    2.  March 8, 2023:p T3 N0 M0 invasive lobular carcinoma s/p bilateral mastectomy with left deep axillary sentinel lymph node biopsy, with bilateral placement of prepectoral tissue expanders for reconstruction and bilateral placement of AlloDerm.  · Pathology shows multifocal tumor in  upper outer quadrant and upper inner quadrant, invasive lobular carcinoma grade 2 with a Clubb score  score of 7  , total of 4 foci were seen, largest tumor being 60 mm, 20 mm, 2 mm, 1 mm.  No DCIS identified.  Lobular carcinoma in situ present.  No lymphatic or vascular channel invasion, no dermal lymphovascular space invasion all margins were negative for invasive carcinoma 4 lymph nodes were all negative for malignancy its pathologic T3N0  ·   · ER %, NC %, HER2/cheryl 2+ with Ki-67 of 5%  · Previous biopsies had shown 2 of the tumors were ER/NC positive HER2/cheryl negative and one of them was ER/NC positive HER2 positive  · On discussing with pathologist Dr. Serrano, she thinks that the larger tumor was ER NC positive and HER2 positive but unsure if it was the 60 mm tumor or 20 mm tumor.  Either way it was not the smaller tumors which was  HER2 positive.  ·   · All tumors look-alike per pathologist and they are invasive lobular carcinoma.                               HISTORY OF PRESENT ILLNESS:    Patient is a 47-year-old female with history of multifocal left breast cancer with MRI of the breast showing almost 15 lesions.  Biopsies on 3 different lesions were done and they were all consistent with invasive lobular carcinoma.  2 of the lesions where ER/MI positive HER2/cheryl negative and one of the larger lesion was ER/MI positive HER2 2+ equivocal.  FISH testing was done and the HER2 copy number was 5.7 with HER2/CEP ratio of 3.5.  The FISH was amplified.    Patient underwent bilateral mastectomy    With left sentinel lymph node surgery..  The pathology shows tumor to be present in the left upper outer quadrant invasive lobular carcinoma with a Marie score of 7, grade 2.  There were 4 lesions the greatest size was 6 0 mm, 20 mm, 2 mm and 1 mm in size.  Lobular carcinoma in situ is present.  All margins are negative for invasive carcinoma.  4 lymph nodes negative.  Patient has T3N0 invasive lobular carcinoma.      Discussed with pathology in length Dr. Serrano, one of the larger lesions which was 60 mm was heterogeneous it was biopsied at 2 separate spots.  The left anterior lateral and left 1:00 core fragment was ER/MI positive HER2/cheryl negative the left upper core biopsy was ER/MI positive HER2/cheryl 2+ equivocal but HER2/cheryl FISH was positive.    On discussion with pathology the 60 mm tumor showed ER and MI positive and HER2 positive by FISH with a Ki-67 of 40% at the Top-Hat clip.  The HER2/cheryl copy number was 5.7 with a HER2/cheryl by CEP ratio 3.5 which is positive.  The Ki-67 on the lower part was 13%.  The 20 mm tumor had a Ki-67 of 9%.  So the larger lesion was triple positive and heterogeneous as 2 biopsies were done on the larger lesions and that was both triple positive and at the second biopsy was ER/MI positive HER2/cheryl negative so it was a  heterogeneous tumor.  The 20 mm lesion was ER/AK positive HER2/cheryl negative.    Given that she had a large tumor with high Ki-67 and premenopausal status we discussed about giving Taxotere carboplatin Herceptin Perjeta.    Interval history:  Patient is here for cycle 2 Taxotere carboplatin/Herceptin Perjeta.  Discussed with patient that there is no further carboplatinum is available.  Hence we will give 2 more cycles of Taxotere Herceptin Perjeta followed by Adriamycin Cytoxan every 2 weeks x4 given aggressive HER2 positive disease.    Patient usually requires fluids the Monday after her chemotherapy      Oncology history:  Patient is a 47-year-old female who has been recently diagnosed with left breast cancer.  Patient has been compliant with her mammograms.    There is no family history of breast or ovarian cancer.  Her father had prostate cancer and melanoma.  A paternal aunt had thyroid cancer.      Details are as follows.    November 29, 2022: Screening bilateral mammogram showed 8 mm asymmetry in the central left breast posterior one third.  This appears to be in the superior left breast on the MLO view.  Right breast was negative.  A left diagnostic mammogram and ultrasound was recommended.    January January 3, 2023: Left breast diagnostic mammogram showed the spiculated areas within the central posterior and lateral anterior superior left breast where redemonstrated.  Targeted ultrasound was done.  At 1 o'clock position 10 cm from the nipple there is a shadowing mass with peripheral spiculations which is concordant with the mammographic abnormality and suspicious for malignancy.  The more centrally located spiculation on the cc view posterior to the glandular tissue cannot be clearly identified on the ultrasound.    Impression was 2 separate suspicious spiculated lesion in the left breast.  One of the lesions is seen well on the ultrasound and should be amenable to ultrasound-guided core biopsy.  The other  lesion which was seen in the CC projection was amicable to stereotactic guided tissue sampling.    January 3, 2023 patient underwent ultrasound-guided left breast biopsy    The ultrasound-guided biopsy of left breast 10 cm from the nipple at 1 o'clock position showed invasive lobular carcinoma moderately differentiated with a Marie grade of 2 and score of 6 measuring 4 mm.  There was focal lobular carcinoma in situ.    Left breast anterior lateral stereotactic core needle biopsy showed invasive lobular carcinoma moderately differentiated grade 2 with a Argyle score of 6 measuring 4 mm.  There was atypical lobular hyperplasia.  Estrogen receptor was %, progesterone receptor was %, HER2/cheryl 0, Ki-67 5%.    Left breast upper stereotactic guided core biopsy showed invasive lobular carcinoma moderately differentiated grade 2 with Argyle score of 6 with measuring 3 mm with lobular carcinoma in situ present.  I do not see the ER/OR or HER2 on the ultrasound-guided biopsy of the left breast lesion as well as the left upper stereotactic biopsy.  We will check with pathologist    January 23, 2023: Patient underwent  MRI of the breast bilateral    Right breast: Negative    Left breast: At 1:00 anterior left breast 4 cm from the nipple there is a 1.5 x 1.6 x 1.3 cm irregular enhancing mass which is biopsy-proven malignancy.  The biopsy clip is located within 0.9 x 1 x 1.1 cm postbiopsy hematoma along the inferior margin of the mass.  And there is additional hematoma along the lateral margin of the mass which measures 1.2 x 1.9 x 0.9 cm.    At 1:00 in the middle to posterior left breast 7.4 cm from the nipple there is a 1.3 x 1 cm x 1 cm irregular enhancing mass mass with a focus from a biopsy clip along the inferior margin which also represents the biopsy-proven malignancy    At 12:00 in the posterior left breast 8.5 cm from the nipple there is a 1.9 x 1.8 x 2.2 cm irregular enhancing mass with a  corresponding 2.9 cm biopsy cavity with a biopsy clip which represents the biopsy-proven malignancy    In addition there are multiple at least 12 similar-appearing irregular enhancing masses and foci are identified throughout the left breast predominantly involving the upper breast but also involving the lower outer quadrant.  At 11-12 o'clock in the middle and posterior left breast there are 3 adjacent reference masses together measuring 5.5 cm but individually measuring 1.2 x 0.9 x 1 cm.  At 1:00 in the far posterior left breast/axillary tail 13.7 cm posterior to the nipple is a 1.3 x 1.1 x 1 cm irregular enhancing mass which is located 0.8 cm from the anterolateral margin of the pectoralis Muscle.  Together the enhancing masses span approximately 11.7 x 8 x 7.8 cm.  There is no suspicious enhancement in the left nipple or chest wall.  Focal areas of skin enhancement likely reflect skin entry point from recent biopsies.  There are no pathologically enlarged internal mammary chain lymph nodes.    Impression    .  At least 15 similar-appearing irregular enhancing masses and foci  scattered throughout the left breast, together measuring up to 11.7 cm  in maximum dimension, encompass 3 sites of biopsy-proven malignancy and  are consistent with multicentric malignancy. Oncologic and surgical  management are recommended.  2.  No MRI evidence of malignancy in the right breast.    Patient is a 47-year-old female with multifocal invasive lobular carcinoma who on screening mammogram showed 8 mm asymmetry in the central left breast posterior one third.  In the cc view.  It appears to be superior left breast on the MLO view.  There was also architectural distortion in the lateral left breast one third on the left cc view.    On diagnostic mammogram the spiculated areas within the central posterior and the lateral anterior superior left breast where redemonstrated.  Targeted ultrasound was done.  At 1 o'clock position 10 cm  from the nipple there is a mass with spiculations which is suspicious.  The more centrally located spiculation on the cc view.  The more centrally located spiculation on the cc view cannot be clearly identified on ultrasound.  So there impression was there were 2 separate suspicious spiculated lesion in the left breast.  1 was seen well on the ultrasound and amenable to ultrasound-guided biopsy the other is seen well on mammography and a stereotactic guided biopsy suggested.    Patient subsequently underwent stereotactic biopsy of 1 lesion and ultrasound-guided biopsy of the 1 cm mass at 1 o'clock position 10 cm from the nipple.  A total of 3 different areas of the left breast were biopsied and specimens were sent to pathology.  The third biopsy was performed at 12 o'clock position in the posterior one third of the left breast.  All of the 3 sites were consistent with invasive lobular carcinoma at site A, B, and C.    MRI of the breast showed at 1 o'clock position of the left breast anteriorly 4 cm posterior to the nipple there is a 1.5 x 1.6 x 1.3 cm irregular enhancing mass.  There is a small hematoma along the inferior margin of the mass.  An additional hematoma along the lateral margin of the mass which is measuring 1.2 x 0.9 x 0.9 cm    At 1:00 in the middle to posterior left breast 7.4 cm posterior to the nipple there is a 1.3 x 1 x 1 cm enhancing mass with a biopsy clip.    At 12:00 posterior left breast 8.5 cm posterior to the nipple there is a 1.5 x 1.8 x 2.2 cm irregular enhancing mass with a corresponding 2.9 cm biopsy cavity with a clip which represents the biopsy site malignancy    Multiple at least 12 similar-appearing irregular enhancing masses and foci are identified throughout the left breast predominantly involving the upper breast but also involving the lower outer quadrant.  At 11-12 o'clock in the middle and posterior left breast there is a 3 adjacent reference masses measuring 5.5 cm in AP  dimension and individually measuring 1.2 x 0.9 x 1 cm.  At 1:00 in the far posterior left breast axillary tail 13.7 cm from the nipple there is a 1.3 x 1.1 x 1 cm craniocaudal dimension irregular enhancing mass which is located 0.8 cm from the anterolateral margin of the pectoralis muscle.     Per the MRI at least 15 similar-appearing irregular enhancing masses and foci scattered throughout the left breast together measuring 11.7 cm in maximum dimension which encompasses 3 sites of biopsy-proven malignancy and a consistent with multicentric malignancy.  MRI of the right breast is negative    INVITAE genetic testing showed variation of uncertain significance of LUCAS  Gene.    Patient was suggested left total mastectomy, left axillary sentinel lymph node biopsy possible node dissection and possible reconstruction.    However patient called Dr. Hi and decided to go for upfront bilateral total mastectomy, left axillary sentinel lymph node biopsy and possible axillary lymph node dissection and reconstruction.     I presented the case in the breast cancer conference today.  Patient had multiple nodules per MRI on the left breast Dr. Todd looked at it and felt there were multiple nodules and the largest one was 2.2 cm.  The discussion was to go ahead and upfront do the surgery and subsequently treat with chemo/ endocrine therapy as needed.  The left breast MRI findings are slightly atypical and that it is not contiguous involvement over 11.5 cm area but multiple nodules.  Surgical pathology will clarify.  If it is 1 big lesion or multiple sma      Past Medical History:   Diagnosis Date   • ADD (attention deficit disorder)    • Bilateral impacted cerumen 07/13/2020   • Breast cancer 03/08/2023    LUCIO MASTECTOMY   • Foot fracture, left    • History of COVID-19 2022   • History of gestational diabetes 2010   • History of Graves' disease 2012   • History of radioactive iodine thyroid ablation 2012   • History of vertigo     • Hyperlipidemia    • Hypothyroidism    • Melanoma of back    • Positive depression screening 07/13/2020        Past Surgical History:   Procedure Laterality Date   • BREAST RECONSTRUCTION Bilateral 3/8/2023    Procedure: BILATERAL PLACEMENT  TISSUE EXPANDER AND ALLODERM;  Surgeon: Eamon Stone MD;  Location: [facility] MAIN OR;  Service: Plastics;  Laterality: Bilateral;   • D & C CERVICAL BIOPSY  01/2006   • INTRAUTERINE DEVICE INSERTION  2010    Mirena   • INTRAUTERINE DEVICE INSERTION  2015    Mirena exchange, old IUD removal and new one inserted by Dr Carr, Lot # [id number] 09/17.mar   • MASTECTOMY W/ SENTINEL NODE BIOPSY Bilateral 3/8/2023    Procedure: Bilateral total mastectomy, left axillary sentinel lymph node biopsy;  Surgeon: Kell Hi MD;  Location: [facility] MAIN OR;  Service: General;  Laterality: Bilateral;   • SCAR REVISION BREAST Right 4/3/2023    Procedure: RIGHT MASTECTOMY REVISION;  Surgeon: Eamon Stone MD;  Location: [facility] MAIN OR;  Service: Plastics;  Laterality: Right;   • VENOUS ACCESS DEVICE (PORT) INSERTION Right 4/13/2023    Procedure: right port placement;  Surgeon: Kell Hi MD;  Location: [facility] MAIN OR;  Service: General;  Laterality: Right;   • WISDOM TOOTH EXTRACTION          Current Outpatient Medications on File Prior to Visit   Medication Sig Dispense Refill   • acetaminophen (TYLENOL) 325 MG tablet Take 2 tablets by mouth Every 4 (Four) Hours As Needed for Mild Pain. 60 tablet 0   • COLLAGEN PO Take 1 tablet by mouth Daily.     • escitalopram (Lexapro) 10 MG tablet Take 1 tablet by mouth Daily. 30 tablet 1   • levothyroxine (SYNTHROID, LEVOTHROID) 125 MCG tablet Take 1 tablet by mouth Daily. 90 tablet 1   • lidocaine-prilocaine (EMLA) 2.5-2.5 % cream Apply 1 application topically to the appropriate area as directed As Needed for Mild Pain (apply pea-sized amount to port site 30 minutes prior to port being accessed). 30 g 3   • lisinopril  (PRINIVIL,ZESTRIL) 2.5 MG tablet Take 1 tablet by mouth Daily. 30 tablet 2   • loratadine (CLARITIN) 10 MG tablet      • LORazepam (ATIVAN) 0.5 MG tablet Take 1 tablet by mouth At Night As Needed for Anxiety. 30 tablet 0   • OLANZapine (ZyPREXA) 5 MG tablet Take 1 tablet by mouth Every Night. Take on days 2, 3 and 4 after chemotherapy. 3 tablet 5   • omeprazole (priLOSEC) 20 MG capsule Take 1 capsule by mouth Daily. 90 capsule 1   • ondansetron (ZOFRAN) 8 MG tablet Take 1 tablet by mouth 3 (Three) Times a Day As Needed for Nausea or Vomiting. 30 tablet 5   • Probiotic Product (PROBIOTIC ADVANCED PO) Take 1 tablet by mouth Daily.     • [DISCONTINUED] fluconazole (DIFLUCAN) 100 MG tablet Take 200 mg by mouth on day 1 and then 100 mg by mouth for the remaining 6 days. 8 tablet 0     No current facility-administered medications on file prior to visit.        ALLERGIES:    Allergies   Allergen Reactions   • Erythromycin GI Intolerance     Pt reports   • Penicillins Rash        Social History     Socioeconomic History   • Marital status:      Spouse name: Parish   • Number of children: 2   Tobacco Use   • Smoking status: Never   • Smokeless tobacco: Never   Vaping Use   • Vaping Use: Never used   Substance and Sexual Activity   • Alcohol use: Not Currently     Alcohol/week: 1.0 standard drink     Types: 1 Glasses of wine per week     Comment: Daily caffeine use   • Drug use: Never   • Sexual activity: Yes     Partners: Male     Comment:          Family History   Problem Relation Age of Onset   • Diabetes Father    • Hyperlipidemia Father    • Arthritis Father    • Hypertension Father    • Heart disease Father         S/P stents   • Heart attack Father 41   • Colon polyps Father    • Melanoma Father    • Prostate cancer Father    • Hyperlipidemia Sister    • Hypertension Sister    • Heart attack Sister    • Diabetes Sister    • Thyroid cancer Paternal Aunt    • Lung cancer Maternal Grandmother    • Cancer  "Maternal Grandfather         Bladder cancer   • Lung cancer Maternal Grandfather    • Heart failure Paternal Grandmother    • Transient ischemic attack Paternal Grandmother    • Heart disease Paternal Grandfather    • Malig Hyperthermia Neg Hx       Family history: Maternal great aunt had breast cancer, unsure of the age .  Father had melanoma and prostate cancer, paternal aunt had thyroid cancer, paternal grandfather had bladder cancer maternal grandfather had bladder cancer maternal grandmother had lung cancer    Past medical history is consistent with Graves' disease    OB/GYN history:  Age of menarche: 14  Patient is premenopausal and had an IUD which has been now removed   2 para 2 no miscarriages  Age at first childbirth 30  Patient did breast-feed 24 months  Length of taking birth control pills none      Objective     Vitals:    23 0806   BP: 113/41   Pulse: 79   Resp: 16   Temp: 97.7 °F (36.5 °C)   TempSrc: Temporal   SpO2: 98%   Weight: 77.7 kg (171 lb 4.8 oz)   Height: 160 cm (62.99\")   PainSc: 0-No pain         2023     1:49 PM   Current Status   ECOG score 0       Physical Exam      CONSTITUTIONAL:  Vital signs reviewed.  No distress, looks comfortable.  RESPIRATORY:  Normal respiratory effort.  Lungs clear to auscultation bilaterally.  CARDIOVASCULAR:  Normal S1, S2.  No murmurs rubs or gallops.  No significant lower extremity edema.  GASTROINTESTINAL: Abdomen appears unremarkable.  Nontender.  No hepatomegaly.  No splenomegaly.  LYMPHATIC:  No cervical, supraclavicular, axillary lymphadenopathy.  SKIN:  Warm.  No rashes.  PSYCHIATRIC:  Normal judgment and insight.  Normal mood and affect.  No evidence of rash today on the skin.    I have reexamined the patient and the results are consistent with the previously documented exam. Kathrin Frederick MD       RECENT LABS:  Hematology WBC   Date Value Ref Range Status   2023 15.59 (H) 3.40 - 10.80 10*3/mm3 Final   10/11/2022 8.0 3.4 - " 10.8 x10E3/uL Final     RBC   Date Value Ref Range Status   05/22/2023 4.11 3.77 - 5.28 10*6/mm3 Final   10/11/2022 5.00 3.77 - 5.28 x10E6/uL Final     Hemoglobin   Date Value Ref Range Status   05/22/2023 12.6 12.0 - 15.9 g/dL Final     Hematocrit   Date Value Ref Range Status   05/22/2023 37.6 34.0 - 46.6 % Final     Platelets   Date Value Ref Range Status   05/22/2023 235 140 - 450 10*3/mm3 Final          Assessment & Plan     *Pathologic T3N0 invasive lobular carcinoma of the left breast, upper inner and upper outer quadrant, multifocal, 60 mm, 20 mm, 2 mm, 1 mm, grade 2, Marie score of 7, 2 of them are ER/MO positive, HER2/cheryl negative.  One of the larger lesions was ER/MO positive HER2 positive.  Ki-67 is very low 5%,    · Invasive lobular carcinoma of the left breast recently diagnosed, multifocal with 3 lesions biopsied in the left breast.  Patient has multicentric disease with 15 similar-appearing irregular enhancing masses in the left breast together measuring 11.7 cm in maximum dimension which encompasses 3 of the 3 biopsy proven malignancies.  All the 3 very invasive lobular carcinoma, grade 2 with Marie score of 6 but 2 of the lesions where ER/MO positive HER2 negative and the third lesion was ER/MO positive HER2 2+ with FISH showing HER2 amplified.      1.  Estrogen receptor 95%, progesterone receptor 95%, HER2/cheryl 0, Ki-67 5%    2 left breast 10 o'clock position 10 cm from the nipple ultrasound core guided biopsy showed ER 90%, %, HER2/cheryl 0    3.  Left breast upper stereotactic core biopsy showed estrogen receptor 90%, progesterone receptor 100%, HER2/cheryl equivocal 2+  FISH testing showed her to as 5.7 with a KEIRA 17 of 1.6 with a ratio HER2/KEIRA 17 of 3.5, HER2 is amplified        · INVITAE genetic testing showed variation of uncertain significance of LUCAS gene      · February 24, 2023: Presented patient in the breast cancer conference.  Even though it appears to be a heterogeneous  tumor, since it is difficult to really appreciate by clinical exam and the fact that it is invasive lobular carcinoma, it was felt best to do upfront surgery.    · March 8, 2023:p T3 N0 M0 invasive lobular carcinoma s/p bilateral mastectomy with left deep axillary sentinel lymph node biopsy, with bilateral placement of prepectoral tissue expanders for reconstruction and bilateral placement of AlloDerm.      · Pathology shows multifocal tumor in  upper outer quadrant and upper inner quadrant, invasive lobular carcinoma grade 2 with a [place] score  score of 7  , total of 4 foci were seen, largest tumor being 60 mm, 20 mm, 2 mm, 1 mm.  No DCIS identified.  Lobular carcinoma in situ present.  No lymphatic or vascular channel invasion, no dermal lymphovascular space invasion all margins were negative for invasive carcinoma 4 lymph nodes were all negative for malignancy its pathologic T3N0    · ER %, KY %, HER2/cheryl 2+ with Ki-67 of 5%  · Previous biopsies had shown 2 of the tumors were ER/KY positive HER2/cheryl negative and one of them was ER/KY positive HER2 positive  · On discussing with pathologist Dr. Serrano, she thinks that the larger tumor was ER KY positive and HER2 positive.  HER2 copy number of 5.7 with HER2/CEP ratio of 3.5 positive and this was on the larger tumor.  · Given that patient has heterogeneous tumor with the larger lesion being ER/KY positive HER2 positive and a Ki-67 of 40%, being premenopausal we will treat with Taxotere carboplatin Herceptin Perjeta.      · April 20, 2023: Patient was to start cycle 1 today but unfortunately her liver function tests were extremely high.  Discussed about alcohol and she had drank and taken Tylenol.  Will hold chemo today  · April 28, 2023: Reviewed her liver function test which are completely normalized.  Discussed with her not to drink alcohol.  Here for cycle 1 day 1 TCHP chemotherapy.  Reviewed echo which showed ejection fraction of 64% with strain  pattern of -21.  Patient understands all the side effects\  · 5/4/2023: Patient tolerated Cycle 1 TCHP well. She has had diarrhea, nausea and fatigue that have been well managed.   · May 9, 2023: Patient is here for toxicity check.  She did have diarrhea on day 4 on Monday at which time she got IV fluids and that helped.  She also had thrush and hence was started on fluconazole.  But liver function test slightly increased today and could be secondary to fluconazole.  We will try to do Yen's Magic potion proactively so she does not require fluconazole swish and spit 4 times a day with next chemo.  Also patient becomes very irritable and could be secondary to steroids and with told to decrease the steroids to dexamethasone 4 mg twice daily starting the day before chemo and to continue it for 3 days.  On the day of the chemo she will take 8 mg prior to the chemotherapy on dexamethasone.    · May 19, 2023: Cycle 2 Taxotere carboplatinum Herceptin Perjeta.  Discussed with patient that there is no carboplatin available and hence will do cycle 3 and cycle 4 with Taxotere Herceptin Perjeta and subsequently switched to 4 cycles of dose dense Adriamycin Cytoxan.  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 She did recommend she start taking her gabapentin again to help with her anxiety and irritability/anger from her decadron with treatment. Patient is encouraged to continue to follow with Cierra.     *Oral Thrush and Chemotherapy induced mucositis  · 5/4/2023: I am concerned patient is starting into chemotherapy induced mucositis. She is reporting  sore throat at times, possibly worsened reflux, mouth and tongue changes. She noticed white plaquing on her tongue this morning. She has not been able to  her Yen's Magic Mouthwash until today because of pharmacy supplies. She is currently on omeprazole 20 mg daily. She can increase this to 40 mg daily if needed. Prescription sent to pharmacy today to start Fluconazole 200 mg on day and 100 mg on day 2-7. We will continue to monitor symptoms and watch liver enzymes closely. ALT 34 and AST 24.  · Liver function test mildly elevated today, unsure if that is the effect of fluconazole.  · Next time we will try and avoid fluconazole and consider just Yen's Magic potion Marek actively    *Maculopapular rash on chest wall  · 5/4/2023: Continue to monitor. Appear acne-like in nature. She is encouraged to moisturizer her chest wall well with an Aquaphor or CeraVe moisturizing ointment. We will continue to monitor. She may continue hydrocortisone as needed if she is having itching.   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 Sneed  for cycle 3 Taxotere Herceptin Perjeta  · Follow-up with me June 29 for cycle 4 Taxotere/Herceptin/Perjeta      Kathrin Frederick MD    I spent 40 total minutes, face-to-face, caring for Ursula today. Greater than 50% of this time involved counseling and/or coordination of care as documented within this note.     Dr. Kell Hi

## 2023-05-19 ENCOUNTER — INFUSION (OUTPATIENT)
Dept: ONCOLOGY | Facility: HOSPITAL | Age: 48
End: 2023-05-19
Payer: COMMERCIAL

## 2023-05-19 DIAGNOSIS — Z17.0 MALIGNANT NEOPLASM OF OVERLAPPING SITES OF LEFT BREAST IN FEMALE, ESTROGEN RECEPTOR POSITIVE: Primary | ICD-10-CM

## 2023-05-19 DIAGNOSIS — C50.812 MALIGNANT NEOPLASM OF OVERLAPPING SITES OF LEFT BREAST IN FEMALE, ESTROGEN RECEPTOR POSITIVE: Primary | ICD-10-CM

## 2023-05-19 PROCEDURE — 96372 THER/PROPH/DIAG INJ SC/IM: CPT

## 2023-05-19 PROCEDURE — 25010000002 PEGFILGRASTIM-CBQV 6 MG/0.6ML SOLUTION PREFILLED SYRINGE: Performed by: INTERNAL MEDICINE

## 2023-05-19 RX ADMIN — PEGFILGRASTIM-CBQV 6 MG: 6 INJECTION, SOLUTION SUBCUTANEOUS at 14:16

## 2023-05-22 ENCOUNTER — INFUSION (OUTPATIENT)
Dept: ONCOLOGY | Facility: HOSPITAL | Age: 48
End: 2023-05-22
Payer: COMMERCIAL

## 2023-05-22 ENCOUNTER — OFFICE VISIT (OUTPATIENT)
Dept: ONCOLOGY | Facility: CLINIC | Age: 48
End: 2023-05-22
Payer: COMMERCIAL

## 2023-05-22 VITALS
BODY MASS INDEX: 29.96 KG/M2 | DIASTOLIC BLOOD PRESSURE: 81 MMHG | SYSTOLIC BLOOD PRESSURE: 114 MMHG | TEMPERATURE: 98.7 F | OXYGEN SATURATION: 96 % | WEIGHT: 169.1 LBS | RESPIRATION RATE: 18 BRPM | HEIGHT: 63 IN | HEART RATE: 100 BPM

## 2023-05-22 DIAGNOSIS — C50.912 BREAST CARCINOMA, LOBULAR, LEFT: Primary | ICD-10-CM

## 2023-05-22 DIAGNOSIS — Z45.2 ENCOUNTER FOR ADJUSTMENT OR MANAGEMENT OF VASCULAR ACCESS DEVICE: Primary | ICD-10-CM

## 2023-05-22 DIAGNOSIS — Z17.0 MALIGNANT NEOPLASM OF OVERLAPPING SITES OF LEFT BREAST IN FEMALE, ESTROGEN RECEPTOR POSITIVE: ICD-10-CM

## 2023-05-22 DIAGNOSIS — C50.812 MALIGNANT NEOPLASM OF OVERLAPPING SITES OF LEFT BREAST IN FEMALE, ESTROGEN RECEPTOR POSITIVE: ICD-10-CM

## 2023-05-22 DIAGNOSIS — Z79.899 HIGH RISK MEDICATION USE: ICD-10-CM

## 2023-05-22 DIAGNOSIS — U07.1 COVID-19 VIRUS INFECTION: ICD-10-CM

## 2023-05-22 LAB
ALBUMIN SERPL-MCNC: 4.2 G/DL (ref 3.5–5.2)
ALBUMIN/GLOB SERPL: 1.7 G/DL (ref 1.1–2.4)
ALP SERPL-CCNC: 125 U/L (ref 38–116)
ALT SERPL W P-5'-P-CCNC: 25 U/L (ref 0–33)
ANION GAP SERPL CALCULATED.3IONS-SCNC: 12 MMOL/L (ref 5–15)
AST SERPL-CCNC: 21 U/L (ref 0–32)
BASOPHILS # BLD AUTO: 0.06 10*3/MM3 (ref 0–0.2)
BASOPHILS NFR BLD AUTO: 0.4 % (ref 0–1.5)
BILIRUB SERPL-MCNC: 1 MG/DL (ref 0.2–1.2)
BUN SERPL-MCNC: 13 MG/DL (ref 6–20)
BUN/CREAT SERPL: 16.7 (ref 7.3–30)
CALCIUM SPEC-SCNC: 9.2 MG/DL (ref 8.5–10.2)
CHLORIDE SERPL-SCNC: 103 MMOL/L (ref 98–107)
CO2 SERPL-SCNC: 25 MMOL/L (ref 22–29)
CREAT SERPL-MCNC: 0.78 MG/DL (ref 0.6–1.1)
DEPRECATED RDW RBC AUTO: 45.3 FL (ref 37–54)
EGFRCR SERPLBLD CKD-EPI 2021: 95.6 ML/MIN/1.73
EOSINOPHIL # BLD AUTO: 0.05 10*3/MM3 (ref 0–0.4)
EOSINOPHIL NFR BLD AUTO: 0.3 % (ref 0.3–6.2)
ERYTHROCYTE [DISTWIDTH] IN BLOOD BY AUTOMATED COUNT: 13.6 % (ref 12.3–15.4)
GLOBULIN UR ELPH-MCNC: 2.5 GM/DL (ref 1.8–3.5)
GLUCOSE SERPL-MCNC: 101 MG/DL (ref 74–124)
HCT VFR BLD AUTO: 37.6 % (ref 34–46.6)
HGB BLD-MCNC: 12.6 G/DL (ref 12–15.9)
IMM GRANULOCYTES # BLD AUTO: 1.92 10*3/MM3 (ref 0–0.05)
IMM GRANULOCYTES NFR BLD AUTO: 12.3 % (ref 0–0.5)
LYMPHOCYTES # BLD AUTO: 1.47 10*3/MM3 (ref 0.7–3.1)
LYMPHOCYTES NFR BLD AUTO: 9.4 % (ref 19.6–45.3)
MCH RBC QN AUTO: 30.7 PG (ref 26.6–33)
MCHC RBC AUTO-ENTMCNC: 33.5 G/DL (ref 31.5–35.7)
MCV RBC AUTO: 91.5 FL (ref 79–97)
MONOCYTES # BLD AUTO: 0.11 10*3/MM3 (ref 0.1–0.9)
MONOCYTES NFR BLD AUTO: 0.7 % (ref 5–12)
NEUTROPHILS NFR BLD AUTO: 11.98 10*3/MM3 (ref 1.7–7)
NEUTROPHILS NFR BLD AUTO: 76.9 % (ref 42.7–76)
NRBC BLD AUTO-RTO: 0 /100 WBC (ref 0–0.2)
PLATELET # BLD AUTO: 235 10*3/MM3 (ref 140–450)
PMV BLD AUTO: 10.3 FL (ref 6–12)
POTASSIUM SERPL-SCNC: 3.5 MMOL/L (ref 3.5–4.7)
PROT SERPL-MCNC: 6.7 G/DL (ref 6.3–8)
RBC # BLD AUTO: 4.11 10*6/MM3 (ref 3.77–5.28)
SODIUM SERPL-SCNC: 140 MMOL/L (ref 134–145)
WBC NRBC COR # BLD: 15.59 10*3/MM3 (ref 3.4–10.8)

## 2023-05-22 PROCEDURE — 99214 OFFICE O/P EST MOD 30 MIN: CPT | Performed by: NURSE PRACTITIONER

## 2023-05-22 PROCEDURE — 80053 COMPREHEN METABOLIC PANEL: CPT

## 2023-05-22 PROCEDURE — 96360 HYDRATION IV INFUSION INIT: CPT

## 2023-05-22 PROCEDURE — 25010000002 HEPARIN LOCK FLUSH PER 10 UNITS: Performed by: INTERNAL MEDICINE

## 2023-05-22 PROCEDURE — 85025 COMPLETE CBC W/AUTO DIFF WBC: CPT

## 2023-05-22 RX ORDER — HEPARIN SODIUM (PORCINE) LOCK FLUSH IV SOLN 100 UNIT/ML 100 UNIT/ML
500 SOLUTION INTRAVENOUS AS NEEDED
Status: DISCONTINUED | OUTPATIENT
Start: 2023-05-22 | End: 2023-05-22 | Stop reason: HOSPADM

## 2023-05-22 RX ORDER — SODIUM CHLORIDE 0.9 % (FLUSH) 0.9 %
10 SYRINGE (ML) INJECTION AS NEEDED
Status: DISCONTINUED | OUTPATIENT
Start: 2023-05-22 | End: 2023-05-22 | Stop reason: HOSPADM

## 2023-05-22 RX ORDER — SODIUM CHLORIDE 9 MG/ML
500 INJECTION, SOLUTION INTRAVENOUS ONCE
Status: COMPLETED | OUTPATIENT
Start: 2023-05-22 | End: 2023-05-22

## 2023-05-22 RX ORDER — HEPARIN SODIUM (PORCINE) LOCK FLUSH IV SOLN 100 UNIT/ML 100 UNIT/ML
500 SOLUTION INTRAVENOUS AS NEEDED
OUTPATIENT
Start: 2023-05-22

## 2023-05-22 RX ORDER — SODIUM CHLORIDE 0.9 % (FLUSH) 0.9 %
10 SYRINGE (ML) INJECTION AS NEEDED
OUTPATIENT
Start: 2023-05-22

## 2023-05-22 RX ADMIN — Medication 10 ML: at 14:50

## 2023-05-22 RX ADMIN — SODIUM CHLORIDE 500 ML: 9 INJECTION, SOLUTION INTRAVENOUS at 14:14

## 2023-05-22 RX ADMIN — Medication 500 UNITS: at 14:50

## 2023-05-22 NOTE — PROGRESS NOTES
Subjective     REASON FOR follow up  1.  Invasive lobular carcinoma multifocal,  · Left breast anterolateral stereotactic biopsy invasive lobular carcinoma, [place] score of 6, grade 2, 4 mm, ER 95%, MA 95%, HER2/cheryl 0 negative with Ki-67 of 5%.  Positive for atypical lobular hyperplasia  · Left breast 1 o'clock position 10 cm from the nipple ultrasound-guided biopsy consistent with invasive lobular carcinoma grade 2 [place] score of 6, 4 mm with lobular carcinoma in situ, ER 90%, %, HER2/cheryl 0  · Left breast upper stereotactic guided core biopsy invasive lobular carcinoma grade 2 Marie score of six 3 mm with lobular carcinoma in situ, ER 90%, %, HER2/cheryl equivocal 2+, FISH shows her to 5.7 with CEP 1.6 and ratio HER2 nu/KEIRA-17 ratio of 3.5.  It is amplified.    2.  March 8, 2023:p T3 N0 M0 invasive lobular carcinoma s/p bilateral mastectomy with left deep axillary sentinel lymph node biopsy, with bilateral placement of prepectoral tissue expanders for reconstruction and bilateral placement of AlloDerm.  · Pathology shows multifocal tumor in  upper outer quadrant and upper inner quadrant, invasive lobular carcinoma grade 2 with a [place] score  score of 7  , total of 4 foci were seen, largest tumor being 60 mm, 20 mm, 2 mm, 1 mm.  No DCIS identified.  Lobular carcinoma in situ present.  No lymphatic or vascular channel invasion, no dermal lymphovascular space invasion all margins were negative for invasive carcinoma 4 lymph nodes were all negative for malignancy its pathologic T3N0  ·   · ER %, MA %, HER2/cheryl 2+ with Ki-67 of 5%  · Previous biopsies had shown 2 of the tumors were ER/MA positive HER2/cheryl negative and one of them was ER/MA positive HER2 positive  · On discussing with pathologist Dr. Serrano, she thinks that the larger tumor was ER MA positive and HER2 positive but unsure if it was the 60 mm tumor or 20 mm tumor.  Either way it was not the smaller tumors which was  HER2 positive.  ·   · All tumors look-alike per pathologist and they are invasive lobular carcinoma.                               HISTORY OF PRESENT ILLNESS:    Patient is a 47-year-old female with history of multifocal left breast cancer with MRI of the breast showing almost 15 lesions.  Biopsies on 3 different lesions were done and they were all consistent with invasive lobular carcinoma.  2 of the lesions where ER/TX positive HER2/cheryl negative and one of the larger lesion was ER/TX positive HER2 2+ equivocal.  FISH testing was done and the HER2 copy number was 5.7 with HER2/CEP ratio of 3.5.  The FISH was amplified.    Patient underwent bilateral mastectomy    With left sentinel lymph node surgery..  The pathology shows tumor to be present in the left upper outer quadrant invasive lobular carcinoma with a Marie score of 7, grade 2.  There were 4 lesions the greatest size was 6 0 mm, 20 mm, 2 mm and 1 mm in size.  Lobular carcinoma in situ is present.  All margins are negative for invasive carcinoma.  4 lymph nodes negative.  Patient has T3N0 invasive lobular carcinoma.      Discussed with pathology in length Dr. Serrano, one of the larger lesions which was 60 mm was heterogeneous it was biopsied at 2 separate spots.  The left anterior lateral and left 1:00 core fragment was ER/TX positive HER2/cheryl negative the left upper core biopsy was ER/TX positive HER2/cheryl 2+ equivocal but HER2/cheryl FISH was positive.    On discussion with pathology the 60 mm tumor showed ER and TX positive and HER2 positive by FISH with a Ki-67 of 40% at the Top-Hat clip.  The HER2/cheryl copy number was 5.7 with a HER2/cheryl by CEP ratio 3.5 which is positive.  The Ki-67 on the lower part was 13%.  The 20 mm tumor had a Ki-67 of 9%.  So the larger lesion was triple positive and heterogeneous as 2 biopsies were done on the larger lesions and that was both triple positive and at the second biopsy was ER/TX positive HER2/cheryl negative so it was a  heterogeneous tumor.  The 20 mm lesion was ER/DC positive HER2/cheryl negative.    Given that she had a large tumor with high Ki-67 and premenopausal status we discussed about giving Taxotere carboplatin Herceptin Perjeta.    Interval history:  Patient is seen back today in short-term follow-up, having received cycle 2-day 1 TCHP on Friday, 5/18/2023.  Thankfully patient states she did much better over the weekend with no significant nausea and no vomiting.  She had only 1 small bouts of diarrhea this morning not requiring Imodium.  She states overall she thus far is tolerated her second cycle much better than the first.  Vitals today are appropriate and no evidence of dehydration on lab work.  Discussed with patient and we will give just 1/2 L of normal saline in the office.  She denies other concerns at this time.    Oncology history:  Patient is a 47-year-old female who has been recently diagnosed with left breast cancer.  Patient has been compliant with her mammograms.    There is no family history of breast or ovarian cancer.  Her father had prostate cancer and melanoma.  A paternal aunt had thyroid cancer.      Details are as follows.    November 29, 2022: Screening bilateral mammogram showed 8 mm asymmetry in the central left breast posterior one third.  This appears to be in the superior left breast on the MLO view.  Right breast was negative.  A left diagnostic mammogram and ultrasound was recommended.    January January 3, 2023: Left breast diagnostic mammogram showed the spiculated areas within the central posterior and lateral anterior superior left breast where redemonstrated.  Targeted ultrasound was done.  At 1 o'clock position 10 cm from the nipple there is a shadowing mass with peripheral spiculations which is concordant with the mammographic abnormality and suspicious for malignancy.  The more centrally located spiculation on the cc view posterior to the glandular tissue cannot be clearly identified  on the ultrasound.    Impression was 2 separate suspicious spiculated lesion in the left breast.  One of the lesions is seen well on the ultrasound and should be amenable to ultrasound-guided core biopsy.  The other lesion which was seen in the CC projection was amicable to stereotactic guided tissue sampling.    January 3, 2023 patient underwent ultrasound-guided left breast biopsy    The ultrasound-guided biopsy of left breast 10 cm from the nipple at 1 o'clock position showed invasive lobular carcinoma moderately differentiated with a Marie grade of 2 and score of 6 measuring 4 mm.  There was focal lobular carcinoma in situ.    Left breast anterior lateral stereotactic core needle biopsy showed invasive lobular carcinoma moderately differentiated grade 2 with a Marie score of 6 measuring 4 mm.  There was atypical lobular hyperplasia.  Estrogen receptor was %, progesterone receptor was %, HER2/cheryl 0, Ki-67 5%.    Left breast upper stereotactic guided core biopsy showed invasive lobular carcinoma moderately differentiated grade 2 with [place] score of 6 with measuring 3 mm with lobular carcinoma in situ present.  I do not see the ER/MN or HER2 on the ultrasound-guided biopsy of the left breast lesion as well as the left upper stereotactic biopsy.  We will check with pathologist    January 23, 2023: Patient underwent  MRI of the breast bilateral    Right breast: Negative    Left breast: At 1:00 anterior left breast 4 cm from the nipple there is a 1.5 x 1.6 x 1.3 cm irregular enhancing mass which is biopsy-proven malignancy.  The biopsy clip is located within 0.9 x 1 x 1.1 cm postbiopsy hematoma along the inferior margin of the mass.  And there is additional hematoma along the lateral margin of the mass which measures 1.2 x 1.9 x 0.9 cm.    At 1:00 in the middle to posterior left breast 7.4 cm from the nipple there is a 1.3 x 1 cm x 1 cm irregular enhancing mass mass with a focus from a  biopsy clip along the inferior margin which also represents the biopsy-proven malignancy    At 12:00 in the posterior left breast 8.5 cm from the nipple there is a 1.9 x 1.8 x 2.2 cm irregular enhancing mass with a corresponding 2.9 cm biopsy cavity with a biopsy clip which represents the biopsy-proven malignancy    In addition there are multiple at least 12 similar-appearing irregular enhancing masses and foci are identified throughout the left breast predominantly involving the upper breast but also involving the lower outer quadrant.  At 11-12 o'clock in the middle and posterior left breast there are 3 adjacent reference masses together measuring 5.5 cm but individually measuring 1.2 x 0.9 x 1 cm.  At 1:00 in the far posterior left breast/axillary tail 13.7 cm posterior to the nipple is a 1.3 x 1.1 x 1 cm irregular enhancing mass which is located 0.8 cm from the anterolateral margin of the pectoralis Muscle.  Together the enhancing masses span approximately 11.7 x 8 x 7.8 cm.  There is no suspicious enhancement in the left nipple or chest wall.  Focal areas of skin enhancement likely reflect skin entry point from recent biopsies.  There are no pathologically enlarged internal mammary chain lymph nodes.    Impression    .  At least 15 similar-appearing irregular enhancing masses and foci  scattered throughout the left breast, together measuring up to 11.7 cm  in maximum dimension, encompass 3 sites of biopsy-proven malignancy and  are consistent with multicentric malignancy. Oncologic and surgical  management are recommended.  2.  No MRI evidence of malignancy in the right breast.    Patient is a 47-year-old female with multifocal invasive lobular carcinoma who on screening mammogram showed 8 mm asymmetry in the central left breast posterior one third.  In the cc view.  It appears to be superior left breast on the MLO view.  There was also architectural distortion in the lateral left breast one third on the left  cc view.    On diagnostic mammogram the spiculated areas within the central posterior and the lateral anterior superior left breast where redemonstrated.  Targeted ultrasound was done.  At 1 o'clock position 10 cm from the nipple there is a mass with spiculations which is suspicious.  The more centrally located spiculation on the cc view.  The more centrally located spiculation on the cc view cannot be clearly identified on ultrasound.  So there impression was there were 2 separate suspicious spiculated lesion in the left breast.  1 was seen well on the ultrasound and amenable to ultrasound-guided biopsy the other is seen well on mammography and a stereotactic guided biopsy suggested.    Patient subsequently underwent stereotactic biopsy of 1 lesion and ultrasound-guided biopsy of the 1 cm mass at 1 o'clock position 10 cm from the nipple.  A total of 3 different areas of the left breast were biopsied and specimens were sent to pathology.  The third biopsy was performed at 12 o'clock position in the posterior one third of the left breast.  All of the 3 sites were consistent with invasive lobular carcinoma at site A, B, and C.    MRI of the breast showed at 1 o'clock position of the left breast anteriorly 4 cm posterior to the nipple there is a 1.5 x 1.6 x 1.3 cm irregular enhancing mass.  There is a small hematoma along the inferior margin of the mass.  An additional hematoma along the lateral margin of the mass which is measuring 1.2 x 0.9 x 0.9 cm    At 1:00 in the middle to posterior left breast 7.4 cm posterior to the nipple there is a 1.3 x 1 x 1 cm enhancing mass with a biopsy clip.    At 12:00 posterior left breast 8.5 cm posterior to the nipple there is a 1.5 x 1.8 x 2.2 cm irregular enhancing mass with a corresponding 2.9 cm biopsy cavity with a clip which represents the biopsy site malignancy    Multiple at least 12 similar-appearing irregular enhancing masses and foci are identified throughout the left  breast predominantly involving the upper breast but also involving the lower outer quadrant.  At 11-12 o'clock in the middle and posterior left breast there is a 3 adjacent reference masses measuring 5.5 cm in AP dimension and individually measuring 1.2 x 0.9 x 1 cm.  At 1:00 in the far posterior left breast axillary tail 13.7 cm from the nipple there is a 1.3 x 1.1 x 1 cm craniocaudal dimension irregular enhancing mass which is located 0.8 cm from the anterolateral margin of the pectoralis muscle.     Per the MRI at least 15 similar-appearing irregular enhancing masses and foci scattered throughout the left breast together measuring 11.7 cm in maximum dimension which encompasses 3 sites of biopsy-proven malignancy and a consistent with multicentric malignancy.  MRI of the right breast is negative    INVITAE genetic testing showed variation of uncertain significance of LUCAS  Gene.    Patient was suggested left total mastectomy, left axillary sentinel lymph node biopsy possible node dissection and possible reconstruction.    However patient called Dr. Hi and decided to go for upfront bilateral total mastectomy, left axillary sentinel lymph node biopsy and possible axillary lymph node dissection and reconstruction.     I presented the case in the breast cancer conference today.  Patient had multiple nodules per MRI on the left breast Dr. Todd looked at it and felt there were multiple nodules and the largest one was 2.2 cm.  The discussion was to go ahead and upfront do the surgery and subsequently treat with chemo/ endocrine therapy as needed.  The left breast MRI findings are slightly atypical and that it is not contiguous involvement over 11.5 cm area but multiple nodules.  Surgical pathology will clarify.  If it is 1 big lesion or multiple sma      Past Medical History:   Diagnosis Date   • ADD (attention deficit disorder)    • Bilateral impacted cerumen 07/13/2020   • Breast cancer 03/08/2023    LUCIO  MASTECTOMY   • Foot fracture, left    • History of COVID-19 2022   • History of gestational diabetes 2010   • History of Graves' disease 2012   • History of radioactive iodine thyroid ablation 2012   • History of vertigo    • Hyperlipidemia    • Hypothyroidism    • Melanoma of back    • Positive depression screening 07/13/2020        Past Surgical History:   Procedure Laterality Date   • BREAST RECONSTRUCTION Bilateral 3/8/2023    Procedure: BILATERAL PLACEMENT  TISSUE EXPANDER AND ALLODERM;  Surgeon: Eamon Stone MD;  Location: [facility] MAIN OR;  Service: Plastics;  Laterality: Bilateral;   • D & C CERVICAL BIOPSY  01/2006   • INTRAUTERINE DEVICE INSERTION  2010    Mirena   • INTRAUTERINE DEVICE INSERTION  2015    Mirena exchange, old IUD removal and new one inserted by Dr Carr, Lot # [id number] exp 09/17.mar   • MASTECTOMY W/ SENTINEL NODE BIOPSY Bilateral 3/8/2023    Procedure: Bilateral total mastectomy, left axillary sentinel lymph node biopsy;  Surgeon: Kell Hi MD;  Location: [facility] OR;  Service: General;  Laterality: Bilateral;   • SCAR REVISION BREAST Right 4/3/2023    Procedure: RIGHT MASTECTOMY REVISION;  Surgeon: Eamon Stone MD;  Location: [facility] OR;  Service: Plastics;  Laterality: Right;   • VENOUS ACCESS DEVICE (PORT) INSERTION Right 4/13/2023    Procedure: right port placement;  Surgeon: Kell Hi MD;  Location: [facility] OR;  Service: General;  Laterality: Right;   • WISDOM TOOTH EXTRACTION          Current Outpatient Medications on File Prior to Visit   Medication Sig Dispense Refill   • acetaminophen (TYLENOL) 325 MG tablet Take 2 tablets by mouth Every 4 (Four) Hours As Needed for Mild Pain. 60 tablet 0   • aluminum-magnesium hydroxide-simethicone-diphenhydrAMINE in nystatin Swish and spit 10 mL by mouth every 4 to 6 hours as needed for thrush. 380 mL 3   • COLLAGEN PO Take 1 tablet by mouth Daily.     • escitalopram (Lexapro) 10 MG tablet Take 1  tablet by mouth Daily. 30 tablet 1   • levothyroxine (SYNTHROID, LEVOTHROID) 125 MCG tablet Take 1 tablet by mouth Daily. 90 tablet 1   • lidocaine-prilocaine (EMLA) 2.5-2.5 % cream Apply 1 application topically to the appropriate area as directed As Needed for Mild Pain (apply pea-sized amount to port site 30 minutes prior to port being accessed). 30 g 3   • lisinopril (PRINIVIL,ZESTRIL) 2.5 MG tablet Take 1 tablet by mouth Daily. 30 tablet 2   • loratadine (CLARITIN) 10 MG tablet      • LORazepam (ATIVAN) 0.5 MG tablet Take 1 tablet by mouth At Night As Needed for Anxiety. 30 tablet 0   • nystatin in aluminum-magnesium hydroxide-simethicone suspension-diphenhydrAMINE liquid-Lidocaine Viscous HCl solution Take 10 mL by mouth Every 4 (Four) to 6 (Six) Hours As Needed (thrush). 410 mL 0   • OLANZapine (ZyPREXA) 5 MG tablet Take 1 tablet by mouth Every Night. Take on days 2, 3 and 4 after chemotherapy. 3 tablet 5   • omeprazole (priLOSEC) 20 MG capsule Take 1 capsule by mouth Daily. 90 capsule 1   • ondansetron (ZOFRAN) 8 MG tablet Take 1 tablet by mouth 3 (Three) Times a Day As Needed for Nausea or Vomiting. 30 tablet 5   • Probiotic Product (PROBIOTIC ADVANCED PO) Take 1 tablet by mouth Daily.     • [DISCONTINUED] fluconazole (DIFLUCAN) 100 MG tablet Take 200 mg by mouth on day 1 and then 100 mg by mouth for the remaining 6 days. 8 tablet 0     No current facility-administered medications on file prior to visit.        ALLERGIES:    Allergies   Allergen Reactions   • Erythromycin GI Intolerance     Pt reports   • Penicillins Rash        Social History     Socioeconomic History   • Marital status:      Spouse name: Parish   • Number of children: 2   Tobacco Use   • Smoking status: Never   • Smokeless tobacco: Never   Vaping Use   • Vaping Use: Never used   Substance and Sexual Activity   • Alcohol use: Not Currently     Alcohol/week: 1.0 standard drink     Types: 1 Glasses of wine per week     Comment: Daily  "caffeine use   • Drug use: Never   • Sexual activity: Yes     Partners: Male     Comment:          Family History   Problem Relation Age of Onset   • Diabetes Father    • Hyperlipidemia Father    • Arthritis Father    • Hypertension Father    • Heart disease Father         S/P stents   • Heart attack Father 41   • Colon polyps Father    • Melanoma Father    • Prostate cancer Father    • Hyperlipidemia Sister    • Hypertension Sister    • Heart attack Sister    • Diabetes Sister    • Thyroid cancer Paternal Aunt    • Lung cancer Maternal Grandmother    • Cancer Maternal Grandfather         Bladder cancer   • Lung cancer Maternal Grandfather    • Heart failure Paternal Grandmother    • Transient ischemic attack Paternal Grandmother    • Heart disease Paternal Grandfather    • Malig Hyperthermia Neg Hx       Family history: Maternal great aunt had breast cancer, unsure of the age .  Father had melanoma and prostate cancer, paternal aunt had thyroid cancer, paternal grandfather had bladder cancer maternal grandfather had bladder cancer maternal grandmother had lung cancer    Past medical history is consistent with Graves' disease    OB/GYN history:  Age of menarche: 14  Patient is premenopausal and had an IUD which has been now removed   2 para 2 no miscarriages  Age at first childbirth 30  Patient did breast-feed 24 months  Length of taking birth control pills none      Objective     Vitals:    23 1349   BP: 114/81   Pulse: 100   Resp: 18   Temp: 98.7 °F (37.1 °C)   TempSrc: Temporal   SpO2: 96%   Weight: 76.7 kg (169 lb 1.6 oz)   Height: 160 cm (62.99\")   PainSc: 2  Comment: bone pain possibly from the neulasta         2023     1:49 PM   Current Status   ECOG score 0       Physical Exam      CONSTITUTIONAL:  Vital signs reviewed.  No distress, looks comfortable.  RESPIRATORY:  Normal respiratory effort.  Lungs clear to auscultation bilaterally.  CARDIOVASCULAR:  Normal S1, S2.  No murmurs rubs " or gallops.  No significant lower extremity edema.  GASTROINTESTINAL: Abdomen appears unremarkable.  Nontender.  No hepatomegaly.  No splenomegaly.  LYMPHATIC:  No cervical, supraclavicular, axillary lymphadenopathy.  SKIN:  Warm.  No rashes.  PSYCHIATRIC:  Normal judgment and insight.  Normal mood and affect.  No evidence of rash today on the skin.    I have reexamined the patient and the results are consistent with the previously documented exam. RADHA Woods       RECENT LABS:  Results from last 7 days   Lab Units 05/22/23  1328 05/18/23  0734   WBC 10*3/mm3 15.59* 18.41*   NEUTROS ABS 10*3/mm3 11.98* 15.16*   HEMOGLOBIN g/dL 12.6 12.5   HEMATOCRIT % 37.6 36.8   PLATELETS 10*3/mm3 235 357     Results from last 7 days   Lab Units 05/22/23  1328 05/18/23  0734   SODIUM mmol/L 140 140   POTASSIUM mmol/L 3.5 4.1   CHLORIDE mmol/L 103 105   CO2 mmol/L 25.0 22.0   BUN mg/dL 13 14   CREATININE mg/dL 0.78 0.75   CALCIUM mg/dL 9.2 9.9   ALBUMIN g/dL 4.2 4.5   BILIRUBIN mg/dL 1.0 0.5   ALK PHOS U/L 125* 126*   ALT (SGPT) U/L 25 39*   AST (SGOT) U/L 21 27   GLUCOSE mg/dL 101 135*             Assessment & Plan     *Pathologic T3N0 invasive lobular carcinoma of the left breast, upper inner and upper outer quadrant, multifocal, 60 mm, 20 mm, 2 mm, 1 mm, grade 2, Marie score of 7, 2 of them are ER/VT positive, HER2/cheryl negative.  One of the larger lesions was ER/VT positive HER2 positive.  Ki-67 is very low 5%,    · Invasive lobular carcinoma of the left breast recently diagnosed, multifocal with 3 lesions biopsied in the left breast.  Patient has multicentric disease with 15 similar-appearing irregular enhancing masses in the left breast together measuring 11.7 cm in maximum dimension which encompasses 3 of the 3 biopsy proven malignancies.  All the 3 very invasive lobular carcinoma, grade 2 with Marie score of 6 but 2 of the lesions where ER/VT positive HER2 negative and the third lesion was ER/VT  positive HER2 2+ with FISH showing HER2 amplified.      1.  Estrogen receptor 95%, progesterone receptor 95%, HER2/cheryl 0, Ki-67 5%    2 left breast 10 o'clock position 10 cm from the nipple ultrasound core guided biopsy showed ER 90%, %, HER2/cheryl 0    3.  Left breast upper stereotactic core biopsy showed estrogen receptor 90%, progesterone receptor 100%, HER2/cheryl equivocal 2+  FISH testing showed her to as 5.7 with a KEIRA 17 of 1.6 with a ratio HER2/KEIRA 17 of        · INVITAE genetic testing showed variation of uncertain significance of LUCAS gene  · We discussed the multidisciplinary approach in this patient's and I also discussed in length that that invasive lobular carcinomas do benefit from endocrine therapy and discussed in length about side effects of all endocrine therapies  · Pending the results of the surgery she may need to be a candidate for chemotherapy as well and subsequently radiation if the tumor is large locally  · Patient is keen on getting bilateral oophorectomy after her breast surgery is done and we discussed about medical oophorectomy with Lupron as well  · February 24, 2023: Presented patient in the breast cancer conference.  Even though it appears to be a heterogeneous tumor, since it is difficult to really appreciate by clinical exam and the fact that it is invasive lobular carcinoma, it was felt best to do upfront surgery.  · March 8, 2023:p T3 N0 M0 invasive lobular carcinoma s/p bilateral mastectomy with left deep axillary sentinel lymph node biopsy, with bilateral placement of prepectoral tissue expanders for reconstruction and bilateral placement of AlloDerm.  · Pathology shows multifocal tumor in  upper outer quadrant and upper inner quadrant, invasive lobular carcinoma grade 2 with a Marie score  score of 7  , total of 4 foci were seen, largest tumor being 60 mm, 20 mm, 2 mm, 1 mm.  No DCIS identified.  Lobular carcinoma in situ present.  No lymphatic or vascular channel  invasion, no dermal lymphovascular space invasion all margins were negative for invasive carcinoma 4 lymph nodes were all negative for malignancy its pathologic T3N0  · ER %, HI %, HER2/cheryl 2+ with Ki-67 of 5%  · Previous biopsies had shown 2 of the tumors were ER/HI positive HER2/cheryl negative and one of them was ER/HI positive HER2 positive  · On discussing with pathologist Dr. Serrano, she thinks that the larger tumor was ER HI positive and HER2 positive.  HER2 copy number of 5.7 with HER2/CEP ratio of 3.5 positive and this was on the larger tumor.  · Given that patient has heterogeneous tumor with the larger lesion being ER/HI positive HER2 positive and a Ki-67 of 40%, being premenopausal we will treat with Taxotere carboplatin Herceptin Perjeta.  · April 20, 2023: Patient was to start cycle 1 today but unfortunately her liver function tests were extremely high.  Discussed about alcohol and she had drank and taken Tylenol.  Will hold chemo today  · April 28, 2023: Reviewed her liver function test which are completely normalized.  Discussed with her not to drink alcohol.  Here for cycle 1 day 1 TCHP chemotherapy.  Reviewed echo which showed ejection fraction of 64% with strain pattern of -21.  Patient understands all the side effects\  · 5/4/2023: Patient tolerated Cycle 1 TCHP well. She has had diarrhea, nausea and fatigue that have been well managed.   · 5/18/2023: C2 TCHP  · 5/22/2023: Here for close follow-up and possible IV fluids after treatment last Friday, 5/18/2023.  Patient had a fairly good weekend with good oral intake, weight that is notably stable, and very little nausea.  She only had 1 loose stool this morning and therefore no significant diarrhea.  CMP is WNL.  Per discussion with patient we will give just 500 mL normal saline.    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 She did recommend she start taking her gabapentin again to help with her anxiety and irritability/anger from her decadron with treatment. Patient is encouraged to continue to follow with Cierra.     *Oral Thrush and Chemotherapy induced mucositis  · 5/4/2023: I am concerned patient is starting into chemotherapy induced mucositis. She is reporting sore throat at times, possibly worsened reflux, mouth and tongue changes. She noticed white plaquing on her tongue this morning. She has not been able to  her Yen's Magic Mouthwash until today because of pharmacy supplies. She is currently on omeprazole 20 mg daily. She can increase this to 40 mg daily if needed. Prescription sent to pharmacy today to start Fluconazole 200 mg on day and 100 mg on day 2-7. We will continue to monitor symptoms and watch liver enzymes closely. ALT 34 and AST 24.  · Liver function test mildly elevated today, unsure if that is the effect of fluconazole.  · Next time we will try and avoid fluconazole and consider just Yen's Magic mouthwash.    *Maculopapular rash on chest wall  · 5/4/2023: Continue to monitor. Appear acne-like in nature. She is encouraged to moisturizer her chest wall well with an Aquaphor or CeraVe moisturizing ointment. We will continue to monitor. She may continue hydrocortisone as needed if she is having itching.   · Resolution of the rash    Plan  · Proceed with 500 mL normal saline  · Continue Magic mouthwash for mucositis.  · Avoid Diflucan due to previous elevation in LFTs.  · Return 5/25/2023 for toxicity check with Dr. Frederick.    This patient is on high risk drug therapy requiring intensive monitoring for toxicity.        RADHA Woods Dr.

## 2023-05-25 ENCOUNTER — OFFICE VISIT (OUTPATIENT)
Dept: ONCOLOGY | Facility: CLINIC | Age: 48
End: 2023-05-25
Payer: COMMERCIAL

## 2023-05-25 ENCOUNTER — APPOINTMENT (OUTPATIENT)
Dept: ONCOLOGY | Facility: HOSPITAL | Age: 48
End: 2023-05-25
Payer: COMMERCIAL

## 2023-05-25 ENCOUNTER — INFUSION (OUTPATIENT)
Dept: ONCOLOGY | Facility: HOSPITAL | Age: 48
End: 2023-05-25
Payer: COMMERCIAL

## 2023-05-25 VITALS
OXYGEN SATURATION: 97 % | DIASTOLIC BLOOD PRESSURE: 87 MMHG | HEIGHT: 63 IN | TEMPERATURE: 97.3 F | BODY MASS INDEX: 29.54 KG/M2 | RESPIRATION RATE: 16 BRPM | WEIGHT: 166.7 LBS | HEART RATE: 96 BPM | SYSTOLIC BLOOD PRESSURE: 117 MMHG

## 2023-05-25 DIAGNOSIS — R19.7 DIARRHEA, UNSPECIFIED TYPE: ICD-10-CM

## 2023-05-25 DIAGNOSIS — Z17.0 MALIGNANT NEOPLASM OF OVERLAPPING SITES OF LEFT BREAST IN FEMALE, ESTROGEN RECEPTOR POSITIVE: Primary | ICD-10-CM

## 2023-05-25 DIAGNOSIS — C50.812 MALIGNANT NEOPLASM OF OVERLAPPING SITES OF LEFT BREAST IN FEMALE, ESTROGEN RECEPTOR POSITIVE: Primary | ICD-10-CM

## 2023-05-25 DIAGNOSIS — Z17.0 MALIGNANT NEOPLASM OF OVERLAPPING SITES OF LEFT BREAST IN FEMALE, ESTROGEN RECEPTOR POSITIVE: ICD-10-CM

## 2023-05-25 DIAGNOSIS — C50.812 MALIGNANT NEOPLASM OF OVERLAPPING SITES OF LEFT BREAST IN FEMALE, ESTROGEN RECEPTOR POSITIVE: ICD-10-CM

## 2023-05-25 LAB
ALBUMIN SERPL-MCNC: 4.5 G/DL (ref 3.5–5.2)
ALBUMIN/GLOB SERPL: 1.6 G/DL (ref 1.1–2.4)
ALP SERPL-CCNC: 183 U/L (ref 38–116)
ALT SERPL W P-5'-P-CCNC: 70 U/L (ref 0–33)
ANION GAP SERPL CALCULATED.3IONS-SCNC: 12.5 MMOL/L (ref 5–15)
AST SERPL-CCNC: 46 U/L (ref 0–32)
BASOPHILS # BLD AUTO: 0.11 10*3/MM3 (ref 0–0.2)
BASOPHILS NFR BLD AUTO: 1.7 % (ref 0–1.5)
BILIRUB SERPL-MCNC: 0.4 MG/DL (ref 0.2–1.2)
BUN SERPL-MCNC: 17 MG/DL (ref 6–20)
BUN/CREAT SERPL: 21 (ref 7.3–30)
CALCIUM SPEC-SCNC: 9.9 MG/DL (ref 8.5–10.2)
CHLORIDE SERPL-SCNC: 104 MMOL/L (ref 98–107)
CO2 SERPL-SCNC: 22.5 MMOL/L (ref 22–29)
CREAT SERPL-MCNC: 0.81 MG/DL (ref 0.6–1.1)
DEPRECATED RDW RBC AUTO: 43.5 FL (ref 37–54)
EGFRCR SERPLBLD CKD-EPI 2021: 91.4 ML/MIN/1.73
EOSINOPHIL # BLD AUTO: 0.04 10*3/MM3 (ref 0–0.4)
EOSINOPHIL NFR BLD AUTO: 0.6 % (ref 0.3–6.2)
ERYTHROCYTE [DISTWIDTH] IN BLOOD BY AUTOMATED COUNT: 13.2 % (ref 12.3–15.4)
GLOBULIN UR ELPH-MCNC: 2.8 GM/DL (ref 1.8–3.5)
GLUCOSE SERPL-MCNC: 124 MG/DL (ref 74–124)
HCT VFR BLD AUTO: 39.8 % (ref 34–46.6)
HGB BLD-MCNC: 13.2 G/DL (ref 12–15.9)
IMM GRANULOCYTES # BLD AUTO: 0.12 10*3/MM3 (ref 0–0.05)
IMM GRANULOCYTES NFR BLD AUTO: 1.9 % (ref 0–0.5)
LYMPHOCYTES # BLD AUTO: 1.9 10*3/MM3 (ref 0.7–3.1)
LYMPHOCYTES NFR BLD AUTO: 29.5 % (ref 19.6–45.3)
MCH RBC QN AUTO: 29.7 PG (ref 26.6–33)
MCHC RBC AUTO-ENTMCNC: 33.2 G/DL (ref 31.5–35.7)
MCV RBC AUTO: 89.6 FL (ref 79–97)
MONOCYTES # BLD AUTO: 0.65 10*3/MM3 (ref 0.1–0.9)
MONOCYTES NFR BLD AUTO: 10.1 % (ref 5–12)
NEUTROPHILS NFR BLD AUTO: 3.63 10*3/MM3 (ref 1.7–7)
NEUTROPHILS NFR BLD AUTO: 56.2 % (ref 42.7–76)
NRBC BLD AUTO-RTO: 0 /100 WBC (ref 0–0.2)
PLATELET # BLD AUTO: 213 10*3/MM3 (ref 140–450)
PMV BLD AUTO: 10.4 FL (ref 6–12)
POTASSIUM SERPL-SCNC: 4 MMOL/L (ref 3.5–4.7)
PROT SERPL-MCNC: 7.3 G/DL (ref 6.3–8)
RBC # BLD AUTO: 4.44 10*6/MM3 (ref 3.77–5.28)
SODIUM SERPL-SCNC: 139 MMOL/L (ref 134–145)
WBC NRBC COR # BLD: 6.45 10*3/MM3 (ref 3.4–10.8)

## 2023-05-25 PROCEDURE — 96360 HYDRATION IV INFUSION INIT: CPT

## 2023-05-25 PROCEDURE — 85025 COMPLETE CBC W/AUTO DIFF WBC: CPT

## 2023-05-25 PROCEDURE — 80053 COMPREHEN METABOLIC PANEL: CPT

## 2023-05-25 RX ORDER — SODIUM CHLORIDE 9 MG/ML
1000 INJECTION, SOLUTION INTRAVENOUS ONCE
Status: CANCELLED
Start: 2023-05-25 | End: 2023-05-25

## 2023-05-25 RX ORDER — SODIUM CHLORIDE 9 MG/ML
1000 INJECTION, SOLUTION INTRAVENOUS ONCE
Status: COMPLETED | OUTPATIENT
Start: 2023-05-25 | End: 2023-05-25

## 2023-05-25 RX ADMIN — SODIUM CHLORIDE 1000 ML: 9 INJECTION, SOLUTION INTRAVENOUS at 09:27

## 2023-05-25 NOTE — PROGRESS NOTES
Subjective     REASON FOR follow up  1.  Invasive lobular carcinoma multifocal,  · Left breast anterolateral stereotactic biopsy invasive lobular carcinoma, Montezuma score of 6, grade 2, 4 mm, ER 95%, KS 95%, HER2/cheryl 0 negative with Ki-67 of 5%.  Positive for atypical lobular hyperplasia  · Left breast 1 o'clock position 10 cm from the nipple ultrasound-guided biopsy consistent with invasive lobular carcinoma grade 2 Montezuma score of 6, 4 mm with lobular carcinoma in situ, ER 90%, %, HER2/cheryl 0  · Left breast upper stereotactic guided core biopsy invasive lobular carcinoma grade 2 Marie score of six 3 mm with lobular carcinoma in situ, ER 90%, %, HER2/cheryl equivocal 2+, FISH shows her to 5.7 with CEP 1.6 and ratio HER2 nu/KEIRA-17 ratio of 3.5.  It is amplified.    2.  March 8, 2023:p T3 N0 M0 invasive lobular carcinoma s/p bilateral mastectomy with left deep axillary sentinel lymph node biopsy, with bilateral placement of prepectoral tissue expanders for reconstruction and bilateral placement of AlloDerm.  · Pathology shows multifocal tumor in  upper outer quadrant and upper inner quadrant, invasive lobular carcinoma grade 2 with a Montezuma score  score of 7  , total of 4 foci were seen, largest tumor being 60 mm, 20 mm, 2 mm, 1 mm.  No DCIS identified.  Lobular carcinoma in situ present.  No lymphatic or vascular channel invasion, no dermal lymphovascular space invasion all margins were negative for invasive carcinoma 4 lymph nodes were all negative for malignancy its pathologic T3N0  ·   · ER %, KS %, HER2/cheryl 2+ with Ki-67 of 5%  · Previous biopsies had shown 2 of the tumors were ER/KS positive HER2/cheryl negative and one of them was ER/KS positive HER2 positive  · On discussing with pathologist Dr. Serrano, she thinks that the larger tumor was ER KS positive and HER2 positive but unsure if it was the 60 mm tumor or 20 mm tumor.  Either way it was not the smaller tumors which was  HER2 positive.  ·   · All tumors look-alike per pathologist and they are invasive lobular carcinoma.                               HISTORY OF PRESENT ILLNESS:    Patient is a 47-year-old female with history of multifocal left breast cancer with MRI of the breast showing almost 15 lesions.  Biopsies on 3 different lesions were done and they were all consistent with invasive lobular carcinoma.  2 of the lesions where ER/WI positive HER2/cheryl negative and one of the larger lesion was ER/WI positive HER2 2+ equivocal.  FISH testing was done and the HER2 copy number was 5.7 with HER2/CEP ratio of 3.5.  The FISH was amplified.    Patient underwent bilateral mastectomy    With left sentinel lymph node surgery..  The pathology shows tumor to be present in the left upper outer quadrant invasive lobular carcinoma with a Marie score of 7, grade 2.  There were 4 lesions the greatest size was 6 0 mm, 20 mm, 2 mm and 1 mm in size.  Lobular carcinoma in situ is present.  All margins are negative for invasive carcinoma.  4 lymph nodes negative.  Patient has T3N0 invasive lobular carcinoma.      Discussed with pathology in length Dr. Serrano, one of the larger lesions which was 60 mm was heterogeneous it was biopsied at 2 separate spots.  The left anterior lateral and left 1:00 core fragment was ER/WI positive HER2/cheryl negative the left upper core biopsy was ER/WI positive HER2/cheryl 2+ equivocal but HER2/cheryl FISH was positive.    On discussion with pathology the 60 mm tumor showed ER and WI positive and HER2 positive by FISH with a Ki-67 of 40% at the Top-Hat clip.  The HER2/cheryl copy number was 5.7 with a HER2/cheryl by CEP ratio 3.5 which is positive.  The Ki-67 on the lower part was 13%.  The 20 mm tumor had a Ki-67 of 9%.  So the larger lesion was triple positive and heterogeneous as 2 biopsies were done on the larger lesions and that was both triple positive and at the second biopsy was ER/WI positive HER2/cheryl negative so it was a  heterogeneous tumor.  The 20 mm lesion was ER/OH positive HER2/cheryl negative.    Given that she had a large tumor with high Ki-67 and premenopausal status we discussed about giving Taxotere carboplatin Herceptin Perjeta.    Interval history:  Patient had diarrhea this morning.  She had come for fluids on the Monday.  Today she is 1 week out after cycle 2 chemotherapy with Taxotere carboplatinum Herceptin Perjeta.  We discussed Taxotere as well as Perjeta as the cause of her diarrhea.  She does have abdominal cramping.  Since there is no carboplatin available she will receive Taxotere Herceptin Perjeta with cycle 3 and cycle 4 followed by Adriamycin Cytoxan dose dense x4 cycles.    We did discuss that if her diarrhea gets very severe we could hold her Herceptin or decrease the dose of Taxotere.  The other option would be weekly Taxol 3 weeks on 1 week off for 2 more cycles prior to going to Adriamycin Cytoxan.  However she prefers to stay on the Taxotere Herceptin Perjeta as she states the diarrhea is not that bad.  She had a few episodes today though she could not tell exactly how many she thinks she may have had 3 episodes.  Her CMP is pending.    Oncology history:  Patient is a 47-year-old female who has been recently diagnosed with left breast cancer.  Patient has been compliant with her mammograms.    There is no family history of breast or ovarian cancer.  Her father had prostate cancer and melanoma.  A paternal aunt had thyroid cancer.      Details are as follows.    November 29, 2022: Screening bilateral mammogram showed 8 mm asymmetry in the central left breast posterior one third.  This appears to be in the superior left breast on the MLO view.  Right breast was negative.  A left diagnostic mammogram and ultrasound was recommended.    January January 3, 2023: Left breast diagnostic mammogram showed the spiculated areas within the central posterior and lateral anterior superior left breast where  redemonstrated.  Targeted ultrasound was done.  At 1 o'clock position 10 cm from the nipple there is a shadowing mass with peripheral spiculations which is concordant with the mammographic abnormality and suspicious for malignancy.  The more centrally located spiculation on the cc view posterior to the glandular tissue cannot be clearly identified on the ultrasound.    Impression was 2 separate suspicious spiculated lesion in the left breast.  One of the lesions is seen well on the ultrasound and should be amenable to ultrasound-guided core biopsy.  The other lesion which was seen in the CC projection was amicable to stereotactic guided tissue sampling.    January 3, 2023 patient underwent ultrasound-guided left breast biopsy    The ultrasound-guided biopsy of left breast 10 cm from the nipple at 1 o'clock position showed invasive lobular carcinoma moderately differentiated with a [place] grade of 2 and score of 6 measuring 4 mm.  There was focal lobular carcinoma in situ.    Left breast anterior lateral stereotactic core needle biopsy showed invasive lobular carcinoma moderately differentiated grade 2 with a [place] score of 6 measuring 4 mm.  There was atypical lobular hyperplasia.  Estrogen receptor was %, progesterone receptor was %, HER2/cheryl 0, Ki-67 5%.    Left breast upper stereotactic guided core biopsy showed invasive lobular carcinoma moderately differentiated grade 2 with [place] score of 6 with measuring 3 mm with lobular carcinoma in situ present.  I do not see the ER/ID or HER2 on the ultrasound-guided biopsy of the left breast lesion as well as the left upper stereotactic biopsy.  We will check with pathologist    January 23, 2023: Patient underwent  MRI of the breast bilateral    Right breast: Negative    Left breast: At 1:00 anterior left breast 4 cm from the nipple there is a 1.5 x 1.6 x 1.3 cm irregular enhancing mass which is biopsy-proven malignancy.  The biopsy clip is  located within 0.9 x 1 x 1.1 cm postbiopsy hematoma along the inferior margin of the mass.  And there is additional hematoma along the lateral margin of the mass which measures 1.2 x 1.9 x 0.9 cm.    At 1:00 in the middle to posterior left breast 7.4 cm from the nipple there is a 1.3 x 1 cm x 1 cm irregular enhancing mass mass with a focus from a biopsy clip along the inferior margin which also represents the biopsy-proven malignancy    At 12:00 in the posterior left breast 8.5 cm from the nipple there is a 1.9 x 1.8 x 2.2 cm irregular enhancing mass with a corresponding 2.9 cm biopsy cavity with a biopsy clip which represents the biopsy-proven malignancy    In addition there are multiple at least 12 similar-appearing irregular enhancing masses and foci are identified throughout the left breast predominantly involving the upper breast but also involving the lower outer quadrant.  At 11-12 o'clock in the middle and posterior left breast there are 3 adjacent reference masses together measuring 5.5 cm but individually measuring 1.2 x 0.9 x 1 cm.  At 1:00 in the far posterior left breast/axillary tail 13.7 cm posterior to the nipple is a 1.3 x 1.1 x 1 cm irregular enhancing mass which is located 0.8 cm from the anterolateral margin of the pectoralis Muscle.  Together the enhancing masses span approximately 11.7 x 8 x 7.8 cm.  There is no suspicious enhancement in the left nipple or chest wall.  Focal areas of skin enhancement likely reflect skin entry point from recent biopsies.  There are no pathologically enlarged internal mammary chain lymph nodes.    Impression    .  At least 15 similar-appearing irregular enhancing masses and foci  scattered throughout the left breast, together measuring up to 11.7 cm  in maximum dimension, encompass 3 sites of biopsy-proven malignancy and  are consistent with multicentric malignancy. Oncologic and surgical  management are recommended.  2.  No MRI evidence of malignancy in the  right breast.    Patient is a 47-year-old female with multifocal invasive lobular carcinoma who on screening mammogram showed 8 mm asymmetry in the central left breast posterior one third.  In the cc view.  It appears to be superior left breast on the MLO view.  There was also architectural distortion in the lateral left breast one third on the left cc view.    On diagnostic mammogram the spiculated areas within the central posterior and the lateral anterior superior left breast where redemonstrated.  Targeted ultrasound was done.  At 1 o'clock position 10 cm from the nipple there is a mass with spiculations which is suspicious.  The more centrally located spiculation on the cc view.  The more centrally located spiculation on the cc view cannot be clearly identified on ultrasound.  So there impression was there were 2 separate suspicious spiculated lesion in the left breast.  1 was seen well on the ultrasound and amenable to ultrasound-guided biopsy the other is seen well on mammography and a stereotactic guided biopsy suggested.    Patient subsequently underwent stereotactic biopsy of 1 lesion and ultrasound-guided biopsy of the 1 cm mass at 1 o'clock position 10 cm from the nipple.  A total of 3 different areas of the left breast were biopsied and specimens were sent to pathology.  The third biopsy was performed at 12 o'clock position in the posterior one third of the left breast.  All of the 3 sites were consistent with invasive lobular carcinoma at site A, B, and C.    MRI of the breast showed at 1 o'clock position of the left breast anteriorly 4 cm posterior to the nipple there is a 1.5 x 1.6 x 1.3 cm irregular enhancing mass.  There is a small hematoma along the inferior margin of the mass.  An additional hematoma along the lateral margin of the mass which is measuring 1.2 x 0.9 x 0.9 cm    At 1:00 in the middle to posterior left breast 7.4 cm posterior to the nipple there is a 1.3 x 1 x 1 cm enhancing mass  with a biopsy clip.    At 12:00 posterior left breast 8.5 cm posterior to the nipple there is a 1.5 x 1.8 x 2.2 cm irregular enhancing mass with a corresponding 2.9 cm biopsy cavity with a clip which represents the biopsy site malignancy    Multiple at least 12 similar-appearing irregular enhancing masses and foci are identified throughout the left breast predominantly involving the upper breast but also involving the lower outer quadrant.  At 11-12 o'clock in the middle and posterior left breast there is a 3 adjacent reference masses measuring 5.5 cm in AP dimension and individually measuring 1.2 x 0.9 x 1 cm.  At 1:00 in the far posterior left breast axillary tail 13.7 cm from the nipple there is a 1.3 x 1.1 x 1 cm craniocaudal dimension irregular enhancing mass which is located 0.8 cm from the anterolateral margin of the pectoralis muscle.     Per the MRI at least 15 similar-appearing irregular enhancing masses and foci scattered throughout the left breast together measuring 11.7 cm in maximum dimension which encompasses 3 sites of biopsy-proven malignancy and a consistent with multicentric malignancy.  MRI of the right breast is negative    INVITAE genetic testing showed variation of uncertain significance of LUCAS  Gene.    Patient was suggested left total mastectomy, left axillary sentinel lymph node biopsy possible node dissection and possible reconstruction.    However patient called Dr. Hi and decided to go for upfront bilateral total mastectomy, left axillary sentinel lymph node biopsy and possible axillary lymph node dissection and reconstruction.     I presented the case in the breast cancer conference today.  Patient had multiple nodules per MRI on the left breast Dr. Todd looked at it and felt there were multiple nodules and the largest one was 2.2 cm.  The discussion was to go ahead and upfront do the surgery and subsequently treat with chemo/ endocrine therapy as needed.  The left breast MRI  findings are slightly atypical and that it is not contiguous involvement over 11.5 cm area but multiple nodules.  Surgical pathology will clarify.  If it is 1 big lesion or multiple sma      Past Medical History:   Diagnosis Date   • ADD (attention deficit disorder)    • Bilateral impacted cerumen 07/13/2020   • Breast cancer 03/08/2023    LUCIO MASTECTOMY   • Foot fracture, left    • History of COVID-19 2022   • History of gestational diabetes 2010   • History of Graves' disease 2012   • History of radioactive iodine thyroid ablation 2012   • History of vertigo    • Hyperlipidemia    • Hypothyroidism    • Melanoma of back    • Positive depression screening 07/13/2020        Past Surgical History:   Procedure Laterality Date   • BREAST RECONSTRUCTION Bilateral 3/8/2023    Procedure: BILATERAL PLACEMENT  TISSUE EXPANDER AND ALLODERM;  Surgeon: Eamon Stone MD;  Location: [facility];  Service: Plastics;  Laterality: Bilateral;   • D & C CERVICAL BIOPSY  01/2006   • INTRAUTERINE DEVICE INSERTION  2010    Mirena   • INTRAUTERINE DEVICE INSERTION  2015    Mirena exchange, old IUD removal and new one inserted by Dr Carr, Lot # [id number] exp 09/17.mar   • MASTECTOMY W/ SENTINEL NODE BIOPSY Bilateral 3/8/2023    Procedure: Bilateral total mastectomy, left axillary sentinel lymph node biopsy;  Surgeon: Kell Hi MD;  Location: [facility];  Service: General;  Laterality: Bilateral;   • SCAR REVISION BREAST Right 4/3/2023    Procedure: RIGHT MASTECTOMY REVISION;  Surgeon: Eamon Stone MD;  Location: [facility];  Service: Plastics;  Laterality: Right;   • VENOUS ACCESS DEVICE (PORT) INSERTION Right 4/13/2023    Procedure: right port placement;  Surgeon: Kell Hi MD;  Location: [facility];  Service: General;  Laterality: Right;   • WISDOM TOOTH EXTRACTION          Current Outpatient Medications on File Prior to Visit   Medication Sig Dispense Refill   • acetaminophen  (TYLENOL) 325 MG tablet Take 2 tablets by mouth Every 4 (Four) Hours As Needed for Mild Pain. 60 tablet 0   • aluminum-magnesium hydroxide-simethicone-diphenhydrAMINE in nystatin Swish and spit 10 mL by mouth every 4 to 6 hours as needed for thrush. 380 mL 3   • COLLAGEN PO Take 1 tablet by mouth Daily.     • escitalopram (Lexapro) 10 MG tablet Take 1 tablet by mouth Daily. 30 tablet 1   • levothyroxine (SYNTHROID, LEVOTHROID) 125 MCG tablet Take 1 tablet by mouth Daily. 90 tablet 1   • lidocaine-prilocaine (EMLA) 2.5-2.5 % cream Apply 1 application topically to the appropriate area as directed As Needed for Mild Pain (apply pea-sized amount to port site 30 minutes prior to port being accessed). 30 g 3   • lisinopril (PRINIVIL,ZESTRIL) 2.5 MG tablet Take 1 tablet by mouth Daily. 30 tablet 2   • loratadine (CLARITIN) 10 MG tablet      • LORazepam (ATIVAN) 0.5 MG tablet Take 1 tablet by mouth At Night As Needed for Anxiety. 30 tablet 0   • nystatin in aluminum-magnesium hydroxide-simethicone suspension-diphenhydrAMINE liquid-Lidocaine Viscous HCl solution Take 10 mL by mouth Every 4 (Four) to 6 (Six) Hours As Needed (thrush). 410 mL 0   • OLANZapine (ZyPREXA) 5 MG tablet Take 1 tablet by mouth Every Night. Take on days 2, 3 and 4 after chemotherapy. 3 tablet 5   • omeprazole (priLOSEC) 20 MG capsule Take 1 capsule by mouth Daily. 90 capsule 1   • ondansetron (ZOFRAN) 8 MG tablet Take 1 tablet by mouth 3 (Three) Times a Day As Needed for Nausea or Vomiting. 30 tablet 5   • Probiotic Product (PROBIOTIC ADVANCED PO) Take 1 tablet by mouth Daily.       No current facility-administered medications on file prior to visit.        ALLERGIES:    Allergies   Allergen Reactions   • Erythromycin GI Intolerance     Pt reports   • Penicillins Rash        Social History     Socioeconomic History   • Marital status:      Spouse name: Parish   • Number of children: 2   Tobacco Use   • Smoking status: Never   • Smokeless  "tobacco: Never   Vaping Use   • Vaping Use: Never used   Substance and Sexual Activity   • Alcohol use: Not Currently     Alcohol/week: 1.0 standard drink     Types: 1 Glasses of wine per week     Comment: Daily caffeine use   • Drug use: Never   • Sexual activity: Yes     Partners: Male     Comment:          Family History   Problem Relation Age of Onset   • Diabetes Father    • Hyperlipidemia Father    • Arthritis Father    • Hypertension Father    • Heart disease Father         S/P stents   • Heart attack Father 41   • Colon polyps Father    • Melanoma Father    • Prostate cancer Father    • Hyperlipidemia Sister    • Hypertension Sister    • Heart attack Sister    • Diabetes Sister    • Thyroid cancer Paternal Aunt    • Lung cancer Maternal Grandmother    • Cancer Maternal Grandfather         Bladder cancer   • Lung cancer Maternal Grandfather    • Heart failure Paternal Grandmother    • Transient ischemic attack Paternal Grandmother    • Heart disease Paternal Grandfather    • Malig Hyperthermia Neg Hx       Family history: Maternal great aunt had breast cancer, unsure of the age .  Father had melanoma and prostate cancer, paternal aunt had thyroid cancer, paternal grandfather had bladder cancer maternal grandfather had bladder cancer maternal grandmother had lung cancer    Past medical history is consistent with Graves' disease    OB/GYN history:  Age of menarche: 14  Patient is premenopausal and had an IUD which has been now removed   2 para 2 no miscarriages  Age at first childbirth 30  Patient did breast-feed 24 months  Length of taking birth control pills none      Objective     Vitals:    23 0838   BP: 117/87   Pulse: 96   Resp: 16   Temp: 97.3 °F (36.3 °C)   TempSrc: Temporal   SpO2: 97%   Weight: 75.6 kg (166 lb 11.2 oz)   Height: 160 cm (62.99\")   PainSc: 0-No pain         2023     8:22 AM   Current Status   ECOG score 0       Physical Exam      CONSTITUTIONAL:  Vital signs " reviewed.  No distress, looks comfortable.  RESPIRATORY:  Normal respiratory effort.  Lungs clear to auscultation bilaterally.  CARDIOVASCULAR:  Normal S1, S2.  No murmurs rubs or gallops.  No significant lower extremity edema.  GASTROINTESTINAL: Abdomen appears unremarkable.  Nontender.  No hepatomegaly.  No splenomegaly.  LYMPHATIC:  No cervical, supraclavicular, axillary lymphadenopathy.  SKIN:  Warm.  No rashes.  PSYCHIATRIC:  Normal judgment and insight.  Normal mood and affect.  No evidence of rash today on the skin.    I have reexamined the patient and the results are consistent with the previously documented exam. Kathrin Frederick MD       RECENT LABS:  Results from last 7 days   Lab Units 05/25/23  0825 05/22/23  1328   WBC 10*3/mm3 6.45 15.59*   NEUTROS ABS 10*3/mm3 3.63 11.98*   HEMOGLOBIN g/dL 13.2 12.6   HEMATOCRIT % 39.8 37.6   PLATELETS 10*3/mm3 213 235     Results from last 7 days   Lab Units 05/25/23  0825 05/22/23  1328   SODIUM mmol/L 139 140   POTASSIUM mmol/L 4.0 3.5   CHLORIDE mmol/L 104 103   CO2 mmol/L 22.5 25.0   BUN mg/dL 17 13   CREATININE mg/dL 0.81 0.78   CALCIUM mg/dL 9.9 9.2   ALBUMIN g/dL 4.5 4.2   BILIRUBIN mg/dL 0.4 1.0   ALK PHOS U/L 183* 125*   ALT (SGPT) U/L 70* 25   AST (SGOT) U/L 46* 21   GLUCOSE mg/dL 124 101             Assessment & Plan     *Pathologic T3N0 invasive lobular carcinoma of the left breast, upper inner and upper outer quadrant, multifocal, 60 mm, 20 mm, 2 mm, 1 mm, grade 2, Marie score of 7, 2 of them are ER/AL positive, HER2/cheryl negative.  One of the larger lesions was ER/AL positive HER2 positive.  Ki-67 is very low 5%,    · Invasive lobular carcinoma of the left breast recently diagnosed, multifocal with 3 lesions biopsied in the left breast.  Patient has multicentric disease with 15 similar-appearing irregular enhancing masses in the left breast together measuring 11.7 cm in maximum dimension which encompasses 3 of the 3 biopsy proven malignancies.  All  the 3 very invasive lobular carcinoma, grade 2 with [place] score of 6 but 2 of the lesions where ER/OR positive HER2 negative and the third lesion was ER/OR positive HER2 2+ with FISH showing HER2 amplified.      1.  Estrogen receptor 95%, progesterone receptor 95%, HER2/cheryl 0, Ki-67 5%    2 left breast 10 o'clock position 10 cm from the nipple ultrasound core guided biopsy showed ER 90%, %, HER2/cheryl 0    3.  Left breast upper stereotactic core biopsy showed estrogen receptor 90%, progesterone receptor 100%, HER2/cheryl equivocal 2+  FISH testing showed her to as 5.7 with a KEIRA 17 of 1.6 with a ratio HER2/KEIRA 17 of        · INVITAE genetic testing showed variation of uncertain significance of LUCAS gene  · We discussed the multidisciplinary approach in this patient's and I also discussed in length that that invasive lobular carcinomas do benefit from endocrine therapy and discussed in length about side effects of all endocrine therapies  · Pending the results of the surgery she may need to be a candidate for chemotherapy as well and subsequently radiation if the tumor is large locally  · Patient is keen on getting bilateral oophorectomy after her breast surgery is done and we discussed about medical oophorectomy with Lupron as well  · February 24, 2023: Presented patient in the breast cancer conference.  Even though it appears to be a heterogeneous tumor, since it is difficult to really appreciate by clinical exam and the fact that it is invasive lobular carcinoma, it was felt best to do upfront surgery.  · March 8, 2023:p T3 N0 M0 invasive lobular carcinoma s/p bilateral mastectomy with left deep axillary sentinel lymph node biopsy, with bilateral placement of prepectoral tissue expanders for reconstruction and bilateral placement of AlloDerm.  · Pathology shows multifocal tumor in  upper outer quadrant and upper inner quadrant, invasive lobular carcinoma grade 2 with a [place] score  score of 7  , total of  4 foci were seen, largest tumor being 60 mm, 20 mm, 2 mm, 1 mm.  No DCIS identified.  Lobular carcinoma in situ present.  No lymphatic or vascular channel invasion, no dermal lymphovascular space invasion all margins were negative for invasive carcinoma 4 lymph nodes were all negative for malignancy its pathologic T3N0  · ER %, CO %, HER2/cheryl 2+ with Ki-67 of 5%  · Previous biopsies had shown 2 of the tumors were ER/CO positive HER2/cheryl negative and one of them was ER/CO positive HER2 positive  · On discussing with pathologist Dr. Serrano, she thinks that the larger tumor was ER CO positive and HER2 positive.  HER2 copy number of 5.7 with HER2/CEP ratio of 3.5 positive and this was on the larger tumor.  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  · April 20, 2023: Patient was to start cycle 1 today but unfortunately her liver function tests were extremely high.  Discussed about alcohol and she had drank and taken Tylenol.  Will hold chemo today  · April 28, 2023: Reviewed her liver function test which are completely normalized.  Discussed with her not to drink alcohol.  Here for cycle 1 day 1 TCHP chemotherapy.  Reviewed echo which showed ejection fraction of 64% with strain pattern of -21.  Patient understands all the side effects\  · 5/4/2023: Patient tolerated Cycle 1 TCHP well. She has had diarrhea, nausea and fatigue that have been well managed.   · 5/18/2023: C2 TCHP  · 5/22/2023: Here for close follow-up and possible IV fluids after treatment last Friday, 5/18/2023.  Patient had a fairly good weekend with good oral intake, weight that is notably stable, and very little nausea.  She only had 1 loose stool this morning and therefore no significant diarrhea.  CMP is WNL.  Per discussion with patient we will give just 500 mL normal saline.  · May  25, 2023: Patient has diarrhea.  She is 1 week out of cycle 2 Taxotere carboplatin Herceptin Perjeta.  She has some abdominal cramping.  We will give her IV fluids today.  Given carboplatinum is not available she will receive Taxotere Herceptin Perjeta for 2 more cycles prior to going to Adriamycin Cytoxan.    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 She did recommend she start taking her gabapentin again to help with her anxiety and irritability/anger from her decadron with treatment. Patient is encouraged to continue to follow with Cierra.     *Oral Thrush and Chemotherapy induced mucositis  · 5/4/2023: I am concerned patient is starting into chemotherapy induced mucositis. She is reporting sore throat at times, possibly worsened reflux, mouth and tongue changes. She noticed white plaquing on her tongue this morning. She has not been able to  her Yen's Magic Mouthwash until today because of pharmacy supplies. She is currently on omeprazole 20 mg daily. She can increase this to 40 mg daily if needed. Prescription sent to pharmacy today to start Fluconazole 200 mg on day and 100 mg on day 2-7. We will continue to monitor symptoms and watch liver enzymes closely. ALT 34 and AST 24.  · Liver function test mildly elevated today, unsure if that is the effect of fluconazole.  · Next time we will try and avoid fluconazole and consider just Yen's Magic mouthwash.    *Maculopapular rash on chest wall  · 5/4/2023: Continue to monitor. Appear acne-like in nature. She is encouraged to moisturizer her chest wall well with an Aquaphor or CeraVe moisturizing ointment. We will continue to monitor. She may continue hydrocortisone as  needed if she is having itching.   · Resolution of the rash    Plan  · Give IV fluids 1 L today  · No evidence of Candida in the mouth she is using nystatin swish and spit  · Her electrolytes are pending today  · She will follow-up with Dr. Sneed on June 8, 2023 for cycle 3 Taxotere Herceptin Perjeta  · She has follow-up appointment with Anna Domínguez on June 12, 2023 for IV fluids  · If her diarrhea gets severe we can always decrease the dose of Taxotere by 20% or hold perjeta.  · She will see me on June 29 for cycle 4 Taxotere carboplatinum Herceptin Perjeta following which she will get dose dense AC x4..    This patient is on high risk drug therapy requiring intensive monitoring for toxicity.        MD Dr. Kell Sandoval

## 2023-05-31 ENCOUNTER — TELEPHONE (OUTPATIENT)
Dept: ONCOLOGY | Facility: CLINIC | Age: 48
End: 2023-05-31

## 2023-06-06 ENCOUNTER — PATIENT OUTREACH (OUTPATIENT)
Dept: OTHER | Facility: HOSPITAL | Age: 48
End: 2023-06-06
Payer: COMMERCIAL

## 2023-06-06 NOTE — PROGRESS NOTES
Called Ms. Kulkarni to see how she was doing. Attempted to leave a message with my contact information, but the mailbox was full. Will try again at a later time

## 2023-06-08 ENCOUNTER — INFUSION (OUTPATIENT)
Dept: ONCOLOGY | Facility: HOSPITAL | Age: 48
End: 2023-06-08
Payer: COMMERCIAL

## 2023-06-08 ENCOUNTER — TELEPHONE (OUTPATIENT)
Dept: ONCOLOGY | Facility: CLINIC | Age: 48
End: 2023-06-08
Payer: COMMERCIAL

## 2023-06-08 ENCOUNTER — OFFICE VISIT (OUTPATIENT)
Dept: ONCOLOGY | Facility: CLINIC | Age: 48
End: 2023-06-08
Payer: COMMERCIAL

## 2023-06-08 VITALS
DIASTOLIC BLOOD PRESSURE: 88 MMHG | TEMPERATURE: 98.4 F | HEIGHT: 63 IN | HEART RATE: 90 BPM | SYSTOLIC BLOOD PRESSURE: 145 MMHG | BODY MASS INDEX: 30.25 KG/M2 | OXYGEN SATURATION: 98 % | RESPIRATION RATE: 16 BRPM | WEIGHT: 170.7 LBS

## 2023-06-08 DIAGNOSIS — C50.912 BREAST CARCINOMA, LOBULAR, LEFT: ICD-10-CM

## 2023-06-08 DIAGNOSIS — Z45.2 ENCOUNTER FOR ADJUSTMENT OR MANAGEMENT OF VASCULAR ACCESS DEVICE: ICD-10-CM

## 2023-06-08 DIAGNOSIS — Z17.0 MALIGNANT NEOPLASM OF OVERLAPPING SITES OF LEFT BREAST IN FEMALE, ESTROGEN RECEPTOR POSITIVE: Primary | ICD-10-CM

## 2023-06-08 DIAGNOSIS — C50.812 MALIGNANT NEOPLASM OF OVERLAPPING SITES OF LEFT BREAST IN FEMALE, ESTROGEN RECEPTOR POSITIVE: ICD-10-CM

## 2023-06-08 DIAGNOSIS — R03.0 ELEVATED BLOOD PRESSURE READING WITHOUT DIAGNOSIS OF HYPERTENSION: ICD-10-CM

## 2023-06-08 DIAGNOSIS — C50.812 MALIGNANT NEOPLASM OF OVERLAPPING SITES OF LEFT BREAST IN FEMALE, ESTROGEN RECEPTOR POSITIVE: Primary | ICD-10-CM

## 2023-06-08 DIAGNOSIS — Z17.0 MALIGNANT NEOPLASM OF OVERLAPPING SITES OF LEFT BREAST IN FEMALE, ESTROGEN RECEPTOR POSITIVE: ICD-10-CM

## 2023-06-08 DIAGNOSIS — R74.8 ELEVATED ALKALINE PHOSPHATASE LEVEL: ICD-10-CM

## 2023-06-08 LAB
ALBUMIN SERPL-MCNC: 4.8 G/DL (ref 3.5–5.2)
ALBUMIN/GLOB SERPL: 1.8 G/DL (ref 1.1–2.4)
ALP SERPL-CCNC: 137 U/L (ref 38–116)
ALT SERPL W P-5'-P-CCNC: 42 U/L (ref 0–33)
ANION GAP SERPL CALCULATED.3IONS-SCNC: 15.3 MMOL/L (ref 5–15)
AST SERPL-CCNC: 23 U/L (ref 0–32)
BASOPHILS # BLD AUTO: 0.03 10*3/MM3 (ref 0–0.2)
BASOPHILS NFR BLD AUTO: 0.2 % (ref 0–1.5)
BILIRUB SERPL-MCNC: 0.5 MG/DL (ref 0.2–1.2)
BUN SERPL-MCNC: 14 MG/DL (ref 6–20)
BUN/CREAT SERPL: 20.6 (ref 7.3–30)
CALCIUM SPEC-SCNC: 10 MG/DL (ref 8.5–10.2)
CHLORIDE SERPL-SCNC: 105 MMOL/L (ref 98–107)
CO2 SERPL-SCNC: 20.7 MMOL/L (ref 22–29)
CREAT SERPL-MCNC: 0.68 MG/DL (ref 0.6–1.1)
DEPRECATED RDW RBC AUTO: 50.2 FL (ref 37–54)
EGFRCR SERPLBLD CKD-EPI 2021: 108.9 ML/MIN/1.73
EOSINOPHIL # BLD AUTO: 0 10*3/MM3 (ref 0–0.4)
EOSINOPHIL NFR BLD AUTO: 0 % (ref 0.3–6.2)
ERYTHROCYTE [DISTWIDTH] IN BLOOD BY AUTOMATED COUNT: 15.6 % (ref 12.3–15.4)
GLOBULIN UR ELPH-MCNC: 2.6 GM/DL (ref 1.8–3.5)
GLUCOSE SERPL-MCNC: 157 MG/DL (ref 74–124)
HCT VFR BLD AUTO: 36.1 % (ref 34–46.6)
HGB BLD-MCNC: 12.2 G/DL (ref 12–15.9)
IMM GRANULOCYTES # BLD AUTO: 0.27 10*3/MM3 (ref 0–0.05)
IMM GRANULOCYTES NFR BLD AUTO: 1.7 % (ref 0–0.5)
LYMPHOCYTES # BLD AUTO: 2 10*3/MM3 (ref 0.7–3.1)
LYMPHOCYTES NFR BLD AUTO: 12.5 % (ref 19.6–45.3)
MCH RBC QN AUTO: 30.7 PG (ref 26.6–33)
MCHC RBC AUTO-ENTMCNC: 33.8 G/DL (ref 31.5–35.7)
MCV RBC AUTO: 90.9 FL (ref 79–97)
MONOCYTES # BLD AUTO: 0.43 10*3/MM3 (ref 0.1–0.9)
MONOCYTES NFR BLD AUTO: 2.7 % (ref 5–12)
NEUTROPHILS NFR BLD AUTO: 13.25 10*3/MM3 (ref 1.7–7)
NEUTROPHILS NFR BLD AUTO: 82.9 % (ref 42.7–76)
NRBC BLD AUTO-RTO: 0 /100 WBC (ref 0–0.2)
PLATELET # BLD AUTO: 253 10*3/MM3 (ref 140–450)
PMV BLD AUTO: 10.1 FL (ref 6–12)
POTASSIUM SERPL-SCNC: 4 MMOL/L (ref 3.5–4.7)
PROT SERPL-MCNC: 7.4 G/DL (ref 6.3–8)
RBC # BLD AUTO: 3.97 10*6/MM3 (ref 3.77–5.28)
SODIUM SERPL-SCNC: 141 MMOL/L (ref 134–145)
WBC NRBC COR # BLD: 15.98 10*3/MM3 (ref 3.4–10.8)

## 2023-06-08 PROCEDURE — 96375 TX/PRO/DX INJ NEW DRUG ADDON: CPT

## 2023-06-08 PROCEDURE — 25010000002 CARBOPLATIN PER 50 MG: Performed by: INTERNAL MEDICINE

## 2023-06-08 PROCEDURE — 85025 COMPLETE CBC W/AUTO DIFF WBC: CPT

## 2023-06-08 PROCEDURE — 96417 CHEMO IV INFUS EACH ADDL SEQ: CPT

## 2023-06-08 PROCEDURE — 25010000002 DOCETAXEL 10 MG/ML SOLUTION 8 ML VIAL: Performed by: INTERNAL MEDICINE

## 2023-06-08 PROCEDURE — 25010000002 TRASTUZUMAB PER 10 MG: Performed by: INTERNAL MEDICINE

## 2023-06-08 PROCEDURE — 96413 CHEMO IV INFUSION 1 HR: CPT

## 2023-06-08 PROCEDURE — 25010000002 DIPHENHYDRAMINE PER 50 MG: Performed by: INTERNAL MEDICINE

## 2023-06-08 PROCEDURE — 80053 COMPREHEN METABOLIC PANEL: CPT

## 2023-06-08 PROCEDURE — 96367 TX/PROPH/DG ADDL SEQ IV INF: CPT

## 2023-06-08 PROCEDURE — 25010000002 FOSAPREPITANT PER 1 MG: Performed by: INTERNAL MEDICINE

## 2023-06-08 PROCEDURE — 25010000002 PALONOSETRON PER 25 MCG: Performed by: INTERNAL MEDICINE

## 2023-06-08 PROCEDURE — 25010000002 PERTUZUMAB 420 MG/14ML SOLUTION 420 MG VIAL: Performed by: INTERNAL MEDICINE

## 2023-06-08 RX ORDER — SODIUM CHLORIDE 9 MG/ML
250 INJECTION, SOLUTION INTRAVENOUS ONCE
Status: COMPLETED | OUTPATIENT
Start: 2023-06-08 | End: 2023-06-08

## 2023-06-08 RX ORDER — FAMOTIDINE 10 MG/ML
20 INJECTION, SOLUTION INTRAVENOUS ONCE
Status: CANCELLED | OUTPATIENT
Start: 2023-06-08

## 2023-06-08 RX ORDER — FAMOTIDINE 10 MG/ML
20 INJECTION, SOLUTION INTRAVENOUS AS NEEDED
Status: CANCELLED | OUTPATIENT
Start: 2023-06-08

## 2023-06-08 RX ORDER — OLANZAPINE 5 MG/1
5 TABLET ORAL ONCE
Status: CANCELLED | OUTPATIENT
Start: 2023-06-08 | End: 2023-06-08

## 2023-06-08 RX ORDER — FAMOTIDINE 10 MG/ML
20 INJECTION, SOLUTION INTRAVENOUS ONCE
Status: COMPLETED | OUTPATIENT
Start: 2023-06-08 | End: 2023-06-08

## 2023-06-08 RX ORDER — PALONOSETRON 0.05 MG/ML
0.25 INJECTION, SOLUTION INTRAVENOUS ONCE
Status: CANCELLED | OUTPATIENT
Start: 2023-06-08

## 2023-06-08 RX ORDER — SODIUM CHLORIDE 9 MG/ML
250 INJECTION, SOLUTION INTRAVENOUS ONCE
Status: CANCELLED | OUTPATIENT
Start: 2023-06-08

## 2023-06-08 RX ORDER — OLANZAPINE 5 MG/1
5 TABLET ORAL ONCE
Status: COMPLETED | OUTPATIENT
Start: 2023-06-08 | End: 2023-06-08

## 2023-06-08 RX ORDER — SODIUM CHLORIDE 9 MG/ML
1000 INJECTION, SOLUTION INTRAVENOUS ONCE
Start: 2023-06-12 | End: 2023-06-12

## 2023-06-08 RX ORDER — PALONOSETRON 0.05 MG/ML
0.25 INJECTION, SOLUTION INTRAVENOUS ONCE
Status: COMPLETED | OUTPATIENT
Start: 2023-06-08 | End: 2023-06-08

## 2023-06-08 RX ORDER — DIPHENHYDRAMINE HYDROCHLORIDE 50 MG/ML
50 INJECTION INTRAMUSCULAR; INTRAVENOUS AS NEEDED
Status: CANCELLED | OUTPATIENT
Start: 2023-06-08

## 2023-06-08 RX ADMIN — PALONOSETRON 0.25 MG: 0.05 INJECTION, SOLUTION INTRAVENOUS at 08:42

## 2023-06-08 RX ADMIN — SODIUM CHLORIDE 100 ML: 9 INJECTION, SOLUTION INTRAVENOUS at 09:36

## 2023-06-08 RX ADMIN — PERTUZUMAB 420 MG: 30 INJECTION, SOLUTION, CONCENTRATE INTRAVENOUS at 09:03

## 2023-06-08 RX ADMIN — SODIUM CHLORIDE 250 ML: 9 INJECTION, SOLUTION INTRAVENOUS at 08:42

## 2023-06-08 RX ADMIN — DIPHENHYDRAMINE HYDROCHLORIDE 25 MG: 50 INJECTION, SOLUTION INTRAMUSCULAR; INTRAVENOUS at 08:48

## 2023-06-08 RX ADMIN — SODIUM CHLORIDE 750 MG: 900 INJECTION, SOLUTION INTRAVENOUS at 12:20

## 2023-06-08 RX ADMIN — FAMOTIDINE 20 MG: 10 INJECTION INTRAVENOUS at 08:42

## 2023-06-08 RX ADMIN — SODIUM CHLORIDE 135 MG: 9 INJECTION, SOLUTION INTRAVENOUS at 10:48

## 2023-06-08 RX ADMIN — OLANZAPINE 5 MG: 5 TABLET, FILM COATED ORAL at 08:42

## 2023-06-08 RX ADMIN — TRASTUZUMAB 470 MG: 150 INJECTION, POWDER, LYOPHILIZED, FOR SOLUTION INTRAVENOUS at 10:06

## 2023-06-08 NOTE — PROGRESS NOTES
Subjective     REASON FOR follow up  1.  Invasive lobular carcinoma multifocal,  Left breast anterolateral stereotactic biopsy invasive lobular carcinoma, Midnight score of 6, grade 2, 4 mm, ER 95%, HI 95%, HER2/cheryl 0 negative with Ki-67 of 5%.  Positive for atypical lobular hyperplasia  Left breast 1 o'clock position 10 cm from the nipple ultrasound-guided biopsy consistent with invasive lobular carcinoma grade 2 Midnight score of 6, 4 mm with lobular carcinoma in situ, ER 90%, %, HER2/cheryl 0  Left breast upper stereotactic guided core biopsy invasive lobular carcinoma grade 2 Marie score of six 3 mm with lobular carcinoma in situ, ER 90%, %, HER2/cheryl equivocal 2+, FISH shows her to 5.7 with CEP 1.6 and ratio HER2 nu/KEIRA-17 ratio of 3.5.  It is amplified.    2.  March 8, 2023:p T3 N0 M0 invasive lobular carcinoma s/p bilateral mastectomy with left deep axillary sentinel lymph node biopsy, with bilateral placement of prepectoral tissue expanders for reconstruction and bilateral placement of AlloDerm.  Pathology shows multifocal tumor in  upper outer quadrant and upper inner quadrant, invasive lobular carcinoma grade 2 with a Marie score  score of 7  , total of 4 foci were seen, largest tumor being 60 mm, 20 mm, 2 mm, 1 mm.  No DCIS identified.  Lobular carcinoma in situ present.  No lymphatic or vascular channel invasion, no dermal lymphovascular space invasion all margins were negative for invasive carcinoma 4 lymph nodes were all negative for malignancy its pathologic T3N0    ER %, HI %, HER2/cheryl 2+ with Ki-67 of 5%  Previous biopsies had shown 2 of the tumors were ER/HI positive HER2/cheryl negative and one of them was ER/HI positive HER2 positive  On discussing with pathologist Dr. Serrano, she thinks that the larger tumor was ER HI positive and HER2 positive but unsure if it was the 60 mm tumor or 20 mm tumor.  Either way it was not the smaller tumors which was HER2  positive.    All tumors look-alike per pathologist and they are invasive lobular carcinoma.                               HISTORY OF PRESENT ILLNESS:    Patient is a 47-year-old female with history of multifocal left breast cancer with MRI of the breast showing almost 15 lesions.  Biopsies on 3 different lesions were done and they were all consistent with invasive lobular carcinoma.  2 of the lesions where ER/ND positive HER2/cheryl negative and one of the larger lesion was ER/ND positive HER2 2+ equivocal.  FISH testing was done and the HER2 copy number was 5.7 with HER2/CEP ratio of 3.5.  The FISH was amplified.    Patient underwent bilateral mastectomy    With left sentinel lymph node surgery..  The pathology shows tumor to be present in the left upper outer quadrant invasive lobular carcinoma with a Marie score of 7, grade 2.  There were 4 lesions the greatest size was 6 0 mm, 20 mm, 2 mm and 1 mm in size.  Lobular carcinoma in situ is present.  All margins are negative for invasive carcinoma.  4 lymph nodes negative.  Patient has T3N0 invasive lobular carcinoma.      Discussed with pathology in length Dr. Serrano, one of the larger lesions which was 60 mm was heterogeneous it was biopsied at 2 separate spots.  The left anterior lateral and left 1:00 core fragment was ER/ND positive HER2/cheryl negative the left upper core biopsy was ER/ND positive HER2/cheryl 2+ equivocal but HER2/cheryl FISH was positive.    On discussion with pathology the 60 mm tumor showed ER and ND positive and HER2 positive by FISH with a Ki-67 of 40% at the Top-Hat clip.  The HER2/cheryl copy number was 5.7 with a HER2/cheryl by CEP ratio 3.5 which is positive.  The Ki-67 on the lower part was 13%.  The 20 mm tumor had a Ki-67 of 9%.  So the larger lesion was triple positive and heterogeneous as 2 biopsies were done on the larger lesions and that was both triple positive and at the second biopsy was ER/ND positive HER2/cheryl negative so it was a  heterogeneous tumor.  The 20 mm lesion was ER/WV positive HER2/cheryl negative.    Given that she had a large tumor with high Ki-67 and premenopausal status we discussed about giving Taxotere carboplatin Herceptin Perjeta.    Interval history:  Patient returns today scheduled for cycle #3 of TCHP.  Originally there was concern that she would not be able to get carboplatin today due to the national shortage but I was informed there is enough in stock to continue her treatment without modification.    She looks great today.  She reports her diarrhea is manageable.  She did request a refill on her Yen's Magic mouthwash.  Her port is functioning well.    Oncology history:  Patient is a 48 y.o. female who has been recently diagnosed with left breast cancer.  Patient has been compliant with her mammograms.    There is no family history of breast or ovarian cancer.  Her father had prostate cancer and melanoma.  A paternal aunt had thyroid cancer.      Details are as follows.    November 29, 2022: Screening bilateral mammogram showed 8 mm asymmetry in the central left breast posterior one third.  This appears to be in the superior left breast on the MLO view.  Right breast was negative.  A left diagnostic mammogram and ultrasound was recommended.    January January 3, 2023: Left breast diagnostic mammogram showed the spiculated areas within the central posterior and lateral anterior superior left breast where redemonstrated.  Targeted ultrasound was done.  At 1 o'clock position 10 cm from the nipple there is a shadowing mass with peripheral spiculations which is concordant with the mammographic abnormality and suspicious for malignancy.  The more centrally located spiculation on the cc view posterior to the glandular tissue cannot be clearly identified on the ultrasound.    Impression was 2 separate suspicious spiculated lesion in the left breast.  One of the lesions is seen well on the ultrasound and should be amenable to  ultrasound-guided core biopsy.  The other lesion which was seen in the CC projection was amicable to stereotactic guided tissue sampling.    January 3, 2023 patient underwent ultrasound-guided left breast biopsy    The ultrasound-guided biopsy of left breast 10 cm from the nipple at 1 o'clock position showed invasive lobular carcinoma moderately differentiated with a Greenwood grade of 2 and score of 6 measuring 4 mm.  There was focal lobular carcinoma in situ.    Left breast anterior lateral stereotactic core needle biopsy showed invasive lobular carcinoma moderately differentiated grade 2 with a Marie score of 6 measuring 4 mm.  There was atypical lobular hyperplasia.  Estrogen receptor was %, progesterone receptor was %, HER2/cheryl 0, Ki-67 5%.    Left breast upper stereotactic guided core biopsy showed invasive lobular carcinoma moderately differentiated grade 2 with [place] score of 6 with measuring 3 mm with lobular carcinoma in situ present.  I do not see the ER/VT or HER2 on the ultrasound-guided biopsy of the left breast lesion as well as the left upper stereotactic biopsy.  We will check with pathologist    January 23, 2023: Patient underwent  MRI of the breast bilateral    Right breast: Negative    Left breast: At 1:00 anterior left breast 4 cm from the nipple there is a 1.5 x 1.6 x 1.3 cm irregular enhancing mass which is biopsy-proven malignancy.  The biopsy clip is located within 0.9 x 1 x 1.1 cm postbiopsy hematoma along the inferior margin of the mass.  And there is additional hematoma along the lateral margin of the mass which measures 1.2 x 1.9 x 0.9 cm.    At 1:00 in the middle to posterior left breast 7.4 cm from the nipple there is a 1.3 x 1 cm x 1 cm irregular enhancing mass mass with a focus from a biopsy clip along the inferior margin which also represents the biopsy-proven malignancy    At 12:00 in the posterior left breast 8.5 cm from the nipple there is a 1.9 x 1.8 x 2.2  cm irregular enhancing mass with a corresponding 2.9 cm biopsy cavity with a biopsy clip which represents the biopsy-proven malignancy    In addition there are multiple at least 12 similar-appearing irregular enhancing masses and foci are identified throughout the left breast predominantly involving the upper breast but also involving the lower outer quadrant.  At 11-12 o'clock in the middle and posterior left breast there are 3 adjacent reference masses together measuring 5.5 cm but individually measuring 1.2 x 0.9 x 1 cm.  At 1:00 in the far posterior left breast/axillary tail 13.7 cm posterior to the nipple is a 1.3 x 1.1 x 1 cm irregular enhancing mass which is located 0.8 cm from the anterolateral margin of the pectoralis Muscle.  Together the enhancing masses span approximately 11.7 x 8 x 7.8 cm.  There is no suspicious enhancement in the left nipple or chest wall.  Focal areas of skin enhancement likely reflect skin entry point from recent biopsies.  There are no pathologically enlarged internal mammary chain lymph nodes.    Impression    .  At least 15 similar-appearing irregular enhancing masses and foci  scattered throughout the left breast, together measuring up to 11.7 cm  in maximum dimension, encompass 3 sites of biopsy-proven malignancy and  are consistent with multicentric malignancy. Oncologic and surgical  management are recommended.  2.  No MRI evidence of malignancy in the right breast.    Patient is a 47-year-old female with multifocal invasive lobular carcinoma who on screening mammogram showed 8 mm asymmetry in the central left breast posterior one third.  In the cc view.  It appears to be superior left breast on the MLO view.  There was also architectural distortion in the lateral left breast one third on the left cc view.    On diagnostic mammogram the spiculated areas within the central posterior and the lateral anterior superior left breast where redemonstrated.  Targeted ultrasound was  done.  At 1 o'clock position 10 cm from the nipple there is a mass with spiculations which is suspicious.  The more centrally located spiculation on the cc view.  The more centrally located spiculation on the cc view cannot be clearly identified on ultrasound.  So there impression was there were 2 separate suspicious spiculated lesion in the left breast.  1 was seen well on the ultrasound and amenable to ultrasound-guided biopsy the other is seen well on mammography and a stereotactic guided biopsy suggested.    Patient subsequently underwent stereotactic biopsy of 1 lesion and ultrasound-guided biopsy of the 1 cm mass at 1 o'clock position 10 cm from the nipple.  A total of 3 different areas of the left breast were biopsied and specimens were sent to pathology.  The third biopsy was performed at 12 o'clock position in the posterior one third of the left breast.  All of the 3 sites were consistent with invasive lobular carcinoma at site A, B, and C.    MRI of the breast showed at 1 o'clock position of the left breast anteriorly 4 cm posterior to the nipple there is a 1.5 x 1.6 x 1.3 cm irregular enhancing mass.  There is a small hematoma along the inferior margin of the mass.  An additional hematoma along the lateral margin of the mass which is measuring 1.2 x 0.9 x 0.9 cm    At 1:00 in the middle to posterior left breast 7.4 cm posterior to the nipple there is a 1.3 x 1 x 1 cm enhancing mass with a biopsy clip.    At 12:00 posterior left breast 8.5 cm posterior to the nipple there is a 1.5 x 1.8 x 2.2 cm irregular enhancing mass with a corresponding 2.9 cm biopsy cavity with a clip which represents the biopsy site malignancy    Multiple at least 12 similar-appearing irregular enhancing masses and foci are identified throughout the left breast predominantly involving the upper breast but also involving the lower outer quadrant.  At 11-12 o'clock in the middle and posterior left breast there is a 3 adjacent reference  masses measuring 5.5 cm in AP dimension and individually measuring 1.2 x 0.9 x 1 cm.  At 1:00 in the far posterior left breast axillary tail 13.7 cm from the nipple there is a 1.3 x 1.1 x 1 cm craniocaudal dimension irregular enhancing mass which is located 0.8 cm from the anterolateral margin of the pectoralis muscle.     Per the MRI at least 15 similar-appearing irregular enhancing masses and foci scattered throughout the left breast together measuring 11.7 cm in maximum dimension which encompasses 3 sites of biopsy-proven malignancy and a consistent with multicentric malignancy.  MRI of the right breast is negative    INVITAE genetic testing showed variation of uncertain significance of LUCAS  Gene.    Patient was suggested left total mastectomy, left axillary sentinel lymph node biopsy possible node dissection and possible reconstruction.    However patient called Dr. Hi and decided to go for upfront bilateral total mastectomy, left axillary sentinel lymph node biopsy and possible axillary lymph node dissection and reconstruction.     I presented the case in the breast cancer conference today.  Patient had multiple nodules per MRI on the left breast Dr. Todd looked at it and felt there were multiple nodules and the largest one was 2.2 cm.  The discussion was to go ahead and upfront do the surgery and subsequently treat with chemo/ endocrine therapy as needed.  The left breast MRI findings are slightly atypical and that it is not contiguous involvement over 11.5 cm area but multiple nodules.  Surgical pathology will clarify.  If it is 1 big lesion or multiple sma      Past Medical History:   Diagnosis Date    ADD (attention deficit disorder)     Bilateral impacted cerumen 07/13/2020    Breast cancer 03/08/2023    LUCIO MASTECTOMY    Foot fracture, left     History of COVID-19 2022    History of gestational diabetes 2010    History of Graves' disease 2012    History of radioactive iodine thyroid ablation 2012     History of vertigo     Hyperlipidemia     Hypothyroidism     Melanoma of back     Positive depression screening 07/13/2020        Past Surgical History:   Procedure Laterality Date    BREAST RECONSTRUCTION Bilateral 3/8/2023    Procedure: BILATERAL PLACEMENT  TISSUE EXPANDER AND ALLODERM;  Surgeon: Eamon Stone MD;  Location:  [facility];  Service: Plastics;  Laterality: Bilateral;    D & C CERVICAL BIOPSY  01/2006    INTRAUTERINE DEVICE INSERTION  2010    Mirena    INTRAUTERINE DEVICE INSERTION  2015    Mirena exchange, old IUD removal and new one inserted by Dr Carr, Lot # [id number] exp 09/17.mar    MASTECTOMY W/ SENTINEL NODE BIOPSY Bilateral 3/8/2023    Procedure: Bilateral total mastectomy, left axillary sentinel lymph node biopsy;  Surgeon: Kell Hi MD;  Location[facility] OR;  Service: General;  Laterality: Bilateral;    SCAR REVISION BREAST Right 4/3/2023    Procedure: RIGHT MASTECTOMY REVISION;  Surgeon: Eamon Stone MD;  Location: [facility] MAIN OR;  Service: Plastics;  Laterality: Right;    VENOUS ACCESS DEVICE (PORT) INSERTION Right 4/13/2023    Procedure: right port placement;  Surgeon: Kell Hi MD;  Location: [facility] MAIN OR;  Service: General;  Laterality: Right;    WISDOM TOOTH EXTRACTION          Current Outpatient Medications on File Prior to Visit   Medication Sig Dispense Refill    acetaminophen (TYLENOL) 325 MG tablet Take 2 tablets by mouth Every 4 (Four) Hours As Needed for Mild Pain. 60 tablet 0    aluminum-magnesium hydroxide-simethicone-diphenhydrAMINE in nystatin Swish and spit 10 mL by mouth every 4 to 6 hours as needed for thrush. 380 mL 3    COLLAGEN PO Take 1 tablet by mouth Daily.      escitalopram (Lexapro) 10 MG tablet Take 1 tablet by mouth Daily. 30 tablet 1    levothyroxine (SYNTHROID, LEVOTHROID) 125 MCG tablet Take 1 tablet by mouth Daily. 90 tablet 1    lidocaine-prilocaine (EMLA) 2.5-2.5 % cream Apply 1 application topically to  the appropriate area as directed As Needed for Mild Pain (apply pea-sized amount to port site 30 minutes prior to port being accessed). 30 g 3    lisinopril (PRINIVIL,ZESTRIL) 2.5 MG tablet Take 1 tablet by mouth Daily. 30 tablet 2    loratadine (CLARITIN) 10 MG tablet       LORazepam (ATIVAN) 0.5 MG tablet Take 1 tablet by mouth At Night As Needed for Anxiety. 30 tablet 0    nystatin in aluminum-magnesium hydroxide-simethicone suspension-diphenhydrAMINE liquid-Lidocaine Viscous HCl solution Take 10 mL by mouth Every 4 (Four) to 6 (Six) Hours As Needed (thrush). 410 mL 0    OLANZapine (ZyPREXA) 5 MG tablet Take 1 tablet by mouth Every Night. Take on days 2, 3 and 4 after chemotherapy. 3 tablet 5    omeprazole (priLOSEC) 20 MG capsule Take 1 capsule by mouth Daily. 90 capsule 1    ondansetron (ZOFRAN) 8 MG tablet Take 1 tablet by mouth 3 (Three) Times a Day As Needed for Nausea or Vomiting. 30 tablet 5    Probiotic Product (PROBIOTIC ADVANCED PO) Take 1 tablet by mouth Daily.       No current facility-administered medications on file prior to visit.        ALLERGIES:    Allergies   Allergen Reactions    Erythromycin GI Intolerance     Pt reports    Penicillins Rash        Social History     Socioeconomic History    Marital status:      Spouse name: Parish    Number of children: 2   Tobacco Use    Smoking status: Never    Smokeless tobacco: Never   Vaping Use    Vaping Use: Never used   Substance and Sexual Activity    Alcohol use: Not Currently     Alcohol/week: 1.0 standard drink     Types: 1 Glasses of wine per week     Comment: Daily caffeine use    Drug use: Never    Sexual activity: Yes     Partners: Male     Comment:          Family History   Problem Relation Age of Onset    Diabetes Father     Hyperlipidemia Father     Arthritis Father     Hypertension Father     Heart disease Father         S/P stents    Heart attack Father 41    Colon polyps Father     Melanoma Father     Prostate cancer  Father     Hyperlipidemia Sister     Hypertension Sister     Heart attack Sister     Diabetes Sister     Thyroid cancer Paternal Aunt     Lung cancer Maternal Grandmother     Cancer Maternal Grandfather         Bladder cancer    Lung cancer Maternal Grandfather     Heart failure Paternal Grandmother     Transient ischemic attack Paternal Grandmother     Heart disease Paternal Grandfather     Malig Hyperthermia Neg Hx       Family history: Maternal great aunt had breast cancer, unsure of the age .  Father had melanoma and prostate cancer, paternal aunt had thyroid cancer, paternal grandfather had bladder cancer maternal grandfather had bladder cancer maternal grandmother had lung cancer    Past medical history is consistent with Graves' disease    OB/GYN history:  Age of menarche: 14  Patient is premenopausal and had an IUD which has been now removed   2 para 2 no miscarriages  Age at first childbirth 30  Patient did breast-feed 24 months  Length of taking birth control pills none      Objective     There were no vitals filed for this visit.        2023     8:22 AM   Current Status   ECOG score 0       Physical Exam      CONSTITUTIONAL:  Vital signs reviewed.  No distress, looks comfortable.  RESPIRATORY:  Normal respiratory effort.  Lungs clear to auscultation bilaterally.  CARDIOVASCULAR:  Normal S1, S2.  No murmurs rubs or gallops.  No significant lower extremity edema.  GASTROINTESTINAL: Abdomen appears unremarkable.  Nontender.  No hepatomegaly.  No splenomegaly.  LYMPHATIC:  No cervical, supraclavicular, axillary lymphadenopathy.  SKIN:  Warm.  No rashes.  PSYCHIATRIC:  Normal judgment and insight.  Normal mood and affect.  No evidence of rash today on the skin.    I have reexamined the patient and the results are consistent with the previously documented exam. Chintan Sneed MD       RECENT LABS:  Results from last 7 days   Lab Units 23  0731   WBC 10*3/mm3 15.98*   NEUTROS ABS 10*3/mm3  13.25*   HEMOGLOBIN g/dL 12.2   HEMATOCRIT % 36.1   PLATELETS 10*3/mm3 253                     Assessment & Plan     *Pathologic T3N0 invasive lobular carcinoma of the left breast, upper inner and upper outer quadrant, multifocal, 60 mm, 20 mm, 2 mm, 1 mm, grade 2, Marie score of 7, 2 of them are ER/NH positive, HER2/cheryl negative.  One of the larger lesions was ER/NH positive HER2 positive.  Ki-67 is very low 5%,    Invasive lobular carcinoma of the left breast recently diagnosed, multifocal with 3 lesions biopsied in the left breast.  Patient has multicentric disease with 15 similar-appearing irregular enhancing masses in the left breast together measuring 11.7 cm in maximum dimension which encompasses 3 of the 3 biopsy proven malignancies.  All the 3 very invasive lobular carcinoma, grade 2 with [place] score of 6 but 2 of the lesions where ER/NH positive HER2 negative and the third lesion was ER/NH positive HER2 2+ with FISH showing HER2 amplified.      1.  Estrogen receptor 95%, progesterone receptor 95%, HER2/cheryl 0, Ki-67 5%    2 left breast 10 o'clock position 10 cm from the nipple ultrasound core guided biopsy showed ER 90%, %, HER2/cheryl 0    3.  Left breast upper stereotactic core biopsy showed estrogen receptor 90%, progesterone receptor 100%, HER2/cheryl equivocal 2+  FISH testing showed her to as 5.7 with a KEIRA 17 of 1.6 with a ratio HER2/KEIRA 17 of        INVITAE genetic testing showed variation of uncertain significance of LUCAS gene  We discussed the multidisciplinary approach in this patient's and I also discussed in length that that invasive lobular carcinomas do benefit from endocrine therapy and discussed in length about side effects of all endocrine therapies  Pending the results of the surgery she may need to be a candidate for chemotherapy as well and subsequently radiation if the tumor is large locally  Patient is keen on getting bilateral oophorectomy after her breast surgery is done  and we discussed about medical oophorectomy with Lupron as well  February 24, 2023: Presented patient in the breast cancer conference.  Even though it appears to be a heterogeneous tumor, since it is difficult to really appreciate by clinical exam and the fact that it is invasive lobular carcinoma, it was felt best to do upfront surgery.  March 8, 2023:p T3 N0 M0 invasive lobular carcinoma s/p bilateral mastectomy with left deep axillary sentinel lymph node biopsy, with bilateral placement of prepectoral tissue expanders for reconstruction and bilateral placement of AlloDerm.  Pathology shows multifocal tumor in  upper outer quadrant and upper inner quadrant, invasive lobular carcinoma grade 2 with a Marie score  score of 7  , total of 4 foci were seen, largest tumor being 60 mm, 20 mm, 2 mm, 1 mm.  No DCIS identified.  Lobular carcinoma in situ present.  No lymphatic or vascular channel invasion, no dermal lymphovascular space invasion all margins were negative for invasive carcinoma 4 lymph nodes were all negative for malignancy its pathologic T3N0  ER %, IL %, HER2/cheryl 2+ with Ki-67 of 5%  Previous biopsies had shown 2 of the tumors were ER/IL positive HER2/cheryl negative and one of them was ER/IL positive HER2 positive  On discussing with pathologist Dr. Serrano, she thinks that the larger tumor was ER IL positive and HER2 positive.  HER2 copy number of 5.7 with HER2/CEP ratio of 3.5 positive and this was on the larger tumor.  Given that patient has heterogeneous tumor with the larger lesion being ER/IL positive HER2 positive and a Ki-67 of 40%, being premenopausal we will treat with Taxotere carboplatin Herceptin Perjeta.  April 20, 2023: Patient was to start cycle 1 today but unfortunately her liver function tests were extremely high.  Discussed about alcohol and she had drank and taken Tylenol.  Will hold chemo today  April 28, 2023: Reviewed her liver function test which are completely  normalized.  Discussed with her not to drink alcohol.  Here for cycle 1 day 1 TCHP chemotherapy.  Reviewed echo which showed ejection fraction of 64% with strain pattern of -21.  Patient understands all the side effects\  5/4/2023: Patient tolerated Cycle 1 TCHP well. She has had diarrhea, nausea and fatigue that have been well managed.   5/18/2023: C2 TCHP  5/22/2023: Here for close follow-up and possible IV fluids after treatment last Friday, 5/18/2023.  Patient had a fairly good weekend with good oral intake, weight that is notably stable, and very little nausea.  She only had 1 loose stool this morning and therefore no significant diarrhea.  CMP is WNL.  Per discussion with patient we will give just 500 mL normal saline.  May 25, 2023: Patient has diarrhea.  She is 1 week out of cycle 2 Taxotere carboplatin Herceptin Perjeta.  She has some abdominal cramping.  We will give her IV fluids today.  Given carboplatinum is not available she will receive Taxotere Herceptin Perjeta for 2 more cycles prior to going to Adriamycin Cytoxan.  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 She did recommend she start taking her gabapentin again to help with her anxiety and irritability/anger from her decadron with treatment. Patient is encouraged to continue to follow with Cierra.     *Oral Thrush and Chemotherapy induced mucositis  5/4/2023: I am concerned patient is starting into chemotherapy induced mucositis. She is reporting sore throat at times, possibly worsened reflux, mouth and tongue changes. She noticed white plaquing on her tongue this morning. She has not been able to  her Yen's Magic Mouthwash until  today because of pharmacy supplies. She is currently on omeprazole 20 mg daily. She can increase this to 40 mg daily if needed. Prescription sent to pharmacy today to start Fluconazole 200 mg on day and 100 mg on day 2-7. We will continue to monitor symptoms and watch liver enzymes closely. ALT 34 and AST 24.  Liver function test mildly elevated today, unsure if that is the effect of fluconazole.  Next time we will try and avoid fluconazole and consider just Yen's Magic mouthwash.    *Maculopapular rash on chest wall  5/4/2023: Continue to monitor. Appear acne-like in nature. She is encouraged to moisturizer her chest wall well with an Aquaphor or CeraVe moisturizing ointment. We will continue to monitor. She may continue hydrocortisone as needed if she is having itching.   Resolution of the rash    Plan  Proceed with cycle #3 TCHP with growth factor support on day 2.  She is scheduled for nurse practitioner assessment and possible IV fluids on Monday, 6/12/2023.  She will see Dr Frederick on June 29 for cycle 4 Taxotere carboplatinum Herceptin Perjeta following which she will get dose dense AC x4..    This patient is on high risk drug therapy requiring intensive monitoring for toxicity.        MD Dr. Kell Jhaveri

## 2023-06-08 NOTE — TELEPHONE ENCOUNTER
Call to [facility] for Yen's Magic Mouthwash as previously ordered with Benadryl Elixir 60 ml, Maalox 200 ml, Nystatin 120 ml, as Nystatin is on back order, and will not be included, 10 ml by mouth every 4-6 hours prn for thrush per Dr. Sneed, as attempted to e-scribe to pharmacy. Order was received per Abby in [facility]

## 2023-06-09 ENCOUNTER — INFUSION (OUTPATIENT)
Dept: ONCOLOGY | Facility: HOSPITAL | Age: 48
End: 2023-06-09
Payer: COMMERCIAL

## 2023-06-09 DIAGNOSIS — C50.812 MALIGNANT NEOPLASM OF OVERLAPPING SITES OF LEFT BREAST IN FEMALE, ESTROGEN RECEPTOR POSITIVE: Primary | ICD-10-CM

## 2023-06-09 DIAGNOSIS — Z17.0 MALIGNANT NEOPLASM OF OVERLAPPING SITES OF LEFT BREAST IN FEMALE, ESTROGEN RECEPTOR POSITIVE: Primary | ICD-10-CM

## 2023-06-09 PROCEDURE — 25010000002 PEGFILGRASTIM-CBQV 6 MG/0.6ML SOLUTION PREFILLED SYRINGE: Performed by: INTERNAL MEDICINE

## 2023-06-09 PROCEDURE — 96372 THER/PROPH/DIAG INJ SC/IM: CPT

## 2023-06-09 RX ADMIN — PEGFILGRASTIM-CBQV 6 MG: 6 INJECTION, SOLUTION SUBCUTANEOUS at 09:09

## 2023-06-12 ENCOUNTER — INFUSION (OUTPATIENT)
Dept: ONCOLOGY | Facility: HOSPITAL | Age: 48
End: 2023-06-12
Payer: COMMERCIAL

## 2023-06-12 ENCOUNTER — OFFICE VISIT (OUTPATIENT)
Dept: ONCOLOGY | Facility: CLINIC | Age: 48
End: 2023-06-12
Payer: COMMERCIAL

## 2023-06-12 VITALS
HEART RATE: 87 BPM | TEMPERATURE: 98.2 F | HEIGHT: 63 IN | BODY MASS INDEX: 29.75 KG/M2 | WEIGHT: 167.9 LBS | OXYGEN SATURATION: 97 % | SYSTOLIC BLOOD PRESSURE: 126 MMHG | RESPIRATION RATE: 16 BRPM | DIASTOLIC BLOOD PRESSURE: 86 MMHG

## 2023-06-12 DIAGNOSIS — C50.812 MALIGNANT NEOPLASM OF OVERLAPPING SITES OF LEFT BREAST IN FEMALE, ESTROGEN RECEPTOR POSITIVE: Primary | ICD-10-CM

## 2023-06-12 DIAGNOSIS — Z79.899 HIGH RISK MEDICATION USE: ICD-10-CM

## 2023-06-12 DIAGNOSIS — Z17.0 MALIGNANT NEOPLASM OF OVERLAPPING SITES OF LEFT BREAST IN FEMALE, ESTROGEN RECEPTOR POSITIVE: ICD-10-CM

## 2023-06-12 DIAGNOSIS — R74.8 ELEVATED ALKALINE PHOSPHATASE LEVEL: ICD-10-CM

## 2023-06-12 DIAGNOSIS — Z17.0 MALIGNANT NEOPLASM OF OVERLAPPING SITES OF LEFT BREAST IN FEMALE, ESTROGEN RECEPTOR POSITIVE: Primary | ICD-10-CM

## 2023-06-12 DIAGNOSIS — C50.812 MALIGNANT NEOPLASM OF OVERLAPPING SITES OF LEFT BREAST IN FEMALE, ESTROGEN RECEPTOR POSITIVE: ICD-10-CM

## 2023-06-12 DIAGNOSIS — C50.912 BREAST CARCINOMA, LOBULAR, LEFT: ICD-10-CM

## 2023-06-12 DIAGNOSIS — Z95.828 PORT-A-CATH IN PLACE: Primary | ICD-10-CM

## 2023-06-12 DIAGNOSIS — Z45.2 ENCOUNTER FOR ADJUSTMENT OR MANAGEMENT OF VASCULAR ACCESS DEVICE: ICD-10-CM

## 2023-06-12 LAB
25(OH)D3 SERPL-MCNC: 41.3 NG/ML (ref 30–100)
ALBUMIN SERPL-MCNC: 4.1 G/DL (ref 3.5–5.2)
ALBUMIN/GLOB SERPL: 1.5 G/DL (ref 1.1–2.4)
ALP SERPL-CCNC: 122 U/L (ref 38–116)
ALT SERPL W P-5'-P-CCNC: 22 U/L (ref 0–33)
ANION GAP SERPL CALCULATED.3IONS-SCNC: 12.1 MMOL/L (ref 5–15)
AST SERPL-CCNC: 16 U/L (ref 0–32)
BASOPHILS # BLD AUTO: 0.03 10*3/MM3 (ref 0–0.2)
BASOPHILS NFR BLD AUTO: 0.2 % (ref 0–1.5)
BILIRUB SERPL-MCNC: 0.6 MG/DL (ref 0.2–1.2)
BUN SERPL-MCNC: 21 MG/DL (ref 6–20)
BUN/CREAT SERPL: 30.9 (ref 7.3–30)
CALCIUM SPEC-SCNC: 9 MG/DL (ref 8.5–10.2)
CHLORIDE SERPL-SCNC: 102 MMOL/L (ref 98–107)
CO2 SERPL-SCNC: 24.9 MMOL/L (ref 22–29)
CREAT SERPL-MCNC: 0.68 MG/DL (ref 0.6–1.1)
DEPRECATED RDW RBC AUTO: 52.9 FL (ref 37–54)
EGFRCR SERPLBLD CKD-EPI 2021: 108.9 ML/MIN/1.73
EOSINOPHIL # BLD AUTO: 0 10*3/MM3 (ref 0–0.4)
EOSINOPHIL NFR BLD AUTO: 0 % (ref 0.3–6.2)
ERYTHROCYTE [DISTWIDTH] IN BLOOD BY AUTOMATED COUNT: 15.4 % (ref 12.3–15.4)
GLOBULIN UR ELPH-MCNC: 2.7 GM/DL (ref 1.8–3.5)
GLUCOSE SERPL-MCNC: 121 MG/DL (ref 74–124)
HCT VFR BLD AUTO: 37.7 % (ref 34–46.6)
HGB BLD-MCNC: 12.5 G/DL (ref 12–15.9)
IMM GRANULOCYTES # BLD AUTO: 2.73 10*3/MM3 (ref 0–0.05)
IMM GRANULOCYTES NFR BLD AUTO: 19 % (ref 0–0.5)
LYMPHOCYTES # BLD AUTO: 2.63 10*3/MM3 (ref 0.7–3.1)
LYMPHOCYTES NFR BLD AUTO: 18.3 % (ref 19.6–45.3)
MCH RBC QN AUTO: 31.3 PG (ref 26.6–33)
MCHC RBC AUTO-ENTMCNC: 33.2 G/DL (ref 31.5–35.7)
MCV RBC AUTO: 94.3 FL (ref 79–97)
MONOCYTES # BLD AUTO: 0.12 10*3/MM3 (ref 0.1–0.9)
MONOCYTES NFR BLD AUTO: 0.8 % (ref 5–12)
NEUTROPHILS NFR BLD AUTO: 61.7 % (ref 42.7–76)
NEUTROPHILS NFR BLD AUTO: 8.89 10*3/MM3 (ref 1.7–7)
NRBC BLD AUTO-RTO: 0 /100 WBC (ref 0–0.2)
PLATELET # BLD AUTO: 175 10*3/MM3 (ref 140–450)
PMV BLD AUTO: 10.8 FL (ref 6–12)
POTASSIUM SERPL-SCNC: 3.6 MMOL/L (ref 3.5–4.7)
PROT SERPL-MCNC: 6.8 G/DL (ref 6.3–8)
RBC # BLD AUTO: 4 10*6/MM3 (ref 3.77–5.28)
SODIUM SERPL-SCNC: 139 MMOL/L (ref 134–145)
WBC NRBC COR # BLD: 14.4 10*3/MM3 (ref 3.4–10.8)

## 2023-06-12 PROCEDURE — 82306 VITAMIN D 25 HYDROXY: CPT | Performed by: INTERNAL MEDICINE

## 2023-06-12 PROCEDURE — 36591 DRAW BLOOD OFF VENOUS DEVICE: CPT

## 2023-06-12 PROCEDURE — 85025 COMPLETE CBC W/AUTO DIFF WBC: CPT

## 2023-06-12 PROCEDURE — 80053 COMPREHEN METABOLIC PANEL: CPT

## 2023-06-12 NOTE — PROGRESS NOTES
Subjective     REASON FOR follow up  1.  Invasive lobular carcinoma multifocal,  Left breast anterolateral stereotactic biopsy invasive lobular carcinoma, Charleston score of 6, grade 2, 4 mm, ER 95%, KY 95%, HER2/cheryl 0 negative with Ki-67 of 5%.  Positive for atypical lobular hyperplasia  Left breast 1 o'clock position 10 cm from the nipple ultrasound-guided biopsy consistent with invasive lobular carcinoma grade 2 Charleston score of 6, 4 mm with lobular carcinoma in situ, ER 90%, %, HER2/cheryl 0  Left breast upper stereotactic guided core biopsy invasive lobular carcinoma grade 2 Marie score of six 3 mm with lobular carcinoma in situ, ER 90%, %, HER2/cheryl equivocal 2+, FISH shows her to 5.7 with CEP 1.6 and ratio HER2 nu/KEIRA-17 ratio of 3.5.  It is amplified.    2.  March 8, 2023:p T3 N0 M0 invasive lobular carcinoma s/p bilateral mastectomy with left deep axillary sentinel lymph node biopsy, with bilateral placement of prepectoral tissue expanders for reconstruction and bilateral placement of AlloDerm.  Pathology shows multifocal tumor in  upper outer quadrant and upper inner quadrant, invasive lobular carcinoma grade 2 with a Marie score  score of 7  , total of 4 foci were seen, largest tumor being 60 mm, 20 mm, 2 mm, 1 mm.  No DCIS identified.  Lobular carcinoma in situ present.  No lymphatic or vascular channel invasion, no dermal lymphovascular space invasion all margins were negative for invasive carcinoma 4 lymph nodes were all negative for malignancy its pathologic T3N0    ER %, KY %, HER2/cheryl 2+ with Ki-67 of 5%  Previous biopsies had shown 2 of the tumors were ER/KY positive HER2/cheryl negative and one of them was ER/KY positive HER2 positive  On discussing with pathologist Dr. Serrano, she thinks that the larger tumor was ER KY positive and HER2 positive but unsure if it was the 60 mm tumor or 20 mm tumor.  Either way it was not the smaller tumors which was HER2  positive.    All tumors look-alike per pathologist and they are invasive lobular carcinoma.                               HISTORY OF PRESENT ILLNESS:    Patient is a 47-year-old female with history of multifocal left breast cancer with MRI of the breast showing almost 15 lesions.  Biopsies on 3 different lesions were done and they were all consistent with invasive lobular carcinoma.  2 of the lesions where ER/OR positive HER2/cheryl negative and one of the larger lesion was ER/OR positive HER2 2+ equivocal.  FISH testing was done and the HER2 copy number was 5.7 with HER2/CEP ratio of 3.5.  The FISH was amplified.    Patient underwent bilateral mastectomy    With left sentinel lymph node surgery..  The pathology shows tumor to be present in the left upper outer quadrant invasive lobular carcinoma with a Marie score of 7, grade 2.  There were 4 lesions the greatest size was 6 0 mm, 20 mm, 2 mm and 1 mm in size.  Lobular carcinoma in situ is present.  All margins are negative for invasive carcinoma.  4 lymph nodes negative.  Patient has T3N0 invasive lobular carcinoma.      Discussed with pathology in length Dr. Serrano, one of the larger lesions which was 60 mm was heterogeneous it was biopsied at 2 separate spots.  The left anterior lateral and left 1:00 core fragment was ER/OR positive HER2/cheryl negative the left upper core biopsy was ER/OR positive HER2/cheryl 2+ equivocal but HER2/cheryl FISH was positive.    On discussion with pathology the 60 mm tumor showed ER and OR positive and HER2 positive by FISH with a Ki-67 of 40% at the Top-Hat clip.  The HER2/cheryl copy number was 5.7 with a HER2/cheryl by CEP ratio 3.5 which is positive.  The Ki-67 on the lower part was 13%.  The 20 mm tumor had a Ki-67 of 9%.  So the larger lesion was triple positive and heterogeneous as 2 biopsies were done on the larger lesions and that was both triple positive and at the second biopsy was ER/OR positive HER2/cheryl negative so it was a  heterogeneous tumor.  The 20 mm lesion was ER/HI positive HER2/cheryl negative.    Given that she had a large tumor with high Ki-67 and premenopausal status we discussed about giving Taxotere carboplatin Herceptin Perjeta.    Interval history: Patient returns the office today for toxicity check status post cycle 3 TCHP.  She did receive Udenyca on day 2.  Patient denies any fevers.  She does report some reflux symptoms.  She does not feel she has benefited from the IV fluids in the past.  She does try to drink a good amount of oral fluids.      Oncology history:  Patient is a 48 y.o. female who has been recently diagnosed with left breast cancer.  Patient has been compliant with her mammograms.    There is no family history of breast or ovarian cancer.  Her father had prostate cancer and melanoma.  A paternal aunt had thyroid cancer.      Details are as follows.    November 29, 2022: Screening bilateral mammogram showed 8 mm asymmetry in the central left breast posterior one third.  This appears to be in the superior left breast on the MLO view.  Right breast was negative.  A left diagnostic mammogram and ultrasound was recommended.    January January 3, 2023: Left breast diagnostic mammogram showed the spiculated areas within the central posterior and lateral anterior superior left breast where redemonstrated.  Targeted ultrasound was done.  At 1 o'clock position 10 cm from the nipple there is a shadowing mass with peripheral spiculations which is concordant with the mammographic abnormality and suspicious for malignancy.  The more centrally located spiculation on the cc view posterior to the glandular tissue cannot be clearly identified on the ultrasound.    Impression was 2 separate suspicious spiculated lesion in the left breast.  One of the lesions is seen well on the ultrasound and should be amenable to ultrasound-guided core biopsy.  The other lesion which was seen in the CC projection was amicable to  stereotactic guided tissue sampling.    January 3, 2023 patient underwent ultrasound-guided left breast biopsy    The ultrasound-guided biopsy of left breast 10 cm from the nipple at 1 o'clock position showed invasive lobular carcinoma moderately differentiated with a Isabella grade of 2 and score of 6 measuring 4 mm.  There was focal lobular carcinoma in situ.    Left breast anterior lateral stereotactic core needle biopsy showed invasive lobular carcinoma moderately differentiated grade 2 with a Isabella score of 6 measuring 4 mm.  There was atypical lobular hyperplasia.  Estrogen receptor was %, progesterone receptor was %, HER2/cheryl 0, Ki-67 5%.    Left breast upper stereotactic guided core biopsy showed invasive lobular carcinoma moderately differentiated grade 2 with Isabella score of 6 with measuring 3 mm with lobular carcinoma in situ present.  I do not see the ER/SC or HER2 on the ultrasound-guided biopsy of the left breast lesion as well as the left upper stereotactic biopsy.  We will check with pathologist    January 23, 2023: Patient underwent  MRI of the breast bilateral    Right breast: Negative    Left breast: At 1:00 anterior left breast 4 cm from the nipple there is a 1.5 x 1.6 x 1.3 cm irregular enhancing mass which is biopsy-proven malignancy.  The biopsy clip is located within 0.9 x 1 x 1.1 cm postbiopsy hematoma along the inferior margin of the mass.  And there is additional hematoma along the lateral margin of the mass which measures 1.2 x 1.9 x 0.9 cm.    At 1:00 in the middle to posterior left breast 7.4 cm from the nipple there is a 1.3 x 1 cm x 1 cm irregular enhancing mass mass with a focus from a biopsy clip along the inferior margin which also represents the biopsy-proven malignancy    At 12:00 in the posterior left breast 8.5 cm from the nipple there is a 1.9 x 1.8 x 2.2 cm irregular enhancing mass with a corresponding 2.9 cm biopsy cavity with a biopsy clip which  represents the biopsy-proven malignancy    In addition there are multiple at least 12 similar-appearing irregular enhancing masses and foci are identified throughout the left breast predominantly involving the upper breast but also involving the lower outer quadrant.  At 11-12 o'clock in the middle and posterior left breast there are 3 adjacent reference masses together measuring 5.5 cm but individually measuring 1.2 x 0.9 x 1 cm.  At 1:00 in the far posterior left breast/axillary tail 13.7 cm posterior to the nipple is a 1.3 x 1.1 x 1 cm irregular enhancing mass which is located 0.8 cm from the anterolateral margin of the pectoralis Muscle.  Together the enhancing masses span approximately 11.7 x 8 x 7.8 cm.  There is no suspicious enhancement in the left nipple or chest wall.  Focal areas of skin enhancement likely reflect skin entry point from recent biopsies.  There are no pathologically enlarged internal mammary chain lymph nodes.    Impression    .  At least 15 similar-appearing irregular enhancing masses and foci  scattered throughout the left breast, together measuring up to 11.7 cm  in maximum dimension, encompass 3 sites of biopsy-proven malignancy and  are consistent with multicentric malignancy. Oncologic and surgical  management are recommended.  2.  No MRI evidence of malignancy in the right breast.    Patient is a 47-year-old female with multifocal invasive lobular carcinoma who on screening mammogram showed 8 mm asymmetry in the central left breast posterior one third.  In the cc view.  It appears to be superior left breast on the MLO view.  There was also architectural distortion in the lateral left breast one third on the left cc view.    On diagnostic mammogram the spiculated areas within the central posterior and the lateral anterior superior left breast where redemonstrated.  Targeted ultrasound was done.  At 1 o'clock position 10 cm from the nipple there is a mass with spiculations which is  suspicious.  The more centrally located spiculation on the cc view.  The more centrally located spiculation on the cc view cannot be clearly identified on ultrasound.  So there impression was there were 2 separate suspicious spiculated lesion in the left breast.  1 was seen well on the ultrasound and amenable to ultrasound-guided biopsy the other is seen well on mammography and a stereotactic guided biopsy suggested.    Patient subsequently underwent stereotactic biopsy of 1 lesion and ultrasound-guided biopsy of the 1 cm mass at 1 o'clock position 10 cm from the nipple.  A total of 3 different areas of the left breast were biopsied and specimens were sent to pathology.  The third biopsy was performed at 12 o'clock position in the posterior one third of the left breast.  All of the 3 sites were consistent with invasive lobular carcinoma at site A, B, and C.    MRI of the breast showed at 1 o'clock position of the left breast anteriorly 4 cm posterior to the nipple there is a 1.5 x 1.6 x 1.3 cm irregular enhancing mass.  There is a small hematoma along the inferior margin of the mass.  An additional hematoma along the lateral margin of the mass which is measuring 1.2 x 0.9 x 0.9 cm    At 1:00 in the middle to posterior left breast 7.4 cm posterior to the nipple there is a 1.3 x 1 x 1 cm enhancing mass with a biopsy clip.    At 12:00 posterior left breast 8.5 cm posterior to the nipple there is a 1.5 x 1.8 x 2.2 cm irregular enhancing mass with a corresponding 2.9 cm biopsy cavity with a clip which represents the biopsy site malignancy    Multiple at least 12 similar-appearing irregular enhancing masses and foci are identified throughout the left breast predominantly involving the upper breast but also involving the lower outer quadrant.  At 11-12 o'clock in the middle and posterior left breast there is a 3 adjacent reference masses measuring 5.5 cm in AP dimension and individually measuring 1.2 x 0.9 x 1 cm.  At 1:00  in the far posterior left breast axillary tail 13.7 cm from the nipple there is a 1.3 x 1.1 x 1 cm craniocaudal dimension irregular enhancing mass which is located 0.8 cm from the anterolateral margin of the pectoralis muscle.     Per the MRI at least 15 similar-appearing irregular enhancing masses and foci scattered throughout the left breast together measuring 11.7 cm in maximum dimension which encompasses 3 sites of biopsy-proven malignancy and a consistent with multicentric malignancy.  MRI of the right breast is negative    INVITAE genetic testing showed variation of uncertain significance of LUCAS  Gene.    Patient was suggested left total mastectomy, left axillary sentinel lymph node biopsy possible node dissection and possible reconstruction.    However patient called Dr. Hi and decided to go for upfront bilateral total mastectomy, left axillary sentinel lymph node biopsy and possible axillary lymph node dissection and reconstruction.     I presented the case in the breast cancer conference today.  Patient had multiple nodules per MRI on the left breast Dr. Todd looked at it and felt there were multiple nodules and the largest one was 2.2 cm.  The discussion was to go ahead and upfront do the surgery and subsequently treat with chemo/ endocrine therapy as needed.  The left breast MRI findings are slightly atypical and that it is not contiguous involvement over 11.5 cm area but multiple nodules.  Surgical pathology will clarify.  If it is 1 big lesion or multiple sma      Past Medical History:   Diagnosis Date    ADD (attention deficit disorder)     Bilateral impacted cerumen 07/13/2020    Breast cancer 03/08/2023    LUCIO MASTECTOMY    Foot fracture, left     History of COVID-19 2022    History of gestational diabetes 2010    History of Graves' disease 2012    History of radioactive iodine thyroid ablation 2012    History of vertigo     Hyperlipidemia     Hypothyroidism     Melanoma of back     Positive  depression screening 07/13/2020        Past Surgical History:   Procedure Laterality Date    BREAST RECONSTRUCTION Bilateral 3/8/2023    Procedure: BILATERAL PLACEMENT  TISSUE EXPANDER AND ALLODERM;  Surgeon: Eamon Stone MD;  Location:  [facility];  Service: Plastics;  Laterality: Bilateral;    D & C CERVICAL BIOPSY  01/2006    INTRAUTERINE DEVICE INSERTION  2010    Mirena    INTRAUTERINE DEVICE INSERTION  2015    Mirena exchange, old IUD removal and new one inserted by Dr Carr, Lot # [id number] 09/17.mar    MASTECTOMY W/ SENTINEL NODE BIOPSY Bilateral 3/8/2023    Procedure: Bilateral total mastectomy, left axillary sentinel lymph node biopsy;  Surgeon: Kell Hi MD;  Location[facility] OR;  Service: General;  Laterality: Bilateral;    SCAR REVISION BREAST Right 4/3/2023    Procedure: RIGHT MASTECTOMY REVISION;  Surgeon: Eamon Stone MD;  Location[facility];  Service: Plastics;  Laterality: Right;    VENOUS ACCESS DEVICE (PORT) INSERTION Right 4/13/2023    Procedure: right port placement;  Surgeon: Kell Hi MD;  Location: [facility] OR;  Service: General;  Laterality: Right;    WISDOM TOOTH EXTRACTION          Current Outpatient Medications on File Prior to Visit   Medication Sig Dispense Refill    acetaminophen (TYLENOL) 325 MG tablet Take 2 tablets by mouth Every 4 (Four) Hours As Needed for Mild Pain. 60 tablet 0    COLLAGEN PO Take 1 tablet by mouth Daily.      diphenhydrAMINE-nystatin in aluminum-magnesium hydroxide-simethicone suspension Swish and spit 10 mL Every 4 (Four) to 6 (Six) Hours As Needed for thrush 380 mL 0    escitalopram (Lexapro) 10 MG tablet Take 1 tablet by mouth Daily. 30 tablet 1    levothyroxine (SYNTHROID, LEVOTHROID) 125 MCG tablet Take 1 tablet by mouth Daily. 90 tablet 1    lidocaine-prilocaine (EMLA) 2.5-2.5 % cream Apply 1 application topically to the appropriate area as directed As Needed for Mild Pain (apply pea-sized amount  to port site 30 minutes prior to port being accessed). 30 g 3    lisinopril (PRINIVIL,ZESTRIL) 2.5 MG tablet Take 1 tablet by mouth Daily. 30 tablet 2    loratadine (CLARITIN) 10 MG tablet       LORazepam (ATIVAN) 0.5 MG tablet Take 1 tablet by mouth At Night As Needed for Anxiety. 30 tablet 0    nystatin in aluminum-magnesium hydroxide-simethicone suspension-diphenhydrAMINE liquid-Lidocaine Viscous HCl solution Take 10 mL by mouth Every 4 (Four) to 6 (Six) Hours As Needed (thrush). 410 mL 0    OLANZapine (ZyPREXA) 5 MG tablet Take 1 tablet by mouth Every Night. Take on days 2, 3 and 4 after chemotherapy. 3 tablet 5    omeprazole (priLOSEC) 20 MG capsule Take 1 capsule by mouth Daily. 90 capsule 1    ondansetron (ZOFRAN) 8 MG tablet Take 1 tablet by mouth 3 (Three) Times a Day As Needed for Nausea or Vomiting. 30 tablet 5    Probiotic Product (PROBIOTIC ADVANCED PO) Take 1 tablet by mouth Daily.       No current facility-administered medications on file prior to visit.        ALLERGIES:    Allergies   Allergen Reactions    Erythromycin GI Intolerance     Pt reports    Penicillins Rash        Social History     Socioeconomic History    Marital status:      Spouse name: Parish    Number of children: 2   Tobacco Use    Smoking status: Never    Smokeless tobacco: Never   Vaping Use    Vaping Use: Never used   Substance and Sexual Activity    Alcohol use: Not Currently     Alcohol/week: 1.0 standard drink     Types: 1 Glasses of wine per week     Comment: Daily caffeine use    Drug use: Never    Sexual activity: Yes     Partners: Male     Comment:          Family History   Problem Relation Age of Onset    Diabetes Father     Hyperlipidemia Father     Arthritis Father     Hypertension Father     Heart disease Father         S/P stents    Heart attack Father 41    Colon polyps Father     Melanoma Father     Prostate cancer Father     Hyperlipidemia Sister     Hypertension Sister     Heart attack Sister      "Diabetes Sister     Thyroid cancer Paternal Aunt     Lung cancer Maternal Grandmother     Cancer Maternal Grandfather         Bladder cancer    Lung cancer Maternal Grandfather     Heart failure Paternal Grandmother     Transient ischemic attack Paternal Grandmother     Heart disease Paternal Grandfather     Malig Hyperthermia Neg Hx       Family history: Maternal great aunt had breast cancer, unsure of the age .  Father had melanoma and prostate cancer, paternal aunt had thyroid cancer, paternal grandfather had bladder cancer maternal grandfather had bladder cancer maternal grandmother had lung cancer    Past medical history is consistent with Graves' disease    OB/GYN history:  Age of menarche: 14  Patient is premenopausal and had an IUD which has been now removed   2 para 2 no miscarriages  Age at first childbirth 30  Patient did breast-feed 24 months  Length of taking birth control pills none      Objective     Vitals:    23 1424   BP: 126/86   Pulse: 87   Resp: 16   Temp: 98.2 °F (36.8 °C)   TempSrc: Temporal   SpO2: 97%   Weight: 76.2 kg (167 lb 14.4 oz)   Height: 160 cm (62.99\")   PainSc: 1  Comment: discomfort around  port           2023     2:23 PM   Current Status   ECOG score 0       Physical Exam      CONSTITUTIONAL:  Vital signs reviewed.  No distress, looks comfortable.  RESPIRATORY:  Normal respiratory effort.  Lungs clear to auscultation bilaterally.  CARDIOVASCULAR:  Normal S1, S2.  No murmurs rubs or gallops.  No significant lower extremity edema.  GASTROINTESTINAL: Abdomen appears unremarkable.  Nontender.  No hepatomegaly.  No splenomegaly.  LYMPHATIC:  No cervical, supraclavicular, axillary lymphadenopathy.  SKIN:  Warm.  No rashes.  PSYCHIATRIC:  Normal judgment and insight.  Normal mood and affect.  No evidence of rash today on the skin.    I have reexamined the patient and the results are consistent with the previously documented exam. Anna Domínguez, APRLUPE       RECENT " LABS:  Results from last 7 days   Lab Units 06/08/23  0731   WBC 10*3/mm3 15.98*   NEUTROS ABS 10*3/mm3 13.25*   HEMOGLOBIN g/dL 12.2   HEMATOCRIT % 36.1   PLATELETS 10*3/mm3 253       Results from last 7 days   Lab Units 06/08/23  0731   SODIUM mmol/L 141   POTASSIUM mmol/L 4.0   CHLORIDE mmol/L 105   CO2 mmol/L 20.7*   BUN mg/dL 14   CREATININE mg/dL 0.68   CALCIUM mg/dL 10.0   ALBUMIN g/dL 4.8   BILIRUBIN mg/dL 0.5   ALK PHOS U/L 137*   ALT (SGPT) U/L 42*   AST (SGOT) U/L 23   GLUCOSE mg/dL 157*               Assessment & Plan     *Pathologic T3N0 invasive lobular carcinoma of the left breast, upper inner and upper outer quadrant, multifocal, 60 mm, 20 mm, 2 mm, 1 mm, grade 2, Peggs score of 7, 2 of them are ER/NC positive, HER2/cheryl negative.  One of the larger lesions was ER/NC positive HER2 positive.  Ki-67 is very low 5%,    Invasive lobular carcinoma of the left breast recently diagnosed, multifocal with 3 lesions biopsied in the left breast.  Patient has multicentric disease with 15 similar-appearing irregular enhancing masses in the left breast together measuring 11.7 cm in maximum dimension which encompasses 3 of the 3 biopsy proven malignancies.  All the 3 very invasive lobular carcinoma, grade 2 with Marie score of 6 but 2 of the lesions where ER/NC positive HER2 negative and the third lesion was ER/NC positive HER2 2+ with FISH showing HER2 amplified.      1.  Estrogen receptor 95%, progesterone receptor 95%, HER2/cheryl 0, Ki-67 5%    2 left breast 10 o'clock position 10 cm from the nipple ultrasound core guided biopsy showed ER 90%, %, HER2/cheryl 0    3.  Left breast upper stereotactic core biopsy showed estrogen receptor 90%, progesterone receptor 100%, HER2/cheryl equivocal 2+  FISH testing showed her to as 5.7 with a KEIRA 17 of 1.6 with a ratio HER2/KEIRA 17 of        INVITAE genetic testing showed variation of uncertain significance of LUCAS gene  We discussed the multidisciplinary approach in  this patient's and I also discussed in length that that invasive lobular carcinomas do benefit from endocrine therapy and discussed in length about side effects of all endocrine therapies  Pending the results of the surgery she may need to be a candidate for chemotherapy as well and subsequently radiation if the tumor is large locally  Patient is keen on getting bilateral oophorectomy after her breast surgery is done and we discussed about medical oophorectomy with Lupron as well  February 24, 2023: Presented patient in the breast cancer conference.  Even though it appears to be a heterogeneous tumor, since it is difficult to really appreciate by clinical exam and the fact that it is invasive lobular carcinoma, it was felt best to do upfront surgery.  March 8, 2023:p T3 N0 M0 invasive lobular carcinoma s/p bilateral mastectomy with left deep axillary sentinel lymph node biopsy, with bilateral placement of prepectoral tissue expanders for reconstruction and bilateral placement of AlloDerm.  Pathology shows multifocal tumor in  upper outer quadrant and upper inner quadrant, invasive lobular carcinoma grade 2 with a Marie score  score of 7  , total of 4 foci were seen, largest tumor being 60 mm, 20 mm, 2 mm, 1 mm.  No DCIS identified.  Lobular carcinoma in situ present.  No lymphatic or vascular channel invasion, no dermal lymphovascular space invasion all margins were negative for invasive carcinoma 4 lymph nodes were all negative for malignancy its pathologic T3N0  ER %, WV %, HER2/cheryl 2+ with Ki-67 of 5%  Previous biopsies had shown 2 of the tumors were ER/WV positive HER2/cehryl negative and one of them was ER/WV positive HER2 positive  On discussing with pathologist Dr. Serrano, she thinks that the larger tumor was ER WV positive and HER2 positive.  HER2 copy number of 5.7 with HER2/CEP ratio of 3.5 positive and this was on the larger tumor.  Given that patient has heterogeneous tumor with the larger  lesion being ER/TX positive HER2 positive and a Ki-67 of 40%, being premenopausal we will treat with Taxotere carboplatin Herceptin Perjeta.  April 20, 2023: Patient was to start cycle 1 today but unfortunately her liver function tests were extremely high.  Discussed about alcohol and she had drank and taken Tylenol.  Will hold chemo today  April 28, 2023: Reviewed her liver function test which are completely normalized.  Discussed with her not to drink alcohol.  Here for cycle 1 day 1 TCHP chemotherapy.  Reviewed echo which showed ejection fraction of 64% with strain pattern of -21.  Patient understands all the side effects\  5/4/2023: Patient tolerated Cycle 1 TCHP well. She has had diarrhea, nausea and fatigue that have been well managed.   5/18/2023: C2 TCHP  5/22/2023: Here for close follow-up and possible IV fluids after treatment last Friday, 5/18/2023.  Patient had a fairly good weekend with good oral intake, weight that is notably stable, and very little nausea.  She only had 1 loose stool this morning and therefore no significant diarrhea.  CMP is WNL.  Per discussion with patient we will give just 500 mL normal saline.  May 25, 2023: Patient has diarrhea.  She is 1 week out of cycle 2 Taxotere carboplatin Herceptin Perjeta.  She has some abdominal cramping.  We will give her IV fluids today.  Given carboplatinum is not available she will receive Taxotere Herceptin Perjeta for 2 more cycles prior to going to Adriamycin Cytoxan.  Cycle #3 TCHP 6/8/2023  Patient returns 6/12/2023 for toxicity check.  She is eating and drinking sufficiently at this point.  She does not feel she has benefited from the IV fluids in the past and therefore will not do that today.  She denies any fevers or chills.    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 She did recommend she start taking her gabapentin again to help with her anxiety and irritability/anger from her decadron with treatment. Patient is encouraged to continue to follow with Cierra.     *Oral Thrush and Chemotherapy induced mucositis  5/4/2023: I am concerned patient is starting into chemotherapy induced mucositis. She is reporting sore throat at times, possibly worsened reflux, mouth and tongue changes. She noticed white plaquing on her tongue this morning. She has not been able to  her Yen's Magic Mouthwash until today because of pharmacy supplies. She is currently on omeprazole 20 mg daily. She can increase this to 40 mg daily if needed. Prescription sent to pharmacy today to start Fluconazole 200 mg on day and 100 mg on day 2-7. We will continue to monitor symptoms and watch liver enzymes closely. ALT 34 and AST 24.  Liver function test mildly elevated today, unsure if that is the effect of fluconazole.  Next time we will try and avoid fluconazole and consider just Yen's Magic mouthwash.  Patient denies mouth sores currently.    *Maculopapular rash on chest wall  5/4/2023: Continue to monitor. Appear acne-like in nature. She is encouraged to moisturizer her chest wall well with an Aquaphor or CeraVe moisturizing ointment. We will continue to monitor. She may continue hydrocortisone as needed if she is having itching.   Resolution of the rash    Plan  Patient will continue good oral hydration.  She will monitor for any signs or symptoms of fever or chills notify the office.  We will proceed with nystatin in the future if patient has any thrush symptoms due to previous LFT elevation  Vitamin D level evaluated and in normal range  She will see Dr Frederick on June 29 for cycle 4 Taxotere carboplatinum Herceptin Perjeta following which she will get dose dense AC x4..    This  patient is on high risk drug therapy requiring intensive monitoring for toxicity.        RADHA Young Dr.

## 2023-06-13 ENCOUNTER — TELEPHONE (OUTPATIENT)
Dept: ONCOLOGY | Facility: CLINIC | Age: 48
End: 2023-06-13
Payer: COMMERCIAL

## 2023-06-13 NOTE — TELEPHONE ENCOUNTER
Call to Ursula to let her know Anna wanted to give her a message that her labs were unremarkable, and her vitamin D level is normal.  Anna did want to instruct her to take more fluids.  Patient verbalized understanding and appreciation for the call.

## 2023-07-14 NOTE — PROGRESS NOTES
Chief Complaint: Ursula Kulkarni is a 47 y.o. female who was seen in consultation at the request of RADHA Lauren  for newly diagnosed breast cancer and a postoperative visit    History of Present Illness:  Patient presents with newly diagnosed breast cancer LEFT breast. She noted no new masses, skin changes, nipple discharge, nipple changes prior to her most recent imaging.    Her most recent imaging includes the followin2022, MMG Screening Digital Gerardo Bilateral with CAD ([facility])   There is an 8 mm asymmetry in the central left breast posterior 1/3 on the CC view. This appears to be in the superior left breast on the MLO view.   BI-RADS 0: Incomplete     2023, MMG Diagnostic Gerardo Left with CAD ([facility])   The spiculated areas within the central posterior and the lateral anterior superior    left breast were re-demonstrated.  Targeted ultrasound was performed.  At the 1 o'clock position 10    cm from the nipple there is a shadowing mass with peripheral spiculations favored to represent the    mammographic abnormality and suspicious for malignancy.  The more centrally located spiculation on    the CC view disc posterior to the glandular tissue in the retro glandular fat cannot be clearly    identified on ultrasound.   Two separate suspicious spiculated lesions of the left breast.  One lesion is felt to be seen well    on ultrasound and should be amenable to ultrasound-guided core biopsy.  The other lesion is seen    well on mammography. BI-RADS 4: Suspicious          Pathology:  2023, ADDENDUM:                   All 3 sites were biopsied are malignant. This is concordant.                                   Site A (top-hat shaped clip)                                   Site B (coil shaped clip)                                   Site C (stoplight shaped clip)   Slightly lateral left breast, stereotactic, 9 gauge vacuum assisted Brevera, Twelve specimens were obtained and a top-hat shaped clip was  Rash is back- had this a few weeks back  [facility] and [facility] -   Hot to touch, sore, real red  Getting no relief at all   Rash is around hips, under arm, forearm- same as before- but is not peeling this time around    Taking 4 different DM meds. Could he be taking too much DM  meds to be causing side effects     [facility] [address]     Pt stated that he called last night and spoke to this office in regards to this. There was no message put back. He did end up going to the pharm but they had nothing for him- they have some kind of script that needed a PA- could that have been the cream that was sent in a few weeks ago. Sending high priority so we can get this resolved for pt since he is in pain from the rash. then placed. Site A: ultrasound biopsy table. 1.0 cm, 1:00 position 10 cm from the nipple. 12 gauge Bard.   Four samples were obtained and a coil shaped HydroMARK clip was placed, Site B. The post-procedure mammogram shows the top hat shaped clip from the stereotactic biopsy in good position in the biopsy cavity.  The coil shaped clip is in good position on the true lateral view, however on the CC view it does not correlate to the mammographic finding, which on today's exam is more at the 12 o'clock position in the posterior 1/3 of the left breast.  Therefore was decided to perform a 3rd biopsy.   9-gauge vacuum assisted Brevera, Twelve specimens, stoplight shaped clip, Site C, all 3 clips are appropriately positioned.     LEFT BREAST:     At 1:00 in the anterior left breast, 4 cm posterior to the nipple, there is a 1.5 AP dimension, 1.6 cm transverse dimension, 1.3 cm craniocaudal dimension irregular enhancing mass with a focus of susceptibility from a biopsy clip along the inferior margin. The biopsy clip is located within hematoma along the lateral margin of the mass.  At 1:00 in the middle to posterior left breast, 7.4 cm posterior to the nipple, there is a 1.3 cm irregular enhancing mass clip along the inferior margin. At 12:00 in the posterior left breast, 8.5 cm posterior to the nipple, 2.2 cm craniocaudal dimension irregular enhancing mass with a biopsy clip.  Multiple, at least 12, similar appearing irregular enhancing masses and foci are identified throughout left breast.  At 11-12 o'clock in the middle and posterior left breast, there are 3 adjacent reference masses together measuring up to 5.5 cm in AP dimension.  At 1:00 in the far posterior left breast/axillary tail, 13.7 cm posterior to the nipple, there is a reference 1.3 cm AP dimension, 1.1 cm transverse dimension, 1.0 cm craniocaudal dimension, irregular enhancing mass, which is located approximately 0.8 cm from the anterolateral margin of the  pectoralis muscle. Together, the enhancing masses span approximately 11.7 cm in maximum AP dimension, 8 cm in maximum transverse dimension, and 7.8 cm in maximum craniocaudal dimension. The visualized axilla is within normal limits   EXTRAMAMMARY FINDINGS:    There are no pathologically enlarged internal mammary chain lymph nodes on either side.   IMPRESSION AND RECOMMENDATION:   At least 15 similar-appearing irregular enhancing masses and foci scattered throughout the left breast, together measuring up to 11.7 cm consistent with multicentric malignancy   No MRI evidence of malignancy in the right breast        She had a biopsy on the following day that showed:   1. Left breast, anterior lateral, stereotactic-guided core biopsy:   - Invasive lobular carcinoma   - Histologic grade (Marie Histologic Score):   - Glandular (Acinar)/Tubular Differentiation: Score 3   - Nuclear Pleomorphism: Score 2   - Mitotic Rate: Score 1   - Overall Grade: Grade 2   - Size of largest focus of invasion: 4 mm   - Breast biomarker testing:   - Estrogen Receptor (ER): Positive (95%, moderate)   - Progesterone Receptor (PgR): Positive (95%, strong)   - HER2 (by immunohistochemistry): Negative (Score 0)   - Ki-67: 5%   - Other findings:   - Atypical lobular hyperplasia (ALH)     2. Left breast, 1 o'clock position, and 10 cm from nipple, ultrasound-guided core biopsy:  - Invasive lobular carcinoma   - Histologic grade (Marie Histologic Score):   - Glandular (Acinar)/Tubular Differentiation: Score 3   - Nuclear Pleomorphism: Score 2   - Mitotic Rate: Score 1   - Overall Grade: Grade 2   - Size of largest focus of invasion: 4 mm  - Other findings:   - Lobular carcinoma in situ (LCIS), classic type     3. Left breast, upper, stereotactic-guided core biopsy:   - Invasive lobular carcinoma  - Histologic grade ([place] Histologic Score):   - Glandular (Acinar)/Tubular Differentiation: Score 3   - Nuclear Pleomorphism: Score 2   -  Mitotic Rate: Score 1   - Overall Grade: Grade 2   - Size of largest focus of invasion: 3 mm   - Other findings:   - Lobular carcinoma in situ (LCIS), classic type     OUTSIDE PATHOLOGY:   1. Left Breast, Anterior Lateral, Stereotactic-Guided Core Needle Biopsy:                  A. INVASIVE LOBULAR CARCINOMA, Moderately differentiated; [place] Histologic Grade II/III                      (tubule score = 3, nuclear score = 2, mitoses score=1), measuring at least 4 mm.                B. Atypical lobular hyperplasia.   ER:       POSITIVE (%, Moderate).                AL:       POSITIVE (%, Strong).                HER2:  NEGATIVE (0).                Ki67:     Approximately 5%.             2. Left Breast, 1:00, 10 cm FN, U/S-Guided Core Needle Biopsy:                 A. INVASIVE LOBULAR CARCINOMA, Moderately differentiated; Marie Histologic Grade II/III                     (tubule score = 3, nuclear score = 2, mitoses score = 1), measuring at least 4 mm.                B. FOCAL LOBULAR CARCINOMA IN SITU (LCIS).                C. See comment.     3. Left Breast, Upper, Stereotactic-Guided Core Needle Biopsy:                  A. INVASIVE LOBULAR CARCINOMA, Moderately differentiated; Marie Histologic Grade II/III                     (tubule score = 3, nuclear score = 2, mitoses score = 1), measuring at least 3 mm.                B. LOBULAR CARCINOMA IN SITU (LCIS).                 C. See comment.     She had not had a breast biopsy in the past.  She has her uterus and ovaries, is ? perimenopausal, and has an IUD  Her family history includes the following: She has 2 daughters, 1 sister, 2 maternal aunts, 1 paternal aunt.  No family history of breast or ovarian cancer.  Her dad had prostate and melanoma cancers.  A paternal aunt had thyroid cancer.      Interval HIstory:  3-8-23 pathology from bilateral total mastectomy and LEFT sentinel node biopsy with reconstruction Dr Stone returned as  :        Left total mastectomy, multifocal invasive lobular carcinoma, intermediate grade, 3, 2, 2, 3 clips and biopsy changes identified.  Dimensions of the tumors 6 cm, 2 cm, 2 mm, 1 mm.  Margins are all clear.  0 of 4 sentinel lymph nodes.  Pathologic stage T3N0 stage IIb.  Estrogen , progesterone , HER2/cheryl 2+ .    Has had 2 of 4 drains removed.  Reports expected discomfort.  Denies drainage from incisions      Review of Systems:  Review of Systems   Constitutional: Negative for unexpected weight change (9 lb wt loss since last visit).   All other systems reviewed and are negative.       Past Medical and Surgical History:  Breast Biopsy History:  Patient had not had a breast biopsy prior to her cancer diagnosis.  Breast Cancer HIstory:  Patient does not have a past medical history of breast cancer.  Breast Operations, and year:  None   Obstetric/Gynecologic History:  Age menstrual periods began: 14  Patient is premenopausal, first day of last period: 1/10/23 IUD  Number of pregnancies: 2  Number of live births: 2  Number of abortions or miscarriages: 0  Age of delivery of first child: 30  Patient breast fed, for the following lenth of time:24 months   Length of time taking birth control pills: none   Patient has never taken hormone replacement    Patient has both ovaries and uterus  Past Surgical History:   Procedure Laterality Date   • BREAST RECONSTRUCTION Bilateral 3/8/2023    Procedure: BILATERAL PLACEMENT  TISSUE EXPANDER AND ALLODERM;  Surgeon: Eamon Stone MD;  Location: [facility];  Service: Plastics;  Laterality: Bilateral;   • D & C CERVICAL BIOPSY  01/2006   • INTRAUTERINE DEVICE INSERTION  2010    Mirena   • INTRAUTERINE DEVICE INSERTION  2015    Mirena exchange, old IUD removal and new one inserted by Dr Carr, Lot # [id number] exp 09/17.mar   • MASTECTOMY W/ SENTINEL NODE BIOPSY Bilateral 3/8/2023    Procedure: Bilateral total mastectomy, left axillary sentinel lymph node  biopsy;  Surgeon: Kell Hi MD;  Location: [facility] OR;  Service: General;  Laterality: Bilateral;   • WISDOM TOOTH EXTRACTION       Past Medical History:   Diagnosis Date   • ADD (attention deficit disorder)    • Bilateral impacted cerumen 07/13/2020   • Breast cancer (HCC)    • Foot fracture, left    • History of COVID-19 2022   • History of gestational diabetes 2010   • History of Graves' disease 2012   • History of radioactive iodine thyroid ablation 2012   • History of vertigo    • Hyperlipidemia    • Hypothyroidism    • Melanoma of back (HCC)    • Positive depression screening 07/13/2020       Prior Hospitalizations, other than for surgery or childbirth, and year:  None     Social History     Socioeconomic History   • Marital status:      Spouse name: Parish   • Number of children: 2   Tobacco Use   • Smoking status: Never   • Smokeless tobacco: Never   Vaping Use   • Vaping Use: Never used   Substance and Sexual Activity   • Alcohol use: Yes     Alcohol/week: 1.0 standard drink     Types: 1 Glasses of wine per week     Comment: occ   • Drug use: Never   • Sexual activity: Yes     Partners: Male     Birth control/protection: I.U.D.     Comment:       Patient is .  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    Family History:  Family History   Problem Relation Age of Onset   • Hyperlipidemia Father    • Arthritis Father    • Hypertension Father    • Heart disease Father         S/P stents   • Heart attack Father 41   • Colon polyps Father    • Melanoma Father    • Prostate cancer Father    • Hyperlipidemia Sister    • Hypertension Sister    • Heart attack Sister    • Thyroid cancer Paternal Aunt    • Lung cancer Maternal Grandmother    • Cancer Maternal Grandfather         Bladder cancer   • Lung cancer Maternal Grandfather    • Heart disease Paternal Grandmother   "  • Cancer Paternal Grandfather         Bladder   • Heart disease Paternal Grandfather    • Malig Hyperthermia Neg Hx        Vital Signs:  /86   Pulse 64   Ht 161.3 cm (63.5\")   Wt 78.5 kg (173 lb)   LMP  (LMP Unknown)   SpO2 100%   BMI 30.16 kg/m²      Medications:    Current Outpatient Medications:   •  acetaminophen (TYLENOL) 325 MG tablet, Take 2 tablets by mouth Every 4 (Four) Hours As Needed for Mild Pain., Disp: 60 tablet, Rfl: 0  •  cetirizine (zyrTEC) 10 MG tablet, Take 1 tablet by mouth Daily., Disp: , Rfl:   •  COLLAGEN PO, Take 1 tablet by mouth Daily., Disp: , Rfl:   •  gabapentin (Neurontin) 100 MG capsule, Take 1 capsule by mouth Every 8 (Eight) Hours., Disp: 60 capsule, Rfl: 2  •  levothyroxine (SYNTHROID, LEVOTHROID) 125 MCG tablet, Take 1 tablet by mouth Daily., Disp: 90 tablet, Rfl: 1  •  omeprazole (priLOSEC) 20 MG capsule, Take 1 capsule by mouth Daily., Disp: 30 capsule, Rfl: 1  •  Probiotic Product (PROBIOTIC ADVANCED PO), Take 1 tablet by mouth Daily., Disp: , Rfl:   •  docusate sodium (COLACE) 250 MG capsule, Take 1 capsule by mouth 2 (Two) Times a Day As Needed for Constipation., Disp: 60 capsule, Rfl: 1  •  HYDROcodone-acetaminophen (NORCO) 5-325 MG per tablet, Take 1-2 tablets by mouth Every 4 (Four) Hours As Needed (Pain)., Disp: 20 tablet, Rfl: 0  •  ondansetron ODT (ZOFRAN-ODT) 8 MG disintegrating tablet, Place 1 tablet on the tongue Every 8 (Eight) Hours As Needed for Nausea or Vomiting., Disp: 10 tablet, Rfl: 1  •  sennosides-docusate (PERICOLACE) 8.6-50 MG per tablet, Take 2 tablets by mouth Daily As Needed for Constipation., Disp: 30 tablet, Rfl: 1     Allergies:  Allergies   Allergen Reactions   • Erythromycin GI Intolerance     Pt reports   • Penicillins Rash       Physical Examination:  /86   Pulse 64   Ht 161.3 cm (63.5\")   Wt 78.5 kg (173 lb)   LMP  (LMP Unknown)   SpO2 100%   BMI 30.16 kg/m²   General Appearance:  Patient is in no distress.  She is " well kept and has an obese build.   Psychiatric:  Patient with appropriate mood and affect. Alert and oriented to self, time, and place.    Breast, RIGHT: Surgically absent with well-healing transverse incision.  On the right superior mastectomy flap there is an area of blistering that looks like epidermal lysis with some reactive hyperemia.  The drains are in place and serosanguineous.  No other skin changes.    Breast, LEFT: Surgically absent with well-healing transverse incision.  On the right superior mastectomy flap there is an area of blistering that looks like epidermal lysis with some reactive hyperemia.  The drains are in place and serosanguineous.  No other skin changes.    Lymphatic:  There is no axillary, cervical, infraclavicular, or supraclavicular adenopathy bilaterally.  Eyes:  Pupils are round and reactive to light.  Cardiovascular:  Heart rate and rhythm are regular.  Respiratory:  Lungs are clear bilaterally with no crackles or wheezes in any lung field.  Gastrointestinal:  Abdomen is soft, nondistended, and nontender. There are no scars from previous surgery.    Musculoskeletal:  Good strength in all 4 extremities.   There is good range of motion in both shoulders.    Skin:  No new skin lesions or rashes on the skin excluding the breast (see breast exam above).        Imagin2022, MMG Screening Digital Gerardo Bilateral with CAD ([facility])   There is an 8 mm asymmetry in the central left breast posterior 1/3 on the CC view. This appears to be in the superior left breast on the MLO view.   BI-RADS 0: Incomplete     2023, MMG Diagnostic Gerardo Left with CAD ([facility])   The spiculated areas within the central posterior and the lateral anterior superior    left breast were re-demonstrated.  Targeted ultrasound was performed.  At the 1 o'clock position 10    cm from the nipple there is a shadowing mass with peripheral spiculations favored to represent the    mammographic abnormality and suspicious  for malignancy.  The more centrally located spiculation on    the CC view disc posterior to the glandular tissue in the retro glandular fat cannot be clearly    identified on ultrasound.   Two separate suspicious spiculated lesions of the left breast.  One lesion is felt to be seen well    on ultrasound and should be amenable to ultrasound-guided core biopsy.  The other lesion is seen    well on mammography. BI-RADS 4: Suspicious          Pathology:  01/17/2023, ADDENDUM:                   All 3 sites were biopsied are malignant. This is concordant.                                   Site A (top-hat shaped clip)                                   Site B (coil shaped clip)                                   Site C (stoplight shaped clip)   Slightly lateral left breast, stereotactic, 9 gauge vacuum assisted Brevera, Twelve specimens were obtained and a top-hat shaped clip was then placed. Site A: ultrasound biopsy table. 1.0 cm, 1:00 position 10 cm from the nipple. 12 gauge Bard.   Four samples were obtained and a coil shaped HydroMARK clip was placed, Site B. The post-procedure mammogram shows the top hat shaped clip from the stereotactic biopsy in good position in the biopsy cavity.  The coil shaped clip is in good position on the true lateral view, however on the CC view it does not correlate to the mammographic finding, which on today's exam is more at the 12 o'clock position in the posterior 1/3 of the left breast.  Therefore was decided to perform a 3rd biopsy.   9-gauge vacuum assisted Brevera, Twelve specimens, stoplight shaped clip, Site C, all 3 clips are appropriately positioned.     LEFT BREAST:     At 1:00 in the anterior left breast, 4 cm posterior to the nipple, there is a 1.5 AP dimension, 1.6 cm transverse dimension, 1.3 cm craniocaudal dimension irregular enhancing mass with a focus of susceptibility from a biopsy clip along the inferior margin. The biopsy clip is located within hematoma along the  lateral margin of the mass.  At 1:00 in the middle to posterior left breast, 7.4 cm posterior to the nipple, there is a 1.3 cm irregular enhancing mass clip along the inferior margin. At 12:00 in the posterior left breast, 8.5 cm posterior to the nipple, 2.2 cm craniocaudal dimension irregular enhancing mass with a biopsy clip.  Multiple, at least 12, similar appearing irregular enhancing masses and foci are identified throughout left breast.  At 11-12 o'clock in the middle and posterior left breast, there are 3 adjacent reference masses together measuring up to 5.5 cm in AP dimension.  At 1:00 in the far posterior left breast/axillary tail, 13.7 cm posterior to the nipple, there is a reference 1.3 cm AP dimension, 1.1 cm transverse dimension, 1.0 cm craniocaudal dimension, irregular enhancing mass, which is located approximately 0.8 cm from the anterolateral margin of the pectoralis muscle. Together, the enhancing masses span approximately 11.7 cm in maximum AP dimension, 8 cm in maximum transverse dimension, and 7.8 cm in maximum craniocaudal dimension. The visualized axilla is within normal limits   EXTRAMAMMARY FINDINGS:    There are no pathologically enlarged internal mammary chain lymph nodes on either side.   IMPRESSION AND RECOMMENDATION:   At least 15 similar-appearing irregular enhancing masses and foci scattered throughout the left breast, together measuring up to 11.7 cm consistent with multicentric malignancy   No MRI evidence of malignancy in the right breast      Pathology:  1. Left breast, anterior lateral, stereotactic-guided core biopsy:   - Invasive lobular carcinoma   - Histologic grade (Marie Histologic Score):   - Glandular (Acinar)/Tubular Differentiation: Score 3   - Nuclear Pleomorphism: Score 2   - Mitotic Rate: Score 1   - Overall Grade: Grade 2   - Size of largest focus of invasion: 4 mm   - Breast biomarker testing:   - Estrogen Receptor (ER): Positive (95%, moderate)   -  Progesterone Receptor (PgR): Positive (95%, strong)   - HER2 (by immunohistochemistry): Negative (Score 0)   - Ki-67: 5%   - Other findings:   - Atypical lobular hyperplasia (ALH)     2. Left breast, 1 o'clock position, and 10 cm from nipple, ultrasound-guided core biopsy:  - Invasive lobular carcinoma   - Histologic grade (Marie Histologic Score):   - Glandular (Acinar)/Tubular Differentiation: Score 3   - Nuclear Pleomorphism: Score 2   - Mitotic Rate: Score 1   - Overall Grade: Grade 2   - Size of largest focus of invasion: 4 mm  - Other findings:   - Lobular carcinoma in situ (LCIS), classic type     3. Left breast, upper, stereotactic-guided core biopsy:   - Invasive lobular carcinoma  - Histologic grade (Marie Histologic Score):   - Glandular (Acinar)/Tubular Differentiation: Score 3   - Nuclear Pleomorphism: Score 2   - Mitotic Rate: Score 1   - Overall Grade: Grade 2   - Size of largest focus of invasion: 3 mm   - Other findings:   - Lobular carcinoma in situ (LCIS), classic type     OUTSIDE PATHOLOGY:   1. Left Breast, Anterior Lateral, Stereotactic-Guided Core Needle Biopsy:                  A. INVASIVE LOBULAR CARCINOMA, Moderately differentiated; [place] Histologic Grade II/III                      (tubule score = 3, nuclear score = 2, mitoses score=1), measuring at least 4 mm.                B. Atypical lobular hyperplasia.   ER:       POSITIVE (%, Moderate).                AL:       POSITIVE (%, Strong).                HER2:  NEGATIVE (0).                Ki67:     Approximately 5%.             2. Left Breast, 1:00, 10 cm FN, U/S-Guided Core Needle Biopsy:                 A. INVASIVE LOBULAR CARCINOMA, Moderately differentiated; Marie Histologic Grade II/III                     (tubule score = 3, nuclear score = 2, mitoses score = 1), measuring at least 4 mm.                B. FOCAL LOBULAR CARCINOMA IN SITU (LCIS).                C. See comment.     3. Left Breast, Upper,  Stereotactic-Guided Core Needle Biopsy:                  A. INVASIVE LOBULAR CARCINOMA, Moderately differentiated; [place] Histologic Grade II/III                     (tubule score = 3, nuclear score = 2, mitoses score = 1), measuring at least 3 mm.                B. LOBULAR CARCINOMA IN SITU (LCIS).                 C. See comment.     3-8-23 pathology from bilateral total mastectomy and LEFT sentinel node biopsy with reconstruction Dr Stone returned as :        Left total mastectomy, multifocal invasive lobular carcinoma, intermediate grade, 3, 2, 2, 3 clips and biopsy changes identified.  Dimensions of the tumors 6 cm, 2 cm, 2 mm, 1 mm.  Margins are all clear.  0 of 4 sentinel lymph nodes.  Pathologic stage T3N0 stage IIb.  Estrogen , progesterone , HER2/cheryl 2+ with FISH positive.            Final Diagnosis    1. Lymph Node, Left [place] #1, Excision (H&E, AE1/AE3):               A. Two benign lymph nodes (0/2).     2. Lymph Node, Left [place] #2, Excision (H&E, AE1/AE3):               A. One benign lymph node (0/1).     3. Lymph Node, Left [place] #3, Excision (H&E, AE1/AE3):               A. One benign lymph node with pigment (0/1).     4. Breast, Left, Mastectomy (1,377 Grams):               A. Multifocal invasive lobular carcinoma, Marie histologic grade II (tubules = 3, nuclei = 2, mitoses = 2).               B. Three clips and biopsy change identified.               C. See synoptic template for complete tumor details.     5. Breast, Right, Mastectomy (1,473 Grams):               A. Benign skin, subcutaneous tissue and breast parenchyma with fibroadenomatoid change.      [facility]/clm                Electronically signed by Radha Serrano MD on 3/9/2023 at 1122    Synoptic Checklist    INVASIVE CARCINOMA OF THE BREAST: Resection  8th Edition - Protocol posted: 12/14/2022  INVASIVE CARCINOMA OF THE BREAST: COMPLETE EXCISION - 1, 2, 3, 4       SPECIMEN   Procedure   Total mastectomy     Specimen Laterality   Left    TUMOR   Tumor Site   Upper outer quadrant        Upper inner quadrant    Histologic Type   Invasive lobular carcinoma    Histologic Grade (Marie Histologic Score)       Glandular (Acinar) / Tubular Differentiation   Score 3    Nuclear Pleomorphism   Score 2    Mitotic Rate   Score 2    Overall Grade   Grade 2 (scores of 6 or 7)    Tumor Size   Greatest dimension of largest invasive focus (Millimeters): 60 mm   Tumor Focality   Multiple foci of invasive carcinoma    Number of Foci   At least: 4    Sizes of Individual Foci in Millimeters (mm)   20 mm; 2 mm; 1mm    Ductal Carcinoma In Situ (DCIS)   Not identified    Lobular Carcinoma In Situ (LCIS)   Present    Lymphatic and / or Vascular Invasion   Not identified    Dermal Lymphovascular Invasion   Not identified    Treatment Effect in the Breast   No known presurgical therapy    MARGINS   Margin Status for Invasive Carcinoma   All margins negative for invasive carcinoma    Distance from Invasive Carcinoma to Closest Margin   10 mm   Closest Margin(s) to Invasive Carcinoma   Anterior    REGIONAL LYMPH NODES   Regional Lymph Node Status   All regional lymph nodes negative for tumor    Total Number of Lymph Nodes Examined (sentinel and non-sentinel)   4    Number of [place] Nodes Examined   4    pTNM CLASSIFICATION (AJCC 8th Edition)   Reporting of pT, pN, and (when applicable) pM categories is based on information available to the pathologist at the time the report is issued. As per the AJCC (Chapter 1, 8th Ed.) it is the managing physician’s responsibility to establish the final pathologic stage based upon all pertinent information, including but potentially not limited to this pathology report.   pT Category   pT3    T Suffix   (m)    pN Category   pN0    N Suffix   (sn)    SPECIAL STUDIES           Estrogen Receptor (ER) Status   Positive (greater than 10% of cells demonstrate nuclear positivity)    Percentage of Cells with  Nuclear Positivity   %            Progesterone Receptor (PgR) Status   Positive    Percentage of Cells with Nuclear Positivity   %            HER2 (by immunohistochemistry)   Equivocal (Score 2+)            HER2 (by in situ hybridization)   Positive (amplified)            Ki-67 Percentage of Positive Nuclei   5 %   Testing Performed on Case Number   [id number]; see comment    .      Comment      The patient's original left breast core biopsy material was performed at [facility] ([id number]) and was reviewed in-house as [id number].  The slide is pulled and reviewed for comparison. No discordance is noted.     All three of the core biopsies are stained with biomarkers.  The left anterior lateral and left 1:00 o'clock core fragments were ER and SC positive, Her2 IHC negative. The left upper core biopsy was ER and SC positive with Her2 2+ equivocal by IHC and Her2 FISH positive.      Mec/kds                    Correspondence frmo Dr Frederick-  She has checked the her 2 cheryl on 2 other lesions and the third was triple positive. The second was triple negative.     She agreed with surgery upfront.    Procedures:      Assessment:   Diagnosis Plan   1. Lobular breast cancer, left (HCC)        2. Class 1 obesity due to excess calories without serious comorbidity with body mass index (BMI) of 32.0 to 32.9 in adult        1-  LEFT breast UOQ middle third 11.5 cm on MRI with 15 nodules total. Largest single nodule 2.2cm. NOrmal nodes on MRI and exam. On exam 6x8cm. No pverlying skin changes. On mammogram 3x markers  6cm greatest.  Invasive lobular carcinoma. Int grade, 3,2,1, associated LCIS.  ER , SC , her 2 cheryl 0, Ki 67 5 %.  Clinical stage T2-3N0- stage II  3-8-23 pathology from bilateral total mastectomy and LEFT sentinel node biopsy with reconstruction Dr Stone returned as :        Left total mastectomy, multifocal invasive lobular carcinoma, intermediate grade, 3, 2, 2, 3 clips and  biopsy changes identified.  Dimensions of the tumors 6 cm, 2 cm, 2 mm, 1 mm.  Margins are all clear.  0 of 4 sentinel lymph nodes.  Pathologic stage T3N0 stage IIb.  Estrogen , progesterone , HER2/cheryl 2+ .    - area of epidermolysis superior RIGHT mastectomy flap at postop  - oncotype sent and then cancelled due to FISH returning as positive on her surgical specimen      2-  BMI 32.3    3-  38DDD        Plan:  The patient goes by Ursula. She is a danyelle woman with 2 daughters ages 17,12 and happily  in her second marriage with a supportive .      We reviewed her pathology report, her genetics report, her examination together today.  She has an appointment with the  to discuss the variant of uncertain significance.  She has an appointment with Dr. Stone to get the last 2 of her 4 drains out this week.  I sent him a picture of the area of presumed epidermal lysis right superior mastectomy flap today to see if he had any intervention for this in terms of antibiotic.  She is seeing Dr. Frederick back on Friday the 31st and radiation oncology on April 14.  We did a consent for a right port placement today and discussed the risks of bleeding, infection, thrombosis, malfunction, pneumothorax.  We will see if Dr. Frederick request this.  Otherwise I will plan to see her back for routine follow-up in 6 to 9 months.  Exam only.        Previously, I arranged for her to see physical therapy for bioimpedence measurements.    Kell Hi MD    Next Appointment:  Return in about 8 months (around 11/20/2023) for no imaging; port placement if indicated.      EMR Dragon/transcription disclaimer:    Please note that portions of this note were completed with a voice recognition program.                Answers for HPI/ROS submitted by the patient on 3/13/2023  Please describe your symptoms.: s/p bilateral mastectomy 3/8/23  Have you had these symptoms before?: No  How long have you been having  these symptoms?: 1-2 weeks  What is the primary reason for your visit?: Other

## 2023-07-26 DIAGNOSIS — C50.812 MALIGNANT NEOPLASM OF OVERLAPPING SITES OF LEFT BREAST IN FEMALE, ESTROGEN RECEPTOR POSITIVE: Primary | ICD-10-CM

## 2023-07-26 DIAGNOSIS — Z17.0 MALIGNANT NEOPLASM OF OVERLAPPING SITES OF LEFT BREAST IN FEMALE, ESTROGEN RECEPTOR POSITIVE: Primary | ICD-10-CM

## 2023-07-27 ENCOUNTER — OFFICE VISIT (OUTPATIENT)
Dept: CARDIOLOGY | Facility: CLINIC | Age: 48
End: 2023-07-27
Payer: COMMERCIAL

## 2023-07-27 ENCOUNTER — HOSPITAL ENCOUNTER (OUTPATIENT)
Dept: PHYSICAL THERAPY | Facility: HOSPITAL | Age: 48
Setting detail: THERAPIES SERIES
Discharge: HOME | End: 2023-07-27
Payer: COMMERCIAL

## 2023-07-27 ENCOUNTER — RESEARCH ENCOUNTER (OUTPATIENT)
Dept: CARDIOLOGY | Facility: CLINIC | Age: 48
End: 2023-07-27
Payer: COMMERCIAL

## 2023-07-27 ENCOUNTER — LAB (OUTPATIENT)
Dept: LAB | Facility: HOSPITAL | Age: 48
End: 2023-07-27
Payer: COMMERCIAL

## 2023-07-27 ENCOUNTER — TELEPHONE (OUTPATIENT)
Dept: CARDIOLOGY | Facility: CLINIC | Age: 48
End: 2023-07-27

## 2023-07-27 ENCOUNTER — OFFICE VISIT (OUTPATIENT)
Dept: ONCOLOGY | Facility: CLINIC | Age: 48
End: 2023-07-27
Payer: COMMERCIAL

## 2023-07-27 ENCOUNTER — HOSPITAL ENCOUNTER (OUTPATIENT)
Dept: CARDIOLOGY | Facility: HOSPITAL | Age: 48
Discharge: HOME | End: 2023-07-27
Admitting: INTERNAL MEDICINE
Payer: COMMERCIAL

## 2023-07-27 VITALS
HEIGHT: 63 IN | HEART RATE: 84 BPM | WEIGHT: 170.64 LBS | BODY MASS INDEX: 30.23 KG/M2 | SYSTOLIC BLOOD PRESSURE: 126 MMHG | DIASTOLIC BLOOD PRESSURE: 64 MMHG

## 2023-07-27 VITALS
HEART RATE: 77 BPM | WEIGHT: 167 LBS | HEIGHT: 63 IN | BODY MASS INDEX: 29.59 KG/M2 | DIASTOLIC BLOOD PRESSURE: 70 MMHG | SYSTOLIC BLOOD PRESSURE: 114 MMHG

## 2023-07-27 VITALS
TEMPERATURE: 97.5 F | HEIGHT: 63 IN | SYSTOLIC BLOOD PRESSURE: 114 MMHG | OXYGEN SATURATION: 97 % | WEIGHT: 168.8 LBS | BODY MASS INDEX: 29.91 KG/M2 | HEART RATE: 112 BPM | DIASTOLIC BLOOD PRESSURE: 73 MMHG

## 2023-07-27 DIAGNOSIS — C50.812 MALIGNANT NEOPLASM OF OVERLAPPING SITES OF LEFT BREAST IN FEMALE, ESTROGEN RECEPTOR POSITIVE: ICD-10-CM

## 2023-07-27 DIAGNOSIS — Z17.0 MALIGNANT NEOPLASM OF OVERLAPPING SITES OF LEFT BREAST IN FEMALE, ESTROGEN RECEPTOR POSITIVE: ICD-10-CM

## 2023-07-27 DIAGNOSIS — Z51.81 ENCOUNTER FOR MONITORING CARDIOTOXIC DRUG THERAPY: Primary | ICD-10-CM

## 2023-07-27 DIAGNOSIS — Z51.81 ENCOUNTER FOR MONITORING CARDIOTOXIC DRUG THERAPY: ICD-10-CM

## 2023-07-27 DIAGNOSIS — Z79.899 ENCOUNTER FOR MONITORING CARDIOTOXIC DRUG THERAPY: ICD-10-CM

## 2023-07-27 DIAGNOSIS — Z79.899 HIGH RISK MEDICATION USE: ICD-10-CM

## 2023-07-27 DIAGNOSIS — C50.812 MALIGNANT NEOPLASM OF OVERLAPPING SITES OF LEFT BREAST IN FEMALE, ESTROGEN RECEPTOR POSITIVE: Primary | ICD-10-CM

## 2023-07-27 DIAGNOSIS — Z17.0 MALIGNANT NEOPLASM OF OVERLAPPING SITES OF LEFT BREAST IN FEMALE, ESTROGEN RECEPTOR POSITIVE: Primary | ICD-10-CM

## 2023-07-27 DIAGNOSIS — Z91.89 AT RISK FOR LYMPHEDEMA: Primary | ICD-10-CM

## 2023-07-27 DIAGNOSIS — Z79.899 ENCOUNTER FOR MONITORING CARDIOTOXIC DRUG THERAPY: Primary | ICD-10-CM

## 2023-07-27 DIAGNOSIS — E78.5 MILD HYPERLIPIDEMIA: ICD-10-CM

## 2023-07-27 DIAGNOSIS — R03.0 ELEVATED BLOOD PRESSURE READING WITHOUT DIAGNOSIS OF HYPERTENSION: ICD-10-CM

## 2023-07-27 DIAGNOSIS — Z90.13 S/P BILATERAL MASTECTOMY: ICD-10-CM

## 2023-07-27 DIAGNOSIS — C50.912 BREAST CARCINOMA, LOBULAR, LEFT: ICD-10-CM

## 2023-07-27 LAB
ALBUMIN SERPL-MCNC: 4.4 G/DL (ref 3.5–5.2)
ALBUMIN/GLOB SERPL: 2.2 G/DL
ALP SERPL-CCNC: 143 U/L (ref 39–117)
ALT SERPL W P-5'-P-CCNC: 76 U/L (ref 1–33)
ANION GAP SERPL CALCULATED.3IONS-SCNC: 17.5 MMOL/L (ref 5–15)
AST SERPL-CCNC: 35 U/L (ref 1–32)
AV HCM GRAD VALS: 16 MMHG
AV LVOT PEAK GRADIENT: 1 MMHG
BASOPHILS # BLD AUTO: 0.03 10*3/MM3 (ref 0–0.2)
BASOPHILS NFR BLD AUTO: 0.3 % (ref 0–1.5)
BH CV ECHO HCM PROVOKED MEAN GRADIENT: 1 MMHG
BH CV ECHO HCM PROVOKED PEAK GRADIENT: 18 MMHG
BH CV ECHO LEFT VENTRICLE GLOBAL LONGITUDINAL STRAIN: -22.7 %
BH CV ECHO MEAS - EDV(CUBED): 53.7 ML
BH CV ECHO MEAS - EDV(MOD-SP2): 86 ML
BH CV ECHO MEAS - EDV(MOD-SP4): 115 ML
BH CV ECHO MEAS - EF(MOD-BP): 62.5 %
BH CV ECHO MEAS - EF(MOD-SP2): 59.3 %
BH CV ECHO MEAS - EF(MOD-SP4): 61.7 %
BH CV ECHO MEAS - ESV(CUBED): 14.8 ML
BH CV ECHO MEAS - ESV(MOD-SP2): 35 ML
BH CV ECHO MEAS - ESV(MOD-SP4): 44 ML
BH CV ECHO MEAS - FS: 35 %
BH CV ECHO MEAS - IVS/LVPW: 0.88 CM
BH CV ECHO MEAS - IVSD: 0.85 CM
BH CV ECHO MEAS - LAT PEAK E' VEL: 9 CM/SEC
BH CV ECHO MEAS - LV DIASTOLIC VOL/BSA (35-75): 64.7 CM2
BH CV ECHO MEAS - LV MASS(C)D: 101.6 GRAMS
BH CV ECHO MEAS - LV SYSTOLIC VOL/BSA (12-30): 24.8 CM2
BH CV ECHO MEAS - LVIDD: 3.8 CM
BH CV ECHO MEAS - LVIDS: 2.45 CM
BH CV ECHO MEAS - LVOT AREA: 3.2 CM2
BH CV ECHO MEAS - LVOT DIAM: 2.01 CM
BH CV ECHO MEAS - LVPWD: 0.97 CM
BH CV ECHO MEAS - MED PEAK E' VEL: 5.1 CM/SEC
BH CV ECHO MEAS - MV A DUR: 0.13 SEC
BH CV ECHO MEAS - MV A MAX VEL: 55.1 CM/SEC
BH CV ECHO MEAS - MV DEC SLOPE: 434.7 CM/SEC2
BH CV ECHO MEAS - MV DEC TIME: 0.2 MSEC
BH CV ECHO MEAS - MV E MAX VEL: 56.9 CM/SEC
BH CV ECHO MEAS - MV E/A: 1.03
BH CV ECHO MEAS - MV MAX PG: 2.31 MMHG
BH CV ECHO MEAS - MV MEAN PG: 1.15 MMHG
BH CV ECHO MEAS - MV P1/2T: 51.2 MSEC
BH CV ECHO MEAS - MV V2 VTI: 18.2 CM
BH CV ECHO MEAS - MVA(P1/2T): 4.3 CM2
BH CV ECHO MEAS - PULM A REVS DUR: 0.15 SEC
BH CV ECHO MEAS - PULM A REVS VEL: 40.5 CM/SEC
BH CV ECHO MEAS - PULM DIAS VEL: 47.5 CM/SEC
BH CV ECHO MEAS - PULM S/D: 1.15
BH CV ECHO MEAS - PULM SYS VEL: 54.4 CM/SEC
BH CV ECHO MEAS - SI(MOD-SP2): 28.7 ML/M2
BH CV ECHO MEAS - SI(MOD-SP4): 39.9 ML/M2
BH CV ECHO MEAS - SV(MOD-SP2): 51 ML
BH CV ECHO MEAS - SV(MOD-SP4): 71 ML
BH CV ECHO MEASUREMENTS AVERAGE E/E' RATIO: 8.07
BILIRUB SERPL-MCNC: 0.6 MG/DL (ref 0–1.2)
BUN SERPL-MCNC: 12 MG/DL (ref 6–20)
BUN/CREAT SERPL: 15 (ref 7–25)
CALCIUM SPEC-SCNC: 9.9 MG/DL (ref 8.6–10.5)
CHLORIDE SERPL-SCNC: 99 MMOL/L (ref 98–107)
CO2 SERPL-SCNC: 24.5 MMOL/L (ref 22–29)
CREAT SERPL-MCNC: 0.8 MG/DL (ref 0.6–1.1)
DEPRECATED RDW RBC AUTO: 51.8 FL (ref 37–54)
EGFRCR SERPLBLD CKD-EPI 2021: 92.2 ML/MIN/1.73
EOSINOPHIL # BLD AUTO: 0.02 10*3/MM3 (ref 0–0.4)
EOSINOPHIL NFR BLD AUTO: 0.2 % (ref 0.3–6.2)
ERYTHROCYTE [DISTWIDTH] IN BLOOD BY AUTOMATED COUNT: 14.6 % (ref 12.3–15.4)
GLOBULIN UR ELPH-MCNC: 2 GM/DL
GLUCOSE SERPL-MCNC: 105 MG/DL (ref 65–99)
HCT VFR BLD AUTO: 37.6 % (ref 34–46.6)
HGB BLD-MCNC: 12.7 G/DL (ref 12–15.9)
IMM GRANULOCYTES # BLD AUTO: 0.2 10*3/MM3 (ref 0–0.05)
IMM GRANULOCYTES NFR BLD AUTO: 1.9 % (ref 0–0.5)
LYMPHOCYTES # BLD AUTO: 3.06 10*3/MM3 (ref 0.7–3.1)
LYMPHOCYTES NFR BLD AUTO: 29.3 % (ref 19.6–45.3)
MCH RBC QN AUTO: 32.7 PG (ref 26.6–33)
MCHC RBC AUTO-ENTMCNC: 33.8 G/DL (ref 31.5–35.7)
MCV RBC AUTO: 96.9 FL (ref 79–97)
MONOCYTES # BLD AUTO: 1.02 10*3/MM3 (ref 0.1–0.9)
MONOCYTES NFR BLD AUTO: 9.8 % (ref 5–12)
NEUTROPHILS NFR BLD AUTO: 58.5 % (ref 42.7–76)
NEUTROPHILS NFR BLD AUTO: 6.1 10*3/MM3 (ref 1.7–7)
NRBC BLD AUTO-RTO: 0 /100 WBC (ref 0–0.2)
PLATELET # BLD AUTO: 207 10*3/MM3 (ref 140–450)
PMV BLD AUTO: 10.3 FL (ref 6–12)
POTASSIUM SERPL-SCNC: 4.3 MMOL/L (ref 3.5–5.2)
PROT SERPL-MCNC: 6.4 G/DL (ref 6–8.5)
RBC # BLD AUTO: 3.88 10*6/MM3 (ref 3.77–5.28)
SINUS: 2.7 CM
SODIUM SERPL-SCNC: 141 MMOL/L (ref 136–145)
STJ: 2.6 CM
WBC NRBC COR # BLD: 10.43 10*3/MM3 (ref 3.4–10.8)

## 2023-07-27 PROCEDURE — 25510000001 PERFLUTREN (DEFINITY) 8.476 MG IN SODIUM CHLORIDE (PF) 0.9 % 10 ML INJECTION: Performed by: INTERNAL MEDICINE

## 2023-07-27 PROCEDURE — 93356 MYOCRD STRAIN IMG SPCKL TRCK: CPT | Performed by: INTERNAL MEDICINE

## 2023-07-27 PROCEDURE — 93325 DOPPLER ECHO COLOR FLOW MAPG: CPT

## 2023-07-27 PROCEDURE — 93308 TTE F-UP OR LMTD: CPT

## 2023-07-27 PROCEDURE — 93325 DOPPLER ECHO COLOR FLOW MAPG: CPT | Performed by: INTERNAL MEDICINE

## 2023-07-27 PROCEDURE — 93321 DOPPLER ECHO F-UP/LMTD STD: CPT

## 2023-07-27 PROCEDURE — 93321 DOPPLER ECHO F-UP/LMTD STD: CPT | Performed by: INTERNAL MEDICINE

## 2023-07-27 PROCEDURE — 93702 BIS XTRACELL FLUID ANALYSIS: CPT

## 2023-07-27 PROCEDURE — 93356 MYOCRD STRAIN IMG SPCKL TRCK: CPT

## 2023-07-27 PROCEDURE — 85025 COMPLETE CBC W/AUTO DIFF WBC: CPT

## 2023-07-27 PROCEDURE — 93308 TTE F-UP OR LMTD: CPT | Performed by: INTERNAL MEDICINE

## 2023-07-27 PROCEDURE — 36415 COLL VENOUS BLD VENIPUNCTURE: CPT

## 2023-07-27 PROCEDURE — 97535 SELF CARE MNGMENT TRAINING: CPT

## 2023-07-27 PROCEDURE — 80053 COMPREHEN METABOLIC PANEL: CPT

## 2023-07-27 RX ADMIN — PERFLUTREN 2 ML: 6.52 INJECTION, SUSPENSION INTRAVENOUS at 09:14

## 2023-07-27 NOTE — TELEPHONE ENCOUNTER
Please let her know that echo looks good.  Her heart is strong and functioning well.  No significant change from prior echo.  Continue current medications and keep October appointment

## 2023-07-27 NOTE — THERAPY RE-EVALUATION
Physical Therapy Lymphedema Re-Evaluation  [facility]     Patient Name: Ursula Kulkarni  : 1975  MRN: [id number]  Today's Date: 2023      Visit Date: 2023    Visit Dx:    ICD-10-CM ICD-9-CM   1. At risk for lymphedema  Z91.89 V49.89   2. S/P bilateral mastectomy  Z90.13 V45.71   3. Breast carcinoma, lobular, left  C50.912 174.9       Patient Active Problem List   Diagnosis    Hypothyroidism    Anxiety    Mild hyperlipidemia    Heartburn    Elevated blood pressure reading without diagnosis of hypertension    Thyroid nodule    Allergic rhinitis    COVID-19 virus infection    Non-recurrent acute suppurative otitis media of right ear without spontaneous rupture of tympanic membrane    Rash    Pharyngeal dysphagia    Indigestion    Fatigue    Abnormal mammogram of left breast    Malignant neoplasm of overlapping sites of left breast in female, estrogen receptor positive    Breast carcinoma, lobular, left    High risk medication use    Encounter for adjustment or management of vascular access device    Elevated alkaline phosphatase level    Diarrhea        Past Medical History:   Diagnosis Date    ADD (attention deficit disorder)     Bilateral impacted cerumen 2020    Breast cancer 2023    LUCIO MASTECTOMY    Foot fracture, left     History of COVID-19     History of gestational diabetes     History of Graves' disease     History of radioactive iodine thyroid ablation     History of vertigo     Hyperlipidemia     Hypothyroidism     Melanoma of back     Positive depression screening 2020        Past Surgical History:   Procedure Laterality Date    BREAST RECONSTRUCTION Bilateral 3/8/2023    Procedure: BILATERAL PLACEMENT  TISSUE EXPANDER AND ALLODERM;  Surgeon: Eamon Stone MD;  Location: [facility];  Service: Plastics;  Laterality: Bilateral;    D & C CERVICAL BIOPSY  2006    INTRAUTERINE DEVICE INSERTION      Mirena    INTRAUTERINE DEVICE  INSERTION  2015    Mirena exchange, old IUD removal and new one inserted by Dr Carr, Lot # [id number] exp 09/17.mar    MASTECTOMY W/ SENTINEL NODE BIOPSY Bilateral 3/8/2023    Procedure: Bilateral total mastectomy, left axillary sentinel lymph node biopsy;  Surgeon: Kell Hi MD;  Location: [facility] MAIN OR;  Service: General;  Laterality: Bilateral;    SCAR REVISION BREAST Right 4/3/2023    Procedure: RIGHT MASTECTOMY REVISION;  Surgeon: Eamon Stone MD;  Location: [facility] MAIN OR;  Service: Plastics;  Laterality: Right;    VENOUS ACCESS DEVICE (PORT) INSERTION Right 4/13/2023    Procedure: right port placement;  Surgeon: Kell Hi MD;  Location: [facility] MAIN OR;  Service: General;  Laterality: Right;    WISDOM TOOTH EXTRACTION         Visit Dx:    ICD-10-CM ICD-9-CM   1. At risk for lymphedema  Z91.89 V49.89   2. S/P bilateral mastectomy  Z90.13 V45.71   3. Breast carcinoma, lobular, left  C50.912 174.9            Lymphedema       Row Name 07/27/23 0700             Subjective Pain    Able to rate subjective pain? yes  -LB      Pre-Treatment Pain Level 0  -LB         Subjective Comments    Subjective Comments I am doing fine, still in chemotherapy. I am tolerating it pretty well. I will start radiation in October.  -LB         Lymphedema Assessment    Lymphedema Classification LUE:;at risk/stage 0  -LB      Lymphedema Cancer Related Sx bilateral;modified radical mastectomy  -LB      Lymphedema Surgery Comments 3/8/23; re-excision 4/3/23  -LB      Lymph Nodes Removed # 4  -LB      Positive Lymph Nodes # 0  -LB      Chemo Received yes  -LB      Chemo Treatments #/Timeframe current  -LB      Radiation Therapy Received yes  -LB      Radiation Treatments #/Timeframe upcoming  -LB      Infections or Cellulitis? no  -LB         Posture/Observations    Posture/Observations Comments forward head, rounded shoulders  -LB         Lymphedema Edema Assessment    Edema Assessment Comment no obvious edema   -LB         L-Dex Bioimpedence Screening    L-Dex Measurement Extremity LUE  -LB      L-Dex Patient Position Standing  -LB      L-Dex UE Dominate Side Right  -LB      L-Dex UE At Risk Side Left  -LB      L-Dex UE Pre Surgical Value Yes  -LB      L-Dex UE Score -1.5  -LB      L-Dex UE Baseline Score 2.4  -LB      L-Dex UE Value Change -3.9  -LB      L-Dex UE Comment WNL  -LB      $ L-Dex Charge yes  -LB                User Key  (r) = Recorded By, (t) = Taken By, (c) = Cosigned By      Initials Name Provider Type    Rocio Biggs, PT Physical Therapist                                    Therapy Education  Education Details: reviewed s/s of lymphedema, discussed compression garment wear during radiation, skincare during radiation, bioimpedance results and interpretation  Given: Symptoms/condition management, Edema management, Mobility training  Program: Reinforced  How Provided: Verbal, Written  Provided to: Patient  Level of Understanding: Verbalized  54537 - PT Self Care/Mgmt Minutes: 10       OP Exercises       Row Name 07/27/23 0700             Subjective Comments    Subjective Comments I am doing fine, still in chemotherapy. I am tolerating it pretty well. I will start radiation in October.  -LB         Subjective Pain    Able to rate subjective pain? yes  -LB      Pre-Treatment Pain Level 0  -LB                User Key  (r) = Recorded By, (t) = Taken By, (c) = Cosigned By      Initials Name Provider Type    Rocio Biggs, PT Physical Therapist                                 PT OP Goals       Row Name 07/27/23 0700          Long Term Goals    LTG Date to Achieve 03/11/23  -LB     LTG 1 Pt will maintain LDex bioimpedance WNL.  -LB     LTG 1 Progress Ongoing  -LB     LTG 1 Progress Comments WNL today -1.5  -LB     LTG 2 Pt will demonstrate full AROM BUE post-operatively.  -LB     LTG 2 Progress Met  -LB     LTG 3 Pt will acquire appropriately fitted compression garment and verbalize appropriate wear schedule.   -LB     LTG 3 Progress Met  -LB     LTG 3 Progress Comments Discussed and reviewed today.  -LB               User Key  (r) = Recorded By, (t) = Taken By, (c) = Cosigned By      Initials Name Provider Type    Rocio Biggs, PT Physical Therapist                     PT Assessment/Plan       Row Name 07/27/23 0726          PT Assessment    Functional Limitations Other (comment);Limitations in functional capacity and performance;Limitations in community activities;Performance in work activities;Performance in self-care ADL;Performance in leisure activities  -LB     Impairments Impaired flexibility;Impaired lymphatic circulation;Muscle strength;Range of motion;Sensation  -LB     Assessment Comments Pt returns for 3 month bioimpedance follow up reporting good tolerance to chemotherapy and radiation planned for late September/October. She has acquired sleeve and verbalizes good fit. She has continued working and states she is tolerating ADLs and household tasks well. She demonstrates full AROM BUE. Repeat bioimpedance testing WNL and stable at -1.5. She has met 2/3 LTGs. We discussed compression garment use during radiation and pt verbalizes understanding.  -LB     Rehab Potential Good  -LB     Patient/caregiver participated in establishment of treatment plan and goals Yes  -LB     Patient would benefit from skilled therapy intervention Yes  -LB        PT Plan    PT Frequency Other (comment)  -LB     Predicted Duration of Therapy Intervention (PT) repeat bioimpedance post radiation  -LB     Planned CPT's? PT EVAL LOW COMPLEXITY: 03474;PT RE-EVAL: 76427;PT THER PROC EA 15 MIN: 08046;PT THER ACT EA 15 MIN: 27997;PT MANUAL THERAPY EA 15 MIN: 96828;PT NEUROMUSC RE-EDUCATION EA 15 MIN: 77318;PT SELF CARE/HOME MGMT/TRAIN EA 15: 59152;PT BIS XTRACELL FLUID ANALYSIS: 35940  -LB     PT Plan Comments repeat bioimpedance post radiation, assess ROM post radiation, review skincare  -LB               User Key  (r) = Recorded By, (t)  = Taken By, (c) = Cosigned By      Initials Name Provider Type    LB Rocio Daniels, PT Physical Therapist                                 Time Calculation:   Start Time: 0700  Stop Time: 0720  Time Calculation (min): 20 min  Total Timed Code Minutes- PT: 10 minute(s)  Timed Charges  25471 - PT Self Care/Mgmt Minutes: 10  Total Minutes  Timed Charges Total Minutes: 10   Total Minutes: 10   Therapy Charges for Today       Code Description Service Date Service Provider Modifiers Qty    35549216438 HC PT BIS XTRACELL FLUID ANALYSIS 7/27/2023 Rocio Daniels, PT  1    32954978015 HC PT SELF CARE/MGMT/TRAIN EA 15 MIN 7/27/2023 Rocio Daniels, PT GP 1                      Rocio Daniels PT  7/27/2023

## 2023-07-27 NOTE — PROGRESS NOTES
Subjective     REASON FOR FOLLOW-UP:  1.  Invasive lobular carcinoma multifocal,  Left breast anterolateral stereotactic biopsy invasive lobular carcinoma, Marie score of 6, grade 2, 4 mm, ER 95%, VA 95%, HER2/cheryl 0 negative with Ki-67 of 5%.  Positive for atypical lobular hyperplasia  Left breast 1 o'clock position 10 cm from the nipple ultrasound-guided biopsy consistent with invasive lobular carcinoma grade 2 [place] score of 6, 4 mm with lobular carcinoma in situ, ER 90%, %, HER2/cheryl 0  Left breast upper stereotactic guided core biopsy invasive lobular carcinoma grade 2 [place] score of six 3 mm with lobular carcinoma in situ, ER 90%, %, HER2/cheryl equivocal 2+, FISH shows her to 5.7 with CEP 1.6 and ratio HER2 nu/KEIRA-17 ratio of 3.5.  It is amplified.    2.  March 8, 2023:p T3 N0 M0 invasive lobular carcinoma s/p bilateral mastectomy with left deep axillary sentinel lymph node biopsy, with bilateral placement of prepectoral tissue expanders for reconstruction and bilateral placement of AlloDerm.  Pathology shows multifocal tumor in  upper outer quadrant and upper inner quadrant, invasive lobular carcinoma grade 2 with a [place] score  score of 7  , total of 4 foci were seen, largest tumor being 60 mm, 20 mm, 2 mm, 1 mm.  No DCIS identified.  Lobular carcinoma in situ present.  No lymphatic or vascular channel invasion, no dermal lymphovascular space invasion all margins were negative for invasive carcinoma 4 lymph nodes were all negative for malignancy its pathologic T3N0  ER %, VA %, HER2/cheryl 2+ with Ki-67 of 5%  Previous biopsies had shown 2 of the tumors were ER/VA positive HER2/cheryl negative and one of them was ER/VA positive HER2 positive  On discussing with pathologist Dr. Serrano, she thinks that the larger tumor was ER VA positive and HER2 positive but unsure if it was the 60 mm tumor or 20 mm tumor.  Either way it was not the smaller tumors which was HER2  positive.  All tumors look-alike per pathologist and they are invasive lobular carcinoma.                  HISTORY OF PRESENT ILLNESS:         Patient is a 48 y.o. female with history of multifocal left breast cancer with MRI of the breast showing almost 15 lesions.  Biopsies on 3 different lesions were done and they were all consistent with invasive lobular carcinoma.  2 of the lesions where ER/GA positive HER2/cheryl negative and one of the larger lesion was ER/GA positive HER2 2+ equivocal.  FISH testing was done and the HER2 copy number was 5.7 with HER2/CEP ratio of 3.5.  The FISH was amplified.    Patient underwent bilateral mastectomy    With left sentinel lymph node surgery..  The pathology shows tumor to be present in the left upper outer quadrant invasive lobular carcinoma with a Marie score of 7, grade 2.  There were 4 lesions the greatest size was 6 0 mm, 20 mm, 2 mm and 1 mm in size.  Lobular carcinoma in situ is present.  All margins are negative for invasive carcinoma.  4 lymph nodes negative.  Patient has T3N0 invasive lobular carcinoma.      Discussed with pathology in length Dr. Serrano, one of the larger lesions which was 60 mm was heterogeneous it was biopsied at 2 separate spots.  The left anterior lateral and left 1:00 core fragment was ER/GA positive HER2/cheryl negative the left upper core biopsy was ER/GA positive HER2/cheryl 2+ equivocal but HER2/cheryl FISH was positive.    On discussion with pathology the 60 mm tumor showed ER and GA positive and HER2 positive by FISH with a Ki-67 of 40% at the Top-Hat clip.  The HER2/cheryl copy number was 5.7 with a HER2/cheryl by CEP ratio 3.5 which is positive.  The Ki-67 on the lower part was 13%.  The 20 mm tumor had a Ki-67 of 9%.  So the larger lesion was triple positive and heterogeneous as 2 biopsies were done on the larger lesions and that was both triple positive and at the second biopsy was ER/GA positive HER2/cheryl negative so it was a heterogeneous tumor.   The 20 mm lesion was ER/RI positive HER2/cheryl negative.    Given that she had a large tumor with high Ki-67 and premenopausal status we discussed about giving Taxotere carboplatin Herceptin Perjeta.    Interval History:   Patient is reviewed back today in follow-up, 1 week out from cycle 5 TCHP.  She is happy to report that this is the best weeks she has had following treatment out of all of her cycles.  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  She looks stronger today and overall is feeling fairly good.  She denies any new concerns at this time.    Oncology history:  Patient is a 48 y.o. female who has been recently diagnosed with left breast cancer.  Patient has been compliant with her mammograms.    There is no family history of breast or ovarian cancer.  Her father had prostate cancer and melanoma.  A paternal aunt had thyroid cancer.      Details are as follows.    November 29, 2022: Screening bilateral mammogram showed 8 mm asymmetry in the central left breast posterior one third.  This appears to be in the superior left breast on the MLO view.  Right breast was negative.  A left diagnostic mammogram and ultrasound was recommended.    January January 3, 2023: Left breast diagnostic mammogram showed the spiculated areas within the central posterior and lateral anterior superior left breast where redemonstrated.  Targeted ultrasound was done.  At 1 o'clock position 10 cm from the nipple there is a shadowing mass with peripheral spiculations which is concordant with the mammographic abnormality and suspicious for malignancy.  The more centrally located spiculation on the cc view posterior to the glandular tissue cannot be clearly identified on the ultrasound.    Impression was 2 separate suspicious spiculated lesion in the left breast.  One of the lesions is seen well on the ultrasound and should be amenable to ultrasound-guided core  biopsy.  The other lesion which was seen in the CC projection was amicable to stereotactic guided tissue sampling.    January 3, 2023 patient underwent ultrasound-guided left breast biopsy    The ultrasound-guided biopsy of left breast 10 cm from the nipple at 1 o'clock position showed invasive lobular carcinoma moderately differentiated with a [place] grade of 2 and score of 6 measuring 4 mm.  There was focal lobular carcinoma in situ.    Left breast anterior lateral stereotactic core needle biopsy showed invasive lobular carcinoma moderately differentiated grade 2 with a Marie score of 6 measuring 4 mm.  There was atypical lobular hyperplasia.  Estrogen receptor was %, progesterone receptor was %, HER2/cheryl 0, Ki-67 5%.    Left breast upper stereotactic guided core biopsy showed invasive lobular carcinoma moderately differentiated grade 2 with [place] score of 6 with measuring 3 mm with lobular carcinoma in situ present.  I do not see the ER/NM or HER2 on the ultrasound-guided biopsy of the left breast lesion as well as the left upper stereotactic biopsy.  We will check with pathologist    January 23, 2023: Patient underwent  MRI of the breast bilateral    Right breast: Negative    Left breast: At 1:00 anterior left breast 4 cm from the nipple there is a 1.5 x 1.6 x 1.3 cm irregular enhancing mass which is biopsy-proven malignancy.  The biopsy clip is located within 0.9 x 1 x 1.1 cm postbiopsy hematoma along the inferior margin of the mass.  And there is additional hematoma along the lateral margin of the mass which measures 1.2 x 1.9 x 0.9 cm.    At 1:00 in the middle to posterior left breast 7.4 cm from the nipple there is a 1.3 x 1 cm x 1 cm irregular enhancing mass mass with a focus from a biopsy clip along the inferior margin which also represents the biopsy-proven malignancy    At 12:00 in the posterior left breast 8.5 cm from the nipple there is a 1.9 x 1.8 x 2.2 cm irregular enhancing  mass with a corresponding 2.9 cm biopsy cavity with a biopsy clip which represents the biopsy-proven malignancy    In addition there are multiple at least 12 similar-appearing irregular enhancing masses and foci are identified throughout the left breast predominantly involving the upper breast but also involving the lower outer quadrant.  At 11-12 o'clock in the middle and posterior left breast there are 3 adjacent reference masses together measuring 5.5 cm but individually measuring 1.2 x 0.9 x 1 cm.  At 1:00 in the far posterior left breast/axillary tail 13.7 cm posterior to the nipple is a 1.3 x 1.1 x 1 cm irregular enhancing mass which is located 0.8 cm from the anterolateral margin of the pectoralis Muscle.  Together the enhancing masses span approximately 11.7 x 8 x 7.8 cm.  There is no suspicious enhancement in the left nipple or chest wall.  Focal areas of skin enhancement likely reflect skin entry point from recent biopsies.  There are no pathologically enlarged internal mammary chain lymph nodes.    Impression    .  At least 15 similar-appearing irregular enhancing masses and foci  scattered throughout the left breast, together measuring up to 11.7 cm  in maximum dimension, encompass 3 sites of biopsy-proven malignancy and  are consistent with multicentric malignancy. Oncologic and surgical  management are recommended.  2.  No MRI evidence of malignancy in the right breast.    Patient is a 47-year-old female with multifocal invasive lobular carcinoma who on screening mammogram showed 8 mm asymmetry in the central left breast posterior one third.  In the cc view.  It appears to be superior left breast on the MLO view.  There was also architectural distortion in the lateral left breast one third on the left cc view.    On diagnostic mammogram the spiculated areas within the central posterior and the lateral anterior superior left breast where redemonstrated.  Targeted ultrasound was done.  At 1 o'clock  position 10 cm from the nipple there is a mass with spiculations which is suspicious.  The more centrally located spiculation on the cc view.  The more centrally located spiculation on the cc view cannot be clearly identified on ultrasound.  So there impression was there were 2 separate suspicious spiculated lesion in the left breast.  1 was seen well on the ultrasound and amenable to ultrasound-guided biopsy the other is seen well on mammography and a stereotactic guided biopsy suggested.    Patient subsequently underwent stereotactic biopsy of 1 lesion and ultrasound-guided biopsy of the 1 cm mass at 1 o'clock position 10 cm from the nipple.  A total of 3 different areas of the left breast were biopsied and specimens were sent to pathology.  The third biopsy was performed at 12 o'clock position in the posterior one third of the left breast.  All of the 3 sites were consistent with invasive lobular carcinoma at site A, B, and C.    MRI of the breast showed at 1 o'clock position of the left breast anteriorly 4 cm posterior to the nipple there is a 1.5 x 1.6 x 1.3 cm irregular enhancing mass.  There is a small hematoma along the inferior margin of the mass.  An additional hematoma along the lateral margin of the mass which is measuring 1.2 x 0.9 x 0.9 cm    At 1:00 in the middle to posterior left breast 7.4 cm posterior to the nipple there is a 1.3 x 1 x 1 cm enhancing mass with a biopsy clip.    At 12:00 posterior left breast 8.5 cm posterior to the nipple there is a 1.5 x 1.8 x 2.2 cm irregular enhancing mass with a corresponding 2.9 cm biopsy cavity with a clip which represents the biopsy site malignancy    Multiple at least 12 similar-appearing irregular enhancing masses and foci are identified throughout the left breast predominantly involving the upper breast but also involving the lower outer quadrant.  At 11-12 o'clock in the middle and posterior left breast there is a 3 adjacent reference masses measuring  5.5 cm in AP dimension and individually measuring 1.2 x 0.9 x 1 cm.  At 1:00 in the far posterior left breast axillary tail 13.7 cm from the nipple there is a 1.3 x 1.1 x 1 cm craniocaudal dimension irregular enhancing mass which is located 0.8 cm from the anterolateral margin of the pectoralis muscle.     Per the MRI at least 15 similar-appearing irregular enhancing masses and foci scattered throughout the left breast together measuring 11.7 cm in maximum dimension which encompasses 3 sites of biopsy-proven malignancy and a consistent with multicentric malignancy.  MRI of the right breast is negative    INVITAE genetic testing showed variation of uncertain significance of LUCAS  Gene.    Patient was suggested left total mastectomy, left axillary sentinel lymph node biopsy possible node dissection and possible reconstruction.    However patient called Dr. Hi and decided to go for upfront bilateral total mastectomy, left axillary sentinel lymph node biopsy and possible axillary lymph node dissection and reconstruction.     I presented the case in the breast cancer conference today.  Patient had multiple nodules per MRI on the left breast Dr. Todd looked at it and felt there were multiple nodules and the largest one was 2.2 cm.  The discussion was to go ahead and upfront do the surgery and subsequently treat with chemo/ endocrine therapy as needed.  The left breast MRI findings are slightly atypical and that it is not contiguous involvement over 11.5 cm area but multiple nodules.  Surgical pathology will clarify.  If it is 1 big lesion or multiple sma      Past Medical History:   Diagnosis Date    ADD (attention deficit disorder)     Bilateral impacted cerumen 07/13/2020    Breast cancer 03/08/2023    LUCIO MASTECTOMY    Foot fracture, left     History of COVID-19 2022    History of gestational diabetes 2010    History of Graves' disease 2012    History of radioactive iodine thyroid ablation 2012    History of  vertigo     Hyperlipidemia     Hypothyroidism     Melanoma of back     Positive depression screening 07/13/2020        Past Surgical History:   Procedure Laterality Date    BREAST RECONSTRUCTION Bilateral 3/8/2023    Procedure: BILATERAL PLACEMENT  TISSUE EXPANDER AND ALLODERM;  Surgeon: Eamon Stone MD;  Location[facility] MAIN OR;  Service: Plastics;  Laterality: Bilateral;    D & C CERVICAL BIOPSY  01/2006    INTRAUTERINE DEVICE INSERTION  2010    Mirena    INTRAUTERINE DEVICE INSERTION  2015    Mirena exchange, old IUD removal and new one inserted by Dr Carr, Lot # [id number] 09/17.mar    MASTECTOMY W/ SENTINEL NODE BIOPSY Bilateral 3/8/2023    Procedure: Bilateral total mastectomy, left axillary sentinel lymph node biopsy;  Surgeon: Kell Hi MD;  Location: [facility] MAIN OR;  Service: General;  Laterality: Bilateral;    SCAR REVISION BREAST Right 4/3/2023    Procedure: RIGHT MASTECTOMY REVISION;  Surgeon: Eamon Stone MD;  Location: [facility] MAIN OR;  Service: Plastics;  Laterality: Right;    VENOUS ACCESS DEVICE (PORT) INSERTION Right 4/13/2023    Procedure: right port placement;  Surgeon: Kell Hi MD;  Location: [facility] MAIN OR;  Service: General;  Laterality: Right;    WISDOM TOOTH EXTRACTION          Current Outpatient Medications on File Prior to Visit   Medication Sig Dispense Refill    acetaminophen (TYLENOL) 325 MG tablet Take 2 tablets by mouth Every 4 (Four) Hours As Needed for Mild Pain. 60 tablet 0    aluminum-magnesium hydroxide-simethicone-nystatin in diphenhydrAMINE liquid Swish and spit 5 mL every 4 (four) hours as needed. 380 mL 4    COLLAGEN PO Take 1 tablet by mouth Daily.      dexamethasone (DECADRON) 4 MG tablet Take 1 tablet by mouth Daily.      escitalopram (Lexapro) 10 MG tablet Take 1 tablet by mouth Daily. 30 tablet 1    levothyroxine (SYNTHROID, LEVOTHROID) 125 MCG tablet Take 1 tablet by mouth Daily. 90 tablet 1    lidocaine-prilocaine (EMLA)  2.5-2.5 % cream Apply 1 application topically to the appropriate area as directed As Needed for Mild Pain (apply pea-sized amount to port site 30 minutes prior to port being accessed). 30 g 3    lisinopril (PRINIVIL,ZESTRIL) 2.5 MG tablet Take 1 tablet by mouth Daily. 30 tablet 2    loratadine (CLARITIN) 10 MG tablet Take 1 tablet by mouth Daily.      LORazepam (ATIVAN) 0.5 MG tablet Take 1 tablet by mouth At Night As Needed for Anxiety. 30 tablet 0    OLANZapine (ZyPREXA) 5 MG tablet Take 1 tablet by mouth Every Night. Take on days 2, 3 and 4 after chemotherapy. 3 tablet 5    ondansetron (ZOFRAN) 8 MG tablet Take 1 tablet by mouth 3 (Three) Times a Day As Needed for Nausea or Vomiting. 30 tablet 5    pantoprazole (PROTONIX) 40 MG EC tablet Take 1 tablet by mouth Daily. 30 tablet 3    Probiotic Product (PROBIOTIC ADVANCED PO) Take 1 tablet by mouth Daily.       Current Facility-Administered Medications on File Prior to Visit   Medication Dose Route Frequency Provider Last Rate Last Admin    [COMPLETED] perflutren (DEFINITY) 8.476 mg in Sodium Chloride (PF) 0.9 % 10 mL injection  2 mL Intravenous Once in imaging Alisia Fermin MD   2 mL at 07/27/23 0956        ALLERGIES:    Allergies   Allergen Reactions    Erythromycin GI Intolerance     Pt reports    Penicillins Rash        Social History     Socioeconomic History    Marital status:      Spouse name: Parish    Number of children: 2   Tobacco Use    Smoking status: Never    Smokeless tobacco: Never   Vaping Use    Vaping Use: Never used   Substance and Sexual Activity    Alcohol use: Not Currently     Alcohol/week: 1.0 standard drink     Types: 1 Glasses of wine per week     Comment: Daily caffeine use    Drug use: Never    Sexual activity: Yes     Partners: Male     Comment:          Family History   Problem Relation Age of Onset    Diabetes Father     Hyperlipidemia Father     Arthritis Father     Hypertension Father     Heart disease Father          "S/P stents    Heart attack Father 41    Colon polyps Father     Melanoma Father     Prostate cancer Father     Hyperlipidemia Sister     Hypertension Sister     Heart attack Sister     Diabetes Sister     Thyroid cancer Paternal Aunt     Lung cancer Maternal Grandmother     Cancer Maternal Grandfather         Bladder cancer    Lung cancer Maternal Grandfather     Heart failure Paternal Grandmother     Transient ischemic attack Paternal Grandmother     Heart disease Paternal Grandfather     Malig Hyperthermia Neg Hx       Family history: Maternal great aunt had breast cancer, unsure of the age .  Father had melanoma and prostate cancer, paternal aunt had thyroid cancer, paternal grandfather had bladder cancer maternal grandfather had bladder cancer maternal grandmother had lung cancer    Past medical history is consistent with Graves' disease    OB/GYN history:  Age of menarche: 14  Patient is premenopausal and had an IUD which has been now removed   2 para 2 no miscarriages  Age at first childbirth 30  Patient did breast-feed 24 months  Length of taking birth control pills none      Objective     Vitals:    23 1452   BP: 114/73   Pulse: 112   Temp: 97.5 °F (36.4 °C)   TempSrc: Temporal   SpO2: 97%   Weight: 76.6 kg (168 lb 12.8 oz)   Height: 160 cm (62.99\")   PainSc: 0-No pain           2023     2:51 PM   Current Status   ECOG score 0       Physical Exam      GENERAL:  Well-developed, Patient  in no acute distress.   SKIN:  Warm, dry ,NO purpura ,no rash.  HEENT:  Pupils were equal and reactive to light and accomodation, conjunctivae noninjected,  normal visual acuity.   LYMPHATICS:  No cervical, NO supraclavicular, NO axillary, NO inguinal adenopathies.  BREASTS: Not examined today  CARDIAC: Normal rate and rhythm, no murmurs gallops or rubs.  LUNGS: Clear to auscultation bilaterally.   ABDOMEN:  Soft, nontender with bowel sounds active.   EXTREMITIES: No cyanosis clubbing or edema.  NEUROLOGICAL: "  Patient was awake, alert, oriented to time, person and place.    RECENT LABS:  Results from last 7 days   Lab Units 07/27/23  1434   WBC 10*3/mm3 10.43   NEUTROS ABS 10*3/mm3 6.10   HEMOGLOBIN g/dL 12.7   HEMATOCRIT % 37.6   PLATELETS 10*3/mm3 207     Results from last 7 days   Lab Units 07/27/23  1434   SODIUM mmol/L 141   POTASSIUM mmol/L 4.3   CHLORIDE mmol/L 99   CO2 mmol/L 24.5   BUN mg/dL 12   CREATININE mg/dL 0.80   CALCIUM mg/dL 9.9   ALBUMIN g/dL 4.4   BILIRUBIN mg/dL 0.6   ALK PHOS U/L 143*   ALT (SGPT) U/L 76*   AST (SGOT) U/L 35*   GLUCOSE mg/dL 105*           Assessment & Plan     *Pathologic T3N0 invasive lobular carcinoma of the left breast, upper inner and upper outer quadrant, multifocal, 60 mm, 20 mm, 2 mm, 1 mm, grade 2, [place] score of 7, 2 of them are ER/SD positive, HER2/cheryl negative.  One of the larger lesions was ER/SD positive HER2 positive.  Ki-67 is very low 5%,    Invasive lobular carcinoma of the left breast recently diagnosed, multifocal with 3 lesions biopsied in the left breast.  Patient has multicentric disease with 15 similar-appearing irregular enhancing masses in the left breast together measuring 11.7 cm in maximum dimension which encompasses 3 of the 3 biopsy proven malignancies.  All the 3 very invasive lobular carcinoma, grade 2 with Marie score of 6 but 2 of the lesions where ER/SD positive HER2 negative and the third lesion was ER/SD positive HER2 2+ with FISH showing HER2 amplified.      1.  Estrogen receptor 95%, progesterone receptor 95%, HER2/cheryl 0, Ki-67 5%    2 left breast 10 o'clock position 10 cm from the nipple ultrasound core guided biopsy showed ER 90%, %, HER2/cheryl 0    3.  Left breast upper stereotactic core biopsy showed estrogen receptor 90%, progesterone receptor 100%, HER2/cheryl equivocal 2+  FISH testing showed her to as 5.7 with a KEIRA 17 of 1.6 with a ratio HER2/KEIRA 17 of  INVITAE genetic testing showed variation of uncertain significance of  LUCAS gene  We discussed the multidisciplinary approach in this patient's and I also discussed in length that that invasive lobular carcinomas do benefit from endocrine therapy and discussed in length about side effects of all endocrine therapies  Pending the results of the surgery she may need to be a candidate for chemotherapy as well and subsequently radiation if the tumor is large locally  Patient is keen on getting bilateral oophorectomy after her breast surgery is done and we discussed about medical oophorectomy with Lupron as well  February 24, 2023: Presented patient in the breast cancer conference.  Even though it appears to be a heterogeneous tumor, since it is difficult to really appreciate by clinical exam and the fact that it is invasive lobular carcinoma, it was felt best to do upfront surgery.  March 8, 2023:p T3 N0 M0 invasive lobular carcinoma s/p bilateral mastectomy with left deep axillary sentinel lymph node biopsy, with bilateral placement of prepectoral tissue expanders for reconstruction and bilateral placement of AlloDerm.  Pathology shows multifocal tumor in  upper outer quadrant and upper inner quadrant, invasive lobular carcinoma grade 2 with a Marie score  score of 7  , total of 4 foci were seen, largest tumor being 60 mm, 20 mm, 2 mm, 1 mm.  No DCIS identified.  Lobular carcinoma in situ present.  No lymphatic or vascular channel invasion, no dermal lymphovascular space invasion all margins were negative for invasive carcinoma 4 lymph nodes were all negative for malignancy its pathologic T3N0  ER %, NH %, HER2/cheryl 2+ with Ki-67 of 5%  Previous biopsies had shown 2 of the tumors were ER/NH positive HER2/cheryl negative and one of them was ER/NH positive HER2 positive  On discussing with pathologist Dr. Serrano, she thinks that the larger tumor was ER NH positive and HER2 positive.  HER2 copy number of 5.7 with HER2/CEP ratio of 3.5 positive and this was on the larger  tumor.  Given that patient has heterogeneous tumor with the larger lesion being ER/NM positive HER2 positive and a Ki-67 of 40%, being premenopausal we will treat with Taxotere carboplatin Herceptin Perjeta.  April 20, 2023: Patient was to start cycle 1 today but unfortunately her liver function tests were extremely high.  Discussed about alcohol and she had drank and taken Tylenol.  Will hold chemo today  April 28, 2023: Reviewed her liver function test which are completely normalized.  Discussed with her not to drink alcohol.  Here for cycle 1 day 1 TCHP chemotherapy.  Reviewed echo which showed ejection fraction of 64% with strain pattern of -21.  Patient understands all the side effects\  5/4/2023: Patient tolerated Cycle 1 TCHP well. She has had diarrhea, nausea and fatigue that have been well managed.   5/18/2023: C2 TCHP  5/22/2023: Here for close follow-up and possible IV fluids after treatment last Friday, 5/18/2023.  Patient had a fairly good weekend with good oral intake, weight that is notably stable, and very little nausea.  She only had 1 loose stool this morning and therefore no significant diarrhea.  CMP is WNL.  Per discussion with patient we will give just 500 mL normal saline.  May 25, 2023: Patient has diarrhea.  She is 1 week out of cycle 2 Taxotere carboplatin Herceptin Perjeta.  She has some abdominal cramping.  We will give her IV fluids today.  Given carboplatinum is not available she will receive Taxotere Herceptin Perjeta for 2 more cycles prior to going to Adriamycin Cytoxan.  Cycle #3 TCHP 6/8/2023  Patient returns 6/12/2023 for toxicity check.  She is eating and drinking sufficiently at this point.  She does not feel she has benefited from the IV fluids in the past and therefore will not do that today.  She denies any fevers or chills.  June 29, 2023: Cycle 4 TCHP.  She will need referral to radiation at the completion of 6 cycles of TCHP  07/20/2023 proceed with cycle 5 of carboplatin,  Taxotere, Herceptin, Perjeta with Neulasta support.    *S/p  IUD removal  Experiencing hot flashes with chemo likely triggering menopausal changes.    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 She did recommend she start taking her gabapentin again to help with her anxiety and irritability/anger from her decadron with treatment.   Patient continues follow-up with supportive oncology, RADHA Rolle. Patient is encouraged to continue to follow with Cierra.     *Oral Thrush and Chemotherapy induced mucositis  5/4/2023: I am concerned patient is starting into chemotherapy induced mucositis. She is reporting sore throat at times, possibly worsened reflux, mouth and tongue changes. She noticed white plaquing on her tongue this morning. She has not been able to  her Yen's Magic Mouthwash until today because of pharmacy supplies. She is currently on omeprazole 20 mg daily. She can increase this to 40 mg daily if needed. Prescription sent to pharmacy today to start Fluconazole 200 mg on day and 100 mg on day 2-7. We will continue to monitor symptoms and watch liver enzymes closely. ALT 34 and AST 24.  Liver function test mildly elevated today, unsure if that is the effect of fluconazole.  In the future avoid fluconazole and consider just Yen's Magic mouthwash.  7/6/2023 Oral thrush recurrent today, mild but bothersome.  Refill Magic mouthwash.  Patient continuing to rinse her mouth to minimize oral candida.     * Maculopapular rash on chest wall  5/4/2023: Continue to monitor. Appear acne-like in nature. She is encouraged to moisturizer her chest wall well with an Aquaphor or CeraVe moisturizing ointment. We will continue to monitor. She may  continue hydrocortisone as needed if she is having itching.   Resolution of the rash    *  Port study negative    *Intermittent mild elevation of LFTs.    Patient has previously reported intermittent use of Tylenol and alcohol.    7/6/2023 AST 51, , alk phos 184.  Patient denies any alcohol intake or Tylenol.  This is felt to be drug related.  Discussed we will monitor with plans to reassess before giving cycle 5.    *Cardiac monitoring  Seen by cardiology today, 7/27/2023.  Echo stable.  Plan for continued echocardiogram every 3 months while on therapy.  Continue low-dose lisinopril    PLAN:  No need for IV fluids today.  Return 2 weeks for follow-up with Dr. Frederick and cycle 6 TC.  Call with any questions or concerns prior to scheduled return.      This patient is on high risk drug therapy requiring intensive monitoring for toxicity.          RADHA Woods Dr.

## 2023-07-27 NOTE — PROGRESS NOTES
Date of Office Visit: 2023  Encounter Provider: RADHA Luna  Place of Service: [facility] CARDIOLOGY  Patient Name: Ursula Kulkarni  :1975    Chief complaint  Follow-up for cardio oncology care    History of Present Illness  Patient is a 48-year-old female patient of Dr. Fermin.  Past medical history includes  Graves' disease (s/p radioactive ablation), iatrogenic hypothyroidism, ADD, hyperlipidemia, 2023 diagnosed with left-sided breast cancer.  She underwent bilateral mastectomy and lymph node biopsy and placement of tissue expanders on 3/8/2023.  There is now a discussion regarding chemotherapy options including AC followed by THP versus TCHP. Baseline echocardiogram on 4/10/2023 showed LVEF 64.7%, GLS -21.0%, normal LV cavity size and wall thickness, normal diastolic function, and trace tricuspid regurgitation.    Interval history  Patient presents today for routine follow-up.  I will visit with her for the first time today and have reviewed her medical record.  Since last visit she has overall been doing well.  She denies palpitations, shortness of breath, edema, dizziness, chest pain or chest pressure, syncope or presyncope.  She does report stable fatigue.  Blood pressure today is at goal and she reports similar readings at home and is only taking lisinopril as needed (every few nights).  She continues to undergo chemotherapy and has plans for radiation to the left side of her chest in late September or early October of this year (25 treatments total).  She had echocardiogram done today but results are not available to me at this time.     Labs 2023 show creatinine 0.67, sodium 140, potassium 4.1, calcium 9.9, albumin 4.6, ALT 58, AST 35, alk phos 128 (all improved since 2023).  Hemoglobin 11.9, platelets 223  Past Medical History:   Diagnosis Date    ADD (attention deficit disorder)     Bilateral impacted cerumen 2020    Breast cancer  03/08/2023    LUCIO MASTECTOMY    Foot fracture, left     History of COVID-19 2022    History of gestational diabetes 2010    History of Graves' disease 2012    History of radioactive iodine thyroid ablation 2012    History of vertigo     Hyperlipidemia     Hypothyroidism     Melanoma of back     Positive depression screening 07/13/2020     Past Surgical History:   Procedure Laterality Date    BREAST RECONSTRUCTION Bilateral 3/8/2023    Procedure: BILATERAL PLACEMENT  TISSUE EXPANDER AND ALLODERM;  Surgeon: Eamon Stone MD;  Location: [facility] MAIN OR;  Service: Plastics;  Laterality: Bilateral;    D & C CERVICAL BIOPSY  01/2006    INTRAUTERINE DEVICE INSERTION  2010    Mirena    INTRAUTERINE DEVICE INSERTION  2015    Mirena exchange, old IUD removal and new one inserted by Dr Carr, Lot # [id number] 09/17.mar    MASTECTOMY W/ SENTINEL NODE BIOPSY Bilateral 3/8/2023    Procedure: Bilateral total mastectomy, left axillary sentinel lymph node biopsy;  Surgeon: Kell Hi MD;  Location: [facility] MAIN OR;  Service: General;  Laterality: Bilateral;    SCAR REVISION BREAST Right 4/3/2023    Procedure: RIGHT MASTECTOMY REVISION;  Surgeon: Eamon Stone MD;  Location: [facility] OR;  Service: Plastics;  Laterality: Right;    VENOUS ACCESS DEVICE (PORT) INSERTION Right 4/13/2023    Procedure: right port placement;  Surgeon: Kell Hi MD;  Location: [facility] OR;  Service: General;  Laterality: Right;    WISDOM TOOTH EXTRACTION       Outpatient Medications Prior to Visit   Medication Sig Dispense Refill    acetaminophen (TYLENOL) 325 MG tablet Take 2 tablets by mouth Every 4 (Four) Hours As Needed for Mild Pain. 60 tablet 0    aluminum-magnesium hydroxide-simethicone-nystatin in diphenhydrAMINE liquid Swish and spit 5 mL every 4 (four) hours as needed. 380 mL 4    COLLAGEN PO Take 1 tablet by mouth Daily.      dexamethasone (DECADRON) 4 MG tablet Take 1 tablet by mouth Daily.       escitalopram (Lexapro) 10 MG tablet Take 1 tablet by mouth Daily. 30 tablet 1    levothyroxine (SYNTHROID, LEVOTHROID) 125 MCG tablet Take 1 tablet by mouth Daily. 90 tablet 1    lidocaine-prilocaine (EMLA) 2.5-2.5 % cream Apply 1 application topically to the appropriate area as directed As Needed for Mild Pain (apply pea-sized amount to port site 30 minutes prior to port being accessed). 30 g 3    lisinopril (PRINIVIL,ZESTRIL) 2.5 MG tablet Take 1 tablet by mouth Daily. 30 tablet 2    loratadine (CLARITIN) 10 MG tablet Take 1 tablet by mouth Daily.      LORazepam (ATIVAN) 0.5 MG tablet Take 1 tablet by mouth At Night As Needed for Anxiety. 30 tablet 0    OLANZapine (ZyPREXA) 5 MG tablet Take 1 tablet by mouth Every Night. Take on days 2, 3 and 4 after chemotherapy. 3 tablet 5    ondansetron (ZOFRAN) 8 MG tablet Take 1 tablet by mouth 3 (Three) Times a Day As Needed for Nausea or Vomiting. 30 tablet 5    pantoprazole (PROTONIX) 40 MG EC tablet Take 1 tablet by mouth Daily. 30 tablet 3    Probiotic Product (PROBIOTIC ADVANCED PO) Take 1 tablet by mouth Daily.       No facility-administered medications prior to visit.       Allergies as of 07/27/2023 - Reviewed 07/27/2023   Allergen Reaction Noted    Erythromycin GI Intolerance 01/27/2023    Penicillins Rash 07/13/2020     Social History     Socioeconomic History    Marital status:      Spouse name: Parish    Number of children: 2   Tobacco Use    Smoking status: Never    Smokeless tobacco: Never   Vaping Use    Vaping Use: Never used   Substance and Sexual Activity    Alcohol use: Not Currently     Alcohol/week: 1.0 standard drink     Types: 1 Glasses of wine per week     Comment: Daily caffeine use    Drug use: Never    Sexual activity: Yes     Partners: Male     Comment:       Family History   Problem Relation Age of Onset    Diabetes Father     Hyperlipidemia Father     Arthritis Father     Hypertension Father     Heart disease Father         S/P  "stents    Heart attack Father 41    Colon polyps Father     Melanoma Father     Prostate cancer Father     Hyperlipidemia Sister     Hypertension Sister     Heart attack Sister     Diabetes Sister     Thyroid cancer Paternal Aunt     Lung cancer Maternal Grandmother     Cancer Maternal Grandfather         Bladder cancer    Lung cancer Maternal Grandfather     Heart failure Paternal Grandmother     Transient ischemic attack Paternal Grandmother     Heart disease Paternal Grandfather     Malig Hyperthermia Neg Hx      Review of Systems   Constitutional: Positive for malaise/fatigue.   Cardiovascular:  Negative for chest pain, claudication, dyspnea on exertion, leg swelling, near-syncope, orthopnea, palpitations, paroxysmal nocturnal dyspnea and syncope.   Respiratory:  Negative for shortness of breath.    Neurological:  Negative for brief paralysis, dizziness, headaches and light-headedness.   All other systems reviewed and are negative.     Objective:     Vitals:    07/27/23 0926   BP: 114/70   Pulse: 77   Weight: 75.8 kg (167 lb)   Height: 160 cm (63\")     Body mass index is 29.58 kg/m².    Vitals reviewed.   Constitutional:       General: Not in acute distress.     Appearance: Well-developed and not in distress. Not diaphoretic.   HENT:      Head: Normocephalic.   Pulmonary:      Effort: Pulmonary effort is normal. No respiratory distress.      Breath sounds: Normal breath sounds. No wheezing. No rhonchi. No rales.   Cardiovascular:      Normal rate. Regular rhythm.      Murmurs: There is no murmur.   Pulses:     Radial: 2+ bilaterally.  Edema:     Peripheral edema absent.   Skin:     General: Skin is warm and dry. There is no cyanosis.      Findings: No rash.   Neurological:      Mental Status: Alert and oriented to person, place, and time.   Psychiatric:         Behavior: Behavior normal. Behavior is cooperative.         Thought Content: Thought content normal.         Judgment: Judgment normal.     Lab Review: "     ECG 12 Lead    Date/Time: 7/27/2023 9:34 AM  Performed by: Tia Healy APRN  Authorized by: Tia Healy APRN   Comparison: compared with previous ECG   Similar to previous ECG  Rhythm: sinus rhythm  Rate: normal  BPM: 77  QRS axis: normal  Comments: Similar to prior.  No new ischemic changes      Assessment:       Diagnosis Plan   1. Encounter for monitoring cardiotoxic drug therapy  ECG 12 Lead    Adult Transthoracic Echo Limited W/ Cont if Necessary Per Protocol      2. Elevated blood pressure reading without diagnosis of hypertension  ECG 12 Lead    Adult Transthoracic Echo Limited W/ Cont if Necessary Per Protocol      3. Mild hyperlipidemia  ECG 12 Lead    Adult Transthoracic Echo Limited W/ Cont if Necessary Per Protocol        Plan:       1.  Left-sided breast cancer, status post bilateral mastectomy. Now receiving Taxotere, Carboplatin, Perjeta and Herceptin with plans for 25 radiation treatments (left sided) in September or October of this year. Baseline echo 4/10/23 with EF 64.7% and GLS -21.0%. Troponin and proBNP negative. Now on low dose lisinopril. Plan for echocardiogram q3 months until end of chemotherapy   2.  Hypothyroidism. On levothyroxine  3.  Dyslipidemia.  Lipids and 10/2022 with , HDL 47, triglycerides normal.  She will try to pursue more aggressive low-cholesterol diet.  Recheck labs at next appointment with oncology. Orders entered.  4.  ADD      Time Spent: I spent 30 minutes caring for Ursula on this date of service. This time includes time spent by me in the following activities: preparing for the visit, reviewing tests, performing a medically appropriate examination and/or evaluation, counseling and educating the patient/family/caregiver, ordering medications, tests, or procedures, and documenting information in the medical record.   I spent 1 minutes on the separately reported service of ECG. This time is not included in the time used to support the E/M service  also reported today.        Your medication list            Accurate as of July 27, 2023 12:00 PM. If you have any questions, ask your nurse or doctor.                CONTINUE taking these medications        Instructions Last Dose Given Next Dose Due   acetaminophen 325 MG tablet  Commonly known as: TYLENOL      Take 2 tablets by mouth Every 4 (Four) Hours As Needed for Mild Pain.       aluminum-magnesium hydroxide-simethicone-nystatin in diphenhydrAMINE liquid      Swish and spit 5 mL every 4 (four) hours as needed.       COLLAGEN PO      Take 1 tablet by mouth Daily.       dexamethasone 4 MG tablet  Commonly known as: DECADRON      Take 1 tablet by mouth Daily.       escitalopram 10 MG tablet  Commonly known as: Lexapro      Take 1 tablet by mouth Daily.       levothyroxine 125 MCG tablet  Commonly known as: SYNTHROID, LEVOTHROID      Take 1 tablet by mouth Daily.       lidocaine-prilocaine 2.5-2.5 % cream  Commonly known as: EMLA      Apply 1 application topically to the appropriate area as directed As Needed for Mild Pain (apply pea-sized amount to port site 30 minutes prior to port being accessed).       lisinopril 2.5 MG tablet  Commonly known as: PRINIVIL,ZESTRIL      Take 1 tablet by mouth Daily.       loratadine 10 MG tablet  Commonly known as: CLARITIN      Take 1 tablet by mouth Daily.       LORazepam 0.5 MG tablet  Commonly known as: ATIVAN      Take 1 tablet by mouth At Night As Needed for Anxiety.       OLANZapine 5 MG tablet  Commonly known as: ZyPREXA      Take 1 tablet by mouth Every Night. Take on days 2, 3 and 4 after chemotherapy.       ondansetron 8 MG tablet  Commonly known as: ZOFRAN      Take 1 tablet by mouth 3 (Three) Times a Day As Needed for Nausea or Vomiting.       pantoprazole 40 MG EC tablet  Commonly known as: PROTONIX      Take 1 tablet by mouth Daily.       PROBIOTIC ADVANCED PO      Take 1 tablet by mouth Daily.                Patient is no longer taking -.  I corrected the med  list to reflect this.  I did not stop these medications.      Dictated utilizing Dragon dictation

## 2023-07-28 NOTE — TELEPHONE ENCOUNTER
I spoke with Ursula Kulkarni and updated pt on results/recommendations from provider.  Pt verbalized understanding and has no further questions at this time.    Thank you,    Ranjana Kaufman, RN  Triage [facility]  07/28/23 08:19 EDT

## 2023-07-31 DIAGNOSIS — E89.0 POSTABLATIVE HYPOTHYROIDISM: ICD-10-CM

## 2023-07-31 RX ORDER — LISINOPRIL 2.5 MG/1
TABLET ORAL
Qty: 30 TABLET | Refills: 5 | Status: SHIPPED | OUTPATIENT
Start: 2023-07-31

## 2023-08-01 RX ORDER — LEVOTHYROXINE SODIUM 0.12 MG/1
125 TABLET ORAL DAILY
Qty: 30 TABLET | Refills: 1 | Status: SHIPPED | OUTPATIENT
Start: 2023-08-01

## 2023-08-04 ENCOUNTER — TELEPHONE (OUTPATIENT)
Dept: ONCOLOGY | Facility: CLINIC | Age: 48
End: 2023-08-04
Payer: COMMERCIAL

## 2023-08-10 ENCOUNTER — INFUSION (OUTPATIENT)
Dept: ONCOLOGY | Facility: HOSPITAL | Age: 48
End: 2023-08-10
Payer: COMMERCIAL

## 2023-08-10 ENCOUNTER — OFFICE VISIT (OUTPATIENT)
Dept: ONCOLOGY | Facility: CLINIC | Age: 48
End: 2023-08-10
Payer: COMMERCIAL

## 2023-08-10 VITALS
BODY MASS INDEX: 30.51 KG/M2 | WEIGHT: 172.2 LBS | HEART RATE: 101 BPM | HEIGHT: 63 IN | TEMPERATURE: 98.4 F | OXYGEN SATURATION: 98 % | DIASTOLIC BLOOD PRESSURE: 86 MMHG | SYSTOLIC BLOOD PRESSURE: 122 MMHG | RESPIRATION RATE: 16 BRPM

## 2023-08-10 DIAGNOSIS — C50.812 MALIGNANT NEOPLASM OF OVERLAPPING SITES OF LEFT BREAST IN FEMALE, ESTROGEN RECEPTOR POSITIVE: Primary | ICD-10-CM

## 2023-08-10 DIAGNOSIS — C50.812 MALIGNANT NEOPLASM OF OVERLAPPING SITES OF LEFT BREAST IN FEMALE, ESTROGEN RECEPTOR POSITIVE: ICD-10-CM

## 2023-08-10 DIAGNOSIS — Z79.899 HIGH RISK MEDICATION USE: ICD-10-CM

## 2023-08-10 DIAGNOSIS — U07.1 COVID-19 VIRUS INFECTION: ICD-10-CM

## 2023-08-10 DIAGNOSIS — Z17.0 MALIGNANT NEOPLASM OF OVERLAPPING SITES OF LEFT BREAST IN FEMALE, ESTROGEN RECEPTOR POSITIVE: Primary | ICD-10-CM

## 2023-08-10 DIAGNOSIS — E89.0 POSTABLATIVE HYPOTHYROIDISM: ICD-10-CM

## 2023-08-10 DIAGNOSIS — Z17.0 MALIGNANT NEOPLASM OF OVERLAPPING SITES OF LEFT BREAST IN FEMALE, ESTROGEN RECEPTOR POSITIVE: ICD-10-CM

## 2023-08-10 DIAGNOSIS — Z45.2 ENCOUNTER FOR ADJUSTMENT OR MANAGEMENT OF VASCULAR ACCESS DEVICE: ICD-10-CM

## 2023-08-10 LAB
ALBUMIN SERPL-MCNC: 4.5 G/DL (ref 3.5–5.2)
ALBUMIN/GLOB SERPL: 2.4 G/DL
ALP SERPL-CCNC: 98 U/L (ref 39–117)
ALT SERPL W P-5'-P-CCNC: 59 U/L (ref 1–33)
ANION GAP SERPL CALCULATED.3IONS-SCNC: 17.9 MMOL/L (ref 5–15)
AST SERPL-CCNC: 32 U/L (ref 1–32)
BASOPHILS # BLD AUTO: 0.03 10*3/MM3 (ref 0–0.2)
BASOPHILS NFR BLD AUTO: 0.3 % (ref 0–1.5)
BILIRUB SERPL-MCNC: 0.4 MG/DL (ref 0–1.2)
BUN SERPL-MCNC: 14 MG/DL (ref 6–20)
BUN/CREAT SERPL: 21.5 (ref 7–25)
CALCIUM SPEC-SCNC: 9.4 MG/DL (ref 8.6–10.5)
CHLORIDE SERPL-SCNC: 103 MMOL/L (ref 98–107)
CO2 SERPL-SCNC: 18.1 MMOL/L (ref 22–29)
CREAT SERPL-MCNC: 0.65 MG/DL (ref 0.6–1.1)
DEPRECATED RDW RBC AUTO: 58.8 FL (ref 37–54)
EGFRCR SERPLBLD CKD-EPI 2021: 110.1 ML/MIN/1.73
EOSINOPHIL # BLD AUTO: 0 10*3/MM3 (ref 0–0.4)
EOSINOPHIL NFR BLD AUTO: 0 % (ref 0.3–6.2)
ERYTHROCYTE [DISTWIDTH] IN BLOOD BY AUTOMATED COUNT: 15.5 % (ref 12.3–15.4)
GLOBULIN UR ELPH-MCNC: 1.9 GM/DL
GLUCOSE SERPL-MCNC: 139 MG/DL (ref 65–99)
HCT VFR BLD AUTO: 35.4 % (ref 34–46.6)
HGB BLD-MCNC: 11.9 G/DL (ref 12–15.9)
IMM GRANULOCYTES # BLD AUTO: 0.22 10*3/MM3 (ref 0–0.05)
IMM GRANULOCYTES NFR BLD AUTO: 1.8 % (ref 0–0.5)
LYMPHOCYTES # BLD AUTO: 1.57 10*3/MM3 (ref 0.7–3.1)
LYMPHOCYTES NFR BLD AUTO: 13.1 % (ref 19.6–45.3)
MCH RBC QN AUTO: 34.4 PG (ref 26.6–33)
MCHC RBC AUTO-ENTMCNC: 33.6 G/DL (ref 31.5–35.7)
MCV RBC AUTO: 102.3 FL (ref 79–97)
MONOCYTES # BLD AUTO: 0.53 10*3/MM3 (ref 0.1–0.9)
MONOCYTES NFR BLD AUTO: 4.4 % (ref 5–12)
NEUTROPHILS NFR BLD AUTO: 80.4 % (ref 42.7–76)
NEUTROPHILS NFR BLD AUTO: 9.65 10*3/MM3 (ref 1.7–7)
NRBC BLD AUTO-RTO: 0 /100 WBC (ref 0–0.2)
PLATELET # BLD AUTO: 228 10*3/MM3 (ref 140–450)
PMV BLD AUTO: 9.6 FL (ref 6–12)
POTASSIUM SERPL-SCNC: 4.1 MMOL/L (ref 3.5–5.2)
PROT SERPL-MCNC: 6.4 G/DL (ref 6–8.5)
RBC # BLD AUTO: 3.46 10*6/MM3 (ref 3.77–5.28)
SODIUM SERPL-SCNC: 139 MMOL/L (ref 136–145)
T4 FREE SERPL-MCNC: 1.5 NG/DL (ref 0.93–1.7)
TSH SERPL DL<=0.05 MIU/L-ACNC: 0.94 UIU/ML (ref 0.27–4.2)
WBC NRBC COR # BLD: 12 10*3/MM3 (ref 3.4–10.8)

## 2023-08-10 PROCEDURE — 96417 CHEMO IV INFUS EACH ADDL SEQ: CPT

## 2023-08-10 PROCEDURE — 80050 GENERAL HEALTH PANEL: CPT

## 2023-08-10 PROCEDURE — 96375 TX/PRO/DX INJ NEW DRUG ADDON: CPT

## 2023-08-10 PROCEDURE — 25010000002 DIPHENHYDRAMINE PER 50 MG: Performed by: INTERNAL MEDICINE

## 2023-08-10 PROCEDURE — 25010000002 TRASTUZUMAB PER 10 MG: Performed by: INTERNAL MEDICINE

## 2023-08-10 PROCEDURE — 25010000002 HEPARIN LOCK FLUSH PER 10 UNITS: Performed by: INTERNAL MEDICINE

## 2023-08-10 PROCEDURE — 25010000002 DOCETAXEL 10 MG/ML SOLUTION 8 ML VIAL: Performed by: INTERNAL MEDICINE

## 2023-08-10 PROCEDURE — 25010000002 FOSAPREPITANT PER 1 MG: Performed by: INTERNAL MEDICINE

## 2023-08-10 PROCEDURE — 25010000002 CARBOPLATIN PER 50 MG: Performed by: INTERNAL MEDICINE

## 2023-08-10 PROCEDURE — 96413 CHEMO IV INFUSION 1 HR: CPT

## 2023-08-10 PROCEDURE — 84439 ASSAY OF FREE THYROXINE: CPT | Performed by: INTERNAL MEDICINE

## 2023-08-10 PROCEDURE — 25010000002 PERTUZUMAB 420 MG/14ML SOLUTION 420 MG VIAL: Performed by: INTERNAL MEDICINE

## 2023-08-10 PROCEDURE — 25010000002 PALONOSETRON PER 25 MCG: Performed by: INTERNAL MEDICINE

## 2023-08-10 PROCEDURE — 96367 TX/PROPH/DG ADDL SEQ IV INF: CPT

## 2023-08-10 RX ORDER — HEPARIN SODIUM (PORCINE) LOCK FLUSH IV SOLN 100 UNIT/ML 100 UNIT/ML
500 SOLUTION INTRAVENOUS AS NEEDED
Status: CANCELLED | OUTPATIENT
Start: 2023-08-10

## 2023-08-10 RX ORDER — FAMOTIDINE 10 MG/ML
20 INJECTION, SOLUTION INTRAVENOUS AS NEEDED
Status: CANCELLED | OUTPATIENT
Start: 2023-08-10

## 2023-08-10 RX ORDER — FAMOTIDINE 10 MG/ML
20 INJECTION, SOLUTION INTRAVENOUS ONCE
Status: CANCELLED | OUTPATIENT
Start: 2023-08-10

## 2023-08-10 RX ORDER — OLANZAPINE 5 MG/1
5 TABLET ORAL ONCE
Status: CANCELLED | OUTPATIENT
Start: 2023-08-10 | End: 2023-08-10

## 2023-08-10 RX ORDER — OLANZAPINE 5 MG/1
5 TABLET ORAL ONCE
Status: COMPLETED | OUTPATIENT
Start: 2023-08-10 | End: 2023-08-10

## 2023-08-10 RX ORDER — SODIUM CHLORIDE 0.9 % (FLUSH) 0.9 %
10 SYRINGE (ML) INJECTION AS NEEDED
Status: CANCELLED | OUTPATIENT
Start: 2023-08-10

## 2023-08-10 RX ORDER — LEVOTHYROXINE SODIUM 0.12 MG/1
125 TABLET ORAL DAILY
Qty: 30 TABLET | Refills: 1 | Status: SHIPPED | OUTPATIENT
Start: 2023-08-10

## 2023-08-10 RX ORDER — HEPARIN SODIUM (PORCINE) LOCK FLUSH IV SOLN 100 UNIT/ML 100 UNIT/ML
500 SOLUTION INTRAVENOUS AS NEEDED
Status: DISCONTINUED | OUTPATIENT
Start: 2023-08-10 | End: 2023-08-10 | Stop reason: HOSPADM

## 2023-08-10 RX ORDER — PALONOSETRON 0.05 MG/ML
0.25 INJECTION, SOLUTION INTRAVENOUS ONCE
Status: CANCELLED | OUTPATIENT
Start: 2023-08-10

## 2023-08-10 RX ORDER — SODIUM CHLORIDE 9 MG/ML
1000 INJECTION, SOLUTION INTRAVENOUS ONCE
Start: 2023-08-14 | End: 2023-08-14

## 2023-08-10 RX ORDER — SODIUM CHLORIDE 0.9 % (FLUSH) 0.9 %
10 SYRINGE (ML) INJECTION AS NEEDED
Status: DISCONTINUED | OUTPATIENT
Start: 2023-08-10 | End: 2023-08-10 | Stop reason: HOSPADM

## 2023-08-10 RX ORDER — PALONOSETRON 0.05 MG/ML
0.25 INJECTION, SOLUTION INTRAVENOUS ONCE
Status: COMPLETED | OUTPATIENT
Start: 2023-08-10 | End: 2023-08-10

## 2023-08-10 RX ORDER — DIPHENHYDRAMINE HYDROCHLORIDE 50 MG/ML
50 INJECTION INTRAMUSCULAR; INTRAVENOUS AS NEEDED
Status: CANCELLED | OUTPATIENT
Start: 2023-08-10

## 2023-08-10 RX ORDER — FAMOTIDINE 10 MG/ML
20 INJECTION, SOLUTION INTRAVENOUS ONCE
Status: COMPLETED | OUTPATIENT
Start: 2023-08-10 | End: 2023-08-10

## 2023-08-10 RX ORDER — SODIUM CHLORIDE 9 MG/ML
250 INJECTION, SOLUTION INTRAVENOUS ONCE
Status: COMPLETED | OUTPATIENT
Start: 2023-08-10 | End: 2023-08-10

## 2023-08-10 RX ORDER — SODIUM CHLORIDE 9 MG/ML
250 INJECTION, SOLUTION INTRAVENOUS ONCE
Status: CANCELLED | OUTPATIENT
Start: 2023-08-10

## 2023-08-10 RX ADMIN — FOSAPREPITANT DIMEGLUMINE 100 ML: 150 INJECTION, POWDER, LYOPHILIZED, FOR SOLUTION INTRAVENOUS at 10:35

## 2023-08-10 RX ADMIN — CARBOPLATIN 750 MG: 10 INJECTION INTRAVENOUS at 12:53

## 2023-08-10 RX ADMIN — DIPHENHYDRAMINE HYDROCHLORIDE 25 MG: 50 INJECTION INTRAMUSCULAR; INTRAVENOUS at 09:42

## 2023-08-10 RX ADMIN — SODIUM CHLORIDE 250 ML: 9 INJECTION, SOLUTION INTRAVENOUS at 09:39

## 2023-08-10 RX ADMIN — Medication 10 ML: at 13:27

## 2023-08-10 RX ADMIN — Medication 500 UNITS: at 13:29

## 2023-08-10 RX ADMIN — PERTUZUMAB 420 MG: 30 INJECTION, SOLUTION, CONCENTRATE INTRAVENOUS at 10:02

## 2023-08-10 RX ADMIN — OLANZAPINE 5 MG: 5 TABLET, FILM COATED ORAL at 09:40

## 2023-08-10 RX ADMIN — FAMOTIDINE 20 MG: 10 INJECTION INTRAVENOUS at 09:40

## 2023-08-10 RX ADMIN — TRASTUZUMAB 470 MG: 150 INJECTION, POWDER, LYOPHILIZED, FOR SOLUTION INTRAVENOUS at 11:09

## 2023-08-10 RX ADMIN — PALONOSETRON 0.25 MG: 0.05 INJECTION, SOLUTION INTRAVENOUS at 10:33

## 2023-08-10 RX ADMIN — SODIUM CHLORIDE 135 MG: 9 INJECTION, SOLUTION INTRAVENOUS at 11:50

## 2023-08-10 NOTE — PROGRESS NOTES
Subjective     REASON FOR FOLLOW-UP:  1.  Invasive lobular carcinoma multifocal,  Left breast anterolateral stereotactic biopsy invasive lobular carcinoma, Marie score of 6, grade 2, 4 mm, ER 95%, NM 95%, HER2/cheryl 0 negative with Ki-67 of 5%.  Positive for atypical lobular hyperplasia  Left breast 1 o'clock position 10 cm from the nipple ultrasound-guided biopsy consistent with invasive lobular carcinoma grade 2 [place] score of 6, 4 mm with lobular carcinoma in situ, ER 90%, %, HER2/cheryl 0  Left breast upper stereotactic guided core biopsy invasive lobular carcinoma grade 2 [place] score of six 3 mm with lobular carcinoma in situ, ER 90%, %, HER2/cheryl equivocal 2+, FISH shows her to 5.7 with CEP 1.6 and ratio HER2 nu/KEIRA-17 ratio of 3.5.  It is amplified.    2.  March 8, 2023:p T3 N0 M0 invasive lobular carcinoma s/p bilateral mastectomy with left deep axillary sentinel lymph node biopsy, with bilateral placement of prepectoral tissue expanders for reconstruction and bilateral placement of AlloDerm.  Pathology shows multifocal tumor in  upper outer quadrant and upper inner quadrant, invasive lobular carcinoma grade 2 with a [place] score  score of 7  , total of 4 foci were seen, largest tumor being 60 mm, 20 mm, 2 mm, 1 mm.  No DCIS identified.  Lobular carcinoma in situ present.  No lymphatic or vascular channel invasion, no dermal lymphovascular space invasion all margins were negative for invasive carcinoma 4 lymph nodes were all negative for malignancy its pathologic T3N0  ER %, NM %, HER2/cheryl 2+ with Ki-67 of 5%  Previous biopsies had shown 2 of the tumors were ER/NM positive HER2/cheryl negative and one of them was ER/NM positive HER2 positive  On discussing with pathologist Dr. Serrano, she thinks that the larger tumor was ER NM positive and HER2 positive but unsure if it was the 60 mm tumor or 20 mm tumor.  Either way it was not the smaller tumors which was HER2  positive.  All tumors look-alike per pathologist and they are invasive lobular carcinoma.                  HISTORY OF PRESENT ILLNESS:         Patient is a 48 y.o. female with history of multifocal left breast cancer with MRI of the breast showing almost 15 lesions.  Biopsies on 3 different lesions were done and they were all consistent with invasive lobular carcinoma.  2 of the lesions where ER/TX positive HER2/cheryl negative and one of the larger lesion was ER/TX positive HER2 2+ equivocal.  FISH testing was done and the HER2 copy number was 5.7 with HER2/CEP ratio of 3.5.  The FISH was amplified.    Patient underwent bilateral mastectomy    With left sentinel lymph node surgery..  The pathology shows tumor to be present in the left upper outer quadrant invasive lobular carcinoma with a Marie score of 7, grade 2.  There were 4 lesions the greatest size was 6 0 mm, 20 mm, 2 mm and 1 mm in size.  Lobular carcinoma in situ is present.  All margins are negative for invasive carcinoma.  4 lymph nodes negative.  Patient has T3N0 invasive lobular carcinoma.      Discussed with pathology in length Dr. Serrano, one of the larger lesions which was 60 mm was heterogeneous it was biopsied at 2 separate spots.  The left anterior lateral and left 1:00 core fragment was ER/TX positive HER2/cheryl negative the left upper core biopsy was ER/TX positive HER2/cheryl 2+ equivocal but HER2/cheryl FISH was positive.    On discussion with pathology the 60 mm tumor showed ER and TX positive and HER2 positive by FISH with a Ki-67 of 40% at the Top-Hat clip.  The HER2/cheryl copy number was 5.7 with a HER2/cheryl by CEP ratio 3.5 which is positive.  The Ki-67 on the lower part was 13%.  The 20 mm tumor had a Ki-67 of 9%.  So the larger lesion was triple positive and heterogeneous as 2 biopsies were done on the larger lesions and that was both triple positive and at the second biopsy was ER/TX positive HER2/cheryl negative so it was a heterogeneous tumor.   The 20 mm lesion was ER/KS positive HER2/cheryl negative.    Given that she had a large tumor with high Ki-67 and premenopausal status we discussed about giving Taxotere carboplatin Herceptin Perjeta.    Interval History:   Patient is here for cycle 6 TCHP.  So far she has tolerated it well and has not required fluids in between.  She does get Neulasta support.  We have decreased her steroid dose as it had caused her to become very emotional.  Following this she will be needing to be referred to radiation oncology to consider radiation.  She will also need to start Herceptin Perjeta every 3 weeks for total of 1 year.    I reviewed the echocardiogram which is normal.  And stable from before she will require to go on endocrine therapy.  She is likely eligible for our clinical protocol with Lupron every 3 months and will try to see if she can be eligible for that.  However patient also wants to think about oophorectomy.  Is going to discuss with her primary gynecologist.  Her genetic testing was negative but given that she has had a history of fibroid she prefers to get the hysterectomy and bilateral oophorectomy      Oncology history:  Patient is a 48 y.o. female who has been recently diagnosed with left breast cancer.  Patient has been compliant with her mammograms.    There is no family history of breast or ovarian cancer.  Her father had prostate cancer and melanoma.  A paternal aunt had thyroid cancer.      Details are as follows.    November 29, 2022: Screening bilateral mammogram showed 8 mm asymmetry in the central left breast posterior one third.  This appears to be in the superior left breast on the MLO view.  Right breast was negative.  A left diagnostic mammogram and ultrasound was recommended.    January January 3, 2023: Left breast diagnostic mammogram showed the spiculated areas within the central posterior and lateral anterior superior left breast where redemonstrated.  Targeted ultrasound was done.  At 1  o'clock position 10 cm from the nipple there is a shadowing mass with peripheral spiculations which is concordant with the mammographic abnormality and suspicious for malignancy.  The more centrally located spiculation on the cc view posterior to the glandular tissue cannot be clearly identified on the ultrasound.    Impression was 2 separate suspicious spiculated lesion in the left breast.  One of the lesions is seen well on the ultrasound and should be amenable to ultrasound-guided core biopsy.  The other lesion which was seen in the CC projection was amicable to stereotactic guided tissue sampling.    January 3, 2023 patient underwent ultrasound-guided left breast biopsy    The ultrasound-guided biopsy of left breast 10 cm from the nipple at 1 o'clock position showed invasive lobular carcinoma moderately differentiated with a Marie grade of 2 and score of 6 measuring 4 mm.  There was focal lobular carcinoma in situ.    Left breast anterior lateral stereotactic core needle biopsy showed invasive lobular carcinoma moderately differentiated grade 2 with a [place] score of 6 measuring 4 mm.  There was atypical lobular hyperplasia.  Estrogen receptor was %, progesterone receptor was %, HER2/cheryl 0, Ki-67 5%.    Left breast upper stereotactic guided core biopsy showed invasive lobular carcinoma moderately differentiated grade 2 with Marie score of 6 with measuring 3 mm with lobular carcinoma in situ present.  I do not see the ER/WV or HER2 on the ultrasound-guided biopsy of the left breast lesion as well as the left upper stereotactic biopsy.  We will check with pathologist    January 23, 2023: Patient underwent  MRI of the breast bilateral    Right breast: Negative    Left breast: At 1:00 anterior left breast 4 cm from the nipple there is a 1.5 x 1.6 x 1.3 cm irregular enhancing mass which is biopsy-proven malignancy.  The biopsy clip is located within 0.9 x 1 x 1.1 cm postbiopsy hematoma along  the inferior margin of the mass.  And there is additional hematoma along the lateral margin of the mass which measures 1.2 x 1.9 x 0.9 cm.    At 1:00 in the middle to posterior left breast 7.4 cm from the nipple there is a 1.3 x 1 cm x 1 cm irregular enhancing mass mass with a focus from a biopsy clip along the inferior margin which also represents the biopsy-proven malignancy    At 12:00 in the posterior left breast 8.5 cm from the nipple there is a 1.9 x 1.8 x 2.2 cm irregular enhancing mass with a corresponding 2.9 cm biopsy cavity with a biopsy clip which represents the biopsy-proven malignancy    In addition there are multiple at least 12 similar-appearing irregular enhancing masses and foci are identified throughout the left breast predominantly involving the upper breast but also involving the lower outer quadrant.  At 11-12 o'clock in the middle and posterior left breast there are 3 adjacent reference masses together measuring 5.5 cm but individually measuring 1.2 x 0.9 x 1 cm.  At 1:00 in the far posterior left breast/axillary tail 13.7 cm posterior to the nipple is a 1.3 x 1.1 x 1 cm irregular enhancing mass which is located 0.8 cm from the anterolateral margin of the pectoralis Muscle.  Together the enhancing masses span approximately 11.7 x 8 x 7.8 cm.  There is no suspicious enhancement in the left nipple or chest wall.  Focal areas of skin enhancement likely reflect skin entry point from recent biopsies.  There are no pathologically enlarged internal mammary chain lymph nodes.    Impression    .  At least 15 similar-appearing irregular enhancing masses and foci  scattered throughout the left breast, together measuring up to 11.7 cm  in maximum dimension, encompass 3 sites of biopsy-proven malignancy and  are consistent with multicentric malignancy. Oncologic and surgical  management are recommended.  2.  No MRI evidence of malignancy in the right breast.    Patient is a 47-year-old female with  multifocal invasive lobular carcinoma who on screening mammogram showed 8 mm asymmetry in the central left breast posterior one third.  In the cc view.  It appears to be superior left breast on the MLO view.  There was also architectural distortion in the lateral left breast one third on the left cc view.    On diagnostic mammogram the spiculated areas within the central posterior and the lateral anterior superior left breast where redemonstrated.  Targeted ultrasound was done.  At 1 o'clock position 10 cm from the nipple there is a mass with spiculations which is suspicious.  The more centrally located spiculation on the cc view.  The more centrally located spiculation on the cc view cannot be clearly identified on ultrasound.  So there impression was there were 2 separate suspicious spiculated lesion in the left breast.  1 was seen well on the ultrasound and amenable to ultrasound-guided biopsy the other is seen well on mammography and a stereotactic guided biopsy suggested.    Patient subsequently underwent stereotactic biopsy of 1 lesion and ultrasound-guided biopsy of the 1 cm mass at 1 o'clock position 10 cm from the nipple.  A total of 3 different areas of the left breast were biopsied and specimens were sent to pathology.  The third biopsy was performed at 12 o'clock position in the posterior one third of the left breast.  All of the 3 sites were consistent with invasive lobular carcinoma at site A, B, and C.    MRI of the breast showed at 1 o'clock position of the left breast anteriorly 4 cm posterior to the nipple there is a 1.5 x 1.6 x 1.3 cm irregular enhancing mass.  There is a small hematoma along the inferior margin of the mass.  An additional hematoma along the lateral margin of the mass which is measuring 1.2 x 0.9 x 0.9 cm    At 1:00 in the middle to posterior left breast 7.4 cm posterior to the nipple there is a 1.3 x 1 x 1 cm enhancing mass with a biopsy clip.    At 12:00 posterior left breast  8.5 cm posterior to the nipple there is a 1.5 x 1.8 x 2.2 cm irregular enhancing mass with a corresponding 2.9 cm biopsy cavity with a clip which represents the biopsy site malignancy    Multiple at least 12 similar-appearing irregular enhancing masses and foci are identified throughout the left breast predominantly involving the upper breast but also involving the lower outer quadrant.  At 11-12 o'clock in the middle and posterior left breast there is a 3 adjacent reference masses measuring 5.5 cm in AP dimension and individually measuring 1.2 x 0.9 x 1 cm.  At 1:00 in the far posterior left breast axillary tail 13.7 cm from the nipple there is a 1.3 x 1.1 x 1 cm craniocaudal dimension irregular enhancing mass which is located 0.8 cm from the anterolateral margin of the pectoralis muscle.     Per the MRI at least 15 similar-appearing irregular enhancing masses and foci scattered throughout the left breast together measuring 11.7 cm in maximum dimension which encompasses 3 sites of biopsy-proven malignancy and a consistent with multicentric malignancy.  MRI of the right breast is negative    INVITAE genetic testing showed variation of uncertain significance of LUCAS  Gene.    Patient was suggested left total mastectomy, left axillary sentinel lymph node biopsy possible node dissection and possible reconstruction.    However patient called Dr. Hi and decided to go for upfront bilateral total mastectomy, left axillary sentinel lymph node biopsy and possible axillary lymph node dissection and reconstruction.     I presented the case in the breast cancer conference today.  Patient had multiple nodules per MRI on the left breast Dr. Todd looked at it and felt there were multiple nodules and the largest one was 2.2 cm.  The discussion was to go ahead and upfront do the surgery and subsequently treat with chemo/ endocrine therapy as needed.  The left breast MRI findings are slightly atypical and that it is not  contiguous involvement over 11.5 cm area but multiple nodules.  Surgical pathology will clarify.  If it is 1 big lesion or multiple sma      Past Medical History:   Diagnosis Date    ADD (attention deficit disorder)     Bilateral impacted cerumen 07/13/2020    Breast cancer 03/08/2023    LUCIO MASTECTOMY    Foot fracture, left     History of COVID-19 2022    History of gestational diabetes 2010    History of Graves' disease 2012    History of radioactive iodine thyroid ablation 2012    History of vertigo     Hyperlipidemia     Hypothyroidism     Melanoma of back     Positive depression screening 07/13/2020        Past Surgical History:   Procedure Laterality Date    BREAST RECONSTRUCTION Bilateral 3/8/2023    Procedure: BILATERAL PLACEMENT  TISSUE EXPANDER AND ALLODERM;  Surgeon: Eamon Stone MD;  Location: [facility] OR;  Service: Plastics;  Laterality: Bilateral;    D & C CERVICAL BIOPSY  01/2006    INTRAUTERINE DEVICE INSERTION  2010    Mirena    INTRAUTERINE DEVICE INSERTION  2015    Mirena exchange, old IUD removal and new one inserted by Dr Carr, Lot # [id number] exp 09/17.mar    MASTECTOMY W/ SENTINEL NODE BIOPSY Bilateral 3/8/2023    Procedure: Bilateral total mastectomy, left axillary sentinel lymph node biopsy;  Surgeon: Kell Hi MD;  Location: [facility] OR;  Service: General;  Laterality: Bilateral;    SCAR REVISION BREAST Right 4/3/2023    Procedure: RIGHT MASTECTOMY REVISION;  Surgeon: Eamon Stone MD;  Location: [facility] OR;  Service: Plastics;  Laterality: Right;    VENOUS ACCESS DEVICE (PORT) INSERTION Right 4/13/2023    Procedure: right port placement;  Surgeon: Kell Hi MD;  Location: [facility] OR;  Service: General;  Laterality: Right;    WISDOM TOOTH EXTRACTION          Current Outpatient Medications on File Prior to Visit   Medication Sig Dispense Refill    acetaminophen (TYLENOL) 325 MG tablet Take 2 tablets by mouth Every 4 (Four) Hours As Needed  for Mild Pain. 60 tablet 0    aluminum-magnesium hydroxide-simethicone-nystatin in diphenhydrAMINE liquid Swish and spit 5 mL every 4 (four) hours as needed. 380 mL 4    COLLAGEN PO Take 1 tablet by mouth Daily.      dexamethasone (DECADRON) 4 MG tablet Take 1 tablet by mouth Daily.      escitalopram (Lexapro) 10 MG tablet Take 1 tablet by mouth Daily. 30 tablet 1    lidocaine-prilocaine (EMLA) 2.5-2.5 % cream Apply 1 application topically to the appropriate area as directed As Needed for Mild Pain (apply pea-sized amount to port site 30 minutes prior to port being accessed). 30 g 3    lisinopril (PRINIVIL,ZESTRIL) 2.5 MG tablet Take 1 tablet by mouth once daily 30 tablet 5    loratadine (CLARITIN) 10 MG tablet Take 1 tablet by mouth Daily.      LORazepam (ATIVAN) 0.5 MG tablet Take 1 tablet by mouth At Night As Needed for Anxiety. 30 tablet 0    OLANZapine (ZyPREXA) 5 MG tablet Take 1 tablet by mouth Every Night. Take on days 2, 3 and 4 after chemotherapy. 3 tablet 5    ondansetron (ZOFRAN) 8 MG tablet Take 1 tablet by mouth 3 (Three) Times a Day As Needed for Nausea or Vomiting. 30 tablet 5    pantoprazole (PROTONIX) 40 MG EC tablet Take 1 tablet by mouth Daily. 30 tablet 3    Probiotic Product (PROBIOTIC ADVANCED PO) Take 1 tablet by mouth Daily.      [DISCONTINUED] levothyroxine (SYNTHROID, LEVOTHROID) 125 MCG tablet Take 1 tablet by mouth Daily. 30 tablet 1     Current Facility-Administered Medications on File Prior to Visit   Medication Dose Route Frequency Provider Last Rate Last Admin    [COMPLETED] CARBOplatin (PARAPLATIN) 750 mg in sodium chloride 0.9 % 325 mL chemo IVPB  750 mg Intravenous Once Kathrin Frederick MD   Stopped at 08/10/23 1325    [COMPLETED] diphenhydrAMINE (BENADRYL) IVPB 25 mg  25 mg Intravenous Once Kathrin Frederick MD   Stopped at 08/10/23 0957    [COMPLETED] DOCEtaxel (TAXOTERE) 135 mg in sodium chloride 0.9 % 263.5 mL chemo IVPB  75 mg/m2 (Treatment Plan Recorded) Intravenous Once  Kathrin Frederick MD   Stopped at 08/10/23 1251    [COMPLETED] famotidine (PEPCID) injection 20 mg  20 mg Intravenous Once Kathrin Frederick MD   20 mg at 08/10/23 0940    [COMPLETED] FOSAPREPITANT 150 MG/100ML NORMAL SALINE (CBC) IVPB 100 mL 100 mL  150 mg Intravenous Once Kathrin Frederick MD   Stopped at 08/10/23 1105    [COMPLETED] OLANZapine (zyPREXA) tablet 5 mg  5 mg Oral Once Kathrin Frederick MD   5 mg at 08/10/23 0940    [COMPLETED] palonosetron (ALOXI) injection 0.25 mg  0.25 mg Intravenous Once Kathrin Frederick MD   0.25 mg at 08/10/23 1033    [COMPLETED] pertuzumab (PERJETA) 420 mg in sodium chloride 0.9 % 264 mL chemo IVPB  420 mg Intravenous Once Kathrin Frederick MD   Stopped at 08/10/23 1032    [COMPLETED] sodium chloride 0.9 % infusion 250 mL  250 mL Intravenous Once Kathrin Frederick MD   Stopped at 08/10/23 1326    [COMPLETED] Trastuzumab (HERCEPTIN) 470 mg in sodium chloride 0.9 % 250 mL chemo IVPB  6 mg/kg (Treatment Plan Recorded) Intravenous Once Kathrin Frederick MD   Stopped at 08/10/23 1140    [DISCONTINUED] heparin injection 500 Units  500 Units Intravenous PRN Kathrin Frederick MD   500 Units at 08/10/23 1329    [DISCONTINUED] sodium chloride 0.9 % flush 10 mL  10 mL Intravenous PRN Kathrin Frederick MD   10 mL at 08/10/23 1327        ALLERGIES:    Allergies   Allergen Reactions    Erythromycin GI Intolerance     Pt reports    Penicillins Rash        Social History     Socioeconomic History    Marital status:      Spouse name: Parish    Number of children: 2   Tobacco Use    Smoking status: Never    Smokeless tobacco: Never   Vaping Use    Vaping Use: Never used   Substance and Sexual Activity    Alcohol use: Not Currently     Alcohol/week: 1.0 standard drink     Types: 1 Glasses of wine per week     Comment: Daily caffeine use    Drug use: Never    Sexual activity: Yes     Partners: Male     Comment:          Family History   Problem Relation Age of Onset    Diabetes Father      "Hyperlipidemia Father     Arthritis Father     Hypertension Father     Heart disease Father         S/P stents    Heart attack Father 41    Colon polyps Father     Melanoma Father     Prostate cancer Father     Hyperlipidemia Sister     Hypertension Sister     Heart attack Sister     Diabetes Sister     Thyroid cancer Paternal Aunt     Lung cancer Maternal Grandmother     Cancer Maternal Grandfather         Bladder cancer    Lung cancer Maternal Grandfather     Heart failure Paternal Grandmother     Transient ischemic attack Paternal Grandmother     Heart disease Paternal Grandfather     Malig Hyperthermia Neg Hx       Family history: Maternal great aunt had breast cancer, unsure of the age .  Father had melanoma and prostate cancer, paternal aunt had thyroid cancer, paternal grandfather had bladder cancer maternal grandfather had bladder cancer maternal grandmother had lung cancer    Past medical history is consistent with Graves' disease    OB/GYN history:  Age of menarche: 14  Patient is premenopausal and had an IUD which has been now removed   2 para 2 no miscarriages  Age at first childbirth 30  Patient did breast-feed 24 months  Length of taking birth control pills none      Objective     Vitals:    08/10/23 0826   BP: 122/86   Pulse: 101   Resp: 16   Temp: 98.4 øF (36.9 øC)   TempSrc: Temporal   SpO2: 98%   Weight: 78.1 kg (172 lb 3.2 oz)   Height: 160 cm (62.99\")   PainSc: 0-No pain           8/10/2023     8:07 AM   Current Status   ECOG score 0     Review of systems: Patient has fatigue      Physical Exam      GENERAL:  Well-developed, Patient  in no acute distress.   SKIN:  Warm, dry ,NO purpura ,no rash.  HEENT:  Pupils were equal and reactive to light and accomodation, conjunctivae noninjected,  normal visual acuity.   LYMPHATICS:  No cervical, NO supraclavicular, NO axillary, NO inguinal adenopathies.  BREASTS: Not examined today  CARDIAC: Normal rate and rhythm, no murmurs gallops or " rubs.  LUNGS: Clear to auscultation bilaterally.   ABDOMEN:  Soft, nontender with bowel sounds active.   EXTREMITIES: No cyanosis clubbing or edema.  NEUROLOGICAL:  Patient was awake, alert, oriented to time, person and place.    RECENT LABS:  Results from last 7 days   Lab Units 08/10/23  0736   WBC 10*3/mm3 12.00*   NEUTROS ABS 10*3/mm3 9.65*   HEMOGLOBIN g/dL 11.9*   HEMATOCRIT % 35.4   PLATELETS 10*3/mm3 228     Results from last 7 days   Lab Units 08/10/23  0736   SODIUM mmol/L 139   POTASSIUM mmol/L 4.1   CHLORIDE mmol/L 103   CO2 mmol/L 18.1*   BUN mg/dL 14   CREATININE mg/dL 0.65   CALCIUM mg/dL 9.4   ALBUMIN g/dL 4.5   BILIRUBIN mg/dL 0.4   ALK PHOS U/L 98   ALT (SGPT) U/L 59*   AST (SGOT) U/L 32   GLUCOSE mg/dL 139*           Assessment & Plan     *Pathologic T3N0 invasive lobular carcinoma of the left breast, upper inner and upper outer quadrant, multifocal, 60 mm, 20 mm, 2 mm, 1 mm, grade 2, [place] score of 7, 2 of them are ER/KY positive, HER2/cheryl negative.  One of the larger lesions was ER/KY positive HER2 positive.  Ki-67 is very low 5%,    Invasive lobular carcinoma of the left breast recently diagnosed, multifocal with 3 lesions biopsied in the left breast.  Patient has multicentric disease with 15 similar-appearing irregular enhancing masses in the left breast together measuring 11.7 cm in maximum dimension which encompasses 3 of the 3 biopsy proven malignancies.  All the 3 very invasive lobular carcinoma, grade 2 with [place] score of 6 but 2 of the lesions where ER/KY positive HER2 negative and the third lesion was ER/KY positive HER2 2+ with FISH showing HER2 amplified.      1.  Estrogen receptor 95%, progesterone receptor 95%, HER2/cheryl 0, Ki-67 5%    2 left breast 10 o'clock position 10 cm from the nipple ultrasound core guided biopsy showed ER 90%, %, HER2/cheryl 0    3.  Left breast upper stereotactic core biopsy showed estrogen receptor 90%, progesterone receptor 100%, HER2/cheryl  equivocal 2+  FISH testing showed her to as 5.7 with a KEIRA 17 of 1.6 with a ratio HER2/KEIRA 17 of  INVITAE genetic testing showed variation of uncertain significance of LUCAS gene  We discussed the multidisciplinary approach in this patient's and I also discussed in length that that invasive lobular carcinomas do benefit from endocrine therapy and discussed in length about side effects of all endocrine therapies  Pending the results of the surgery she may need to be a candidate for chemotherapy as well and subsequently radiation if the tumor is large locally  Patient is keen on getting bilateral oophorectomy after her breast surgery is done and we discussed about medical oophorectomy with Lupron as well  February 24, 2023: Presented patient in the breast cancer conference.  Even though it appears to be a heterogeneous tumor, since it is difficult to really appreciate by clinical exam and the fact that it is invasive lobular carcinoma, it was felt best to do upfront surgery.  March 8, 2023:p T3 N0 M0 invasive lobular carcinoma s/p bilateral mastectomy with left deep axillary sentinel lymph node biopsy, with bilateral placement of prepectoral tissue expanders for reconstruction and bilateral placement of AlloDerm.  Pathology shows multifocal tumor in  upper outer quadrant and upper inner quadrant, invasive lobular carcinoma grade 2 with a [place] score  score of 7  , total of 4 foci were seen, largest tumor being 60 mm, 20 mm, 2 mm, 1 mm.  No DCIS identified.  Lobular carcinoma in situ present.  No lymphatic or vascular channel invasion, no dermal lymphovascular space invasion all margins were negative for invasive carcinoma 4 lymph nodes were all negative for malignancy its pathologic T3N0  ER %, MO %, HER2/cheryl 2+ with Ki-67 of 5%  Previous biopsies had shown 2 of the tumors were ER/MO positive HER2/cheryl negative and one of them was ER/MO positive HER2 positive  On discussing with pathologist Dr. Serrano, she  thinks that the larger tumor was ER AR positive and HER2 positive.  HER2 copy number of 5.7 with HER2/CEP ratio of 3.5 positive and this was on the larger tumor.  Given that patient has heterogeneous tumor with the larger lesion being ER/AR positive HER2 positive and a Ki-67 of 40%, being premenopausal we will treat with Taxotere carboplatin Herceptin Perjeta.  April 20, 2023: Patient was to start cycle 1 today but unfortunately her liver function tests were extremely high.  Discussed about alcohol and she had drank and taken Tylenol.  Will hold chemo today  April 28, 2023: Reviewed her liver function test which are completely normalized.  Discussed with her not to drink alcohol.  Here for cycle 1 day 1 TCHP chemotherapy.  Reviewed echo which showed ejection fraction of 64% with strain pattern of -21.  Patient understands all the side effects\  5/4/2023: Patient tolerated Cycle 1 TCHP well. She has had diarrhea, nausea and fatigue that have been well managed.   5/18/2023: C2 TCHP  5/22/2023: Here for close follow-up and possible IV fluids after treatment last Friday, 5/18/2023.  Patient had a fairly good weekend with good oral intake, weight that is notably stable, and very little nausea.  She only had 1 loose stool this morning and therefore no significant diarrhea.  CMP is WNL.  Per discussion with patient we will give just 500 mL normal saline.  May 25, 2023: Patient has diarrhea.  She is 1 week out of cycle 2 Taxotere carboplatin Herceptin Perjeta.  She has some abdominal cramping.  We will give her IV fluids today.  Given carboplatinum is not available she will receive Taxotere Herceptin Perjeta for 2 more cycles prior to going to Adriamycin Cytoxan.  Cycle #3 TCHP 6/8/2023  Patient returns 6/12/2023 for toxicity check.  She is eating and drinking sufficiently at this point.  She does not feel she has benefited from the IV fluids in the past and therefore will not do that today.  She denies any fevers or  "chills.  June 29, 2023: Cycle 4 TCHP.  She will need referral to radiation at the completion of 6 cycles of TCHP  07/20/2023 proceed with cycle 5 of carboplatin, Taxotere, Herceptin, Perjeta with Neulasta support.  August 10, 2023: Patient is due to complete cycle 6 TCHP.  So far she has tolerated it very well.  She will then start every 3 week Herceptin Perjeta.  X 1 year reviewed\" normal  Patient will also require to go on Lupron followed by aromatase inhibitor.  She will have difficulty tolerating tamoxifen.  However she wants to think about hysterectomy and bilateral oophorectomy in the future    *S/p  IUD removal  Experiencing hot flashes with chemo likely triggering menopausal changes.    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 She did recommend she start taking her gabapentin again to help with her anxiety and irritability/anger from her decadron with treatment.   Patient continues follow-up with supportive oncology, RADHA Rolle. Patient is encouraged to continue to follow with Cierra.     *Oral Thrush and Chemotherapy induced mucositis  5/4/2023: I am concerned patient is starting into chemotherapy induced mucositis. She is reporting sore throat at times, possibly worsened reflux, mouth and tongue changes. She noticed white plaquing on her tongue this morning. She has not been able to  her Yen's Magic Mouthwash until today because of pharmacy supplies. She is currently on omeprazole 20 mg daily. She can increase this to 40 mg daily if needed. Prescription sent to pharmacy today to start Fluconazole 200 mg on day and 100 mg on day 2-7. We will continue to monitor symptoms and watch liver enzymes closely. ALT 34 and AST " 24.  Liver function test mildly elevated today, unsure if that is the effect of fluconazole.  In the future avoid fluconazole and consider just Yen's Magic mouthwash.  7/6/2023 Oral thrush recurrent today, mild but bothersome.  Refill Magic mouthwash.  Patient continuing to rinse her mouth to minimize oral candida.     * Maculopapular rash on chest wall  5/4/2023: Continue to monitor. Appear acne-like in nature. She is encouraged to moisturizer her chest wall well with an Aquaphor or CeraVe moisturizing ointment. We will continue to monitor. She may continue hydrocortisone as needed if she is having itching.   Resolution of the rash    *  Port study negative    *Intermittent mild elevation of LFTs.    Patient has previously reported intermittent use of Tylenol and alcohol.    7/6/2023 AST 51, , alk phos 184.  Patient denies any alcohol intake or Tylenol.  This is felt to be drug related.  Discussed we will monitor with plans to reassess before giving cycle 5.    *Cardiac monitoring  Seen by cardiology today, 7/27/2023.  Echo stable.  Plan for continued echocardiogram every 3 months while on therapy.  Continue low-dose lisinopril    PLAN:  Cycle 6 TCHP today  Refer to radiation oncology  Reviewed echocardiogram which is normal  Start every 3 week Herceptin Perjeta in 3 weeks with nurse practitioner  Follow-up with me in 6 weeks at which time we can discuss placing on her endocrine therapy with Lupron followed by aromatase inhibitor    This patient is on high risk drug therapy requiring intensive monitoring for toxicity.      I spent 40 total minutes, face-to-face, caring for Ursula today. Greater than 50% of this time involved counseling and/or coordination of care as documented within this note.    MD Dr. Klel Sandoval

## 2023-08-10 NOTE — TELEPHONE ENCOUNTER
Rx Refill Note  Requested Prescriptions     Pending Prescriptions Disp Refills    levothyroxine (SYNTHROID, LEVOTHROID) 125 MCG tablet 30 tablet 1     Sig: Take 1 tablet by mouth Daily.      Last office visit with prescribing clinician: 10/11/2022   Last telemedicine visit with prescribing clinician: Visit date not found   Next office visit with prescribing clinician: Visit date not found                         Would you like a call back once the refill request has been completed: [] Yes [] No    If the office needs to give you a call back, can they leave a voicemail: [] Yes [] No    Sarah Joshua MA  08/10/23, 09:38 EDT

## 2023-08-11 ENCOUNTER — TELEPHONE (OUTPATIENT)
Dept: ONCOLOGY | Facility: CLINIC | Age: 48
End: 2023-08-11
Payer: COMMERCIAL

## 2023-08-11 ENCOUNTER — INFUSION (OUTPATIENT)
Dept: ONCOLOGY | Facility: HOSPITAL | Age: 48
End: 2023-08-11
Payer: COMMERCIAL

## 2023-08-11 DIAGNOSIS — Z45.2 ENCOUNTER FOR ADJUSTMENT OR MANAGEMENT OF VASCULAR ACCESS DEVICE: ICD-10-CM

## 2023-08-11 DIAGNOSIS — Z17.0 MALIGNANT NEOPLASM OF OVERLAPPING SITES OF LEFT BREAST IN FEMALE, ESTROGEN RECEPTOR POSITIVE: Primary | ICD-10-CM

## 2023-08-11 DIAGNOSIS — U07.1 COVID-19 VIRUS INFECTION: ICD-10-CM

## 2023-08-11 DIAGNOSIS — C50.812 MALIGNANT NEOPLASM OF OVERLAPPING SITES OF LEFT BREAST IN FEMALE, ESTROGEN RECEPTOR POSITIVE: Primary | ICD-10-CM

## 2023-08-11 PROCEDURE — 25010000002 PEGFILGRASTIM-CBQV 6 MG/0.6ML SOLUTION PREFILLED SYRINGE: Performed by: INTERNAL MEDICINE

## 2023-08-11 PROCEDURE — 96372 THER/PROPH/DIAG INJ SC/IM: CPT

## 2023-08-11 RX ORDER — HEPARIN SODIUM (PORCINE) LOCK FLUSH IV SOLN 100 UNIT/ML 100 UNIT/ML
500 SOLUTION INTRAVENOUS AS NEEDED
OUTPATIENT
Start: 2023-08-11

## 2023-08-11 RX ORDER — SODIUM CHLORIDE 0.9 % (FLUSH) 0.9 %
10 SYRINGE (ML) INJECTION AS NEEDED
Status: DISCONTINUED | OUTPATIENT
Start: 2023-08-11 | End: 2023-08-11 | Stop reason: HOSPADM

## 2023-08-11 RX ORDER — HEPARIN SODIUM (PORCINE) LOCK FLUSH IV SOLN 100 UNIT/ML 100 UNIT/ML
500 SOLUTION INTRAVENOUS AS NEEDED
Status: DISCONTINUED | OUTPATIENT
Start: 2023-08-11 | End: 2023-08-11 | Stop reason: HOSPADM

## 2023-08-11 RX ORDER — SODIUM CHLORIDE 0.9 % (FLUSH) 0.9 %
10 SYRINGE (ML) INJECTION AS NEEDED
OUTPATIENT
Start: 2023-08-11

## 2023-08-11 RX ADMIN — PEGFILGRASTIM-CBQV 6 MG: 6 INJECTION, SOLUTION SUBCUTANEOUS at 13:19

## 2023-08-11 NOTE — PROGRESS NOTES
I identified JCARLOS as a candidate for the registry after receiving a cardio-oncology referral.  She came to the office 7/27/23 for an office visit.  I met her at the end of this encounter to describe the study and assess her interest in participating.     I summarized that the purpose of the study is to learn more about cancer treatment related to cardiovascular disease and its impact on overall prognosis, commonly recommended treatments, and outcomes.  There was no direct benefit to her in participating but other people may be helped by what is learned. I emphasized that participation was completely voluntary and she was not obligated.  In addition, should she consent today, she could withdraw consent at any time in the future without any impact on the care received by providers and staff at [facility] and [facility]. I explained that her role in the study was quite minimal. We ask that she simply keep appointments as recommended by her providers.  I would collect study data from her medical record periodically during the length of the study. I would rarely need to contact her directly.  There are no extra appointments or other activities related to the registry that would need to be completed.\    JCARLOS said that she was interested and would like to hear more. And so we reviewed the study consent form page by page in a private space and I allowed adequate time for questions.  We reviewed the risks, benefits, alternatives, cost to participate (none), patient payments (none), voluntariness, confidentiality, authorization to use and disclose health information, IRB oversight, and who to call with questions. She verbalized understanding of all these areas, and then she initialed and signed where indicated.  I signed as well and then made a photocopy of the signed study consent for her to take home.    I thanked JCARLOS for enrolling and for her time today, gave her my business card with direct phone number and invited them to call  my at any time with registry questions or concerns.  No study activities were performed prior to the signing of the study consent document. After Dr Fermin signs the last page of the consent, a copy will be faxed to the [facility] for inclusion in the medical record.

## 2023-08-11 NOTE — TELEPHONE ENCOUNTER
----- Message from Evon Galan sent at 8/11/2023  2:07 PM EDT -----    ----- Message -----  From: Leslie Rogers RN  Sent: 8/11/2023   1:44 PM EDT  To: [facility]    Please move appointment for fluids on Monday, 8/14/23, to Wednesday, 8/16/23, at 10 or 11 per patient request, as she has not one to bring her on Monday.    Thank you,  Leslie

## 2023-08-29 NOTE — PROGRESS NOTES
Subjective     REASON FOR FOLLOW-UP:  1.  Invasive lobular carcinoma multifocal,  Left breast anterolateral stereotactic biopsy invasive lobular carcinoma, Marie score of 6, grade 2, 4 mm, ER 95%, HI 95%, HER2/cheryl 0 negative with Ki-67 of 5%.  Positive for atypical lobular hyperplasia  Left breast 1 o'clock position 10 cm from the nipple ultrasound-guided biopsy consistent with invasive lobular carcinoma grade 2 [place] score of 6, 4 mm with lobular carcinoma in situ, ER 90%, %, HER2/cheryl 0  Left breast upper stereotactic guided core biopsy invasive lobular carcinoma grade 2 [place] score of six 3 mm with lobular carcinoma in situ, ER 90%, %, HER2/cheryl equivocal 2+, FISH shows her to 5.7 with CEP 1.6 and ratio HER2 nu/KEIRA-17 ratio of 3.5.  It is amplified.    2.  March 8, 2023:p T3 N0 M0 invasive lobular carcinoma s/p bilateral mastectomy with left deep axillary sentinel lymph node biopsy, with bilateral placement of prepectoral tissue expanders for reconstruction and bilateral placement of AlloDerm.  Pathology shows multifocal tumor in  upper outer quadrant and upper inner quadrant, invasive lobular carcinoma grade 2 with a [place] score  score of 7  , total of 4 foci were seen, largest tumor being 60 mm, 20 mm, 2 mm, 1 mm.  No DCIS identified.  Lobular carcinoma in situ present.  No lymphatic or vascular channel invasion, no dermal lymphovascular space invasion all margins were negative for invasive carcinoma 4 lymph nodes were all negative for malignancy its pathologic T3N0  ER %, HI %, HER2/cheryl 2+ with Ki-67 of 5%  Previous biopsies had shown 2 of the tumors were ER/HI positive HER2/cheryl negative and one of them was ER/HI positive HER2 positive  On discussing with pathologist Dr. Serrano, she thinks that the larger tumor was ER HI positive and HER2 positive but unsure if it was the 60 mm tumor or 20 mm tumor.  Either way it was not the smaller tumors which was HER2  positive.  All tumors look-alike per pathologist and they are invasive lobular carcinoma.                  HISTORY OF PRESENT ILLNESS:         Patient is a 48 y.o. female with history of multifocal left breast cancer with MRI of the breast showing almost 15 lesions.  Biopsies on 3 different lesions were done and they were all consistent with invasive lobular carcinoma.  2 of the lesions where ER/OK positive HER2/cheryl negative and one of the larger lesion was ER/OK positive HER2 2+ equivocal.  FISH testing was done and the HER2 copy number was 5.7 with HER2/CEP ratio of 3.5.  The FISH was amplified.    Patient underwent bilateral mastectomy    With left sentinel lymph node surgery..  The pathology shows tumor to be present in the left upper outer quadrant invasive lobular carcinoma with a Marie score of 7, grade 2.  There were 4 lesions the greatest size was 6 0 mm, 20 mm, 2 mm and 1 mm in size.  Lobular carcinoma in situ is present.  All margins are negative for invasive carcinoma.  4 lymph nodes negative.  Patient has T3N0 invasive lobular carcinoma.      Discussed with pathology in length Dr. Serrano, one of the larger lesions which was 60 mm was heterogeneous it was biopsied at 2 separate spots.  The left anterior lateral and left 1:00 core fragment was ER/OK positive HER2/cheryl negative the left upper core biopsy was ER/OK positive HER2/cheryl 2+ equivocal but HER2/cheryl FISH was positive.    On discussion with pathology the 60 mm tumor showed ER and OK positive and HER2 positive by FISH with a Ki-67 of 40% at the Top-Hat clip.  The HER2/cheryl copy number was 5.7 with a HER2/cheryl by CEP ratio 3.5 which is positive.  The Ki-67 on the lower part was 13%.  The 20 mm tumor had a Ki-67 of 9%.  So the larger lesion was triple positive and heterogeneous as 2 biopsies were done on the larger lesions and that was both triple positive and at the second biopsy was ER/OK positive HER2/cheryl negative so it was a heterogeneous tumor.   The 20 mm lesion was ER/DE positive HER2/cheryl negative.    Given that she had a large tumor with high Ki-67 and premenopausal status we discussed about giving Taxotere carboplatin Herceptin Perjeta.    Interval History:   Patient is seen back today in follow-up, having now completed 6 cycles of TCHP and continuing as of today on Herceptin/Perjeta.  She has an appointment to meet with Dr. Hines, radiation oncology, next Friday, 9/8/2023 to discuss final preparations for radiation therapy.    As she is reviewed back today patient is feeling stronger.  We reviewed that she will no longer need to take oral Dex and she will not receive IV Dex with treatment and hopefully this will alleviate her issues with thrush which have been ongoing with chemotherapy.  We also discussed she will not receive Neulasta shot anymore and she is thrilled about this.  She continues to work and overall is noting improved energy.    She denies other concerns today.      Oncology history:  Patient is a 48 y.o. female who has been recently diagnosed with left breast cancer.  Patient has been compliant with her mammograms.    There is no family history of breast or ovarian cancer.  Her father had prostate cancer and melanoma.  A paternal aunt had thyroid cancer.      Details are as follows.    November 29, 2022: Screening bilateral mammogram showed 8 mm asymmetry in the central left breast posterior one third.  This appears to be in the superior left breast on the MLO view.  Right breast was negative.  A left diagnostic mammogram and ultrasound was recommended.    January January 3, 2023: Left breast diagnostic mammogram showed the spiculated areas within the central posterior and lateral anterior superior left breast where redemonstrated.  Targeted ultrasound was done.  At 1 o'clock position 10 cm from the nipple there is a shadowing mass with peripheral spiculations which is concordant with the mammographic abnormality and suspicious for  malignancy.  The more centrally located spiculation on the cc view posterior to the glandular tissue cannot be clearly identified on the ultrasound.    Impression was 2 separate suspicious spiculated lesion in the left breast.  One of the lesions is seen well on the ultrasound and should be amenable to ultrasound-guided core biopsy.  The other lesion which was seen in the CC projection was amicable to stereotactic guided tissue sampling.    January 3, 2023 patient underwent ultrasound-guided left breast biopsy    The ultrasound-guided biopsy of left breast 10 cm from the nipple at 1 o'clock position showed invasive lobular carcinoma moderately differentiated with a [place] grade of 2 and score of 6 measuring 4 mm.  There was focal lobular carcinoma in situ.    Left breast anterior lateral stereotactic core needle biopsy showed invasive lobular carcinoma moderately differentiated grade 2 with a [place] score of 6 measuring 4 mm.  There was atypical lobular hyperplasia.  Estrogen receptor was %, progesterone receptor was %, HER2/cheryl 0, Ki-67 5%.    Left breast upper stereotactic guided core biopsy showed invasive lobular carcinoma moderately differentiated grade 2 with [place] score of 6 with measuring 3 mm with lobular carcinoma in situ present.  I do not see the ER/ND or HER2 on the ultrasound-guided biopsy of the left breast lesion as well as the left upper stereotactic biopsy.  We will check with pathologist    January 23, 2023: Patient underwent  MRI of the breast bilateral    Right breast: Negative    Left breast: At 1:00 anterior left breast 4 cm from the nipple there is a 1.5 x 1.6 x 1.3 cm irregular enhancing mass which is biopsy-proven malignancy.  The biopsy clip is located within 0.9 x 1 x 1.1 cm postbiopsy hematoma along the inferior margin of the mass.  And there is additional hematoma along the lateral margin of the mass which measures 1.2 x 1.9 x 0.9 cm.    At 1:00 in the middle to  posterior left breast 7.4 cm from the nipple there is a 1.3 x 1 cm x 1 cm irregular enhancing mass mass with a focus from a biopsy clip along the inferior margin which also represents the biopsy-proven malignancy    At 12:00 in the posterior left breast 8.5 cm from the nipple there is a 1.9 x 1.8 x 2.2 cm irregular enhancing mass with a corresponding 2.9 cm biopsy cavity with a biopsy clip which represents the biopsy-proven malignancy    In addition there are multiple at least 12 similar-appearing irregular enhancing masses and foci are identified throughout the left breast predominantly involving the upper breast but also involving the lower outer quadrant.  At 11-12 o'clock in the middle and posterior left breast there are 3 adjacent reference masses together measuring 5.5 cm but individually measuring 1.2 x 0.9 x 1 cm.  At 1:00 in the far posterior left breast/axillary tail 13.7 cm posterior to the nipple is a 1.3 x 1.1 x 1 cm irregular enhancing mass which is located 0.8 cm from the anterolateral margin of the pectoralis Muscle.  Together the enhancing masses span approximately 11.7 x 8 x 7.8 cm.  There is no suspicious enhancement in the left nipple or chest wall.  Focal areas of skin enhancement likely reflect skin entry point from recent biopsies.  There are no pathologically enlarged internal mammary chain lymph nodes.    Impression    .  At least 15 similar-appearing irregular enhancing masses and foci  scattered throughout the left breast, together measuring up to 11.7 cm  in maximum dimension, encompass 3 sites of biopsy-proven malignancy and  are consistent with multicentric malignancy. Oncologic and surgical  management are recommended.  2.  No MRI evidence of malignancy in the right breast.    Patient is a 47-year-old female with multifocal invasive lobular carcinoma who on screening mammogram showed 8 mm asymmetry in the central left breast posterior one third.  In the cc view.  It appears to be  superior left breast on the MLO view.  There was also architectural distortion in the lateral left breast one third on the left cc view.    On diagnostic mammogram the spiculated areas within the central posterior and the lateral anterior superior left breast where redemonstrated.  Targeted ultrasound was done.  At 1 o'clock position 10 cm from the nipple there is a mass with spiculations which is suspicious.  The more centrally located spiculation on the cc view.  The more centrally located spiculation on the cc view cannot be clearly identified on ultrasound.  So there impression was there were 2 separate suspicious spiculated lesion in the left breast.  1 was seen well on the ultrasound and amenable to ultrasound-guided biopsy the other is seen well on mammography and a stereotactic guided biopsy suggested.    Patient subsequently underwent stereotactic biopsy of 1 lesion and ultrasound-guided biopsy of the 1 cm mass at 1 o'clock position 10 cm from the nipple.  A total of 3 different areas of the left breast were biopsied and specimens were sent to pathology.  The third biopsy was performed at 12 o'clock position in the posterior one third of the left breast.  All of the 3 sites were consistent with invasive lobular carcinoma at site A, B, and C.    MRI of the breast showed at 1 o'clock position of the left breast anteriorly 4 cm posterior to the nipple there is a 1.5 x 1.6 x 1.3 cm irregular enhancing mass.  There is a small hematoma along the inferior margin of the mass.  An additional hematoma along the lateral margin of the mass which is measuring 1.2 x 0.9 x 0.9 cm    At 1:00 in the middle to posterior left breast 7.4 cm posterior to the nipple there is a 1.3 x 1 x 1 cm enhancing mass with a biopsy clip.    At 12:00 posterior left breast 8.5 cm posterior to the nipple there is a 1.5 x 1.8 x 2.2 cm irregular enhancing mass with a corresponding 2.9 cm biopsy cavity with a clip which represents the biopsy site  malignancy    Multiple at least 12 similar-appearing irregular enhancing masses and foci are identified throughout the left breast predominantly involving the upper breast but also involving the lower outer quadrant.  At 11-12 o'clock in the middle and posterior left breast there is a 3 adjacent reference masses measuring 5.5 cm in AP dimension and individually measuring 1.2 x 0.9 x 1 cm.  At 1:00 in the far posterior left breast axillary tail 13.7 cm from the nipple there is a 1.3 x 1.1 x 1 cm craniocaudal dimension irregular enhancing mass which is located 0.8 cm from the anterolateral margin of the pectoralis muscle.     Per the MRI at least 15 similar-appearing irregular enhancing masses and foci scattered throughout the left breast together measuring 11.7 cm in maximum dimension which encompasses 3 sites of biopsy-proven malignancy and a consistent with multicentric malignancy.  MRI of the right breast is negative    INVITAE genetic testing showed variation of uncertain significance of LUCAS  Gene.    Patient was suggested left total mastectomy, left axillary sentinel lymph node biopsy possible node dissection and possible reconstruction.    However patient called Dr. Hi and decided to go for upfront bilateral total mastectomy, left axillary sentinel lymph node biopsy and possible axillary lymph node dissection and reconstruction.     I presented the case in the breast cancer conference today.  Patient had multiple nodules per MRI on the left breast Dr. Todd looked at it and felt there were multiple nodules and the largest one was 2.2 cm.  The discussion was to go ahead and upfront do the surgery and subsequently treat with chemo/ endocrine therapy as needed.  The left breast MRI findings are slightly atypical and that it is not contiguous involvement over 11.5 cm area but multiple nodules.  Surgical pathology will clarify.  If it is 1 big lesion or multiple sma      Past Medical History:   Diagnosis Date     ADD (attention deficit disorder)     Bilateral impacted cerumen 07/13/2020    Breast cancer 03/08/2023    LUCIO MASTECTOMY    Foot fracture, left     History of COVID-19 2022    History of gestational diabetes 2010    History of Graves' disease 2012    History of radioactive iodine thyroid ablation 2012    History of vertigo     Hyperlipidemia     Hypothyroidism     Melanoma of back     Positive depression screening 07/13/2020        Past Surgical History:   Procedure Laterality Date    BREAST RECONSTRUCTION Bilateral 3/8/2023    Procedure: BILATERAL PLACEMENT  TISSUE EXPANDER AND ALLODERM;  Surgeon: Eamon Stone MD;  Location: [facility] MAIN OR;  Service: Plastics;  Laterality: Bilateral;    D & C CERVICAL BIOPSY  01/2006    INTRAUTERINE DEVICE INSERTION  2010    Mirena    INTRAUTERINE DEVICE INSERTION  2015    Mirena exchange, old IUD removal and new one inserted by Dr Carr, Lot # [id number] exp 09/17.mar    MASTECTOMY W/ SENTINEL NODE BIOPSY Bilateral 3/8/2023    Procedure: Bilateral total mastectomy, left axillary sentinel lymph node biopsy;  Surgeon: Kell Hi MD;  Location: [facility] MAIN OR;  Service: General;  Laterality: Bilateral;    SCAR REVISION BREAST Right 4/3/2023    Procedure: RIGHT MASTECTOMY REVISION;  Surgeon: Eamon Stone MD;  Location: [facility] MAIN OR;  Service: Plastics;  Laterality: Right;    VENOUS ACCESS DEVICE (PORT) INSERTION Right 4/13/2023    Procedure: right port placement;  Surgeon: Kell Hi MD;  Location: [facility] MAIN OR;  Service: General;  Laterality: Right;    WISDOM TOOTH EXTRACTION          Current Outpatient Medications on File Prior to Visit   Medication Sig Dispense Refill    acetaminophen (TYLENOL) 325 MG tablet Take 2 tablets by mouth Every 4 (Four) Hours As Needed for Mild Pain. 60 tablet 0    aluminum-magnesium hydroxide-simethicone-nystatin in diphenhydrAMINE liquid Swish and spit 5 mL every 4 (four) hours as needed. 380 mL 4    COLLAGEN  PO Take 1 tablet by mouth Daily.      escitalopram (Lexapro) 10 MG tablet Take 1 tablet by mouth Daily. 30 tablet 1    levothyroxine (SYNTHROID, LEVOTHROID) 125 MCG tablet Take 1 tablet by mouth Daily. 30 tablet 1    lidocaine-prilocaine (EMLA) 2.5-2.5 % cream Apply 1 application topically to the appropriate area as directed As Needed for Mild Pain (apply pea-sized amount to port site 30 minutes prior to port being accessed). 30 g 3    lisinopril (PRINIVIL,ZESTRIL) 2.5 MG tablet Take 1 tablet by mouth once daily 30 tablet 5    loratadine (CLARITIN) 10 MG tablet Take 1 tablet by mouth Daily.      LORazepam (ATIVAN) 0.5 MG tablet Take 1 tablet by mouth At Night As Needed for Anxiety. 30 tablet 0    OLANZapine (ZyPREXA) 5 MG tablet Take 1 tablet by mouth Every Night. Take on days 2, 3 and 4 after chemotherapy. 3 tablet 5    ondansetron (ZOFRAN) 8 MG tablet Take 1 tablet by mouth 3 (Three) Times a Day As Needed for Nausea or Vomiting. 30 tablet 5    pantoprazole (PROTONIX) 40 MG EC tablet Take 1 tablet by mouth Daily. 30 tablet 3    Probiotic Product (PROBIOTIC ADVANCED PO) Take 1 tablet by mouth Daily.      [DISCONTINUED] dexamethasone (DECADRON) 4 MG tablet Take 1 tablet by mouth Daily.       No current facility-administered medications on file prior to visit.        ALLERGIES:    Allergies   Allergen Reactions    Erythromycin GI Intolerance     Pt reports    Penicillins Rash        Social History     Socioeconomic History    Marital status:      Spouse name: Parish    Number of children: 2   Tobacco Use    Smoking status: Never    Smokeless tobacco: Never   Vaping Use    Vaping Use: Never used   Substance and Sexual Activity    Alcohol use: Not Currently     Alcohol/week: 1.0 standard drink     Types: 1 Glasses of wine per week     Comment: Daily caffeine use    Drug use: Never    Sexual activity: Yes     Partners: Male     Comment:          Family History   Problem Relation Age of Onset    Diabetes  "Father     Hyperlipidemia Father     Arthritis Father     Hypertension Father     Heart disease Father         S/P stents    Heart attack Father 41    Colon polyps Father     Melanoma Father     Prostate cancer Father     Hyperlipidemia Sister     Hypertension Sister     Heart attack Sister     Diabetes Sister     Thyroid cancer Paternal Aunt     Lung cancer Maternal Grandmother     Cancer Maternal Grandfather         Bladder cancer    Lung cancer Maternal Grandfather     Heart failure Paternal Grandmother     Transient ischemic attack Paternal Grandmother     Heart disease Paternal Grandfather     Malig Hyperthermia Neg Hx       Family history: Maternal great aunt had breast cancer, unsure of the age .  Father had melanoma and prostate cancer, paternal aunt had thyroid cancer, paternal grandfather had bladder cancer maternal grandfather had bladder cancer maternal grandmother had lung cancer    Past medical history is consistent with Graves' disease    OB/GYN history:  Age of menarche: 14  Patient is premenopausal and had an IUD which has been now removed   2 para 2 no miscarriages  Age at first childbirth 30  Patient did breast-feed 24 months  Length of taking birth control pills none      Objective     Vitals:    23 0836   BP: 143/91   Pulse: 88   Resp: 18   Temp: 98.7 øF (37.1 øC)   TempSrc: Temporal   SpO2: 99%   Weight: 78.2 kg (172 lb 4.8 oz)   Height: 160 cm (62.99\")   PainSc: 0-No pain           2023     8:34 AM   Current Status   ECOG score 0     Review of systems: Patient has fatigue    Physical Exam      GENERAL:  Well-developed, Patient  in no acute distress.   SKIN:  Warm, dry ,NO purpura ,no rash.  HEENT:  Pupils were equal and reactive to light and accomodation, conjunctivae noninjected,  normal visual acuity.   LYMPHATICS:  No cervical, NO supraclavicular, NO axillary, NO inguinal adenopathies.  BREASTS: Not examined today  CARDIAC: Normal rate and rhythm, no murmurs gallops or " rubs.  LUNGS: Clear to auscultation bilaterally.   ABDOMEN:  Soft, nontender with bowel sounds active.   EXTREMITIES: No cyanosis clubbing or edema.  NEUROLOGICAL:  Patient was awake, alert, oriented to time, person and place.    RECENT LABS:  Results from last 7 days   Lab Units 08/31/23  0750   WBC 10*3/mm3 11.39*   NEUTROS ABS 10*3/mm3 9.63*   HEMOGLOBIN g/dL 12.2   HEMATOCRIT % 35.9   PLATELETS 10*3/mm3 206     Results from last 7 days   Lab Units 08/31/23  0750   SODIUM mmol/L 140   POTASSIUM mmol/L 3.9   CHLORIDE mmol/L 101   CO2 mmol/L 20.6*   BUN mg/dL 11   CREATININE mg/dL 0.72   CALCIUM mg/dL 9.8   ALBUMIN g/dL 4.8   BILIRUBIN mg/dL 0.5   ALK PHOS U/L 100   ALT (SGPT) U/L 41*   AST (SGOT) U/L 35*   GLUCOSE mg/dL 149*           Assessment & Plan     *Pathologic T3N0 invasive lobular carcinoma of the left breast, upper inner and upper outer quadrant, multifocal, 60 mm, 20 mm, 2 mm, 1 mm, grade 2, [place] score of 7, 2 of them are ER/NC positive, HER2/cheryl negative.  One of the larger lesions was ER/NC positive HER2 positive.  Ki-67 is very low 5%,    Invasive lobular carcinoma of the left breast recently diagnosed, multifocal with 3 lesions biopsied in the left breast.  Patient has multicentric disease with 15 similar-appearing irregular enhancing masses in the left breast together measuring 11.7 cm in maximum dimension which encompasses 3 of the 3 biopsy proven malignancies.  All the 3 very invasive lobular carcinoma, grade 2 with [place] score of 6 but 2 of the lesions where ER/NC positive HER2 negative and the third lesion was ER/NC positive HER2 2+ with FISH showing HER2 amplified.      1.  Estrogen receptor 95%, progesterone receptor 95%, HER2/cheryl 0, Ki-67 5%    2 left breast 10 o'clock position 10 cm from the nipple ultrasound core guided biopsy showed ER 90%, %, HER2/cheryl 0    3.  Left breast upper stereotactic core biopsy showed estrogen receptor 90%, progesterone receptor 100%, HER2/cheryl  equivocal 2+  FISH testing showed her to as 5.7 with a KEIRA 17 of 1.6 with a ratio HER2/KEIRA 17 of  INVITAE genetic testing showed variation of uncertain significance of LUCAS gene  We discussed the multidisciplinary approach in this patient's and I also discussed in length that that invasive lobular carcinomas do benefit from endocrine therapy and discussed in length about side effects of all endocrine therapies  Pending the results of the surgery she may need to be a candidate for chemotherapy as well and subsequently radiation if the tumor is large locally  Patient is keen on getting bilateral oophorectomy after her breast surgery is done and we discussed about medical oophorectomy with Lupron as well  February 24, 2023: Presented patient in the breast cancer conference.  Even though it appears to be a heterogeneous tumor, since it is difficult to really appreciate by clinical exam and the fact that it is invasive lobular carcinoma, it was felt best to do upfront surgery.  March 8, 2023:p T3 N0 M0 invasive lobular carcinoma s/p bilateral mastectomy with left deep axillary sentinel lymph node biopsy, with bilateral placement of prepectoral tissue expanders for reconstruction and bilateral placement of AlloDerm.  Pathology shows multifocal tumor in  upper outer quadrant and upper inner quadrant, invasive lobular carcinoma grade 2 with a [place] score  score of 7  , total of 4 foci were seen, largest tumor being 60 mm, 20 mm, 2 mm, 1 mm.  No DCIS identified.  Lobular carcinoma in situ present.  No lymphatic or vascular channel invasion, no dermal lymphovascular space invasion all margins were negative for invasive carcinoma 4 lymph nodes were all negative for malignancy its pathologic T3N0  ER %, HI %, HER2/cheryl 2+ with Ki-67 of 5%  Previous biopsies had shown 2 of the tumors were ER/HI positive HER2/cheryl negative and one of them was ER/HI positive HER2 positive  On discussing with pathologist Dr. Serrano, she  thinks that the larger tumor was ER ME positive and HER2 positive.  HER2 copy number of 5.7 with HER2/CEP ratio of 3.5 positive and this was on the larger tumor.  Given that patient has heterogeneous tumor with the larger lesion being ER/ME positive HER2 positive and a Ki-67 of 40%, being premenopausal we will treat with Taxotere carboplatin Herceptin Perjeta.  April 20, 2023: Patient was to start cycle 1 today but unfortunately her liver function tests were extremely high.  Discussed about alcohol and she had drank and taken Tylenol.  Will hold chemo today  April 28, 2023: Reviewed her liver function test which are completely normalized.  Discussed with her not to drink alcohol.  Here for cycle 1 day 1 TCHP chemotherapy.  Reviewed echo which showed ejection fraction of 64% with strain pattern of -21.  Patient understands all the side effects\  5/4/2023: Patient tolerated Cycle 1 TCHP well. She has had diarrhea, nausea and fatigue that have been well managed.   5/18/2023: C2 TCHP  5/22/2023: Here for close follow-up and possible IV fluids after treatment last Friday, 5/18/2023.  Patient had a fairly good weekend with good oral intake, weight that is notably stable, and very little nausea.  She only had 1 loose stool this morning and therefore no significant diarrhea.  CMP is WNL.  Per discussion with patient we will give just 500 mL normal saline.  May 25, 2023: Patient has diarrhea.  She is 1 week out of cycle 2 Taxotere carboplatin Herceptin Perjeta.  She has some abdominal cramping.  We will give her IV fluids today.  Given carboplatinum is not available she will receive Taxotere Herceptin Perjeta for 2 more cycles prior to going to Adriamycin Cytoxan.  Cycle #3 TCHP 6/8/2023  Patient returns 6/12/2023 for toxicity check.  She is eating and drinking sufficiently at this point.  She does not feel she has benefited from the IV fluids in the past and therefore will not do that today.  She denies any fevers or  chills.  June 29, 2023: Cycle 4 TCHP.  She will need referral to radiation at the completion of 6 cycles of TCHP  07/20/2023 proceed with cycle 5 of carboplatin, Taxotere, Herceptin, Perjeta with Neulasta support.  August 10, 2023: Final cycle 6 TCHP.  Thereafter plan to continue every 3-week Herceptin/Perjeta for total of 1 year.   We have discussed potentially beginning Lupron followed by aromatase inhibitor therapy following radiation however patient wants to discuss potential hysterectomy and bilateral oophorectomy with her GYN.      *S/p  IUD removal  Experiencing hot flashes with chemo likely triggering menopausal changes.    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 She did recommend she start taking her gabapentin again to help with her anxiety and irritability/anger from her decadron with treatment.   Patient continues follow-up with supportive oncology, RADHA Rolle. Patient is encouraged to continue to follow with Cierra.     *Oral Thrush and Chemotherapy induced mucositis  5/4/2023: I am concerned patient is starting into chemotherapy induced mucositis. She is reporting sore throat at times, possibly worsened reflux, mouth and tongue changes. She noticed white plaquing on her tongue this morning. She has not been able to  her Yen's Magic Mouthwash until today because of pharmacy supplies. She is currently on omeprazole 20 mg daily. She can increase this to 40 mg daily if needed. Prescription sent to pharmacy today to start Fluconazole 200 mg on day and 100 mg on day 2-7. We will continue to monitor symptoms and watch liver enzymes closely. ALT 34 and AST 24.  Liver function test mildly elevated today, unsure if that is the  effect of fluconazole.  In the future avoid fluconazole and consider just Yen's Magic mouthwash.  7/6/2023 Oral thrush recurrent today, mild but bothersome.  Refill Magic mouthwash.  Patient continuing to rinse her mouth to minimize oral candida.  Expect this to resolve now that she is done with chemotherapy and no longer requiring dexamethasone.    *  Port study negative    *Intermittent mild elevation of LFTs.    Patient has previously reported intermittent use of Tylenol and alcohol.    8/31/2023 AST 35, ALT 41, alk phos 100.  Patient denies any alcohol intake or Tylenol.  This is felt to be drug related.  Stable to improved. Monitor.    *Cardiac monitoring  Seen by cardiology today, 7/27/2023.  Echo stable.  Plan for continued echocardiogram every 3 months while on therapy.  Continue low-dose lisinopril    PLAN:  Proceed with C1 Herceptin/Perjeta with plans to continue this every 3 weeks for total of 1 year.  Patient will see Dr. Hines for final preparations for XRT next Friday, 9/8/2023.  Otherwise return in 3 weeks for follow-up with Dr. Frederick and further Herceptin/Perjeta.  Plan to discuss at that visit Lupron followed by AI versus patient's plans for potential hysterectomy/oophorectomy.  Patient is not currently sexually active with her  per conversation today. This has been the case for many months. Therefore pregnancy test is NOT necessary for treatment.    This patient is on high risk drug therapy requiring intensive monitoring for toxicity.      RADHA Woods Dr.

## 2023-08-31 ENCOUNTER — OFFICE VISIT (OUTPATIENT)
Dept: ONCOLOGY | Facility: CLINIC | Age: 48
End: 2023-08-31
Payer: COMMERCIAL

## 2023-08-31 ENCOUNTER — INFUSION (OUTPATIENT)
Dept: ONCOLOGY | Facility: HOSPITAL | Age: 48
End: 2023-08-31
Payer: COMMERCIAL

## 2023-08-31 VITALS
RESPIRATION RATE: 18 BRPM | OXYGEN SATURATION: 99 % | WEIGHT: 172.3 LBS | TEMPERATURE: 98.7 F | DIASTOLIC BLOOD PRESSURE: 91 MMHG | BODY MASS INDEX: 30.53 KG/M2 | SYSTOLIC BLOOD PRESSURE: 143 MMHG | HEART RATE: 88 BPM | HEIGHT: 63 IN

## 2023-08-31 DIAGNOSIS — Z17.0 MALIGNANT NEOPLASM OF OVERLAPPING SITES OF LEFT BREAST IN FEMALE, ESTROGEN RECEPTOR POSITIVE: Primary | ICD-10-CM

## 2023-08-31 DIAGNOSIS — C50.912 BREAST CARCINOMA, LOBULAR, LEFT: ICD-10-CM

## 2023-08-31 DIAGNOSIS — Z17.0 MALIGNANT NEOPLASM OF OVERLAPPING SITES OF LEFT BREAST IN FEMALE, ESTROGEN RECEPTOR POSITIVE: ICD-10-CM

## 2023-08-31 DIAGNOSIS — C50.812 MALIGNANT NEOPLASM OF OVERLAPPING SITES OF LEFT BREAST IN FEMALE, ESTROGEN RECEPTOR POSITIVE: ICD-10-CM

## 2023-08-31 DIAGNOSIS — U07.1 COVID-19 VIRUS INFECTION: ICD-10-CM

## 2023-08-31 DIAGNOSIS — Z79.899 HIGH RISK MEDICATION USE: ICD-10-CM

## 2023-08-31 DIAGNOSIS — Z45.2 ENCOUNTER FOR ADJUSTMENT OR MANAGEMENT OF VASCULAR ACCESS DEVICE: ICD-10-CM

## 2023-08-31 DIAGNOSIS — C50.812 MALIGNANT NEOPLASM OF OVERLAPPING SITES OF LEFT BREAST IN FEMALE, ESTROGEN RECEPTOR POSITIVE: Primary | ICD-10-CM

## 2023-08-31 LAB
ALBUMIN SERPL-MCNC: 4.8 G/DL (ref 3.5–5.2)
ALBUMIN/GLOB SERPL: 2.1 G/DL
ALP SERPL-CCNC: 100 U/L (ref 39–117)
ALT SERPL W P-5'-P-CCNC: 41 U/L (ref 1–33)
ANION GAP SERPL CALCULATED.3IONS-SCNC: 18.4 MMOL/L (ref 5–15)
AST SERPL-CCNC: 35 U/L (ref 1–32)
BASOPHILS # BLD AUTO: 0.03 10*3/MM3 (ref 0–0.2)
BASOPHILS NFR BLD AUTO: 0.3 % (ref 0–1.5)
BILIRUB SERPL-MCNC: 0.5 MG/DL (ref 0–1.2)
BUN SERPL-MCNC: 11 MG/DL (ref 6–20)
BUN/CREAT SERPL: 15.3 (ref 7–25)
CALCIUM SPEC-SCNC: 9.8 MG/DL (ref 8.6–10.5)
CHLORIDE SERPL-SCNC: 101 MMOL/L (ref 98–107)
CO2 SERPL-SCNC: 20.6 MMOL/L (ref 22–29)
CREAT SERPL-MCNC: 0.72 MG/DL (ref 0.6–1.1)
DEPRECATED RDW RBC AUTO: 53.4 FL (ref 37–54)
EGFRCR SERPLBLD CKD-EPI 2021: 104.6 ML/MIN/1.73
EOSINOPHIL # BLD AUTO: 0 10*3/MM3 (ref 0–0.4)
EOSINOPHIL NFR BLD AUTO: 0 % (ref 0.3–6.2)
ERYTHROCYTE [DISTWIDTH] IN BLOOD BY AUTOMATED COUNT: 14.6 % (ref 12.3–15.4)
GLOBULIN UR ELPH-MCNC: 2.3 GM/DL
GLUCOSE SERPL-MCNC: 149 MG/DL (ref 65–99)
HCT VFR BLD AUTO: 35.9 % (ref 34–46.6)
HGB BLD-MCNC: 12.2 G/DL (ref 12–15.9)
IMM GRANULOCYTES # BLD AUTO: 0.14 10*3/MM3 (ref 0–0.05)
IMM GRANULOCYTES NFR BLD AUTO: 1.2 % (ref 0–0.5)
LYMPHOCYTES # BLD AUTO: 1.42 10*3/MM3 (ref 0.7–3.1)
LYMPHOCYTES NFR BLD AUTO: 12.5 % (ref 19.6–45.3)
MCH RBC QN AUTO: 34 PG (ref 26.6–33)
MCHC RBC AUTO-ENTMCNC: 34 G/DL (ref 31.5–35.7)
MCV RBC AUTO: 100 FL (ref 79–97)
MONOCYTES # BLD AUTO: 0.17 10*3/MM3 (ref 0.1–0.9)
MONOCYTES NFR BLD AUTO: 1.5 % (ref 5–12)
NEUTROPHILS NFR BLD AUTO: 84.5 % (ref 42.7–76)
NEUTROPHILS NFR BLD AUTO: 9.63 10*3/MM3 (ref 1.7–7)
NRBC BLD AUTO-RTO: 0 /100 WBC (ref 0–0.2)
PLATELET # BLD AUTO: 206 10*3/MM3 (ref 140–450)
PMV BLD AUTO: 10.2 FL (ref 6–12)
POTASSIUM SERPL-SCNC: 3.9 MMOL/L (ref 3.5–5.2)
PROT SERPL-MCNC: 7.1 G/DL (ref 6–8.5)
RBC # BLD AUTO: 3.59 10*6/MM3 (ref 3.77–5.28)
SODIUM SERPL-SCNC: 140 MMOL/L (ref 136–145)
WBC NRBC COR # BLD: 11.39 10*3/MM3 (ref 3.4–10.8)

## 2023-08-31 PROCEDURE — 80053 COMPREHEN METABOLIC PANEL: CPT

## 2023-08-31 PROCEDURE — 85025 COMPLETE CBC W/AUTO DIFF WBC: CPT

## 2023-08-31 PROCEDURE — 96413 CHEMO IV INFUSION 1 HR: CPT

## 2023-08-31 PROCEDURE — 25010000002 PERTUZUMAB 420 MG/14ML SOLUTION 420 MG VIAL: Performed by: NURSE PRACTITIONER

## 2023-08-31 PROCEDURE — 25010000002 TRASTUZUMAB PER 10 MG: Performed by: NURSE PRACTITIONER

## 2023-08-31 PROCEDURE — 25010000002 DIPHENHYDRAMINE PER 50 MG: Performed by: NURSE PRACTITIONER

## 2023-08-31 PROCEDURE — 96367 TX/PROPH/DG ADDL SEQ IV INF: CPT

## 2023-08-31 PROCEDURE — 96375 TX/PRO/DX INJ NEW DRUG ADDON: CPT

## 2023-08-31 PROCEDURE — 96417 CHEMO IV INFUS EACH ADDL SEQ: CPT

## 2023-08-31 PROCEDURE — 25010000002 HEPARIN LOCK FLUSH PER 10 UNITS: Performed by: INTERNAL MEDICINE

## 2023-08-31 RX ORDER — SODIUM CHLORIDE 0.9 % (FLUSH) 0.9 %
10 SYRINGE (ML) INJECTION AS NEEDED
Status: DISCONTINUED | OUTPATIENT
Start: 2023-08-31 | End: 2023-08-31 | Stop reason: HOSPADM

## 2023-08-31 RX ORDER — SODIUM CHLORIDE 0.9 % (FLUSH) 0.9 %
10 SYRINGE (ML) INJECTION AS NEEDED
OUTPATIENT
Start: 2023-08-31

## 2023-08-31 RX ORDER — FAMOTIDINE 10 MG/ML
20 INJECTION, SOLUTION INTRAVENOUS ONCE
Status: CANCELLED | OUTPATIENT
Start: 2023-08-31

## 2023-08-31 RX ORDER — SODIUM CHLORIDE 9 MG/ML
250 INJECTION, SOLUTION INTRAVENOUS ONCE
Status: CANCELLED | OUTPATIENT
Start: 2023-08-31

## 2023-08-31 RX ORDER — FAMOTIDINE 10 MG/ML
20 INJECTION, SOLUTION INTRAVENOUS ONCE
Status: COMPLETED | OUTPATIENT
Start: 2023-08-31 | End: 2023-08-31

## 2023-08-31 RX ORDER — SODIUM CHLORIDE 9 MG/ML
250 INJECTION, SOLUTION INTRAVENOUS ONCE
Status: COMPLETED | OUTPATIENT
Start: 2023-08-31 | End: 2023-08-31

## 2023-08-31 RX ORDER — HEPARIN SODIUM (PORCINE) LOCK FLUSH IV SOLN 100 UNIT/ML 100 UNIT/ML
500 SOLUTION INTRAVENOUS AS NEEDED
Status: DISCONTINUED | OUTPATIENT
Start: 2023-08-31 | End: 2023-08-31 | Stop reason: HOSPADM

## 2023-08-31 RX ORDER — HEPARIN SODIUM (PORCINE) LOCK FLUSH IV SOLN 100 UNIT/ML 100 UNIT/ML
500 SOLUTION INTRAVENOUS AS NEEDED
OUTPATIENT
Start: 2023-08-31

## 2023-08-31 RX ADMIN — SODIUM CHLORIDE 250 ML: 9 INJECTION, SOLUTION INTRAVENOUS at 09:03

## 2023-08-31 RX ADMIN — FAMOTIDINE 20 MG: 10 INJECTION INTRAVENOUS at 09:04

## 2023-08-31 RX ADMIN — HEPARIN 500 UNITS: 100 SYRINGE at 12:03

## 2023-08-31 RX ADMIN — DIPHENHYDRAMINE HYDROCHLORIDE 25 MG: 50 INJECTION INTRAMUSCULAR; INTRAVENOUS at 09:03

## 2023-08-31 RX ADMIN — Medication 10 ML: at 12:02

## 2023-08-31 RX ADMIN — TRASTUZUMAB 470 MG: 150 INJECTION, POWDER, LYOPHILIZED, FOR SOLUTION INTRAVENOUS at 11:28

## 2023-08-31 RX ADMIN — PERTUZUMAB 420 MG: 30 INJECTION, SOLUTION, CONCENTRATE INTRAVENOUS at 09:53

## 2023-09-08 ENCOUNTER — OFFICE VISIT (OUTPATIENT)
Dept: RADIATION ONCOLOGY | Facility: HOSPITAL | Age: 48
End: 2023-09-08
Payer: COMMERCIAL

## 2023-09-08 ENCOUNTER — HOSPITAL ENCOUNTER (OUTPATIENT)
Dept: RADIATION ONCOLOGY | Facility: HOSPITAL | Age: 48
Setting detail: RADIATION/ONCOLOGY SERIES
End: 2023-09-08
Payer: COMMERCIAL

## 2023-09-08 VITALS
WEIGHT: 172.6 LBS | HEART RATE: 81 BPM | SYSTOLIC BLOOD PRESSURE: 122 MMHG | DIASTOLIC BLOOD PRESSURE: 87 MMHG | OXYGEN SATURATION: 97 % | BODY MASS INDEX: 30.58 KG/M2

## 2023-09-08 DIAGNOSIS — C50.812 MALIGNANT NEOPLASM OF OVERLAPPING SITES OF LEFT BREAST IN FEMALE, ESTROGEN RECEPTOR POSITIVE: Primary | ICD-10-CM

## 2023-09-08 DIAGNOSIS — Z17.0 MALIGNANT NEOPLASM OF OVERLAPPING SITES OF LEFT BREAST IN FEMALE, ESTROGEN RECEPTOR POSITIVE: Primary | ICD-10-CM

## 2023-09-08 PROCEDURE — 77263 THER RADIOLOGY TX PLNG CPLX: CPT | Performed by: RADIOLOGY

## 2023-09-08 PROCEDURE — 77334 RADIATION TREATMENT AID(S): CPT | Performed by: RADIOLOGY

## 2023-09-08 PROCEDURE — 77332 RADIATION TREATMENT AID(S): CPT | Performed by: RADIOLOGY

## 2023-09-08 PROCEDURE — 77290 THER RAD SIMULAJ FIELD CPLX: CPT | Performed by: RADIOLOGY

## 2023-09-08 PROCEDURE — G0463 HOSPITAL OUTPT CLINIC VISIT: HCPCS | Performed by: RADIOLOGY

## 2023-09-08 NOTE — PROGRESS NOTES
Subjective     No ref. provider found     Cancer Staging     C: left breast cancer mpT3N0 ER OK pos Her positive ki67 5%                             Dear Kell Hi MD     I had the pleasure of seeing Ursula Kulkarni  [facility].   The patient is a 47 y.o. female with recently diagnosed left breast cancer.  She had a screening mammogram 11/29/22 which showed a 8mm asymmetry in the central left posterior breast birads 0.  She had a diagnostic left mammogram on 1/3/23 which showed the spiculated areas within the central posterior and the lateral anterior superior  left breast were redemonstrated.  Targeted ultrasound was performed.  At the 1 o'clock position 10 cm from the nipple there is a shadowing mass with peripheral spiculations favored to represent the mammographic abnormality and suspicious for malignancy.  The more centrally located spiculation on   the CC view disc posterior to the glandular tissue in the retro glandular fat cannot be clearly identified on ultrasound.     She underwent a left breast stereotactic biopsy on 1/17/23 with pathology revealing invasive lobular carcinoma, grade 2, ER 95% OK 95% Her 2 neg and ki67 5% from the anterior lateral region as well as the left upper breast.  Ultrasound guided biopsy of the left breast 10 oclock region revealed invasive lobular carcinoma, grade 2,  4mm, ER 90% % Her 2 positive on FISH.     She underwent a breast mri on 1/23/23 which revealed at least 15 similar-appearing irregular enhancing masses and foci  scattered throughout the left breast, together measuring up to 11.7 cm in maximum dimension, encompass 3 sites of biopsy-proven malignancy and are consistent with multicentric malignancy.  No MRI evidence of malignancy in the right breast.     She underwent bilateral total mastectomies and left sentinel node biopsy on 3/8/23 with Dr. Hi with pathology from the left revealing multifocal invasive lobular carcinoma,  grade II, involving the upper outer and upper inner quadrant, with the largest foci measuring 60mm.  A total of 4 foci were identified measuring 60mm, 20mm, 2mm, 1mm with 4 benign sentinel lymph nodes. All margins were negative the closest anterior margin 10mm.  Her pathologic stage was mpT3N0.  She had prepectoral expanders placed by Dr. Stone at the time of surgery.     Pathology from the right breast was benign.  She underwent reexicison of right breast skin and tissue on 4/3/23      She is  with menarche age 14 and first childbirth age 30.  She  for 24 months, has never taken oral contraceptives or hormone replacement therapy.  She is premenopausal and has an IUD.  Her family hx includes a maternal great aunt with breast cancer.  She has a hx of Graves disease treated with radioactive iodine as well as a melanoma of the skin of her back. 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      She met with Dr. Frederick and she has recommended adjuvant chemo consisting of taxol, cytoxan and herceptin but may consider TCHP or AC depending on consensus recommendation from [facility].     She is recovering well from surgery and referred today for evaluation for adjuvant postmastectomy radiation.     Interval hx 23:  She completed chemotherapy consisting of TCHP 6 cycles and is now continuing on herceptin/perjeta.  She tolerated chemo fairly well.  She returns today for re-evaluation.         Review of Systems   Constitutional: Negative.    HENT: Negative.     Respiratory: Negative.     Musculoskeletal: Negative.    Psychiatric/Behavioral: Negative.         Past Medical History:   Diagnosis Date    ADD (attention deficit disorder)     Bilateral impacted cerumen 2020    Breast cancer 2023    LUCIO MASTECTOMY    Foot fracture, left     History of COVID-19     History of gestational diabetes 2010    History of Graves' disease 2012    History of radioactive iodine thyroid  ablation 2012    History of vertigo     Hyperlipidemia     Hypothyroidism     Melanoma of back     Positive depression screening 07/13/2020         Past Surgical History:   Procedure Laterality Date    BREAST RECONSTRUCTION Bilateral 3/8/2023    Procedure: BILATERAL PLACEMENT  TISSUE EXPANDER AND ALLODERM;  Surgeon: Eamon Stone MD;  Location: [facility] MAIN OR;  Service: Plastics;  Laterality: Bilateral;    D & C CERVICAL BIOPSY  01/2006    INTRAUTERINE DEVICE INSERTION  2010    Mirena    INTRAUTERINE DEVICE INSERTION  2015    Mirena exchange, old IUD removal and new one inserted by Dr Carr, Lot # [id number] exp 09/17.mar    MASTECTOMY W/ SENTINEL NODE BIOPSY Bilateral 3/8/2023    Procedure: Bilateral total mastectomy, left axillary sentinel lymph node biopsy;  Surgeon: Kell Hi MD;  Location: [facility] MAIN OR;  Service: General;  Laterality: Bilateral;    SCAR REVISION BREAST Right 4/3/2023    Procedure: RIGHT MASTECTOMY REVISION;  Surgeon: Eamon Stone MD;  Location: [facility] MAIN OR;  Service: Plastics;  Laterality: Right;    VENOUS ACCESS DEVICE (PORT) INSERTION Right 4/13/2023    Procedure: right port placement;  Surgeon: Kell Hi MD;  Location: [facility] MAIN OR;  Service: General;  Laterality: Right;    WISDOM TOOTH EXTRACTION           Social History     Socioeconomic History    Marital status:      Spouse name: Parish    Number of children: 2   Tobacco Use    Smoking status: Never    Smokeless tobacco: Never   Vaping Use    Vaping Use: Never used   Substance and Sexual Activity    Alcohol use: Not Currently     Alcohol/week: 1.0 standard drink     Types: 1 Glasses of wine per week     Comment: Daily caffeine use    Drug use: Never    Sexual activity: Yes     Partners: Male     Comment:           Family History   Problem Relation Age of Onset    Diabetes Father     Hyperlipidemia Father     Arthritis Father     Hypertension Father     Heart disease Father          S/P stents    Heart attack Father 41    Colon polyps Father     Melanoma Father     Prostate cancer Father     Hyperlipidemia Sister     Hypertension Sister     Heart attack Sister     Diabetes Sister     Thyroid cancer Paternal Aunt     Lung cancer Maternal Grandmother     Cancer Maternal Grandfather         Bladder cancer    Lung cancer Maternal Grandfather     Heart failure Paternal Grandmother     Transient ischemic attack Paternal Grandmother     Heart disease Paternal Grandfather     Malig Hyperthermia Neg Hx           Objective    Physical Exam  Constitutional:       Appearance: Normal appearance.   Chest:          Comments: Left breast surgically absent, expander in place, no suspicious lesions or nodules  Neurological:      Mental Status: She is alert.   Psychiatric:         Mood and Affect: Mood normal.         Current Outpatient Medications on File Prior to Visit   Medication Sig Dispense Refill    acetaminophen (TYLENOL) 325 MG tablet Take 2 tablets by mouth Every 4 (Four) Hours As Needed for Mild Pain. 60 tablet 0    aluminum-magnesium hydroxide-simethicone-nystatin in diphenhydrAMINE liquid Swish and spit 5 mL every 4 (four) hours as needed. 380 mL 4    COLLAGEN PO Take 1 tablet by mouth Daily.      escitalopram (Lexapro) 10 MG tablet Take 1 tablet by mouth Daily. 30 tablet 1    levothyroxine (SYNTHROID, LEVOTHROID) 125 MCG tablet Take 1 tablet by mouth Daily. 30 tablet 1    lidocaine-prilocaine (EMLA) 2.5-2.5 % cream Apply 1 application topically to the appropriate area as directed As Needed for Mild Pain (apply pea-sized amount to port site 30 minutes prior to port being accessed). 30 g 3    lisinopril (PRINIVIL,ZESTRIL) 2.5 MG tablet Take 1 tablet by mouth once daily 30 tablet 5    loratadine (CLARITIN) 10 MG tablet Take 1 tablet by mouth Daily.      LORazepam (ATIVAN) 0.5 MG tablet Take 1 tablet by mouth At Night As Needed for Anxiety. 30 tablet 0    OLANZapine (ZyPREXA) 5 MG tablet Take 1 tablet  by mouth Every Night. Take on days 2, 3 and 4 after chemotherapy. 3 tablet 5    ondansetron (ZOFRAN) 8 MG tablet Take 1 tablet by mouth 3 (Three) Times a Day As Needed for Nausea or Vomiting. 30 tablet 5    pantoprazole (PROTONIX) 40 MG EC tablet Take 1 tablet by mouth Daily. 30 tablet 3    Probiotic Product (PROBIOTIC ADVANCED PO) Take 1 tablet by mouth Daily.       No current facility-administered medications on file prior to visit.       ALLERGIES:    Allergies   Allergen Reactions    Erythromycin GI Intolerance     Pt reports    Penicillins Rash       There were no vitals taken for this visit.         8/31/2023     8:34 AM   Current Status   ECOG score 0         Assessment & Plan   48 year old female with mpT3N0 invasive lobular carcinoma of left breast, ER ID positive Her 2 positive in the larger tumor with the largest tumor measuring 60mm.  I again discussed with her my recommendation for post-mastectomy radiation therapy to the left chest wall/reconstructed breast to decrease the risk of local recurrence.       I discussed with her in detail the risks, benefits and rationale of radiation therapy to the left breast to include but not limited to the following:     Acute: skin erythema, breakdown/moist desquamation, swelling or discomfort of the breast, fatigue, pneumonitis resulting in shortness of breath, cough or pain     Late: Permanent skin changes including hyperpigmentation, telangiectasias, fibrosis of the breast resulting in smaller size or poor cosmetic outcome, late edema or cellulitis, late rib fracture, late pulmonary fibrosis and the remote risk of late cardiac damage resulting in increased risk of heart attack or second malignancies.       She voiced understanding and was given an opportunity to ask questions which I believe were answered to her satisfaction.  we will proceed with CT simulation today.  I plan to treat the left chest wall to a dose of 48-50Gy in 24-25 fractions.      I personally  spent greater than 40 minutes today assessing, managing, discussing and documenting my visit with the patient. That time includes review of records, imaging and pathology reports, obtaining my own history, performing a medically appropriate evaluation, counseling and educating the patient, discussing goals, logistics, alternatives and risks of my recommendations, surveillance options and potential outcomes. It also includes the time documenting the clinical information in the EMR and communicating my recommendations to the other involved physicians.             Thank you very much for allowing me to participate in the care of this very pleasant patient.    Sincerely,      Fay Hines MD

## 2023-09-14 PROCEDURE — 77295 3-D RADIOTHERAPY PLAN: CPT | Performed by: RADIOLOGY

## 2023-09-14 PROCEDURE — 77300 RADIATION THERAPY DOSE PLAN: CPT | Performed by: RADIOLOGY

## 2023-09-14 PROCEDURE — 77334 RADIATION TREATMENT AID(S): CPT | Performed by: RADIOLOGY

## 2023-09-16 ENCOUNTER — PATIENT OUTREACH (OUTPATIENT)
Dept: OTHER | Facility: HOSPITAL | Age: 48
End: 2023-09-16
Payer: COMMERCIAL

## 2023-09-16 NOTE — PROGRESS NOTES
Called MsJack Aguilardani to see how she was doing. She stated she just finished chemo and will start radiation soon. She stated she feels great and is doing well. She stated she has no needs at this time. She was thankful for the call and will reach out if any questions or needs arise.

## 2023-09-19 ENCOUNTER — HOSPITAL ENCOUNTER (OUTPATIENT)
Dept: RADIATION ONCOLOGY | Facility: HOSPITAL | Age: 48
Discharge: HOME | End: 2023-09-19
Payer: COMMERCIAL

## 2023-09-19 ENCOUNTER — RADIATION ONCOLOGY WEEKLY ASSESSMENT (OUTPATIENT)
Dept: RADIATION ONCOLOGY | Facility: HOSPITAL | Age: 48
End: 2023-09-19
Payer: COMMERCIAL

## 2023-09-19 DIAGNOSIS — C50.912 BREAST CARCINOMA, LOBULAR, LEFT: Primary | ICD-10-CM

## 2023-09-19 LAB
RAD ONC ARIA COURSE ID: NORMAL
RAD ONC ARIA COURSE LAST TREATMENT DATE: NORMAL
RAD ONC ARIA COURSE START DATE: NORMAL
RAD ONC ARIA COURSE TREATMENT ELAPSED DAYS: 0
RAD ONC ARIA FIRST TREATMENT DATE: NORMAL
RAD ONC ARIA PLAN FRACTIONS TREATED TO DATE: 1
RAD ONC ARIA PLAN ID: NORMAL
RAD ONC ARIA PLAN PRESCRIBED DOSE PER FRACTION: 2 GY
RAD ONC ARIA PLAN PRIMARY REFERENCE POINT: NORMAL
RAD ONC ARIA PLAN TOTAL FRACTIONS PRESCRIBED: 25
RAD ONC ARIA PLAN TOTAL PRESCRIBED DOSE: 5000 CGY
RAD ONC ARIA REFERENCE POINT DOSAGE GIVEN TO DATE: 2 GY
RAD ONC ARIA REFERENCE POINT ID: NORMAL
RAD ONC ARIA REFERENCE POINT SESSION DOSAGE GIVEN: 2 GY

## 2023-09-19 PROCEDURE — 77412 RADIATION TX DELIVERY LVL 3: CPT | Performed by: RADIOLOGY

## 2023-09-19 PROCEDURE — 77427 RADIATION TX MANAGEMENT X5: CPT | Performed by: RADIOLOGY

## 2023-09-19 PROCEDURE — 77387 GUIDANCE FOR RADJ TX DLVR: CPT | Performed by: RADIOLOGY

## 2023-09-19 PROCEDURE — 77280 THER RAD SIMULAJ FIELD SMPL: CPT | Performed by: RADIOLOGY

## 2023-09-19 PROCEDURE — 77417 THER RADIOLOGY PORT IMAGE(S): CPT | Performed by: RADIOLOGY

## 2023-09-19 NOTE — PROGRESS NOTES
Physician Weekly Management Note    Diagnosis:     Diagnosis Plan   1. Breast carcinoma, lobular, left            RT Details:  fx 1 left breast/expander chest wall [id number]    Notes on Treatment course, Films, Medical progress:  Kps 90% doing well, no pain, no erythema, cont on     Weekly Management:  Medication reviewed?   Yes  New medications given?   No  Problemlist reviewed?   Yes  Problem added?   No  Issues raised requiring referral to support services - task assigned to:  na    Technical aspects reviewed:  Weekly OBI approved?   Yes  Weekly port films approved?   Yes  Change requests noted on port film?   No  Patient setup and plan reviewed?   Yes    Chart Reviewed:  Continue current treatment plan?   Yes  Treatment plan change requested?   No  CBC reviewed?   No  Concurrent Chemo?   No    Objective     Toxicities:   none     Review of Systems   Constitutional: Negative.    Skin: Negative.         There were no vitals filed for this visit.        8/31/2023     8:34 AM   Current Status   ECOG score 0       Physical Exam  Constitutional:       Appearance: Normal appearance.   Chest:          Comments: No erythema  Neurological:      Mental Status: She is alert.           Problem Summary List    Diagnosis:     Diagnosis Plan   1. Breast carcinoma, lobular, left          Pathology:   breast cancer    Past Medical History:   Diagnosis Date   • ADD (attention deficit disorder)    • Bilateral impacted cerumen 07/13/2020   • Breast cancer 03/08/2023    LUCIO MASTECTOMY   • Foot fracture, left    • History of COVID-19 2022   • History of gestational diabetes 2010   • History of Graves' disease 2012   • History of radioactive iodine thyroid ablation 2012   • History of vertigo    • Hyperlipidemia    • Hypothyroidism    • Melanoma of back    • Positive depression screening 07/13/2020         Past Surgical History:   Procedure Laterality Date   • BREAST RECONSTRUCTION Bilateral 3/8/2023    Procedure: BILATERAL PLACEMENT   TISSUE EXPANDER AND ALLODERM;  Surgeon: Eamon Stone MD;  Location: [facility] MAIN OR;  Service: Plastics;  Laterality: Bilateral;   • D & C CERVICAL BIOPSY  01/2006   • INTRAUTERINE DEVICE INSERTION  2010    Mirena   • INTRAUTERINE DEVICE INSERTION  2015    Mirena exchange, old IUD removal and new one inserted by Dr Carr, Lot # [id number] 09/17.mar   • MASTECTOMY W/ SENTINEL NODE BIOPSY Bilateral 3/8/2023    Procedure: Bilateral total mastectomy, left axillary sentinel lymph node biopsy;  Surgeon: Kell Hi MD;  Location: [facility] MAIN OR;  Service: General;  Laterality: Bilateral;   • SCAR REVISION BREAST Right 4/3/2023    Procedure: RIGHT MASTECTOMY REVISION;  Surgeon: Eamon Stone MD;  Location: [facility] MAIN OR;  Service: Plastics;  Laterality: Right;   • VENOUS ACCESS DEVICE (PORT) INSERTION Right 4/13/2023    Procedure: right port placement;  Surgeon: Kell Hi MD;  Location: [facility] MAIN OR;  Service: General;  Laterality: Right;   • WISDOM TOOTH EXTRACTION           Current Outpatient Medications on File Prior to Visit   Medication Sig Dispense Refill   • COLLAGEN PO Take 1 tablet by mouth Daily.     • levothyroxine (SYNTHROID, LEVOTHROID) 125 MCG tablet Take 1 tablet by mouth Daily. 30 tablet 1   • lidocaine-prilocaine (EMLA) 2.5-2.5 % cream Apply 1 application topically to the appropriate area as directed As Needed for Mild Pain (apply pea-sized amount to port site 30 minutes prior to port being accessed). 30 g 3   • lisinopril (PRINIVIL,ZESTRIL) 2.5 MG tablet Take 1 tablet by mouth once daily 30 tablet 5   • loratadine (CLARITIN) 10 MG tablet Take 1 tablet by mouth Daily.     • pantoprazole (PROTONIX) 40 MG EC tablet Take 1 tablet by mouth Daily. 30 tablet 3   • Probiotic Product (PROBIOTIC ADVANCED PO) Take 1 tablet by mouth Daily.       No current facility-administered medications on file prior to visit.       Allergies   Allergen Reactions   • Erythromycin GI  Intolerance     Pt reports   • Penicillins Rash         Referring Provider:    No referring provider defined for this encounter.    Oncologist:  No primary care provider on file.      Seen and approved by:  Fay Hines MD  09/19/2023

## 2023-09-20 ENCOUNTER — HOSPITAL ENCOUNTER (OUTPATIENT)
Dept: RADIATION ONCOLOGY | Facility: HOSPITAL | Age: 48
Discharge: HOME | End: 2023-09-20
Payer: COMMERCIAL

## 2023-09-20 LAB
RAD ONC ARIA COURSE ID: NORMAL
RAD ONC ARIA COURSE LAST TREATMENT DATE: NORMAL
RAD ONC ARIA COURSE START DATE: NORMAL
RAD ONC ARIA COURSE TREATMENT ELAPSED DAYS: 1
RAD ONC ARIA FIRST TREATMENT DATE: NORMAL
RAD ONC ARIA PLAN FRACTIONS TREATED TO DATE: 2
RAD ONC ARIA PLAN ID: NORMAL
RAD ONC ARIA PLAN PRESCRIBED DOSE PER FRACTION: 2 GY
RAD ONC ARIA PLAN PRIMARY REFERENCE POINT: NORMAL
RAD ONC ARIA PLAN TOTAL FRACTIONS PRESCRIBED: 25
RAD ONC ARIA PLAN TOTAL PRESCRIBED DOSE: 5000 CGY
RAD ONC ARIA REFERENCE POINT DOSAGE GIVEN TO DATE: 4 GY
RAD ONC ARIA REFERENCE POINT ID: NORMAL
RAD ONC ARIA REFERENCE POINT SESSION DOSAGE GIVEN: 2 GY

## 2023-09-20 PROCEDURE — 77387 GUIDANCE FOR RADJ TX DLVR: CPT | Performed by: RADIOLOGY

## 2023-09-20 PROCEDURE — 77412 RADIATION TX DELIVERY LVL 3: CPT | Performed by: RADIOLOGY

## 2023-09-21 ENCOUNTER — HOSPITAL ENCOUNTER (OUTPATIENT)
Dept: RADIATION ONCOLOGY | Facility: HOSPITAL | Age: 48
Discharge: HOME | End: 2023-09-21
Payer: COMMERCIAL

## 2023-09-21 ENCOUNTER — INFUSION (OUTPATIENT)
Dept: ONCOLOGY | Facility: HOSPITAL | Age: 48
End: 2023-09-21
Payer: COMMERCIAL

## 2023-09-21 ENCOUNTER — OFFICE VISIT (OUTPATIENT)
Dept: ONCOLOGY | Facility: CLINIC | Age: 48
End: 2023-09-21
Payer: COMMERCIAL

## 2023-09-21 VITALS
OXYGEN SATURATION: 99 % | DIASTOLIC BLOOD PRESSURE: 90 MMHG | RESPIRATION RATE: 16 BRPM | TEMPERATURE: 98.4 F | HEART RATE: 78 BPM | HEIGHT: 63 IN | BODY MASS INDEX: 30.41 KG/M2 | SYSTOLIC BLOOD PRESSURE: 126 MMHG | WEIGHT: 171.6 LBS

## 2023-09-21 DIAGNOSIS — C50.812 MALIGNANT NEOPLASM OF OVERLAPPING SITES OF LEFT BREAST IN FEMALE, ESTROGEN RECEPTOR POSITIVE: Primary | ICD-10-CM

## 2023-09-21 DIAGNOSIS — Z17.0 MALIGNANT NEOPLASM OF OVERLAPPING SITES OF LEFT BREAST IN FEMALE, ESTROGEN RECEPTOR POSITIVE: ICD-10-CM

## 2023-09-21 DIAGNOSIS — Z17.0 MALIGNANT NEOPLASM OF OVERLAPPING SITES OF LEFT BREAST IN FEMALE, ESTROGEN RECEPTOR POSITIVE: Primary | ICD-10-CM

## 2023-09-21 DIAGNOSIS — C50.812 MALIGNANT NEOPLASM OF OVERLAPPING SITES OF LEFT BREAST IN FEMALE, ESTROGEN RECEPTOR POSITIVE: ICD-10-CM

## 2023-09-21 LAB
ALBUMIN SERPL-MCNC: 4.5 G/DL (ref 3.5–5.2)
ALBUMIN/GLOB SERPL: 1.9 G/DL
ALP SERPL-CCNC: 128 U/L (ref 39–117)
ALT SERPL W P-5'-P-CCNC: 103 U/L (ref 1–33)
ANION GAP SERPL CALCULATED.3IONS-SCNC: 15.4 MMOL/L (ref 5–15)
AST SERPL-CCNC: 65 U/L (ref 1–32)
BASOPHILS # BLD AUTO: 0.05 10*3/MM3 (ref 0–0.2)
BASOPHILS NFR BLD AUTO: 0.8 % (ref 0–1.5)
BILIRUB SERPL-MCNC: 0.7 MG/DL (ref 0–1.2)
BUN SERPL-MCNC: 16 MG/DL (ref 6–20)
BUN/CREAT SERPL: 19.5 (ref 7–25)
CALCIUM SPEC-SCNC: 9.7 MG/DL (ref 8.6–10.5)
CHLORIDE SERPL-SCNC: 102 MMOL/L (ref 98–107)
CO2 SERPL-SCNC: 22.6 MMOL/L (ref 22–29)
CREAT SERPL-MCNC: 0.82 MG/DL (ref 0.6–1.1)
DEPRECATED RDW RBC AUTO: 44.4 FL (ref 37–54)
EGFRCR SERPLBLD CKD-EPI 2021: 89.5 ML/MIN/1.73
EOSINOPHIL # BLD AUTO: 0.49 10*3/MM3 (ref 0–0.4)
EOSINOPHIL NFR BLD AUTO: 7.6 % (ref 0.3–6.2)
ERYTHROCYTE [DISTWIDTH] IN BLOOD BY AUTOMATED COUNT: 12.2 % (ref 12.3–15.4)
GLOBULIN UR ELPH-MCNC: 2.4 GM/DL
GLUCOSE SERPL-MCNC: 96 MG/DL (ref 65–99)
HCT VFR BLD AUTO: 38.5 % (ref 34–46.6)
HGB BLD-MCNC: 13.3 G/DL (ref 12–15.9)
IMM GRANULOCYTES # BLD AUTO: 0.02 10*3/MM3 (ref 0–0.05)
IMM GRANULOCYTES NFR BLD AUTO: 0.3 % (ref 0–0.5)
LYMPHOCYTES # BLD AUTO: 2.38 10*3/MM3 (ref 0.7–3.1)
LYMPHOCYTES NFR BLD AUTO: 37.1 % (ref 19.6–45.3)
MCH RBC QN AUTO: 34.3 PG (ref 26.6–33)
MCHC RBC AUTO-ENTMCNC: 34.5 G/DL (ref 31.5–35.7)
MCV RBC AUTO: 99.2 FL (ref 79–97)
MONOCYTES # BLD AUTO: 0.48 10*3/MM3 (ref 0.1–0.9)
MONOCYTES NFR BLD AUTO: 7.5 % (ref 5–12)
NEUTROPHILS NFR BLD AUTO: 2.99 10*3/MM3 (ref 1.7–7)
NEUTROPHILS NFR BLD AUTO: 46.7 % (ref 42.7–76)
NRBC BLD AUTO-RTO: 0 /100 WBC (ref 0–0.2)
PLATELET # BLD AUTO: 198 10*3/MM3 (ref 140–450)
PMV BLD AUTO: 10 FL (ref 6–12)
POTASSIUM SERPL-SCNC: 3.7 MMOL/L (ref 3.5–5.2)
PROT SERPL-MCNC: 6.9 G/DL (ref 6–8.5)
RAD ONC ARIA COURSE ID: NORMAL
RAD ONC ARIA COURSE LAST TREATMENT DATE: NORMAL
RAD ONC ARIA COURSE START DATE: NORMAL
RAD ONC ARIA COURSE TREATMENT ELAPSED DAYS: 2
RAD ONC ARIA FIRST TREATMENT DATE: NORMAL
RAD ONC ARIA PLAN FRACTIONS TREATED TO DATE: 3
RAD ONC ARIA PLAN ID: NORMAL
RAD ONC ARIA PLAN PRESCRIBED DOSE PER FRACTION: 2 GY
RAD ONC ARIA PLAN PRIMARY REFERENCE POINT: NORMAL
RAD ONC ARIA PLAN TOTAL FRACTIONS PRESCRIBED: 25
RAD ONC ARIA PLAN TOTAL PRESCRIBED DOSE: 5000 CGY
RAD ONC ARIA REFERENCE POINT DOSAGE GIVEN TO DATE: 6 GY
RAD ONC ARIA REFERENCE POINT ID: NORMAL
RAD ONC ARIA REFERENCE POINT SESSION DOSAGE GIVEN: 2 GY
RBC # BLD AUTO: 3.88 10*6/MM3 (ref 3.77–5.28)
SODIUM SERPL-SCNC: 140 MMOL/L (ref 136–145)
WBC NRBC COR # BLD: 6.41 10*3/MM3 (ref 3.4–10.8)

## 2023-09-21 PROCEDURE — 25010000002 TRASTUZUMAB PER 10 MG: Performed by: INTERNAL MEDICINE

## 2023-09-21 PROCEDURE — 99214 OFFICE O/P EST MOD 30 MIN: CPT | Performed by: INTERNAL MEDICINE

## 2023-09-21 PROCEDURE — 96375 TX/PRO/DX INJ NEW DRUG ADDON: CPT

## 2023-09-21 PROCEDURE — 25010000002 DIPHENHYDRAMINE PER 50 MG: Performed by: INTERNAL MEDICINE

## 2023-09-21 PROCEDURE — 85025 COMPLETE CBC W/AUTO DIFF WBC: CPT

## 2023-09-21 PROCEDURE — 25010000002 PERTUZUMAB 420 MG/14ML SOLUTION 420 MG VIAL: Performed by: INTERNAL MEDICINE

## 2023-09-21 PROCEDURE — 80053 COMPREHEN METABOLIC PANEL: CPT

## 2023-09-21 PROCEDURE — 77387 GUIDANCE FOR RADJ TX DLVR: CPT | Performed by: RADIOLOGY

## 2023-09-21 PROCEDURE — 96417 CHEMO IV INFUS EACH ADDL SEQ: CPT

## 2023-09-21 PROCEDURE — 77336 RADIATION PHYSICS CONSULT: CPT | Performed by: RADIOLOGY

## 2023-09-21 PROCEDURE — 96413 CHEMO IV INFUSION 1 HR: CPT

## 2023-09-21 PROCEDURE — 77412 RADIATION TX DELIVERY LVL 3: CPT | Performed by: RADIOLOGY

## 2023-09-21 RX ORDER — FAMOTIDINE 10 MG/ML
20 INJECTION, SOLUTION INTRAVENOUS ONCE
Status: CANCELLED | OUTPATIENT
Start: 2023-09-21

## 2023-09-21 RX ORDER — FAMOTIDINE 10 MG/ML
20 INJECTION, SOLUTION INTRAVENOUS ONCE
Status: COMPLETED | OUTPATIENT
Start: 2023-09-21 | End: 2023-09-21

## 2023-09-21 RX ORDER — SODIUM CHLORIDE 9 MG/ML
250 INJECTION, SOLUTION INTRAVENOUS ONCE
Status: COMPLETED | OUTPATIENT
Start: 2023-09-21 | End: 2023-09-21

## 2023-09-21 RX ORDER — SODIUM CHLORIDE 9 MG/ML
250 INJECTION, SOLUTION INTRAVENOUS ONCE
Status: CANCELLED | OUTPATIENT
Start: 2023-09-21

## 2023-09-21 RX ADMIN — TRASTUZUMAB 470 MG: 150 INJECTION, POWDER, LYOPHILIZED, FOR SOLUTION INTRAVENOUS at 13:03

## 2023-09-21 RX ADMIN — DIPHENHYDRAMINE HYDROCHLORIDE 25 MG: 50 INJECTION, SOLUTION INTRAMUSCULAR; INTRAVENOUS at 11:35

## 2023-09-21 RX ADMIN — PERTUZUMAB 420 MG: 30 INJECTION, SOLUTION, CONCENTRATE INTRAVENOUS at 12:05

## 2023-09-21 RX ADMIN — FAMOTIDINE 20 MG: 10 INJECTION INTRAVENOUS at 11:35

## 2023-09-21 RX ADMIN — SODIUM CHLORIDE 250 ML: 9 INJECTION, SOLUTION INTRAVENOUS at 11:35

## 2023-09-21 NOTE — PROGRESS NOTES
Subjective     REASON FOR FOLLOW-UP:  1.  Invasive lobular carcinoma multifocal,  Left breast anterolateral stereotactic biopsy invasive lobular carcinoma, Marie score of 6, grade 2, 4 mm, ER 95%, WI 95%, HER2/cheryl 0 negative with Ki-67 of 5%.  Positive for atypical lobular hyperplasia  Left breast 1 o'clock position 10 cm from the nipple ultrasound-guided biopsy consistent with invasive lobular carcinoma grade 2 [place] score of 6, 4 mm with lobular carcinoma in situ, ER 90%, %, HER2/cheryl 0  Left breast upper stereotactic guided core biopsy invasive lobular carcinoma grade 2 [place] score of six 3 mm with lobular carcinoma in situ, ER 90%, %, HER2/cheryl equivocal 2+, FISH shows her to 5.7 with CEP 1.6 and ratio HER2 nu/KEIRA-17 ratio of 3.5.  It is amplified.    2.  March 8, 2023:p T3 N0 M0 invasive lobular carcinoma s/p bilateral mastectomy with left deep axillary sentinel lymph node biopsy, with bilateral placement of prepectoral tissue expanders for reconstruction and bilateral placement of AlloDerm.  Pathology shows multifocal tumor in  upper outer quadrant and upper inner quadrant, invasive lobular carcinoma grade 2 with a [place] score  score of 7  , total of 4 foci were seen, largest tumor being 60 mm, 20 mm, 2 mm, 1 mm.  No DCIS identified.  Lobular carcinoma in situ present.  No lymphatic or vascular channel invasion, no dermal lymphovascular space invasion all margins were negative for invasive carcinoma 4 lymph nodes were all negative for malignancy its pathologic T3N0  ER %, WI %, HER2/cheryl 2+ with Ki-67 of 5%  Previous biopsies had shown 2 of the tumors were ER/WI positive HER2/cheryl negative and one of them was ER/WI positive HER2 positive  On discussing with pathologist Dr. Serrano, she thinks that the larger tumor was ER WI positive and HER2 positive but unsure if it was the 60 mm tumor or 20 mm tumor.  Either way it was not the smaller tumors which was HER2  positive.  All tumors look-alike per pathologist and they are invasive lobular carcinoma.                  HISTORY OF PRESENT ILLNESS:         Patient is a 48 y.o. female with history of multifocal left breast cancer with MRI of the breast showing almost 15 lesions.  Biopsies on 3 different lesions were done and they were all consistent with invasive lobular carcinoma.  2 of the lesions where ER/WV positive HER2/cheryl negative and one of the larger lesion was ER/WV positive HER2 2+ equivocal.  FISH testing was done and the HER2 copy number was 5.7 with HER2/CEP ratio of 3.5.  The FISH was amplified.    Patient underwent bilateral mastectomy    With left sentinel lymph node surgery..  The pathology shows tumor to be present in the left upper outer quadrant invasive lobular carcinoma with a Marie score of 7, grade 2.  There were 4 lesions the greatest size was 6 0 mm, 20 mm, 2 mm and 1 mm in size.  Lobular carcinoma in situ is present.  All margins are negative for invasive carcinoma.  4 lymph nodes negative.  Patient has T3N0 invasive lobular carcinoma.      Discussed with pathology in length Dr. Serrano, one of the larger lesions which was 60 mm was heterogeneous it was biopsied at 2 separate spots.  The left anterior lateral and left 1:00 core fragment was ER/WV positive HER2/cheryl negative the left upper core biopsy was ER/WV positive HER2/cheryl 2+ equivocal but HER2/cheryl FISH was positive.    On discussion with pathology the 60 mm tumor showed ER and WV positive and HER2 positive by FISH with a Ki-67 of 40% at the Top-Hat clip.  The HER2/cheryl copy number was 5.7 with a HER2/cheryl by CEP ratio 3.5 which is positive.  The Ki-67 on the lower part was 13%.  The 20 mm tumor had a Ki-67 of 9%.  So the larger lesion was triple positive and heterogeneous as 2 biopsies were done on the larger lesions and that was both triple positive and at the second biopsy was ER/WV positive HER2/cheryl negative so it was a heterogeneous tumor.   The 20 mm lesion was ER/SD positive HER2/cheryl negative.    Given that she had a large tumor with high Ki-67 and premenopausal status we discussed about giving Taxotere carboplatin Herceptin Perjeta.    Interval History:       Patient is tolerating Herceptin Perjeta very well.  She has triple positive breast cancer.  She has started radiation and has 25 total treatments so far she has  completed 2 treatments.  Patient has had family history of depression though personally she has not had but mild depression.  She is also interested to get oophorectomy bilaterally as well as hysterectomy because she has fibroid.  She is going to see her gynecologist.  We discussed about consideration of starting tamoxifen at the end of radiation.  However we can make a decision in 6 weeks when she sees me as she would have completed radiation by then.  We can either do Lupron followed by aromatase inhibitor if she is concerned about depression.  She wants to think about it.    Oncology history:  Patient is a 48 y.o. female who has been recently diagnosed with left breast cancer.  Patient has been compliant with her mammograms.    There is no family history of breast or ovarian cancer.  Her father had prostate cancer and melanoma.  A paternal aunt had thyroid cancer.      Details are as follows.    November 29, 2022: Screening bilateral mammogram showed 8 mm asymmetry in the central left breast posterior one third.  This appears to be in the superior left breast on the MLO view.  Right breast was negative.  A left diagnostic mammogram and ultrasound was recommended.    January January 3, 2023: Left breast diagnostic mammogram showed the spiculated areas within the central posterior and lateral anterior superior left breast where redemonstrated.  Targeted ultrasound was done.  At 1 o'clock position 10 cm from the nipple there is a shadowing mass with peripheral spiculations which is concordant with the mammographic abnormality and  suspicious for malignancy.  The more centrally located spiculation on the cc view posterior to the glandular tissue cannot be clearly identified on the ultrasound.    Impression was 2 separate suspicious spiculated lesion in the left breast.  One of the lesions is seen well on the ultrasound and should be amenable to ultrasound-guided core biopsy.  The other lesion which was seen in the CC projection was amicable to stereotactic guided tissue sampling.    January 3, 2023 patient underwent ultrasound-guided left breast biopsy    The ultrasound-guided biopsy of left breast 10 cm from the nipple at 1 o'clock position showed invasive lobular carcinoma moderately differentiated with a [place] grade of 2 and score of 6 measuring 4 mm.  There was focal lobular carcinoma in situ.    Left breast anterior lateral stereotactic core needle biopsy showed invasive lobular carcinoma moderately differentiated grade 2 with a [place] score of 6 measuring 4 mm.  There was atypical lobular hyperplasia.  Estrogen receptor was %, progesterone receptor was %, HER2/cheryl 0, Ki-67 5%.    Left breast upper stereotactic guided core biopsy showed invasive lobular carcinoma moderately differentiated grade 2 with Marie score of 6 with measuring 3 mm with lobular carcinoma in situ present.  I do not see the ER/[place] or HER2 on the ultrasound-guided biopsy of the left breast lesion as well as the left upper stereotactic biopsy.  We will check with pathologist    January 23, 2023: Patient underwent  MRI of the breast bilateral    Right breast: Negative    Left breast: At 1:00 anterior left breast 4 cm from the nipple there is a 1.5 x 1.6 x 1.3 cm irregular enhancing mass which is biopsy-proven malignancy.  The biopsy clip is located within 0.9 x 1 x 1.1 cm postbiopsy hematoma along the inferior margin of the mass.  And there is additional hematoma along the lateral margin of the mass which measures 1.2 x 1.9 x 0.9 cm.    At 1:00  in the middle to posterior left breast 7.4 cm from the nipple there is a 1.3 x 1 cm x 1 cm irregular enhancing mass mass with a focus from a biopsy clip along the inferior margin which also represents the biopsy-proven malignancy    At 12:00 in the posterior left breast 8.5 cm from the nipple there is a 1.9 x 1.8 x 2.2 cm irregular enhancing mass with a corresponding 2.9 cm biopsy cavity with a biopsy clip which represents the biopsy-proven malignancy    In addition there are multiple at least 12 similar-appearing irregular enhancing masses and foci are identified throughout the left breast predominantly involving the upper breast but also involving the lower outer quadrant.  At 11-12 o'clock in the middle and posterior left breast there are 3 adjacent reference masses together measuring 5.5 cm but individually measuring 1.2 x 0.9 x 1 cm.  At 1:00 in the far posterior left breast/axillary tail 13.7 cm posterior to the nipple is a 1.3 x 1.1 x 1 cm irregular enhancing mass which is located 0.8 cm from the anterolateral margin of the pectoralis Muscle.  Together the enhancing masses span approximately 11.7 x 8 x 7.8 cm.  There is no suspicious enhancement in the left nipple or chest wall.  Focal areas of skin enhancement likely reflect skin entry point from recent biopsies.  There are no pathologically enlarged internal mammary chain lymph nodes.    Impression    .  At least 15 similar-appearing irregular enhancing masses and foci  scattered throughout the left breast, together measuring up to 11.7 cm  in maximum dimension, encompass 3 sites of biopsy-proven malignancy and  are consistent with multicentric malignancy. Oncologic and surgical  management are recommended.  2.  No MRI evidence of malignancy in the right breast.    Patient is a 47-year-old female with multifocal invasive lobular carcinoma who on screening mammogram showed 8 mm asymmetry in the central left breast posterior one third.  In the cc view.  It  appears to be superior left breast on the MLO view.  There was also architectural distortion in the lateral left breast one third on the left cc view.    On diagnostic mammogram the spiculated areas within the central posterior and the lateral anterior superior left breast where redemonstrated.  Targeted ultrasound was done.  At 1 o'clock position 10 cm from the nipple there is a mass with spiculations which is suspicious.  The more centrally located spiculation on the cc view.  The more centrally located spiculation on the cc view cannot be clearly identified on ultrasound.  So there impression was there were 2 separate suspicious spiculated lesion in the left breast.  1 was seen well on the ultrasound and amenable to ultrasound-guided biopsy the other is seen well on mammography and a stereotactic guided biopsy suggested.    Patient subsequently underwent stereotactic biopsy of 1 lesion and ultrasound-guided biopsy of the 1 cm mass at 1 o'clock position 10 cm from the nipple.  A total of 3 different areas of the left breast were biopsied and specimens were sent to pathology.  The third biopsy was performed at 12 o'clock position in the posterior one third of the left breast.  All of the 3 sites were consistent with invasive lobular carcinoma at site A, B, and C.    MRI of the breast showed at 1 o'clock position of the left breast anteriorly 4 cm posterior to the nipple there is a 1.5 x 1.6 x 1.3 cm irregular enhancing mass.  There is a small hematoma along the inferior margin of the mass.  An additional hematoma along the lateral margin of the mass which is measuring 1.2 x 0.9 x 0.9 cm    At 1:00 in the middle to posterior left breast 7.4 cm posterior to the nipple there is a 1.3 x 1 x 1 cm enhancing mass with a biopsy clip.    At 12:00 posterior left breast 8.5 cm posterior to the nipple there is a 1.5 x 1.8 x 2.2 cm irregular enhancing mass with a corresponding 2.9 cm biopsy cavity with a clip which represents  the biopsy site malignancy    Multiple at least 12 similar-appearing irregular enhancing masses and foci are identified throughout the left breast predominantly involving the upper breast but also involving the lower outer quadrant.  At 11-12 o'clock in the middle and posterior left breast there is a 3 adjacent reference masses measuring 5.5 cm in AP dimension and individually measuring 1.2 x 0.9 x 1 cm.  At 1:00 in the far posterior left breast axillary tail 13.7 cm from the nipple there is a 1.3 x 1.1 x 1 cm craniocaudal dimension irregular enhancing mass which is located 0.8 cm from the anterolateral margin of the pectoralis muscle.     Per the MRI at least 15 similar-appearing irregular enhancing masses and foci scattered throughout the left breast together measuring 11.7 cm in maximum dimension which encompasses 3 sites of biopsy-proven malignancy and a consistent with multicentric malignancy.  MRI of the right breast is negative    INVITAE genetic testing showed variation of uncertain significance of LUCAS  Gene.    Patient was suggested left total mastectomy, left axillary sentinel lymph node biopsy possible node dissection and possible reconstruction.    However patient called Dr. Hi and decided to go for upfront bilateral total mastectomy, left axillary sentinel lymph node biopsy and possible axillary lymph node dissection and reconstruction.     I presented the case in the breast cancer conference today.  Patient had multiple nodules per MRI on the left breast Dr. Todd looked at it and felt there were multiple nodules and the largest one was 2.2 cm.  The discussion was to go ahead and upfront do the surgery and subsequently treat with chemo/ endocrine therapy as needed.  The left breast MRI findings are slightly atypical and that it is not contiguous involvement over 11.5 cm area but multiple nodules.  Surgical pathology will clarify.  If it is 1 big lesion or multiple sma      Past Medical History:    Diagnosis Date    ADD (attention deficit disorder)     Bilateral impacted cerumen 07/13/2020    Breast cancer 03/08/2023    LUCIO MASTECTOMY    Foot fracture, left     History of COVID-19 2022    History of gestational diabetes 2010    History of Graves' disease 2012    History of radioactive iodine thyroid ablation 2012    History of vertigo     Hyperlipidemia     Hypothyroidism     Melanoma of back     Positive depression screening 07/13/2020        Past Surgical History:   Procedure Laterality Date    BREAST RECONSTRUCTION Bilateral 3/8/2023    Procedure: BILATERAL PLACEMENT  TISSUE EXPANDER AND ALLODERM;  Surgeon: Eamon Stone MD;  [facility];  Service: Plastics;  Laterality: Bilateral;    D & C CERVICAL BIOPSY  01/2006    INTRAUTERINE DEVICE INSERTION  2010    Mirena    INTRAUTERINE DEVICE INSERTION  2015    Mirena exchange, old IUD removal and new one inserted by Dr Carr, Lot # [id number] 09/17.mar    MASTECTOMY W/ SENTINEL NODE BIOPSY Bilateral 3/8/2023    Procedure: Bilateral total mastectomy, left axillary sentinel lymph node biopsy;  Surgeon: Kell Hi MD;  Location: [facility] MAIN OR;  Service: General;  Laterality: Bilateral;    SCAR REVISION BREAST Right 4/3/2023    Procedure: RIGHT MASTECTOMY REVISION;  Surgeon: Eamon Stone MD;  Location: [facility] MAIN OR;  Service: Plastics;  Laterality: Right;    VENOUS ACCESS DEVICE (PORT) INSERTION Right 4/13/2023    Procedure: right port placement;  Surgeon: Kell Hi MD;  Location: [facility] MAIN OR;  Service: General;  Laterality: Right;    WISDOM TOOTH EXTRACTION          Current Outpatient Medications on File Prior to Visit   Medication Sig Dispense Refill    COLLAGEN PO Take 1 tablet by mouth Daily.      levothyroxine (SYNTHROID, LEVOTHROID) 125 MCG tablet Take 1 tablet by mouth Daily. 30 tablet 1    lidocaine-prilocaine (EMLA) 2.5-2.5 % cream Apply 1 application topically to the appropriate area as directed  As Needed for Mild Pain (apply pea-sized amount to port site 30 minutes prior to port being accessed). 30 g 3    lisinopril (PRINIVIL,ZESTRIL) 2.5 MG tablet Take 1 tablet by mouth once daily 30 tablet 5    loratadine (CLARITIN) 10 MG tablet Take 1 tablet by mouth Daily.      pantoprazole (PROTONIX) 40 MG EC tablet Take 1 tablet by mouth Daily. 30 tablet 3    Probiotic Product (PROBIOTIC ADVANCED PO) Take 1 tablet by mouth Daily.       No current facility-administered medications on file prior to visit.        ALLERGIES:    Allergies   Allergen Reactions    Erythromycin GI Intolerance     Pt reports    Penicillins Rash        Social History     Socioeconomic History    Marital status:      Spouse name: Parish    Number of children: 2   Tobacco Use    Smoking status: Never    Smokeless tobacco: Never   Vaping Use    Vaping Use: Never used   Substance and Sexual Activity    Alcohol use: Not Currently     Alcohol/week: 1.0 standard drink     Types: 1 Glasses of wine per week     Comment: Daily caffeine use    Drug use: Never    Sexual activity: Yes     Partners: Male     Comment:          Family History   Problem Relation Age of Onset    Diabetes Father     Hyperlipidemia Father     Arthritis Father     Hypertension Father     Heart disease Father         S/P stents    Heart attack Father 41    Colon polyps Father     Melanoma Father     Prostate cancer Father     Hyperlipidemia Sister     Hypertension Sister     Heart attack Sister     Diabetes Sister     Thyroid cancer Paternal Aunt     Lung cancer Maternal Grandmother     Cancer Maternal Grandfather         Bladder cancer    Lung cancer Maternal Grandfather     Heart failure Paternal Grandmother     Transient ischemic attack Paternal Grandmother     Heart disease Paternal Grandfather     Malig Hyperthermia Neg Hx       Family history: Maternal great aunt had breast cancer, unsure of the age .  Father had melanoma and prostate cancer, paternal aunt had  "thyroid cancer, paternal grandfather had bladder cancer maternal grandfather had bladder cancer maternal grandmother had lung cancer    Past medical history is consistent with Graves' disease    OB/GYN history:  Age of menarche: 14  Patient is premenopausal and had an IUD which has been now removed   2 para 2 no miscarriages  Age at first childbirth 30  Patient did breast-feed 24 months  Length of taking birth control pills none      Objective     Vitals:    23 1043   BP: 126/90   Pulse: 78   Resp: 16   Temp: 98.4 °F (36.9 °C)   TempSrc: Temporal   SpO2: 99%   Weight: 77.8 kg (171 lb 9.6 oz)   Height: 160 cm (62.99\")   PainSc: 0-No pain           2023    10:31 AM   Current Status   ECOG score 0     Review of systems: Patient has fatigue    Physical Exam      GENERAL:  Well-developed, Patient  in no acute distress.   SKIN:  Warm, dry ,NO purpura ,no rash.  HEENT:  Pupils were equal and reactive to light and accomodation, conjunctivae noninjected,  normal visual acuity.   LYMPHATICS:  No cervical, NO supraclavicular, NO axillary, NO inguinal adenopathies.  BREASTS: Not examined today  CARDIAC: Normal rate and rhythm, no murmurs gallops or rubs.  LUNGS: Clear to auscultation bilaterally.   ABDOMEN:  Soft, nontender with bowel sounds active.   EXTREMITIES: No cyanosis clubbing or edema.  NEUROLOGICAL:  Patient was awake, alert, oriented to time, person and place.    RECENT LABS:  Results from last 7 days   Lab Units 23  1025   WBC 10*3/mm3 6.41   NEUTROS ABS 10*3/mm3 2.99   HEMOGLOBIN g/dL 13.3   HEMATOCRIT % 38.5   PLATELETS 10*3/mm3 198     Results from last 7 days   Lab Units 23  1025   SODIUM mmol/L 140   POTASSIUM mmol/L 3.7   CHLORIDE mmol/L 102   CO2 mmol/L 22.6   BUN mg/dL 16   CREATININE mg/dL 0.82   CALCIUM mg/dL 9.7   ALBUMIN g/dL 4.5   BILIRUBIN mg/dL 0.7   ALK PHOS U/L 128*   ALT (SGPT) U/L 103*   AST (SGOT) U/L 65*   GLUCOSE mg/dL 96           Assessment & Plan     *Pathologic " T3N0 invasive lobular carcinoma of the left breast, upper inner and upper outer quadrant, multifocal, 60 mm, 20 mm, 2 mm, 1 mm, grade 2, Marie score of 7, 2 of them are ER/KS positive, HER2/cheryl negative.  One of the larger lesions was ER/KS positive HER2 positive.  Ki-67 is very low 5%,    Invasive lobular carcinoma of the left breast recently diagnosed, multifocal with 3 lesions biopsied in the left breast.  Patient has multicentric disease with 15 similar-appearing irregular enhancing masses in the left breast together measuring 11.7 cm in maximum dimension which encompasses 3 of the 3 biopsy proven malignancies.  All the 3 very invasive lobular carcinoma, grade 2 with Marie score of 6 but 2 of the lesions where ER/KS positive HER2 negative and the third lesion was ER/KS positive HER2 2+ with FISH showing HER2 amplified.      1.  Estrogen receptor 95%, progesterone receptor 95%, HER2/cheryl 0, Ki-67 5%    2 left breast 10 o'clock position 10 cm from the nipple ultrasound core guided biopsy showed ER 90%, %, HER2/cheryl 0    3.  Left breast upper stereotactic core biopsy showed estrogen receptor 90%, progesterone receptor 100%, HER2/cheryl equivocal 2+  FISH testing showed her to as 5.7 with a [place] 17 of 1.6 with a ratio HER2/KEIRA 17 of  INVITAE genetic testing showed variation of uncertain significance of LUCAS gene  We discussed the multidisciplinary approach in this patient's and I also discussed in length that that invasive lobular carcinomas do benefit from endocrine therapy and discussed in length about side effects of all endocrine therapies  Pending the results of the surgery she may need to be a candidate for chemotherapy as well and subsequently radiation if the tumor is large locally  Patient is keen on getting bilateral oophorectomy after her breast surgery is done and we discussed about medical oophorectomy with Lupron as well  February 24, 2023: Presented patient in the breast cancer conference.   Even though it appears to be a heterogeneous tumor, since it is difficult to really appreciate by clinical exam and the fact that it is invasive lobular carcinoma, it was felt best to do upfront surgery.  March 8, 2023:p T3 N0 M0 invasive lobular carcinoma s/p bilateral mastectomy with left deep axillary sentinel lymph node biopsy, with bilateral placement of prepectoral tissue expanders for reconstruction and bilateral placement of AlloDerm.  Pathology shows multifocal tumor in  upper outer quadrant and upper inner quadrant, invasive lobular carcinoma grade 2 with a [place] score  score of 7  , total of 4 foci were seen, largest tumor being 60 mm, 20 mm, 2 mm, 1 mm.  No DCIS identified.  Lobular carcinoma in situ present.  No lymphatic or vascular channel invasion, no dermal lymphovascular space invasion all margins were negative for invasive carcinoma 4 lymph nodes were all negative for malignancy its pathologic T3N0  ER %, TN %, HER2/cheryl 2+ with Ki-67 of 5%  Previous biopsies had shown 2 of the tumors were ER/TN positive HER2/cheryl negative and one of them was ER/TN positive HER2 positive  On discussing with pathologist Dr. Serrano, she thinks that the larger tumor was ER TN positive and HER2 positive.  HER2 copy number of 5.7 with HER2/CEP ratio of 3.5 positive and this was on the larger tumor.  Given that patient has heterogeneous tumor with the larger lesion being ER/TN positive HER2 positive and a Ki-67 of 40%, being premenopausal we will treat with Taxotere carboplatin Herceptin Perjeta.  April 20, 2023: Patient was to start cycle 1 today but unfortunately her liver function tests were extremely high.  Discussed about alcohol and she had drank and taken Tylenol.  Will hold chemo today  April 28, 2023: Reviewed her liver function test which are completely normalized.  Discussed with her not to drink alcohol.  Here for cycle 1 day 1 TCHP chemotherapy.  Reviewed echo which showed ejection fraction of  64% with strain pattern of -21.  Patient understands all the side effects\  5/4/2023: Patient tolerated Cycle 1 TCHP well. She has had diarrhea, nausea and fatigue that have been well managed.   5/18/2023: C2 TCHP  5/22/2023: Here for close follow-up and possible IV fluids after treatment last Friday, 5/18/2023.  Patient had a fairly good weekend with good oral intake, weight that is notably stable, and very little nausea.  She only had 1 loose stool this morning and therefore no significant diarrhea.  CMP is WNL.  Per discussion with patient we will give just 500 mL normal saline.  May 25, 2023: Patient has diarrhea.  She is 1 week out of cycle 2 Taxotere carboplatin Herceptin Perjeta.  She has some abdominal cramping.  We will give her IV fluids today.  Given carboplatinum is not available she will receive Taxotere Herceptin Perjeta for 2 more cycles prior to going to Adriamycin Cytoxan.  Cycle #3 TCHP 6/8/2023  Patient returns 6/12/2023 for toxicity check.  She is eating and drinking sufficiently at this point.  She does not feel she has benefited from the IV fluids in the past and therefore will not do that today.  She denies any fevers or chills.  June 29, 2023: Cycle 4 TCHP.  She will need referral to radiation at the completion of 6 cycles of TCHP  07/20/2023 proceed with cycle 5 of carboplatin, Taxotere, Herceptin, Perjeta with Neulasta support.  August 10, 2023: Final cycle 6 TCHP.  Thereafter plan to continue every 3-week Herceptin/Perjeta for total of 1 year.   We have discussed potentially beginning Lupron followed by aromatase inhibitor therapy following radiation however patient wants to discuss potential hysterectomy and bilateral oophorectomy with her GYN.    September 21, 2023: Patient started radiation has completed 2 treatments so far out of 25.  Patient has history of depression in the family but not herself.  We discussed about consideration of Lupron with aromatase inhibitor but she wants to  undergo hysterectomy with bilateral oophorectomy because of her fibroids.  In which case we could consider starting tamoxifen until she has oophorectomy and subsequently start AI.  She is tolerating Herceptin Perjeta very well    *S/p  IUD removal  Experiencing hot flashes with chemo likely triggering menopausal changes.    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 She did recommend she start taking her gabapentin again to help with her anxiety and irritability/anger from her decadron with treatment.   Patient continues follow-up with supportive oncology, RADHA Rolle. Patient is encouraged to continue to follow with Cierra.     *Oral Thrush and Chemotherapy induced mucositis  5/4/2023: I am concerned patient is starting into chemotherapy induced mucositis. She is reporting sore throat at times, possibly worsened reflux, mouth and tongue changes. She noticed white plaquing on her tongue this morning. She has not been able to  her Yen's Magic Mouthwash until today because of pharmacy supplies. She is currently on omeprazole 20 mg daily. She can increase this to 40 mg daily if needed. Prescription sent to pharmacy today to start Fluconazole 200 mg on day and 100 mg on day 2-7. We will continue to monitor symptoms and watch liver enzymes closely. ALT 34 and AST 24.  Liver function test mildly elevated today, unsure if that is the effect of fluconazole.  In the future avoid fluconazole and consider just Yen's Magic mouthwash.  7/6/2023 Oral thrush recurrent today, mild but bothersome.  Refill Magic mouthwash.  Patient continuing to rinse her mouth to minimize oral candida.  Expect this to resolve now that she is done with chemotherapy and  no longer requiring dexamethasone.    *  Port study negative    *Intermittent mild elevation of LFTs.    Patient has previously reported intermittent use of Tylenol and alcohol.    8/31/2023 AST 35, ALT 41, alk phos 100.  Patient denies any alcohol intake or Tylenol.  This is felt to be drug related.  Stable to improved. Monitor.    *Cardiac monitoring  Seen by cardiology today, 7/27/2023.  Echo stable.  Plan for continued echocardiogram every 3 months while on therapy.  Continue low-dose lisinopril    PLAN:  Patient started radiation  Here for Herceptin Perjeta and tolerating well  Next echocardiogram due end of October 2023  Consideration given either to tamoxifen at the end of radiation or Lupron followed by AI after she had oophorectomy.  We will make decision at 6 weeks when she would have completed radiation.  Today we have educated her about endocrine therapy  Follow-up with nurse practitioner in 3 weeks with Herceptin Perjeta and with me in 6 weeks at which time we can consider endocrine therapy  This patient is on high risk drug therapy requiring intensive monitoring for toxicity.      MD Dr. Kell Sandoval

## 2023-09-21 NOTE — NURSING NOTE
Reviewed labs with Dr. Frederick, regarding elevation of LFT's.  Pt did report taking tylenol twice yesterday for headache.  Per Dr. Frederick OK to proceed with treatment today.  Pharmacy notified as well.

## 2023-09-22 ENCOUNTER — HOSPITAL ENCOUNTER (OUTPATIENT)
Dept: RADIATION ONCOLOGY | Facility: HOSPITAL | Age: 48
Discharge: HOME | End: 2023-09-22
Payer: COMMERCIAL

## 2023-09-22 LAB
RAD ONC ARIA COURSE ID: NORMAL
RAD ONC ARIA COURSE LAST TREATMENT DATE: NORMAL
RAD ONC ARIA COURSE START DATE: NORMAL
RAD ONC ARIA COURSE TREATMENT ELAPSED DAYS: 3
RAD ONC ARIA FIRST TREATMENT DATE: NORMAL
RAD ONC ARIA PLAN FRACTIONS TREATED TO DATE: 4
RAD ONC ARIA PLAN ID: NORMAL
RAD ONC ARIA PLAN PRESCRIBED DOSE PER FRACTION: 2 GY
RAD ONC ARIA PLAN PRIMARY REFERENCE POINT: NORMAL
RAD ONC ARIA PLAN TOTAL FRACTIONS PRESCRIBED: 25
RAD ONC ARIA PLAN TOTAL PRESCRIBED DOSE: 5000 CGY
RAD ONC ARIA REFERENCE POINT DOSAGE GIVEN TO DATE: 8 GY
RAD ONC ARIA REFERENCE POINT ID: NORMAL
RAD ONC ARIA REFERENCE POINT SESSION DOSAGE GIVEN: 2 GY

## 2023-09-22 PROCEDURE — 77387 GUIDANCE FOR RADJ TX DLVR: CPT | Performed by: RADIOLOGY

## 2023-09-22 PROCEDURE — 77412 RADIATION TX DELIVERY LVL 3: CPT | Performed by: RADIOLOGY

## 2023-09-25 ENCOUNTER — HOSPITAL ENCOUNTER (OUTPATIENT)
Dept: RADIATION ONCOLOGY | Facility: HOSPITAL | Age: 48
Discharge: HOME | End: 2023-09-25
Payer: COMMERCIAL

## 2023-09-25 LAB
RAD ONC ARIA COURSE ID: NORMAL
RAD ONC ARIA COURSE LAST TREATMENT DATE: NORMAL
RAD ONC ARIA COURSE START DATE: NORMAL
RAD ONC ARIA COURSE TREATMENT ELAPSED DAYS: 6
RAD ONC ARIA FIRST TREATMENT DATE: NORMAL
RAD ONC ARIA PLAN FRACTIONS TREATED TO DATE: 5
RAD ONC ARIA PLAN ID: NORMAL
RAD ONC ARIA PLAN PRESCRIBED DOSE PER FRACTION: 2 GY
RAD ONC ARIA PLAN PRIMARY REFERENCE POINT: NORMAL
RAD ONC ARIA PLAN TOTAL FRACTIONS PRESCRIBED: 25
RAD ONC ARIA PLAN TOTAL PRESCRIBED DOSE: 5000 CGY
RAD ONC ARIA REFERENCE POINT DOSAGE GIVEN TO DATE: 10 GY
RAD ONC ARIA REFERENCE POINT ID: NORMAL
RAD ONC ARIA REFERENCE POINT SESSION DOSAGE GIVEN: 2 GY

## 2023-09-25 PROCEDURE — 77387 GUIDANCE FOR RADJ TX DLVR: CPT | Performed by: RADIOLOGY

## 2023-09-25 PROCEDURE — 77412 RADIATION TX DELIVERY LVL 3: CPT | Performed by: RADIOLOGY

## 2023-09-26 ENCOUNTER — RADIATION ONCOLOGY WEEKLY ASSESSMENT (OUTPATIENT)
Dept: RADIATION ONCOLOGY | Facility: HOSPITAL | Age: 48
End: 2023-09-26
Payer: COMMERCIAL

## 2023-09-26 ENCOUNTER — HOSPITAL ENCOUNTER (OUTPATIENT)
Dept: RADIATION ONCOLOGY | Facility: HOSPITAL | Age: 48
Discharge: HOME | End: 2023-09-26
Payer: COMMERCIAL

## 2023-09-26 VITALS
OXYGEN SATURATION: 97 % | BODY MASS INDEX: 30.72 KG/M2 | DIASTOLIC BLOOD PRESSURE: 97 MMHG | HEART RATE: 89 BPM | SYSTOLIC BLOOD PRESSURE: 142 MMHG | WEIGHT: 173.4 LBS

## 2023-09-26 DIAGNOSIS — C50.812 MALIGNANT NEOPLASM OF OVERLAPPING SITES OF LEFT BREAST IN FEMALE, ESTROGEN RECEPTOR POSITIVE: Primary | ICD-10-CM

## 2023-09-26 DIAGNOSIS — Z17.0 MALIGNANT NEOPLASM OF OVERLAPPING SITES OF LEFT BREAST IN FEMALE, ESTROGEN RECEPTOR POSITIVE: Primary | ICD-10-CM

## 2023-09-26 LAB
RAD ONC ARIA COURSE ID: NORMAL
RAD ONC ARIA COURSE LAST TREATMENT DATE: NORMAL
RAD ONC ARIA COURSE START DATE: NORMAL
RAD ONC ARIA COURSE TREATMENT ELAPSED DAYS: 7
RAD ONC ARIA FIRST TREATMENT DATE: NORMAL
RAD ONC ARIA PLAN FRACTIONS TREATED TO DATE: 6
RAD ONC ARIA PLAN ID: NORMAL
RAD ONC ARIA PLAN PRESCRIBED DOSE PER FRACTION: 2 GY
RAD ONC ARIA PLAN PRIMARY REFERENCE POINT: NORMAL
RAD ONC ARIA PLAN TOTAL FRACTIONS PRESCRIBED: 25
RAD ONC ARIA PLAN TOTAL PRESCRIBED DOSE: 5000 CGY
RAD ONC ARIA REFERENCE POINT DOSAGE GIVEN TO DATE: 12 GY
RAD ONC ARIA REFERENCE POINT ID: NORMAL
RAD ONC ARIA REFERENCE POINT SESSION DOSAGE GIVEN: 2 GY

## 2023-09-26 PROCEDURE — 77412 RADIATION TX DELIVERY LVL 3: CPT | Performed by: RADIOLOGY

## 2023-09-26 PROCEDURE — 77417 THER RADIOLOGY PORT IMAGE(S): CPT | Performed by: RADIOLOGY

## 2023-09-26 PROCEDURE — 77387 GUIDANCE FOR RADJ TX DLVR: CPT | Performed by: RADIOLOGY

## 2023-09-26 NOTE — PROGRESS NOTES
Physician Weekly Management Note    Diagnosis:     Diagnosis Plan   1. Malignant neoplasm of overlapping sites of left breast in female, estrogen receptor positive            RT Details:  fx 6/25 left chest wall 12/50Gy    Notes on Treatment course, Films, Medical progress:  Kps 90% doing well no erythema no fatigue    Weekly Management:  Medication reviewed?   Yes  New medications given?   No  Problemlist reviewed?   Yes  Problem added?   No  Issues raised requiring referral to support services - task assigned to:  na    Technical aspects reviewed:  Weekly OBI approved?   Yes  Weekly port films approved?   Yes  Change requests noted on port film?   No  Patient setup and plan reviewed?   Yes    Chart Reviewed:  Continue current treatment plan?   Yes  Treatment plan change requested?   No  CBC reviewed?   No  Concurrent Chemo?   No    Objective     Toxicities:   none     Review of Systems   Constitutional: Negative.    Skin: Negative.         Vitals:    09/26/23 0811   BP: 142/97   Pulse: 89   SpO2: 97%           9/21/2023    10:31 AM   Current Status   ECOG score 0       Physical Exam  Constitutional:       Appearance: Normal appearance.   Chest:          Comments: No erythema  Neurological:      Mental Status: She is alert.           Problem Summary List    Diagnosis:     Diagnosis Plan   1. Malignant neoplasm of overlapping sites of left breast in female, estrogen receptor positive          Pathology:   breast     Past Medical History:   Diagnosis Date   • ADD (attention deficit disorder)    • Bilateral impacted cerumen 07/13/2020   • Breast cancer 03/08/2023    LUCIO MASTECTOMY   • Foot fracture, left    • History of COVID-19 2022   • History of gestational diabetes 2010   • History of Graves' disease 2012   • History of radioactive iodine thyroid ablation 2012   • History of vertigo    • Hyperlipidemia    • Hypothyroidism    • Melanoma of back    • Positive depression screening 07/13/2020         Past Surgical  History:   Procedure Laterality Date   • BREAST RECONSTRUCTION Bilateral 3/8/2023    Procedure: BILATERAL PLACEMENT  TISSUE EXPANDER AND ALLODERM;  Surgeon: Eamon Stone MD;  Location: [facility] MAIN OR;  Service: Plastics;  Laterality: Bilateral;   • D & C CERVICAL BIOPSY  01/2006   • INTRAUTERINE DEVICE INSERTION  2010    Mirena   • INTRAUTERINE DEVICE INSERTION  2015    Mirena exchange, old IUD removal and new one inserted by Dr Carr, Lot # [id number] 09/17.mar   • MASTECTOMY W/ SENTINEL NODE BIOPSY Bilateral 3/8/2023    Procedure: Bilateral total mastectomy, left axillary sentinel lymph node biopsy;  Surgeon: Kell Hi MD;  Location: [facility] MAIN OR;  Service: General;  Laterality: Bilateral;   • SCAR REVISION BREAST Right 4/3/2023    Procedure: RIGHT MASTECTOMY REVISION;  Surgeon: Eamon Stone MD;  Location: [facility] MAIN OR;  Service: Plastics;  Laterality: Right;   • VENOUS ACCESS DEVICE (PORT) INSERTION Right 4/13/2023    Procedure: right port placement;  Surgeon: Kell Hi MD;  Location: [facility] MAIN OR;  Service: General;  Laterality: Right;   • WISDOM TOOTH EXTRACTION           Current Outpatient Medications on File Prior to Visit   Medication Sig Dispense Refill   • COLLAGEN PO Take 1 tablet by mouth Daily.     • levothyroxine (SYNTHROID, LEVOTHROID) 125 MCG tablet Take 1 tablet by mouth Daily. 30 tablet 1   • lidocaine-prilocaine (EMLA) 2.5-2.5 % cream Apply 1 application topically to the appropriate area as directed As Needed for Mild Pain (apply pea-sized amount to port site 30 minutes prior to port being accessed). 30 g 3   • lisinopril (PRINIVIL,ZESTRIL) 2.5 MG tablet Take 1 tablet by mouth once daily 30 tablet 5   • loratadine (CLARITIN) 10 MG tablet Take 1 tablet by mouth Daily.     • pantoprazole (PROTONIX) 40 MG EC tablet Take 1 tablet by mouth Daily. 30 tablet 3   • Probiotic Product (PROBIOTIC ADVANCED PO) Take 1 tablet by mouth Daily.       No current  facility-administered medications on file prior to visit.       Allergies   Allergen Reactions   • Erythromycin GI Intolerance     Pt reports   • Penicillins Rash         Referring Provider:    Kathrin Frederick Md  [address] [place] [address]    Oncologist:  No primary care provider on file.      Seen and approved by:  Fay Hines MD  09/26/2023

## 2023-09-27 ENCOUNTER — HOSPITAL ENCOUNTER (OUTPATIENT)
Dept: RADIATION ONCOLOGY | Facility: HOSPITAL | Age: 48
Discharge: HOME | End: 2023-09-27
Payer: COMMERCIAL

## 2023-09-27 LAB
RAD ONC ARIA COURSE ID: NORMAL
RAD ONC ARIA COURSE LAST TREATMENT DATE: NORMAL
RAD ONC ARIA COURSE START DATE: NORMAL
RAD ONC ARIA COURSE TREATMENT ELAPSED DAYS: 8
RAD ONC ARIA FIRST TREATMENT DATE: NORMAL
RAD ONC ARIA PLAN FRACTIONS TREATED TO DATE: 7
RAD ONC ARIA PLAN ID: NORMAL
RAD ONC ARIA PLAN PRESCRIBED DOSE PER FRACTION: 2 GY
RAD ONC ARIA PLAN PRIMARY REFERENCE POINT: NORMAL
RAD ONC ARIA PLAN TOTAL FRACTIONS PRESCRIBED: 25
RAD ONC ARIA PLAN TOTAL PRESCRIBED DOSE: 5000 CGY
RAD ONC ARIA REFERENCE POINT DOSAGE GIVEN TO DATE: 14 GY
RAD ONC ARIA REFERENCE POINT ID: NORMAL
RAD ONC ARIA REFERENCE POINT SESSION DOSAGE GIVEN: 2 GY

## 2023-09-27 PROCEDURE — 77412 RADIATION TX DELIVERY LVL 3: CPT | Performed by: RADIOLOGY

## 2023-09-27 PROCEDURE — 77387 GUIDANCE FOR RADJ TX DLVR: CPT | Performed by: RADIOLOGY

## 2023-09-28 ENCOUNTER — HOSPITAL ENCOUNTER (OUTPATIENT)
Dept: RADIATION ONCOLOGY | Facility: HOSPITAL | Age: 48
Discharge: HOME | End: 2023-09-28
Payer: COMMERCIAL

## 2023-09-28 DIAGNOSIS — E89.0 POSTABLATIVE HYPOTHYROIDISM: ICD-10-CM

## 2023-09-28 LAB
RAD ONC ARIA COURSE ID: NORMAL
RAD ONC ARIA COURSE LAST TREATMENT DATE: NORMAL
RAD ONC ARIA COURSE START DATE: NORMAL
RAD ONC ARIA COURSE TREATMENT ELAPSED DAYS: 9
RAD ONC ARIA FIRST TREATMENT DATE: NORMAL
RAD ONC ARIA PLAN FRACTIONS TREATED TO DATE: 8
RAD ONC ARIA PLAN ID: NORMAL
RAD ONC ARIA PLAN PRESCRIBED DOSE PER FRACTION: 2 GY
RAD ONC ARIA PLAN PRIMARY REFERENCE POINT: NORMAL
RAD ONC ARIA PLAN TOTAL FRACTIONS PRESCRIBED: 25
RAD ONC ARIA PLAN TOTAL PRESCRIBED DOSE: 5000 CGY
RAD ONC ARIA REFERENCE POINT DOSAGE GIVEN TO DATE: 16 GY
RAD ONC ARIA REFERENCE POINT ID: NORMAL
RAD ONC ARIA REFERENCE POINT SESSION DOSAGE GIVEN: 2 GY

## 2023-09-28 PROCEDURE — 77387 GUIDANCE FOR RADJ TX DLVR: CPT | Performed by: RADIOLOGY

## 2023-09-28 PROCEDURE — 77336 RADIATION PHYSICS CONSULT: CPT | Performed by: RADIOLOGY

## 2023-09-28 PROCEDURE — 77412 RADIATION TX DELIVERY LVL 3: CPT | Performed by: RADIOLOGY

## 2023-09-28 RX ORDER — LEVOTHYROXINE SODIUM 0.12 MG/1
TABLET ORAL
Qty: 30 TABLET | Refills: 0 | Status: SHIPPED | OUTPATIENT
Start: 2023-09-28

## 2023-09-29 ENCOUNTER — HOSPITAL ENCOUNTER (OUTPATIENT)
Dept: RADIATION ONCOLOGY | Facility: HOSPITAL | Age: 48
Discharge: HOME | End: 2023-09-29
Payer: COMMERCIAL

## 2023-09-29 LAB
RAD ONC ARIA COURSE ID: NORMAL
RAD ONC ARIA COURSE LAST TREATMENT DATE: NORMAL
RAD ONC ARIA COURSE START DATE: NORMAL
RAD ONC ARIA COURSE TREATMENT ELAPSED DAYS: 10
RAD ONC ARIA FIRST TREATMENT DATE: NORMAL
RAD ONC ARIA PLAN FRACTIONS TREATED TO DATE: 9
RAD ONC ARIA PLAN ID: NORMAL
RAD ONC ARIA PLAN PRESCRIBED DOSE PER FRACTION: 2 GY
RAD ONC ARIA PLAN PRIMARY REFERENCE POINT: NORMAL
RAD ONC ARIA PLAN TOTAL FRACTIONS PRESCRIBED: 25
RAD ONC ARIA PLAN TOTAL PRESCRIBED DOSE: 5000 CGY
RAD ONC ARIA REFERENCE POINT DOSAGE GIVEN TO DATE: 18 GY
RAD ONC ARIA REFERENCE POINT ID: NORMAL
RAD ONC ARIA REFERENCE POINT SESSION DOSAGE GIVEN: 2 GY

## 2023-09-29 PROCEDURE — 77412 RADIATION TX DELIVERY LVL 3: CPT | Performed by: RADIOLOGY

## 2023-09-29 PROCEDURE — 77387 GUIDANCE FOR RADJ TX DLVR: CPT | Performed by: RADIOLOGY

## 2023-10-01 ENCOUNTER — HOSPITAL ENCOUNTER (OUTPATIENT)
Dept: RADIATION ONCOLOGY | Facility: HOSPITAL | Age: 48
Setting detail: RADIATION/ONCOLOGY SERIES
End: 2023-10-01
Payer: COMMERCIAL

## 2023-10-02 ENCOUNTER — HOSPITAL ENCOUNTER (OUTPATIENT)
Dept: RADIATION ONCOLOGY | Facility: HOSPITAL | Age: 48
Discharge: HOME | End: 2023-10-02
Payer: COMMERCIAL

## 2023-10-02 LAB
RAD ONC ARIA COURSE ID: NORMAL
RAD ONC ARIA COURSE LAST TREATMENT DATE: NORMAL
RAD ONC ARIA COURSE START DATE: NORMAL
RAD ONC ARIA COURSE TREATMENT ELAPSED DAYS: 13
RAD ONC ARIA FIRST TREATMENT DATE: NORMAL
RAD ONC ARIA PLAN FRACTIONS TREATED TO DATE: 10
RAD ONC ARIA PLAN ID: NORMAL
RAD ONC ARIA PLAN PRESCRIBED DOSE PER FRACTION: 2 GY
RAD ONC ARIA PLAN PRIMARY REFERENCE POINT: NORMAL
RAD ONC ARIA PLAN TOTAL FRACTIONS PRESCRIBED: 25
RAD ONC ARIA PLAN TOTAL PRESCRIBED DOSE: 5000 CGY
RAD ONC ARIA REFERENCE POINT DOSAGE GIVEN TO DATE: 20 GY
RAD ONC ARIA REFERENCE POINT ID: NORMAL
RAD ONC ARIA REFERENCE POINT SESSION DOSAGE GIVEN: 2 GY

## 2023-10-02 PROCEDURE — 77412 RADIATION TX DELIVERY LVL 3: CPT | Performed by: RADIOLOGY

## 2023-10-02 PROCEDURE — 77387 GUIDANCE FOR RADJ TX DLVR: CPT | Performed by: RADIOLOGY

## 2023-10-03 ENCOUNTER — RADIATION ONCOLOGY WEEKLY ASSESSMENT (OUTPATIENT)
Dept: RADIATION ONCOLOGY | Facility: HOSPITAL | Age: 48
End: 2023-10-03
Payer: COMMERCIAL

## 2023-10-03 ENCOUNTER — HOSPITAL ENCOUNTER (OUTPATIENT)
Dept: RADIATION ONCOLOGY | Facility: HOSPITAL | Age: 48
Discharge: HOME | End: 2023-10-03
Payer: COMMERCIAL

## 2023-10-03 VITALS
OXYGEN SATURATION: 99 % | DIASTOLIC BLOOD PRESSURE: 82 MMHG | HEART RATE: 87 BPM | WEIGHT: 173.2 LBS | BODY MASS INDEX: 30.69 KG/M2 | SYSTOLIC BLOOD PRESSURE: 129 MMHG

## 2023-10-03 DIAGNOSIS — Z17.0 MALIGNANT NEOPLASM OF OVERLAPPING SITES OF LEFT BREAST IN FEMALE, ESTROGEN RECEPTOR POSITIVE: Primary | ICD-10-CM

## 2023-10-03 DIAGNOSIS — C50.812 MALIGNANT NEOPLASM OF OVERLAPPING SITES OF LEFT BREAST IN FEMALE, ESTROGEN RECEPTOR POSITIVE: Primary | ICD-10-CM

## 2023-10-03 LAB
RAD ONC ARIA COURSE ID: NORMAL
RAD ONC ARIA COURSE LAST TREATMENT DATE: NORMAL
RAD ONC ARIA COURSE START DATE: NORMAL
RAD ONC ARIA COURSE TREATMENT ELAPSED DAYS: 14
RAD ONC ARIA FIRST TREATMENT DATE: NORMAL
RAD ONC ARIA PLAN FRACTIONS TREATED TO DATE: 11
RAD ONC ARIA PLAN ID: NORMAL
RAD ONC ARIA PLAN PRESCRIBED DOSE PER FRACTION: 2 GY
RAD ONC ARIA PLAN PRIMARY REFERENCE POINT: NORMAL
RAD ONC ARIA PLAN TOTAL FRACTIONS PRESCRIBED: 25
RAD ONC ARIA PLAN TOTAL PRESCRIBED DOSE: 5000 CGY
RAD ONC ARIA REFERENCE POINT DOSAGE GIVEN TO DATE: 22 GY
RAD ONC ARIA REFERENCE POINT ID: NORMAL
RAD ONC ARIA REFERENCE POINT SESSION DOSAGE GIVEN: 2 GY

## 2023-10-03 PROCEDURE — 77387 GUIDANCE FOR RADJ TX DLVR: CPT | Performed by: RADIOLOGY

## 2023-10-03 PROCEDURE — 77412 RADIATION TX DELIVERY LVL 3: CPT | Performed by: RADIOLOGY

## 2023-10-03 PROCEDURE — 77427 RADIATION TX MANAGEMENT X5: CPT | Performed by: RADIOLOGY

## 2023-10-03 NOTE — PROGRESS NOTES
Physician Weekly Management Note    Diagnosis:     Diagnosis Plan   1. Malignant neoplasm of overlapping sites of left breast in female, estrogen receptor positive            RT Details:  fx 11/25 left chest wall 22/50Gy    Notes on Treatment course, Films, Medical progress:  Kps90% doing well, no fatigue, mild erythema, no pain, cont on     Weekly Management:  Medication reviewed?   Yes  New medications given?   No  Problemlist reviewed?   Yes  Problem added?   No  Issues raised requiring referral to support services - task assigned to:  na    Technical aspects reviewed:  Weekly OBI approved?   Yes  Weekly port films approved?   Yes  Change requests noted on port film?   No  Patient setup and plan reviewed?   Yes    Chart Reviewed:  Continue current treatment plan?   Yes  Treatment plan change requested?   No  CBC reviewed?   No  Concurrent Chemo?   No    Objective     Toxicities:   none     Review of Systems   Constitutional: Negative.    Skin: Negative.    Psychiatric/Behavioral: Negative.          Vitals:    10/03/23 0759   BP: 129/82   Pulse: 87   SpO2: 99%           9/21/2023    10:31 AM   Current Status   ECOG score 0       Physical Exam  Constitutional:       Appearance: Normal appearance.   Chest:          Comments: No erythema  Neurological:      Mental Status: She is alert.           Problem Summary List    Diagnosis:     Diagnosis Plan   1. Malignant neoplasm of overlapping sites of left breast in female, estrogen receptor positive          Pathology:   breast    Past Medical History:   Diagnosis Date   • ADD (attention deficit disorder)    • Bilateral impacted cerumen 07/13/2020   • Breast cancer 03/08/2023    LUCIO MASTECTOMY   • Foot fracture, left    • History of COVID-19 2022   • History of gestational diabetes 2010   • History of Graves' disease 2012   • History of radioactive iodine thyroid ablation 2012   • History of vertigo    • Hyperlipidemia    • Hypothyroidism    • Melanoma of back    •  Positive depression screening 07/13/2020         Past Surgical History:   Procedure Laterality Date   • BREAST RECONSTRUCTION Bilateral 3/8/2023    Procedure: BILATERAL PLACEMENT  TISSUE EXPANDER AND ALLODERM;  Surgeon: Eamon Stone MD;  Location:  [facility] MAIN OR;  Service: Plastics;  Laterality: Bilateral;   • D & C CERVICAL BIOPSY  01/2006   • INTRAUTERINE DEVICE INSERTION  2010    Mirena   • INTRAUTERINE DEVICE INSERTION  2015    Mirena exchange, old IUD removal and new one inserted by Dr Carr, Lot # [id number] 09/17.mar   • MASTECTOMY W/ SENTINEL NODE BIOPSY Bilateral 3/8/2023    Procedure: Bilateral total mastectomy, left axillary sentinel lymph node biopsy;  Surgeon: Kell Hi MD;  Location: [facility] MAIN OR;  Service: General;  Laterality: Bilateral;   • SCAR REVISION BREAST Right 4/3/2023    Procedure: RIGHT MASTECTOMY REVISION;  Surgeon: Eamon Stone MD;  [facility] MA[facility];  Service: Plastics;  Laterality: Right;   • VENOUS ACCESS DEVICE (PORT) INSERTION Right 4/13/2023    Procedure: right port placement;  Surgeon: Kell Hi MD;  Location: [facility] MAIN OR;  Service: General;  Laterality: Right;   • WISDOM TOOTH EXTRACTION           Current Outpatient Medications on File Prior to Visit   Medication Sig Dispense Refill   • COLLAGEN PO Take 1 tablet by mouth Daily.     • levothyroxine (SYNTHROID, LEVOTHROID) 125 MCG tablet Take 1 tablet by mouth once daily 30 tablet 0   • lidocaine-prilocaine (EMLA) 2.5-2.5 % cream Apply 1 application topically to the appropriate area as directed As Needed for Mild Pain (apply pea-sized amount to port site 30 minutes prior to port being accessed). 30 g 3   • lisinopril (PRINIVIL,ZESTRIL) 2.5 MG tablet Take 1 tablet by mouth once daily 30 tablet 5   • loratadine (CLARITIN) 10 MG tablet Take 1 tablet by mouth Daily.     • pantoprazole (PROTONIX) 40 MG EC tablet Take 1 tablet by mouth Daily. 30 tablet 3   • Probiotic Product  (PROBIOTIC ADVANCED PO) Take 1 tablet by mouth Daily.       No current facility-administered medications on file prior to visit.       Allergies   Allergen Reactions   • Erythromycin GI Intolerance     Pt reports   • Penicillins Rash         Referring Provider:    Kathrin Frederick Md  [address] [address]    Oncologist:  No primary care provider on file.      Seen and approved by:  Fay Hines MD  10/03/2023

## 2023-10-04 ENCOUNTER — HOSPITAL ENCOUNTER (OUTPATIENT)
Dept: RADIATION ONCOLOGY | Facility: HOSPITAL | Age: 48
Discharge: HOME | End: 2023-10-04
Payer: COMMERCIAL

## 2023-10-04 LAB
RAD ONC ARIA COURSE ID: NORMAL
RAD ONC ARIA COURSE LAST TREATMENT DATE: NORMAL
RAD ONC ARIA COURSE START DATE: NORMAL
RAD ONC ARIA COURSE TREATMENT ELAPSED DAYS: 15
RAD ONC ARIA FIRST TREATMENT DATE: NORMAL
RAD ONC ARIA PLAN FRACTIONS TREATED TO DATE: 12
RAD ONC ARIA PLAN ID: NORMAL
RAD ONC ARIA PLAN PRESCRIBED DOSE PER FRACTION: 2 GY
RAD ONC ARIA PLAN PRIMARY REFERENCE POINT: NORMAL
RAD ONC ARIA PLAN TOTAL FRACTIONS PRESCRIBED: 25
RAD ONC ARIA PLAN TOTAL PRESCRIBED DOSE: 5000 CGY
RAD ONC ARIA REFERENCE POINT DOSAGE GIVEN TO DATE: 24 GY
RAD ONC ARIA REFERENCE POINT ID: NORMAL
RAD ONC ARIA REFERENCE POINT SESSION DOSAGE GIVEN: 2 GY

## 2023-10-04 PROCEDURE — 77412 RADIATION TX DELIVERY LVL 3: CPT | Performed by: RADIOLOGY

## 2023-10-04 PROCEDURE — 77387 GUIDANCE FOR RADJ TX DLVR: CPT | Performed by: RADIOLOGY

## 2023-10-05 ENCOUNTER — HOSPITAL ENCOUNTER (OUTPATIENT)
Dept: RADIATION ONCOLOGY | Facility: HOSPITAL | Age: 48
Discharge: HOME | End: 2023-10-05
Payer: COMMERCIAL

## 2023-10-05 LAB
RAD ONC ARIA COURSE ID: NORMAL
RAD ONC ARIA COURSE LAST TREATMENT DATE: NORMAL
RAD ONC ARIA COURSE START DATE: NORMAL
RAD ONC ARIA COURSE TREATMENT ELAPSED DAYS: 16
RAD ONC ARIA FIRST TREATMENT DATE: NORMAL
RAD ONC ARIA PLAN FRACTIONS TREATED TO DATE: 13
RAD ONC ARIA PLAN ID: NORMAL
RAD ONC ARIA PLAN PRESCRIBED DOSE PER FRACTION: 2 GY
RAD ONC ARIA PLAN PRIMARY REFERENCE POINT: NORMAL
RAD ONC ARIA PLAN TOTAL FRACTIONS PRESCRIBED: 25
RAD ONC ARIA PLAN TOTAL PRESCRIBED DOSE: 5000 CGY
RAD ONC ARIA REFERENCE POINT DOSAGE GIVEN TO DATE: 26 GY
RAD ONC ARIA REFERENCE POINT ID: NORMAL
RAD ONC ARIA REFERENCE POINT SESSION DOSAGE GIVEN: 2 GY

## 2023-10-05 PROCEDURE — 77412 RADIATION TX DELIVERY LVL 3: CPT | Performed by: RADIOLOGY

## 2023-10-05 PROCEDURE — 77387 GUIDANCE FOR RADJ TX DLVR: CPT | Performed by: RADIOLOGY

## 2023-10-05 PROCEDURE — 77336 RADIATION PHYSICS CONSULT: CPT | Performed by: RADIOLOGY

## 2023-10-06 ENCOUNTER — HOSPITAL ENCOUNTER (OUTPATIENT)
Dept: RADIATION ONCOLOGY | Facility: HOSPITAL | Age: 48
Discharge: HOME | End: 2023-10-06
Payer: COMMERCIAL

## 2023-10-06 LAB
RAD ONC ARIA COURSE ID: NORMAL
RAD ONC ARIA COURSE LAST TREATMENT DATE: NORMAL
RAD ONC ARIA COURSE START DATE: NORMAL
RAD ONC ARIA COURSE TREATMENT ELAPSED DAYS: 17
RAD ONC ARIA FIRST TREATMENT DATE: NORMAL
RAD ONC ARIA PLAN FRACTIONS TREATED TO DATE: 14
RAD ONC ARIA PLAN ID: NORMAL
RAD ONC ARIA PLAN PRESCRIBED DOSE PER FRACTION: 2 GY
RAD ONC ARIA PLAN PRIMARY REFERENCE POINT: NORMAL
RAD ONC ARIA PLAN TOTAL FRACTIONS PRESCRIBED: 25
RAD ONC ARIA PLAN TOTAL PRESCRIBED DOSE: 5000 CGY
RAD ONC ARIA REFERENCE POINT DOSAGE GIVEN TO DATE: 28 GY
RAD ONC ARIA REFERENCE POINT ID: NORMAL
RAD ONC ARIA REFERENCE POINT SESSION DOSAGE GIVEN: 2 GY

## 2023-10-06 PROCEDURE — 77387 GUIDANCE FOR RADJ TX DLVR: CPT | Performed by: RADIOLOGY

## 2023-10-06 PROCEDURE — 77412 RADIATION TX DELIVERY LVL 3: CPT | Performed by: RADIOLOGY

## 2023-10-09 ENCOUNTER — HOSPITAL ENCOUNTER (OUTPATIENT)
Dept: RADIATION ONCOLOGY | Facility: HOSPITAL | Age: 48
Discharge: HOME | End: 2023-10-09
Payer: COMMERCIAL

## 2023-10-09 LAB
RAD ONC ARIA COURSE ID: NORMAL
RAD ONC ARIA COURSE LAST TREATMENT DATE: NORMAL
RAD ONC ARIA COURSE START DATE: NORMAL
RAD ONC ARIA COURSE TREATMENT ELAPSED DAYS: 20
RAD ONC ARIA FIRST TREATMENT DATE: NORMAL
RAD ONC ARIA PLAN FRACTIONS TREATED TO DATE: 15
RAD ONC ARIA PLAN ID: NORMAL
RAD ONC ARIA PLAN PRESCRIBED DOSE PER FRACTION: 2 GY
RAD ONC ARIA PLAN PRIMARY REFERENCE POINT: NORMAL
RAD ONC ARIA PLAN TOTAL FRACTIONS PRESCRIBED: 25
RAD ONC ARIA PLAN TOTAL PRESCRIBED DOSE: 5000 CGY
RAD ONC ARIA REFERENCE POINT DOSAGE GIVEN TO DATE: 30 GY
RAD ONC ARIA REFERENCE POINT ID: NORMAL
RAD ONC ARIA REFERENCE POINT SESSION DOSAGE GIVEN: 2 GY

## 2023-10-09 PROCEDURE — 77387 GUIDANCE FOR RADJ TX DLVR: CPT | Performed by: RADIOLOGY

## 2023-10-09 PROCEDURE — 77412 RADIATION TX DELIVERY LVL 3: CPT | Performed by: RADIOLOGY

## 2023-10-10 ENCOUNTER — RADIATION ONCOLOGY WEEKLY ASSESSMENT (OUTPATIENT)
Dept: RADIATION ONCOLOGY | Facility: HOSPITAL | Age: 48
End: 2023-10-10
Payer: COMMERCIAL

## 2023-10-10 ENCOUNTER — HOSPITAL ENCOUNTER (OUTPATIENT)
Dept: RADIATION ONCOLOGY | Facility: HOSPITAL | Age: 48
Discharge: HOME | End: 2023-10-10
Payer: COMMERCIAL

## 2023-10-10 VITALS — DIASTOLIC BLOOD PRESSURE: 86 MMHG | SYSTOLIC BLOOD PRESSURE: 144 MMHG | HEART RATE: 81 BPM | OXYGEN SATURATION: 99 %

## 2023-10-10 DIAGNOSIS — Z17.0 MALIGNANT NEOPLASM OF OVERLAPPING SITES OF LEFT BREAST IN FEMALE, ESTROGEN RECEPTOR POSITIVE: Primary | ICD-10-CM

## 2023-10-10 DIAGNOSIS — C50.812 MALIGNANT NEOPLASM OF OVERLAPPING SITES OF LEFT BREAST IN FEMALE, ESTROGEN RECEPTOR POSITIVE: Primary | ICD-10-CM

## 2023-10-10 LAB
RAD ONC ARIA COURSE ID: NORMAL
RAD ONC ARIA COURSE LAST TREATMENT DATE: NORMAL
RAD ONC ARIA COURSE START DATE: NORMAL
RAD ONC ARIA COURSE TREATMENT ELAPSED DAYS: 21
RAD ONC ARIA FIRST TREATMENT DATE: NORMAL
RAD ONC ARIA PLAN FRACTIONS TREATED TO DATE: 16
RAD ONC ARIA PLAN ID: NORMAL
RAD ONC ARIA PLAN PRESCRIBED DOSE PER FRACTION: 2 GY
RAD ONC ARIA PLAN PRIMARY REFERENCE POINT: NORMAL
RAD ONC ARIA PLAN TOTAL FRACTIONS PRESCRIBED: 25
RAD ONC ARIA PLAN TOTAL PRESCRIBED DOSE: 5000 CGY
RAD ONC ARIA REFERENCE POINT DOSAGE GIVEN TO DATE: 32 GY
RAD ONC ARIA REFERENCE POINT ID: NORMAL
RAD ONC ARIA REFERENCE POINT SESSION DOSAGE GIVEN: 2 GY

## 2023-10-10 PROCEDURE — 77387 GUIDANCE FOR RADJ TX DLVR: CPT | Performed by: RADIOLOGY

## 2023-10-10 PROCEDURE — 77412 RADIATION TX DELIVERY LVL 3: CPT | Performed by: RADIOLOGY

## 2023-10-10 PROCEDURE — 77427 RADIATION TX MANAGEMENT X5: CPT | Performed by: RADIOLOGY

## 2023-10-10 NOTE — PROGRESS NOTES
Physician Weekly Management Note    Diagnosis:     Diagnosis Plan   1. Malignant neoplasm of overlapping sites of left breast in female, estrogen receptor positive            RT Details:  fx 16/25 left chest wall 32/50Gy    Notes on Treatment course, Films, Medical progress:  Kps 90% doing well, mild erythema over left ches twall, no pain, cont on     Weekly Management:  Medication reviewed?   Yes  New medications given?   No  Problemlist reviewed?   Yes  Problem added?   No  Issues raised requiring referral to support services - task assigned to:  na    Technical aspects reviewed:  Weekly OBI approved?   Yes  Weekly port films approved?   Yes  Change requests noted on port film?   No  Patient setup and plan reviewed?   Yes    Chart Reviewed:  Continue current treatment plan?   Yes  Treatment plan change requested?   No  CBC reviewed?   No  Concurrent Chemo?   No    Objective     Toxicities:   grade 1 erythema      Review of Systems   Constitutional: Negative.    Skin: Negative.         Vitals:    10/10/23 0800   BP: 144/86   Pulse: 81   SpO2: 99%           9/21/2023    10:31 AM   Current Status   ECOG score 0       Physical Exam  Constitutional:       Appearance: Normal appearance.   Chest:          Comments: Minimal erythema  Neurological:      Mental Status: She is alert.           Problem Summary List    Diagnosis:     Diagnosis Plan   1. Malignant neoplasm of overlapping sites of left breast in female, estrogen receptor positive          Pathology:   breast     Past Medical History:   Diagnosis Date    ADD (attention deficit disorder)     Bilateral impacted cerumen 07/13/2020    Breast cancer 03/08/2023    LUCIO MASTECTOMY    Foot fracture, left     History of COVID-19 2022    History of gestational diabetes 2010    History of Graves' disease 2012    History of radioactive iodine thyroid ablation 2012    History of vertigo     Hyperlipidemia     Hypothyroidism     Melanoma of back     Positive  depression screening 07/13/2020         Past Surgical History:   Procedure Laterality Date    BREAST RECONSTRUCTION Bilateral 3/8/2023    Procedure: BILATERAL PLACEMENT  TISSUE EXPANDER AND ALLODERM;  Surgeon: Eamon Stone MD;  Location:  [facility] MAIN OR;  Service: Plastics;  Laterality: Bilateral;    D & C CERVICAL BIOPSY  01/2006    INTRAUTERINE DEVICE INSERTION  2010    Mirena    INTRAUTERINE DEVICE INSERTION  2015    Mirena exchange, old IUD removal and new one inserted by Dr Carr, Lot # [id number] 09/17.mar    MASTECTOMY W/ SENTINEL NODE BIOPSY Bilateral 3/8/2023    Procedure: Bilateral total mastectomy, left axillary sentinel lymph node biopsy;  Surgeon: Kell Hi MD;  [facility];  Service: General;  Laterality: Bilateral;    SCAR REVISION BREAST Right 4/3/2023    Procedure: RIGHT MASTECTOMY REVISION;  Surgeon: Eamon Stone MD;  [facility];  Service: Plastics;  Laterality: Right;    VENOUS ACCESS DEVICE (PORT) INSERTION Right 4/13/2023    Procedure: right port placement;  Surgeon: Kell Hi MD;  Location: [facility] MAIN OR;  Service: General;  Laterality: Right;    WISDOM TOOTH EXTRACTION           Current Outpatient Medications on File Prior to Visit   Medication Sig Dispense Refill    COLLAGEN PO Take 1 tablet by mouth Daily.      levothyroxine (SYNTHROID, LEVOTHROID) 125 MCG tablet Take 1 tablet by mouth once daily 30 tablet 0    lidocaine-prilocaine (EMLA) 2.5-2.5 % cream Apply 1 application topically to the appropriate area as directed As Needed for Mild Pain (apply pea-sized amount to port site 30 minutes prior to port being accessed). 30 g 3    lisinopril (PRINIVIL,ZESTRIL) 2.5 MG tablet Take 1 tablet by mouth once daily 30 tablet 5    loratadine (CLARITIN) 10 MG tablet Take 1 tablet by mouth Daily.      pantoprazole (PROTONIX) 40 MG EC tablet Take 1 tablet by mouth Daily. 30 tablet 3    Probiotic Product (PROBIOTIC  ADVANCED PO) Take 1 tablet by mouth Daily.       No current facility-administered medications on file prior to visit.       Allergies   Allergen Reactions    Erythromycin GI Intolerance     Pt reports    Penicillins Rash         Referring Provider:    No referring provider defined for this encounter.    Oncologist:  No primary care provider on file.      Seen and approved by:  Fay Hines MD  10/10/2023  Subjective

## 2023-10-11 ENCOUNTER — HOSPITAL ENCOUNTER (OUTPATIENT)
Dept: RADIATION ONCOLOGY | Facility: HOSPITAL | Age: 48
Discharge: HOME | End: 2023-10-11
Payer: COMMERCIAL

## 2023-10-11 LAB
RAD ONC ARIA COURSE ID: NORMAL
RAD ONC ARIA COURSE LAST TREATMENT DATE: NORMAL
RAD ONC ARIA COURSE START DATE: NORMAL
RAD ONC ARIA COURSE TREATMENT ELAPSED DAYS: 22
RAD ONC ARIA FIRST TREATMENT DATE: NORMAL
RAD ONC ARIA PLAN FRACTIONS TREATED TO DATE: 17
RAD ONC ARIA PLAN ID: NORMAL
RAD ONC ARIA PLAN PRESCRIBED DOSE PER FRACTION: 2 GY
RAD ONC ARIA PLAN PRIMARY REFERENCE POINT: NORMAL
RAD ONC ARIA PLAN TOTAL FRACTIONS PRESCRIBED: 25
RAD ONC ARIA PLAN TOTAL PRESCRIBED DOSE: 5000 CGY
RAD ONC ARIA REFERENCE POINT DOSAGE GIVEN TO DATE: 34 GY
RAD ONC ARIA REFERENCE POINT ID: NORMAL
RAD ONC ARIA REFERENCE POINT SESSION DOSAGE GIVEN: 2 GY

## 2023-10-11 PROCEDURE — 77412 RADIATION TX DELIVERY LVL 3: CPT | Performed by: RADIOLOGY

## 2023-10-11 PROCEDURE — 77387 GUIDANCE FOR RADJ TX DLVR: CPT | Performed by: RADIOLOGY

## 2023-10-12 ENCOUNTER — OFFICE VISIT (OUTPATIENT)
Dept: ONCOLOGY | Facility: CLINIC | Age: 48
End: 2023-10-12
Payer: COMMERCIAL

## 2023-10-12 ENCOUNTER — HOSPITAL ENCOUNTER (OUTPATIENT)
Dept: RADIATION ONCOLOGY | Facility: HOSPITAL | Age: 48
Discharge: HOME | End: 2023-10-12
Payer: COMMERCIAL

## 2023-10-12 ENCOUNTER — INFUSION (OUTPATIENT)
Dept: ONCOLOGY | Facility: HOSPITAL | Age: 48
End: 2023-10-12
Payer: COMMERCIAL

## 2023-10-12 VITALS
TEMPERATURE: 97.5 F | SYSTOLIC BLOOD PRESSURE: 133 MMHG | OXYGEN SATURATION: 99 % | HEART RATE: 81 BPM | DIASTOLIC BLOOD PRESSURE: 90 MMHG | WEIGHT: 172.9 LBS | BODY MASS INDEX: 30.64 KG/M2 | HEIGHT: 63 IN

## 2023-10-12 DIAGNOSIS — Z17.0 MALIGNANT NEOPLASM OF OVERLAPPING SITES OF LEFT BREAST IN FEMALE, ESTROGEN RECEPTOR POSITIVE: ICD-10-CM

## 2023-10-12 DIAGNOSIS — Z79.899 HIGH RISK MEDICATION USE: ICD-10-CM

## 2023-10-12 DIAGNOSIS — Z17.0 MALIGNANT NEOPLASM OF OVERLAPPING SITES OF LEFT BREAST IN FEMALE, ESTROGEN RECEPTOR POSITIVE: Primary | ICD-10-CM

## 2023-10-12 DIAGNOSIS — C50.812 MALIGNANT NEOPLASM OF OVERLAPPING SITES OF LEFT BREAST IN FEMALE, ESTROGEN RECEPTOR POSITIVE: Primary | ICD-10-CM

## 2023-10-12 DIAGNOSIS — C50.812 MALIGNANT NEOPLASM OF OVERLAPPING SITES OF LEFT BREAST IN FEMALE, ESTROGEN RECEPTOR POSITIVE: ICD-10-CM

## 2023-10-12 LAB
ALBUMIN SERPL-MCNC: 4.3 G/DL (ref 3.5–5.2)
ALBUMIN/GLOB SERPL: 1.8 G/DL
ALP SERPL-CCNC: 146 U/L (ref 39–117)
ALT SERPL W P-5'-P-CCNC: 119 U/L (ref 1–33)
ANION GAP SERPL CALCULATED.3IONS-SCNC: 13.7 MMOL/L (ref 5–15)
AST SERPL-CCNC: 93 U/L (ref 1–32)
BASOPHILS # BLD AUTO: 0.03 10*3/MM3 (ref 0–0.2)
BASOPHILS NFR BLD AUTO: 0.5 % (ref 0–1.5)
BILIRUB SERPL-MCNC: 0.4 MG/DL (ref 0–1.2)
BUN SERPL-MCNC: 20 MG/DL (ref 6–20)
BUN/CREAT SERPL: 25.3 (ref 7–25)
CALCIUM SPEC-SCNC: 9.6 MG/DL (ref 8.6–10.5)
CHLORIDE SERPL-SCNC: 101 MMOL/L (ref 98–107)
CO2 SERPL-SCNC: 22.3 MMOL/L (ref 22–29)
CREAT SERPL-MCNC: 0.79 MG/DL (ref 0.6–1.1)
DEPRECATED RDW RBC AUTO: 40 FL (ref 37–54)
EGFRCR SERPLBLD CKD-EPI 2021: 93.6 ML/MIN/1.73
EOSINOPHIL # BLD AUTO: 0.34 10*3/MM3 (ref 0–0.4)
EOSINOPHIL NFR BLD AUTO: 5.2 % (ref 0.3–6.2)
ERYTHROCYTE [DISTWIDTH] IN BLOOD BY AUTOMATED COUNT: 11.4 % (ref 12.3–15.4)
GLOBULIN UR ELPH-MCNC: 2.4 GM/DL
GLUCOSE SERPL-MCNC: 101 MG/DL (ref 65–99)
HCT VFR BLD AUTO: 38.7 % (ref 34–46.6)
HGB BLD-MCNC: 13.3 G/DL (ref 12–15.9)
IMM GRANULOCYTES # BLD AUTO: 0.02 10*3/MM3 (ref 0–0.05)
IMM GRANULOCYTES NFR BLD AUTO: 0.3 % (ref 0–0.5)
LYMPHOCYTES # BLD AUTO: 1.79 10*3/MM3 (ref 0.7–3.1)
LYMPHOCYTES NFR BLD AUTO: 27.5 % (ref 19.6–45.3)
MAGNESIUM SERPL-MCNC: 1.4 MG/DL (ref 1.6–2.6)
MCH RBC QN AUTO: 32.8 PG (ref 26.6–33)
MCHC RBC AUTO-ENTMCNC: 34.4 G/DL (ref 31.5–35.7)
MCV RBC AUTO: 95.6 FL (ref 79–97)
MONOCYTES # BLD AUTO: 0.43 10*3/MM3 (ref 0.1–0.9)
MONOCYTES NFR BLD AUTO: 6.6 % (ref 5–12)
NEUTROPHILS NFR BLD AUTO: 3.91 10*3/MM3 (ref 1.7–7)
NEUTROPHILS NFR BLD AUTO: 59.9 % (ref 42.7–76)
NRBC BLD AUTO-RTO: 0 /100 WBC (ref 0–0.2)
PLATELET # BLD AUTO: 227 10*3/MM3 (ref 140–450)
PMV BLD AUTO: 9.5 FL (ref 6–12)
POTASSIUM SERPL-SCNC: 4.1 MMOL/L (ref 3.5–5.2)
PROT SERPL-MCNC: 6.7 G/DL (ref 6–8.5)
RAD ONC ARIA COURSE ID: NORMAL
RAD ONC ARIA COURSE LAST TREATMENT DATE: NORMAL
RAD ONC ARIA COURSE START DATE: NORMAL
RAD ONC ARIA COURSE TREATMENT ELAPSED DAYS: 23
RAD ONC ARIA FIRST TREATMENT DATE: NORMAL
RAD ONC ARIA PLAN FRACTIONS TREATED TO DATE: 18
RAD ONC ARIA PLAN ID: NORMAL
RAD ONC ARIA PLAN PRESCRIBED DOSE PER FRACTION: 2 GY
RAD ONC ARIA PLAN PRIMARY REFERENCE POINT: NORMAL
RAD ONC ARIA PLAN TOTAL FRACTIONS PRESCRIBED: 25
RAD ONC ARIA PLAN TOTAL PRESCRIBED DOSE: 5000 CGY
RAD ONC ARIA REFERENCE POINT DOSAGE GIVEN TO DATE: 36 GY
RAD ONC ARIA REFERENCE POINT ID: NORMAL
RAD ONC ARIA REFERENCE POINT SESSION DOSAGE GIVEN: 2 GY
RBC # BLD AUTO: 4.05 10*6/MM3 (ref 3.77–5.28)
SODIUM SERPL-SCNC: 137 MMOL/L (ref 136–145)
WBC NRBC COR # BLD: 6.52 10*3/MM3 (ref 3.4–10.8)

## 2023-10-12 PROCEDURE — 85025 COMPLETE CBC W/AUTO DIFF WBC: CPT

## 2023-10-12 PROCEDURE — 25010000002 MAGNESIUM SULFATE IN D5W 1G/100ML (PREMIX) 1-5 GM/100ML-% SOLUTION: Performed by: NURSE PRACTITIONER

## 2023-10-12 PROCEDURE — 77387 GUIDANCE FOR RADJ TX DLVR: CPT | Performed by: RADIOLOGY

## 2023-10-12 PROCEDURE — 77412 RADIATION TX DELIVERY LVL 3: CPT | Performed by: RADIOLOGY

## 2023-10-12 PROCEDURE — 25010000002 MAGNESIUM SULFATE 2 GM/50ML SOLUTION: Performed by: NURSE PRACTITIONER

## 2023-10-12 PROCEDURE — 80053 COMPREHEN METABOLIC PANEL: CPT

## 2023-10-12 PROCEDURE — 96417 CHEMO IV INFUS EACH ADDL SEQ: CPT

## 2023-10-12 PROCEDURE — 25810000003 SODIUM CHLORIDE 0.9 % SOLUTION: Performed by: NURSE PRACTITIONER

## 2023-10-12 PROCEDURE — 83735 ASSAY OF MAGNESIUM: CPT | Performed by: NURSE PRACTITIONER

## 2023-10-12 PROCEDURE — 25010000002 DIPHENHYDRAMINE PER 50 MG: Performed by: NURSE PRACTITIONER

## 2023-10-12 PROCEDURE — 25010000002 TRASTUZUMAB PER 10 MG: Performed by: NURSE PRACTITIONER

## 2023-10-12 PROCEDURE — 96375 TX/PRO/DX INJ NEW DRUG ADDON: CPT

## 2023-10-12 PROCEDURE — 96366 THER/PROPH/DIAG IV INF ADDON: CPT

## 2023-10-12 PROCEDURE — 25010000002 PERTUZUMAB 420 MG/14ML SOLUTION 420 MG VIAL: Performed by: NURSE PRACTITIONER

## 2023-10-12 PROCEDURE — 25810000003 SODIUM CHLORIDE 0.9 % SOLUTION 250 ML FLEX CONT: Performed by: NURSE PRACTITIONER

## 2023-10-12 PROCEDURE — 77336 RADIATION PHYSICS CONSULT: CPT | Performed by: RADIOLOGY

## 2023-10-12 PROCEDURE — 96367 TX/PROPH/DG ADDL SEQ IV INF: CPT

## 2023-10-12 PROCEDURE — 96413 CHEMO IV INFUSION 1 HR: CPT

## 2023-10-12 RX ORDER — MAGNESIUM SULFATE HEPTAHYDRATE 40 MG/ML
2 INJECTION, SOLUTION INTRAVENOUS ONCE
Status: COMPLETED | OUTPATIENT
Start: 2023-10-12 | End: 2023-10-12

## 2023-10-12 RX ORDER — SODIUM CHLORIDE 9 MG/ML
250 INJECTION, SOLUTION INTRAVENOUS ONCE
Status: CANCELLED | OUTPATIENT
Start: 2023-10-12

## 2023-10-12 RX ORDER — SODIUM CHLORIDE 9 MG/ML
250 INJECTION, SOLUTION INTRAVENOUS ONCE
Status: COMPLETED | OUTPATIENT
Start: 2023-10-12 | End: 2023-10-12

## 2023-10-12 RX ORDER — FAMOTIDINE 10 MG/ML
20 INJECTION, SOLUTION INTRAVENOUS ONCE
Status: COMPLETED | OUTPATIENT
Start: 2023-10-12 | End: 2023-10-12

## 2023-10-12 RX ORDER — MAGNESIUM SULFATE 1 G/100ML
1 INJECTION INTRAVENOUS ONCE
Status: COMPLETED | OUTPATIENT
Start: 2023-10-12 | End: 2023-10-12

## 2023-10-12 RX ORDER — FAMOTIDINE 10 MG/ML
20 INJECTION, SOLUTION INTRAVENOUS ONCE
Status: CANCELLED | OUTPATIENT
Start: 2023-10-12

## 2023-10-12 RX ADMIN — MAGNESIUM SULFATE HEPTAHYDRATE 2 G: 40 INJECTION, SOLUTION INTRAVENOUS at 10:49

## 2023-10-12 RX ADMIN — SODIUM CHLORIDE 250 ML: 9 INJECTION, SOLUTION INTRAVENOUS at 09:23

## 2023-10-12 RX ADMIN — DIPHENHYDRAMINE HYDROCHLORIDE 25 MG: 50 INJECTION, SOLUTION INTRAMUSCULAR; INTRAVENOUS at 09:55

## 2023-10-12 RX ADMIN — PERTUZUMAB 420 MG: 30 INJECTION, SOLUTION, CONCENTRATE INTRAVENOUS at 10:16

## 2023-10-12 RX ADMIN — TRASTUZUMAB 470 MG: 150 INJECTION, POWDER, LYOPHILIZED, FOR SOLUTION INTRAVENOUS at 11:21

## 2023-10-12 RX ADMIN — MAGNESIUM SULFATE HEPTAHYDRATE 1 G: 1 INJECTION, SOLUTION INTRAVENOUS at 09:23

## 2023-10-12 RX ADMIN — FAMOTIDINE 20 MG: 10 INJECTION INTRAVENOUS at 09:55

## 2023-10-12 NOTE — PROGRESS NOTES
Subjective     REASON FOR FOLLOW-UP:  1.  Invasive lobular carcinoma multifocal,  Left breast anterolateral stereotactic biopsy invasive lobular carcinoma, Marie score of 6, grade 2, 4 mm, ER 95%, SD 95%, HER2/cheryl 0 negative with Ki-67 of 5%.  Positive for atypical lobular hyperplasia  Left breast 1 o'clock position 10 cm from the nipple ultrasound-guided biopsy consistent with invasive lobular carcinoma grade 2 [place] score of 6, 4 mm with lobular carcinoma in situ, ER 90%, %, HER2/cheryl 0  Left breast upper stereotactic guided core biopsy invasive lobular carcinoma grade 2 [place] score of six 3 mm with lobular carcinoma in situ, ER 90%, %, HER2/cheryl equivocal 2+, FISH shows her to 5.7 with CEP 1.6 and ratio HER2 nu/KEIRA-17 ratio of 3.5.  It is amplified.    2.  March 8, 2023:p T3 N0 M0 invasive lobular carcinoma s/p bilateral mastectomy with left deep axillary sentinel lymph node biopsy, with bilateral placement of prepectoral tissue expanders for reconstruction and bilateral placement of AlloDerm.  Pathology shows multifocal tumor in  upper outer quadrant and upper inner quadrant, invasive lobular carcinoma grade 2 with a [place] score  score of 7  , total of 4 foci were seen, largest tumor being 60 mm, 20 mm, 2 mm, 1 mm.  No DCIS identified.  Lobular carcinoma in situ present.  No lymphatic or vascular channel invasion, no dermal lymphovascular space invasion all margins were negative for invasive carcinoma 4 lymph nodes were all negative for malignancy its pathologic T3N0  ER %, SD %, HER2/cheryl 2+ with Ki-67 of 5%  Previous biopsies had shown 2 of the tumors were ER/SD positive HER2/cheryl negative and one of them was ER/SD positive HER2 positive  On discussing with pathologist Dr. Serrano, she thinks that the larger tumor was ER SD positive and HER2 positive but unsure if it was the 60 mm tumor or 20 mm tumor.  Either way it was not the smaller tumors which was HER2  positive.  All tumors look-alike per pathologist and they are invasive lobular carcinoma.                  HISTORY OF PRESENT ILLNESS:         Patient is a 48 y.o. female with history of multifocal left breast cancer with MRI of the breast showing almost 15 lesions.  Biopsies on 3 different lesions were done and they were all consistent with invasive lobular carcinoma.  2 of the lesions where ER/AR positive HER2/cheryl negative and one of the larger lesion was ER/AR positive HER2 2+ equivocal.  FISH testing was done and the HER2 copy number was 5.7 with HER2/CEP ratio of 3.5.  The FISH was amplified.    Patient underwent bilateral mastectomy    With left sentinel lymph node surgery..  The pathology shows tumor to be present in the left upper outer quadrant invasive lobular carcinoma with a Marie score of 7, grade 2.  There were 4 lesions the greatest size was 6 0 mm, 20 mm, 2 mm and 1 mm in size.  Lobular carcinoma in situ is present.  All margins are negative for invasive carcinoma.  4 lymph nodes negative.  Patient has T3N0 invasive lobular carcinoma.      Discussed with pathology in length Dr. Serrano, one of the larger lesions which was 60 mm was heterogeneous it was biopsied at 2 separate spots.  The left anterior lateral and left 1:00 core fragment was ER/AR positive HER2/cheryl negative the left upper core biopsy was ER/AR positive HER2/cheryl 2+ equivocal but HER2/cheryl FISH was positive.    On discussion with pathology the 60 mm tumor showed ER and AR positive and HER2 positive by FISH with a Ki-67 of 40% at the Top-Hat clip.  The HER2/cheryl copy number was 5.7 with a HER2/cheryl by CEP ratio 3.5 which is positive.  The Ki-67 on the lower part was 13%.  The 20 mm tumor had a Ki-67 of 9%.  So the larger lesion was triple positive and heterogeneous as 2 biopsies were done on the larger lesions and that was both triple positive and at the second biopsy was ER/AR positive HER2/cheryl negative so it was a heterogeneous tumor.   The 20 mm lesion was ER/MD positive HER2/cheryl negative.    Given that she had a large tumor with high Ki-67 and premenopausal status we discussed about giving Taxotere carboplatin Herceptin Perjeta.    Interval History:   Patient is tolerating Herceptin Perjeta very well.  She has triple positive breast cancer status post bilateral mastectomy.  She has started radiation and has 25 total treatments so far she has completed 17/25 treatments.  She denies any new pain today.  She has no skin changes from radiation thus far.    Oncology history:  Patient is a 48 y.o. female who has been recently diagnosed with left breast cancer.  Patient has been compliant with her mammograms.    There is no family history of breast or ovarian cancer.  Her father had prostate cancer and melanoma.  A paternal aunt had thyroid cancer.      Details are as follows.    November 29, 2022: Screening bilateral mammogram showed 8 mm asymmetry in the central left breast posterior one third.  This appears to be in the superior left breast on the MLO view.  Right breast was negative.  A left diagnostic mammogram and ultrasound was recommended.    January January 3, 2023: Left breast diagnostic mammogram showed the spiculated areas within the central posterior and lateral anterior superior left breast where redemonstrated.  Targeted ultrasound was done.  At 1 o'clock position 10 cm from the nipple there is a shadowing mass with peripheral spiculations which is concordant with the mammographic abnormality and suspicious for malignancy.  The more centrally located spiculation on the cc view posterior to the glandular tissue cannot be clearly identified on the ultrasound.    Impression was 2 separate suspicious spiculated lesion in the left breast.  One of the lesions is seen well on the ultrasound and should be amenable to ultrasound-guided core biopsy.  The other lesion which was seen in the CC projection was amicable to stereotactic guided tissue  sampling.    January 3, 2023 patient underwent ultrasound-guided left breast biopsy    The ultrasound-guided biopsy of left breast 10 cm from the nipple at 1 o'clock position showed invasive lobular carcinoma moderately differentiated with a [place] grade of 2 and score of 6 measuring 4 mm.  There was focal lobular carcinoma in situ.    Left breast anterior lateral stereotactic core needle biopsy showed invasive lobular carcinoma moderately differentiated grade 2 with a Marie score of 6 measuring 4 mm.  There was atypical lobular hyperplasia.  Estrogen receptor was %, progesterone receptor was %, HER2/cheryl 0, Ki-67 5%.    Left breast upper stereotactic guided core biopsy showed invasive lobular carcinoma moderately differentiated grade 2 with [place] score of 6 with measuring 3 mm with lobular carcinoma in situ present.  I do not see the ER/RI or HER2 on the ultrasound-guided biopsy of the left breast lesion as well as the left upper stereotactic biopsy.  We will check with pathologist    January 23, 2023: Patient underwent  MRI of the breast bilateral    Right breast: Negative    Left breast: At 1:00 anterior left breast 4 cm from the nipple there is a 1.5 x 1.6 x 1.3 cm irregular enhancing mass which is biopsy-proven malignancy.  The biopsy clip is located within 0.9 x 1 x 1.1 cm postbiopsy hematoma along the inferior margin of the mass.  And there is additional hematoma along the lateral margin of the mass which measures 1.2 x 1.9 x 0.9 cm.    At 1:00 in the middle to posterior left breast 7.4 cm from the nipple there is a 1.3 x 1 cm x 1 cm irregular enhancing mass mass with a focus from a biopsy clip along the inferior margin which also represents the biopsy-proven malignancy    At 12:00 in the posterior left breast 8.5 cm from the nipple there is a 1.9 x 1.8 x 2.2 cm irregular enhancing mass with a corresponding 2.9 cm biopsy cavity with a biopsy clip which represents the biopsy-proven  malignancy    In addition there are multiple at least 12 similar-appearing irregular enhancing masses and foci are identified throughout the left breast predominantly involving the upper breast but also involving the lower outer quadrant.  At 11-12 o'clock in the middle and posterior left breast there are 3 adjacent reference masses together measuring 5.5 cm but individually measuring 1.2 x 0.9 x 1 cm.  At 1:00 in the far posterior left breast/axillary tail 13.7 cm posterior to the nipple is a 1.3 x 1.1 x 1 cm irregular enhancing mass which is located 0.8 cm from the anterolateral margin of the pectoralis Muscle.  Together the enhancing masses span approximately 11.7 x 8 x 7.8 cm.  There is no suspicious enhancement in the left nipple or chest wall.  Focal areas of skin enhancement likely reflect skin entry point from recent biopsies.  There are no pathologically enlarged internal mammary chain lymph nodes.    Impression    .  At least 15 similar-appearing irregular enhancing masses and foci  scattered throughout the left breast, together measuring up to 11.7 cm  in maximum dimension, encompass 3 sites of biopsy-proven malignancy and  are consistent with multicentric malignancy. Oncologic and surgical  management are recommended.  2.  No MRI evidence of malignancy in the right breast.    Patient is a 47-year-old female with multifocal invasive lobular carcinoma who on screening mammogram showed 8 mm asymmetry in the central left breast posterior one third.  In the cc view.  It appears to be superior left breast on the MLO view.  There was also architectural distortion in the lateral left breast one third on the left cc view.    On diagnostic mammogram the spiculated areas within the central posterior and the lateral anterior superior left breast where redemonstrated.  Targeted ultrasound was done.  At 1 o'clock position 10 cm from the nipple there is a mass with spiculations which is suspicious.  The more centrally  located spiculation on the cc view.  The more centrally located spiculation on the cc view cannot be clearly identified on ultrasound.  So there impression was there were 2 separate suspicious spiculated lesion in the left breast.  1 was seen well on the ultrasound and amenable to ultrasound-guided biopsy the other is seen well on mammography and a stereotactic guided biopsy suggested.    Patient subsequently underwent stereotactic biopsy of 1 lesion and ultrasound-guided biopsy of the 1 cm mass at 1 o'clock position 10 cm from the nipple.  A total of 3 different areas of the left breast were biopsied and specimens were sent to pathology.  The third biopsy was performed at 12 o'clock position in the posterior one third of the left breast.  All of the 3 sites were consistent with invasive lobular carcinoma at site A, B, and C.    MRI of the breast showed at 1 o'clock position of the left breast anteriorly 4 cm posterior to the nipple there is a 1.5 x 1.6 x 1.3 cm irregular enhancing mass.  There is a small hematoma along the inferior margin of the mass.  An additional hematoma along the lateral margin of the mass which is measuring 1.2 x 0.9 x 0.9 cm    At 1:00 in the middle to posterior left breast 7.4 cm posterior to the nipple there is a 1.3 x 1 x 1 cm enhancing mass with a biopsy clip.    At 12:00 posterior left breast 8.5 cm posterior to the nipple there is a 1.5 x 1.8 x 2.2 cm irregular enhancing mass with a corresponding 2.9 cm biopsy cavity with a clip which represents the biopsy site malignancy    Multiple at least 12 similar-appearing irregular enhancing masses and foci are identified throughout the left breast predominantly involving the upper breast but also involving the lower outer quadrant.  At 11-12 o'clock in the middle and posterior left breast there is a 3 adjacent reference masses measuring 5.5 cm in AP dimension and individually measuring 1.2 x 0.9 x 1 cm.  At 1:00 in the far posterior left  breast axillary tail 13.7 cm from the nipple there is a 1.3 x 1.1 x 1 cm craniocaudal dimension irregular enhancing mass which is located 0.8 cm from the anterolateral margin of the pectoralis muscle.     Per the MRI at least 15 similar-appearing irregular enhancing masses and foci scattered throughout the left breast together measuring 11.7 cm in maximum dimension which encompasses 3 sites of biopsy-proven malignancy and a consistent with multicentric malignancy.  MRI of the right breast is negative    INVITAE genetic testing showed variation of uncertain significance of LUCAS  Gene.    Patient was suggested left total mastectomy, left axillary sentinel lymph node biopsy possible node dissection and possible reconstruction.    However patient called Dr. Hi and decided to go for upfront bilateral total mastectomy, left axillary sentinel lymph node biopsy and possible axillary lymph node dissection and reconstruction.     I presented the case in the breast cancer conference today.  Patient had multiple nodules per MRI on the left breast Dr. Todd looked at it and felt there were multiple nodules and the largest one was 2.2 cm.  The discussion was to go ahead and upfront do the surgery and subsequently treat with chemo/ endocrine therapy as needed.  The left breast MRI findings are slightly atypical and that it is not contiguous involvement over 11.5 cm area but multiple nodules.  Surgical pathology will clarify.  If it is 1 big lesion or multiple sma      Past Medical History:   Diagnosis Date    ADD (attention deficit disorder)     Bilateral impacted cerumen 07/13/2020    Breast cancer 03/08/2023    LUCIO MASTECTOMY    Foot fracture, left     History of COVID-19 2022    History of gestational diabetes 2010    History of Graves' disease 2012    History of radioactive iodine thyroid ablation 2012    History of vertigo     Hyperlipidemia     Hypothyroidism     Melanoma of back     Positive depression screening  07/13/2020        Past Surgical History:   Procedure Laterality Date    BREAST RECONSTRUCTION Bilateral 3/8/2023    Procedure: BILATERAL PLACEMENT  TISSUE EXPANDER AND ALLODERM;  Surgeon: Eamon Stone MD;  Location:  [facility];  Service: Plastics;  Laterality: Bilateral;    D & C CERVICAL BIOPSY  01/2006    INTRAUTERINE DEVICE INSERTION  2010    Mirena    INTRAUTERINE DEVICE INSERTION  2015    Mirena exchange, old IUD removal and new one inserted by Dr Carr, Lot # [id number] exp 09/17.mar    MASTECTOMY W/ SENTINEL NODE BIOPSY Bilateral 3/8/2023    Procedure: Bilateral total mastectomy, left axillary sentinel lymph node biopsy;  Surgeon: Kell Hi MD;  Location: [facility];  Service: General;  Laterality: Bilateral;    SCAR REVISION BREAST Right 4/3/2023    Procedure: RIGHT MASTECTOMY REVISION;  Surgeon: Eamon Stone MD;  Location: [facility];  Service: Plastics;  Laterality: Right;    VENOUS ACCESS DEVICE (PORT) INSERTION Right 4/13/2023    Procedure: right port placement;  Surgeon: Kell Hi MD;  Location: [facility] OR;  Service: General;  Laterality: Right;    WISDOM TOOTH EXTRACTION          Current Outpatient Medications on File Prior to Visit   Medication Sig Dispense Refill    COLLAGEN PO Take 1 tablet by mouth Daily.      levothyroxine (SYNTHROID, LEVOTHROID) 125 MCG tablet Take 1 tablet by mouth once daily 30 tablet 0    lidocaine-prilocaine (EMLA) 2.5-2.5 % cream Apply 1 application topically to the appropriate area as directed As Needed for Mild Pain (apply pea-sized amount to port site 30 minutes prior to port being accessed). 30 g 3    lisinopril (PRINIVIL,ZESTRIL) 2.5 MG tablet Take 1 tablet by mouth once daily 30 tablet 5    loratadine (CLARITIN) 10 MG tablet Take 1 tablet by mouth Daily.      pantoprazole (PROTONIX) 40 MG EC tablet Take 1 tablet by mouth Daily. 30 tablet 3    Probiotic Product (PROBIOTIC ADVANCED PO) Take 1 tablet by mouth Daily.        No current facility-administered medications on file prior to visit.        ALLERGIES:    Allergies   Allergen Reactions    Erythromycin GI Intolerance     Pt reports    Penicillins Rash        Social History     Socioeconomic History    Marital status:      Spouse name: Parish    Number of children: 2   Tobacco Use    Smoking status: Never    Smokeless tobacco: Never   Vaping Use    Vaping Use: Never used   Substance and Sexual Activity    Alcohol use: Not Currently     Alcohol/week: 1.0 standard drink of alcohol     Types: 1 Glasses of wine per week     Comment: Daily caffeine use    Drug use: Never    Sexual activity: Yes     Partners: Male     Comment:          Family History   Problem Relation Age of Onset    Diabetes Father     Hyperlipidemia Father     Arthritis Father     Hypertension Father     Heart disease Father         S/P stents    Heart attack Father 41    Colon polyps Father     Melanoma Father     Prostate cancer Father     Hyperlipidemia Sister     Hypertension Sister     Heart attack Sister     Diabetes Sister     Thyroid cancer Paternal Aunt     Lung cancer Maternal Grandmother     Cancer Maternal Grandfather         Bladder cancer    Lung cancer Maternal Grandfather     Heart failure Paternal Grandmother     Transient ischemic attack Paternal Grandmother     Heart disease Paternal Grandfather     Malig Hyperthermia Neg Hx       Family history: Maternal great aunt had breast cancer, unsure of the age .  Father had melanoma and prostate cancer, paternal aunt had thyroid cancer, paternal grandfather had bladder cancer maternal grandfather had bladder cancer maternal grandmother had lung cancer    Past medical history is consistent with Graves' disease    OB/GYN history:  Age of menarche: 14  Patient is premenopausal and had an IUD which has been now removed   2 para 2 no miscarriages  Age at first childbirth 30  Patient did breast-feed 24 months  Length of taking birth  "control pills none      Objective     Vitals:    10/12/23 0836   BP: 133/90   Pulse: 81   Temp: 97.5 øF (36.4 øC)   TempSrc: Temporal   SpO2: 99%   Weight: 78.4 kg (172 lb 14.4 oz)   Height: 160 cm (62.99\")   PainSc: 0-No pain             10/12/2023     8:31 AM   Current Status   ECOG score 0         Physical Exam      GENERAL:  Well-developed, Patient  in no acute distress.   SKIN:  Warm, dry ,NO purpura ,no rash.  HEENT:  Pupils were equal and reactive to light and accomodation, conjunctivae noninjected,  normal visual acuity.   LYMPHATICS:  No cervical, NO supraclavicular, NO axillary adenopathies.  BREASTS: Not examined today  CARDIAC: Normal rate and rhythm, no murmurs gallops or rubs.  LUNGS: Clear to auscultation bilaterally.   ABDOMEN:  Soft, nontender with bowel sounds active.   EXTREMITIES: No cyanosis clubbing or edema.  NEUROLOGICAL:  Patient was awake, alert, oriented to time, person and place.    RECENT LABS:  Results from last 7 days   Lab Units 10/12/23  0819   WBC 10*3/mm3 6.52   NEUTROS ABS 10*3/mm3 3.91   HEMOGLOBIN g/dL 13.3   HEMATOCRIT % 38.7   PLATELETS 10*3/mm3 227       Results from last 7 days   Lab Units 10/12/23  0819   SODIUM mmol/L 137   POTASSIUM mmol/L 4.1   CHLORIDE mmol/L 101   CO2 mmol/L 22.3   BUN mg/dL 20   CREATININE mg/dL 0.79   CALCIUM mg/dL 9.6   ALBUMIN g/dL 4.3   BILIRUBIN mg/dL 0.4   ALK PHOS U/L 146*   ALT (SGPT) U/L 119*   AST (SGOT) U/L 93*   GLUCOSE mg/dL 101*   MAGNESIUM mg/dL 1.4*             Assessment & Plan     *Pathologic T3N0 invasive lobular carcinoma of the left breast, upper inner and upper outer quadrant, multifocal, 60 mm, 20 mm, 2 mm, 1 mm, grade 2, [place] score of 7, 2 of them are ER/NH positive, HER2/cheryl negative.  One of the larger lesions was ER/NH positive HER2 positive.  Ki-67 is very low 5%,    Invasive lobular carcinoma of the left breast recently diagnosed, multifocal with 3 lesions biopsied in the left breast.  Patient has multicentric " disease with 15 similar-appearing irregular enhancing masses in the left breast together measuring 11.7 cm in maximum dimension which encompasses 3 of the 3 biopsy proven malignancies.  All the 3 very invasive lobular carcinoma, grade 2 with [place] score of 6 but 2 of the lesions where ER/WA positive HER2 negative and the third lesion was ER/WA positive HER2 2+ with FISH showing HER2 amplified.      1.  Estrogen receptor 95%, progesterone receptor 95%, HER2/cheryl 0, Ki-67 5%    2 left breast 10 o'clock position 10 cm from the nipple ultrasound core guided biopsy showed ER 90%, %, HER2/cheryl 0    3.  Left breast upper stereotactic core biopsy showed estrogen receptor 90%, progesterone receptor 100%, HER2/cheryl equivocal 2+  FISH testing showed her to as 5.7 with a KEIRA 17 of 1.6 with a ratio HER2/KEIRA 17 of  INVITAE genetic testing showed variation of uncertain significance of LUCAS gene  We discussed the multidisciplinary approach in this patient's and I also discussed in length that that invasive lobular carcinomas do benefit from endocrine therapy and discussed in length about side effects of all endocrine therapies  Pending the results of the surgery she may need to be a candidate for chemotherapy as well and subsequently radiation if the tumor is large locally  Patient is keen on getting bilateral oophorectomy after her breast surgery is done and we discussed about medical oophorectomy with Lupron as well  February 24, 2023: Presented patient in the breast cancer conference.  Even though it appears to be a heterogeneous tumor, since it is difficult to really appreciate by clinical exam and the fact that it is invasive lobular carcinoma, it was felt best to do upfront surgery.  March 8, 2023:p T3 N0 M0 invasive lobular carcinoma s/p bilateral mastectomy with left deep axillary sentinel lymph node biopsy, with bilateral placement of prepectoral tissue expanders for reconstruction and bilateral placement of  AlloDerm.  Pathology shows multifocal tumor in  upper outer quadrant and upper inner quadrant, invasive lobular carcinoma grade 2 with a Marie score  score of 7  , total of 4 foci were seen, largest tumor being 60 mm, 20 mm, 2 mm, 1 mm.  No DCIS identified.  Lobular carcinoma in situ present.  No lymphatic or vascular channel invasion, no dermal lymphovascular space invasion all margins were negative for invasive carcinoma 4 lymph nodes were all negative for malignancy its pathologic T3N0  ER %, NH %, HER2/cheryl 2+ with Ki-67 of 5%  Previous biopsies had shown 2 of the tumors were ER/NH positive HER2/cheryl negative and one of them was ER/NH positive HER2 positive  On discussing with pathologist Dr. Serrano, she thinks that the larger tumor was ER NH positive and HER2 positive.  HER2 copy number of 5.7 with HER2/CEP ratio of 3.5 positive and this was on the larger tumor.  Given that patient has heterogeneous tumor with the larger lesion being ER/NH positive HER2 positive and a Ki-67 of 40%, being premenopausal we will treat with Taxotere carboplatin Herceptin Perjeta.  April 20, 2023: Patient was to start cycle 1 today but unfortunately her liver function tests were extremely high.  Discussed about alcohol and she had drank and taken Tylenol.  Will hold chemo today  April 28, 2023: Reviewed her liver function test which are completely normalized.  Discussed with her not to drink alcohol.  Here for cycle 1 day 1 TCHP chemotherapy.  Reviewed echo which showed ejection fraction of 64% with strain pattern of -21.  Patient understands all the side effects\  5/4/2023: Patient tolerated Cycle 1 TCHP well. She has had diarrhea, nausea and fatigue that have been well managed.   5/18/2023: C2 TCHP  5/22/2023: Here for close follow-up and possible IV fluids after treatment last Friday, 5/18/2023.  Patient had a fairly good weekend with good oral intake, weight that is notably stable, and very little nausea.  She only  had 1 loose stool this morning and therefore no significant diarrhea.  CMP is WNL.  Per discussion with patient we will give just 500 mL normal saline.  May 25, 2023: Patient has diarrhea.  She is 1 week out of cycle 2 Taxotere carboplatin Herceptin Perjeta.  She has some abdominal cramping.  We will give her IV fluids today.  Given carboplatinum is not available she will receive Taxotere Herceptin Perjeta for 2 more cycles prior to going to Adriamycin Cytoxan.  Cycle #3 TCHP 6/8/2023  Patient returns 6/12/2023 for toxicity check.  She is eating and drinking sufficiently at this point.  She does not feel she has benefited from the IV fluids in the past and therefore will not do that today.  She denies any fevers or chills.  June 29, 2023: Cycle 4 TCHP.  She will need referral to radiation at the completion of 6 cycles of TCHP  07/20/2023 proceed with cycle 5 of carboplatin, Taxotere, Herceptin, Perjeta with Neulasta support.  August 10, 2023: Final cycle 6 TCHP.  Thereafter plan to continue every 3-week Herceptin/Perjeta for total of 1 year.   We have discussed potentially beginning Lupron followed by aromatase inhibitor therapy following radiation however patient wants to discuss potential hysterectomy and bilateral oophorectomy with her GYN.    September 21, 2023: Patient started radiation has completed 2 treatments so far out of 25.  Patient has history of depression in the family but not herself.  We discussed about consideration of Lupron with aromatase inhibitor but she wants to undergo hysterectomy with bilateral oophorectomy because of her fibroids.  In which case we could consider starting tamoxifen until she has oophorectomy and subsequently start AI.  She is tolerating Herceptin Perjeta very well  10/12/23 Here for Hepcetin and Perjeta today. She is currently on 18 of 25 radiation treatments and tolerating well.  She has no new concerns today.    *S/p  IUD removal  Experiencing hot flashes with chemo  likely triggering menopausal changes.    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 She did recommend she start taking her gabapentin again to help with her anxiety and irritability/anger from her decadron with treatment.   Patient continues follow-up with supportive oncology, RADHA Rolle. Patient is encouraged to continue to follow with Cierra.     *Oral Thrush and Chemotherapy induced mucositis  5/4/2023: I am concerned patient is starting into chemotherapy induced mucositis. She is reporting sore throat at times, possibly worsened reflux, mouth and tongue changes. She noticed white plaquing on her tongue this morning. She has not been able to  her Yen's Magic Mouthwash until today because of pharmacy supplies. She is currently on omeprazole 20 mg daily. She can increase this to 40 mg daily if needed. Prescription sent to pharmacy today to start Fluconazole 200 mg on day and 100 mg on day 2-7. We will continue to monitor symptoms and watch liver enzymes closely. ALT 34 and AST 24.  Liver function test mildly elevated today, unsure if that is the effect of fluconazole.  In the future avoid fluconazole and consider just Yen's Magic mouthwash.  7/6/2023 Oral thrush recurrent today, mild but bothersome.  Refill Magic mouthwash.  Patient continuing to rinse her mouth to minimize oral candida.  Expect this to resolve now that she is done with chemotherapy and no longer requiring dexamethasone.    *  Port study negative    *Intermittent mild elevation of LFTs.    Patient has previously reported intermittent use of Tylenol and alcohol.    8/31/2023 AST 35, ALT 41, alk phos 100.  Patient denies any alcohol intake or Tylenol.  This is felt to  be drug related.  Stable to improved. Monitor.  10/12/2023 AST 93, .  Patient has had 1 joe this past week however denies any other alcohol or Tylenol intake.  Reviewed with Dr. TRINIDAD and we will treat.    *Cardiac monitoring  Seen by cardiology today, 7/27/2023.  Echo stable.  Plan for continued echocardiogram every 3 months while on therapy.  Continue low-dose lisinopril    PLAN:  Proceed with Herceptin and Perjeta today  Next echocardiogram due end of October 2023  Consideration given either to tamoxifen at the end of radiation or Lupron followed by AI after she had oophorectomy.  We will make decision at 6 weeks when she would have completed radiation.  Today we have educated her about endocrine therapy  Follow-up with Dr. TRINIDAD in 3 weeks at which time we can consider endocrine therapy    This patient is on high risk drug therapy requiring intensive monitoring for toxicity.      RADHA Young Dr.

## 2023-10-12 NOTE — PROGRESS NOTES
D/w Anna APRN elevated liver enzymes. V/o ok to treat today.    Lab Results   Component Value Date    GLUCOSE 101 (H) 10/12/2023    BUN 20 10/12/2023    CREATININE 0.79 10/12/2023    EGFRRESULT 89 10/11/2022    EGFR 93.6 10/12/2023    BCR 25.3 (H) 10/12/2023    K 4.1 10/12/2023    CO2 22.3 10/12/2023    CALCIUM 9.6 10/12/2023    PROTENTOTREF 7.1 10/11/2022    ALBUMIN 4.3 10/12/2023    BILITOT 0.4 10/12/2023    AST 93 (C) 10/12/2023     (C) 10/12/2023

## 2023-10-13 ENCOUNTER — HOSPITAL ENCOUNTER (OUTPATIENT)
Dept: RADIATION ONCOLOGY | Facility: HOSPITAL | Age: 48
Discharge: HOME | End: 2023-10-13
Payer: COMMERCIAL

## 2023-10-13 LAB
RAD ONC ARIA COURSE ID: NORMAL
RAD ONC ARIA COURSE LAST TREATMENT DATE: NORMAL
RAD ONC ARIA COURSE START DATE: NORMAL
RAD ONC ARIA COURSE TREATMENT ELAPSED DAYS: 24
RAD ONC ARIA FIRST TREATMENT DATE: NORMAL
RAD ONC ARIA PLAN FRACTIONS TREATED TO DATE: 19
RAD ONC ARIA PLAN ID: NORMAL
RAD ONC ARIA PLAN PRESCRIBED DOSE PER FRACTION: 2 GY
RAD ONC ARIA PLAN PRIMARY REFERENCE POINT: NORMAL
RAD ONC ARIA PLAN TOTAL FRACTIONS PRESCRIBED: 25
RAD ONC ARIA PLAN TOTAL PRESCRIBED DOSE: 5000 CGY
RAD ONC ARIA REFERENCE POINT DOSAGE GIVEN TO DATE: 38 GY
RAD ONC ARIA REFERENCE POINT ID: NORMAL
RAD ONC ARIA REFERENCE POINT SESSION DOSAGE GIVEN: 2 GY

## 2023-10-13 PROCEDURE — 77412 RADIATION TX DELIVERY LVL 3: CPT | Performed by: RADIOLOGY

## 2023-10-13 PROCEDURE — 77387 GUIDANCE FOR RADJ TX DLVR: CPT | Performed by: RADIOLOGY

## 2023-10-16 ENCOUNTER — HOSPITAL ENCOUNTER (OUTPATIENT)
Dept: RADIATION ONCOLOGY | Facility: HOSPITAL | Age: 48
Discharge: HOME | End: 2023-10-16
Payer: COMMERCIAL

## 2023-10-16 LAB
RAD ONC ARIA COURSE ID: NORMAL
RAD ONC ARIA COURSE LAST TREATMENT DATE: NORMAL
RAD ONC ARIA COURSE START DATE: NORMAL
RAD ONC ARIA COURSE TREATMENT ELAPSED DAYS: 27
RAD ONC ARIA FIRST TREATMENT DATE: NORMAL
RAD ONC ARIA PLAN FRACTIONS TREATED TO DATE: 20
RAD ONC ARIA PLAN ID: NORMAL
RAD ONC ARIA PLAN PRESCRIBED DOSE PER FRACTION: 2 GY
RAD ONC ARIA PLAN PRIMARY REFERENCE POINT: NORMAL
RAD ONC ARIA PLAN TOTAL FRACTIONS PRESCRIBED: 25
RAD ONC ARIA PLAN TOTAL PRESCRIBED DOSE: 5000 CGY
RAD ONC ARIA REFERENCE POINT DOSAGE GIVEN TO DATE: 40 GY
RAD ONC ARIA REFERENCE POINT ID: NORMAL
RAD ONC ARIA REFERENCE POINT SESSION DOSAGE GIVEN: 2 GY

## 2023-10-16 PROCEDURE — 77387 GUIDANCE FOR RADJ TX DLVR: CPT | Performed by: RADIOLOGY

## 2023-10-16 PROCEDURE — 77412 RADIATION TX DELIVERY LVL 3: CPT | Performed by: RADIOLOGY

## 2023-10-17 ENCOUNTER — RADIATION ONCOLOGY WEEKLY ASSESSMENT (OUTPATIENT)
Dept: RADIATION ONCOLOGY | Facility: HOSPITAL | Age: 48
End: 2023-10-17
Payer: COMMERCIAL

## 2023-10-17 ENCOUNTER — HOSPITAL ENCOUNTER (OUTPATIENT)
Dept: RADIATION ONCOLOGY | Facility: HOSPITAL | Age: 48
Discharge: HOME | End: 2023-10-17
Payer: COMMERCIAL

## 2023-10-17 VITALS
DIASTOLIC BLOOD PRESSURE: 89 MMHG | SYSTOLIC BLOOD PRESSURE: 132 MMHG | BODY MASS INDEX: 31.11 KG/M2 | OXYGEN SATURATION: 98 % | WEIGHT: 175.6 LBS | HEART RATE: 82 BPM

## 2023-10-17 DIAGNOSIS — C50.812 MALIGNANT NEOPLASM OF OVERLAPPING SITES OF LEFT BREAST IN FEMALE, ESTROGEN RECEPTOR POSITIVE: Primary | ICD-10-CM

## 2023-10-17 DIAGNOSIS — Z17.0 MALIGNANT NEOPLASM OF OVERLAPPING SITES OF LEFT BREAST IN FEMALE, ESTROGEN RECEPTOR POSITIVE: Primary | ICD-10-CM

## 2023-10-17 LAB
RAD ONC ARIA COURSE ID: NORMAL
RAD ONC ARIA COURSE LAST TREATMENT DATE: NORMAL
RAD ONC ARIA COURSE START DATE: NORMAL
RAD ONC ARIA COURSE TREATMENT ELAPSED DAYS: 28
RAD ONC ARIA FIRST TREATMENT DATE: NORMAL
RAD ONC ARIA PLAN FRACTIONS TREATED TO DATE: 21
RAD ONC ARIA PLAN ID: NORMAL
RAD ONC ARIA PLAN PRESCRIBED DOSE PER FRACTION: 2 GY
RAD ONC ARIA PLAN PRIMARY REFERENCE POINT: NORMAL
RAD ONC ARIA PLAN TOTAL FRACTIONS PRESCRIBED: 25
RAD ONC ARIA PLAN TOTAL PRESCRIBED DOSE: 5000 CGY
RAD ONC ARIA REFERENCE POINT DOSAGE GIVEN TO DATE: 42 GY
RAD ONC ARIA REFERENCE POINT ID: NORMAL
RAD ONC ARIA REFERENCE POINT SESSION DOSAGE GIVEN: 2 GY

## 2023-10-17 PROCEDURE — 77387 GUIDANCE FOR RADJ TX DLVR: CPT | Performed by: RADIOLOGY

## 2023-10-17 PROCEDURE — 77412 RADIATION TX DELIVERY LVL 3: CPT | Performed by: RADIOLOGY

## 2023-10-17 PROCEDURE — 77427 RADIATION TX MANAGEMENT X5: CPT | Performed by: RADIOLOGY

## 2023-10-17 NOTE — PROGRESS NOTES
Physician Weekly Management Note    Diagnosis:     Diagnosis Plan   1. Malignant neoplasm of overlapping sites of left breast in female, estrogen receptor positive            RT Details:  fx 21/25 left ches twall and nodes 42/50Gy    Notes on Treatment course, Films, Medical progress:  Kps 90% doing well, minimal erythema, no pain, cont on     Weekly Management:  Medication reviewed?   Yes  New medications given?   No  Problemlist reviewed?   yes  Problem added?   No  Issues raised requiring referral to support services - task assigned to:  na    Technical aspects reviewed:  Weekly OBI approved?   Yes  Weekly port films approved?   Yes  Change requests noted on port film?   No  Patient setup and plan reviewed?   Yes    Chart Reviewed:  Continue current treatment plan?   Yes  Treatment plan change requested?   No  CBC reviewed?   No  Concurrent Chemo?   No    Objective     Toxicities:   none      Review of Systems   Constitutional: Negative.    Skin: Negative.    Psychiatric/Behavioral: Negative.          Vitals:    10/17/23 0754   BP: 132/89   Pulse: 82   SpO2: 98%           10/12/2023     8:31 AM   Current Status   ECOG score 0       Physical Exam  Constitutional:       Appearance: Normal appearance.   Chest:          Comments: Minimal erythema  Neurological:      Mental Status: She is alert.           Problem Summary List    Diagnosis:     Diagnosis Plan   1. Malignant neoplasm of overlapping sites of left breast in female, estrogen receptor positive          Pathology:   breast     Past Medical History:   Diagnosis Date    ADD (attention deficit disorder)     Bilateral impacted cerumen 07/13/2020    Breast cancer 03/08/2023    LUCIO MASTECTOMY    Foot fracture, left     History of COVID-19 2022    History of gestational diabetes 2010    History of Graves' disease 2012    History of radioactive iodine thyroid ablation 2012    History of vertigo     Hyperlipidemia     Hypothyroidism     Melanoma of back     Positive  depression screening 07/13/2020         Past Surgical History:   Procedure Laterality Date    BREAST RECONSTRUCTION Bilateral 3/8/2023    Procedure: BILATERAL PLACEMENT  TISSUE EXPANDER AND ALLODERM;  Surgeon: Eamon Stone MD;  Location:  [facility];  Service: Plastics;  Laterality: Bilateral;    D & C CERVICAL BIOPSY  01/2006    INTRAUTERINE DEVICE INSERTION  2010    Mirena    INTRAUTERINE DEVICE INSERTION  2015    Mirena exchange, old IUD removal and new one inserted by Dr Carr, Lot # [id number] exp 09/17.mar    MASTECTOMY W/ SENTINEL NODE BIOPSY Bilateral 3/8/2023    Procedure: Bilateral total mastectomy, left axillary sentinel lymph node biopsy;  Surgeon: Kell Hi MD;  Location: [facility] OR;  Service: General;  Laterality: Bilateral;    SCAR REVISION BREAST Right 4/3/2023    Procedure: RIGHT MASTECTOMY REVISION;  Surgeon: Eamon Stone MD;  Location: [facility];  Service: Plastics;  Laterality: Right;    VENOUS ACCESS DEVICE (PORT) INSERTION Right 4/13/2023    Procedure: right port placement;  Surgeon: Kell Hi MD;  Location: [facility] MAIN OR;  Service: General;  Laterality: Right;    WISDOM TOOTH EXTRACTION           Current Outpatient Medications on File Prior to Visit   Medication Sig Dispense Refill    COLLAGEN PO Take 1 tablet by mouth Daily.      levothyroxine (SYNTHROID, LEVOTHROID) 125 MCG tablet Take 1 tablet by mouth once daily 30 tablet 0    lidocaine-prilocaine (EMLA) 2.5-2.5 % cream Apply 1 application topically to the appropriate area as directed As Needed for Mild Pain (apply pea-sized amount to port site 30 minutes prior to port being accessed). 30 g 3    lisinopril (PRINIVIL,ZESTRIL) 2.5 MG tablet Take 1 tablet by mouth once daily 30 tablet 5    loratadine (CLARITIN) 10 MG tablet Take 1 tablet by mouth Daily.      pantoprazole (PROTONIX) 40 MG EC tablet Take 1 tablet by mouth Daily. 30 tablet 3    Probiotic Product (PROBIOTIC ADVANCED PO) Take 1  tablet by mouth Daily.       No current facility-administered medications on file prior to visit.       Allergies   Allergen Reactions    Erythromycin GI Intolerance     Pt reports    Penicillins Rash         Referring Provider:    No referring provider defined for this encounter.    Oncologist:  No primary care provider on file.      Seen and approved by:  Fay Hines MD  10/17/2023

## 2023-10-18 ENCOUNTER — HOSPITAL ENCOUNTER (OUTPATIENT)
Dept: RADIATION ONCOLOGY | Facility: HOSPITAL | Age: 48
Discharge: HOME | End: 2023-10-18

## 2023-10-18 LAB
RAD ONC ARIA COURSE ID: NORMAL
RAD ONC ARIA COURSE LAST TREATMENT DATE: NORMAL
RAD ONC ARIA COURSE START DATE: NORMAL
RAD ONC ARIA COURSE TREATMENT ELAPSED DAYS: 29
RAD ONC ARIA FIRST TREATMENT DATE: NORMAL
RAD ONC ARIA PLAN FRACTIONS TREATED TO DATE: 22
RAD ONC ARIA PLAN ID: NORMAL
RAD ONC ARIA PLAN PRESCRIBED DOSE PER FRACTION: 2 GY
RAD ONC ARIA PLAN PRIMARY REFERENCE POINT: NORMAL
RAD ONC ARIA PLAN TOTAL FRACTIONS PRESCRIBED: 25
RAD ONC ARIA PLAN TOTAL PRESCRIBED DOSE: 5000 CGY
RAD ONC ARIA REFERENCE POINT DOSAGE GIVEN TO DATE: 44 GY
RAD ONC ARIA REFERENCE POINT ID: NORMAL
RAD ONC ARIA REFERENCE POINT SESSION DOSAGE GIVEN: 2 GY

## 2023-10-18 PROCEDURE — 77412 RADIATION TX DELIVERY LVL 3: CPT | Performed by: RADIOLOGY

## 2023-10-18 PROCEDURE — 77387 GUIDANCE FOR RADJ TX DLVR: CPT | Performed by: RADIOLOGY

## 2023-10-19 ENCOUNTER — HOSPITAL ENCOUNTER (OUTPATIENT)
Dept: RADIATION ONCOLOGY | Facility: HOSPITAL | Age: 48
Discharge: HOME | End: 2023-10-19

## 2023-10-19 ENCOUNTER — HOSPITAL ENCOUNTER (OUTPATIENT)
Dept: CARDIOLOGY | Facility: HOSPITAL | Age: 48
Discharge: HOME | End: 2023-10-19
Admitting: NURSE PRACTITIONER
Payer: COMMERCIAL

## 2023-10-19 ENCOUNTER — OFFICE VISIT (OUTPATIENT)
Dept: CARDIOLOGY | Facility: CLINIC | Age: 48
End: 2023-10-19
Payer: COMMERCIAL

## 2023-10-19 VITALS
HEART RATE: 91 BPM | BODY MASS INDEX: 31.01 KG/M2 | OXYGEN SATURATION: 96 % | HEIGHT: 63 IN | SYSTOLIC BLOOD PRESSURE: 118 MMHG | WEIGHT: 175 LBS | DIASTOLIC BLOOD PRESSURE: 80 MMHG

## 2023-10-19 VITALS
HEART RATE: 84 BPM | HEIGHT: 63 IN | SYSTOLIC BLOOD PRESSURE: 124 MMHG | BODY MASS INDEX: 31.01 KG/M2 | WEIGHT: 175 LBS | DIASTOLIC BLOOD PRESSURE: 84 MMHG

## 2023-10-19 DIAGNOSIS — Z79.899 ENCOUNTER FOR MONITORING CARDIOTOXIC DRUG THERAPY: ICD-10-CM

## 2023-10-19 DIAGNOSIS — E89.0 POSTABLATIVE HYPOTHYROIDISM: ICD-10-CM

## 2023-10-19 DIAGNOSIS — Z51.81 ENCOUNTER FOR MONITORING CARDIOTOXIC DRUG THERAPY: Primary | ICD-10-CM

## 2023-10-19 DIAGNOSIS — E78.5 MILD HYPERLIPIDEMIA: ICD-10-CM

## 2023-10-19 DIAGNOSIS — Z51.81 ENCOUNTER FOR MONITORING CARDIOTOXIC DRUG THERAPY: ICD-10-CM

## 2023-10-19 DIAGNOSIS — R03.0 ELEVATED BLOOD PRESSURE READING WITHOUT DIAGNOSIS OF HYPERTENSION: ICD-10-CM

## 2023-10-19 DIAGNOSIS — Z79.899 ENCOUNTER FOR MONITORING CARDIOTOXIC DRUG THERAPY: Primary | ICD-10-CM

## 2023-10-19 LAB
BH CV ECHO LEFT VENTRICLE GLOBAL LONGITUDINAL STRAIN: -22.9 %
BH CV ECHO MEAS - EDV(CUBED): 74.1 ML
BH CV ECHO MEAS - EDV(MOD-SP2): 42 ML
BH CV ECHO MEAS - EDV(MOD-SP4): 47 ML
BH CV ECHO MEAS - EF(MOD-BP): 65.5 %
BH CV ECHO MEAS - EF(MOD-SP2): 66.7 %
BH CV ECHO MEAS - EF(MOD-SP4): 66 %
BH CV ECHO MEAS - ESV(CUBED): 18.1 ML
BH CV ECHO MEAS - ESV(MOD-SP2): 14 ML
BH CV ECHO MEAS - ESV(MOD-SP4): 16 ML
BH CV ECHO MEAS - FS: 37.5 %
BH CV ECHO MEAS - IVS/LVPW: 0.9 CM
BH CV ECHO MEAS - IVSD: 0.9 CM
BH CV ECHO MEAS - LAT PEAK E' VEL: 10.6 CM/SEC
BH CV ECHO MEAS - LV DIASTOLIC VOL/BSA (35-75): 25.7 CM2
BH CV ECHO MEAS - LV MASS(C)D: 127.8 GRAMS
BH CV ECHO MEAS - LV SYSTOLIC VOL/BSA (12-30): 8.8 CM2
BH CV ECHO MEAS - LVIDD: 4.2 CM
BH CV ECHO MEAS - LVIDS: 2.6 CM
BH CV ECHO MEAS - LVOT AREA: 3.1 CM2
BH CV ECHO MEAS - LVOT DIAM: 2 CM
BH CV ECHO MEAS - LVPWD: 1 CM
BH CV ECHO MEAS - MED PEAK E' VEL: 8.6 CM/SEC
BH CV ECHO MEAS - MV A DUR: 0.1 SEC
BH CV ECHO MEAS - MV A MAX VEL: 70.3 CM/SEC
BH CV ECHO MEAS - MV DEC TIME: 0.19 SEC
BH CV ECHO MEAS - MV E MAX VEL: 54.4 CM/SEC
BH CV ECHO MEAS - MV E/A: 0.77
BH CV ECHO MEAS - PULM A REVS DUR: 0.09 SEC
BH CV ECHO MEAS - PULM A REVS VEL: 37.7 CM/SEC
BH CV ECHO MEAS - PULM DIAS VEL: 37.7 CM/SEC
BH CV ECHO MEAS - PULM S/D: 1.48
BH CV ECHO MEAS - PULM SYS VEL: 55.7 CM/SEC
BH CV ECHO MEAS - SI(MOD-SP2): 15.3 ML/M2
BH CV ECHO MEAS - SI(MOD-SP4): 17 ML/M2
BH CV ECHO MEAS - SV(MOD-SP2): 28 ML
BH CV ECHO MEAS - SV(MOD-SP4): 31 ML
BH CV ECHO MEASUREMENTS AVERAGE E/E' RATIO: 5.67
BH CV XLRA - TDI S': 12 CM/SEC
LEFT ATRIUM VOLUME INDEX: 17.7 ML/M2
RAD ONC ARIA COURSE ID: NORMAL
RAD ONC ARIA COURSE LAST TREATMENT DATE: NORMAL
RAD ONC ARIA COURSE START DATE: NORMAL
RAD ONC ARIA COURSE TREATMENT ELAPSED DAYS: 30
RAD ONC ARIA FIRST TREATMENT DATE: NORMAL
RAD ONC ARIA PLAN FRACTIONS TREATED TO DATE: 23
RAD ONC ARIA PLAN ID: NORMAL
RAD ONC ARIA PLAN PRESCRIBED DOSE PER FRACTION: 2 GY
RAD ONC ARIA PLAN PRIMARY REFERENCE POINT: NORMAL
RAD ONC ARIA PLAN TOTAL FRACTIONS PRESCRIBED: 25
RAD ONC ARIA PLAN TOTAL PRESCRIBED DOSE: 5000 CGY
RAD ONC ARIA REFERENCE POINT DOSAGE GIVEN TO DATE: 46 GY
RAD ONC ARIA REFERENCE POINT ID: NORMAL
RAD ONC ARIA REFERENCE POINT SESSION DOSAGE GIVEN: 2 GY

## 2023-10-19 PROCEDURE — 93321 DOPPLER ECHO F-UP/LMTD STD: CPT

## 2023-10-19 PROCEDURE — 25510000001 PERFLUTREN (DEFINITY) 8.476 MG IN SODIUM CHLORIDE (PF) 0.9 % 10 ML INJECTION: Performed by: NURSE PRACTITIONER

## 2023-10-19 PROCEDURE — 77336 RADIATION PHYSICS CONSULT: CPT | Performed by: RADIOLOGY

## 2023-10-19 PROCEDURE — 93308 TTE F-UP OR LMTD: CPT

## 2023-10-19 PROCEDURE — 77387 GUIDANCE FOR RADJ TX DLVR: CPT | Performed by: RADIOLOGY

## 2023-10-19 PROCEDURE — 93325 DOPPLER ECHO COLOR FLOW MAPG: CPT

## 2023-10-19 PROCEDURE — 93356 MYOCRD STRAIN IMG SPCKL TRCK: CPT

## 2023-10-19 PROCEDURE — 77412 RADIATION TX DELIVERY LVL 3: CPT | Performed by: RADIOLOGY

## 2023-10-19 RX ORDER — MAGNESIUM OXIDE 400 MG/1
400 TABLET ORAL EVERY OTHER DAY
COMMUNITY

## 2023-10-19 RX ORDER — LEVOTHYROXINE SODIUM 0.12 MG/1
125 TABLET ORAL DAILY
Qty: 15 TABLET | Refills: 0 | Status: SHIPPED | OUTPATIENT
Start: 2023-10-19

## 2023-10-19 RX ADMIN — PERFLUTREN 1.5 ML: 6.52 INJECTION, SUSPENSION INTRAVENOUS at 08:47

## 2023-10-19 NOTE — PROGRESS NOTES
Date of Office Visit: 10/19/2023  Encounter Provider: Alisia Fermin MD  Place of Service: [facility] CARDIOLOGY  Patient Name: Ursula Kulkarni  :1975    Chief complaint  Cardio oncology care    History of Present Illness  Patient is a 47-year-old female with history of Graves' disease (s/p radioactive ablation), iatrogenic hypothyroidism, ADD, hyperlipidemia 2023 diagnosed with left-sided breast cancer.  On 3/2023 she underwent bilateral mastectomy and lymph node biopsy and placement of tissue expanders.  She has been treated with TCHP starting on 2023 (to be completed 2024).  She also started radiation therapy 2023 testing 1 treatment is to go.    Baseline echo on 2023 showed an ejection fraction 64.7% GLS -21% with normal diastolic function and trivial valve regurgitation.  Echo 2023 showed an ejection fraction of 60.5%, GLS -22.7%.  Echo on 10/19/2023 (today) shows an ejection fraction of 66% with GLS -22.9%, normal left ventricular wall thickness, grade 1 diastolic dysfunction and no significant pulmonary hypertension.    Since last visit patient has had no chest pain palpitations syncope near syncope.  She is walking consistently.      Past Medical History:   Diagnosis Date    ADD (attention deficit disorder)     Bilateral impacted cerumen 2020    Breast cancer 2023    LUCIO MASTECTOMY    Foot fracture, left     History of COVID-2022    History of gestational diabetes 2010    History of Graves' disease 2012    History of radioactive iodine thyroid ablation 2012    History of vertigo     Hyperlipidemia     Hypothyroidism     Melanoma of back     Positive depression screening 2020     Past Surgical History:   Procedure Laterality Date    BREAST RECONSTRUCTION Bilateral 3/8/2023    Procedure: BILATERAL PLACEMENT  TISSUE EXPANDER AND ALLODERM;  Surgeon: Eamon Stone MD;  Location: [facility];  Service: Plastics;  Laterality:  Bilateral;    D & C CERVICAL BIOPSY  01/2006    INTRAUTERINE DEVICE INSERTION  2010    Mirena    INTRAUTERINE DEVICE INSERTION  2015    Mirena exchange, old IUD removal and new one inserted by Dr Carr, Lot # [id number] exp 09/17.mar    MASTECTOMY W/ SENTINEL NODE BIOPSY Bilateral 3/8/2023    Procedure: Bilateral total mastectomy, left axillary sentinel lymph node biopsy;  Surgeon: Kell Hi MD;  Location: [facility] MAIN OR;  Service: General;  Laterality: Bilateral;    SCAR REVISION BREAST Right 4/3/2023    Procedure: RIGHT MASTECTOMY REVISION;  Surgeon: Eamon Stone MD;  Location: [facility] OR;  Service: Plastics;  Laterality: Right;    VENOUS ACCESS DEVICE (PORT) INSERTION Right 4/13/2023    Procedure: right port placement;  Surgeon: Kell Hi MD;  Location: [facility] OR;  Service: General;  Laterality: Right;    WISDOM TOOTH EXTRACTION       Outpatient Medications Prior to Visit   Medication Sig Dispense Refill    BIOTIN PO Take  by mouth.      BLACK COHOSH PO Take  by mouth.      Coenzyme Q10 (COQ10 PO) Take  by mouth.      COLLAGEN PO Take 1 tablet by mouth Daily.      lidocaine-prilocaine (EMLA) 2.5-2.5 % cream Apply 1 application topically to the appropriate area as directed As Needed for Mild Pain (apply pea-sized amount to port site 30 minutes prior to port being accessed). 30 g 3    lisinopril (PRINIVIL,ZESTRIL) 2.5 MG tablet Take 1 tablet by mouth once daily (Patient taking differently: Take if SBP above 120) 30 tablet 5    loratadine (CLARITIN) 10 MG tablet Take 1 tablet by mouth Daily.      magnesium oxide (MAG-OX) 400 MG tablet Take 1 tablet by mouth Every Other Day.      pantoprazole (PROTONIX) 40 MG EC tablet Take 1 tablet by mouth Daily. 30 tablet 3    Probiotic Product (PROBIOTIC ADVANCED PO) Take 1 tablet by mouth Daily.      levothyroxine (SYNTHROID, LEVOTHROID) 125 MCG tablet Take 1 tablet by mouth once daily 30 tablet 0     No facility-administered medications  prior to visit.       Allergies as of 10/19/2023 - Reviewed 10/19/2023   Allergen Reaction Noted    Erythromycin GI Intolerance 01/27/2023    Penicillins Rash 07/13/2020     Social History     Socioeconomic History    Marital status:      Spouse name: Parish    Number of children: 2   Tobacco Use    Smoking status: Never    Smokeless tobacco: Never   Vaping Use    Vaping Use: Never used   Substance and Sexual Activity    Alcohol use: Not Currently     Alcohol/week: 1.0 standard drink of alcohol     Types: 1 Glasses of wine per week     Comment: Daily caffeine use    Drug use: Never    Sexual activity: Yes     Partners: Male     Comment:       Family History   Problem Relation Age of Onset    Diabetes Father     Hyperlipidemia Father     Arthritis Father     Hypertension Father     Heart disease Father         S/P stents    Heart attack Father 41    Colon polyps Father     Melanoma Father     Prostate cancer Father     Hyperlipidemia Sister     Hypertension Sister     Heart attack Sister     Diabetes Sister     Thyroid cancer Paternal Aunt     Lung cancer Maternal Grandmother     Cancer Maternal Grandfather         Bladder cancer    Lung cancer Maternal Grandfather     Heart failure Paternal Grandmother     Transient ischemic attack Paternal Grandmother     Heart disease Paternal Grandfather     Malig Hyperthermia Neg Hx      Review of Systems   Constitutional: Negative for chills, fever, weight gain and weight loss.   Cardiovascular:  Negative for leg swelling.   Respiratory:  Negative for cough, snoring and wheezing.    Hematologic/Lymphatic: Negative for bleeding problem. Does not bruise/bleed easily.   Skin:  Negative for color change.   Musculoskeletal:  Negative for falls, joint pain and myalgias.   Gastrointestinal:  Negative for melena.   Genitourinary:  Negative for hematuria.   Neurological:  Negative for excessive daytime sleepiness.   Psychiatric/Behavioral:  Negative for depression. The  "patient is not nervous/anxious.         Objective:     Vitals:    10/19/23 0948   BP: 124/84   Pulse: 84   Weight: 79.4 kg (175 lb)   Height: 160 cm (63\")     Body mass index is 31 kg/m².    Vitals reviewed.   Constitutional:       Appearance: Well-developed and overweight.   Eyes:      General: No scleral icterus.        Right eye: No discharge.      Conjunctiva/sclera: Conjunctivae normal.      Pupils: Pupils are equal, round, and reactive to light.   HENT:      Head: Normocephalic.      Nose: Nose normal.   Neck:      Thyroid: No thyromegaly.      Vascular: No JVD.   Pulmonary:      Effort: Pulmonary effort is normal. No respiratory distress.      Breath sounds: Normal breath sounds. No wheezing. No rales.   Cardiovascular:      Normal rate. Regular rhythm. Normal S1. Normal S2.       Murmurs: There is no murmur.      No gallop.    Pulses:     Intact distal pulses.      Carotid: 2+ bilaterally.     Radial: 2+ bilaterally.     Femoral: 2+ bilaterally.     Popliteal: 2+ bilaterally.     Dorsalis pedis: 2+ bilaterally.     Posterior tibial: 2+ bilaterally.  Edema:     Peripheral edema absent.   Abdominal:      General: Bowel sounds are normal. There is no distension.      Palpations: Abdomen is soft.      Tenderness: There is no abdominal tenderness. There is no rebound.   Musculoskeletal: Normal range of motion.         General: No tenderness.      Cervical back: Normal range of motion and neck supple. Skin:     General: Skin is warm and dry.      Findings: No erythema or rash.   Neurological:      Mental Status: Alert and oriented to person, place, and time.   Psychiatric:         Behavior: Behavior normal.         Thought Content: Thought content normal.         Judgment: Judgment normal.       Lab Review:   Lab Results - Last 18 Months   Lab Units 11/02/23  0958 10/12/23  0819 02/03/23  1050 10/11/22  0925   WBC 10*3/mm3 6.66 6.52   < > 8.0   RBC 10*6/mm3 4.29 4.05   < > 5.00   HEMOGLOBIN g/dL 13.7 13.3   < > " 15.1   HEMATOCRIT % 40.4 38.7   < > 45.3   MCV fL 94.2 95.6   < > 91   MCH pg 31.9 32.8   < > 30.2   MCHC g/dL 33.9 34.4   < > 33.3   RDW % 11.6* 11.4*   < > 12.3   PLATELETS 10*3/mm3 244 227   < > 319   NEUTROPHIL % % 57.1 59.9   < > 55   LYMPHOCYTE % % 27.8 27.5   < > 25   MONOCYTES % % 6.5 6.6   < > 6   EOSINOPHIL % % 7.5* 5.2   < > 14   BASOPHIL % % 0.8 0.5   < > 0   NEUTROS ABS 10*3/mm3 3.81 3.91   < > 4.4   LYMPHS ABS 10*3/mm3 1.85 1.79   < > 2.0   MONOS ABS 10*3/mm3 0.43 0.43   < > 0.5   EOS ABS 10*3/mm3 0.50* 0.34   < > 1.1*   BASOS ABS 10*3/mm3 0.05 0.03   < > 0.0   IMM GRAN % %  --   --   --  0   IMMATURE GRANS (ABS) x10E3/uL  --   --   --  0.0   RDW-SD fl 39.8 40.0   < >  --    MPV fL 9.7 9.5   < >  --     < > = values in this interval not displayed.     Lab Results - Last 18 Months   Lab Units 11/02/23 0958 10/12/23  0819   GLUCOSE mg/dL 94 101*   BUN mg/dL 16 20   CREATININE mg/dL 0.82 0.79   SODIUM mmol/L 142 137   POTASSIUM mmol/L 4.0 4.1   CHLORIDE mmol/L 103 101   CO2 mmol/L 23.6 22.3   CALCIUM mg/dL 9.7 9.6   BUN / CREAT RATIO  19.5 25.3*   ANION GAP mmol/L 15.4* 13.7   EGFR mL/min/1.73 89.5 93.6     Lab Results - Last 18 Months   Lab Units 11/02/23 0958 10/12/23  0819   GLUCOSE mg/dL 94 101*   BUN mg/dL 16 20   CREATININE mg/dL 0.82 0.79   SODIUM mmol/L 142 137   POTASSIUM mmol/L 4.0 4.1   CHLORIDE mmol/L 103 101   CO2 mmol/L 23.6 22.3   CALCIUM mg/dL 9.7 9.6   TOTAL PROTEIN g/dL 7.0 6.7   ALBUMIN g/dL 4.4 4.3   ALT (SGPT) U/L 74* 119*   AST (SGOT) U/L 59* 93*   ALK PHOS U/L 129* 146*   BILIRUBIN mg/dL 0.2 0.4   GLOBULIN gm/dL 2.6 2.4   A/G RATIO g/dL 1.7 1.8   BUN / CREAT RATIO  19.5 25.3*   ANION GAP mmol/L 15.4* 13.7   EGFR mL/min/1.73 89.5 93.6     Lab Results - Last 18 Months   Lab Units 10/11/22  0925   TRIGLYCERIDES mg/dL 87   HDL CHOL mg/dL 47   LDL CHOL mg/dL 153*   VLDL CHOLESTEROL NELLA mg/dL 16   LDL/HDL RATIO ratio 3.3*     Lab Results - Last 18 Months   Lab Units 04/10/23  1122    PROBNP pg/mL 16.3     Lab Results - Last 18 Months   Lab Units 04/10/23  1122   HSTROP T ng/L 7     Lab Results - Last 18 Months   Lab Units 08/10/23  0736 02/17/23  0750   TSH uIU/mL 0.944 1.870     Lab Results - Last 18 Months   Lab Units 05/15/23  1127   PROTIME seconds 13.1           ECG 12 Lead    Date/Time: 10/19/2023 10:39 AM  Performed by: Alisia Fermin MD    Authorized by: Alisia Fermin MD  Comparison: compared with previous ECG   Similar to previous ECG  Rhythm: sinus rhythm  Ectopy comments: Short GA interval  Other findings: non-specific ST-T wave changes    Clinical impression: abnormal EKG            Diagnosis Plan   1. Encounter for monitoring cardiotoxic drug therapy  ECG 12 Lead    Adult Transthoracic Echo Limited W/ Cont if Necessary Per Protocol    Adult Transthoracic Echo Limited W/ Cont if Necessary Per Protocol      2. Mild hyperlipidemia  ECG 12 Lead      3. Elevated blood pressure reading without diagnosis of hypertension          Plan:           1.  Left-sided breast cancer, status post bilateral mastectomy.  Currently receiving TCHP and radiation therapy.  Prior normal proBNP.  Echo at baseline normal echo again today normal.  Plan on follow-up echo in 3 months and then follow-up with me in office with repeat echo in 6 months which will be the last of her 3-month series.  Following that she will have echo imaging every 6 months for 2 years.  Given left-sided radiation therapy echo every 5 years and stress test every 10 years possibly related  2.  Hypothyroidism  3.  Dyslipidemia.  Strongly recommended a low-cholesterol diet.  Will have lipids checked in several months.  4.  Hypertension controlled  5.  ADD    Time Spent: I spent 35 minutes caring for Ursula on this date of service. This time includes time spent by me in the following activities: preparing for the visit, reviewing tests, obtaining and/or reviewing a separately obtained history, performing a medically appropriate examination  and/or evaluation, counseling and educating the patient/family/caregiver, ordering medications, tests, or procedures, documenting information in the medical record, and independently interpreting results and communicating that information with the patient/family/caregiver.   I spent 1 minutes on the separately reported service of ECG. This time is not included in the time used to support the E/M service also reported today.        Your medication list            Accurate as of October 19, 2023 11:59 PM. If you have any questions, ask your nurse or doctor.                CHANGE how you take these medications        Instructions Last Dose Given Next Dose Due   lisinopril 2.5 MG tablet  Commonly known as: PRINIVIL,ZESTRIL  What changed:   how much to take  how to take this  when to take this  additional instructions      Take 1 tablet by mouth once daily              CONTINUE taking these medications        Instructions Last Dose Given Next Dose Due   BIOTIN PO      Take  by mouth.       BLACK COHOSH PO      Take  by mouth.       COLLAGEN PO      Take 1 tablet by mouth Daily.       COQ10 PO      Take  by mouth.       levothyroxine 125 MCG tablet  Commonly known as: SYNTHROID, LEVOTHROID      Take 1 tablet by mouth Daily.       lidocaine-prilocaine 2.5-2.5 % cream  Commonly known as: EMLA      Apply 1 application topically to the appropriate area as directed As Needed for Mild Pain (apply pea-sized amount to port site 30 minutes prior to port being accessed).       loratadine 10 MG tablet  Commonly known as: CLARITIN      Take 1 tablet by mouth Daily.       magnesium oxide 400 MG tablet  Commonly known as: MAG-OX      Take 1 tablet by mouth Every Other Day.       pantoprazole 40 MG EC tablet  Commonly known as: PROTONIX      Take 1 tablet by mouth Daily.       PROBIOTIC ADVANCED PO      Take 1 tablet by mouth Daily.                 Where to Get Your Medications        These medications were sent to [id number]  - [place], [place] - [address] - [phone]  - [phone] FX  [address], [facility] [address]      Phone: [phone]   levothyroxine 125 MCG tablet         Patient is no longer taking -.  I corrected the med list to reflect this.  I did not stop these medications.      Dictated utilizing Dragon dictation

## 2023-10-20 ENCOUNTER — HOSPITAL ENCOUNTER (OUTPATIENT)
Dept: RADIATION ONCOLOGY | Facility: HOSPITAL | Age: 48
Discharge: HOME | End: 2023-10-20

## 2023-10-20 LAB
RAD ONC ARIA COURSE ID: NORMAL
RAD ONC ARIA COURSE LAST TREATMENT DATE: NORMAL
RAD ONC ARIA COURSE START DATE: NORMAL
RAD ONC ARIA COURSE TREATMENT ELAPSED DAYS: 31
RAD ONC ARIA FIRST TREATMENT DATE: NORMAL
RAD ONC ARIA PLAN FRACTIONS TREATED TO DATE: 24
RAD ONC ARIA PLAN ID: NORMAL
RAD ONC ARIA PLAN PRESCRIBED DOSE PER FRACTION: 2 GY
RAD ONC ARIA PLAN PRIMARY REFERENCE POINT: NORMAL
RAD ONC ARIA PLAN TOTAL FRACTIONS PRESCRIBED: 25
RAD ONC ARIA PLAN TOTAL PRESCRIBED DOSE: 5000 CGY
RAD ONC ARIA REFERENCE POINT DOSAGE GIVEN TO DATE: 48 GY
RAD ONC ARIA REFERENCE POINT ID: NORMAL
RAD ONC ARIA REFERENCE POINT SESSION DOSAGE GIVEN: 2 GY

## 2023-10-20 PROCEDURE — 77387 GUIDANCE FOR RADJ TX DLVR: CPT | Performed by: RADIOLOGY

## 2023-10-20 PROCEDURE — 77412 RADIATION TX DELIVERY LVL 3: CPT | Performed by: RADIOLOGY

## 2023-10-23 ENCOUNTER — HOSPITAL ENCOUNTER (OUTPATIENT)
Dept: RADIATION ONCOLOGY | Facility: HOSPITAL | Age: 48
Discharge: HOME | End: 2023-10-23
Payer: COMMERCIAL

## 2023-10-23 DIAGNOSIS — Z17.0 MALIGNANT NEOPLASM OF OVERLAPPING SITES OF LEFT BREAST IN FEMALE, ESTROGEN RECEPTOR POSITIVE: Primary | ICD-10-CM

## 2023-10-23 DIAGNOSIS — C50.812 MALIGNANT NEOPLASM OF OVERLAPPING SITES OF LEFT BREAST IN FEMALE, ESTROGEN RECEPTOR POSITIVE: Primary | ICD-10-CM

## 2023-10-23 LAB
RAD ONC ARIA COURSE ID: NORMAL
RAD ONC ARIA COURSE LAST TREATMENT DATE: NORMAL
RAD ONC ARIA COURSE START DATE: NORMAL
RAD ONC ARIA COURSE TREATMENT ELAPSED DAYS: 34
RAD ONC ARIA FIRST TREATMENT DATE: NORMAL
RAD ONC ARIA PLAN FRACTIONS TREATED TO DATE: 25
RAD ONC ARIA PLAN ID: NORMAL
RAD ONC ARIA PLAN PRESCRIBED DOSE PER FRACTION: 2 GY
RAD ONC ARIA PLAN PRIMARY REFERENCE POINT: NORMAL
RAD ONC ARIA PLAN TOTAL FRACTIONS PRESCRIBED: 25
RAD ONC ARIA PLAN TOTAL PRESCRIBED DOSE: 5000 CGY
RAD ONC ARIA REFERENCE POINT DOSAGE GIVEN TO DATE: 50 GY
RAD ONC ARIA REFERENCE POINT ID: NORMAL
RAD ONC ARIA REFERENCE POINT SESSION DOSAGE GIVEN: 2 GY

## 2023-10-23 PROCEDURE — 77387 GUIDANCE FOR RADJ TX DLVR: CPT | Performed by: RADIOLOGY

## 2023-10-23 PROCEDURE — 77412 RADIATION TX DELIVERY LVL 3: CPT | Performed by: RADIOLOGY

## 2023-10-30 ENCOUNTER — HOSPITAL ENCOUNTER (OUTPATIENT)
Dept: ULTRASOUND IMAGING | Facility: HOSPITAL | Age: 48
Discharge: HOME | End: 2023-10-30
Admitting: NURSE PRACTITIONER
Payer: COMMERCIAL

## 2023-10-30 DIAGNOSIS — E04.1 THYROID NODULE: ICD-10-CM

## 2023-10-30 PROCEDURE — 76536 US EXAM OF HEAD AND NECK: CPT

## 2023-11-01 ENCOUNTER — TELEPHONE (OUTPATIENT)
Dept: FAMILY MEDICINE CLINIC | Facility: CLINIC | Age: 48
End: 2023-11-01
Payer: COMMERCIAL

## 2023-11-01 DIAGNOSIS — E04.1 THYROID NODULE: Primary | ICD-10-CM

## 2023-11-01 DIAGNOSIS — E89.0 POSTABLATIVE HYPOTHYROIDISM: ICD-10-CM

## 2023-11-01 RX ORDER — LEVOTHYROXINE SODIUM 0.12 MG/1
125 TABLET ORAL DAILY
Qty: 90 TABLET | Refills: 0 | Status: SHIPPED | OUTPATIENT
Start: 2023-11-01

## 2023-11-02 ENCOUNTER — INFUSION (OUTPATIENT)
Dept: ONCOLOGY | Facility: HOSPITAL | Age: 48
End: 2023-11-02
Payer: COMMERCIAL

## 2023-11-02 ENCOUNTER — OFFICE VISIT (OUTPATIENT)
Dept: ONCOLOGY | Facility: CLINIC | Age: 48
End: 2023-11-02
Payer: COMMERCIAL

## 2023-11-02 VITALS
WEIGHT: 175.4 LBS | SYSTOLIC BLOOD PRESSURE: 134 MMHG | DIASTOLIC BLOOD PRESSURE: 86 MMHG | OXYGEN SATURATION: 99 % | HEIGHT: 63 IN | RESPIRATION RATE: 16 BRPM | HEART RATE: 80 BPM | BODY MASS INDEX: 31.08 KG/M2 | TEMPERATURE: 97.3 F

## 2023-11-02 DIAGNOSIS — Z17.0 MALIGNANT NEOPLASM OF OVERLAPPING SITES OF LEFT BREAST IN FEMALE, ESTROGEN RECEPTOR POSITIVE: ICD-10-CM

## 2023-11-02 DIAGNOSIS — C50.812 MALIGNANT NEOPLASM OF OVERLAPPING SITES OF LEFT BREAST IN FEMALE, ESTROGEN RECEPTOR POSITIVE: ICD-10-CM

## 2023-11-02 DIAGNOSIS — C50.812 MALIGNANT NEOPLASM OF OVERLAPPING SITES OF LEFT BREAST IN FEMALE, ESTROGEN RECEPTOR POSITIVE: Primary | ICD-10-CM

## 2023-11-02 DIAGNOSIS — Z17.0 MALIGNANT NEOPLASM OF OVERLAPPING SITES OF LEFT BREAST IN FEMALE, ESTROGEN RECEPTOR POSITIVE: Primary | ICD-10-CM

## 2023-11-02 DIAGNOSIS — Z45.2 ENCOUNTER FOR ADJUSTMENT OR MANAGEMENT OF VASCULAR ACCESS DEVICE: ICD-10-CM

## 2023-11-02 DIAGNOSIS — U07.1 COVID-19 VIRUS INFECTION: ICD-10-CM

## 2023-11-02 LAB
ALBUMIN SERPL-MCNC: 4.4 G/DL (ref 3.5–5.2)
ALBUMIN/GLOB SERPL: 1.7 G/DL
ALP SERPL-CCNC: 129 U/L (ref 39–117)
ALT SERPL W P-5'-P-CCNC: 74 U/L (ref 1–33)
ANION GAP SERPL CALCULATED.3IONS-SCNC: 15.4 MMOL/L (ref 5–15)
AST SERPL-CCNC: 59 U/L (ref 1–32)
BASOPHILS # BLD AUTO: 0.05 10*3/MM3 (ref 0–0.2)
BASOPHILS NFR BLD AUTO: 0.8 % (ref 0–1.5)
BILIRUB SERPL-MCNC: 0.2 MG/DL (ref 0–1.2)
BUN SERPL-MCNC: 16 MG/DL (ref 6–20)
BUN/CREAT SERPL: 19.5 (ref 7–25)
CALCIUM SPEC-SCNC: 9.7 MG/DL (ref 8.6–10.5)
CHLORIDE SERPL-SCNC: 103 MMOL/L (ref 98–107)
CO2 SERPL-SCNC: 23.6 MMOL/L (ref 22–29)
CREAT SERPL-MCNC: 0.82 MG/DL (ref 0.6–1.1)
DEPRECATED RDW RBC AUTO: 39.8 FL (ref 37–54)
EGFRCR SERPLBLD CKD-EPI 2021: 89.5 ML/MIN/1.73
EOSINOPHIL # BLD AUTO: 0.5 10*3/MM3 (ref 0–0.4)
EOSINOPHIL NFR BLD AUTO: 7.5 % (ref 0.3–6.2)
ERYTHROCYTE [DISTWIDTH] IN BLOOD BY AUTOMATED COUNT: 11.6 % (ref 12.3–15.4)
GLOBULIN UR ELPH-MCNC: 2.6 GM/DL
GLUCOSE SERPL-MCNC: 94 MG/DL (ref 65–99)
HCT VFR BLD AUTO: 40.4 % (ref 34–46.6)
HGB BLD-MCNC: 13.7 G/DL (ref 12–15.9)
IMM GRANULOCYTES # BLD AUTO: 0.02 10*3/MM3 (ref 0–0.05)
IMM GRANULOCYTES NFR BLD AUTO: 0.3 % (ref 0–0.5)
LYMPHOCYTES # BLD AUTO: 1.85 10*3/MM3 (ref 0.7–3.1)
LYMPHOCYTES NFR BLD AUTO: 27.8 % (ref 19.6–45.3)
MCH RBC QN AUTO: 31.9 PG (ref 26.6–33)
MCHC RBC AUTO-ENTMCNC: 33.9 G/DL (ref 31.5–35.7)
MCV RBC AUTO: 94.2 FL (ref 79–97)
MONOCYTES # BLD AUTO: 0.43 10*3/MM3 (ref 0.1–0.9)
MONOCYTES NFR BLD AUTO: 6.5 % (ref 5–12)
NEUTROPHILS NFR BLD AUTO: 3.81 10*3/MM3 (ref 1.7–7)
NEUTROPHILS NFR BLD AUTO: 57.1 % (ref 42.7–76)
NRBC BLD AUTO-RTO: 0 /100 WBC (ref 0–0.2)
PLATELET # BLD AUTO: 244 10*3/MM3 (ref 140–450)
PMV BLD AUTO: 9.7 FL (ref 6–12)
POTASSIUM SERPL-SCNC: 4 MMOL/L (ref 3.5–5.2)
PROT SERPL-MCNC: 7 G/DL (ref 6–8.5)
RBC # BLD AUTO: 4.29 10*6/MM3 (ref 3.77–5.28)
SODIUM SERPL-SCNC: 142 MMOL/L (ref 136–145)
WBC NRBC COR # BLD: 6.66 10*3/MM3 (ref 3.4–10.8)

## 2023-11-02 PROCEDURE — 96417 CHEMO IV INFUS EACH ADDL SEQ: CPT

## 2023-11-02 PROCEDURE — 25010000002 HEPARIN LOCK FLUSH PER 10 UNITS: Performed by: INTERNAL MEDICINE

## 2023-11-02 PROCEDURE — 80053 COMPREHEN METABOLIC PANEL: CPT

## 2023-11-02 PROCEDURE — 96413 CHEMO IV INFUSION 1 HR: CPT

## 2023-11-02 PROCEDURE — 25010000002 PERTUZUMAB 420 MG/14ML SOLUTION 420 MG VIAL: Performed by: INTERNAL MEDICINE

## 2023-11-02 PROCEDURE — 25810000003 SODIUM CHLORIDE 0.9 % SOLUTION 250 ML FLEX CONT: Performed by: INTERNAL MEDICINE

## 2023-11-02 PROCEDURE — 99214 OFFICE O/P EST MOD 30 MIN: CPT | Performed by: INTERNAL MEDICINE

## 2023-11-02 PROCEDURE — 25010000002 TRASTUZUMAB PER 10 MG: Performed by: INTERNAL MEDICINE

## 2023-11-02 PROCEDURE — 25810000003 SODIUM CHLORIDE 0.9 % SOLUTION: Performed by: INTERNAL MEDICINE

## 2023-11-02 PROCEDURE — 96375 TX/PRO/DX INJ NEW DRUG ADDON: CPT

## 2023-11-02 PROCEDURE — 85025 COMPLETE CBC W/AUTO DIFF WBC: CPT

## 2023-11-02 PROCEDURE — 25010000002 DIPHENHYDRAMINE PER 50 MG: Performed by: INTERNAL MEDICINE

## 2023-11-02 RX ORDER — HEPARIN SODIUM (PORCINE) LOCK FLUSH IV SOLN 100 UNIT/ML 100 UNIT/ML
500 SOLUTION INTRAVENOUS AS NEEDED
OUTPATIENT
Start: 2023-11-02

## 2023-11-02 RX ORDER — SODIUM CHLORIDE 9 MG/ML
250 INJECTION, SOLUTION INTRAVENOUS ONCE
Status: COMPLETED | OUTPATIENT
Start: 2023-11-02 | End: 2023-11-02

## 2023-11-02 RX ORDER — SODIUM CHLORIDE 9 MG/ML
250 INJECTION, SOLUTION INTRAVENOUS ONCE
Status: CANCELLED | OUTPATIENT
Start: 2023-11-02

## 2023-11-02 RX ORDER — FAMOTIDINE 10 MG/ML
20 INJECTION, SOLUTION INTRAVENOUS ONCE
Status: CANCELLED | OUTPATIENT
Start: 2023-11-02

## 2023-11-02 RX ORDER — SODIUM CHLORIDE 0.9 % (FLUSH) 0.9 %
10 SYRINGE (ML) INJECTION AS NEEDED
Status: DISCONTINUED | OUTPATIENT
Start: 2023-11-02 | End: 2023-11-02 | Stop reason: HOSPADM

## 2023-11-02 RX ORDER — HEPARIN SODIUM (PORCINE) LOCK FLUSH IV SOLN 100 UNIT/ML 100 UNIT/ML
500 SOLUTION INTRAVENOUS AS NEEDED
Status: DISCONTINUED | OUTPATIENT
Start: 2023-11-02 | End: 2023-11-02 | Stop reason: HOSPADM

## 2023-11-02 RX ORDER — FAMOTIDINE 10 MG/ML
20 INJECTION, SOLUTION INTRAVENOUS ONCE
Status: COMPLETED | OUTPATIENT
Start: 2023-11-02 | End: 2023-11-02

## 2023-11-02 RX ORDER — SODIUM CHLORIDE 0.9 % (FLUSH) 0.9 %
10 SYRINGE (ML) INJECTION AS NEEDED
OUTPATIENT
Start: 2023-11-02

## 2023-11-02 RX ADMIN — FAMOTIDINE 20 MG: 10 INJECTION INTRAVENOUS at 11:45

## 2023-11-02 RX ADMIN — Medication 500 UNITS: at 13:46

## 2023-11-02 RX ADMIN — TRASTUZUMAB 470 MG: 150 INJECTION, POWDER, LYOPHILIZED, FOR SOLUTION INTRAVENOUS at 13:10

## 2023-11-02 RX ADMIN — PERTUZUMAB 420 MG: 30 INJECTION, SOLUTION, CONCENTRATE INTRAVENOUS at 12:10

## 2023-11-02 RX ADMIN — DIPHENHYDRAMINE HYDROCHLORIDE 25 MG: 50 INJECTION, SOLUTION INTRAMUSCULAR; INTRAVENOUS at 11:46

## 2023-11-02 RX ADMIN — SODIUM CHLORIDE 250 ML: 9 INJECTION, SOLUTION INTRAVENOUS at 11:43

## 2023-11-02 RX ADMIN — Medication 10 ML: at 13:46

## 2023-11-02 NOTE — PROGRESS NOTES
Subjective     REASON FOR FOLLOW-UP:  1.  Invasive lobular carcinoma multifocal,  Left breast anterolateral stereotactic biopsy invasive lobular carcinoma, Marie score of 6, grade 2, 4 mm, ER 95%, IL 95%, HER2/cheryl 0 negative with Ki-67 of 5%.  Positive for atypical lobular hyperplasia  Left breast 1 o'clock position 10 cm from the nipple ultrasound-guided biopsy consistent with invasive lobular carcinoma grade 2 Fults score of 6, 4 mm with lobular carcinoma in situ, ER 90%, %, HER2/cheryl 0  Left breast upper stereotactic guided core biopsy invasive lobular carcinoma grade 2 Fults score of six 3 mm with lobular carcinoma in situ, ER 90%, %, HER2/cheryl equivocal 2+, FISH shows her to 5.7 with CEP 1.6 and ratio HER2 nu/KEIRA-17 ratio of 3.5.  It is amplified.    2.  March 8, 2023:p T3 N0 M0 invasive lobular carcinoma s/p bilateral mastectomy with left deep axillary sentinel lymph node biopsy, with bilateral placement of prepectoral tissue expanders for reconstruction and bilateral placement of AlloDerm.  Pathology shows multifocal tumor in  upper outer quadrant and upper inner quadrant, invasive lobular carcinoma grade 2 with a Fults score  score of 7  , total of 4 foci were seen, largest tumor being 60 mm, 20 mm, 2 mm, 1 mm.  No DCIS identified.  Lobular carcinoma in situ present.  No lymphatic or vascular channel invasion, no dermal lymphovascular space invasion all margins were negative for invasive carcinoma 4 lymph nodes were all negative for malignancy its pathologic T3N0  ER %, IL %, HER2/cheryl 2+ with Ki-67 of 5%  Previous biopsies had shown 2 of the tumors were ER/IL positive HER2/cheryl negative and one of them was ER/IL positive HER2 positive  On discussing with pathologist Dr. Serrano, she thinks that the larger tumor was ER IL positive and HER2 positive but unsure if it was the 60 mm tumor or 20 mm tumor.  Either way it was not the smaller tumors which was HER2  positive.  All tumors look-alike per pathologist and they are invasive lobular carcinoma.                  HISTORY OF PRESENT ILLNESS:         Patient is a 48 y.o. female with history of multifocal left breast cancer with MRI of the breast showing almost 15 lesions.  Biopsies on 3 different lesions were done and they were all consistent with invasive lobular carcinoma.  2 of the lesions where ER/GA positive HER2/cheryl negative and one of the larger lesion was ER/GA positive HER2 2+ equivocal.  FISH testing was done and the HER2 copy number was 5.7 with HER2/CEP ratio of 3.5.  The FISH was amplified.    Patient underwent bilateral mastectomy    With left sentinel lymph node surgery..  The pathology shows tumor to be present in the left upper outer quadrant invasive lobular carcinoma with a Marie score of 7, grade 2.  There were 4 lesions the greatest size was 6 0 mm, 20 mm, 2 mm and 1 mm in size.  Lobular carcinoma in situ is present.  All margins are negative for invasive carcinoma.  4 lymph nodes negative.  Patient has T3N0 invasive lobular carcinoma.      Discussed with pathology in length Dr. Serrano, one of the larger lesions which was 60 mm was heterogeneous it was biopsied at 2 separate spots.  The left anterior lateral and left 1:00 core fragment was ER/GA positive HER2/cheryl negative the left upper core biopsy was ER/GA positive HER2/cheryl 2+ equivocal but HER2/cheryl FISH was positive.    On discussion with pathology the 60 mm tumor showed ER and GA positive and HER2 positive by FISH with a Ki-67 of 40% at the Top-Hat clip.  The HER2/cheryl copy number was 5.7 with a HER2/cheryl by CEP ratio 3.5 which is positive.  The Ki-67 on the lower part was 13%.  The 20 mm tumor had a Ki-67 of 9%.  So the larger lesion was triple positive and heterogeneous as 2 biopsies were done on the larger lesions and that was both triple positive and at the second biopsy was ER/GA positive HER2/cheryl negative so it was a heterogeneous tumor.   The 20 mm lesion was ER/WA positive HER2/cheryl negative.    Given that she had a large tumor with high Ki-67 and premenopausal status we discussed about giving Taxotere carboplatin Herceptin Perjeta.    Interval History:   Patient is tolerating Herceptin Perjeta very well.  She has triple positive breast cancer status post bilateral mastectomy.  She has started radiation and has 25 total treatments so far she has completed 17/25 treatments.  She denies any new pain today.  She has no skin changes from radiation thus far.    Oncology history:  Patient is a 48 y.o. female who has been recently diagnosed with left breast cancer.  Patient has been compliant with her mammograms.    There is no family history of breast or ovarian cancer.  Her father had prostate cancer and melanoma.  A paternal aunt had thyroid cancer.      Details are as follows.    November 29, 2022: Screening bilateral mammogram showed 8 mm asymmetry in the central left breast posterior one third.  This appears to be in the superior left breast on the MLO view.  Right breast was negative.  A left diagnostic mammogram and ultrasound was recommended.    January January 3, 2023: Left breast diagnostic mammogram showed the spiculated areas within the central posterior and lateral anterior superior left breast where redemonstrated.  Targeted ultrasound was done.  At 1 o'clock position 10 cm from the nipple there is a shadowing mass with peripheral spiculations which is concordant with the mammographic abnormality and suspicious for malignancy.  The more centrally located spiculation on the cc view posterior to the glandular tissue cannot be clearly identified on the ultrasound.    Impression was 2 separate suspicious spiculated lesion in the left breast.  One of the lesions is seen well on the ultrasound and should be amenable to ultrasound-guided core biopsy.  The other lesion which was seen in the CC projection was amicable to stereotactic guided tissue  sampling.    January 3, 2023 patient underwent ultrasound-guided left breast biopsy    The ultrasound-guided biopsy of left breast 10 cm from the nipple at 1 o'clock position showed invasive lobular carcinoma moderately differentiated with a [place] grade of 2 and score of 6 measuring 4 mm.  There was focal lobular carcinoma in situ.    Left breast anterior lateral stereotactic core needle biopsy showed invasive lobular carcinoma moderately differentiated grade 2 with a Marie score of 6 measuring 4 mm.  There was atypical lobular hyperplasia.  Estrogen receptor was %, progesterone receptor was %, HER2/cheryl 0, Ki-67 5%.    Left breast upper stereotactic guided core biopsy showed invasive lobular carcinoma moderately differentiated grade 2 with [place] score of 6 with measuring 3 mm with lobular carcinoma in situ present.  I do not see the ER/ME or HER2 on the ultrasound-guided biopsy of the left breast lesion as well as the left upper stereotactic biopsy.  We will check with pathologist    January 23, 2023: Patient underwent  MRI of the breast bilateral    Right breast: Negative    Left breast: At 1:00 anterior left breast 4 cm from the nipple there is a 1.5 x 1.6 x 1.3 cm irregular enhancing mass which is biopsy-proven malignancy.  The biopsy clip is located within 0.9 x 1 x 1.1 cm postbiopsy hematoma along the inferior margin of the mass.  And there is additional hematoma along the lateral margin of the mass which measures 1.2 x 1.9 x 0.9 cm.    At 1:00 in the middle to posterior left breast 7.4 cm from the nipple there is a 1.3 x 1 cm x 1 cm irregular enhancing mass mass with a focus from a biopsy clip along the inferior margin which also represents the biopsy-proven malignancy    At 12:00 in the posterior left breast 8.5 cm from the nipple there is a 1.9 x 1.8 x 2.2 cm irregular enhancing mass with a corresponding 2.9 cm biopsy cavity with a biopsy clip which represents the biopsy-proven  malignancy    In addition there are multiple at least 12 similar-appearing irregular enhancing masses and foci are identified throughout the left breast predominantly involving the upper breast but also involving the lower outer quadrant.  At 11-12 o'clock in the middle and posterior left breast there are 3 adjacent reference masses together measuring 5.5 cm but individually measuring 1.2 x 0.9 x 1 cm.  At 1:00 in the far posterior left breast/axillary tail 13.7 cm posterior to the nipple is a 1.3 x 1.1 x 1 cm irregular enhancing mass which is located 0.8 cm from the anterolateral margin of the pectoralis Muscle.  Together the enhancing masses span approximately 11.7 x 8 x 7.8 cm.  There is no suspicious enhancement in the left nipple or chest wall.  Focal areas of skin enhancement likely reflect skin entry point from recent biopsies.  There are no pathologically enlarged internal mammary chain lymph nodes.    Impression    .  At least 15 similar-appearing irregular enhancing masses and foci  scattered throughout the left breast, together measuring up to 11.7 cm  in maximum dimension, encompass 3 sites of biopsy-proven malignancy and  are consistent with multicentric malignancy. Oncologic and surgical  management are recommended.  2.  No MRI evidence of malignancy in the right breast.    Patient is a 47-year-old female with multifocal invasive lobular carcinoma who on screening mammogram showed 8 mm asymmetry in the central left breast posterior one third.  In the cc view.  It appears to be superior left breast on the MLO view.  There was also architectural distortion in the lateral left breast one third on the left cc view.    On diagnostic mammogram the spiculated areas within the central posterior and the lateral anterior superior left breast where redemonstrated.  Targeted ultrasound was done.  At 1 o'clock position 10 cm from the nipple there is a mass with spiculations which is suspicious.  The more centrally  located spiculation on the cc view.  The more centrally located spiculation on the cc view cannot be clearly identified on ultrasound.  So there impression was there were 2 separate suspicious spiculated lesion in the left breast.  1 was seen well on the ultrasound and amenable to ultrasound-guided biopsy the other is seen well on mammography and a stereotactic guided biopsy suggested.    Patient subsequently underwent stereotactic biopsy of 1 lesion and ultrasound-guided biopsy of the 1 cm mass at 1 o'clock position 10 cm from the nipple.  A total of 3 different areas of the left breast were biopsied and specimens were sent to pathology.  The third biopsy was performed at 12 o'clock position in the posterior one third of the left breast.  All of the 3 sites were consistent with invasive lobular carcinoma at site A, B, and C.    MRI of the breast showed at 1 o'clock position of the left breast anteriorly 4 cm posterior to the nipple there is a 1.5 x 1.6 x 1.3 cm irregular enhancing mass.  There is a small hematoma along the inferior margin of the mass.  An additional hematoma along the lateral margin of the mass which is measuring 1.2 x 0.9 x 0.9 cm    At 1:00 in the middle to posterior left breast 7.4 cm posterior to the nipple there is a 1.3 x 1 x 1 cm enhancing mass with a biopsy clip.    At 12:00 posterior left breast 8.5 cm posterior to the nipple there is a 1.5 x 1.8 x 2.2 cm irregular enhancing mass with a corresponding 2.9 cm biopsy cavity with a clip which represents the biopsy site malignancy    Multiple at least 12 similar-appearing irregular enhancing masses and foci are identified throughout the left breast predominantly involving the upper breast but also involving the lower outer quadrant.  At 11-12 o'clock in the middle and posterior left breast there is a 3 adjacent reference masses measuring 5.5 cm in AP dimension and individually measuring 1.2 x 0.9 x 1 cm.  At 1:00 in the far posterior left  breast axillary tail 13.7 cm from the nipple there is a 1.3 x 1.1 x 1 cm craniocaudal dimension irregular enhancing mass which is located 0.8 cm from the anterolateral margin of the pectoralis muscle.     Per the MRI at least 15 similar-appearing irregular enhancing masses and foci scattered throughout the left breast together measuring 11.7 cm in maximum dimension which encompasses 3 sites of biopsy-proven malignancy and a consistent with multicentric malignancy.  MRI of the right breast is negative    INVITAE genetic testing showed variation of uncertain significance of LUCAS  Gene.    Patient was suggested left total mastectomy, left axillary sentinel lymph node biopsy possible node dissection and possible reconstruction.    However patient called Dr. Hi and decided to go for upfront bilateral total mastectomy, left axillary sentinel lymph node biopsy and possible axillary lymph node dissection and reconstruction.     I presented the case in the breast cancer conference today.  Patient had multiple nodules per MRI on the left breast Dr. Todd looked at it and felt there were multiple nodules and the largest one was 2.2 cm.  The discussion was to go ahead and upfront do the surgery and subsequently treat with chemo/ endocrine therapy as needed.  The left breast MRI findings are slightly atypical and that it is not contiguous involvement over 11.5 cm area but multiple nodules.  Surgical pathology will clarify.  If it is 1 big lesion or multiple sma      Past Medical History:   Diagnosis Date    ADD (attention deficit disorder)     Bilateral impacted cerumen 07/13/2020    Breast cancer 03/08/2023    LUCIO MASTECTOMY    Foot fracture, left     History of COVID-19 2022    History of gestational diabetes 2010    History of Graves' disease 2012    History of radioactive iodine thyroid ablation 2012    History of vertigo     Hyperlipidemia     Hypothyroidism     Melanoma of back     Positive depression screening  07/13/2020        Past Surgical History:   Procedure Laterality Date    BREAST RECONSTRUCTION Bilateral 3/8/2023    Procedure: BILATERAL PLACEMENT  TISSUE EXPANDER AND ALLODERM;  Surgeon: Eamon Stone MD;  Location:  [facility];  Service: Plastics;  Laterality: Bilateral;    D & C CERVICAL BIOPSY  01/2006    INTRAUTERINE DEVICE INSERTION  2010    Mirena    INTRAUTERINE DEVICE INSERTION  2015    Mirena exchange, old IUD removal and new one inserted by Dr Carr, Lot # [id number] 09/17.mar    MASTECTOMY W/ SENTINEL NODE BIOPSY Bilateral 3/8/2023    Procedure: Bilateral total mastectomy, left axillary sentinel lymph node biopsy;  Surgeon: Kell Hi MD;  Location: [facility];  Service: General;  Laterality: Bilateral;    SCAR REVISION BREAST Right 4/3/2023    Procedure: RIGHT MASTECTOMY REVISION;  Surgeon: Eamon Stone MD;  Location: [facility] MA[facility] OR;  Service: Plastics;  Laterality: Right;    VENOUS ACCESS DEVICE (PORT) INSERTION Right 4/13/2023    Procedure: right port placement;  Surgeon: Kell Hi MD;  Location: [facility] OR;  Service: General;  Laterality: Right;    WISDOM TOOTH EXTRACTION          Current Outpatient Medications on File Prior to Visit   Medication Sig Dispense Refill    BIOTIN PO Take  by mouth.      BLACK COHOSH PO Take  by mouth.      Coenzyme Q10 (COQ10 PO) Take  by mouth.      COLLAGEN PO Take 1 tablet by mouth Daily.      levothyroxine (SYNTHROID, LEVOTHROID) 125 MCG tablet Take 1 tablet by mouth Daily. 90 tablet 0    lidocaine-prilocaine (EMLA) 2.5-2.5 % cream Apply 1 application topically to the appropriate area as directed As Needed for Mild Pain (apply pea-sized amount to port site 30 minutes prior to port being accessed). 30 g 3    lisinopril (PRINIVIL,ZESTRIL) 2.5 MG tablet Take 1 tablet by mouth once daily (Patient taking differently: Take if SBP above 120) 30 tablet 5    loratadine (CLARITIN) 10 MG tablet Take 1 tablet by mouth Daily.       magnesium oxide (MAG-OX) 400 MG tablet Take 1 tablet by mouth Every Other Day.      pantoprazole (PROTONIX) 40 MG EC tablet Take 1 tablet by mouth Daily. 30 tablet 3    Probiotic Product (PROBIOTIC ADVANCED PO) Take 1 tablet by mouth Daily.      S-Adenosylmethionine (CANDELARIA-E PO) Take  by mouth.       No current facility-administered medications on file prior to visit.        ALLERGIES:    Allergies   Allergen Reactions    Erythromycin GI Intolerance     Pt reports    Penicillins Rash        Social History     Socioeconomic History    Marital status:      Spouse name: Parish    Number of children: 2   Tobacco Use    Smoking status: Never    Smokeless tobacco: Never   Vaping Use    Vaping Use: Never used   Substance and Sexual Activity    Alcohol use: Not Currently     Alcohol/week: 1.0 standard drink of alcohol     Types: 1 Glasses of wine per week     Comment: Daily caffeine use    Drug use: Never    Sexual activity: Yes     Partners: Male     Comment:          Family History   Problem Relation Age of Onset    Diabetes Father     Hyperlipidemia Father     Arthritis Father     Hypertension Father     Heart disease Father         S/P stents    Heart attack Father 41    Colon polyps Father     Melanoma Father     Prostate cancer Father     Hyperlipidemia Sister     Hypertension Sister     Heart attack Sister     Diabetes Sister     Thyroid cancer Paternal Aunt     Lung cancer Maternal Grandmother     Cancer Maternal Grandfather         Bladder cancer    Lung cancer Maternal Grandfather     Heart failure Paternal Grandmother     Transient ischemic attack Paternal Grandmother     Heart disease Paternal Grandfather     Malig Hyperthermia Neg Hx       Family history: Maternal great aunt had breast cancer, unsure of the age .  Father had melanoma and prostate cancer, paternal aunt had thyroid cancer, paternal grandfather had bladder cancer maternal grandfather had bladder cancer maternal grandmother had lung  "cancer    Past medical history is consistent with Graves' disease    OB/GYN history:  Age of menarche: 14  Patient is premenopausal and had an IUD which has been now removed   2 para 2 no miscarriages  Age at first childbirth 30  Patient did breast-feed 24 months  Length of taking birth control pills none      Objective     Vitals:    23 1101   BP: 134/86   Pulse: 80   Resp: 16   Temp: 97.3 °F (36.3 °C)   TempSrc: Temporal   SpO2: 99%   Weight: 79.6 kg (175 lb 6.4 oz)   Height: 160 cm (62.99\")   PainSc: 0-No pain               2023     9:59 AM   Current Status   ECOG score 0         Physical Exam      GENERAL:  Well-developed, Patient  in no acute distress.   SKIN:  Warm, dry ,NO purpura ,no rash.  HEENT:  Pupils were equal and reactive to light and accomodation, conjunctivae noninjected,  normal visual acuity.   LYMPHATICS:  No cervical, NO supraclavicular, NO axillary adenopathies.  BREASTS: Not examined today  CARDIAC: Normal rate and rhythm, no murmurs gallops or rubs.  LUNGS: Clear to auscultation bilaterally.   ABDOMEN:  Soft, nontender with bowel sounds active.   EXTREMITIES: No cyanosis clubbing or edema.  NEUROLOGICAL:  Patient was awake, alert, oriented to time, person and place.    RECENT LABS:                        Assessment & Plan     *Pathologic T3N0 invasive lobular carcinoma of the left breast, upper inner and upper outer quadrant, multifocal, 60 mm, 20 mm, 2 mm, 1 mm, grade 2, Fitzhugh score of 7, 2 of them are ER/VT positive, HER2/cheryl negative.  One of the larger lesions was ER/VT positive HER2 positive.  Ki-67 is very low 5%,    Invasive lobular carcinoma of the left breast recently diagnosed, multifocal with 3 lesions biopsied in the left breast.  Patient has multicentric disease with 15 similar-appearing irregular enhancing masses in the left breast together measuring 11.7 cm in maximum dimension which encompasses 3 of the 3 biopsy proven malignancies.  All the 3 very " invasive lobular carcinoma, grade 2 with Marie score of 6 but 2 of the lesions where ER/UT positive HER2 negative and the third lesion was ER/UT positive HER2 2+ with FISH showing HER2 amplified.      1.  Estrogen receptor 95%, progesterone receptor 95%, HER2/cheryl 0, Ki-67 5%    2 left breast 10 o'clock position 10 cm from the nipple ultrasound core guided biopsy showed ER 90%, %, HER2/cheryl 0    3.  Left breast upper stereotactic core biopsy showed estrogen receptor 90%, progesterone receptor 100%, HER2/cheryl equivocal 2+  FISH testing showed her to as 5.7 with a KEIRA 17 of 1.6 with a ratio HER2/KEIRA 17 of  INVITAE genetic testing showed variation of uncertain significance of LUCAS gene  We discussed the multidisciplinary approach in this patient's and I also discussed in length that that invasive lobular carcinomas do benefit from endocrine therapy and discussed in length about side effects of all endocrine therapies  Pending the results of the surgery she may need to be a candidate for chemotherapy as well and subsequently radiation if the tumor is large locally  Patient is keen on getting bilateral oophorectomy after her breast surgery is done and we discussed about medical oophorectomy with Lupron as well  February 24, 2023: Presented patient in the breast cancer conference.  Even though it appears to be a heterogeneous tumor, since it is difficult to really appreciate by clinical exam and the fact that it is invasive lobular carcinoma, it was felt best to do upfront surgery.  March 8, 2023:p T3 N0 M0 invasive lobular carcinoma s/p bilateral mastectomy with left deep axillary sentinel lymph node biopsy, with bilateral placement of prepectoral tissue expanders for reconstruction and bilateral placement of AlloDerm.  Pathology shows multifocal tumor in  upper outer quadrant and upper inner quadrant, invasive lobular carcinoma grade 2 with a [place] score  score of 7  , total of 4 foci were seen, largest  tumor being 60 mm, 20 mm, 2 mm, 1 mm.  No DCIS identified.  Lobular carcinoma in situ present.  No lymphatic or vascular channel invasion, no dermal lymphovascular space invasion all margins were negative for invasive carcinoma 4 lymph nodes were all negative for malignancy its pathologic T3N0  ER %, PA %, HER2/cheryl 2+ with Ki-67 of 5%  Previous biopsies had shown 2 of the tumors were ER/PA positive HER2/cheryl negative and one of them was ER/PA positive HER2 positive  On discussing with pathologist Dr. Serrano, she thinks that the larger tumor was ER PA positive and HER2 positive.  HER2 copy number of 5.7 with HER2/CEP ratio of 3.5 positive and this was on the larger tumor.  Given that patient has heterogeneous tumor with the larger lesion being ER/PA positive HER2 positive and a Ki-67 of 40%, being premenopausal we will treat with Taxotere carboplatin Herceptin Perjeta.  April 20, 2023: Patient was to start cycle 1 today but unfortunately her liver function tests were extremely high.  Discussed about alcohol and she had drank and taken Tylenol.  Will hold chemo today  April 28, 2023: Reviewed her liver function test which are completely normalized.  Discussed with her not to drink alcohol.  Here for cycle 1 day 1 TCHP chemotherapy.  Reviewed echo which showed ejection fraction of 64% with strain pattern of -21.  Patient understands all the side effects\  5/4/2023: Patient tolerated Cycle 1 TCHP well. She has had diarrhea, nausea and fatigue that have been well managed.   5/18/2023: C2 TCHP  5/22/2023: Here for close follow-up and possible IV fluids after treatment last Friday, 5/18/2023.  Patient had a fairly good weekend with good oral intake, weight that is notably stable, and very little nausea.  She only had 1 loose stool this morning and therefore no significant diarrhea.  CMP is WNL.  Per discussion with patient we will give just 500 mL normal saline.  May 25, 2023: Patient has diarrhea.  She is 1 week  out of cycle 2 Taxotere carboplatin Herceptin Perjeta.  She has some abdominal cramping.  We will give her IV fluids today.  Given carboplatinum is not available she will receive Taxotere Herceptin Perjeta for 2 more cycles prior to going to Adriamycin Cytoxan.  Cycle #3 TCHP 6/8/2023  Patient returns 6/12/2023 for toxicity check.  She is eating and drinking sufficiently at this point.  She does not feel she has benefited from the IV fluids in the past and therefore will not do that today.  She denies any fevers or chills.  June 29, 2023: Cycle 4 TCHP.  She will need referral to radiation at the completion of 6 cycles of TCHP  07/20/2023 proceed with cycle 5 of carboplatin, Taxotere, Herceptin, Perjeta with Neulasta support.  August 10, 2023: Final cycle 6 TCHP.  Thereafter plan to continue every 3-week Herceptin/Perjeta for total of 1 year.   We have discussed potentially beginning Lupron followed by aromatase inhibitor therapy following radiation however patient wants to discuss potential hysterectomy and bilateral oophorectomy with her GYN.    September 21, 2023: Patient started radiation has completed 2 treatments so far out of 25.  Patient has history of depression in the family but not herself.  We discussed about consideration of Lupron with aromatase inhibitor but she wants to undergo hysterectomy with bilateral oophorectomy because of her fibroids.  In which case we could consider starting tamoxifen until she has oophorectomy and subsequently start AI.  She is tolerating Herceptin Perjeta very well  10/12/23 Here for Hepcetin and Perjeta today. She is currently on 18 of 25 radiation treatments and tolerating well.  She has no new concerns today.  November 3, 2023: Patient to receive Herceptin Perjeta.  She is due to get oophorectomy following which we will start aromatase inhibitor Arimidex.    *S/p  IUD removal  Experiencing hot flashes with chemo likely triggering menopausal changes.    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 She did recommend she start taking her gabapentin again to help with her anxiety and irritability/anger from her decadron with treatment.   Patient continues follow-up with supportive oncology, RADHA Rolle. Patient is encouraged to continue to follow with Cierra.     *Oral Thrush and Chemotherapy induced mucositis  5/4/2023: I am concerned patient is starting into chemotherapy induced mucositis. She is reporting sore throat at times, possibly worsened reflux, mouth and tongue changes. She noticed white plaquing on her tongue this morning. She has not been able to  her Yen's Magic Mouthwash until today because of pharmacy supplies. She is currently on omeprazole 20 mg daily. She can increase this to 40 mg daily if needed. Prescription sent to pharmacy today to start Fluconazole 200 mg on day and 100 mg on day 2-7. We will continue to monitor symptoms and watch liver enzymes closely. ALT 34 and AST 24.  Liver function test mildly elevated today, unsure if that is the effect of fluconazole.  In the future avoid fluconazole and consider just Yen's Magic mouthwash.  7/6/2023 Oral thrush recurrent today, mild but bothersome.  Refill Magic mouthwash.  Patient continuing to rinse her mouth to minimize oral candida.  Expect this to resolve now that she is done with chemotherapy and no longer requiring dexamethasone.    *  Port study negative    *Intermittent mild elevation of LFTs.    Patient has previously reported intermittent use of Tylenol and alcohol.    8/31/2023 AST 35, ALT 41, alk phos 100.  Patient denies any alcohol intake or Tylenol.  This is felt to be drug related.  Stable to improved.  Monitor.  10/12/2023 AST 93, .  Patient has had 1 joe this past week however denies any other alcohol or Tylenol intake.  Reviewed with Dr. TRINIDAD and we will treat.    *Cardiac monitoring  Seen by cardiology today, 7/27/2023.  Echo stable.  Plan for continued echocardiogram every 3 months while on therapy.  Continue low-dose lisinopril    PLAN:  Proceed with Herceptin and Perjeta today  Left ventricular ejection fraction of 65.5% with strain pattern of -22.9 as of October 2023  Patient is awaiting bilateral oophorectomy with hysterectomy by GYN  Following that we will start her on aromatase inhibitor  She is completing her radiation  Follow-up with nurse practitioner in 4 weeks with Herceptin and Perjeta  Follow-up with me in 7 weeks with Herceptin Perjeta    This patient is on high risk drug therapy requiring intensive monitoring for toxicity.      MD Dr. Kell Sandoval

## 2023-11-14 ENCOUNTER — OFFICE VISIT (OUTPATIENT)
Dept: OTHER | Facility: HOSPITAL | Age: 48
End: 2023-11-14
Payer: COMMERCIAL

## 2023-11-14 VITALS
DIASTOLIC BLOOD PRESSURE: 77 MMHG | WEIGHT: 174 LBS | BODY MASS INDEX: 30.83 KG/M2 | OXYGEN SATURATION: 99 % | HEIGHT: 63 IN | TEMPERATURE: 96.3 F | HEART RATE: 76 BPM | SYSTOLIC BLOOD PRESSURE: 121 MMHG

## 2023-11-14 DIAGNOSIS — C50.812 MALIGNANT NEOPLASM OF OVERLAPPING SITES OF LEFT BREAST IN FEMALE, ESTROGEN RECEPTOR POSITIVE: Primary | ICD-10-CM

## 2023-11-14 DIAGNOSIS — Z17.0 MALIGNANT NEOPLASM OF OVERLAPPING SITES OF LEFT BREAST IN FEMALE, ESTROGEN RECEPTOR POSITIVE: Primary | ICD-10-CM

## 2023-11-14 PROCEDURE — G0463 HOSPITAL OUTPT CLINIC VISIT: HCPCS | Performed by: NURSE PRACTITIONER

## 2023-11-14 NOTE — PROGRESS NOTES
.[facility]ULTIDISCIPLINARY [facility]IN[facility]  SURVIVORSHIP VISIT: IN CLINIC  Survivorship Treatment Summary Initial Visit        Ursula Kulkarni is a pleasant 48 y.o. female being followed by Kathrin Frederick MD for left breast invasive lobular carcinoma. Reviewed today in Multidisciplinary Clinic, for initial survivorship treatment summary visit.     HPI  Sandy 48-year-old patient with a history of melanoma of the back, hyperlipidemia, ADHD, hypothyroidism, anxiety, thyroid nodule treated with radioactive iodine ablation. She is status post bilateral mastectomy with immediate reconstruction on 3/8/2023, 4 negative left sentinel lymph nodes for invasive lobular carcinoma, ER/IN positive, HER2 amplified on FISH at biopsy.  Patient did have a area of concern in the right clavicle which was biopsied on 5/15/2023 but demonstrated no evidence of metastatic carcinoma.    She completed adjuvant chemotherapy with docetaxel, carboplatin, trastuzumab and per Tuesday Mab on 8/11/2023.  She continues maintenance epratuzumab, trastuzumab.  She completed adjuvant left breast radiation on 10/23/2023.  Endocrine therapy is planned.  The patient is currently considering bilateral oophorectomy so that she may proceed with aromatase inhibitor for her hormone blocking therapy.    She is generally doing well.  She is pretty fatigued and has difficulty falling asleep and staying sleep.  She reports experiencing hot flashes during the night which frequently wake her up.  She has tried gabapentin for this but did not find much benefit.  She also heard black cohosh was helpful and has been taking this intermittently with mixed results.    She does wake up with numb fingertips bilaterally at times but wonders if this is due to may be some pinched cervical nerves.  She is not having any functional changes and when she is up for a while the tingling and numbness goes away although she has noted some diminished  strength.  She does  have some generalized muscle and joint aches again this is worse in the morning but when she gets going she feels better.  She has been very concerned about her loss of flexibility and has been practicing yoga which she is finding helpful.    Her appetite is great and all of her taste changes have resolved.  She did develop some thrush through chemotherapy treatment but this is all now completely resolved.  She denies constipation reports stools tend towards the loose side but typically no more than 1 bowel movement per day and she has been refraining from using Imodium as she is afraid it will constipate her    TREATMENT HISTORY:     Oncology/Hematology History   Malignant neoplasm of overlapping sites of left breast in female, estrogen receptor positive   1/17/2023 Initial Diagnosis    Malignant neoplasm of left breast in female, estrogen receptor positive     1/17/2023 Biopsy    Biopsy performed at [facility]  Final Diagnosis   1. Left breast, anterior lateral, stereotactic-guided core biopsy:  - Invasive lobular carcinoma  - Histologic grade (Valentines Histologic Score):    - Glandular (Acinar)/Tubular Differentiation:  Score 3  - Nuclear Pleomorphism:  Score 2  - Mitotic Rate:  Score 1  - Overall Grade:  Grade 2               - Size of largest focus of invasion:  4 mm               - Breast biomarker testing:                            - Estrogen Receptor (ER):  Positive (95%, moderate)                            - Progesterone Receptor (PgR):  Positive (95%, strong)                            - HER2 (by immunohistochemistry):  Negative (Score 0)  - Ki-67: 5%               - Other findings:                            - Atypical lobular hyperplasia (ALH)        2. Left breast,  1 o'clock position, 10 cm from nipple, ultrasound-guided core biopsy:  - Invasive lobular carcinoma  - Histologic grade (Marie Histologic Score):    - Glandular (Acinar)/Tubular Differentiation:  Score 3  - Nuclear  Pleomorphism:  Score 2  - Mitotic Rate:  Score 1  - Overall Grade:  Grade 2               - Size of largest focus of invasion:  4 mm  - Other findings:                            - Lobular carcinoma in situ (LCIS), classic type        3. Left breast, upper, stereotactic-guided core biopsy:  - Invasive lobular carcinoma  - Histologic grade (Marie Histologic Score):    - Glandular (Acinar)/Tubular Differentiation:  Score 3  - Nuclear Pleomorphism:  Score 2  - Mitotic Rate:  Score 1  - Overall Grade:  Grade 2               - Size of largest focus of invasion:  3 mm  - Other findings:                            - Lobular carcinoma in situ (LCIS), classic type     Addendum   Per 's request, breast biomarker testing is performed on parts 2 and 3.       2. Left breast,  10 o'clock position, 10 cm from nipple, ultrasound-guided core biopsy:               - Breast biomarker testing:                            - Estrogen Receptor (ER):  Positive (90%, moderate to weak)                            - Progesterone Receptor (PgR):  Positive (100%, strong)                            - HER2 (by immunohistochemistry):  Negative (Score 0)     3. Left breast, upper, stereotactic-guided core biopsy:               - Breast biomarker testing:                            - Estrogen Receptor (ER):  Positive (90%, moderate)                            - Progesterone Receptor (PgR):  Positive (100%, strong)                            - HER2 (by immunohistochemistry):  Equivocal (Score 2+), see comment     Comment:  FISH for HER-2/cheryl has been ordered and the results will be separately reported.     [facility] Pathology Review   OUTSIDE CONSULT SLIDES FOR REVIEW ([id number]; 1/17/2023):     1. Left Breast, Anterior Lateral, Stereotactic-Guided Core Needle Biopsy:                 A. INVASIVE LOBULAR CARCINOMA, Moderately differentiated; Marie Histologic Grade II/III                     (tubule score = 3,  nuclear score = 2, mitoses score=1), measuring at least 4 mm.               B. Atypical lobular hyperplasia.               C. Received E-Cadherin immunostain performed at the outside institution and interpreted at [facility] shows       loss of membranous staining, supporting the presence of lobular neoplasia (control reacted       appropriately).   D. Received biomarker studies performed at the outside institution and interpreted at [facility] shows the        following for invasive carcinoma (all controls reacted appropriately):  ER:       POSITIVE (%, Moderate).               KS:       POSITIVE (%, Strong).               HER2:  NEGATIVE (0).               Ki67:     Approximately 5%.  E. See comment.     2. Left Breast, 1:00, 10 cm FN, U/S-Guided Core Needle Biopsy:                 A. INVASIVE LOBULAR CARCINOMA, Moderately differentiated; [place] Histologic Grade II/III                    (tubule score = 3, nuclear score = 2, mitoses score = 1), measuring at least 4 mm.               B. FOCAL LOBULAR CARCINOMA IN SITU (LCIS).               C. See comment.     3. Left Breast, Upper, Stereotactic-Guided Core Needle Biopsy:                 A. INVASIVE LOBULAR CARCINOMA, Moderately differentiated; Marie Histologic Grade II/III                    (tubule score = 3, nuclear score = 2, mitoses score = 1), measuring at least 3 mm.               B. LOBULAR CARCINOMA IN SITU (LCIS).                C. See comment.   HER2 AMPLIFIED ON FISH     1/23/2023 Genetic Testing    [employer] 41 gene panel: Variant of uncertain significance in LUCAS      3/8/2023 Surgery    Final Diagnosis   1. Lymph Node, Left [place] #1, Excision (H&E, AE1/AE3):               A. Two benign lymph nodes (0/2).     2. Lymph Node, Left [place] #2, Excision (H&E, AE1/AE3):               A. One benign lymph node (0/1).     3. Lymph Node, Left [place] #3, Excision (H&E, AE1/AE3):               A. One benign lymph node with pigment (0/1).     4. Breast,  Left, Mastectomy (1,377 Grams):               A. Multifocal invasive lobular carcinoma, [place] histologic grade II (tubules = 3, nuclei = 2, mitoses = 2).               B. Three clips and biopsy change identified.               C. See synoptic template for complete tumor details.     5. Breast, Right, Mastectomy (1,473 Grams):               A. Benign skin, subcutaneous tissue and breast parenchyma with fibroadenomatoid change.         Four negative sentinel lymph nodes  Pathologic stage: pT3 pN0     4/3/2023 Surgery    Final Diagnosis   1. Breast, Right Skin and Subcutaneous Tissue, Excision/Debridement:                A. Devitalized skin with ischemic soft tissue, fat necrosis and focal necrotizing acute inflammation.        4/20/2023 -  Chemotherapy    OP CENTRAL VENOUS ACCESS DEVICE ACCESS, CARE, AND MAINTENANCE (CVAD)     4/27/2023 - 8/11/2023 Chemotherapy    OP BREAST TCH-P DOCEtaxel / CARBOplatin AUC=6 / Trastuzumab / Pertuzumab      5/15/2023 Biopsy    Final Diagnosis   1. Bone, Right Clavicle, Core Biopsy:                A. Bony fragments, hematopoietic marrow elements and small skin fragments.               B. No evidence of metastatic carcinoma.           8/31/2023 -  Chemotherapy    OP BREAST Pertuzumab / Trastuzumab Q21D     9/19/2023 - 10/23/2023 Radiation    Radiation OncologyTreatment Course:  Urusla Kulkarni received 5000 cGy in 25 fractions to left chest wall and nodes.      Hormonal Therapy    Planned after completion of radiation         Past Medical History:   Diagnosis Date    ADD (attention deficit disorder)     Bilateral impacted cerumen 07/13/2020    Breast cancer 03/08/2023    LUCIO MASTECTOMY    Foot fracture, left     History of COVID-19 2022    History of gestational diabetes 2010    History of Graves' disease 2012    History of radioactive iodine thyroid ablation 2012    History of vertigo     Hyperlipidemia     Hypothyroidism     Melanoma of back     Positive depression screening  "07/13/2020       Past Surgical History:   Procedure Laterality Date    BREAST RECONSTRUCTION Bilateral 3/8/2023    Procedure: BILATERAL PLACEMENT  TISSUE EXPANDER AND ALLODERM;  Surgeon: Eamon Stone MD;  [facility];  Service: Plastics;  Laterality: Bilateral;    D & C CERVICAL BIOPSY  01/2006    INTRAUTERINE DEVICE INSERTION  2010    Mirena    INTRAUTERINE DEVICE INSERTION  2015    Mirena exchange, old IUD removal and new one inserted by Dr Carr, Lot # [id number] exp 09/17.mar    MASTECTOMY W/ SENTINEL NODE BIOPSY Bilateral 3/8/2023    Procedure: Bilateral total mastectomy, left axillary sentinel lymph node biopsy;  Surgeon: Kell Hi MD;  [facility];  Service: General;  Laterality: Bilateral;    SCAR REVISION BREAST Right 4/3/2023    Procedure: RIGHT MASTECTOMY REVISION;  Surgeon: Eamon Stone MD;  Location: [facility] MAIN OR;  Service: Plastics;  Laterality: Right;    VENOUS ACCESS DEVICE (PORT) INSERTION Right 4/13/2023    Procedure: right port placement;  Surgeon: Kell Hi MD;  Location: [facility] MAIN OR;  Service: General;  Laterality: Right;    WISDOM TOOTH EXTRACTION         Social History     Socioeconomic History    Marital status:      Spouse name: Parish    Number of children: 2   Tobacco Use    Smoking status: Never    Smokeless tobacco: Never   Vaping Use    Vaping Use: Never used   Substance and Sexual Activity    Alcohol use: Not Currently     Alcohol/week: 1.0 standard drink of alcohol     Types: 1 Glasses of wine per week     Comment: Daily caffeine use    Drug use: Never    Sexual activity: Yes     Partners: Male     Comment:           No results found for: \"LDH\", \"URICACID\"      Lab Results   Component Value Date    GLUCOSE 94 11/02/2023    BUN 16 11/02/2023    CREATININE 0.82 11/02/2023    EGFRIFNONA 79 10/05/2021    EGFRIFAFRI 91 10/05/2021    BCR 19.5 11/02/2023    K 4.0 11/02/2023    CO2 23.6 11/02/2023    CALCIUM " "9.7 11/02/2023    PROTENTOTREF 7.1 10/11/2022    ALBUMIN 4.4 11/02/2023    LABIL2 2.2 10/11/2022    AST 59 (H) 11/02/2023    ALT 74 (H) 11/02/2023       CBC w/diff          9/21/2023    10:25 10/12/2023    08:19 11/2/2023    09:58   CBC w/Diff   WBC 6.41  6.52  6.66    RBC 3.88  4.05  4.29    Hemoglobin 13.3  13.3  13.7    Hematocrit 38.5  38.7  40.4    MCV 99.2  95.6  94.2    MCH 34.3  32.8  31.9    MCHC 34.5  34.4  33.9    RDW 12.2  11.4  11.6    Platelets 198  227  244    Neutrophil Rel % 46.7  59.9  57.1    Immature Granulocyte Rel % 0.3  0.3  0.3    Lymphocyte Rel % 37.1  27.5  27.8    Monocyte Rel % 7.5  6.6  6.5    Eosinophil Rel % 7.6  5.2  7.5    Basophil Rel % 0.8  0.5  0.8        Allergies as of 11/14/2023 - Reviewed 11/13/2023   Allergen Reaction Noted    Erythromycin GI Intolerance 01/27/2023    Penicillins Rash 07/13/2020        MEDICATIONS:  Medication list reviewed today    Review of Systems   Constitutional:  Positive for fatigue. Negative for activity change, appetite change and unexpected weight change.        Positive for hot flashes   Respiratory:  Negative for chest tightness and shortness of breath.    Cardiovascular:  Negative for chest pain and leg swelling.   Gastrointestinal:  Negative for blood in stool, constipation and diarrhea (Loose stool 1 time daily).   Genitourinary:  Negative for dysuria and hematuria.   Musculoskeletal:  Negative for arthralgias and myalgias.   Neurological:  Positive for numbness (Bilateral fingertips some mornings after waking, resolves with movement). Negative for weakness and light-headedness.   Psychiatric/Behavioral:  Positive for sleep disturbance. Negative for dysphoric mood. The patient is not nervous/anxious.        /77   Pulse 76   Temp 96.3 °F (35.7 °C)   Ht 160 cm (63\")   Wt 78.9 kg (174 lb)   SpO2 99%   BMI 30.82 kg/m²     Wt Readings from Last 3 Encounters:   11/14/23 78.9 kg (174 lb)   11/02/23 79.6 kg (175 lb 6.4 oz)   10/19/23 79.4 " kg (175 lb)        There were no vitals filed for this visit.    PHQ-9 Total Score: 1   GAD7 Total Score: 0   Distress Score: 0         Physical Exam  Constitutional:       Appearance: Normal appearance. She is well-groomed.   HENT:      Head: Normocephalic and atraumatic.   Cardiovascular:      Rate and Rhythm: Normal rate and regular rhythm.   Pulmonary:      Effort: No tachypnea or respiratory distress.   Abdominal:      General: Abdomen is flat. There is no distension.   Musculoskeletal:      Right ankle: No swelling. No tenderness. Normal pulse.      Left ankle: No swelling. No tenderness. Normal pulse.   Skin:     General: Skin is warm and dry.   Neurological:      Mental Status: She is alert and oriented to person, place, and time.   Psychiatric:         Attention and Perception: Attention and perception normal.         Mood and Affect: Mood and affect normal.         Speech: Speech normal.         Behavior: Behavior normal. Behavior is cooperative.         Thought Content: Thought content normal.           Advance Care Planning     Patient does not have advance care planning complete, we do not have a copy on file    Patient has not designated a healthcare surrogate:     Brief discussion and written information provided regarding advance care planning and appropriateness for all healthy adults, choosing a healthcare surrogate.     Information provided on upcoming Advance Care Planning classes offered virtually through [facility].     Advised arrangements can be made to schedule an appointment with myself or another advance care planning facilitator at their convenience. Patients may also call the toll free [facility] ACP Help Line at [phone].           DISCUSSION HELD TODAY:   Discussed NCCN recommendations for all cancer survivors of 150 minutes/week moderate intensity exercise, achieve and maintain a healthy weight, plants-based whole-foods diet, avoid tobacco and second hand smoke, avoid  alcohol or minimize alcohol intake - no more than 1 drink in a day for adults.     A copy of the Survivorship Treatment Summary & Care Plan for Ms. Kulkarni was provided to and forwarded to the providers identified on the care team.    Problems identified:  Lymphedema: Reviewed lymphedema causes, early signs and symptoms, prevention and management.  She has good range of motion of her bilateral upper extremities.  She is currently followed by lymphedema therapist.  She will meet with her GYN this week to discuss planning for oophorectomy.  Discussed strategies for preservation of bone mass while on AI therapy including calcium and vitamin D supplementation and importance of weight bearing exercise to include moderate intensity walking most days of the week.  We discussed her bone health will be monitored with DEXA bone density exams per Dr. Frederick  With regards to the hot flashes I did discuss with her to discontinue the black cohosh as it can mimic estrogens effects in the body.  She is practicing yoga and we discussed this is a good nonpharmacologic intervention for management of menopausal symptoms as well as other mind-body modalities and she will continue practicing this.  She does have a lot of generalized arthralgias and myalgias which have been present since starting chemotherapy.  We discussed massage therapy available at the cancer center  We discussed changes in libido which are common for most individuals who have undergone cancer treatment.  We discussed strategies for managing vaginal dryness including vaginal moisturizer such as Replens or coconut oil 1-3 times a week for maintenance and use of water-based lubricants for sexual activity.  We discussed with bilateral mastectomy no further mammography indicated.  She will retain her uterus and cervix and she should continue cervical cancer screening with Pap testing no less than every 3 years.  She was scheduled for a first colonoscopy this year but  due to the breast cancer diagnosis and treatment this had to be deferred.  She plans to get this set up again with her primary care office.  She is a never smoker-no lung cancer screening indicated      Plan and recommendations:  Discontinue black cohosh  Continue physical activity as tolerated, we reviewed structured exercise programs available through physical therapy at [facility], [employer] at the [facility], [employer], upcoming chair yoga offering at the cancer center on 11/29  Continue follow-up with physical therapy  She will see GYN this week to discuss planning for bilateral oophorectomy  Discussed vaginal moisturizers for maintenance of tissue elasticity, water-based lubricants for sexual activity  Surveillance as above  No formal follow-up with me for now.  Call my office as needed at [phone] for additional information, resources or support.      Diagnoses and all orders for this visit:    1. Malignant neoplasm of overlapping sites of left breast in female, estrogen receptor positive (Primary)            I spent 40 minutes caring for this patient on this date of service by face-to-face counseling. This time includes time spent by me in the following activities: preparing for the visit, reviewing tests, obtaining and/or reviewing a separately obtained history, performing a medically appropriate examination and/or evaluation, counseling and educating the patient/family/caregiver, documenting information in the medical record, and care coordination

## 2023-11-17 ENCOUNTER — OFFICE VISIT (OUTPATIENT)
Dept: SURGERY | Facility: CLINIC | Age: 48
End: 2023-11-17
Payer: COMMERCIAL

## 2023-11-17 VITALS
OXYGEN SATURATION: 98 % | SYSTOLIC BLOOD PRESSURE: 126 MMHG | WEIGHT: 176.8 LBS | HEIGHT: 63 IN | HEART RATE: 76 BPM | DIASTOLIC BLOOD PRESSURE: 76 MMHG | BODY MASS INDEX: 31.33 KG/M2

## 2023-11-17 DIAGNOSIS — C50.912 LOBULAR BREAST CANCER, LEFT: Primary | ICD-10-CM

## 2023-11-17 DIAGNOSIS — E66.09 CLASS 1 OBESITY DUE TO EXCESS CALORIES WITHOUT SERIOUS COMORBIDITY WITH BODY MASS INDEX (BMI) OF 32.0 TO 32.9 IN ADULT: ICD-10-CM

## 2023-11-17 NOTE — PROGRESS NOTES
Chief Complaint: Ursula Kulkarni is a 48 y.o. female who was seen in consultation at the request of RADHA Lauren  for newly diagnosed breast cancer and a postoperative visit    History of Present Illness:  Patient presents with newly diagnosed breast cancer LEFT breast. She noted no new masses, skin changes, nipple discharge, nipple changes prior to her most recent imaging.    Her most recent imaging includes the followin2022, MMG Screening Digital Gerardo Bilateral with CAD ([facility])   There is an 8 mm asymmetry in the central left breast posterior 1/3 on the CC view. This appears to be in the superior left breast on the MLO view.   BI-RADS 0: Incomplete     2023, MMG Diagnostic Gerardo Left with CAD ([facility])   The spiculated areas within the central posterior and the lateral anterior superior    left breast were re-demonstrated.  Targeted ultrasound was performed.  At the 1 o'clock position 10    cm from the nipple there is a shadowing mass with peripheral spiculations favored to represent the    mammographic abnormality and suspicious for malignancy.  The more centrally located spiculation on    the CC view disc posterior to the glandular tissue in the retro glandular fat cannot be clearly    identified on ultrasound.   Two separate suspicious spiculated lesions of the left breast.  One lesion is felt to be seen well    on ultrasound and should be amenable to ultrasound-guided core biopsy.  The other lesion is seen    well on mammography. BI-RADS 4: Suspicious          Pathology:  2023, ADDENDUM:                   All 3 sites were biopsied are malignant. This is concordant.                                   Site A (top-hat shaped clip)                                   Site B (coil shaped clip)                                   Site C (stoplight shaped clip)   Slightly lateral left breast, stereotactic, 9 gauge vacuum assisted Brevera, Twelve specimens were obtained and a top-hat shaped clip  was then placed. Site A: ultrasound biopsy table. 1.0 cm, 1:00 position 10 cm from the nipple. 12 gauge Bard.   Four samples were obtained and a coil shaped HydroMARK clip was placed, Site B. The post-procedure mammogram shows the top hat shaped clip from the stereotactic biopsy in good position in the biopsy cavity.  The coil shaped clip is in good position on the true lateral view, however on the CC view it does not correlate to the mammographic finding, which on today's exam is more at the 12 o'clock position in the posterior 1/3 of the left breast.  Therefore was decided to perform a 3rd biopsy.   9-gauge vacuum assisted Brevera, Twelve specimens, stoplight shaped clip, Site C, all 3 clips are appropriately positioned.     LEFT BREAST:     At 1:00 in the anterior left breast, 4 cm posterior to the nipple, there is a 1.5 AP dimension, 1.6 cm transverse dimension, 1.3 cm craniocaudal dimension irregular enhancing mass with a focus of susceptibility from a biopsy clip along the inferior margin. The biopsy clip is located within hematoma along the lateral margin of the mass.  At 1:00 in the middle to posterior left breast, 7.4 cm posterior to the nipple, there is a 1.3 cm irregular enhancing mass clip along the inferior margin. At 12:00 in the posterior left breast, 8.5 cm posterior to the nipple, 2.2 cm craniocaudal dimension irregular enhancing mass with a biopsy clip.  Multiple, at least 12, similar appearing irregular enhancing masses and foci are identified throughout left breast.  At 11-12 o'clock in the middle and posterior left breast, there are 3 adjacent reference masses together measuring up to 5.5 cm in AP dimension.  At 1:00 in the far posterior left breast/axillary tail, 13.7 cm posterior to the nipple, there is a reference 1.3 cm AP dimension, 1.1 cm transverse dimension, 1.0 cm craniocaudal dimension, irregular enhancing mass, which is located approximately 0.8 cm from the anterolateral margin of the  pectoralis muscle. Together, the enhancing masses span approximately 11.7 cm in maximum AP dimension, 8 cm in maximum transverse dimension, and 7.8 cm in maximum craniocaudal dimension. The visualized axilla is within normal limits   EXTRAMAMMARY FINDINGS:    There are no pathologically enlarged internal mammary chain lymph nodes on either side.   IMPRESSION AND RECOMMENDATION:   At least 15 similar-appearing irregular enhancing masses and foci scattered throughout the left breast, together measuring up to 11.7 cm consistent with multicentric malignancy   No MRI evidence of malignancy in the right breast        She had a biopsy on the following day that showed:   1. Left breast, anterior lateral, stereotactic-guided core biopsy:   - Invasive lobular carcinoma   - Histologic grade (Marie Histologic Score):   - Glandular (Acinar)/Tubular Differentiation: Score 3   - Nuclear Pleomorphism: Score 2   - Mitotic Rate: Score 1   - Overall Grade: Grade 2   - Size of largest focus of invasion: 4 mm   - Breast biomarker testing:   - Estrogen Receptor (ER): Positive (95%, moderate)   - Progesterone Receptor (PgR): Positive (95%, strong)   - HER2 (by immunohistochemistry): Negative (Score 0)   - Ki-67: 5%   - Other findings:   - Atypical lobular hyperplasia (ALH)     2. Left breast, 1 o'clock position, and 10 cm from nipple, ultrasound-guided core biopsy:  - Invasive lobular carcinoma   - Histologic grade (Marie Histologic Score):   - Glandular (Acinar)/Tubular Differentiation: Score 3   - Nuclear Pleomorphism: Score 2   - Mitotic Rate: Score 1   - Overall Grade: Grade 2   - Size of largest focus of invasion: 4 mm  - Other findings:   - Lobular carcinoma in situ (LCIS), classic type     3. Left breast, upper, stereotactic-guided core biopsy:   - Invasive lobular carcinoma  - Histologic grade ([place] Histologic Score):   - Glandular (Acinar)/Tubular Differentiation: Score 3   - Nuclear Pleomorphism: Score 2   -  Mitotic Rate: Score 1   - Overall Grade: Grade 2   - Size of largest focus of invasion: 3 mm   - Other findings:   - Lobular carcinoma in situ (LCIS), classic type     OUTSIDE PATHOLOGY:   1. Left Breast, Anterior Lateral, Stereotactic-Guided Core Needle Biopsy:                  A. INVASIVE LOBULAR CARCINOMA, Moderately differentiated; [place] Histologic Grade II/III                      (tubule score = 3, nuclear score = 2, mitoses score=1), measuring at least 4 mm.                B. Atypical lobular hyperplasia.   ER:       POSITIVE (%, Moderate).                TX:       POSITIVE (%, Strong).                HER2:  NEGATIVE (0).                Ki67:     Approximately 5%.             2. Left Breast, 1:00, 10 cm FN, U/S-Guided Core Needle Biopsy:                 A. INVASIVE LOBULAR CARCINOMA, Moderately differentiated; Marie Histologic Grade II/III                     (tubule score = 3, nuclear score = 2, mitoses score = 1), measuring at least 4 mm.                B. FOCAL LOBULAR CARCINOMA IN SITU (LCIS).                C. See comment.     3. Left Breast, Upper, Stereotactic-Guided Core Needle Biopsy:                  A. INVASIVE LOBULAR CARCINOMA, Moderately differentiated; Marie Histologic Grade II/III                     (tubule score = 3, nuclear score = 2, mitoses score = 1), measuring at least 3 mm.                B. LOBULAR CARCINOMA IN SITU (LCIS).                 C. See comment.     She had not had a breast biopsy in the past.  She has her uterus and ovaries, is ? perimenopausal, and has an IUD  Her family history includes the following: She has 2 daughters, 1 sister, 2 maternal aunts, 1 paternal aunt.  No family history of breast or ovarian cancer.  Her dad had prostate and melanoma cancers.  A paternal aunt had thyroid cancer.      3-31-23 note from Dr Frederick-   Plans to treat her with TCHP vs AC followed by THP.  Requesting port- we will plan for RIGHT port  placement.        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  Dimensions of the tumors 6 cm, 2 cm, 2 mm, 1 mm.  Margins are all clear.  0 of 4 sentinel lymph nodes.  Pathologic stage T3N0 stage IIb.  Estrogen , progesterone , HER2/cheryl 2+ .    Has had 2 of 4 drains removed.  Reports expected discomfort.  Denies drainage from incisions        Interval HIstory:      In the interim,  Ursula Kulkarni  has done well.  We placed a port on April 13, 2023 and Dr. Frederick use this to deliver TCHP for 6 cycles.  This was started May 4, 2023.  She then took radiation with Dr. Harry from September 19 through October 23, 2023, 25 fractions to her chest wall and regional nodes.  She has seen physical therapy for bioimpedance check.  She has seen genetics for a variant of uncertain significance in LUCAS.  She continues the Herceptin and Perjeta and tells me that her port is working well.  She is scheduled for a ZEV/BSO January 3, 2024.  Dr. Stone plans for her second stage reconstruction in May 2024.  She has gained 9 pounds in the interim due to a job that has less physical activity.      She has some hyperpigmentation from the radiation but otherwise denies any skin discolorations or nodules either side.  She denies headache, bone pain, belly pain, cough, changes in vision or gait.      Review of Systems:  Review of Systems   Constitutional:  Positive for unexpected weight change (3 LB WT GAIN SINCE LAST VISIT).   All other systems reviewed and are negative.       Past Medical and Surgical History:  Breast Biopsy History:  Patient had not had a breast biopsy prior to her cancer  diagnosis.  Breast Cancer HIstory:  Patient does not have a past medical history of breast cancer.  Breast Operations, and year:  None   Obstetric/Gynecologic History:  Age menstrual periods began: 14  Patient is premenopausal, first day of last period: 1/10/23 IUD  Number of pregnancies: 2  Number of live births: 2  Number of abortions or miscarriages: 0  Age of delivery of first child: 30  Patient breast fed, for the following lenth of time:24 months   Length of time taking birth control pills: none   Patient has never taken hormone replacement    Patient has both ovaries and uterus  Past Surgical History:   Procedure Laterality Date    BREAST RECONSTRUCTION Bilateral 3/8/2023    Procedure: BILATERAL PLACEMENT  TISSUE EXPANDER AND ALLODERM;  Surgeon: Eamon Stone MD;  Location: [facility] OR;  Service: Plastics;  Laterality: Bilateral;    D & C CERVICAL BIOPSY  01/2006    INTRAUTERINE DEVICE INSERTION  2010    Mirena    INTRAUTERINE DEVICE INSERTION  2015    Mirena exchange, old IUD removal and new one inserted by Dr Carr, Lot # [id number] 09/17.mar    MASTECTOMY W/ SENTINEL NODE BIOPSY Bilateral 3/8/2023    Procedure: Bilateral total mastectomy, left axillary sentinel lymph node biopsy;  Surgeon: Kell Hi MD;  Location: [facility] OR;  Service: General;  Laterality: Bilateral;    SCAR REVISION BREAST Right 4/3/2023    Procedure: RIGHT MASTECTOMY REVISION;  Surgeon: Eamon Stone MD;  Location: [facility] OR;  Service: Plastics;  Laterality: Right;    VENOUS ACCESS DEVICE (PORT) INSERTION Right 4/13/2023    Procedure: right port placement;  Surgeon: Kell Hi MD;  Location: [facility] OR;  Service: General;  Laterality: Right;    WISDOM TOOTH EXTRACTION       Past Medical History:   Diagnosis Date    ADD (attention deficit disorder)     Bilateral impacted cerumen 07/13/2020    Breast cancer 03/08/2023    LUCIO MASTECTOMY    Foot fracture, left     History of COVID-19  "2022    History of gestational diabetes 2010    History of Graves' disease 2012    History of radioactive iodine thyroid ablation 2012    History of vertigo     Hyperlipidemia     Hypothyroidism     Melanoma of back     Positive depression screening 07/13/2020       Prior Hospitalizations, other than for surgery or childbirth, and year:  None     Social History     Socioeconomic History    Marital status:      Spouse name: Parish    Number of children: 2   Tobacco Use    Smoking status: Never    Smokeless tobacco: Never   Vaping Use    Vaping Use: Never used   Substance and Sexual Activity    Alcohol use: Not Currently     Alcohol/week: 1.0 standard drink of alcohol     Types: 1 Glasses of wine per week     Comment: Daily caffeine use    Drug use: Never    Sexual activity: Yes     Partners: Male     Comment:       Patient is .  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    Family History:  Family History   Problem Relation Age of Onset    Diabetes Father     Hyperlipidemia Father     Arthritis Father     Hypertension Father     Heart disease Father         S/P stents    Heart attack Father 41    Colon polyps Father     Melanoma Father     Prostate cancer Father     Hyperlipidemia Sister     Hypertension Sister     Heart attack Sister     Diabetes Sister     Thyroid cancer Paternal Aunt     Lung cancer Maternal Grandmother     Cancer Maternal Grandfather         Bladder cancer    Lung cancer Maternal Grandfather     Heart failure Paternal Grandmother     Transient ischemic attack Paternal Grandmother     Heart disease Paternal Grandfather     Malig Hyperthermia Neg Hx        Vital Signs:  /76 (BP Location: Right arm, Patient Position: Sitting, Cuff Size: Adult)   Pulse 76   Ht 160 cm (62.99\")   Wt 80.2 kg (176 lb 12.8 oz)   LMP  (LMP Unknown)   SpO2 98%   Breastfeeding No " "  BMI 31.33 kg/m²      Medications:    Current Outpatient Medications:     BIOTIN PO, Take  by mouth., Disp: , Rfl:     Coenzyme Q10 (COQ10 PO), Take  by mouth., Disp: , Rfl:     COLLAGEN PO, Take 1 tablet by mouth Daily., Disp: , Rfl:     levothyroxine (SYNTHROID, LEVOTHROID) 125 MCG tablet, Take 1 tablet by mouth Daily., Disp: 90 tablet, Rfl: 0    lidocaine-prilocaine (EMLA) 2.5-2.5 % cream, Apply 1 application topically to the appropriate area as directed As Needed for Mild Pain (apply pea-sized amount to port site 30 minutes prior to port being accessed)., Disp: 30 g, Rfl: 3    lisinopril (PRINIVIL,ZESTRIL) 2.5 MG tablet, Take 1 tablet by mouth once daily (Patient taking differently: Take if SBP above 120), Disp: 30 tablet, Rfl: 5    loratadine (CLARITIN) 10 MG tablet, Take 1 tablet by mouth Daily., Disp: , Rfl:     magnesium oxide (MAG-OX) 400 MG tablet, Take 1 tablet by mouth Every Other Day., Disp: , Rfl:     pantoprazole (PROTONIX) 40 MG EC tablet, Take 1 tablet by mouth Daily., Disp: 30 tablet, Rfl: 3    Probiotic Product (PROBIOTIC ADVANCED PO), Take 1 tablet by mouth Daily., Disp: , Rfl:     S-Adenosylmethionine (CANDELARIA-E PO), Take  by mouth., Disp: , Rfl:      Allergies:  Allergies   Allergen Reactions    Erythromycin GI Intolerance     Pt reports    Penicillins Rash       Physical Examination:  /76 (BP Location: Right arm, Patient Position: Sitting, Cuff Size: Adult)   Pulse 76   Ht 160 cm (62.99\")   Wt 80.2 kg (176 lb 12.8 oz)   LMP  (LMP Unknown)   SpO2 98%   Breastfeeding No   BMI 31.33 kg/m²   General Appearance:  Patient is in no distress.  She is well kept and has an obese build.   Psychiatric:  Patient with appropriate mood and affect. Alert and oriented to self, time, and place.    Breast, RIGHT: Surgically absent with well-healed transverse incision.  Hyperpigmentation from radiation. No other skin changes, no nodules. RIGHT expander with less volume than LEFT, below.    Breast, " LEFT: Surgically absent with well-healed transverse incision.  Hyperpigmentation from radiation. No other skin changes, no nodules.    Lymphatic:  There is no axillary, cervical, infraclavicular, or supraclavicular adenopathy bilaterally.  Eyes:  Pupils are round and reactive to light.  Cardiovascular:  Heart rate and rhythm are regular.  Respiratory:  Lungs are clear bilaterally with no crackles or wheezes in any lung field.  Gastrointestinal:  Abdomen is soft, nondistended, and nontender. There are no scars from previous surgery.    Musculoskeletal:  Good strength in all 4 extremities.   There is good range of motion in both shoulders.    Skin:  No new skin lesions or rashes on the skin excluding the breast (see breast exam above).        Imagin2022, MMG Screening Digital Gerardo Bilateral with CAD ([facility])   There is an 8 mm asymmetry in the central left breast posterior 1/3 on the CC view. This appears to be in the superior left breast on the MLO view.   BI-RADS 0: Incomplete     2023, MMG Diagnostic Gerardo Left with CAD ([facility])   The spiculated areas within the central posterior and the lateral anterior superior    left breast were re-demonstrated.  Targeted ultrasound was performed.  At the 1 o'clock position 10    cm from the nipple there is a shadowing mass with peripheral spiculations favored to represent the    mammographic abnormality and suspicious for malignancy.  The more centrally located spiculation on    the CC view disc posterior to the glandular tissue in the retro glandular fat cannot be clearly    identified on ultrasound.   Two separate suspicious spiculated lesions of the left breast.  One lesion is felt to be seen well    on ultrasound and should be amenable to ultrasound-guided core biopsy.  The other lesion is seen    well on mammography. BI-RADS 4: Suspicious          Pathology:  2023, ADDENDUM:                   All 3 sites were biopsied are malignant. This is concordant.                                    Site A (top-hat shaped clip)                                   Site B (coil shaped clip)                                   Site C (stoplight shaped clip)   Slightly lateral left breast, stereotactic, 9 gauge vacuum assisted Brevera, Twelve specimens were obtained and a top-hat shaped clip was then placed. Site A: ultrasound biopsy table. 1.0 cm, 1:00 position 10 cm from the nipple. 12 gauge Bard.   Four samples were obtained and a coil shaped HydroMARK clip was placed, Site B. The post-procedure mammogram shows the top hat shaped clip from the stereotactic biopsy in good position in the biopsy cavity.  The coil shaped clip is in good position on the true lateral view, however on the CC view it does not correlate to the mammographic finding, which on today's exam is more at the 12 o'clock position in the posterior 1/3 of the left breast.  Therefore was decided to perform a 3rd biopsy.   9-gauge vacuum assisted Brevera, Twelve specimens, stoplight shaped clip, Site C, all 3 clips are appropriately positioned.     LEFT BREAST:     At 1:00 in the anterior left breast, 4 cm posterior to the nipple, there is a 1.5 AP dimension, 1.6 cm transverse dimension, 1.3 cm craniocaudal dimension irregular enhancing mass with a focus of susceptibility from a biopsy clip along the inferior margin. The biopsy clip is located within hematoma along the lateral margin of the mass.  At 1:00 in the middle to posterior left breast, 7.4 cm posterior to the nipple, there is a 1.3 cm irregular enhancing mass clip along the inferior margin. At 12:00 in the posterior left breast, 8.5 cm posterior to the nipple, 2.2 cm craniocaudal dimension irregular enhancing mass with a biopsy clip.  Multiple, at least 12, similar appearing irregular enhancing masses and foci are identified throughout left breast.  At 11-12 o'clock in the middle and posterior left breast, there are 3 adjacent reference masses together measuring up  to 5.5 cm in AP dimension.  At 1:00 in the far posterior left breast/axillary tail, 13.7 cm posterior to the nipple, there is a reference 1.3 cm AP dimension, 1.1 cm transverse dimension, 1.0 cm craniocaudal dimension, irregular enhancing mass, which is located approximately 0.8 cm from the anterolateral margin of the pectoralis muscle. Together, the enhancing masses span approximately 11.7 cm in maximum AP dimension, 8 cm in maximum transverse dimension, and 7.8 cm in maximum craniocaudal dimension. The visualized axilla is within normal limits   EXTRAMAMMARY FINDINGS:    There are no pathologically enlarged internal mammary chain lymph nodes on either side.   IMPRESSION AND RECOMMENDATION:   At least 15 similar-appearing irregular enhancing masses and foci scattered throughout the left breast, together measuring up to 11.7 cm consistent with multicentric malignancy   No MRI evidence of malignancy in the right breast      Pathology:  1. Left breast, anterior lateral, stereotactic-guided core biopsy:   - Invasive lobular carcinoma   - Histologic grade (Marie Histologic Score):   - Glandular (Acinar)/Tubular Differentiation: Score 3   - Nuclear Pleomorphism: Score 2   - Mitotic Rate: Score 1   - Overall Grade: Grade 2   - Size of largest focus of invasion: 4 mm   - Breast biomarker testing:   - Estrogen Receptor (ER): Positive (95%, moderate)   - Progesterone Receptor (PgR): Positive (95%, strong)   - HER2 (by immunohistochemistry): Negative (Score 0)   - Ki-67: 5%   - Other findings:   - Atypical lobular hyperplasia (ALH)     2. Left breast, 1 o'clock position, and 10 cm from nipple, ultrasound-guided core biopsy:  - Invasive lobular carcinoma   - Histologic grade ([place] Histologic Score):   - Glandular (Acinar)/Tubular Differentiation: Score 3   - Nuclear Pleomorphism: Score 2   - Mitotic Rate: Score 1   - Overall Grade: Grade 2   - Size of largest focus of invasion: 4 mm  - Other findings:   - Lobular  carcinoma in situ (LCIS), classic type     3. Left breast, upper, stereotactic-guided core biopsy:   - Invasive lobular carcinoma  - Histologic grade ([place] Histologic Score):   - Glandular (Acinar)/Tubular Differentiation: Score 3   - Nuclear Pleomorphism: Score 2   - Mitotic Rate: Score 1   - Overall Grade: Grade 2   - Size of largest focus of invasion: 3 mm   - Other findings:   - Lobular carcinoma in situ (LCIS), classic type     OUTSIDE PATHOLOGY:   1. Left Breast, Anterior Lateral, Stereotactic-Guided Core Needle Biopsy:                  A. INVASIVE LOBULAR CARCINOMA, Moderately differentiated; Marie Histologic Grade II/III                      (tubule score = 3, nuclear score = 2, mitoses score=1), measuring at least 4 mm.                B. Atypical lobular hyperplasia.   ER:       POSITIVE (%, Moderate).                OR:       POSITIVE (%, Strong).                HER2:  NEGATIVE (0).                Ki67:     Approximately 5%.             2. Left Breast, 1:00, 10 cm FN, U/S-Guided Core Needle Biopsy:                 A. INVASIVE LOBULAR CARCINOMA, Moderately differentiated; Marie Histologic Grade II/III                     (tubule score = 3, nuclear score = 2, mitoses score = 1), measuring at least 4 mm.                B. FOCAL LOBULAR CARCINOMA IN SITU (LCIS).                C. See comment.     3. Left Breast, Upper, Stereotactic-Guided Core Needle Biopsy:                  A. INVASIVE LOBULAR CARCINOMA, Moderately differentiated; Marie Histologic Grade II/III                     (tubule score = 3, nuclear score = 2, mitoses score = 1), measuring at least 3 mm.                B. LOBULAR CARCINOMA IN SITU (LCIS).                 C. See comment.     3-8-23 pathology from bilateral total mastectomy and LEFT sentinel node biopsy with reconstruction Dr Stone returned as :        Left total mastectomy, multifocal invasive lobular carcinoma, intermediate grade, 3, 2, 2, 3 clips  and biopsy changes identified.  Dimensions of the tumors 6 cm, 2 cm, 2 mm, 1 mm.  Margins are all clear.  0 of 4 sentinel lymph nodes.  Pathologic stage T3N0 stage IIb.  Estrogen , progesterone , HER2/cheryl 2+ with FISH positive.            Final Diagnosis    1. Lymph Node, Left [place] #1, Excision (H&E, AE1/AE3):               A. Two benign lymph nodes (0/2).     2. Lymph Node, Left [place] #2, Excision (H&E, AE1/AE3):               A. One benign lymph node (0/1).     3. Lymph Node, Left [place] #3, Excision (H&E, AE1/AE3):               A. One benign lymph node with pigment (0/1).     4. Breast, Left, Mastectomy (1,377 Grams):               A. Multifocal invasive lobular carcinoma, Marie histologic grade II (tubules = 3, nuclei = 2, mitoses = 2).               B. Three clips and biopsy change identified.               C. See synoptic template for complete tumor details.     5. Breast, Right, Mastectomy (1,473 Grams):               A. Benign skin, subcutaneous tissue and breast parenchyma with fibroadenomatoid change.      MEC/clm                Electronically signed by Radha Serrano MD on 3/9/2023 at 1122    Synoptic Checklist    INVASIVE CARCINOMA OF THE BREAST: Resection  8th Edition - Protocol posted: 12/14/2022  INVASIVE CARCINOMA OF THE BREAST: COMPLETE EXCISION - 1, 2, 3, 4       SPECIMEN   Procedure   Total mastectomy    Specimen Laterality   Left    TUMOR   Tumor Site   Upper outer quadrant        Upper inner quadrant    Histologic Type   Invasive lobular carcinoma    Histologic Grade (Heber Histologic Score)       Glandular (Acinar) / Tubular Differentiation   Score 3    Nuclear Pleomorphism   Score 2    Mitotic Rate   Score 2    Overall Grade   Grade 2 (scores of 6 or 7)    Tumor Size   Greatest dimension of largest invasive focus (Millimeters): 60 mm   Tumor Focality   Multiple foci of invasive carcinoma    Number of Foci   At least: 4    Sizes of Individual Foci in  Millimeters (mm)   20 mm; 2 mm; 1mm    Ductal Carcinoma In Situ (DCIS)   Not identified    Lobular Carcinoma In Situ (LCIS)   Present    Lymphatic and / or Vascular Invasion   Not identified    Dermal Lymphovascular Invasion   Not identified    Treatment Effect in the Breast   No known presurgical therapy    MARGINS   Margin Status for Invasive Carcinoma   All margins negative for invasive carcinoma    Distance from Invasive Carcinoma to Closest Margin   10 mm   Closest Margin(s) to Invasive Carcinoma   Anterior    REGIONAL LYMPH NODES   Regional Lymph Node Status   All regional lymph nodes negative for tumor    Total Number of Lymph Nodes Examined (sentinel and non-sentinel)   4    Number of Otis Nodes Examined   4    pTNM CLASSIFICATION (AJCC 8th Edition)   Reporting of pT, pN, and (when applicable) pM categories is based on information available to the pathologist at the time the report is issued. As per the AJCC (Chapter 1, 8th Ed.) it is the managing physician’s responsibility to establish the final pathologic stage based upon all pertinent information, including but potentially not limited to this pathology report.   pT Category   pT3    T Suffix   (m)    pN Category   pN0    N Suffix   (sn)    SPECIAL STUDIES           Estrogen Receptor (ER) Status   Positive (greater than 10% of cells demonstrate nuclear positivity)    Percentage of Cells with Nuclear Positivity   %            Progesterone Receptor (PgR) Status   Positive    Percentage of Cells with Nuclear Positivity   %            HER2 (by immunohistochemistry)   Equivocal (Score 2+)            HER2 (by in situ hybridization)   Positive (amplified)            Ki-67 Percentage of Positive Nuclei   5 %   Testing Performed on Case Number   [id number]; see comment    .      Comment      The patient's original left breast core biopsy material was performed at [facility] ([id number]) and was reviewed in-house as [id number].  The slide is  pulled and reviewed for comparison. No discordance is noted.     All three of the core biopsies are stained with biomarkers.  The left anterior lateral and left 1:00 o'clock core fragments were ER and UT positive, Her2 IHC negative. The left upper core biopsy was ER and UT positive with Her2 2+ equivocal by IHC and Her2 FISH positive.      Mec/kds                    Correspondence frmo Dr Frederick-  She has checked the her 2 cheryl on 2 other lesions and the third was triple positive. The second was triple negative.     She agreed with surgery upfront.    Procedures:      Assessment:   Diagnosis Plan   1. Lobular breast cancer, left  Ambulatory Referral to Nutrition Services      2. Class 1 obesity due to excess calories without serious comorbidity with body mass index (BMI) of 32.0 to 32.9 in adult          1-  LEFT breast UOQ middle third 11.5 cm on MRI with 15 nodules total. Largest single nodule 2.2cm. NOrmal nodes on MRI and exam. On exam 6x8cm. No pverlying skin changes. On mammogram 3x markers  6cm greatest.  Invasive lobular carcinoma. Int grade, 3,2,1, associated LCIS.  ER , UT , her 2 cheryl 0, Ki 67 5 %.  Clinical stage T2-3N0- stage II  3-8-23 pathology from bilateral total mastectomy and LEFT sentinel node biopsy with reconstruction Dr Stone returned as :        Left total mastectomy, multifocal invasive lobular carcinoma, intermediate grade, 3, 2, 2, 3 clips and biopsy changes identified.  Dimensions of the tumors 6 cm, 2 cm, 2 mm, 1 mm.  Margins are all clear.  0 of 4 sentinel lymph nodes.  Pathologic stage T3N0 stage IIb.  Estrogen , progesterone , HER2/cheryl 2+ .      - area of epidermolysis superior RIGHT mastectomy flap at postop  - oncotype sent and then cancelled due to FISH returning as positive on her surgical specimen      -placed a port on April 13, 2023 and Dr. Frederick use this to deliver TCHP for 6 cycles.  This was started May 4, 2023.  -took radiation with  Dr. Harry from September 19 through October 23, 2023, 25 fractions to her chest wall and regional nodes.  - has seen physical therapy for bioimpedance check.  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  -continues the Herceptin and Perjeta and tells me that her port is working well.  - scheduled for a ZEV/BSO January 3, 2024.  -Dr. Stone plans for her second stage reconstruction in May 2024.      2-  BMI 32.3          Plan:  The patient goes by Ursula. She is a danyelle woman with 2 daughters ages 17,12 and happily  in her second marriage with a supportive .      We reviewed her interval history, consultations, treatment and exam together today.  There is no evidence of disease.    I offered for her to talk with nutrition, and she was interested in this. We will arrange.  She is scheduled to have her TAHBSO 1-3-24.      Dr Frederick continues the herceptin and perjeta via the port, which she tells me is functioning well.        She is scheduled with Dr Stone for second stage reconstruction in May, and she tells me  that he will remove her port at that time.    We will see her back with our NP in one year for exam and evaluation.  I did ask her to moisturize the LEFT chest wall especially, due to the irradiation.          Kell Hi MD    Next Appointment:  Return in about 1 year (around 11/17/2024) for no imaging, with David Martinez.    Today I spent 20 minutes doing the following: Reviewing records, labs, outside imaging and reports in preparation for the patient visit; obtaining medical history; performing the physical exam; counseling and educating the patient and any available family or caregivers; ordering medications, tests or procedures; coordinating care with any other physicians on her care team as needed, and documenting all of the above in the medical record as well as sending communications with her other healthcare professionals.    EMR  Dragon/transcription disclaimer:    Please note that portions of this note were completed with a voice recognition program.                Answers for HPI/ROS submitted by the patient on 3/13/2023  Please describe your symptoms.: s/p bilateral mastectomy 3/8/23  Have you had these symptoms before?: No  How long have you been having these symptoms?: 1-2 weeks  What is the primary reason for your visit?: Other

## 2023-11-21 ENCOUNTER — PATIENT OUTREACH (OUTPATIENT)
Dept: OTHER | Facility: HOSPITAL | Age: 48
End: 2023-11-21
Payer: COMMERCIAL

## 2023-11-21 NOTE — PROGRESS NOTES
Chart reviewed. Patient saw survivorship APRN Nov 14th. No further follow up needed from navigational services.

## 2023-11-30 ENCOUNTER — INFUSION (OUTPATIENT)
Dept: ONCOLOGY | Facility: HOSPITAL | Age: 48
End: 2023-11-30
Payer: COMMERCIAL

## 2023-11-30 ENCOUNTER — NUTRITION (OUTPATIENT)
Dept: OTHER | Facility: HOSPITAL | Age: 48
End: 2023-11-30
Payer: COMMERCIAL

## 2023-11-30 ENCOUNTER — OFFICE VISIT (OUTPATIENT)
Dept: ONCOLOGY | Facility: CLINIC | Age: 48
End: 2023-11-30
Payer: COMMERCIAL

## 2023-11-30 VITALS
DIASTOLIC BLOOD PRESSURE: 94 MMHG | HEIGHT: 63 IN | TEMPERATURE: 97.3 F | BODY MASS INDEX: 31.11 KG/M2 | OXYGEN SATURATION: 100 % | HEART RATE: 83 BPM | RESPIRATION RATE: 18 BRPM | WEIGHT: 175.6 LBS | SYSTOLIC BLOOD PRESSURE: 136 MMHG

## 2023-11-30 DIAGNOSIS — Z17.0 MALIGNANT NEOPLASM OF OVERLAPPING SITES OF LEFT BREAST IN FEMALE, ESTROGEN RECEPTOR POSITIVE: Primary | ICD-10-CM

## 2023-11-30 DIAGNOSIS — Z45.2 ENCOUNTER FOR ADJUSTMENT OR MANAGEMENT OF VASCULAR ACCESS DEVICE: ICD-10-CM

## 2023-11-30 DIAGNOSIS — U07.1 COVID-19 VIRUS INFECTION: ICD-10-CM

## 2023-11-30 DIAGNOSIS — C50.812 MALIGNANT NEOPLASM OF OVERLAPPING SITES OF LEFT BREAST IN FEMALE, ESTROGEN RECEPTOR POSITIVE: ICD-10-CM

## 2023-11-30 DIAGNOSIS — C50.812 MALIGNANT NEOPLASM OF OVERLAPPING SITES OF LEFT BREAST IN FEMALE, ESTROGEN RECEPTOR POSITIVE: Primary | ICD-10-CM

## 2023-11-30 DIAGNOSIS — Z17.0 MALIGNANT NEOPLASM OF OVERLAPPING SITES OF LEFT BREAST IN FEMALE, ESTROGEN RECEPTOR POSITIVE: ICD-10-CM

## 2023-11-30 LAB
25(OH)D3 SERPL-MCNC: 48.3 NG/ML (ref 30–100)
ALBUMIN SERPL-MCNC: 4.3 G/DL (ref 3.5–5.2)
ALBUMIN/GLOB SERPL: 1.6 G/DL
ALP SERPL-CCNC: 121 U/L (ref 39–117)
ALT SERPL W P-5'-P-CCNC: 43 U/L (ref 1–33)
ANION GAP SERPL CALCULATED.3IONS-SCNC: 12.4 MMOL/L (ref 5–15)
AST SERPL-CCNC: 38 U/L (ref 1–32)
BASOPHILS # BLD AUTO: 0.03 10*3/MM3 (ref 0–0.2)
BASOPHILS NFR BLD AUTO: 0.5 % (ref 0–1.5)
BILIRUB SERPL-MCNC: 0.6 MG/DL (ref 0–1.2)
BUN SERPL-MCNC: 17 MG/DL (ref 6–20)
BUN/CREAT SERPL: 17.3 (ref 7–25)
CALCIUM SPEC-SCNC: 9.8 MG/DL (ref 8.6–10.5)
CHLORIDE SERPL-SCNC: 105 MMOL/L (ref 98–107)
CO2 SERPL-SCNC: 24.6 MMOL/L (ref 22–29)
CREAT SERPL-MCNC: 0.98 MG/DL (ref 0.6–1.1)
DEPRECATED RDW RBC AUTO: 39.2 FL (ref 37–54)
EGFRCR SERPLBLD CKD-EPI 2021: 72.2 ML/MIN/1.73
EOSINOPHIL # BLD AUTO: 0.3 10*3/MM3 (ref 0–0.4)
EOSINOPHIL NFR BLD AUTO: 5.1 % (ref 0.3–6.2)
ERYTHROCYTE [DISTWIDTH] IN BLOOD BY AUTOMATED COUNT: 11.9 % (ref 12.3–15.4)
GLOBULIN UR ELPH-MCNC: 2.7 GM/DL
GLUCOSE SERPL-MCNC: 114 MG/DL (ref 65–99)
HCT VFR BLD AUTO: 37.6 % (ref 34–46.6)
HGB BLD-MCNC: 13.1 G/DL (ref 12–15.9)
IMM GRANULOCYTES # BLD AUTO: 0.01 10*3/MM3 (ref 0–0.05)
IMM GRANULOCYTES NFR BLD AUTO: 0.2 % (ref 0–0.5)
LYMPHOCYTES # BLD AUTO: 1.85 10*3/MM3 (ref 0.7–3.1)
LYMPHOCYTES NFR BLD AUTO: 31.6 % (ref 19.6–45.3)
MCH RBC QN AUTO: 31.5 PG (ref 26.6–33)
MCHC RBC AUTO-ENTMCNC: 34.8 G/DL (ref 31.5–35.7)
MCV RBC AUTO: 90.4 FL (ref 79–97)
MONOCYTES # BLD AUTO: 0.39 10*3/MM3 (ref 0.1–0.9)
MONOCYTES NFR BLD AUTO: 6.7 % (ref 5–12)
NEUTROPHILS NFR BLD AUTO: 3.27 10*3/MM3 (ref 1.7–7)
NEUTROPHILS NFR BLD AUTO: 55.9 % (ref 42.7–76)
NRBC BLD AUTO-RTO: 0 /100 WBC (ref 0–0.2)
PLATELET # BLD AUTO: 234 10*3/MM3 (ref 140–450)
PMV BLD AUTO: 9.7 FL (ref 6–12)
POTASSIUM SERPL-SCNC: 4 MMOL/L (ref 3.5–5.2)
PROT SERPL-MCNC: 7 G/DL (ref 6–8.5)
RBC # BLD AUTO: 4.16 10*6/MM3 (ref 3.77–5.28)
SODIUM SERPL-SCNC: 142 MMOL/L (ref 136–145)
WBC NRBC COR # BLD AUTO: 5.85 10*3/MM3 (ref 3.4–10.8)

## 2023-11-30 PROCEDURE — 25010000002 PERTUZUMAB 420 MG/14ML SOLUTION 420 MG VIAL: Performed by: INTERNAL MEDICINE

## 2023-11-30 PROCEDURE — 82306 VITAMIN D 25 HYDROXY: CPT | Performed by: INTERNAL MEDICINE

## 2023-11-30 PROCEDURE — 80053 COMPREHEN METABOLIC PANEL: CPT

## 2023-11-30 PROCEDURE — 96375 TX/PRO/DX INJ NEW DRUG ADDON: CPT

## 2023-11-30 PROCEDURE — 25010000002 DIPHENHYDRAMINE PER 50 MG: Performed by: INTERNAL MEDICINE

## 2023-11-30 PROCEDURE — 25010000002 HEPARIN LOCK FLUSH PER 10 UNITS: Performed by: INTERNAL MEDICINE

## 2023-11-30 PROCEDURE — 96417 CHEMO IV INFUS EACH ADDL SEQ: CPT

## 2023-11-30 PROCEDURE — 96413 CHEMO IV INFUSION 1 HR: CPT

## 2023-11-30 PROCEDURE — 36415 COLL VENOUS BLD VENIPUNCTURE: CPT

## 2023-11-30 PROCEDURE — 25810000003 SODIUM CHLORIDE 0.9 % SOLUTION: Performed by: INTERNAL MEDICINE

## 2023-11-30 PROCEDURE — 25010000002 TRASTUZUMAB PER 10 MG: Performed by: INTERNAL MEDICINE

## 2023-11-30 PROCEDURE — 25810000003 SODIUM CHLORIDE 0.9 % SOLUTION 250 ML FLEX CONT: Performed by: INTERNAL MEDICINE

## 2023-11-30 PROCEDURE — 85025 COMPLETE CBC W/AUTO DIFF WBC: CPT

## 2023-11-30 RX ORDER — SODIUM CHLORIDE 0.9 % (FLUSH) 0.9 %
10 SYRINGE (ML) INJECTION AS NEEDED
OUTPATIENT
Start: 2023-11-30

## 2023-11-30 RX ORDER — HEPARIN SODIUM (PORCINE) LOCK FLUSH IV SOLN 100 UNIT/ML 100 UNIT/ML
500 SOLUTION INTRAVENOUS AS NEEDED
Status: DISCONTINUED | OUTPATIENT
Start: 2023-11-30 | End: 2023-11-30 | Stop reason: HOSPADM

## 2023-11-30 RX ORDER — SODIUM CHLORIDE 9 MG/ML
250 INJECTION, SOLUTION INTRAVENOUS ONCE
Status: COMPLETED | OUTPATIENT
Start: 2023-11-30 | End: 2023-11-30

## 2023-11-30 RX ORDER — FAMOTIDINE 10 MG/ML
20 INJECTION, SOLUTION INTRAVENOUS ONCE
Status: CANCELLED | OUTPATIENT
Start: 2023-11-30

## 2023-11-30 RX ORDER — SODIUM CHLORIDE 9 MG/ML
250 INJECTION, SOLUTION INTRAVENOUS ONCE
Status: CANCELLED | OUTPATIENT
Start: 2023-11-30

## 2023-11-30 RX ORDER — SODIUM CHLORIDE 0.9 % (FLUSH) 0.9 %
10 SYRINGE (ML) INJECTION AS NEEDED
Status: DISCONTINUED | OUTPATIENT
Start: 2023-11-30 | End: 2023-11-30 | Stop reason: HOSPADM

## 2023-11-30 RX ORDER — GLUCOSAMINE SULFATE 500 MG
CAPSULE ORAL
COMMUNITY

## 2023-11-30 RX ORDER — FAMOTIDINE 10 MG/ML
20 INJECTION, SOLUTION INTRAVENOUS ONCE
Status: COMPLETED | OUTPATIENT
Start: 2023-11-30 | End: 2023-11-30

## 2023-11-30 RX ORDER — HEPARIN SODIUM (PORCINE) LOCK FLUSH IV SOLN 100 UNIT/ML 100 UNIT/ML
500 SOLUTION INTRAVENOUS AS NEEDED
OUTPATIENT
Start: 2023-11-30

## 2023-11-30 RX ADMIN — Medication 10 ML: at 10:56

## 2023-11-30 RX ADMIN — DIPHENHYDRAMINE HYDROCHLORIDE 25 MG: 50 INJECTION, SOLUTION INTRAMUSCULAR; INTRAVENOUS at 08:51

## 2023-11-30 RX ADMIN — TRASTUZUMAB 470 MG: 150 INJECTION, POWDER, LYOPHILIZED, FOR SOLUTION INTRAVENOUS at 10:22

## 2023-11-30 RX ADMIN — Medication 500 UNITS: at 10:56

## 2023-11-30 RX ADMIN — PERTUZUMAB 420 MG: 30 INJECTION, SOLUTION, CONCENTRATE INTRAVENOUS at 09:18

## 2023-11-30 RX ADMIN — FAMOTIDINE 20 MG: 10 INJECTION INTRAVENOUS at 08:49

## 2023-11-30 RX ADMIN — SODIUM CHLORIDE 250 ML: 9 INJECTION, SOLUTION INTRAVENOUS at 08:49

## 2023-11-30 NOTE — PROGRESS NOTES
OUTPATIENT ONCOLOGY NUTRITION ASSESSMENT    Patient Name: Ursula Kulkarni  YOB: 1975  MRN: [id number]  Assessment Date: 11/30/2023    COMMENTS:  [facility] nutrition education. Reviewed evidence-based nutrition recommendations for breast cancer survivors with an emphasis on a plant-based diet and weight management.   Reviewed the importance of maintaining or achieving a healthy body weight and including activity daily. Discussed ways to increase dietary fiber from whole grains, legumes, seeds, nuts, vegetables and fruit as part of a plant focused diet. Also reviewed current recommendation and evidence supporting the use of whole foods sources of soy foods including tofu, tempeh, edamame and soy beverages.  Encouraged at least 150 minutes per week of moderate intensity physical activity such as a brisk walk, cycling and swimming.   Provided information on meal planning, grocery shopping, and label reading. Gave examples of meals and snacks, a sample menu and a list of recommended recipe sites. Also discussed the importance of portion control and mindfulness.   Will be available for any questions.         Reason for Assessment Follow up, Education      Diagnosis/Problem   Invasive lobular carcinoma, s/p bilateral mastectomy       Encounter Information        Nutrition/Diet History:  Completed chemotherapy and radiation,  decreased appetite, now improved   Oral Nutrition Supplements:    Factors/Symptoms Affecting Intake: No factors at this time   Comments:      Medical/Surgical History Past Medical History:   Diagnosis Date    ADD (attention deficit disorder)     Bilateral impacted cerumen 07/13/2020    Breast cancer 03/08/2023    LUCIO MASTECTOMY    Foot fracture, left     History of COVID-19 2022    History of gestational diabetes 2010    History of Graves' disease 2012    History of radioactive iodine thyroid ablation 2012    History of vertigo      "Hyperlipidemia     Hypothyroidism     Melanoma of back     Positive depression screening 07/13/2020       Past Surgical History:   Procedure Laterality Date    BREAST RECONSTRUCTION Bilateral 3/8/2023    Procedure: BILATERAL PLACEMENT  TISSUE EXPANDER AND ALLODERM;  Surgeon: Eamon Stone MD;  Location: [facility] MA[facility] OR;  Service: Plastics;  Laterality: Bilateral;    D & C CERVICAL BIOPSY  01/2006    INTRAUTERINE DEVICE INSERTION  2010    Mirena    INTRAUTERINE DEVICE INSERTION  2015    Mirena exchange, old IUD removal and new one inserted by Dr Carr, Lot # [id number] exp 09/17.mar    MASTECTOMY W/ SENTINEL NODE BIOPSY Bilateral 3/8/2023    Procedure: Bilateral total mastectomy, left axillary sentinel lymph node biopsy;  Surgeon: Kell Hi MD;  Location: [facility];  Service: General;  Laterality: Bilateral;    SCAR REVISION BREAST Right 4/3/2023    Procedure: RIGHT MASTECTOMY REVISION;  Surgeon: Eamon Stone MD;  Location: [facility];  Service: Plastics;  Laterality: Right;    VENOUS ACCESS DEVICE (PORT) INSERTION Right 4/13/2023    Procedure: right port placement;  Surgeon: Kell Hi MD;  Location: [facility];  Service: General;  Laterality: Right;    WISDOM TOOTH EXTRACTION              Anthropometrics        Current Height Ht Readings from Last 1 Encounters:   11/30/23 160 cm (62.99\")      Current Weight Wt Readings from Last 1 Encounters:   11/30/23 79.7 kg (175 lb 9.6 oz)      BMI  31.1   Ideal Body Weight (IBW) 115 lb   Usual Body Weight (UBW) 167 lb   Weight Change/Trend Gain   Weight History Wt Readings from Last 30 Encounters:   11/30/23 0755 79.7 kg (175 lb 9.6 oz)   11/17/23 0902 80.2 kg (176 lb 12.8 oz)   11/14/23 0903 78.9 kg (174 lb)   11/02/23 1101 79.6 kg (175 lb 6.4 oz)   10/19/23 0846 79.4 kg (175 lb)   10/19/23 0948 79.4 kg (175 lb)   10/17/23 0754 79.7 kg (175 lb 9.6 oz)   10/12/23 0836 78.4 kg (172 lb 14.4 oz)   10/03/23 0759 78.6 kg (173 lb 3.2 " "oz)   09/26/23 0811 78.7 kg (173 lb 6.4 oz)   09/21/23 1043 77.8 kg (171 lb 9.6 oz)   09/08/23 0929 78.3 kg (172 lb 9.6 oz)   08/31/23 0836 78.2 kg (172 lb 4.8 oz)   08/10/23 0826 78.1 kg (172 lb 3.2 oz)   07/27/23 0813 77.4 kg (170 lb 10.2 oz)   07/27/23 1452 76.6 kg (168 lb 12.8 oz)   07/27/23 0926 75.8 kg (167 lb)   07/20/23 0807 77.4 kg (170 lb 9.6 oz)   07/06/23 1012 75.8 kg (167 lb 1.6 oz)   06/29/23 0809 77.2 kg (170 lb 4.8 oz)   06/12/23 1424 76.2 kg (167 lb 14.4 oz)   06/08/23 0800 77.4 kg (170 lb 11.2 oz)   05/25/23 0838 75.6 kg (166 lb 11.2 oz)   05/22/23 1349 76.7 kg (169 lb 1.6 oz)   05/18/23 0806 77.7 kg (171 lb 4.8 oz)   05/15/23 1118 77.1 kg (170 lb)   05/09/23 0811 77.4 kg (170 lb 9.6 oz)   05/04/23 1032 75.7 kg (166 lb 12.8 oz)   05/01/23 1541 76.7 kg (169 lb)   04/28/23 1529 78.9 kg (174 lb)          Medications           Current medications: Biotin, Collagen, Glucosamine, Probiotic Product, levothyroxine, lidocaine-prilocaine, lisinopril, loratadine, magnesium oxide, and pantoprazole                 Tests/Procedures        Tests/Procedures No new tests/procedures  planning bilateral oopherectomy and hysterectomy by GYN     Labs       Pertinent Labs    Results from last 7 days   Lab Units 11/30/23  0734   SODIUM mmol/L 142   POTASSIUM mmol/L 4.0   CHLORIDE mmol/L 105   CO2 mmol/L 24.6   BUN mg/dL 17   CREATININE mg/dL 0.98   CALCIUM mg/dL 9.8   BILIRUBIN mg/dL 0.6   ALK PHOS U/L 121*   ALT (SGPT) U/L 43*   AST (SGOT) U/L 38*   GLUCOSE mg/dL 114*     Results from last 7 days   Lab Units 11/30/23  0734   HEMOGLOBIN g/dL 13.1   HEMATOCRIT % 37.6   WBC 10*3/mm3 5.85   ALBUMIN g/dL 4.3     Results from last 7 days   Lab Units 11/30/23  0734   PLATELETS 10*3/mm3 234     No results found for: \"COVID19\"  No results found for: \"HGBA1C\"       Physical Findings        Physical Appearance alert     Edema  no edema   Gastrointestinal None   Tubes/Drains implantable port   Oral/Mouth Cavity WNL   Skin  "       Estimated/Assessed Needs        Energy Requirements    Height for Calculation      Weight for Calculation 80 kg   Method for Estimation  18 kcal/kg, 20 kcal/kg   EST Needs (kcal/day) 7599-2818 kcals/d       Protein Requirements    Weight for Calculation 80 kg   EST Protein Needs (g/kg) 1.0 gm/kg   EST Daily Needs (g/day) 80g/d       Fluid Requirements     Method for Estimation 1 mL/kcal    Estimated Needs (mL/day)            PES STATEMENT / NUTRITION DIAGNOSIS      Nutrition Dx Problem Problem:    NutritionDiagnosisProblem: Knowledge Deficit    Etiology:  Medical diagnosis: Breast cancer  Factors affecting nutrition: Reported/Observed By      Signs/Symptoms:  Information Deficit    Comment:      NUTRITION INTERVENTION / PLAN OF CARE      Intervention Goal(s) Appropriate weight loss         RD Intervention/Action Follow Tx progress         Recommendations:       PO Diet 5062-5585 cals/d      Supplements       Snacks       Other    --      Monitor/Evaluation Follow up as needed   Education Education provided   --    RD to follow     Electronically signed by:  Rakel Helton RD, LD  11/30/23 14:48 EST

## 2023-11-30 NOTE — PROGRESS NOTES
Subjective     REASON FOR FOLLOW-UP:  1.  Invasive lobular carcinoma multifocal,  Left breast anterolateral stereotactic biopsy invasive lobular carcinoma, Marie score of 6, grade 2, 4 mm, ER 95%, OH 95%, HER2/cheryl 0 negative with Ki-67 of 5%.  Positive for atypical lobular hyperplasia  Left breast 1 o'clock position 10 cm from the nipple ultrasound-guided biopsy consistent with invasive lobular carcinoma grade 2 Winston score of 6, 4 mm with lobular carcinoma in situ, ER 90%, %, HER2/cheryl 0  Left breast upper stereotactic guided core biopsy invasive lobular carcinoma grade 2 Winston score of six 3 mm with lobular carcinoma in situ, ER 90%, %, HER2/cheryl equivocal 2+, FISH shows her to 5.7 with CEP 1.6 and ratio HER2 nu/KEIRA-17 ratio of 3.5.  It is amplified.    2.  March 8, 2023:p T3 N0 M0 invasive lobular carcinoma s/p bilateral mastectomy with left deep axillary sentinel lymph node biopsy, with bilateral placement of prepectoral tissue expanders for reconstruction and bilateral placement of AlloDerm.  Pathology shows multifocal tumor in  upper outer quadrant and upper inner quadrant, invasive lobular carcinoma grade 2 with a Winston score  score of 7  , total of 4 foci were seen, largest tumor being 60 mm, 20 mm, 2 mm, 1 mm.  No DCIS identified.  Lobular carcinoma in situ present.  No lymphatic or vascular channel invasion, no dermal lymphovascular space invasion all margins were negative for invasive carcinoma 4 lymph nodes were all negative for malignancy its pathologic T3N0  ER %, OH %, HER2/cheryl 2+ with Ki-67 of 5%  Previous biopsies had shown 2 of the tumors were ER/OH positive HER2/cheryl negative and one of them was ER/OH positive HER2 positive  On discussing with pathologist Dr. Serrano, she thinks that the larger tumor was ER OH positive and HER2 positive but unsure if it was the 60 mm tumor or 20 mm tumor.  Either way it was not the smaller tumors which was HER2  positive.  All tumors look-alike per pathologist and they are invasive lobular carcinoma.                  HISTORY OF PRESENT ILLNESS:         Patient is a 48 y.o. female with history of multifocal left breast cancer with MRI of the breast showing almost 15 lesions.  Biopsies on 3 different lesions were done and they were all consistent with invasive lobular carcinoma.  2 of the lesions where ER/RI positive HER2/cheryl negative and one of the larger lesion was ER/RI positive HER2 2+ equivocal.  FISH testing was done and the HER2 copy number was 5.7 with HER2/CEP ratio of 3.5.  The FISH was amplified.    Patient underwent bilateral mastectomy    With left sentinel lymph node surgery..  The pathology shows tumor to be present in the left upper outer quadrant invasive lobular carcinoma with a Marie score of 7, grade 2.  There were 4 lesions the greatest size was 6 0 mm, 20 mm, 2 mm and 1 mm in size.  Lobular carcinoma in situ is present.  All margins are negative for invasive carcinoma.  4 lymph nodes negative.  Patient has T3N0 invasive lobular carcinoma.      Discussed with pathology in length Dr. Serrano, one of the larger lesions which was 60 mm was heterogeneous it was biopsied at 2 separate spots.  The left anterior lateral and left 1:00 core fragment was ER/RI positive HER2/cheryl negative the left upper core biopsy was ER/RI positive HER2/cheryl 2+ equivocal but HER2/cheryl FISH was positive.    On discussion with pathology the 60 mm tumor showed ER and RI positive and HER2 positive by FISH with a Ki-67 of 40% at the Top-Hat clip.  The HER2/cheryl copy number was 5.7 with a HER2/cheryl by CEP ratio 3.5 which is positive.  The Ki-67 on the lower part was 13%.  The 20 mm tumor had a Ki-67 of 9%.  So the larger lesion was triple positive and heterogeneous as 2 biopsies were done on the larger lesions and that was both triple positive and at the second biopsy was ER/RI positive HER2/cheryl negative so it was a heterogeneous tumor.   The 20 mm lesion was ER/WI positive HER2/cheryl negative.    Given that she had a large tumor with high Ki-67 and premenopausal status we discussed about giving Taxotere carboplatin Herceptin Perjeta.    Interval History:   Patient is here for Herceptin Perjeta.  She has some mild diarrhea but Imodium helps it.  She does not have any nausea.  She had her last echocardiogram in October which showed a normal ejection fraction and she is due in January 2024.  She follows up with Dr. Andreina Fermin.  She does not have any lower extremity edema.      Oncology history:  Patient is a 48 y.o. female who has been recently diagnosed with left breast cancer.  Patient has been compliant with her mammograms.    There is no family history of breast or ovarian cancer.  Her father had prostate cancer and melanoma.  A paternal aunt had thyroid cancer.      Details are as follows.    November 29, 2022: Screening bilateral mammogram showed 8 mm asymmetry in the central left breast posterior one third.  This appears to be in the superior left breast on the MLO view.  Right breast was negative.  A left diagnostic mammogram and ultrasound was recommended.    January January 3, 2023: Left breast diagnostic mammogram showed the spiculated areas within the central posterior and lateral anterior superior left breast where redemonstrated.  Targeted ultrasound was done.  At 1 o'clock position 10 cm from the nipple there is a shadowing mass with peripheral spiculations which is concordant with the mammographic abnormality and suspicious for malignancy.  The more centrally located spiculation on the cc view posterior to the glandular tissue cannot be clearly identified on the ultrasound.    Impression was 2 separate suspicious spiculated lesion in the left breast.  One of the lesions is seen well on the ultrasound and should be amenable to ultrasound-guided core biopsy.  The other lesion which was seen in the CC projection was amicable to stereotactic  guided tissue sampling.    January 3, 2023 patient underwent ultrasound-guided left breast biopsy    The ultrasound-guided biopsy of left breast 10 cm from the nipple at 1 o'clock position showed invasive lobular carcinoma moderately differentiated with a Marie grade of 2 and score of 6 measuring 4 mm.  There was focal lobular carcinoma in situ.    Left breast anterior lateral stereotactic core needle biopsy showed invasive lobular carcinoma moderately differentiated grade 2 with a [place] score of 6 measuring 4 mm.  There was atypical lobular hyperplasia.  Estrogen receptor was %, progesterone receptor was %, HER2/cheryl 0, Ki-67 5%.    Left breast upper stereotactic guided core biopsy showed invasive lobular carcinoma moderately differentiated grade 2 with Marie score of 6 with measuring 3 mm with lobular carcinoma in situ present.  I do not see the ER/CT or HER2 on the ultrasound-guided biopsy of the left breast lesion as well as the left upper stereotactic biopsy.  We will check with pathologist    January 23, 2023: Patient underwent  MRI of the breast bilateral    Right breast: Negative    Left breast: At 1:00 anterior left breast 4 cm from the nipple there is a 1.5 x 1.6 x 1.3 cm irregular enhancing mass which is biopsy-proven malignancy.  The biopsy clip is located within 0.9 x 1 x 1.1 cm postbiopsy hematoma along the inferior margin of the mass.  And there is additional hematoma along the lateral margin of the mass which measures 1.2 x 1.9 x 0.9 cm.    At 1:00 in the middle to posterior left breast 7.4 cm from the nipple there is a 1.3 x 1 cm x 1 cm irregular enhancing mass mass with a focus from a biopsy clip along the inferior margin which also represents the biopsy-proven malignancy    At 12:00 in the posterior left breast 8.5 cm from the nipple there is a 1.9 x 1.8 x 2.2 cm irregular enhancing mass with a corresponding 2.9 cm biopsy cavity with a biopsy clip which represents the  biopsy-proven malignancy    In addition there are multiple at least 12 similar-appearing irregular enhancing masses and foci are identified throughout the left breast predominantly involving the upper breast but also involving the lower outer quadrant.  At 11-12 o'clock in the middle and posterior left breast there are 3 adjacent reference masses together measuring 5.5 cm but individually measuring 1.2 x 0.9 x 1 cm.  At 1:00 in the far posterior left breast/axillary tail 13.7 cm posterior to the nipple is a 1.3 x 1.1 x 1 cm irregular enhancing mass which is located 0.8 cm from the anterolateral margin of the pectoralis Muscle.  Together the enhancing masses span approximately 11.7 x 8 x 7.8 cm.  There is no suspicious enhancement in the left nipple or chest wall.  Focal areas of skin enhancement likely reflect skin entry point from recent biopsies.  There are no pathologically enlarged internal mammary chain lymph nodes.    Impression    .  At least 15 similar-appearing irregular enhancing masses and foci  scattered throughout the left breast, together measuring up to 11.7 cm  in maximum dimension, encompass 3 sites of biopsy-proven malignancy and  are consistent with multicentric malignancy. Oncologic and surgical  management are recommended.  2.  No MRI evidence of malignancy in the right breast.    Patient is a 47-year-old female with multifocal invasive lobular carcinoma who on screening mammogram showed 8 mm asymmetry in the central left breast posterior one third.  In the cc view.  It appears to be superior left breast on the MLO view.  There was also architectural distortion in the lateral left breast one third on the left cc view.    On diagnostic mammogram the spiculated areas within the central posterior and the lateral anterior superior left breast where redemonstrated.  Targeted ultrasound was done.  At 1 o'clock position 10 cm from the nipple there is a mass with spiculations which is suspicious.  The  more centrally located spiculation on the cc view.  The more centrally located spiculation on the cc view cannot be clearly identified on ultrasound.  So there impression was there were 2 separate suspicious spiculated lesion in the left breast.  1 was seen well on the ultrasound and amenable to ultrasound-guided biopsy the other is seen well on mammography and a stereotactic guided biopsy suggested.    Patient subsequently underwent stereotactic biopsy of 1 lesion and ultrasound-guided biopsy of the 1 cm mass at 1 o'clock position 10 cm from the nipple.  A total of 3 different areas of the left breast were biopsied and specimens were sent to pathology.  The third biopsy was performed at 12 o'clock position in the posterior one third of the left breast.  All of the 3 sites were consistent with invasive lobular carcinoma at site A, B, and C.    MRI of the breast showed at 1 o'clock position of the left breast anteriorly 4 cm posterior to the nipple there is a 1.5 x 1.6 x 1.3 cm irregular enhancing mass.  There is a small hematoma along the inferior margin of the mass.  An additional hematoma along the lateral margin of the mass which is measuring 1.2 x 0.9 x 0.9 cm    At 1:00 in the middle to posterior left breast 7.4 cm posterior to the nipple there is a 1.3 x 1 x 1 cm enhancing mass with a biopsy clip.    At 12:00 posterior left breast 8.5 cm posterior to the nipple there is a 1.5 x 1.8 x 2.2 cm irregular enhancing mass with a corresponding 2.9 cm biopsy cavity with a clip which represents the biopsy site malignancy    Multiple at least 12 similar-appearing irregular enhancing masses and foci are identified throughout the left breast predominantly involving the upper breast but also involving the lower outer quadrant.  At 11-12 o'clock in the middle and posterior left breast there is a 3 adjacent reference masses measuring 5.5 cm in AP dimension and individually measuring 1.2 x 0.9 x 1 cm.  At 1:00 in the far  posterior left breast axillary tail 13.7 cm from the nipple there is a 1.3 x 1.1 x 1 cm craniocaudal dimension irregular enhancing mass which is located 0.8 cm from the anterolateral margin of the pectoralis muscle.     Per the MRI at least 15 similar-appearing irregular enhancing masses and foci scattered throughout the left breast together measuring 11.7 cm in maximum dimension which encompasses 3 sites of biopsy-proven malignancy and a consistent with multicentric malignancy.  MRI of the right breast is negative    INVITAE genetic testing showed variation of uncertain significance of LUCAS  Gene.    Patient was suggested left total mastectomy, left axillary sentinel lymph node biopsy possible node dissection and possible reconstruction.    However patient called Dr. Hi and decided to go for upfront bilateral total mastectomy, left axillary sentinel lymph node biopsy and possible axillary lymph node dissection and reconstruction.     I presented the case in the breast cancer conference today.  Patient had multiple nodules per MRI on the left breast Dr. Todd looked at it and felt there were multiple nodules and the largest one was 2.2 cm.  The discussion was to go ahead and upfront do the surgery and subsequently treat with chemo/ endocrine therapy as needed.  The left breast MRI findings are slightly atypical and that it is not contiguous involvement over 11.5 cm area but multiple nodules.  Surgical pathology will clarify.  If it is 1 big lesion or multiple sma      Past Medical History:   Diagnosis Date    ADD (attention deficit disorder)     Bilateral impacted cerumen 07/13/2020    Breast cancer 03/08/2023    LUCIO MASTECTOMY    Foot fracture, left     History of COVID-19 2022    History of gestational diabetes 2010    History of Graves' disease 2012    History of radioactive iodine thyroid ablation 2012    History of vertigo     Hyperlipidemia     Hypothyroidism     Melanoma of back     Positive depression  screening 07/13/2020        Past Surgical History:   Procedure Laterality Date    BREAST RECONSTRUCTION Bilateral 3/8/2023    Procedure: BILATERAL PLACEMENT  TISSUE EXPANDER AND ALLODERM;  Surgeon: Eamon Stone MD;  Location:  [facility];  Service: Plastics;  Laterality: Bilateral;    D & C CERVICAL BIOPSY  01/2006    INTRAUTERINE DEVICE INSERTION  2010    Mirena    INTRAUTERINE DEVICE INSERTION  2015    Mirena exchange, old IUD removal and new one inserted by Dr Carr, Lot # [id number] exp 09/17.mar    MASTECTOMY W/ SENTINEL NODE BIOPSY Bilateral 3/8/2023    Procedure: Bilateral total mastectomy, left axillary sentinel lymph node biopsy;  Surgeon: Kell Hi MD;  Location: [facility];  Service: General;  Laterality: Bilateral;    SCAR REVISION BREAST Right 4/3/2023    Procedure: RIGHT MASTECTOMY REVISION;  Surgeon: Eamon Stone MD;  Location: [facility];  Service: Plastics;  Laterality: Right;    VENOUS ACCESS DEVICE (PORT) INSERTION Right 4/13/2023    Procedure: right port placement;  Surgeon: Kell Hi MD;  Location: [facility] MAIN OR;  Service: General;  Laterality: Right;    WISDOM TOOTH EXTRACTION          Current Outpatient Medications on File Prior to Visit   Medication Sig Dispense Refill    BIOTIN PO Take  by mouth.      COLLAGEN PO Take 1 tablet by mouth Daily.      Glucosamine 500 MG capsule       levothyroxine (SYNTHROID, LEVOTHROID) 125 MCG tablet Take 1 tablet by mouth Daily. 90 tablet 0    lidocaine-prilocaine (EMLA) 2.5-2.5 % cream Apply 1 application topically to the appropriate area as directed As Needed for Mild Pain (apply pea-sized amount to port site 30 minutes prior to port being accessed). 30 g 3    lisinopril (PRINIVIL,ZESTRIL) 2.5 MG tablet Take 1 tablet by mouth once daily (Patient taking differently: Take if SBP above 120) 30 tablet 5    loratadine (CLARITIN) 10 MG tablet Take 1 tablet by mouth Daily.      magnesium oxide (MAG-OX) 400 MG  tablet Take 1 tablet by mouth Every Other Day.      pantoprazole (PROTONIX) 40 MG EC tablet Take 1 tablet by mouth Daily. 30 tablet 3    Probiotic Product (PROBIOTIC ADVANCED PO) Take 1 tablet by mouth Daily.      [DISCONTINUED] Coenzyme Q10 (COQ10 PO) Take  by mouth.      [DISCONTINUED] S-Adenosylmethionine (CANDELARIA-E PO) Take  by mouth.       No current facility-administered medications on file prior to visit.        ALLERGIES:    Allergies   Allergen Reactions    Erythromycin GI Intolerance     Pt reports    Penicillins Rash        Social History     Socioeconomic History    Marital status:      Spouse name: Parish    Number of children: 2   Tobacco Use    Smoking status: Never    Smokeless tobacco: Never   Vaping Use    Vaping Use: Never used   Substance and Sexual Activity    Alcohol use: Not Currently     Alcohol/week: 1.0 standard drink of alcohol     Types: 1 Glasses of wine per week     Comment: Daily caffeine use    Drug use: Never    Sexual activity: Yes     Partners: Male     Comment:          Family History   Problem Relation Age of Onset    Diabetes Father     Hyperlipidemia Father     Arthritis Father     Hypertension Father     Heart disease Father         S/P stents    Heart attack Father 41    Colon polyps Father     Melanoma Father     Prostate cancer Father     Hyperlipidemia Sister     Hypertension Sister     Heart attack Sister     Diabetes Sister     Thyroid cancer Paternal Aunt     Lung cancer Maternal Grandmother     Cancer Maternal Grandfather         Bladder cancer    Lung cancer Maternal Grandfather     Heart failure Paternal Grandmother     Transient ischemic attack Paternal Grandmother     Heart disease Paternal Grandfather     Malig Hyperthermia Neg Hx       Family history: Maternal great aunt had breast cancer, unsure of the age .  Father had melanoma and prostate cancer, paternal aunt had thyroid cancer, paternal grandfather had bladder cancer maternal grandfather had  "bladder cancer maternal grandmother had lung cancer    Past medical history is consistent with Graves' disease    OB/GYN history:  Age of menarche: 14  Patient is premenopausal and had an IUD which has been now removed   2 para 2 no miscarriages  Age at first childbirth 30  Patient did breast-feed 24 months  Length of taking birth control pills none      Objective     Vitals:    23 0755   BP: 136/94   Pulse: 83   Resp: 18   Temp: 97.3 °F (36.3 °C)   TempSrc: Temporal   SpO2: 100%   Weight: 79.7 kg (175 lb 9.6 oz)   Height: 160 cm (62.99\")   PainSc: 0-No pain               2023     8:06 AM   Current Status   ECOG score 0         Physical Exam      GENERAL:  Well-developed, Patient  in no acute distress.   SKIN:  Warm, dry ,NO purpura ,no rash.  HEENT:  Pupils were equal and reactive to light and accomodation, conjunctivae noninjected,  normal visual acuity.   LYMPHATICS:  No cervical, NO supraclavicular, NO axillary adenopathies.  BREASTS: S/p bilateral mastectomy with reconstruction, no chest wall lesions, no evidence of bilateral axillary adenopathy or supraclavicular adenopathy  CARDIAC: Normal rate and rhythm, no murmurs gallops or rubs.  LUNGS: Clear to auscultation bilaterally.   ABDOMEN:  Soft, nontender with bowel sounds active.   EXTREMITIES: No cyanosis clubbing or edema.  NEUROLOGICAL:  Patient was awake, alert, oriented to time, person and place.    I have reexamined the patient and the results are consistent with the previously documented exam. Kathrin Frederick MD     RECENT LABS:  Results from last 7 days   Lab Units 23  0734   WBC 10*3/mm3 5.85   NEUTROS ABS 10*3/mm3 3.27   HEMOGLOBIN g/dL 13.1   HEMATOCRIT % 37.6   PLATELETS 10*3/mm3 234         Results from last 7 days   Lab Units 23  0734   SODIUM mmol/L 142   POTASSIUM mmol/L 4.0   CHLORIDE mmol/L 105   CO2 mmol/L 24.6   BUN mg/dL 17   CREATININE mg/dL 0.98   CALCIUM mg/dL 9.8   ALBUMIN g/dL 4.3   BILIRUBIN mg/dL 0.6 "   ALK PHOS U/L 121*   ALT (SGPT) U/L 43*   AST (SGOT) U/L 38*   GLUCOSE mg/dL 114*               Assessment & Plan     *Pathologic T3N0 invasive lobular carcinoma of the left breast, upper inner and upper outer quadrant, multifocal, 60 mm, 20 mm, 2 mm, 1 mm, grade 2, Marie score of 7, 2 of them are ER/WY positive, HER2/cheryl negative.  One of the larger lesions was ER/WY positive HER2 positive.  Ki-67 is very low 5%,    Invasive lobular carcinoma of the left breast recently diagnosed, multifocal with 3 lesions biopsied in the left breast.  Patient has multicentric disease with 15 similar-appearing irregular enhancing masses in the left breast together measuring 11.7 cm in maximum dimension which encompasses 3 of the 3 biopsy proven malignancies.  All the 3 very invasive lobular carcinoma, grade 2 with [place] score of 6 but 2 of the lesions where ER/WY positive HER2 negative and the third lesion was ER/WY positive HER2 2+ with FISH showing HER2 amplified.      1.  Estrogen receptor 95%, progesterone receptor 95%, HER2/cheryl 0, Ki-67 5%    2 left breast 10 o'clock position 10 cm from the nipple ultrasound core guided biopsy showed ER 90%, %, HER2/cheryl 0    3.  Left breast upper stereotactic core biopsy showed estrogen receptor 90%, progesterone receptor 100%, HER2/cheryl equivocal 2+  FISH testing showed her to as 5.7 with a KEIRA 17 of 1.6 with a ratio HER2/KEIRA 17 of  INVITAE genetic testing showed variation of uncertain significance of LUCAS gene  We discussed the multidisciplinary approach in this patient's and I also discussed in length that that invasive lobular carcinomas do benefit from endocrine therapy and discussed in length about side effects of all endocrine therapies  Pending the results of the surgery she may need to be a candidate for chemotherapy as well and subsequently radiation if the tumor is large locally  Patient is keen on getting bilateral oophorectomy after her breast surgery is done and we  discussed about medical oophorectomy with Lupron as well  February 24, 2023: Presented patient in the breast cancer conference.  Even though it appears to be a heterogeneous tumor, since it is difficult to really appreciate by clinical exam and the fact that it is invasive lobular carcinoma, it was felt best to do upfront surgery.  March 8, 2023:p T3 N0 M0 invasive lobular carcinoma s/p bilateral mastectomy with left deep axillary sentinel lymph node biopsy, with bilateral placement of prepectoral tissue expanders for reconstruction and bilateral placement of AlloDerm.  Pathology shows multifocal tumor in  upper outer quadrant and upper inner quadrant, invasive lobular carcinoma grade 2 with a Marie score  score of 7  , total of 4 foci were seen, largest tumor being 60 mm, 20 mm, 2 mm, 1 mm.  No DCIS identified.  Lobular carcinoma in situ present.  No lymphatic or vascular channel invasion, no dermal lymphovascular space invasion all margins were negative for invasive carcinoma 4 lymph nodes were all negative for malignancy its pathologic T3N0  ER %, PA %, HER2/cheryl 2+ with Ki-67 of 5%  Previous biopsies had shown 2 of the tumors were ER/PA positive HER2/cheryl negative and one of them was ER/PA positive HER2 positive  On discussing with pathologist Dr. Serrano, she thinks that the larger tumor was ER PA positive and HER2 positive.  HER2 copy number of 5.7 with HER2/CEP ratio of 3.5 positive and this was on the larger tumor.  Given that patient has heterogeneous tumor with the larger lesion being ER/PA positive HER2 positive and a Ki-67 of 40%, being premenopausal we will treat with Taxotere carboplatin Herceptin Perjeta.  April 20, 2023: Patient was to start cycle 1 today but unfortunately her liver function tests were extremely high.  Discussed about alcohol and she had drank and taken Tylenol.  Will hold chemo today  April 28, 2023: Reviewed her liver function test which are completely normalized.   Discussed with her not to drink alcohol.  Here for cycle 1 day 1 TCHP chemotherapy.  Reviewed echo which showed ejection fraction of 64% with strain pattern of -21.  Patient understands all the side effects\  5/4/2023: Patient tolerated Cycle 1 TCHP well. She has had diarrhea, nausea and fatigue that have been well managed.   5/18/2023: C2 TCHP  5/22/2023: Here for close follow-up and possible IV fluids after treatment last Friday, 5/18/2023.  Patient had a fairly good weekend with good oral intake, weight that is notably stable, and very little nausea.  She only had 1 loose stool this morning and therefore no significant diarrhea.  CMP is WNL.  Per discussion with patient we will give just 500 mL normal saline.  May 25, 2023: Patient has diarrhea.  She is 1 week out of cycle 2 Taxotere carboplatin Herceptin Perjeta.  She has some abdominal cramping.  We will give her IV fluids today.  Given carboplatinum is not available she will receive Taxotere Herceptin Perjeta for 2 more cycles prior to going to Adriamycin Cytoxan.  Cycle #3 TCHP 6/8/2023  Patient returns 6/12/2023 for toxicity check.  She is eating and drinking sufficiently at this point.  She does not feel she has benefited from the IV fluids in the past and therefore will not do that today.  She denies any fevers or chills.  June 29, 2023: Cycle 4 TCHP.  She will need referral to radiation at the completion of 6 cycles of TCHP  07/20/2023 proceed with cycle 5 of carboplatin, Taxotere, Herceptin, Perjeta with Neulasta support.  August 10, 2023: Final cycle 6 TCHP.  Thereafter plan to continue every 3-week Herceptin/Perjeta for total of 1 year.   We have discussed potentially beginning Lupron followed by aromatase inhibitor therapy following radiation however patient wants to discuss potential hysterectomy and bilateral oophorectomy with her GYN.    September 21, 2023: Patient started radiation has completed 2 treatments so far out of 25.  Patient has history  of depression in the family but not herself.  We discussed about consideration of Lupron with aromatase inhibitor but she wants to undergo hysterectomy with bilateral oophorectomy because of her fibroids.  In which case we could consider starting tamoxifen until she has oophorectomy and subsequently start AI.  She is tolerating Herceptin Perjeta very well  10/12/23 Here for Hepcetin and Perjeta today. She is currently on 18 of 25 radiation treatments and tolerating well.  She has no new concerns today.  November 3, 2023: Patient to receive Herceptin Perjeta.  She is due to get oophorectomy following which we will start aromatase inhibitor Arimidex.  November 28, 2023: Here for Herceptin Perjeta.  She is due to receive oophorectomy as of January 4, 2024.  Subsequent to that we will start Arimidex.  Patient is premenopausal and did not want to start Lupron and prefer to go to oophorectomy.  We discussed about starting tamoxifen until oophorectomy is done but patient prefers to not take tamoxifen and wants to wait till oophorectomy is done and subsequently we can start aromatase inhibitor    *S/p  IUD removal  Experiencing hot flashes with chemo likely triggering menopausal changes.    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 She did recommend she start taking her gabapentin again to help with her anxiety and irritability/anger from her decadron with treatment.   Patient continues follow-up with supportive oncology, RADHA Rolle. Patient is encouraged to continue to follow with Cierra.     *Oral Thrush and Chemotherapy induced mucositis  5/4/2023: I am concerned patient is starting into chemotherapy induced mucositis. She is reporting sore throat  at times, possibly worsened reflux, mouth and tongue changes. She noticed white plaquing on her tongue this morning. She has not been able to  her Yen's Magic Mouthwash until today because of pharmacy supplies. She is currently on omeprazole 20 mg daily. She can increase this to 40 mg daily if needed. Prescription sent to pharmacy today to start Fluconazole 200 mg on day and 100 mg on day 2-7. We will continue to monitor symptoms and watch liver enzymes closely. ALT 34 and AST 24.  Liver function test mildly elevated today, unsure if that is the effect of fluconazole.  In the future avoid fluconazole and consider just Yen's Magic mouthwash.  7/6/2023 Oral thrush recurrent today, mild but bothersome.  Refill Magic mouthwash.  Patient continuing to rinse her mouth to minimize oral candida.  Expect this to resolve now that she is done with chemotherapy and no longer requiring dexamethasone.    *  Port study negative    *Intermittent mild elevation of LFTs.    Patient has previously reported intermittent use of Tylenol and alcohol.    8/31/2023 AST 35, ALT 41, alk phos 100.  Patient denies any alcohol intake or Tylenol.  This is felt to be drug related.  Stable to improved. Monitor.  10/12/2023 AST 93, .  Patient has had 1 joe this past week however denies any other alcohol or Tylenol intake.  Reviewed with Dr. TRINIDAD and we will treat.    *Cardiac monitoring  Seen by cardiology today, 7/27/2023.  Echo stable.  Plan for continued echocardiogram every 3 months while on therapy.  Continue low-dose lisinopril    PLAN:  Proceed with Herceptin and Perjeta today  Follow-up every 3 weeks for Herceptin Perjeta  Echo is scheduled in January 2024  Patient is scheduled to undergo bilateral laparoscopic oophorectomy on January 4, 2023  We offered tamoxifen until then but patient prefers to not take tamoxifen  Once patient has had oophorectomy we can start aromatase inhibitor with letrozole  Herceptin and Perjeta  every 3 weeks x 4  Nurse practitioner in 6 weeks  Follow-up with me on February 22 with Herceptin Shahbaz    This patient is on high risk drug therapy requiring intensive monitoring for toxicity.      I spent 40 total minutes, face-to-face, caring for Ursula today. Greater than 50% of this time involved counseling and/or coordination of care as documented within this note.    MD Dr. Kell Sandoval

## 2023-12-14 ENCOUNTER — DOCUMENTATION (OUTPATIENT)
Dept: RADIATION ONCOLOGY | Facility: HOSPITAL | Age: 48
End: 2023-12-14
Payer: COMMERCIAL

## 2023-12-14 DIAGNOSIS — C50.812 MALIGNANT NEOPLASM OF OVERLAPPING SITES OF LEFT BREAST IN FEMALE, ESTROGEN RECEPTOR POSITIVE: Primary | ICD-10-CM

## 2023-12-14 DIAGNOSIS — Z17.0 MALIGNANT NEOPLASM OF OVERLAPPING SITES OF LEFT BREAST IN FEMALE, ESTROGEN RECEPTOR POSITIVE: Primary | ICD-10-CM

## 2023-12-14 NOTE — PROGRESS NOTES
Radiation Treatment Summary Note      Patient Name: Ursula Kulkarni  : 1975    Attending Provider: Fay Hines MD      Diagnosis:     ICD-10-CM ICD-9-CM   1. Malignant neoplasm of overlapping sites of left breast in female, estrogen receptor positive  C50.812 174.8    Z17.0 V86.0   Site: left brteast/chest wall    Radiation Start Date: 23    Radiation Completion Date: 10/23/23      Prescription:     She received a dose of 50 gy in 25 fractions delivered with 3D tangential fields using 6 and 18 mv photons.     Final Delivered Dose Deviated From Initially Prescribed Dose: No    Concurrent Chemotherapy: No    Patient Tolerated Treatment Without Unexpected Side Effects/Complications: Yes    ECOG: Fully active, able to carry on all pre-disease performance without restriction = 0    Pain Management Plan: None Indicated/PRN OTC    Follow-Up Plan: 3 months    Imaging Ordered for Follow-Up: None/NA        Fay Hines MD

## 2023-12-18 ENCOUNTER — PRE-ADMISSION TESTING (OUTPATIENT)
Dept: PREADMISSION TESTING | Facility: HOSPITAL | Age: 48
End: 2023-12-18
Payer: COMMERCIAL

## 2023-12-18 VITALS
OXYGEN SATURATION: 99 % | TEMPERATURE: 98.4 F | BODY MASS INDEX: 30.19 KG/M2 | SYSTOLIC BLOOD PRESSURE: 120 MMHG | DIASTOLIC BLOOD PRESSURE: 83 MMHG | HEIGHT: 64 IN | WEIGHT: 176.8 LBS | HEART RATE: 73 BPM | RESPIRATION RATE: 16 BRPM

## 2023-12-18 LAB — B-HCG UR QL: NEGATIVE

## 2023-12-18 PROCEDURE — 81025 URINE PREGNANCY TEST: CPT

## 2023-12-18 RX ORDER — MULTIVIT WITH MINERALS/LUTEIN
250 TABLET ORAL DAILY
COMMUNITY

## 2023-12-18 RX ORDER — MELATONIN 10 MG
10000 TABLET, SUBLINGUAL SUBLINGUAL DAILY
COMMUNITY

## 2023-12-18 RX ORDER — ZINC GLUCONATE 50 MG
50 TABLET ORAL DAILY
COMMUNITY

## 2023-12-18 NOTE — DISCHARGE INSTRUCTIONS
Take the following medications the morning of surgery:  LEVOTHYROXINE AND PANTOPRAZOLE    ARRIVAL TIME 0930 ON 1/3/24      If you are on prescription narcotic pain medication to control your pain you may also take that medication the morning of surgery.    General Instructions:  Do not eat solid food after midnight the night before surgery.  You may drink clear liquids day of surgery but must stop at least one hour before your hospital arrival time.  It is beneficial for you to have a clear drink that contains carbohydrates the day of surgery.  We suggest a 12 to 20 ounce bottle of Gatorade or Powerade for non-diabetic patients or a 12 to 20 ounce bottle of G2 or Powerade Zero for diabetic patients. (Pediatric patients, are not advised to drink a 12 to 20 ounce carbohydrate drink)    Clear liquids are liquids you can see through.  Nothing red in color.     Plain water                               Sports drinks  Sodas                                   Gelatin (Jell-O)  Fruit juices without pulp such as white grape juice and apple juice  Popsicles that contain no fruit or yogurt  Tea or coffee (no cream or milk added)  Gatorade / Powerade  G2 / Powerade Zero    Infants may have breast milk up to four hours before surgery.  Infants drinking formula may drink formula up to six hours before surgery.   Patients who avoid smoking, chewing tobacco and alcohol for 4 weeks prior to surgery have a reduced risk of post-operative complications.  Quit smoking as many days before surgery as you can.  Do not smoke, use chewing tobacco or drink alcohol the day of surgery.   If applicable bring your C-PAP/ BI-PAP machine in with you to preop day of surgery.  Bring any papers given to you in the doctor’s office.  Wear clean comfortable clothes.  Do not wear contact lenses, false eyelashes or make-up.  Bring a case for your glasses.   Bring crutches or walker if applicable.  Remove all piercings.  Leave jewelry and any other valuables  at home.  Hair extensions with metal clips must be removed prior to surgery.  The Pre-Admission Testing nurse will instruct you to bring medications if unable to obtain an accurate list in Pre-Admission Testing.          Preventing a Surgical Site Infection:  For 2 to 3 days before surgery, avoid shaving with a razor because the razor can irritate skin and make it easier to develop an infection.    Any areas of open skin can increase the risk of a post-operative wound infection by allowing bacteria to enter and travel throughout the body.  Notify your surgeon if you have any skin wounds / rashes even if it is not near the expected surgical site.  The area will need assessed to determine if surgery should be delayed until it is healed.  The night prior to surgery shower using a fresh bar of anti-bacterial soap (such as Dial) and clean washcloth.  Sleep in a clean bed with clean clothing.  Do not allow pets to sleep with you.  Shower on the morning of surgery using a fresh bar of anti-bacterial soap (such as Dial) and clean washcloth.  Dry with a clean towel and dress in clean clothing.  Ask your surgeon if you will be receiving antibiotics prior to surgery.  Make sure you, your family, and all healthcare providers clean their hands with soap and water or an alcohol based hand  before caring for you or your wound.    Day of surgery:  Your arrival time is approximately two hours before your scheduled surgery time.  Upon arrival, a Pre-op nurse and Anesthesiologist will review your health history, obtain vital signs, and answer questions you may have.  The only belongings needed at this time will be a list of your home medications and if applicable your C-PAP/BI-PAP machine.  A Pre-op nurse will start an IV and you may receive medication in preparation for surgery, including something to help you relax.     Please be aware that surgery does come with discomfort.  We want to make every effort to control your  discomfort so please discuss any uncontrolled symptoms with your nurse.   Your doctor will most likely have prescribed pain medications.      If you are going home after surgery you will receive individualized written care instructions before being discharged.  A responsible adult must drive you to and from the hospital on the day of your surgery and stay with you for 24 hours.  Discharge prescriptions can be filled by the hospital pharmacy during regular pharmacy hours.  If you are having surgery late in the day/evening your prescription may be e-prescribed to your pharmacy.  Please verify your pharmacy hours or chose a 24 hour pharmacy to avoid not having access to your prescription because your pharmacy has closed for the day.    If you are staying overnight following surgery, you will be transported to your hospital room following the recovery period.  [facility] has all private rooms.    If you have any questions please call Pre-Admission Testing at [phone].  Deductibles and co-payments are collected on the day of service. Please be prepared to pay the required co-pay, deductible or deposit on the day of service as defined by your plan.    Call your surgeon immediately if you experience any of the following symptoms:  Sore Throat  Shortness of Breath or difficulty breathing  Cough  Chills  Body soreness or muscle pain  Headache  Fever  New loss of taste or smell  Do not arrive for your surgery ill.  Your procedure will need to be rescheduled to another time.  You will need to call your physician before the day of surgery to avoid any unnecessary exposure to hospital staff as well as other patients.

## 2023-12-21 ENCOUNTER — INFUSION (OUTPATIENT)
Dept: ONCOLOGY | Facility: HOSPITAL | Age: 48
End: 2023-12-21
Payer: COMMERCIAL

## 2023-12-21 VITALS
BODY MASS INDEX: 30.55 KG/M2 | RESPIRATION RATE: 16 BRPM | WEIGHT: 178 LBS | TEMPERATURE: 98.4 F | DIASTOLIC BLOOD PRESSURE: 75 MMHG | OXYGEN SATURATION: 98 % | HEART RATE: 79 BPM | SYSTOLIC BLOOD PRESSURE: 145 MMHG

## 2023-12-21 DIAGNOSIS — C50.812 MALIGNANT NEOPLASM OF OVERLAPPING SITES OF LEFT BREAST IN FEMALE, ESTROGEN RECEPTOR POSITIVE: Primary | ICD-10-CM

## 2023-12-21 DIAGNOSIS — Z17.0 MALIGNANT NEOPLASM OF OVERLAPPING SITES OF LEFT BREAST IN FEMALE, ESTROGEN RECEPTOR POSITIVE: Primary | ICD-10-CM

## 2023-12-21 LAB
ALBUMIN SERPL-MCNC: 4.2 G/DL (ref 3.5–5.2)
ALBUMIN/GLOB SERPL: 1.4 G/DL
ALP SERPL-CCNC: 120 U/L (ref 39–117)
ALT SERPL W P-5'-P-CCNC: 55 U/L (ref 1–33)
ANION GAP SERPL CALCULATED.3IONS-SCNC: 10.2 MMOL/L (ref 5–15)
AST SERPL-CCNC: 39 U/L (ref 1–32)
BASOPHILS # BLD AUTO: 0.04 10*3/MM3 (ref 0–0.2)
BASOPHILS NFR BLD AUTO: 0.6 % (ref 0–1.5)
BILIRUB SERPL-MCNC: 0.5 MG/DL (ref 0–1.2)
BUN SERPL-MCNC: 11 MG/DL (ref 6–20)
BUN/CREAT SERPL: 13.1 (ref 7–25)
CALCIUM SPEC-SCNC: 9.9 MG/DL (ref 8.6–10.5)
CHLORIDE SERPL-SCNC: 103 MMOL/L (ref 98–107)
CO2 SERPL-SCNC: 25.8 MMOL/L (ref 22–29)
CREAT SERPL-MCNC: 0.84 MG/DL (ref 0.57–1)
DEPRECATED RDW RBC AUTO: 40 FL (ref 37–54)
EGFRCR SERPLBLD CKD-EPI 2021: 86.9 ML/MIN/1.73
EOSINOPHIL # BLD AUTO: 0.31 10*3/MM3 (ref 0–0.4)
EOSINOPHIL NFR BLD AUTO: 4.7 % (ref 0.3–6.2)
ERYTHROCYTE [DISTWIDTH] IN BLOOD BY AUTOMATED COUNT: 12.1 % (ref 12.3–15.4)
GLOBULIN UR ELPH-MCNC: 2.9 GM/DL
GLUCOSE SERPL-MCNC: 102 MG/DL (ref 65–99)
HCT VFR BLD AUTO: 36.1 % (ref 34–46.6)
HGB BLD-MCNC: 12.6 G/DL (ref 12–15.9)
IMM GRANULOCYTES # BLD AUTO: 0.02 10*3/MM3 (ref 0–0.05)
IMM GRANULOCYTES NFR BLD AUTO: 0.3 % (ref 0–0.5)
LYMPHOCYTES # BLD AUTO: 2.07 10*3/MM3 (ref 0.7–3.1)
LYMPHOCYTES NFR BLD AUTO: 31.7 % (ref 19.6–45.3)
MCH RBC QN AUTO: 31 PG (ref 26.6–33)
MCHC RBC AUTO-ENTMCNC: 34.9 G/DL (ref 31.5–35.7)
MCV RBC AUTO: 88.9 FL (ref 79–97)
MONOCYTES # BLD AUTO: 0.47 10*3/MM3 (ref 0.1–0.9)
MONOCYTES NFR BLD AUTO: 7.2 % (ref 5–12)
NEUTROPHILS NFR BLD AUTO: 3.63 10*3/MM3 (ref 1.7–7)
NEUTROPHILS NFR BLD AUTO: 55.5 % (ref 42.7–76)
NRBC BLD AUTO-RTO: 0 /100 WBC (ref 0–0.2)
PLATELET # BLD AUTO: 228 10*3/MM3 (ref 140–450)
PMV BLD AUTO: 9.6 FL (ref 6–12)
POTASSIUM SERPL-SCNC: 3.8 MMOL/L (ref 3.5–5.2)
PROT SERPL-MCNC: 7.1 G/DL (ref 6–8.5)
RBC # BLD AUTO: 4.06 10*6/MM3 (ref 3.77–5.28)
SODIUM SERPL-SCNC: 139 MMOL/L (ref 136–145)
WBC NRBC COR # BLD AUTO: 6.54 10*3/MM3 (ref 3.4–10.8)

## 2023-12-21 PROCEDURE — 25010000002 DIPHENHYDRAMINE PER 50 MG: Performed by: INTERNAL MEDICINE

## 2023-12-21 PROCEDURE — 85025 COMPLETE CBC W/AUTO DIFF WBC: CPT

## 2023-12-21 PROCEDURE — 25010000002 PERTUZUMAB 420 MG/14ML SOLUTION 420 MG VIAL: Performed by: NURSE PRACTITIONER

## 2023-12-21 PROCEDURE — 96417 CHEMO IV INFUS EACH ADDL SEQ: CPT

## 2023-12-21 PROCEDURE — 25810000003 SODIUM CHLORIDE 0.9 % SOLUTION 250 ML FLEX CONT: Performed by: NURSE PRACTITIONER

## 2023-12-21 PROCEDURE — 25010000002 TRASTUZUMAB PER 10 MG: Performed by: NURSE PRACTITIONER

## 2023-12-21 PROCEDURE — 96413 CHEMO IV INFUSION 1 HR: CPT

## 2023-12-21 PROCEDURE — 96375 TX/PRO/DX INJ NEW DRUG ADDON: CPT

## 2023-12-21 PROCEDURE — 80053 COMPREHEN METABOLIC PANEL: CPT

## 2023-12-21 RX ORDER — SODIUM CHLORIDE 9 MG/ML
250 INJECTION, SOLUTION INTRAVENOUS ONCE
Status: CANCELLED | OUTPATIENT
Start: 2023-12-21

## 2023-12-21 RX ORDER — FAMOTIDINE 10 MG/ML
20 INJECTION, SOLUTION INTRAVENOUS ONCE
Status: COMPLETED | OUTPATIENT
Start: 2023-12-21 | End: 2023-12-21

## 2023-12-21 RX ADMIN — TRASTUZUMAB 470 MG: 150 INJECTION, POWDER, LYOPHILIZED, FOR SOLUTION INTRAVENOUS at 10:26

## 2023-12-21 RX ADMIN — FAMOTIDINE 20 MG: 10 INJECTION INTRAVENOUS at 08:46

## 2023-12-21 RX ADMIN — DIPHENHYDRAMINE HYDROCHLORIDE 25 MG: 50 INJECTION, SOLUTION INTRAMUSCULAR; INTRAVENOUS at 08:48

## 2023-12-21 RX ADMIN — PERTUZUMAB 420 MG: 30 INJECTION, SOLUTION, CONCENTRATE INTRAVENOUS at 09:22

## 2024-01-03 ENCOUNTER — ANESTHESIA EVENT (OUTPATIENT)
Dept: PERIOP | Facility: HOSPITAL | Age: 49
End: 2024-01-03
Payer: COMMERCIAL

## 2024-01-03 ENCOUNTER — HOSPITAL ENCOUNTER (OUTPATIENT)
Facility: HOSPITAL | Age: 49
Setting detail: SURGERY ADMIT
Discharge: HOME | End: 2024-01-03
Attending: OBSTETRICS & GYNECOLOGY | Admitting: OBSTETRICS & GYNECOLOGY
Payer: COMMERCIAL

## 2024-01-03 ENCOUNTER — ANESTHESIA (OUTPATIENT)
Dept: PERIOP | Facility: HOSPITAL | Age: 49
End: 2024-01-03
Payer: COMMERCIAL

## 2024-01-03 VITALS
HEART RATE: 64 BPM | SYSTOLIC BLOOD PRESSURE: 127 MMHG | RESPIRATION RATE: 16 BRPM | TEMPERATURE: 97.6 F | DIASTOLIC BLOOD PRESSURE: 90 MMHG | OXYGEN SATURATION: 97 %

## 2024-01-03 DIAGNOSIS — D21.9 FIBROIDS: ICD-10-CM

## 2024-01-03 DIAGNOSIS — C50.919 BREAST CANCER: ICD-10-CM

## 2024-01-03 DIAGNOSIS — Z98.890 POST-OPERATIVE STATE: Primary | ICD-10-CM

## 2024-01-03 LAB
ABO GROUP BLD: NORMAL
B-HCG UR QL: NEGATIVE
BLD GP AB SCN SERPL QL: NEGATIVE
EXPIRATION DATE: NORMAL
INTERNAL NEGATIVE CONTROL: NEGATIVE
INTERNAL POSITIVE CONTROL: POSITIVE
Lab: NORMAL
RH BLD: NEGATIVE
T&S EXPIRATION DATE: NORMAL

## 2024-01-03 PROCEDURE — 25810000003 LACTATED RINGERS PER 1000 ML: Performed by: ANESTHESIOLOGY

## 2024-01-03 PROCEDURE — 25010000002 SUGAMMADEX 200 MG/2ML SOLUTION: Performed by: NURSE ANESTHETIST, CERTIFIED REGISTERED

## 2024-01-03 PROCEDURE — 25010000002 CEFAZOLIN IN DEXTROSE 2-4 GM/100ML-% SOLUTION: Performed by: OBSTETRICS & GYNECOLOGY

## 2024-01-03 PROCEDURE — 25010000002 DEXAMETHASONE SODIUM PHOSPHATE 20 MG/5ML SOLUTION: Performed by: NURSE ANESTHETIST, CERTIFIED REGISTERED

## 2024-01-03 PROCEDURE — 25010000002 ONDANSETRON PER 1 MG: Performed by: ANESTHESIOLOGY

## 2024-01-03 PROCEDURE — 88307 TISSUE EXAM BY PATHOLOGIST: CPT | Performed by: OBSTETRICS & GYNECOLOGY

## 2024-01-03 PROCEDURE — 25010000002 KETOROLAC TROMETHAMINE PER 15 MG: Performed by: NURSE ANESTHETIST, CERTIFIED REGISTERED

## 2024-01-03 PROCEDURE — 25010000002 HYDROMORPHONE PER 4 MG: Performed by: ANESTHESIOLOGY

## 2024-01-03 PROCEDURE — 25010000002 HYDROMORPHONE 1 MG/ML SOLUTION: Performed by: NURSE ANESTHETIST, CERTIFIED REGISTERED

## 2024-01-03 PROCEDURE — 81025 URINE PREGNANCY TEST: CPT | Performed by: ANESTHESIOLOGY

## 2024-01-03 PROCEDURE — 25010000002 ONDANSETRON PER 1 MG: Performed by: NURSE ANESTHETIST, CERTIFIED REGISTERED

## 2024-01-03 PROCEDURE — 86900 BLOOD TYPING SEROLOGIC ABO: CPT | Performed by: OBSTETRICS & GYNECOLOGY

## 2024-01-03 PROCEDURE — 25010000002 FENTANYL CITRATE (PF) 50 MCG/ML SOLUTION: Performed by: ANESTHESIOLOGY

## 2024-01-03 PROCEDURE — 86901 BLOOD TYPING SEROLOGIC RH(D): CPT | Performed by: OBSTETRICS & GYNECOLOGY

## 2024-01-03 PROCEDURE — 25010000002 MIDAZOLAM PER 1 MG: Performed by: ANESTHESIOLOGY

## 2024-01-03 PROCEDURE — 25010000002 LIDOCAINE 1 % SOLUTION: Performed by: OBSTETRICS & GYNECOLOGY

## 2024-01-03 PROCEDURE — 86850 RBC ANTIBODY SCREEN: CPT | Performed by: OBSTETRICS & GYNECOLOGY

## 2024-01-03 PROCEDURE — 25010000002 PROPOFOL 200 MG/20ML EMULSION: Performed by: NURSE ANESTHETIST, CERTIFIED REGISTERED

## 2024-01-03 DEVICE — ABSORBABLE RELOAD
Type: IMPLANTABLE DEVICE | Site: UTERUS | Status: FUNCTIONAL
Brand: V-LOC 180

## 2024-01-03 RX ORDER — PROMETHAZINE HYDROCHLORIDE 25 MG/1
25 TABLET ORAL ONCE AS NEEDED
Status: CANCELLED | OUTPATIENT
Start: 2024-01-03

## 2024-01-03 RX ORDER — SCOLOPAMINE TRANSDERMAL SYSTEM 1 MG/1
1 PATCH, EXTENDED RELEASE TRANSDERMAL CONTINUOUS
Status: DISCONTINUED | OUTPATIENT
Start: 2024-01-03 | End: 2024-01-03 | Stop reason: HOSPADM

## 2024-01-03 RX ORDER — MIDAZOLAM HYDROCHLORIDE 1 MG/ML
1 INJECTION INTRAMUSCULAR; INTRAVENOUS
Status: DISCONTINUED | OUTPATIENT
Start: 2024-01-03 | End: 2024-01-03 | Stop reason: HOSPADM

## 2024-01-03 RX ORDER — FAMOTIDINE 10 MG/ML
20 INJECTION, SOLUTION INTRAVENOUS ONCE
Status: COMPLETED | OUTPATIENT
Start: 2024-01-03 | End: 2024-01-03

## 2024-01-03 RX ORDER — CEFAZOLIN SODIUM 2 G/100ML
2000 INJECTION, SOLUTION INTRAVENOUS ONCE
Status: COMPLETED | OUTPATIENT
Start: 2024-01-03 | End: 2024-01-03

## 2024-01-03 RX ORDER — ONDANSETRON 2 MG/ML
INJECTION INTRAMUSCULAR; INTRAVENOUS AS NEEDED
Status: DISCONTINUED | OUTPATIENT
Start: 2024-01-03 | End: 2024-01-03 | Stop reason: SURG

## 2024-01-03 RX ORDER — LABETALOL HYDROCHLORIDE 5 MG/ML
5 INJECTION, SOLUTION INTRAVENOUS
Status: DISCONTINUED | OUTPATIENT
Start: 2024-01-03 | End: 2024-01-03 | Stop reason: HOSPADM

## 2024-01-03 RX ORDER — HYDROMORPHONE HYDROCHLORIDE 1 MG/ML
0.5 INJECTION, SOLUTION INTRAMUSCULAR; INTRAVENOUS; SUBCUTANEOUS
Status: CANCELLED | OUTPATIENT
Start: 2024-01-03

## 2024-01-03 RX ORDER — FENTANYL CITRATE 50 UG/ML
50 INJECTION, SOLUTION INTRAMUSCULAR; INTRAVENOUS
Status: DISCONTINUED | OUTPATIENT
Start: 2024-01-03 | End: 2024-01-03 | Stop reason: HOSPADM

## 2024-01-03 RX ORDER — SODIUM CHLORIDE 0.9 % (FLUSH) 0.9 %
3-10 SYRINGE (ML) INJECTION AS NEEDED
Status: DISCONTINUED | OUTPATIENT
Start: 2024-01-03 | End: 2024-01-03 | Stop reason: HOSPADM

## 2024-01-03 RX ORDER — FLUMAZENIL 0.1 MG/ML
0.2 INJECTION INTRAVENOUS AS NEEDED
Status: CANCELLED | OUTPATIENT
Start: 2024-01-03

## 2024-01-03 RX ORDER — NALOXONE HCL 0.4 MG/ML
0.2 VIAL (ML) INJECTION AS NEEDED
Status: CANCELLED | OUTPATIENT
Start: 2024-01-03

## 2024-01-03 RX ORDER — DROPERIDOL 2.5 MG/ML
0.62 INJECTION, SOLUTION INTRAMUSCULAR; INTRAVENOUS
Status: CANCELLED | OUTPATIENT
Start: 2024-01-03

## 2024-01-03 RX ORDER — ROCURONIUM BROMIDE 10 MG/ML
INJECTION, SOLUTION INTRAVENOUS AS NEEDED
Status: DISCONTINUED | OUTPATIENT
Start: 2024-01-03 | End: 2024-01-03 | Stop reason: SURG

## 2024-01-03 RX ORDER — SODIUM CHLORIDE 0.9 % (FLUSH) 0.9 %
10 SYRINGE (ML) INJECTION EVERY 12 HOURS SCHEDULED
Status: DISCONTINUED | OUTPATIENT
Start: 2024-01-03 | End: 2024-01-03 | Stop reason: HOSPADM

## 2024-01-03 RX ORDER — EPHEDRINE SULFATE 50 MG/ML
5 INJECTION, SOLUTION INTRAVENOUS ONCE AS NEEDED
Status: CANCELLED | OUTPATIENT
Start: 2024-01-03

## 2024-01-03 RX ORDER — KETOROLAC TROMETHAMINE 30 MG/ML
INJECTION, SOLUTION INTRAMUSCULAR; INTRAVENOUS AS NEEDED
Status: DISCONTINUED | OUTPATIENT
Start: 2024-01-03 | End: 2024-01-03 | Stop reason: SURG

## 2024-01-03 RX ORDER — PROMETHAZINE HYDROCHLORIDE 25 MG/1
25 SUPPOSITORY RECTAL ONCE AS NEEDED
Status: DISCONTINUED | OUTPATIENT
Start: 2024-01-03 | End: 2024-01-03 | Stop reason: HOSPADM

## 2024-01-03 RX ORDER — OXYCODONE AND ACETAMINOPHEN 7.5; 325 MG/1; MG/1
1 TABLET ORAL EVERY 4 HOURS PRN
Status: DISCONTINUED | OUTPATIENT
Start: 2024-01-03 | End: 2024-01-03 | Stop reason: HOSPADM

## 2024-01-03 RX ORDER — LIDOCAINE HYDROCHLORIDE 10 MG/ML
0.5 INJECTION, SOLUTION INFILTRATION; PERINEURAL ONCE AS NEEDED
Status: DISCONTINUED | OUTPATIENT
Start: 2024-01-03 | End: 2024-01-03 | Stop reason: HOSPADM

## 2024-01-03 RX ORDER — HYDROCODONE BITARTRATE AND ACETAMINOPHEN 5; 325 MG/1; MG/1
1 TABLET ORAL EVERY 6 HOURS PRN
Qty: 20 TABLET | Refills: 0 | Status: SHIPPED | OUTPATIENT
Start: 2024-01-03 | End: 2024-01-08

## 2024-01-03 RX ORDER — OXYCODONE AND ACETAMINOPHEN 7.5; 325 MG/1; MG/1
1 TABLET ORAL EVERY 4 HOURS PRN
Status: CANCELLED | OUTPATIENT
Start: 2024-01-03 | End: 2024-01-10

## 2024-01-03 RX ORDER — PROMETHAZINE HYDROCHLORIDE 25 MG/1
25 TABLET ORAL ONCE AS NEEDED
Status: DISCONTINUED | OUTPATIENT
Start: 2024-01-03 | End: 2024-01-03 | Stop reason: HOSPADM

## 2024-01-03 RX ORDER — SODIUM CHLORIDE 0.9 % (FLUSH) 0.9 %
10 SYRINGE (ML) INJECTION AS NEEDED
Status: DISCONTINUED | OUTPATIENT
Start: 2024-01-03 | End: 2024-01-03 | Stop reason: HOSPADM

## 2024-01-03 RX ORDER — DIPHENHYDRAMINE HYDROCHLORIDE 50 MG/ML
12.5 INJECTION INTRAMUSCULAR; INTRAVENOUS
Status: CANCELLED | OUTPATIENT
Start: 2024-01-03

## 2024-01-03 RX ORDER — FENTANYL CITRATE 50 UG/ML
50 INJECTION, SOLUTION INTRAMUSCULAR; INTRAVENOUS
Status: CANCELLED | OUTPATIENT
Start: 2024-01-03

## 2024-01-03 RX ORDER — ONDANSETRON 2 MG/ML
4 INJECTION INTRAMUSCULAR; INTRAVENOUS ONCE AS NEEDED
Status: COMPLETED | OUTPATIENT
Start: 2024-01-03 | End: 2024-01-03

## 2024-01-03 RX ORDER — HYDRALAZINE HYDROCHLORIDE 20 MG/ML
5 INJECTION INTRAMUSCULAR; INTRAVENOUS
Status: DISCONTINUED | OUTPATIENT
Start: 2024-01-03 | End: 2024-01-03 | Stop reason: HOSPADM

## 2024-01-03 RX ORDER — ACETAMINOPHEN 500 MG
1000 TABLET ORAL ONCE
Status: COMPLETED | OUTPATIENT
Start: 2024-01-03 | End: 2024-01-03

## 2024-01-03 RX ORDER — GABAPENTIN 300 MG/1
600 CAPSULE ORAL ONCE
Status: COMPLETED | OUTPATIENT
Start: 2024-01-03 | End: 2024-01-03

## 2024-01-03 RX ORDER — MAGNESIUM HYDROXIDE 1200 MG/15ML
LIQUID ORAL AS NEEDED
Status: DISCONTINUED | OUTPATIENT
Start: 2024-01-03 | End: 2024-01-03 | Stop reason: HOSPADM

## 2024-01-03 RX ORDER — LABETALOL HYDROCHLORIDE 5 MG/ML
5 INJECTION, SOLUTION INTRAVENOUS
Status: CANCELLED | OUTPATIENT
Start: 2024-01-03

## 2024-01-03 RX ORDER — PROMETHAZINE HYDROCHLORIDE 25 MG/1
25 SUPPOSITORY RECTAL ONCE AS NEEDED
Status: CANCELLED | OUTPATIENT
Start: 2024-01-03

## 2024-01-03 RX ORDER — LIDOCAINE HYDROCHLORIDE 10 MG/ML
INJECTION, SOLUTION INFILTRATION; PERINEURAL AS NEEDED
Status: DISCONTINUED | OUTPATIENT
Start: 2024-01-03 | End: 2024-01-03 | Stop reason: HOSPADM

## 2024-01-03 RX ORDER — NALOXONE HCL 0.4 MG/ML
0.2 VIAL (ML) INJECTION AS NEEDED
Status: DISCONTINUED | OUTPATIENT
Start: 2024-01-03 | End: 2024-01-03 | Stop reason: HOSPADM

## 2024-01-03 RX ORDER — HYDROCODONE BITARTRATE AND ACETAMINOPHEN 5; 325 MG/1; MG/1
1 TABLET ORAL ONCE AS NEEDED
Status: COMPLETED | OUTPATIENT
Start: 2024-01-03 | End: 2024-01-03

## 2024-01-03 RX ORDER — DIPHENHYDRAMINE HYDROCHLORIDE 50 MG/ML
12.5 INJECTION INTRAMUSCULAR; INTRAVENOUS
Status: DISCONTINUED | OUTPATIENT
Start: 2024-01-03 | End: 2024-01-03 | Stop reason: HOSPADM

## 2024-01-03 RX ORDER — HYDROCODONE BITARTRATE AND ACETAMINOPHEN 5; 325 MG/1; MG/1
1 TABLET ORAL ONCE AS NEEDED
Status: CANCELLED | OUTPATIENT
Start: 2024-01-03

## 2024-01-03 RX ORDER — FENTANYL CITRATE 50 UG/ML
50 INJECTION, SOLUTION INTRAMUSCULAR; INTRAVENOUS ONCE AS NEEDED
Status: COMPLETED | OUTPATIENT
Start: 2024-01-03 | End: 2024-01-03

## 2024-01-03 RX ORDER — SODIUM CHLORIDE 9 MG/ML
40 INJECTION, SOLUTION INTRAVENOUS AS NEEDED
Status: DISCONTINUED | OUTPATIENT
Start: 2024-01-03 | End: 2024-01-03 | Stop reason: HOSPADM

## 2024-01-03 RX ORDER — LIDOCAINE HYDROCHLORIDE 20 MG/ML
INJECTION, SOLUTION INFILTRATION; PERINEURAL AS NEEDED
Status: DISCONTINUED | OUTPATIENT
Start: 2024-01-03 | End: 2024-01-03 | Stop reason: SURG

## 2024-01-03 RX ORDER — ONDANSETRON 2 MG/ML
4 INJECTION INTRAMUSCULAR; INTRAVENOUS ONCE AS NEEDED
Status: CANCELLED | OUTPATIENT
Start: 2024-01-03

## 2024-01-03 RX ORDER — EPHEDRINE SULFATE 50 MG/ML
5 INJECTION, SOLUTION INTRAVENOUS ONCE AS NEEDED
Status: DISCONTINUED | OUTPATIENT
Start: 2024-01-03 | End: 2024-01-03 | Stop reason: HOSPADM

## 2024-01-03 RX ORDER — IBUPROFEN 600 MG/1
600 TABLET ORAL EVERY 6 HOURS PRN
Qty: 30 TABLET | Refills: 0 | Status: SHIPPED | OUTPATIENT
Start: 2024-01-03

## 2024-01-03 RX ORDER — HYDROMORPHONE HYDROCHLORIDE 1 MG/ML
0.5 INJECTION, SOLUTION INTRAMUSCULAR; INTRAVENOUS; SUBCUTANEOUS
Status: DISCONTINUED | OUTPATIENT
Start: 2024-01-03 | End: 2024-01-03 | Stop reason: HOSPADM

## 2024-01-03 RX ORDER — IPRATROPIUM BROMIDE AND ALBUTEROL SULFATE 2.5; .5 MG/3ML; MG/3ML
3 SOLUTION RESPIRATORY (INHALATION) ONCE AS NEEDED
Status: DISCONTINUED | OUTPATIENT
Start: 2024-01-03 | End: 2024-01-03 | Stop reason: HOSPADM

## 2024-01-03 RX ORDER — FLUMAZENIL 0.1 MG/ML
0.2 INJECTION INTRAVENOUS AS NEEDED
Status: DISCONTINUED | OUTPATIENT
Start: 2024-01-03 | End: 2024-01-03 | Stop reason: HOSPADM

## 2024-01-03 RX ORDER — HYDRALAZINE HYDROCHLORIDE 20 MG/ML
5 INJECTION INTRAMUSCULAR; INTRAVENOUS
Status: CANCELLED | OUTPATIENT
Start: 2024-01-03

## 2024-01-03 RX ORDER — DEXAMETHASONE SODIUM PHOSPHATE 4 MG/ML
INJECTION, SOLUTION INTRA-ARTICULAR; INTRALESIONAL; INTRAMUSCULAR; INTRAVENOUS; SOFT TISSUE AS NEEDED
Status: DISCONTINUED | OUTPATIENT
Start: 2024-01-03 | End: 2024-01-03 | Stop reason: SURG

## 2024-01-03 RX ORDER — IPRATROPIUM BROMIDE AND ALBUTEROL SULFATE 2.5; .5 MG/3ML; MG/3ML
3 SOLUTION RESPIRATORY (INHALATION) ONCE AS NEEDED
Status: CANCELLED | OUTPATIENT
Start: 2024-01-03

## 2024-01-03 RX ORDER — DROPERIDOL 2.5 MG/ML
0.62 INJECTION, SOLUTION INTRAMUSCULAR; INTRAVENOUS
Status: DISCONTINUED | OUTPATIENT
Start: 2024-01-03 | End: 2024-01-03 | Stop reason: HOSPADM

## 2024-01-03 RX ORDER — SODIUM CHLORIDE 0.9 % (FLUSH) 0.9 %
3 SYRINGE (ML) INJECTION EVERY 12 HOURS SCHEDULED
Status: DISCONTINUED | OUTPATIENT
Start: 2024-01-03 | End: 2024-01-03 | Stop reason: HOSPADM

## 2024-01-03 RX ORDER — ONDANSETRON 4 MG/1
4 TABLET, FILM COATED ORAL DAILY PRN
Qty: 30 TABLET | Refills: 1 | Status: SHIPPED | OUTPATIENT
Start: 2024-01-03 | End: 2025-01-02

## 2024-01-03 RX ORDER — SODIUM CHLORIDE, SODIUM LACTATE, POTASSIUM CHLORIDE, CALCIUM CHLORIDE 600; 310; 30; 20 MG/100ML; MG/100ML; MG/100ML; MG/100ML
9 INJECTION, SOLUTION INTRAVENOUS CONTINUOUS
Status: DISCONTINUED | OUTPATIENT
Start: 2024-01-03 | End: 2024-01-03 | Stop reason: HOSPADM

## 2024-01-03 RX ORDER — PROPOFOL 10 MG/ML
INJECTION, EMULSION INTRAVENOUS AS NEEDED
Status: DISCONTINUED | OUTPATIENT
Start: 2024-01-03 | End: 2024-01-03 | Stop reason: SURG

## 2024-01-03 RX ADMIN — ONDANSETRON 4 MG: 2 INJECTION INTRAMUSCULAR; INTRAVENOUS at 14:48

## 2024-01-03 RX ADMIN — FENTANYL CITRATE 50 MCG: 50 INJECTION, SOLUTION INTRAMUSCULAR; INTRAVENOUS at 13:46

## 2024-01-03 RX ADMIN — SODIUM CHLORIDE, POTASSIUM CHLORIDE, SODIUM LACTATE AND CALCIUM CHLORIDE 9 ML/HR: 600; 310; 30; 20 INJECTION, SOLUTION INTRAVENOUS at 10:47

## 2024-01-03 RX ADMIN — FAMOTIDINE 20 MG: 10 INJECTION INTRAVENOUS at 10:47

## 2024-01-03 RX ADMIN — KETOROLAC TROMETHAMINE 30 MG: 30 INJECTION, SOLUTION INTRAMUSCULAR at 14:47

## 2024-01-03 RX ADMIN — HYDROCODONE BITARTRATE AND ACETAMINOPHEN 1 TABLET: 5; 325 TABLET ORAL at 16:34

## 2024-01-03 RX ADMIN — SUGAMMADEX 100 MG: 100 INJECTION, SOLUTION INTRAVENOUS at 15:12

## 2024-01-03 RX ADMIN — SCOPALAMINE 1 PATCH: 1 PATCH, EXTENDED RELEASE TRANSDERMAL at 10:57

## 2024-01-03 RX ADMIN — ACETAMINOPHEN 1000 MG: 500 TABLET ORAL at 10:58

## 2024-01-03 RX ADMIN — CEFAZOLIN SODIUM 2000 MG: 2 INJECTION, SOLUTION INTRAVENOUS at 13:38

## 2024-01-03 RX ADMIN — FENTANYL CITRATE 50 MCG: 50 INJECTION, SOLUTION INTRAMUSCULAR; INTRAVENOUS at 14:12

## 2024-01-03 RX ADMIN — LIDOCAINE HYDROCHLORIDE 100 MG: 20 INJECTION, SOLUTION INFILTRATION; PERINEURAL at 13:47

## 2024-01-03 RX ADMIN — HYDROMORPHONE HYDROCHLORIDE 0.5 MG: 1 INJECTION, SOLUTION INTRAMUSCULAR; INTRAVENOUS; SUBCUTANEOUS at 15:16

## 2024-01-03 RX ADMIN — HYDROMORPHONE HYDROCHLORIDE 0.5 MG: 1 INJECTION, SOLUTION INTRAMUSCULAR; INTRAVENOUS; SUBCUTANEOUS at 16:34

## 2024-01-03 RX ADMIN — DEXAMETHASONE SODIUM PHOSPHATE 4 MG: 4 INJECTION, SOLUTION INTRAMUSCULAR; INTRAVENOUS at 14:48

## 2024-01-03 RX ADMIN — ONDANSETRON 4 MG: 2 INJECTION INTRAMUSCULAR; INTRAVENOUS at 18:11

## 2024-01-03 RX ADMIN — MIDAZOLAM 1 MG: 1 INJECTION INTRAMUSCULAR; INTRAVENOUS at 10:47

## 2024-01-03 RX ADMIN — ROCURONIUM BROMIDE 50 MG: 10 INJECTION, SOLUTION INTRAVENOUS at 13:47

## 2024-01-03 RX ADMIN — PROPOFOL 100 MG: 10 INJECTION, EMULSION INTRAVENOUS at 13:47

## 2024-01-03 RX ADMIN — GABAPENTIN 600 MG: 300 CAPSULE ORAL at 10:58

## 2024-01-03 NOTE — OP NOTE
Patient Name: Ursula Kulkarni  :  1975  MRN:  [id number]      Date of Service:  24      Surgeon: Rocio Manning MD   Assistant: Deepa Nash MD   Pre-operative diagnosis(es): Hormone-receptor positive breast cancer   Uterine fibroid     Post-operative diagnosis(es): same   Procedure(s): Procedure(s):  TOTAL LAPAROSCOPIC HYSTERECTOMY, BILATERAL SALPIGO-OOPHORECTOMY, CYSTOSCOPY           Anesthesia: Type: General         Operative findings: Laparoscopic survey revealed a normal-sized uterus with a 4cm posterior fibroid. The adnexa were normal in appearance bilaterally. There were no intraabdominal adhesions. On cystoscopy post-procedure the bladder was intact and there were ureteral jets bilaterally     Specimens removed: Uterus, cervix, bilateral fallopian tubes, bilateral ovaries           EBL: 50cc    Procedure:   The abdomen and vagina were prepped and draped in the normal sterile fashion. A De La Torre catheter was inserted.  A speculum was placed into the vagina and the cervix was grasped with a single-tooth tenaculum and the uterus was sounded to 8 cm. The V care uterine manipulator with medium-sized cup was then properly placed.     Attention was then turned to the abdomen where a 10mm Dhaliwal port was placed within the umbilicus using standard open technique. Adequate pneumoperitoneum was obtained and laparoscopic survey completed as above. A 10mm port was placed in the right lower quadrant and a 5mm port placed in the left lower quadrant under direct visualization. The IP ligaments were coagulated and cut using the ligasure.  The round ligaments on either side were coagulated and cut using the Ligasure device. The broad ligament was sequentially opened and uterine arteries skeletonized. A bladder flap was created using a combination of blunt and sharp dissection and the bladder was dissected away from the cervix.   The uterine arteries were then coagulated and cut using the ligasure  device. The colpotomy was made using monopolar cautery circumfirentially, freeing the uterus from the surrounding vagina. The uterus was then  delivered through the vagina. The vaginal cuff was closed using V loc suture with the endostitch device. The entire pelvis was irrigated and noted to be hemostatic.     A cystoscopy was performed after insertion of 180cc of normal saline in to the bladder and bilateral ureteral jets were noted. The right lower quadrant port was closed with 0 vicryl using the Pedro Salomon device. The left lower quadrant port was removed under direct visualization. The umbilical port site fascia was closed with 0 vicryl. All skin incisions were closed with 4-0 vicryl using  subcuticular stitches. Steri-Strips were placed. The final needle, sponge, and instrument count was correct. The patient tolerated the procedure well. Patient to the recovery room in good condition.                                              Rocio Manning MD  01/03/24  15:28 EST

## 2024-01-03 NOTE — ANESTHESIA PREPROCEDURE EVALUATION
Anesthesia Evaluation     Patient summary reviewed and Nursing notes reviewed   NPO Solid Status: > 8 hours  NPO Liquid Status: > 4 hours           Airway   Mallampati: II  TM distance: >3 FB  Neck ROM: full  No difficulty expected  Comment: Grade I view with Nolan 2  Dental - normal exam     Pulmonary - normal exam    breath sounds clear to auscultation  Cardiovascular - normal exam    ECG reviewed  Rhythm: regular  Rate: normal    (+) hypertension less than 2 medications, hyperlipidemia    ROS comment: EF 67%, trace MR and trace TR by ECHO 10/23    Neuro/Psych  (+) psychiatric history Anxiety and ADD    ROS Comment: Hx vertigo  GI/Hepatic/Renal/Endo    (+) obesity, GERD, thyroid problem hypothyroidism and thyroid nodules    Musculoskeletal     Abdominal   (+) obese   Substance History      OB/GYN          Other      history of cancer      Other Comment: Hx melanoma/left breast CA, s/p left mastectomy      Phys Exam Other: Mediport right chest              Anesthesia Plan    ASA 2     general     intravenous induction     Anesthetic plan, risks, benefits, and alternatives have been provided, discussed and informed consent has been obtained with: patient.      CODE STATUS:

## 2024-01-03 NOTE — ANESTHESIA PROCEDURE NOTES
Airway  Urgency: elective    Date/Time: 1/3/2024 1:57 PM  Airway not difficult    General Information and Staff    Patient location during procedure: OR  Anesthesiologist: Julito Mcconnell MD  CRNA/CAA: Max Goodson CRNA    Indications and Patient Condition  Indications for airway management: airway protection    Preoxygenated: yes  MILS maintained throughout  Mask difficulty assessment: 1 - vent by mask    Final Airway Details  Final airway type: endotracheal airway      Successful airway: ETT  Cuffed: yes   Successful intubation technique: lighted stylet  Endotracheal tube insertion site: oral  ETT size (mm): 7.0  Number of attempts at approach: 1  Assessment: lips, teeth, and gum same as pre-op and atraumatic intubation

## 2024-01-03 NOTE — H&P
[facility]   HISTORY AND PHYSICAL    Patient Name:Ursula Kulkarni  : 1975  MRN: [id number]  Primary Care Physician: Phuong Wild APRN  Date of admission: 1/3/2024    Subjective   Subjective     Chief Complaint: s/p treatment for breast cancer, uterine fibroids    History of Present Illness   Ursula Kulkarni is a 48 y.o. female with a history of hormone-receptor positive breast cancer ready for BSO to complete surgical menopause. Additionally she has fibroids with some bulk symptoms so requesting definitive hysterectomy.     Review of Systems      Personal History     Past Medical History:   Diagnosis Date    ADD (attention deficit disorder)     Breast cancer 2023    LEFT SIDE, HX BILATERAL MASTECTOMY, LAST CHEMO 2023, LAST RADIATION 10/23/23  - TARGETED THERAPY EVERY 3 WEEKS CURRENTLY - FOLLOWED BY [facility]    GERD (gastroesophageal reflux disease)     Graves disease     RADIOACTIVE IODINE TREAMENT IN PAST    History of COVID-2022    History of gestational diabetes     History of melanoma     History of radioactive iodine thyroid ablation     History of vertigo     Hyperlipidemia     DIET CONTROLLED    Hypertension     Hypothyroidism     Uterine fibroid        Past Surgical History:   Procedure Laterality Date    BREAST RECONSTRUCTION Bilateral 3/8/2023    Procedure: BILATERAL PLACEMENT  TISSUE EXPANDER AND ALLODERM;  Surgeon: Eamon Stone MD;  Location: [facility];  Service: Plastics;  Laterality: Bilateral;    D & C CERVICAL BIOPSY  2006    INTRAUTERINE DEVICE INSERTION      Mirena    INTRAUTERINE DEVICE INSERTION      Mirena exchange, old IUD removal and new one inserted by Dr Carr, Lot # [id number] exp .mar    MASTECTOMY W/ SENTINEL NODE BIOPSY Bilateral 3/8/2023    Procedure: Bilateral total mastectomy, left axillary sentinel lymph node biopsy;  Surgeon: Kell Hi MD;  Location: [facility];  Service: General;  Laterality:  Bilateral;    SCAR REVISION BREAST Right 4/3/2023    Procedure: RIGHT MASTECTOMY REVISION;  Surgeon: Eamon Stone MD;  Location: [facility] MAIN OR;  Service: Plastics;  Laterality: Right;    VENOUS ACCESS DEVICE (PORT) INSERTION Right 4/13/2023    Procedure: right port placement;  Surgeon: Kell Hi MD;  Location: [facility] MAIN OR;  Service: General;  Laterality: Right;    WISDOM TOOTH EXTRACTION         Family History: Her family history includes Arthritis in her father; Cancer in her maternal grandfather; Colon polyps in her father; Diabetes in her father and sister; Heart attack in her sister; Heart attack (age of onset: 41) in her father; Heart disease in her father and paternal grandfather; Heart failure in her paternal grandmother; Hyperlipidemia in her father and sister; Hypertension in her father and sister; Lung cancer in her maternal grandfather and maternal grandmother; Melanoma in her father; Prostate cancer in her father; Thyroid cancer in her paternal aunt; Transient ischemic attack in her paternal grandmother.     Social History: She  reports that she has never smoked. She has never used smokeless tobacco. She reports that she does not currently use alcohol. She reports that she does not use drugs.    Home Medications:  Biotin, Collagen, Glucosamine, Probiotic Product, Vitamin D3, Zinc, levothyroxine, lidocaine-prilocaine, lisinopril, loratadine, magnesium oxide, pantoprazole, and vitamin C    Allergies:  She is allergic to erythromycin and penicillins.    Objective    Objective     Vitals:    Temp:  [97.8 °F (36.6 °C)] 97.8 °F (36.6 °C)  Heart Rate:  [71] 71  Resp:  [18] 18  BP: (135)/(97) 135/97    Physical Exam   See most recent clinic note  Result Review    Result Review:  I have personally reviewed the results from the time of this admission to 1/3/2024 10:45 EST and agree with these findings:  [x]  Laboratory list / accordion  []  Microbiology  [x]  Radiology  []  EKG/Telemetry    []  Cardiology/Vascular   []  Pathology  []  Old records  []  Other:        Assessment & Plan   Assessment / Plan     Brief Patient Summary:  Ursula Kulkarni is a 48 y.o. female with hormone-receptor positive breast cancer and uterine fibroids    Active Hospital Problems:  There are no active hospital problems to display for this patient.    Plan:   Reviewed ultrasound findings and plan for surgery to include TLH/BSO/cystoscopy. Discussed plan for laparoscopic approach, possible risks including bleeding, infection, damage to surrounding structures, need for open approach, need for further surgery. Reviewed expectations for recovery. All questions answered and consent obtained.    DVT prophylaxis:  No DVT prophylaxis order currently exists.    Rocio Manning MD

## 2024-01-03 NOTE — DISCHARGE INSTRUCTIONS
Scopolamine Patch  This patch has been applied to the skin behind one of your ears.  It may stay in place up to 24 hours. You may remove it at any time after your surgery; however, it should be removed after you are up and walking around the next day.  This medicine reduces stomach upset. Side effects may include: dry mouth, dizziness, sleepiness, constipation, or upset stomach.  An allergy would show up as: a rash, itching, wheezing or shortness of breath.  Follow these instructions:  Do not drink alcohol, drive or operate machinery while taking this medicine.  Wear only 1 patch at a time. You can leave the patch on for up to 24 hours.  When you remove the patch, fold it in half with the sticky sides together and throw it away. Wash your hands and the area under the patch.  Do not touch your eye with your hand if it has touched the patch.  Wash your hands well before and after touching the patch.  Sit or stand slowly to avoid dizziness.  Call your doctor if you have:  Any sign of allergy  No relief  Trouble passing urine  Any new or severe symptoms

## 2024-01-03 NOTE — ANESTHESIA POSTPROCEDURE EVALUATION
Patient: Ursula Kulkarni    Procedure Summary       Date: 01/03/24 Room / Location: [facility] OR  / [facility] MAIN OR    Anesthesia Start: 1342 Anesthesia Stop: 1536    Procedure: TOTAL LAPAROSCOPIC HYSTERECTOMY, BILATERAL SALPIGO-OOPHORECTOMY, CYSTOSCOPY (Bilateral: Abdomen) Diagnosis:     Surgeons: Rocio Manning MD Provider: Julito Mcconnell MD    Anesthesia Type: general ASA Status: 2            Anesthesia Type: general    Vitals  Vitals Value Taken Time   /92 01/03/24 1715   Temp 36.4 °C (97.6 °F) 01/03/24 1532   Pulse 77 01/03/24 1728   Resp 16 01/03/24 1540   SpO2 98 % 01/03/24 1727   Vitals shown include unfiled device data.        Post Anesthesia Care and Evaluation    Patient location during evaluation: bedside  Patient participation: complete - patient participated  Level of consciousness: awake and alert  Pain management: adequate    Airway patency: patent  Anesthetic complications: No anesthetic complications  PONV Status: controlled  Cardiovascular status: acceptable and hemodynamically stable  Respiratory status: acceptable, spontaneous ventilation and nonlabored ventilation  Hydration status: acceptable    Comments: /89 (BP Location: Right arm, Patient Position: Lying)   Pulse 58   Temp 36.4 °C (97.6 °F) (Oral)   Resp 16   SpO2 100%

## 2024-01-05 LAB
LAB AP CASE REPORT: NORMAL
PATH REPORT.FINAL DX SPEC: NORMAL
PATH REPORT.GROSS SPEC: NORMAL

## 2024-01-08 ENCOUNTER — OFFICE VISIT (OUTPATIENT)
Dept: FAMILY MEDICINE CLINIC | Facility: CLINIC | Age: 49
End: 2024-01-08
Payer: COMMERCIAL

## 2024-01-08 VITALS
HEIGHT: 64 IN | SYSTOLIC BLOOD PRESSURE: 122 MMHG | WEIGHT: 175 LBS | BODY MASS INDEX: 29.88 KG/M2 | HEART RATE: 98 BPM | DIASTOLIC BLOOD PRESSURE: 72 MMHG | OXYGEN SATURATION: 95 %

## 2024-01-08 DIAGNOSIS — C50.812 MALIGNANT NEOPLASM OF OVERLAPPING SITES OF LEFT BREAST IN FEMALE, ESTROGEN RECEPTOR POSITIVE: ICD-10-CM

## 2024-01-08 DIAGNOSIS — Z00.00 ENCOUNTER FOR ANNUAL PHYSICAL EXAM: Primary | ICD-10-CM

## 2024-01-08 DIAGNOSIS — C50.912 BREAST CARCINOMA, LOBULAR, LEFT: ICD-10-CM

## 2024-01-08 DIAGNOSIS — Z12.11 SCREEN FOR COLON CANCER: ICD-10-CM

## 2024-01-08 DIAGNOSIS — R12 HEARTBURN: ICD-10-CM

## 2024-01-08 DIAGNOSIS — Z17.0 MALIGNANT NEOPLASM OF OVERLAPPING SITES OF LEFT BREAST IN FEMALE, ESTROGEN RECEPTOR POSITIVE: ICD-10-CM

## 2024-01-08 DIAGNOSIS — E78.5 MILD HYPERLIPIDEMIA: ICD-10-CM

## 2024-01-08 DIAGNOSIS — E89.0 POSTABLATIVE HYPOTHYROIDISM: ICD-10-CM

## 2024-01-08 DIAGNOSIS — J30.9 ALLERGIC RHINITIS, UNSPECIFIED SEASONALITY, UNSPECIFIED TRIGGER: ICD-10-CM

## 2024-01-08 NOTE — PATIENT INSTRUCTIONS
Continue to monitor your blood pressure periodically and record results.  Continue to work on healthy diet and exercise.  Follow up pending lab results.  Follow up in 6 months, or sooner if problems or concerns.   Follow up as scheduled with oncology.

## 2024-01-08 NOTE — PROGRESS NOTES
Subjective   Ursula Kulkarni is a 48 y.o. female.     Chief Complaint   Patient presents with    Annual Exam       History of Present Illness   Patient presents for CPE with fasting labs; pretty healthy diet, gets fruits and vegetables and lean meats; recently met with nutritionist; discussed estrogen is stored in fat and needs to watch weight gain to prevent recurrence of breast cancer; regular exercise, has been working out 5-7 days per week for last 6 weeks; has been doing some strength training; has not been working out since surgery last week; no recent dental visits; last eye exam 12/2023, has new bifocals; no problems hearing; immunizations: declines flu and COVID-19 vaccine, will consider PCV20; sees Dr. Rocio Manning for female care; last PAP 1/2023, had hysterectomy last week due to fibroids and breast cancer, told no uterine cancer; last mammo 1/2023 when diagnosed with breast cancer; had mastectomy 3/8/23; maternal great aunt with family history of breast cancer; colonoscopy never performed due to diagnosed with breast cancer; no family history of colon cancer.     F/U breast cancer: had bilateral mastectomy; has been doing chemo/radiation; still doing chemo every 3 weeks; will finish chemo in April; recent hysterectomy last week; has taken Zofran for nausea a couple of times as needed and helps; doing well; hoping to go back to work 1/15/24; started Facebook page: Breast Season of My Life.    Saw oncology cardiologist and started low dose Lisinopril if needed in case chemo triggered elevated BP; Echo has been good; sees cardio every 3 months; typically takes Lisinopril 3 times per week.    Has had drip from nose since lost hair; has tried Sudafed to see if would help, but has not really helped; will try Zyrtec instead.    Sees plastic surgery next month to set up reconstruction surgery in May.    F/U heartburn: takes Pantoprazole daily and works well; sometimes misses a dose and no problems; will have  symptoms if eats something spicy and has not taken Pantoprazole.    F/U Hypothyroidism: takes Levothyroxine daily on empty stomach; no missed doses.    Had low magnesium with radiation and had IV infusion and then started daily magnesium supplement.      The following portions of the patient's history were reviewed and updated as appropriate: allergies, current medications, past family history, past medical history, past social history, past surgical history and problem list.    Current Outpatient Medications on File Prior to Visit   Medication Sig    BIOTIN PO Take  by mouth Daily.    Black Currant Seed Oil 500 MG capsule Take 1 capsule by mouth Daily.    Cholecalciferol (Vitamin D3) 250 MCG (10869 UT) tablet Take 10,000 Units by mouth Daily.    COLLAGEN PO Take 1 tablet by mouth Daily. TO HOLD 1 WEEK PRIOR TO OR    Glucosamine 500 MG capsule Take 1 capsule by mouth Daily. TO HOLD 1 WEEK PRIOR TO OR    ibuprofen (ADVIL,MOTRIN) 600 MG tablet Take 1 tablet by mouth Every 6 (Six) Hours As Needed for Mild Pain.    levothyroxine (SYNTHROID, LEVOTHROID) 125 MCG tablet Take 1 tablet by mouth Daily.    lidocaine-prilocaine (EMLA) 2.5-2.5 % cream Apply 1 application topically to the appropriate area as directed As Needed for Mild Pain (apply pea-sized amount to port site 30 minutes prior to port being accessed).    lisinopril (PRINIVIL,ZESTRIL) 2.5 MG tablet Take 1 tablet by mouth once daily (Patient taking differently: Take 1 tablet by mouth As Needed (SBP > 120). Take if SBP above 120)    loratadine (CLARITIN) 10 MG tablet Take 1 tablet by mouth As Needed.    magnesium oxide (MAG-OX) 400 MG tablet Take 1 tablet by mouth Every Night.    pantoprazole (PROTONIX) 40 MG EC tablet Take 1 tablet by mouth Daily.    Probiotic Product (PROBIOTIC ADVANCED PO) Take 1 tablet by mouth Daily.    vitamin C (ASCORBIC ACID) 250 MG tablet Take 1 tablet by mouth Daily.    Zinc 50 MG tablet Take 1 tablet by mouth Daily.    ondansetron  (Zofran) 4 MG tablet Take 1 tablet by mouth Daily As Needed for Nausea or Vomiting. (Patient not taking: Reported on 1/8/2024)     No current facility-administered medications on file prior to visit.        Past Medical History:   Diagnosis Date    ADD (attention deficit disorder)     Breast cancer 03/08/2023    LEFT SIDE, HX BILATERAL MASTECTOMY, LAST CHEMO 8/2023, LAST RADIATION 10/23/23  - TARGETED THERAPY EVERY 3 WEEKS CURRENTLY - FOLLOWED BY [facility]    GERD (gastroesophageal reflux disease)     Graves disease     RADIOACTIVE IODINE TREAMENT IN PAST    History of COVID-19 2022    History of gestational diabetes 2010    History of melanoma     History of radioactive iodine thyroid ablation 2012    History of vertigo     Hyperlipidemia     DIET CONTROLLED    Hypertension     Hypothyroidism     Uterine fibroid        Past Surgical History:   Procedure Laterality Date    BREAST RECONSTRUCTION Bilateral 3/8/2023    Procedure: BILATERAL PLACEMENT  TISSUE EXPANDER AND ALLODERM;  Surgeon: Eamon Stone MD;  Location: [facility];  Service: Plastics;  Laterality: Bilateral;    D & C CERVICAL BIOPSY  01/2006    INTRAUTERINE DEVICE INSERTION  2010    Mirena    INTRAUTERINE DEVICE INSERTION  2015    Mirena exchange, old IUD removal and new one inserted by Dr Carr, Lot # [id number] exp 09/17.mar    MASTECTOMY W/ SENTINEL NODE BIOPSY Bilateral 3/8/2023    Procedure: Bilateral total mastectomy, left axillary sentinel lymph node biopsy;  Surgeon: Kell Hi MD;  Location: [facility] OR;  Service: General;  Laterality: Bilateral;    SCAR REVISION BREAST Right 4/3/2023    Procedure: RIGHT MASTECTOMY REVISION;  Surgeon: Eamon Stone MD;  Location: [facility] OR;  Service: Plastics;  Laterality: Right;    TOTAL LAPAROSCOPIC HYSTERECTOMY Bilateral 1/3/2024    Procedure: TOTAL LAPAROSCOPIC HYSTERECTOMY, BILATERAL SALPIGO-OOPHORECTOMY, CYSTOSCOPY;  Surgeon: Rocio Manning MD;  Location: [facility]  OR;  Service: Obstetrics/Gynecology;  Laterality: Bilateral;    VENOUS ACCESS DEVICE (PORT) INSERTION Right 4/13/2023    Procedure: right port placement;  Surgeon: Kell Hi MD;  Location: [facility] MAIN OR;  Service: General;  Laterality: Right;    WISDOM TOOTH EXTRACTION         Family History   Problem Relation Age of Onset    Diabetes Father     Hyperlipidemia Father     Arthritis Father     Hypertension Father     Heart disease Father         S/P stents    Heart attack Father 41    Colon polyps Father     Melanoma Father     Prostate cancer Father     Alcohol abuse Father     Hyperlipidemia Sister     Hypertension Sister     Heart attack Sister     Diabetes Sister     Thyroid cancer Paternal Aunt     Lung cancer Maternal Grandmother     Cancer Maternal Grandfather         Bladder cancer    Lung cancer Maternal Grandfather     Heart failure Paternal Grandmother     Transient ischemic attack Paternal Grandmother     Heart disease Paternal Grandfather     COPD Sister         Heavy smoker    Heart disease Sister     Malig Hyperthermia Neg Hx        Social History     Socioeconomic History    Marital status:      Spouse name: Parish    Number of children: 2   Tobacco Use    Smoking status: Never    Smokeless tobacco: Never   Vaping Use    Vaping Use: Never used   Substance and Sexual Activity    Alcohol use: Not Currently     Comment: RARELY    Drug use: Never    Sexual activity: Not Currently     Partners: Male     Birth control/protection: Condom, Hysterectomy     Comment:         Review of Systems   Constitutional:  Negative for appetite change, chills, fever, unexpected weight gain and unexpected weight loss. Fatigue: not too bad; overall feeling pretty good.  HENT:  Negative for ear pain, sinus pressure, sore throat and trouble swallowing. Rhinorrhea: see HPI.   Eyes:  Negative for blurred vision and discharge.   Respiratory:  Negative for cough, chest tightness and shortness of breath.   "  Cardiovascular:  Negative for chest pain, palpitations and leg swelling.   Gastrointestinal:  Negative for abdominal pain, blood in stool, constipation and diarrhea.   Endocrine: Negative for cold intolerance, heat intolerance and polydipsia.   Genitourinary:  Negative for dysuria and frequency.   Musculoskeletal:  Negative for back pain. Arthralgias: mild, has improved since has been working out more and taking OTC supplements.  Skin:  Negative for rash and skin lesions.   Neurological:  Negative for dizziness, syncope, light-headedness and headache.   Hematological:  Does not bruise/bleed easily.   Psychiatric/Behavioral:  Negative for depressed mood. Sleep disturbance: some trouble staying asleep, attributes to hot flashes.The patient is not nervous/anxious.        Objective   Vitals:    01/08/24 1513   BP: 122/72   BP Location: Right arm   Patient Position: Sitting   Cuff Size: Adult   Pulse: 98   SpO2: 95%   Weight: 79.4 kg (175 lb)   Height: 162.6 cm (64\")     Body mass index is 30.04 kg/m².    Physical Exam  Vitals and nursing note reviewed.   Constitutional:       General: She is not in acute distress.     Appearance: She is well-developed and well-groomed. She is not diaphoretic.   HENT:      Head: Normocephalic and atraumatic.      Jaw: No tenderness or pain on movement.      Right Ear: External ear normal. No decreased hearing noted. Impacted cerumen: cerumen blocking TM.      Left Ear: External ear normal. No decreased hearing noted. Impacted cerumen: cerumen partially blocking TM. Tympanic membrane is not erythematous.      Nose: Nose normal.      Right Sinus: No maxillary sinus tenderness or frontal sinus tenderness.      Left Sinus: No maxillary sinus tenderness or frontal sinus tenderness.      Mouth/Throat:      Mouth: Mucous membranes are moist.      Pharynx: No oropharyngeal exudate or posterior oropharyngeal erythema.   Eyes:      Extraocular Movements: Extraocular movements intact.      " Conjunctiva/sclera: Conjunctivae normal.      Pupils: Pupils are equal, round, and reactive to light.   Neck:      Thyroid: No thyromegaly.      Vascular: No carotid bruit.      Trachea: No tracheal deviation.   Cardiovascular:      Rate and Rhythm: Normal rate and regular rhythm.      Pulses: Normal pulses.      Heart sounds: Normal heart sounds. No murmur heard.  Pulmonary:      Effort: Pulmonary effort is normal. No respiratory distress.      Breath sounds: Normal breath sounds.   Abdominal:      General: Bowel sounds are normal.      Palpations: Abdomen is soft. There is no hepatomegaly or splenomegaly.      Tenderness: Tenderness: mild tenderness in area of recent incisions. There is no rebound.      Comments: Incisions well approximated and secure with tegaderm; no erythema or drainage   Musculoskeletal:         General: Normal range of motion.      Cervical back: Normal range of motion and neck supple. No bony tenderness.      Thoracic back: No bony tenderness.      Lumbar back: No bony tenderness.      Right lower leg: No edema.      Left lower leg: No edema.   Lymphadenopathy:      Cervical: No cervical adenopathy.   Skin:     General: Skin is warm and dry.      Findings: No rash.   Neurological:      Mental Status: She is alert and oriented to person, place, and time.      Cranial Nerves: No cranial nerve deficit.      Motor: Motor function is intact.      Coordination: Coordination normal.      Gait: Gait normal.      Deep Tendon Reflexes: Reflexes are normal and symmetric.   Psychiatric:         Mood and Affect: Mood normal.         Behavior: Behavior normal.         Thought Content: Thought content normal.         Cognition and Memory: Cognition normal.         Judgment: Judgment normal.         Lab Results   Component Value Date    WBC 6.54 12/21/2023    RBC 4.06 12/21/2023    HGB 12.6 12/21/2023    HCT 36.1 12/21/2023    MCV 88.9 12/21/2023    MCH 31.0 12/21/2023    MCHC 34.9 12/21/2023    RDW 12.1  (L) 12/21/2023    RDWSD 40.0 12/21/2023    MPV 9.6 12/21/2023     12/21/2023    NEUTRORELPCT 55.5 12/21/2023    LYMPHORELPCT 31.7 12/21/2023    MONORELPCT 7.2 12/21/2023    EOSRELPCT 4.7 12/21/2023    BASORELPCT 0.6 12/21/2023    AUTOIGPER 0.3 12/21/2023    NEUTROABS 3.63 12/21/2023    LYMPHSABS 2.07 12/21/2023    MONOSABS 0.47 12/21/2023    EOSABS 0.31 12/21/2023    BASOSABS 0.04 12/21/2023    AUTOIGNUM 0.02 12/21/2023    NRBC 0.0 12/21/2023     Lab Results   Component Value Date    GLUCOSE 102 (H) 12/21/2023    BUN 11 12/21/2023    CREATININE 0.84 12/21/2023    EGFRIFNONA 79 10/05/2021    EGFRIFAFRI 91 10/05/2021    BCR 13.1 12/21/2023    K 3.8 12/21/2023    CO2 25.8 12/21/2023    CALCIUM 9.9 12/21/2023    PROTENTOTREF 7.1 10/11/2022    ALBUMIN 4.2 12/21/2023    LABIL2 2.2 10/11/2022    AST 39 (H) 12/21/2023    ALT 55 (H) 12/21/2023      Lab Results   Component Value Date    CHLPL 216 (H) 10/11/2022    TRIG 87 10/11/2022    HDL 47 10/11/2022    VLDL 16 10/11/2022     (H) 10/11/2022     Lab Results   Component Value Date    TSH 0.944 08/10/2023       Assessment    Problem List Items Addressed This Visit       Hypothyroidism    Current Assessment & Plan     Continue Levothyroxine daily.         Relevant Orders    TSH Rfx On Abnormal To Free T4    Mild hyperlipidemia    Current Assessment & Plan     Continue to work on healthy diet and exercise as tolerated.         Relevant Orders    Lipid Panel With LDL / HDL Ratio    Heartburn    Current Assessment & Plan     Stable.  Continue Pantoprazole daily.         Allergic rhinitis    Current Assessment & Plan     Try Zyrtec daily instead of Claritin.         Malignant neoplasm of overlapping sites of left breast in female, estrogen receptor positive    Current Assessment & Plan     Follow up as scheduled with oncology.         Breast carcinoma, lobular, left    Overview     Added automatically from request for surgery [id number]         Current Assessment & Plan      Follow up as scheduled with oncology.          Other Visit Diagnoses       Encounter for annual physical exam    -  Primary    Relevant Orders    Lipid Panel With LDL / HDL Ratio    Screen for colon cancer        Relevant Orders    Ambulatory Referral to Gastroenterology            Return in about 6 months (around 7/8/2024) for Recheck.or sooner if problems or concerns.  Impression: Health maintenance visit.  Currently, eats a pretty healthy diet and has a regular exercise routine, currently exercise on hold pending release after recent hysterectomy.  Cervical cancer screening: UTD per GYN.  Breast cancer screening: UTD per GYN/oncology.  Colorectal cancer screening: referred for colonoscopy.  Screening lab work includes: lipid.  Immunizations:  declines flu and COVID-19 vaccine, will consider PCV20; risks and benefits of immunizations were discussed with patient.  Advice and education were given regarding nutrition and aerobic exercise.

## 2024-01-11 ENCOUNTER — OFFICE VISIT (OUTPATIENT)
Dept: ONCOLOGY | Facility: CLINIC | Age: 49
End: 2024-01-11
Payer: COMMERCIAL

## 2024-01-11 ENCOUNTER — INFUSION (OUTPATIENT)
Dept: ONCOLOGY | Facility: HOSPITAL | Age: 49
End: 2024-01-11
Payer: COMMERCIAL

## 2024-01-11 VITALS
BODY MASS INDEX: 29.57 KG/M2 | RESPIRATION RATE: 16 BRPM | WEIGHT: 173.2 LBS | OXYGEN SATURATION: 100 % | SYSTOLIC BLOOD PRESSURE: 145 MMHG | HEART RATE: 78 BPM | HEIGHT: 64 IN | TEMPERATURE: 98 F | DIASTOLIC BLOOD PRESSURE: 95 MMHG

## 2024-01-11 DIAGNOSIS — Z17.0 MALIGNANT NEOPLASM OF OVERLAPPING SITES OF LEFT BREAST IN FEMALE, ESTROGEN RECEPTOR POSITIVE: ICD-10-CM

## 2024-01-11 DIAGNOSIS — Z17.0 MALIGNANT NEOPLASM OF OVERLAPPING SITES OF LEFT BREAST IN FEMALE, ESTROGEN RECEPTOR POSITIVE: Primary | ICD-10-CM

## 2024-01-11 DIAGNOSIS — E89.0 POSTABLATIVE HYPOTHYROIDISM: ICD-10-CM

## 2024-01-11 DIAGNOSIS — C50.812 MALIGNANT NEOPLASM OF OVERLAPPING SITES OF LEFT BREAST IN FEMALE, ESTROGEN RECEPTOR POSITIVE: Primary | ICD-10-CM

## 2024-01-11 DIAGNOSIS — Z00.00 ENCOUNTER FOR ANNUAL PHYSICAL EXAM: ICD-10-CM

## 2024-01-11 DIAGNOSIS — C50.812 MALIGNANT NEOPLASM OF OVERLAPPING SITES OF LEFT BREAST IN FEMALE, ESTROGEN RECEPTOR POSITIVE: ICD-10-CM

## 2024-01-11 DIAGNOSIS — E78.5 MILD HYPERLIPIDEMIA: ICD-10-CM

## 2024-01-11 LAB
ALBUMIN SERPL-MCNC: 4.1 G/DL (ref 3.5–5.2)
ALBUMIN/GLOB SERPL: 1.6 G/DL
ALP SERPL-CCNC: 194 U/L (ref 39–117)
ALT SERPL W P-5'-P-CCNC: 78 U/L (ref 1–33)
ANION GAP SERPL CALCULATED.3IONS-SCNC: 10.3 MMOL/L (ref 5–15)
AST SERPL-CCNC: 50 U/L (ref 1–32)
BASOPHILS # BLD AUTO: 0.04 10*3/MM3 (ref 0–0.2)
BASOPHILS NFR BLD AUTO: 0.7 % (ref 0–1.5)
BILIRUB SERPL-MCNC: 0.3 MG/DL (ref 0–1.2)
BUN SERPL-MCNC: 13 MG/DL (ref 6–20)
BUN/CREAT SERPL: 15.9 (ref 7–25)
CALCIUM SPEC-SCNC: 9.2 MG/DL (ref 8.6–10.5)
CHLORIDE SERPL-SCNC: 105 MMOL/L (ref 98–107)
CO2 SERPL-SCNC: 24.7 MMOL/L (ref 22–29)
CREAT SERPL-MCNC: 0.82 MG/DL (ref 0.57–1)
DEPRECATED RDW RBC AUTO: 39.8 FL (ref 37–54)
EGFRCR SERPLBLD CKD-EPI 2021: 89.5 ML/MIN/1.73
EOSINOPHIL # BLD AUTO: 0.33 10*3/MM3 (ref 0–0.4)
EOSINOPHIL NFR BLD AUTO: 5.4 % (ref 0.3–6.2)
ERYTHROCYTE [DISTWIDTH] IN BLOOD BY AUTOMATED COUNT: 12.1 % (ref 12.3–15.4)
GLOBULIN UR ELPH-MCNC: 2.6 GM/DL
GLUCOSE SERPL-MCNC: 109 MG/DL (ref 65–99)
HCT VFR BLD AUTO: 36.1 % (ref 34–46.6)
HGB BLD-MCNC: 12.6 G/DL (ref 12–15.9)
IMM GRANULOCYTES # BLD AUTO: 0.02 10*3/MM3 (ref 0–0.05)
IMM GRANULOCYTES NFR BLD AUTO: 0.3 % (ref 0–0.5)
LYMPHOCYTES # BLD AUTO: 2.15 10*3/MM3 (ref 0.7–3.1)
LYMPHOCYTES NFR BLD AUTO: 35.1 % (ref 19.6–45.3)
MCH RBC QN AUTO: 31.4 PG (ref 26.6–33)
MCHC RBC AUTO-ENTMCNC: 34.9 G/DL (ref 31.5–35.7)
MCV RBC AUTO: 90 FL (ref 79–97)
MONOCYTES # BLD AUTO: 0.51 10*3/MM3 (ref 0.1–0.9)
MONOCYTES NFR BLD AUTO: 8.3 % (ref 5–12)
NEUTROPHILS NFR BLD AUTO: 3.07 10*3/MM3 (ref 1.7–7)
NEUTROPHILS NFR BLD AUTO: 50.2 % (ref 42.7–76)
NRBC BLD AUTO-RTO: 0 /100 WBC (ref 0–0.2)
PLATELET # BLD AUTO: 220 10*3/MM3 (ref 140–450)
PMV BLD AUTO: 9.9 FL (ref 6–12)
POTASSIUM SERPL-SCNC: 3.7 MMOL/L (ref 3.5–5.2)
PROT SERPL-MCNC: 6.7 G/DL (ref 6–8.5)
RBC # BLD AUTO: 4.01 10*6/MM3 (ref 3.77–5.28)
SODIUM SERPL-SCNC: 140 MMOL/L (ref 136–145)
WBC NRBC COR # BLD AUTO: 6.12 10*3/MM3 (ref 3.4–10.8)

## 2024-01-11 PROCEDURE — 80050 GENERAL HEALTH PANEL: CPT

## 2024-01-11 PROCEDURE — 25810000003 SODIUM CHLORIDE 0.9 % SOLUTION: Performed by: INTERNAL MEDICINE

## 2024-01-11 PROCEDURE — 25010000002 TRASTUZUMAB PER 10 MG: Performed by: INTERNAL MEDICINE

## 2024-01-11 PROCEDURE — 96413 CHEMO IV INFUSION 1 HR: CPT

## 2024-01-11 PROCEDURE — 96417 CHEMO IV INFUS EACH ADDL SEQ: CPT

## 2024-01-11 PROCEDURE — 96375 TX/PRO/DX INJ NEW DRUG ADDON: CPT

## 2024-01-11 PROCEDURE — 25010000002 PERTUZUMAB 420 MG/14ML SOLUTION 420 MG VIAL: Performed by: INTERNAL MEDICINE

## 2024-01-11 PROCEDURE — 25810000003 SODIUM CHLORIDE 0.9 % SOLUTION 250 ML FLEX CONT: Performed by: INTERNAL MEDICINE

## 2024-01-11 PROCEDURE — 25010000002 DIPHENHYDRAMINE PER 50 MG: Performed by: INTERNAL MEDICINE

## 2024-01-11 RX ORDER — SODIUM CHLORIDE 9 MG/ML
250 INJECTION, SOLUTION INTRAVENOUS ONCE
Status: COMPLETED | OUTPATIENT
Start: 2024-01-11 | End: 2024-01-11

## 2024-01-11 RX ORDER — FAMOTIDINE 10 MG/ML
20 INJECTION, SOLUTION INTRAVENOUS ONCE
Status: COMPLETED | OUTPATIENT
Start: 2024-01-11 | End: 2024-01-11

## 2024-01-11 RX ORDER — LETROZOLE 2.5 MG/1
2.5 TABLET, FILM COATED ORAL DAILY
Qty: 30 TABLET | Refills: 3 | Status: SHIPPED | OUTPATIENT
Start: 2024-01-11 | End: 2024-01-11 | Stop reason: SDUPTHER

## 2024-01-11 RX ORDER — LETROZOLE 2.5 MG/1
2.5 TABLET, FILM COATED ORAL DAILY
Qty: 30 TABLET | Refills: 3 | Status: SHIPPED | OUTPATIENT
Start: 2024-01-11

## 2024-01-11 RX ORDER — SODIUM CHLORIDE 9 MG/ML
250 INJECTION, SOLUTION INTRAVENOUS ONCE
Status: CANCELLED | OUTPATIENT
Start: 2024-01-11

## 2024-01-11 RX ORDER — FAMOTIDINE 10 MG/ML
20 INJECTION, SOLUTION INTRAVENOUS ONCE
Status: CANCELLED | OUTPATIENT
Start: 2024-01-11

## 2024-01-11 RX ADMIN — FAMOTIDINE 20 MG: 10 INJECTION INTRAVENOUS at 09:08

## 2024-01-11 RX ADMIN — DIPHENHYDRAMINE HYDROCHLORIDE 25 MG: 50 INJECTION, SOLUTION INTRAMUSCULAR; INTRAVENOUS at 09:08

## 2024-01-11 RX ADMIN — TRASTUZUMAB 470 MG: 150 INJECTION, POWDER, LYOPHILIZED, FOR SOLUTION INTRAVENOUS at 10:27

## 2024-01-11 RX ADMIN — PERTUZUMAB 420 MG: 30 INJECTION, SOLUTION, CONCENTRATE INTRAVENOUS at 09:24

## 2024-01-11 RX ADMIN — SODIUM CHLORIDE 250 ML: 9 INJECTION, SOLUTION INTRAVENOUS at 09:08

## 2024-01-11 NOTE — PROGRESS NOTES
Subjective     REASON FOR FOLLOW-UP:  1.  Invasive lobular carcinoma multifocal,  Left breast anterolateral stereotactic biopsy invasive lobular carcinoma, Marie score of 6, grade 2, 4 mm, ER 95%, WI 95%, HER2/cheryl 0 negative with Ki-67 of 5%.  Positive for atypical lobular hyperplasia  Left breast 1 o'clock position 10 cm from the nipple ultrasound-guided biopsy consistent with invasive lobular carcinoma grade 2 Lowman score of 6, 4 mm with lobular carcinoma in situ, ER 90%, %, HER2/cheryl 0  Left breast upper stereotactic guided core biopsy invasive lobular carcinoma grade 2 Lowman score of six 3 mm with lobular carcinoma in situ, ER 90%, %, HER2/cheryl equivocal 2+, FISH shows her to 5.7 with CEP 1.6 and ratio HER2 nu/KEIRA-17 ratio of 3.5.  It is amplified.    2.  March 8, 2023:p T3 N0 M0 invasive lobular carcinoma s/p bilateral mastectomy with left deep axillary sentinel lymph node biopsy, with bilateral placement of prepectoral tissue expanders for reconstruction and bilateral placement of AlloDerm.  Pathology shows multifocal tumor in  upper outer quadrant and upper inner quadrant, invasive lobular carcinoma grade 2 with a Lowman score  score of 7  , total of 4 foci were seen, largest tumor being 60 mm, 20 mm, 2 mm, 1 mm.  No DCIS identified.  Lobular carcinoma in situ present.  No lymphatic or vascular channel invasion, no dermal lymphovascular space invasion all margins were negative for invasive carcinoma 4 lymph nodes were all negative for malignancy its pathologic T3N0  ER %, WI %, HER2/cheryl 2+ with Ki-67 of 5%  Previous biopsies had shown 2 of the tumors were ER/WI positive HER2/cheyrl negative and one of them was ER/WI positive HER2 positive  On discussing with pathologist Dr. Serrano, she thinks that the larger tumor was ER WI positive and HER2 positive but unsure if it was the 60 mm tumor or 20 mm tumor.  Either way it was not the smaller tumors which was HER2  positive.  All tumors look-alike per pathologist and they are invasive lobular carcinoma.                  HISTORY OF PRESENT ILLNESS:         Patient is a 48 y.o. female with history of multifocal left breast cancer with MRI of the breast showing almost 15 lesions.  Biopsies on 3 different lesions were done and they were all consistent with invasive lobular carcinoma.  2 of the lesions where ER/NC positive HER2/cheryl negative and one of the larger lesion was ER/NC positive HER2 2+ equivocal.  FISH testing was done and the HER2 copy number was 5.7 with HER2/CEP ratio of 3.5.  The FISH was amplified.    Patient underwent bilateral mastectomy    With left sentinel lymph node surgery..  The pathology shows tumor to be present in the left upper outer quadrant invasive lobular carcinoma with a Marie score of 7, grade 2.  There were 4 lesions the greatest size was 6 0 mm, 20 mm, 2 mm and 1 mm in size.  Lobular carcinoma in situ is present.  All margins are negative for invasive carcinoma.  4 lymph nodes negative.  Patient has T3N0 invasive lobular carcinoma.      Discussed with pathology in length Dr. Serrano, one of the larger lesions which was 60 mm was heterogeneous it was biopsied at 2 separate spots.  The left anterior lateral and left 1:00 core fragment was ER/NC positive HER2/cheryl negative the left upper core biopsy was ER/NC positive HER2/cheryl 2+ equivocal but HER2/cheryl FISH was positive.    On discussion with pathology the 60 mm tumor showed ER and NC positive and HER2 positive by FISH with a Ki-67 of 40% at the Top-Hat clip.  The HER2/cheryl copy number was 5.7 with a HER2/cheryl by CEP ratio 3.5 which is positive.  The Ki-67 on the lower part was 13%.  The 20 mm tumor had a Ki-67 of 9%.  So the larger lesion was triple positive and heterogeneous as 2 biopsies were done on the larger lesions and that was both triple positive and at the second biopsy was ER/NC positive HER2/cheryl negative so it was a heterogeneous tumor.   The 20 mm lesion was ER/OR positive HER2/cheryl negative.    Given that she had a large tumor with high Ki-67 and premenopausal status we discussed about giving Taxotere carboplatin Herceptin Perjeta.    Interval History:   Patient is here for Herceptin Perjeta.  She has some mild diarrhea but Imodium helps it.  She does not have any nausea.  Patient was to have echo this week but she is going to postpone it for week after that.  Currently she has no symptoms of shortness of breath or lower extremity edema.  She has mild diarrhea which is controllable.    Patient is s/p hysterectomy with bilateral oophorectomy by her gynecologist.  She is 1 week out and healed up very well.  Given that she was ER/OR positive we will start her on letrozole.  Discussion done about side effects of letrozole.      Radiation started September 1, 2023 and completed October 2023    Will plan to start letrozole in 2 weeks    Oncology history:  Patient is a 48 y.o. female who has been recently diagnosed with left breast cancer.  Patient has been compliant with her mammograms.    There is no family history of breast or ovarian cancer.  Her father had prostate cancer and melanoma.  A paternal aunt had thyroid cancer.      Details are as follows.    November 29, 2022: Screening bilateral mammogram showed 8 mm asymmetry in the central left breast posterior one third.  This appears to be in the superior left breast on the MLO view.  Right breast was negative.  A left diagnostic mammogram and ultrasound was recommended.    January January 3, 2023: Left breast diagnostic mammogram showed the spiculated areas within the central posterior and lateral anterior superior left breast where redemonstrated.  Targeted ultrasound was done.  At 1 o'clock position 10 cm from the nipple there is a shadowing mass with peripheral spiculations which is concordant with the mammographic abnormality and suspicious for malignancy.  The more centrally located spiculation  on the cc view posterior to the glandular tissue cannot be clearly identified on the ultrasound.    Impression was 2 separate suspicious spiculated lesion in the left breast.  One of the lesions is seen well on the ultrasound and should be amenable to ultrasound-guided core biopsy.  The other lesion which was seen in the CC projection was amicable to stereotactic guided tissue sampling.    January 3, 2023 patient underwent ultrasound-guided left breast biopsy    The ultrasound-guided biopsy of left breast 10 cm from the nipple at 1 o'clock position showed invasive lobular carcinoma moderately differentiated with a Marie grade of 2 and score of 6 measuring 4 mm.  There was focal lobular carcinoma in situ.    Left breast anterior lateral stereotactic core needle biopsy showed invasive lobular carcinoma moderately differentiated grade 2 with a [place] score of 6 measuring 4 mm.  There was atypical lobular hyperplasia.  Estrogen receptor was %, progesterone receptor was %, HER2/cheryl 0, Ki-67 5%.    Left breast upper stereotactic guided core biopsy showed invasive lobular carcinoma moderately differentiated grade 2 with [place] score of 6 with measuring 3 mm with lobular carcinoma in situ present.  I do not see the ER/AR or HER2 on the ultrasound-guided biopsy of the left breast lesion as well as the left upper stereotactic biopsy.  We will check with pathologist    January 23, 2023: Patient underwent  MRI of the breast bilateral    Right breast: Negative    Left breast: At 1:00 anterior left breast 4 cm from the nipple there is a 1.5 x 1.6 x 1.3 cm irregular enhancing mass which is biopsy-proven malignancy.  The biopsy clip is located within 0.9 x 1 x 1.1 cm postbiopsy hematoma along the inferior margin of the mass.  And there is additional hematoma along the lateral margin of the mass which measures 1.2 x 1.9 x 0.9 cm.    At 1:00 in the middle to posterior left breast 7.4 cm from the nipple there  is a 1.3 x 1 cm x 1 cm irregular enhancing mass mass with a focus from a biopsy clip along the inferior margin which also represents the biopsy-proven malignancy    At 12:00 in the posterior left breast 8.5 cm from the nipple there is a 1.9 x 1.8 x 2.2 cm irregular enhancing mass with a corresponding 2.9 cm biopsy cavity with a biopsy clip which represents the biopsy-proven malignancy    In addition there are multiple at least 12 similar-appearing irregular enhancing masses and foci are identified throughout the left breast predominantly involving the upper breast but also involving the lower outer quadrant.  At 11-12 o'clock in the middle and posterior left breast there are 3 adjacent reference masses together measuring 5.5 cm but individually measuring 1.2 x 0.9 x 1 cm.  At 1:00 in the far posterior left breast/axillary tail 13.7 cm posterior to the nipple is a 1.3 x 1.1 x 1 cm irregular enhancing mass which is located 0.8 cm from the anterolateral margin of the pectoralis Muscle.  Together the enhancing masses span approximately 11.7 x 8 x 7.8 cm.  There is no suspicious enhancement in the left nipple or chest wall.  Focal areas of skin enhancement likely reflect skin entry point from recent biopsies.  There are no pathologically enlarged internal mammary chain lymph nodes.    Impression    .  At least 15 similar-appearing irregular enhancing masses and foci  scattered throughout the left breast, together measuring up to 11.7 cm  in maximum dimension, encompass 3 sites of biopsy-proven malignancy and  are consistent with multicentric malignancy. Oncologic and surgical  management are recommended.  2.  No MRI evidence of malignancy in the right breast.    Patient is a 47-year-old female with multifocal invasive lobular carcinoma who on screening mammogram showed 8 mm asymmetry in the central left breast posterior one third.  In the cc view.  It appears to be superior left breast on the MLO view.  There was also  architectural distortion in the lateral left breast one third on the left cc view.    On diagnostic mammogram the spiculated areas within the central posterior and the lateral anterior superior left breast where redemonstrated.  Targeted ultrasound was done.  At 1 o'clock position 10 cm from the nipple there is a mass with spiculations which is suspicious.  The more centrally located spiculation on the cc view.  The more centrally located spiculation on the cc view cannot be clearly identified on ultrasound.  So there impression was there were 2 separate suspicious spiculated lesion in the left breast.  1 was seen well on the ultrasound and amenable to ultrasound-guided biopsy the other is seen well on mammography and a stereotactic guided biopsy suggested.    Patient subsequently underwent stereotactic biopsy of 1 lesion and ultrasound-guided biopsy of the 1 cm mass at 1 o'clock position 10 cm from the nipple.  A total of 3 different areas of the left breast were biopsied and specimens were sent to pathology.  The third biopsy was performed at 12 o'clock position in the posterior one third of the left breast.  All of the 3 sites were consistent with invasive lobular carcinoma at site A, B, and C.    MRI of the breast showed at 1 o'clock position of the left breast anteriorly 4 cm posterior to the nipple there is a 1.5 x 1.6 x 1.3 cm irregular enhancing mass.  There is a small hematoma along the inferior margin of the mass.  An additional hematoma along the lateral margin of the mass which is measuring 1.2 x 0.9 x 0.9 cm    At 1:00 in the middle to posterior left breast 7.4 cm posterior to the nipple there is a 1.3 x 1 x 1 cm enhancing mass with a biopsy clip.    At 12:00 posterior left breast 8.5 cm posterior to the nipple there is a 1.5 x 1.8 x 2.2 cm irregular enhancing mass with a corresponding 2.9 cm biopsy cavity with a clip which represents the biopsy site malignancy    Multiple at least 12 similar-appearing  irregular enhancing masses and foci are identified throughout the left breast predominantly involving the upper breast but also involving the lower outer quadrant.  At 11-12 o'clock in the middle and posterior left breast there is a 3 adjacent reference masses measuring 5.5 cm in AP dimension and individually measuring 1.2 x 0.9 x 1 cm.  At 1:00 in the far posterior left breast axillary tail 13.7 cm from the nipple there is a 1.3 x 1.1 x 1 cm craniocaudal dimension irregular enhancing mass which is located 0.8 cm from the anterolateral margin of the pectoralis muscle.     Per the MRI at least 15 similar-appearing irregular enhancing masses and foci scattered throughout the left breast together measuring 11.7 cm in maximum dimension which encompasses 3 sites of biopsy-proven malignancy and a consistent with multicentric malignancy.  MRI of the right breast is negative    INVITAE genetic testing showed variation of uncertain significance of LUCAS  Gene.    Patient was suggested left total mastectomy, left axillary sentinel lymph node biopsy possible node dissection and possible reconstruction.    However patient called Dr. Hi and decided to go for upfront bilateral total mastectomy, left axillary sentinel lymph node biopsy and possible axillary lymph node dissection and reconstruction.     I presented the case in the breast cancer conference today.  Patient had multiple nodules per MRI on the left breast Dr. Todd looked at it and felt there were multiple nodules and the largest one was 2.2 cm.  The discussion was to go ahead and upfront do the surgery and subsequently treat with chemo/ endocrine therapy as needed.  The left breast MRI findings are slightly atypical and that it is not contiguous involvement over 11.5 cm area but multiple nodules.  Surgical pathology will clarify.  If it is 1 big lesion or multiple sma      Past Medical History:   Diagnosis Date    ADD (attention deficit disorder)     Breast cancer  03/08/2023    LEFT SIDE, HX BILATERAL MASTECTOMY, LAST CHEMO 8/2023, LAST RADIATION 10/23/23  - TARGETED THERAPY EVERY 3 WEEKS CURRENTLY - FOLLOWED BY [facility]    GERD (gastroesophageal reflux disease)     Graves disease     RADIOACTIVE IODINE TREAMENT IN PAST    History of COVID-19 2022    History of gestational diabetes 2010    History of melanoma     History of radioactive iodine thyroid ablation 2012    History of vertigo     Hyperlipidemia     DIET CONTROLLED    Hypertension     Hypothyroidism     Uterine fibroid         Past Surgical History:   Procedure Laterality Date    BREAST RECONSTRUCTION Bilateral 3/8/2023    Procedure: BILATERAL PLACEMENT  TISSUE EXPANDER AND ALLODERM;  Surgeon: Eamon Stone MD;  Location: [facility];  Service: Plastics;  Laterality: Bilateral;    D & C CERVICAL BIOPSY  01/2006    INTRAUTERINE DEVICE INSERTION  2010    Mirena    INTRAUTERINE DEVICE INSERTION  2015    Mirena exchange, old IUD removal and new one inserted by Dr Carr, Lot # [id number] 09/17.mar    MASTECTOMY W/ SENTINEL NODE BIOPSY Bilateral 3/8/2023    Procedure: Bilateral total mastectomy, left axillary sentinel lymph node biopsy;  Surgeon: Kell Hi MD;  Location: [facility] OR;  Service: General;  Laterality: Bilateral;    SCAR REVISION BREAST Right 4/3/2023    Procedure: RIGHT MASTECTOMY REVISION;  Surgeon: Eamon Stone MD;  Location: [facility] OR;  Service: Plastics;  Laterality: Right;    TOTAL LAPAROSCOPIC HYSTERECTOMY Bilateral 1/3/2024    Procedure: TOTAL LAPAROSCOPIC HYSTERECTOMY, BILATERAL SALPIGO-OOPHORECTOMY, CYSTOSCOPY;  Surgeon: Rocio Manning MD;  Location: [facility] OR;  Service: Obstetrics/Gynecology;  Laterality: Bilateral;    VENOUS ACCESS DEVICE (PORT) INSERTION Right 4/13/2023    Procedure: right port placement;  Surgeon: Kell Hi MD;  Location: [facility] OR;  Service: General;  Laterality: Right;    WISDOM TOOTH EXTRACTION          Current  Outpatient Medications on File Prior to Visit   Medication Sig Dispense Refill    BIOTIN PO Take  by mouth Daily.      Black Currant Seed Oil 500 MG capsule Take 1 capsule by mouth Daily.      Cholecalciferol (Vitamin D3) 250 MCG (28681 UT) tablet Take 10,000 Units by mouth Daily.      COLLAGEN PO Take 1 tablet by mouth Daily. TO HOLD 1 WEEK PRIOR TO OR      Glucosamine 500 MG capsule Take 1 capsule by mouth Daily. TO HOLD 1 WEEK PRIOR TO OR      ibuprofen (ADVIL,MOTRIN) 600 MG tablet Take 1 tablet by mouth Every 6 (Six) Hours As Needed for Mild Pain. 30 tablet 0    levothyroxine (SYNTHROID, LEVOTHROID) 125 MCG tablet Take 1 tablet by mouth Daily. 90 tablet 0    lidocaine-prilocaine (EMLA) 2.5-2.5 % cream Apply 1 application topically to the appropriate area as directed As Needed for Mild Pain (apply pea-sized amount to port site 30 minutes prior to port being accessed). 30 g 3    lisinopril (PRINIVIL,ZESTRIL) 2.5 MG tablet Take 1 tablet by mouth once daily (Patient taking differently: Take 1 tablet by mouth As Needed (SBP > 120). Take if SBP above 120) 30 tablet 5    loratadine (CLARITIN) 10 MG tablet Take 1 tablet by mouth As Needed.      magnesium oxide (MAG-OX) 400 MG tablet Take 1 tablet by mouth Every Night.      ondansetron (Zofran) 4 MG tablet Take 1 tablet by mouth Daily As Needed for Nausea or Vomiting. 30 tablet 1    pantoprazole (PROTONIX) 40 MG EC tablet Take 1 tablet by mouth Daily. 30 tablet 3    Probiotic Product (PROBIOTIC ADVANCED PO) Take 1 tablet by mouth Daily.      vitamin C (ASCORBIC ACID) 250 MG tablet Take 1 tablet by mouth Daily.      Zinc 50 MG tablet Take 1 tablet by mouth Daily.       No current facility-administered medications on file prior to visit.        ALLERGIES:    Allergies   Allergen Reactions    Erythromycin GI Intolerance     Pt reports    Penicillins Rash        Social History     Socioeconomic History    Marital status:      Spouse name: Parish    Number of  "children: 2   Tobacco Use    Smoking status: Never    Smokeless tobacco: Never   Vaping Use    Vaping Use: Never used   Substance and Sexual Activity    Alcohol use: Not Currently     Comment: RARELY    Drug use: Never    Sexual activity: Not Currently     Partners: Male     Birth control/protection: Condom, Hysterectomy     Comment:          Family History   Problem Relation Age of Onset    Diabetes Father     Hyperlipidemia Father     Arthritis Father     Hypertension Father     Heart disease Father         S/P stents    Heart attack Father 41    Colon polyps Father     Melanoma Father     Prostate cancer Father     Alcohol abuse Father     Hyperlipidemia Sister     Hypertension Sister     Heart attack Sister     Diabetes Sister     Thyroid cancer Paternal Aunt     Lung cancer Maternal Grandmother     Cancer Maternal Grandfather         Bladder cancer    Lung cancer Maternal Grandfather     Heart failure Paternal Grandmother     Transient ischemic attack Paternal Grandmother     Heart disease Paternal Grandfather     COPD Sister         Heavy smoker    Heart disease Sister     Malig Hyperthermia Neg Hx       Family history: Maternal great aunt had breast cancer, unsure of the age .  Father had melanoma and prostate cancer, paternal aunt had thyroid cancer, paternal grandfather had bladder cancer maternal grandfather had bladder cancer maternal grandmother had lung cancer    Past medical history is consistent with Graves' disease    OB/GYN history:  Age of menarche: 14  Patient is premenopausal and had an IUD which has been now removed   2 para 2 no miscarriages  Age at first childbirth 30  Patient did breast-feed 24 months  Length of taking birth control pills none      Objective     Vitals:    24 0810   BP: 145/95   Pulse: 78   Resp: 16   Temp: 98 °F (36.7 °C)   TempSrc: Temporal   SpO2: 100%   Weight: 78.6 kg (173 lb 3.2 oz)   Height: 162.6 cm (64\")   PainSc: 0-No pain                 " 1/11/2024     8:12 AM   Current Status   ECOG score 0         Physical Exam      GENERAL:  Well-developed, Patient  in no acute distress.   SKIN:  Warm, dry ,NO purpura ,no rash.  HEENT:  Pupils were equal and reactive to light and accomodation, conjunctivae noninjected,  normal visual acuity.   LYMPHATICS:  No cervical, NO supraclavicular, NO axillary adenopathies.  BREASTS: S/p bilateral mastectomy with reconstruction, no chest wall lesions, no evidence of bilateral axillary adenopathy or supraclavicular adenopathy  CARDIAC: Normal rate and rhythm, no murmurs gallops or rubs.  LUNGS: Clear to auscultation bilaterally.   ABDOMEN:  Soft, nontender with bowel sounds active.   EXTREMITIES: No cyanosis clubbing or edema.  NEUROLOGICAL:  Patient was awake, alert, oriented to time, person and place.    I have reexamined the patient and the results are consistent with the previously documented exam. Kathrin Frederick MD     RECENT LABS:  Results from last 7 days   Lab Units 01/11/24  0746   WBC 10*3/mm3 6.12   NEUTROS ABS 10*3/mm3 3.07   HEMOGLOBIN g/dL 12.6   HEMATOCRIT % 36.1   PLATELETS 10*3/mm3 220         Results from last 7 days   Lab Units 01/11/24  0746   SODIUM mmol/L 140   POTASSIUM mmol/L 3.7   CHLORIDE mmol/L 105   CO2 mmol/L 24.7   BUN mg/dL 13   CREATININE mg/dL 0.82   CALCIUM mg/dL 9.2   ALBUMIN g/dL 4.1   BILIRUBIN mg/dL 0.3   ALK PHOS U/L 194*   ALT (SGPT) U/L 78*   AST (SGOT) U/L 50*   GLUCOSE mg/dL 109*                 Assessment & Plan     *Pathologic T3N0 invasive lobular carcinoma of the left breast, upper inner and upper outer quadrant, multifocal, 60 mm, 20 mm, 2 mm, 1 mm, grade 2, Marie score of 7, 2 of them are ER/KS positive, HER2/cheryl negative.  One of the larger lesions was ER/KS positive HER2 positive.  Ki-67 is very low 5%,    Invasive lobular carcinoma of the left breast recently diagnosed, multifocal with 3 lesions biopsied in the left breast.  Patient has multicentric disease with 15  similar-appearing irregular enhancing masses in the left breast together measuring 11.7 cm in maximum dimension which encompasses 3 of the 3 biopsy proven malignancies.  All the 3 very invasive lobular carcinoma, grade 2 with [place] score of 6 but 2 of the lesions where ER/LA positive HER2 negative and the third lesion was ER/LA positive HER2 2+ with FISH showing HER2 amplified.      1.  Estrogen receptor 95%, progesterone receptor 95%, HER2/cheryl 0, Ki-67 5%    2 left breast 10 o'clock position 10 cm from the nipple ultrasound core guided biopsy showed ER 90%, %, HER2/cheryl 0    3.  Left breast upper stereotactic core biopsy showed estrogen receptor 90%, progesterone receptor 100%, HER2/cheryl equivocal 2+  FISH testing showed her to as 5.7 with a KEIRA 17 of 1.6 with a ratio HER2/KEIRA 17 of  INVITAE genetic testing showed variation of uncertain significance of LUCAS gene  We discussed the multidisciplinary approach in this patient's and I also discussed in length that that invasive lobular carcinomas do benefit from endocrine therapy and discussed in length about side effects of all endocrine therapies  Pending the results of the surgery she may need to be a candidate for chemotherapy as well and subsequently radiation if the tumor is large locally  Patient is keen on getting bilateral oophorectomy after her breast surgery is done and we discussed about medical oophorectomy with Lupron as well  February 24, 2023: Presented patient in the breast cancer conference.  Even though it appears to be a heterogeneous tumor, since it is difficult to really appreciate by clinical exam and the fact that it is invasive lobular carcinoma, it was felt best to do upfront surgery.  March 8, 2023:p T3 N0 M0 invasive lobular carcinoma s/p bilateral mastectomy with left deep axillary sentinel lymph node biopsy, with bilateral placement of prepectoral tissue expanders for reconstruction and bilateral placement of AlloDerm.  Pathology  shows multifocal tumor in  upper outer quadrant and upper inner quadrant, invasive lobular carcinoma grade 2 with a [place] score  score of 7  , total of 4 foci were seen, largest tumor being 60 mm, 20 mm, 2 mm, 1 mm.  No DCIS identified.  Lobular carcinoma in situ present.  No lymphatic or vascular channel invasion, no dermal lymphovascular space invasion all margins were negative for invasive carcinoma 4 lymph nodes were all negative for malignancy its pathologic T3N0  ER %, ND %, HER2/cheryl 2+ with Ki-67 of 5%  Previous biopsies had shown 2 of the tumors were ER/ND positive HER2/cheryl negative and one of them was ER/ND positive HER2 positive  On discussing with pathologist Dr. Serrano, she thinks that the larger tumor was ER ND positive and HER2 positive.  HER2 copy number of 5.7 with HER2/CEP ratio of 3.5 positive and this was on the larger tumor.  Given that patient has heterogeneous tumor with the larger lesion being ER/ND positive HER2 positive and a Ki-67 of 40%, being premenopausal we will treat with Taxotere carboplatin Herceptin Perjeta.  April 20, 2023: Patient was to start cycle 1 today but unfortunately her liver function tests were extremely high.  Discussed about alcohol and she had drank and taken Tylenol.  Will hold chemo today  April 28, 2023: Reviewed her liver function test which are completely normalized.  Discussed with her not to drink alcohol.  Here for cycle 1 day 1 TCHP chemotherapy.  Reviewed echo which showed ejection fraction of 64% with strain pattern of -21.  Patient understands all the side effects\  5/4/2023: Patient tolerated Cycle 1 TCHP well. She has had diarrhea, nausea and fatigue that have been well managed.   5/18/2023: C2 TCHP  5/22/2023: Here for close follow-up and possible IV fluids after treatment last Friday, 5/18/2023.  Patient had a fairly good weekend with good oral intake, weight that is notably stable, and very little nausea.  She only had 1 loose stool  this morning and therefore no significant diarrhea.  CMP is WNL.  Per discussion with patient we will give just 500 mL normal saline.  May 25, 2023: Patient has diarrhea.  She is 1 week out of cycle 2 Taxotere carboplatin Herceptin Perjeta.  She has some abdominal cramping.  We will give her IV fluids today.  Given carboplatinum is not available she will receive Taxotere Herceptin Perjeta for 2 more cycles prior to going to Adriamycin Cytoxan.  Cycle #3 TCHP 6/8/2023  Patient returns 6/12/2023 for toxicity check.  She is eating and drinking sufficiently at this point.  She does not feel she has benefited from the IV fluids in the past and therefore will not do that today.  She denies any fevers or chills.  June 29, 2023: Cycle 4 TCHP.  She will need referral to radiation at the completion of 6 cycles of TCHP  07/20/2023 proceed with cycle 5 of carboplatin, Taxotere, Herceptin, Perjeta with Neulasta support.  August 10, 2023: Final cycle 6 TCHP.  Thereafter plan to continue every 3-week Herceptin/Perjeta for total of 1 year.   We have discussed potentially beginning Lupron followed by aromatase inhibitor therapy following radiation however patient wants to discuss potential hysterectomy and bilateral oophorectomy with her GYN.    September 21, 2023: Patient started radiation has completed 2 treatments so far out of 25.  Patient has history of depression in the family but not herself.  We discussed about consideration of Lupron with aromatase inhibitor but she wants to undergo hysterectomy with bilateral oophorectomy because of her fibroids.  In which case we could consider starting tamoxifen until she has oophorectomy and subsequently start AI.  She is tolerating Herceptin Perjeta very well  10/12/23 Here for Hepcetin and Perjeta today. She is currently on 18 of 25 radiation treatments and tolerating well.  She has no new concerns today.  November 3, 2023: Patient to receive Herceptin Perjeta.  She is due to get  oophorectomy following which we will start aromatase inhibitor Arimidex.  November 28, 2023: Here for Herceptin Perjeta.  She is due to receive oophorectomy as of January 4, 2024.  Subsequent to that we will start Arimidex.  Patient is premenopausal and did not want to start Lupron and prefer to go to oophorectomy.  We discussed about starting tamoxifen until oophorectomy is done but patient prefers to not take tamoxifen and wants to wait till oophorectomy is done and subsequently we can start aromatase inhibitor  S/p hysterectomy with bilateral salpingo-oophorectomy January 3, 2024.  Pathology is benign.  She had benign left follicle cyst and inclusion cyst.  There is a subserosal 4 cm uterine leiomyoma.  Patient will start letrozole in 2 weeks.  Given that she was ER/FL positive.  Side effects discussed    *S/p  IUD removal  Experiencing hot flashes with chemo likely triggering menopausal changes.    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 She did recommend she start taking her gabapentin again to help with her anxiety and irritability/anger from her decadron with treatment.   Patient continues follow-up with supportive oncology, RADHA Rolle. Patient is encouraged to continue to follow with Cierra.     *Oral Thrush and Chemotherapy induced mucositis  5/4/2023: I am concerned patient is starting into chemotherapy induced mucositis. She is reporting sore throat at times, possibly worsened reflux, mouth and tongue changes. She noticed white plaquing on her tongue this morning. She has not been able to  her Yen's Magic Mouthwash until today because of pharmacy supplies. She is currently on omeprazole 20 mg daily. She can increase this  to 40 mg daily if needed. Prescription sent to pharmacy today to start Fluconazole 200 mg on day and 100 mg on day 2-7. We will continue to monitor symptoms and watch liver enzymes closely. ALT 34 and AST 24.  Liver function test mildly elevated today, unsure if that is the effect of fluconazole.  In the future avoid fluconazole and consider just Yen's Magic mouthwash.  7/6/2023 Oral thrush recurrent today, mild but bothersome.  Refill Magic mouthwash.  Patient continuing to rinse her mouth to minimize oral candida.  Expect this to resolve now that she is done with chemotherapy and no longer requiring dexamethasone.    *  Port study negative    *Intermittent mild elevation of LFTs.    Patient has previously reported intermittent use of Tylenol and alcohol.    8/31/2023 AST 35, ALT 41, alk phos 100.  Patient denies any alcohol intake or Tylenol.  This is felt to be drug related.  Stable to improved. Monitor.  10/12/2023 AST 93, .  Patient has had 1 joe this past week however denies any other alcohol or Tylenol intake.  Reviewed with Dr. TRINIDAD and we will treat.    *Cardiac monitoring  Seen by cardiology today, 7/27/2023.  Echo stable.  Plan for continued echocardiogram every 3 months while on therapy.  Continue low-dose lisinopril    PLAN:  Proceed with Herceptin and Perjeta today  Follow-up every 3 weeks for Herceptin Perjeta  Echo is scheduled in January 2024, patient postponed it for next week  Reviewed pathology from her recent hysterectomy with bilateral oophorectomy which is benign  Patient to start letrozole in 2 weeks from now.  Follow-up with nurse practitioner in 3 weeks in 6 weeks for Herceptin Perjeta  Follow-up with me in 9 weeks with Herceptin and Perjeta    This patient is on high risk drug therapy requiring intensive monitoring for toxicity.      I spent 45 total minutes, face-to-face, caring for Ursula today. Greater than 50% of this time involved counseling and/or coordination of care as  documented within this note.      MD Dr. Kell Sandoval

## 2024-01-11 NOTE — LETTER
January 11, 2024     Fay Hines MD  [address]  [facility] [address]  [facility] [address]    Patient: Ursula Kulkarni   YOB: 1975   Date of Visit: 1/11/2024     Dear Fay Hines MD:       Thank you for referring Ursula Kulkarni to me for evaluation. Below are the relevant portions of my assessment and plan of care.    If you have questions, please do not hesitate to call me. I look forward to following Ursula along with you.         Sincerely,        Kathrin Frederick MD        CC: MD Alisia Zuñiga MD Leah D Graves, APRN Bhupalam, Leela, MD  01/11/24 1823  Sign when Signing Visit    Subjective    REASON FOR FOLLOW-UP:  1.  Invasive lobular carcinoma multifocal,  Left breast anterolateral stereotactic biopsy invasive lobular carcinoma, Marie score of 6, grade 2, 4 mm, ER 95%, WI 95%, HER2/cheryl 0 negative with Ki-67 of 5%.  Positive for atypical lobular hyperplasia  Left breast 1 o'clock position 10 cm from the nipple ultrasound-guided biopsy consistent with invasive lobular carcinoma grade 2 Marie score of 6, 4 mm with lobular carcinoma in situ, ER 90%, %, HER2/cheryl 0  Left breast upper stereotactic guided core biopsy invasive lobular carcinoma grade 2 Marie score of six 3 mm with lobular carcinoma in situ, ER 90%, %, HER2/cheryl equivocal 2+, FISH shows her to 5.7 with CEP 1.6 and ratio HER2 nu/KEIRA-17 ratio of 3.5.  It is amplified.    2.  March 8, 2023:p T3 N0 M0 invasive lobular carcinoma s/p bilateral mastectomy with left deep axillary sentinel lymph node biopsy, with bilateral placement of prepectoral tissue expanders for reconstruction and bilateral placement of AlloDerm.  Pathology shows multifocal tumor in  upper outer quadrant and upper inner quadrant, invasive lobular carcinoma grade 2 with a [place] score  score of 7  , total of 4 foci were seen, largest tumor being 60 mm, 20 mm, 2 mm, 1 mm.  No DCIS identified.  Lobular carcinoma in  situ present.  No lymphatic or vascular channel invasion, no dermal lymphovascular space invasion all margins were negative for invasive carcinoma 4 lymph nodes were all negative for malignancy its pathologic T3N0  ER %, TN %, HER2/cheryl 2+ with Ki-67 of 5%  Previous biopsies had shown 2 of the tumors were ER/TN positive HER2/cheryl negative and one of them was ER/TN positive HER2 positive  On discussing with pathologist Dr. Serrano, she thinks that the larger tumor was ER TN positive and HER2 positive but unsure if it was the 60 mm tumor or 20 mm tumor.  Either way it was not the smaller tumors which was HER2 positive.  All tumors look-alike per pathologist and they are invasive lobular carcinoma.                  HISTORY OF PRESENT ILLNESS:         Patient is a 48 y.o. female with history of multifocal left breast cancer with MRI of the breast showing almost 15 lesions.  Biopsies on 3 different lesions were done and they were all consistent with invasive lobular carcinoma.  2 of the lesions where ER/TN positive HER2/cheryl negative and one of the larger lesion was ER/TN positive HER2 2+ equivocal.  FISH testing was done and the HER2 copy number was 5.7 with HER2/CEP ratio of 3.5.  The FISH was amplified.    Patient underwent bilateral mastectomy    With left sentinel lymph node surgery..  The pathology shows tumor to be present in the left upper outer quadrant invasive lobular carcinoma with a Marie score of 7, grade 2.  There were 4 lesions the greatest size was 6 0 mm, 20 mm, 2 mm and 1 mm in size.  Lobular carcinoma in situ is present.  All margins are negative for invasive carcinoma.  4 lymph nodes negative.  Patient has T3N0 invasive lobular carcinoma.      Discussed with pathology in length Dr. Serrano, one of the larger lesions which was 60 mm was heterogeneous it was biopsied at 2 separate spots.  The left anterior lateral and left 1:00 core fragment was ER/TN positive HER2/cheryl negative the left upper  core biopsy was ER/MO positive HER2/cheryl 2+ equivocal but HER2/cheryl FISH was positive.    On discussion with pathology the 60 mm tumor showed ER and MO positive and HER2 positive by FISH with a Ki-67 of 40% at the Top-Hat clip.  The HER2/cheryl copy number was 5.7 with a HER2/cheryl by CEP ratio 3.5 which is positive.  The Ki-67 on the lower part was 13%.  The 20 mm tumor had a Ki-67 of 9%.  So the larger lesion was triple positive and heterogeneous as 2 biopsies were done on the larger lesions and that was both triple positive and at the second biopsy was ER/MO positive HER2/cheryl negative so it was a heterogeneous tumor.  The 20 mm lesion was ER/MO positive HER2/cheryl negative.    Given that she had a large tumor with high Ki-67 and premenopausal status we discussed about giving Taxotere carboplatin Herceptin Perjeta.    Interval History:   Patient is here for Herceptin Perjeta.  She has some mild diarrhea but Imodium helps it.  She does not have any nausea.  Patient was to have echo this week but she is going to postpone it for week after that.  Currently she has no symptoms of shortness of breath or lower extremity edema.  She has mild diarrhea which is controllable.    Patient is s/p hysterectomy with bilateral oophorectomy by her gynecologist.  She is 1 week out and healed up very well.  Given that she was ER/MO positive we will start her on letrozole.  Discussion done about side effects of letrozole.      Radiation started September 1, 2023 and completed October 2023    Will plan to start letrozole in 2 weeks    Oncology history:  Patient is a 48 y.o. female who has been recently diagnosed with left breast cancer.  Patient has been compliant with her mammograms.    There is no family history of breast or ovarian cancer.  Her father had prostate cancer and melanoma.  A paternal aunt had thyroid cancer.      Details are as follows.    November 29, 2022: Screening bilateral mammogram showed 8 mm asymmetry in the central  left breast posterior one third.  This appears to be in the superior left breast on the MLO view.  Right breast was negative.  A left diagnostic mammogram and ultrasound was recommended.    January January 3, 2023: Left breast diagnostic mammogram showed the spiculated areas within the central posterior and lateral anterior superior left breast where redemonstrated.  Targeted ultrasound was done.  At 1 o'clock position 10 cm from the nipple there is a shadowing mass with peripheral spiculations which is concordant with the mammographic abnormality and suspicious for malignancy.  The more centrally located spiculation on the cc view posterior to the glandular tissue cannot be clearly identified on the ultrasound.    Impression was 2 separate suspicious spiculated lesion in the left breast.  One of the lesions is seen well on the ultrasound and should be amenable to ultrasound-guided core biopsy.  The other lesion which was seen in the CC projection was amicable to stereotactic guided tissue sampling.    January 3, 2023 patient underwent ultrasound-guided left breast biopsy    The ultrasound-guided biopsy of left breast 10 cm from the nipple at 1 o'clock position showed invasive lobular carcinoma moderately differentiated with a [place] grade of 2 and score of 6 measuring 4 mm.  There was focal lobular carcinoma in situ.    Left breast anterior lateral stereotactic core needle biopsy showed invasive lobular carcinoma moderately differentiated grade 2 with a [place] score of 6 measuring 4 mm.  There was atypical lobular hyperplasia.  Estrogen receptor was %, progesterone receptor was %, HER2/cheryl 0, Ki-67 5%.    Left breast upper stereotactic guided core biopsy showed invasive lobular carcinoma moderately differentiated grade 2 with Marie score of 6 with measuring 3 mm with lobular carcinoma in situ present.  I do not see the ER/HI or HER2 on the ultrasound-guided biopsy of the left breast lesion  as well as the left upper stereotactic biopsy.  We will check with pathologist    January 23, 2023: Patient underwent  MRI of the breast bilateral    Right breast: Negative    Left breast: At 1:00 anterior left breast 4 cm from the nipple there is a 1.5 x 1.6 x 1.3 cm irregular enhancing mass which is biopsy-proven malignancy.  The biopsy clip is located within 0.9 x 1 x 1.1 cm postbiopsy hematoma along the inferior margin of the mass.  And there is additional hematoma along the lateral margin of the mass which measures 1.2 x 1.9 x 0.9 cm.    At 1:00 in the middle to posterior left breast 7.4 cm from the nipple there is a 1.3 x 1 cm x 1 cm irregular enhancing mass mass with a focus from a biopsy clip along the inferior margin which also represents the biopsy-proven malignancy    At 12:00 in the posterior left breast 8.5 cm from the nipple there is a 1.9 x 1.8 x 2.2 cm irregular enhancing mass with a corresponding 2.9 cm biopsy cavity with a biopsy clip which represents the biopsy-proven malignancy    In addition there are multiple at least 12 similar-appearing irregular enhancing masses and foci are identified throughout the left breast predominantly involving the upper breast but also involving the lower outer quadrant.  At 11-12 o'clock in the middle and posterior left breast there are 3 adjacent reference masses together measuring 5.5 cm but individually measuring 1.2 x 0.9 x 1 cm.  At 1:00 in the far posterior left breast/axillary tail 13.7 cm posterior to the nipple is a 1.3 x 1.1 x 1 cm irregular enhancing mass which is located 0.8 cm from the anterolateral margin of the pectoralis Muscle.  Together the enhancing masses span approximately 11.7 x 8 x 7.8 cm.  There is no suspicious enhancement in the left nipple or chest wall.  Focal areas of skin enhancement likely reflect skin entry point from recent biopsies.  There are no pathologically enlarged internal mammary chain lymph nodes.    Impression    .  At  least 15 similar-appearing irregular enhancing masses and foci  scattered throughout the left breast, together measuring up to 11.7 cm  in maximum dimension, encompass 3 sites of biopsy-proven malignancy and  are consistent with multicentric malignancy. Oncologic and surgical  management are recommended.  2.  No MRI evidence of malignancy in the right breast.    Patient is a 47-year-old female with multifocal invasive lobular carcinoma who on screening mammogram showed 8 mm asymmetry in the central left breast posterior one third.  In the cc view.  It appears to be superior left breast on the MLO view.  There was also architectural distortion in the lateral left breast one third on the left cc view.    On diagnostic mammogram the spiculated areas within the central posterior and the lateral anterior superior left breast where redemonstrated.  Targeted ultrasound was done.  At 1 o'clock position 10 cm from the nipple there is a mass with spiculations which is suspicious.  The more centrally located spiculation on the cc view.  The more centrally located spiculation on the cc view cannot be clearly identified on ultrasound.  So there impression was there were 2 separate suspicious spiculated lesion in the left breast.  1 was seen well on the ultrasound and amenable to ultrasound-guided biopsy the other is seen well on mammography and a stereotactic guided biopsy suggested.    Patient subsequently underwent stereotactic biopsy of 1 lesion and ultrasound-guided biopsy of the 1 cm mass at 1 o'clock position 10 cm from the nipple.  A total of 3 different areas of the left breast were biopsied and specimens were sent to pathology.  The third biopsy was performed at 12 o'clock position in the posterior one third of the left breast.  All of the 3 sites were consistent with invasive lobular carcinoma at site A, B, and C.    MRI of the breast showed at 1 o'clock position of the left breast anteriorly 4 cm posterior to the  nipple there is a 1.5 x 1.6 x 1.3 cm irregular enhancing mass.  There is a small hematoma along the inferior margin of the mass.  An additional hematoma along the lateral margin of the mass which is measuring 1.2 x 0.9 x 0.9 cm    At 1:00 in the middle to posterior left breast 7.4 cm posterior to the nipple there is a 1.3 x 1 x 1 cm enhancing mass with a biopsy clip.    At 12:00 posterior left breast 8.5 cm posterior to the nipple there is a 1.5 x 1.8 x 2.2 cm irregular enhancing mass with a corresponding 2.9 cm biopsy cavity with a clip which represents the biopsy site malignancy    Multiple at least 12 similar-appearing irregular enhancing masses and foci are identified throughout the left breast predominantly involving the upper breast but also involving the lower outer quadrant.  At 11-12 o'clock in the middle and posterior left breast there is a 3 adjacent reference masses measuring 5.5 cm in AP dimension and individually measuring 1.2 x 0.9 x 1 cm.  At 1:00 in the far posterior left breast axillary tail 13.7 cm from the nipple there is a 1.3 x 1.1 x 1 cm craniocaudal dimension irregular enhancing mass which is located 0.8 cm from the anterolateral margin of the pectoralis muscle.     Per the MRI at least 15 similar-appearing irregular enhancing masses and foci scattered throughout the left breast together measuring 11.7 cm in maximum dimension which encompasses 3 sites of biopsy-proven malignancy and a consistent with multicentric malignancy.  MRI of the right breast is negative    INVITAE genetic testing showed variation of uncertain significance of LUCAS  Gene.    Patient was suggested left total mastectomy, left axillary sentinel lymph node biopsy possible node dissection and possible reconstruction.    However patient called Dr. Hi and decided to go for upfront bilateral total mastectomy, left axillary sentinel lymph node biopsy and possible axillary lymph node dissection and reconstruction.     I  presented the case in the breast cancer conference today.  Patient had multiple nodules per MRI on the left breast Dr. Todd looked at it and felt there were multiple nodules and the largest one was 2.2 cm.  The discussion was to go ahead and upfront do the surgery and subsequently treat with chemo/ endocrine therapy as needed.  The left breast MRI findings are slightly atypical and that it is not contiguous involvement over 11.5 cm area but multiple nodules.  Surgical pathology will clarify.  If it is 1 big lesion or multiple sma      Past Medical History:   Diagnosis Date   • ADD (attention deficit disorder)    • Breast cancer 03/08/2023    LEFT SIDE, HX BILATERAL MASTECTOMY, LAST CHEMO 8/2023, LAST RADIATION 10/23/23  - TARGETED THERAPY EVERY 3 WEEKS CURRENTLY - FOLLOWED BY [facility]   • GERD (gastroesophageal reflux disease)    • Graves disease     RADIOACTIVE IODINE TREAMENT IN PAST   • History of COVID-19 2022   • History of gestational diabetes 2010   • History of melanoma    • History of radioactive iodine thyroid ablation 2012   • History of vertigo    • Hyperlipidemia     DIET CONTROLLED   • Hypertension    • Hypothyroidism    • Uterine fibroid         Past Surgical History:   Procedure Laterality Date   • BREAST RECONSTRUCTION Bilateral 3/8/2023    Procedure: BILATERAL PLACEMENT  TISSUE EXPANDER AND ALLODERM;  Surgeon: Eamon Stone MD;  Location: [facility];  Service: Plastics;  Laterality: Bilateral;   • D & C CERVICAL BIOPSY  01/2006   • INTRAUTERINE DEVICE INSERTION  2010    Mirena   • INTRAUTERINE DEVICE INSERTION  2015    Mirena exchange, old IUD removal and new one inserted by Dr Carr, Lot # [id number] exp 09/17.mar   • MASTECTOMY W/ SENTINEL NODE BIOPSY Bilateral 3/8/2023    Procedure: Bilateral total mastectomy, left axillary sentinel lymph node biopsy;  Surgeon: Kell Hi MD;  Location: [facility] OR;  Service: General;  Laterality: Bilateral;   • SCAR REVISION BREAST Right  4/3/2023    Procedure: RIGHT MASTECTOMY REVISION;  Surgeon: Eamon Stone MD;  Location: [facility] MAIN OR;  Service: Plastics;  Laterality: Right;   • TOTAL LAPAROSCOPIC HYSTERECTOMY Bilateral 1/3/2024    Procedure: TOTAL LAPAROSCOPIC HYSTERECTOMY, BILATERAL SALPIGO-OOPHORECTOMY, CYSTOSCOPY;  Surgeon: Rocio Manning MD;  Location: [facility] MAIN OR;  Service: Obstetrics/Gynecology;  Laterality: Bilateral;   • VENOUS ACCESS DEVICE (PORT) INSERTION Right 4/13/2023    Procedure: right port placement;  Surgeon: Kell Hi MD;  Location: [facility] OR;  Service: General;  Laterality: Right;   • WISDOM TOOTH EXTRACTION          Current Outpatient Medications on File Prior to Visit   Medication Sig Dispense Refill   • BIOTIN PO Take  by mouth Daily.     • Black Currant Seed Oil 500 MG capsule Take 1 capsule by mouth Daily.     • Cholecalciferol (Vitamin D3) 250 MCG (56489 UT) tablet Take 10,000 Units by mouth Daily.     • COLLAGEN PO Take 1 tablet by mouth Daily. TO HOLD 1 WEEK PRIOR TO OR     • Glucosamine 500 MG capsule Take 1 capsule by mouth Daily. TO HOLD 1 WEEK PRIOR TO OR     • ibuprofen (ADVIL,MOTRIN) 600 MG tablet Take 1 tablet by mouth Every 6 (Six) Hours As Needed for Mild Pain. 30 tablet 0   • levothyroxine (SYNTHROID, LEVOTHROID) 125 MCG tablet Take 1 tablet by mouth Daily. 90 tablet 0   • lidocaine-prilocaine (EMLA) 2.5-2.5 % cream Apply 1 application topically to the appropriate area as directed As Needed for Mild Pain (apply pea-sized amount to port site 30 minutes prior to port being accessed). 30 g 3   • lisinopril (PRINIVIL,ZESTRIL) 2.5 MG tablet Take 1 tablet by mouth once daily (Patient taking differently: Take 1 tablet by mouth As Needed (SBP > 120). Take if SBP above 120) 30 tablet 5   • loratadine (CLARITIN) 10 MG tablet Take 1 tablet by mouth As Needed.     • magnesium oxide (MAG-OX) 400 MG tablet Take 1 tablet by mouth Every Night.     • ondansetron (Zofran) 4 MG tablet Take 1  tablet by mouth Daily As Needed for Nausea or Vomiting. 30 tablet 1   • pantoprazole (PROTONIX) 40 MG EC tablet Take 1 tablet by mouth Daily. 30 tablet 3   • Probiotic Product (PROBIOTIC ADVANCED PO) Take 1 tablet by mouth Daily.     • vitamin C (ASCORBIC ACID) 250 MG tablet Take 1 tablet by mouth Daily.     • Zinc 50 MG tablet Take 1 tablet by mouth Daily.       No current facility-administered medications on file prior to visit.        ALLERGIES:    Allergies   Allergen Reactions   • Erythromycin GI Intolerance     Pt reports   • Penicillins Rash        Social History     Socioeconomic History   • Marital status:      Spouse name: Parish   • Number of children: 2   Tobacco Use   • Smoking status: Never   • Smokeless tobacco: Never   Vaping Use   • Vaping Use: Never used   Substance and Sexual Activity   • Alcohol use: Not Currently     Comment: RARELY   • Drug use: Never   • Sexual activity: Not Currently     Partners: Male     Birth control/protection: Condom, Hysterectomy     Comment:          Family History   Problem Relation Age of Onset   • Diabetes Father    • Hyperlipidemia Father    • Arthritis Father    • Hypertension Father    • Heart disease Father         S/P stents   • Heart attack Father 41   • Colon polyps Father    • Melanoma Father    • Prostate cancer Father    • Alcohol abuse Father    • Hyperlipidemia Sister    • Hypertension Sister    • Heart attack Sister    • Diabetes Sister    • Thyroid cancer Paternal Aunt    • Lung cancer Maternal Grandmother    • Cancer Maternal Grandfather         Bladder cancer   • Lung cancer Maternal Grandfather    • Heart failure Paternal Grandmother    • Transient ischemic attack Paternal Grandmother    • Heart disease Paternal Grandfather    • COPD Sister         Heavy smoker   • Heart disease Sister    • Malig Hyperthermia Neg Hx       Family history: Maternal great aunt had breast cancer, unsure of the age .  Father had melanoma and prostate  "cancer, paternal aunt had thyroid cancer, paternal grandfather had bladder cancer maternal grandfather had bladder cancer maternal grandmother had lung cancer    Past medical history is consistent with Graves' disease    OB/GYN history:  Age of menarche: 14  Patient is premenopausal and had an IUD which has been now removed   2 para 2 no miscarriages  Age at first childbirth 30  Patient did breast-feed 24 months  Length of taking birth control pills none      Objective    Vitals:    24 0810   BP: 145/95   Pulse: 78   Resp: 16   Temp: 98 °F (36.7 °C)   TempSrc: Temporal   SpO2: 100%   Weight: 78.6 kg (173 lb 3.2 oz)   Height: 162.6 cm (64\")   PainSc: 0-No pain                 2024     8:12 AM   Current Status   ECOG score 0         Physical Exam      GENERAL:  Well-developed, Patient  in no acute distress.   SKIN:  Warm, dry ,NO purpura ,no rash.  HEENT:  Pupils were equal and reactive to light and accomodation, conjunctivae noninjected,  normal visual acuity.   LYMPHATICS:  No cervical, NO supraclavicular, NO axillary adenopathies.  BREASTS: S/p bilateral mastectomy with reconstruction, no chest wall lesions, no evidence of bilateral axillary adenopathy or supraclavicular adenopathy  CARDIAC: Normal rate and rhythm, no murmurs gallops or rubs.  LUNGS: Clear to auscultation bilaterally.   ABDOMEN:  Soft, nontender with bowel sounds active.   EXTREMITIES: No cyanosis clubbing or edema.  NEUROLOGICAL:  Patient was awake, alert, oriented to time, person and place.    I have reexamined the patient and the results are consistent with the previously documented exam. Kathrin Frederick MD     RECENT LABS:  Results from last 7 days   Lab Units 24  0746   WBC 10*3/mm3 6.12   NEUTROS ABS 10*3/mm3 3.07   HEMOGLOBIN g/dL 12.6   HEMATOCRIT % 36.1   PLATELETS 10*3/mm3 220         Results from last 7 days   Lab Units 24  0746   SODIUM mmol/L 140   POTASSIUM mmol/L 3.7   CHLORIDE mmol/L 105   CO2 mmol/L " 24.7   BUN mg/dL 13   CREATININE mg/dL 0.82   CALCIUM mg/dL 9.2   ALBUMIN g/dL 4.1   BILIRUBIN mg/dL 0.3   ALK PHOS U/L 194*   ALT (SGPT) U/L 78*   AST (SGOT) U/L 50*   GLUCOSE mg/dL 109*                 Assessment & Plan    *Pathologic T3N0 invasive lobular carcinoma of the left breast, upper inner and upper outer quadrant, multifocal, 60 mm, 20 mm, 2 mm, 1 mm, grade 2, [place] score of 7, 2 of them are ER/AR positive, HER2/cheryl negative.  One of the larger lesions was ER/AR positive HER2 positive.  Ki-67 is very low 5%,    Invasive lobular carcinoma of the left breast recently diagnosed, multifocal with 3 lesions biopsied in the left breast.  Patient has multicentric disease with 15 similar-appearing irregular enhancing masses in the left breast together measuring 11.7 cm in maximum dimension which encompasses 3 of the 3 biopsy proven malignancies.  All the 3 very invasive lobular carcinoma, grade 2 with [place] score of 6 but 2 of the lesions where ER/AR positive HER2 negative and the third lesion was ER/AR positive HER2 2+ with FISH showing HER2 amplified.      1.  Estrogen receptor 95%, progesterone receptor 95%, HER2/cheryl 0, Ki-67 5%    2 left breast 10 o'clock position 10 cm from the nipple ultrasound core guided biopsy showed ER 90%, %, HER2/cheryl 0    3.  Left breast upper stereotactic core biopsy showed estrogen receptor 90%, progesterone receptor 100%, HER2/cheryl equivocal 2+  FISH testing showed her to as 5.7 with a KEIRA 17 of 1.6 with a ratio HER2/KEIRA 17 of  INVITAE genetic testing showed variation of uncertain significance of LUCAS gene  We discussed the multidisciplinary approach in this patient's and I also discussed in length that that invasive lobular carcinomas do benefit from endocrine therapy and discussed in length about side effects of all endocrine therapies  Pending the results of the surgery she may need to be a candidate for chemotherapy as well and subsequently radiation if the tumor  is large locally  Patient is keen on getting bilateral oophorectomy after her breast surgery is done and we discussed about medical oophorectomy with Lupron as well  February 24, 2023: Presented patient in the breast cancer conference.  Even though it appears to be a heterogeneous tumor, since it is difficult to really appreciate by clinical exam and the fact that it is invasive lobular carcinoma, it was felt best to do upfront surgery.  March 8, 2023:p T3 N0 M0 invasive lobular carcinoma s/p bilateral mastectomy with left deep axillary sentinel lymph node biopsy, with bilateral placement of prepectoral tissue expanders for reconstruction and bilateral placement of AlloDerm.  Pathology shows multifocal tumor in  upper outer quadrant and upper inner quadrant, invasive lobular carcinoma grade 2 with a Marie score  score of 7  , total of 4 foci were seen, largest tumor being 60 mm, 20 mm, 2 mm, 1 mm.  No DCIS identified.  Lobular carcinoma in situ present.  No lymphatic or vascular channel invasion, no dermal lymphovascular space invasion all margins were negative for invasive carcinoma 4 lymph nodes were all negative for malignancy its pathologic T3N0  ER %, CA %, HER2/cheryl 2+ with Ki-67 of 5%  Previous biopsies had shown 2 of the tumors were ER/CA positive HER2/cheryl negative and one of them was ER/CA positive HER2 positive  On discussing with pathologist Dr. Serrano, she thinks that the larger tumor was ER CA positive and HER2 positive.  HER2 copy number of 5.7 with HER2/CEP ratio of 3.5 positive and this was on the larger tumor.  Given that patient has heterogeneous tumor with the larger lesion being ER/CA positive HER2 positive and a Ki-67 of 40%, being premenopausal we will treat with Taxotere carboplatin Herceptin Perjeta.  April 20, 2023: Patient was to start cycle 1 today but unfortunately her liver function tests were extremely high.  Discussed about alcohol and she had drank and taken Tylenol.   Will hold chemo today  April 28, 2023: Reviewed her liver function test which are completely normalized.  Discussed with her not to drink alcohol.  Here for cycle 1 day 1 TCHP chemotherapy.  Reviewed echo which showed ejection fraction of 64% with strain pattern of -21.  Patient understands all the side effects\  5/4/2023: Patient tolerated Cycle 1 TCHP well. She has had diarrhea, nausea and fatigue that have been well managed.   5/18/2023: C2 TCHP  5/22/2023: Here for close follow-up and possible IV fluids after treatment last Friday, 5/18/2023.  Patient had a fairly good weekend with good oral intake, weight that is notably stable, and very little nausea.  She only had 1 loose stool this morning and therefore no significant diarrhea.  CMP is WNL.  Per discussion with patient we will give just 500 mL normal saline.  May 25, 2023: Patient has diarrhea.  She is 1 week out of cycle 2 Taxotere carboplatin Herceptin Perjeta.  She has some abdominal cramping.  We will give her IV fluids today.  Given carboplatinum is not available she will receive Taxotere Herceptin Perjeta for 2 more cycles prior to going to Adriamycin Cytoxan.  Cycle #3 TCHP 6/8/2023  Patient returns 6/12/2023 for toxicity check.  She is eating and drinking sufficiently at this point.  She does not feel she has benefited from the IV fluids in the past and therefore will not do that today.  She denies any fevers or chills.  June 29, 2023: Cycle 4 TCHP.  She will need referral to radiation at the completion of 6 cycles of TCHP  07/20/2023 proceed with cycle 5 of carboplatin, Taxotere, Herceptin, Perjeta with Neulasta support.  August 10, 2023: Final cycle 6 TCHP.  Thereafter plan to continue every 3-week Herceptin/Perjeta for total of 1 year.   We have discussed potentially beginning Lupron followed by aromatase inhibitor therapy following radiation however patient wants to discuss potential hysterectomy and bilateral oophorectomy with her GYN.     September 21, 2023: Patient started radiation has completed 2 treatments so far out of 25.  Patient has history of depression in the family but not herself.  We discussed about consideration of Lupron with aromatase inhibitor but she wants to undergo hysterectomy with bilateral oophorectomy because of her fibroids.  In which case we could consider starting tamoxifen until she has oophorectomy and subsequently start AI.  She is tolerating Herceptin Perjeta very well  10/12/23 Here for Hepcetin and Perjeta today. She is currently on 18 of 25 radiation treatments and tolerating well.  She has no new concerns today.  November 3, 2023: Patient to receive Herceptin Perjeta.  She is due to get oophorectomy following which we will start aromatase inhibitor Arimidex.  November 28, 2023: Here for Herceptin Perjeta.  She is due to receive oophorectomy as of January 4, 2024.  Subsequent to that we will start Arimidex.  Patient is premenopausal and did not want to start Lupron and prefer to go to oophorectomy.  We discussed about starting tamoxifen until oophorectomy is done but patient prefers to not take tamoxifen and wants to wait till oophorectomy is done and subsequently we can start aromatase inhibitor  S/p hysterectomy with bilateral salpingo-oophorectomy January 3, 2024.  Pathology is benign.  She had benign left follicle cyst and inclusion cyst.  There is a subserosal 4 cm uterine leiomyoma.  Patient will start letrozole in 2 weeks.  Given that she was ER/IL positive.  Side effects discussed    *S/p  IUD removal  Experiencing hot flashes with chemo likely triggering menopausal changes.    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 She did recommend she  start taking her gabapentin again to help with her anxiety and irritability/anger from her decadron with treatment.   Patient continues follow-up with supportive oncology, RADHA Rolle. Patient is encouraged to continue to follow with Cierra.     *Oral Thrush and Chemotherapy induced mucositis  5/4/2023: I am concerned patient is starting into chemotherapy induced mucositis. She is reporting sore throat at times, possibly worsened reflux, mouth and tongue changes. She noticed white plaquing on her tongue this morning. She has not been able to  her Yen's Magic Mouthwash until today because of pharmacy supplies. She is currently on omeprazole 20 mg daily. She can increase this to 40 mg daily if needed. Prescription sent to pharmacy today to start Fluconazole 200 mg on day and 100 mg on day 2-7. We will continue to monitor symptoms and watch liver enzymes closely. ALT 34 and AST 24.  Liver function test mildly elevated today, unsure if that is the effect of fluconazole.  In the future avoid fluconazole and consider just Yen's Magic mouthwash.  7/6/2023 Oral thrush recurrent today, mild but bothersome.  Refill Magic mouthwash.  Patient continuing to rinse her mouth to minimize oral candida.  Expect this to resolve now that she is done with chemotherapy and no longer requiring dexamethasone.    *  Port study negative    *Intermittent mild elevation of LFTs.    Patient has previously reported intermittent use of Tylenol and alcohol.    8/31/2023 AST 35, ALT 41, alk phos 100.  Patient denies any alcohol intake or Tylenol.  This is felt to be drug related.  Stable to improved. Monitor.  10/12/2023 AST 93, .  Patient has had 1 joe this past week however denies any other alcohol or Tylenol intake.  Reviewed with Dr. TRINIDAD and we will treat.    *Cardiac monitoring  Seen by cardiology today, 7/27/2023.  Echo stable.  Plan for continued echocardiogram every 3 months while on therapy.  Continue low-dose  lisinopril    PLAN:  Proceed with Herceptin and Perjeta today  Follow-up every 3 weeks for Herceptin Perjeta  Echo is scheduled in January 2024, patient postponed it for next week  Reviewed pathology from her recent hysterectomy with bilateral oophorectomy which is benign  Patient to start letrozole in 2 weeks from now.  Follow-up with nurse practitioner in 3 weeks in 6 weeks for Herceptin Perjeta  Follow-up with me in 9 weeks with Herceptin and Perjeta    This patient is on high risk drug therapy requiring intensive monitoring for toxicity.      I spent 45 total minutes, face-to-face, caring for Ursula today. Greater than 50% of this time involved counseling and/or coordination of care as documented within this note.      MD Dr. Kell Sandoval

## 2024-01-12 ENCOUNTER — HOSPITAL ENCOUNTER (OUTPATIENT)
Dept: PHYSICAL THERAPY | Facility: HOSPITAL | Age: 49
Setting detail: THERAPIES SERIES
Discharge: HOME | End: 2024-01-12
Payer: COMMERCIAL

## 2024-01-12 DIAGNOSIS — E89.0 POSTABLATIVE HYPOTHYROIDISM: ICD-10-CM

## 2024-01-12 DIAGNOSIS — Z91.89 AT RISK FOR LYMPHEDEMA: Primary | ICD-10-CM

## 2024-01-12 DIAGNOSIS — Z90.13 S/P BILATERAL MASTECTOMY: ICD-10-CM

## 2024-01-12 DIAGNOSIS — C50.912 BREAST CARCINOMA, LOBULAR, LEFT: ICD-10-CM

## 2024-01-12 LAB
CHOLEST SERPL-MCNC: 187 MG/DL (ref 100–199)
HDLC SERPL-MCNC: 41 MG/DL
LDLC SERPL CALC-MCNC: 120 MG/DL (ref 0–99)
LDLC/HDLC SERPL: 2.9 RATIO (ref 0–3.2)
T4 FREE SERPL-MCNC: 2.03 NG/DL (ref 0.82–1.77)
TRIGL SERPL-MCNC: 146 MG/DL (ref 0–149)
TSH SERPL DL<=0.005 MIU/L-ACNC: 0.05 UIU/ML (ref 0.45–4.5)
VLDLC SERPL CALC-MCNC: 26 MG/DL (ref 5–40)

## 2024-01-12 PROCEDURE — 97535 SELF CARE MNGMENT TRAINING: CPT

## 2024-01-12 PROCEDURE — 93702 BIS XTRACELL FLUID ANALYSIS: CPT

## 2024-01-12 RX ORDER — LEVOTHYROXINE SODIUM 0.12 MG/1
125 TABLET ORAL DAILY
Start: 2024-01-12

## 2024-01-12 NOTE — THERAPY RE-EVALUATION
Physical Therapy Lymphedema Re-Evaluation  [facility]     Patient Name: Ursula Kulkarni  : 1975  MRN: [id number]  Today's Date: 2024      Visit Date: 2024    Visit Dx:    ICD-10-CM ICD-9-CM   1. At risk for lymphedema  Z91.89 V49.89   2. S/P bilateral mastectomy  Z90.13 V45.71   3. Breast carcinoma, lobular, left  C50.912 174.9       Patient Active Problem List   Diagnosis    Hypothyroidism    Anxiety    Mild hyperlipidemia    Heartburn    Elevated blood pressure reading without diagnosis of hypertension    Thyroid nodule    Allergic rhinitis    COVID-19 virus infection    Non-recurrent acute suppurative otitis media of right ear without spontaneous rupture of tympanic membrane    Rash    Pharyngeal dysphagia    Indigestion    Fatigue    Abnormal mammogram of left breast    Malignant neoplasm of overlapping sites of left breast in female, estrogen receptor positive    Breast carcinoma, lobular, left    High risk medication use    Encounter for adjustment or management of vascular access device    Elevated alkaline phosphatase level    Diarrhea        Past Medical History:   Diagnosis Date    ADD (attention deficit disorder)     Breast cancer 2023    LEFT SIDE, HX BILATERAL MASTECTOMY, LAST CHEMO 2023, LAST RADIATION 10/23/23  - TARGETED THERAPY EVERY 3 WEEKS CURRENTLY - FOLLOWED BY [facility]    GERD (gastroesophageal reflux disease)     Graves disease     RADIOACTIVE IODINE TREAMENT IN PAST    History of COVID-19     History of gestational diabetes     History of melanoma     History of radioactive iodine thyroid ablation     History of vertigo     Hyperlipidemia     DIET CONTROLLED    Hypertension     Hypothyroidism     Uterine fibroid         Past Surgical History:   Procedure Laterality Date    BREAST RECONSTRUCTION Bilateral 3/8/2023    Procedure: BILATERAL PLACEMENT  TISSUE EXPANDER AND ALLODERM;  Surgeon: Eamon Stone MD;  Location: [facility] OR;   Service: Plastics;  Laterality: Bilateral;    D & C CERVICAL BIOPSY  01/2006    INTRAUTERINE DEVICE INSERTION  2010    Mirena    INTRAUTERINE DEVICE INSERTION  2015    Mirena exchange, old IUD removal and new one inserted by Dr Carr, Lot # [id number] 09/17.mar    MASTECTOMY W/ SENTINEL NODE BIOPSY Bilateral 3/8/2023    Procedure: Bilateral total mastectomy, left axillary sentinel lymph node biopsy;  Surgeon: Kell Hi MD;  Location: [facility] MAIN OR;  Service: General;  Laterality: Bilateral;    SCAR REVISION BREAST Right 4/3/2023    Procedure: RIGHT MASTECTOMY REVISION;  Surgeon: Eamon Stone MD;  Location: [facility] MAIN OR;  Service: Plastics;  Laterality: Right;    TOTAL LAPAROSCOPIC HYSTERECTOMY Bilateral 1/3/2024    Procedure: TOTAL LAPAROSCOPIC HYSTERECTOMY, BILATERAL SALPIGO-OOPHORECTOMY, CYSTOSCOPY;  Surgeon: Rocio Manning MD;  Location: [facility] MAIN OR;  Service: Obstetrics/Gynecology;  Laterality: Bilateral;    VENOUS ACCESS DEVICE (PORT) INSERTION Right 4/13/2023    Procedure: right port placement;  Surgeon: Kell Hi MD;  Location: [facility] MAIN OR;  Service: General;  Laterality: Right;    WISDOM TOOTH EXTRACTION         Visit Dx:    ICD-10-CM ICD-9-CM   1. At risk for lymphedema  Z91.89 V49.89   2. S/P bilateral mastectomy  Z90.13 V45.71   3. Breast carcinoma, lobular, left  C50.912 174.9            Lymphedema       Row Name 01/12/24 1000             Lymphedema Assessment    Lymphedema Classification LUE:;at risk/stage 0  -LB      Lymphedema Cancer Related Sx bilateral;modified radical mastectomy  -LB      Lymphedema Surgery Comments 3/8/23; re-excision 4/3/23  -LB      Lymph Nodes Removed # 4  -LB      Positive Lymph Nodes # 0  -LB      Chemo Received yes  -LB      Radiation Therapy Received yes  -LB      Infections or Cellulitis? no  -LB         L-Dex Bioimpedence Screening    L-Dex Measurement Extremity LUE  -LB      L-Dex Patient Position Standing  -LB      L-Dex UE  Dominate Side Right  -LB      L-Dex UE At Risk Side Left  -LB      L-Dex UE Pre Surgical Value Yes  -LB      L-Dex UE Score -2.3  -LB      L-Dex UE Baseline Score 2.4  -LB      L-Dex UE Value Change -4.7  -LB      L-Dex UE Comment WNL; stable  -LB      $ L-Dex Charge yes  -LB                User Key  (r) = Recorded By, (t) = Taken By, (c) = Cosigned By      Initials Name Provider Type    Rocio Biggs, PT Physical Therapist                                    Therapy Education  Education Details: reviewed s/s of lymphedema, steps to prevention, bioimpedance results and interpretation, use of compression sleeve  Given: Symptoms/condition management, Edema management  Program: Reinforced  How Provided: Verbal  Provided to: Patient  Level of Understanding: Verbalized  55120 - PT Self Care/Mgmt Minutes: 10                       PT OP Goals       Row Name 01/12/24 1000          Long Term Goals    LTG Date to Achieve 03/11/23  -LB     LTG 1 Pt will maintain LDex bioimpedance WNL.  -LB     LTG 1 Progress Ongoing  -LB     LTG 1 Progress Comments WNL and stable today at -2.3  -LB     LTG 2 Pt will demonstrate full AROM BUE post-operatively.  -LB     LTG 2 Progress Met  -LB     LTG 3 Pt will acquire appropriately fitted compression garment and verbalize appropriate wear schedule.  -LB     LTG 3 Progress Met  -LB               User Key  (r) = Recorded By, (t) = Taken By, (c) = Cosigned By      Initials Name Provider Type    Rocio Biggs PT Physical Therapist                     PT Assessment/Plan       Row Name 01/12/24 1026          PT Assessment    Functional Limitations --  at risk for lymphedema  -LB     Impairments Impaired flexibility;Impaired lymphatic circulation  -LB     Assessment Comments Pt returns for 5 month bioimpedance follow up reporting good tolerance to chemotherapy and radiation completed. She was intermittently compliant with compression garment use during radiation and has now weaned from sleeve.  She has resumed full return to household and job tasks. She demonstrates full AROM BUE. Repeat bioimpedance testing WNL and stable at -2.3. She has met 2/3 LTGs and continues to make good progress. We reviewed s/s of lymphedema and discussed continued use of compression sleeve. Pt remains appropriate for continued skilled PT services to address deficits as needed and continue to monitor for lymphedema.  -LB     Rehab Potential Good  -LB     Patient/caregiver participated in establishment of treatment plan and goals Yes  -LB     Patient would benefit from skilled therapy intervention Yes  -LB        PT Plan    PT Frequency Other (comment)  -LB     Predicted Duration of Therapy Intervention (PT) repeat bioimpedance in 6 months  -LB     Planned CPT's? PT EVAL LOW COMPLEXITY: 23611;PT RE-EVAL: 50895;PT THER PROC EA 15 MIN: 30447;PT THER ACT EA 15 MIN: 14544;PT MANUAL THERAPY EA 15 MIN: 45548;PT SELF CARE/HOME MGMT/TRAIN EA 15: 58451;PT BIS XTRACELL FLUID ANALYSIS: 89389  -LB     PT Plan Comments repeat bioimpedance, return as needed if issues arise  -LB               User Key  (r) = Recorded By, (t) = Taken By, (c) = Cosigned By      Initials Name Provider Type    LB Rocio Daniels, PT Physical Therapist                                 Time Calculation:   Start Time: 0930  Stop Time: 0950  Time Calculation (min): 20 min  Total Timed Code Minutes- PT: 10 minute(s)  Timed Charges  68486 - PT Self Care/Mgmt Minutes: 10  Total Minutes  Timed Charges Total Minutes: 10   Total Minutes: 10   Therapy Charges for Today       Code Description Service Date Service Provider Modifiers Qty    75566682780 HC PT BIS XTRACELL FLUID ANALYSIS 1/12/2024 Rocio Daniels, PT  1    46907974163 HC PT SELF CARE/MGMT/TRAIN EA 15 MIN 1/12/2024 Rocio Daniels, PT GP 1                      Rocio Daniels PT  1/12/2024

## 2024-01-24 ENCOUNTER — TELEPHONE (OUTPATIENT)
Dept: RADIATION ONCOLOGY | Facility: HOSPITAL | Age: 49
End: 2024-01-24
Payer: COMMERCIAL

## 2024-01-25 ENCOUNTER — OFFICE VISIT (OUTPATIENT)
Dept: RADIATION ONCOLOGY | Facility: HOSPITAL | Age: 49
End: 2024-01-25
Payer: COMMERCIAL

## 2024-01-25 VITALS
OXYGEN SATURATION: 98 % | SYSTOLIC BLOOD PRESSURE: 132 MMHG | WEIGHT: 174.2 LBS | DIASTOLIC BLOOD PRESSURE: 95 MMHG | BODY MASS INDEX: 29.9 KG/M2 | HEART RATE: 91 BPM

## 2024-01-25 DIAGNOSIS — C50.812 MALIGNANT NEOPLASM OF OVERLAPPING SITES OF LEFT BREAST IN FEMALE, ESTROGEN RECEPTOR POSITIVE: Primary | ICD-10-CM

## 2024-01-25 DIAGNOSIS — Z17.0 MALIGNANT NEOPLASM OF OVERLAPPING SITES OF LEFT BREAST IN FEMALE, ESTROGEN RECEPTOR POSITIVE: Primary | ICD-10-CM

## 2024-01-25 PROCEDURE — G0463 HOSPITAL OUTPT CLINIC VISIT: HCPCS | Performed by: RADIOLOGY

## 2024-01-25 NOTE — PROGRESS NOTES
Subjective     No ref. provider found     Cancer Staging   No matching staging information was found for the patient.     CC: 3 month follow up for mpT3N0 left breast cancer                                  S:    I had the pleasure of seeing Ursula Kulkarni  [facility].  She is a pleasant 48 year old female with mpT3N0 invasive lobular carcinoma of left breast, ER MA positive Her 2 positive in the larger tumor with the largest tumor measuring 60mm. She returns today for follow up now 3 months out from completion of her radiation therapy to the left chest wall.  She completed a dose of 50Gy in 25 fractions on 10/23/23.   She has been doing well since I last saw her.      Review of Systems   Constitutional: Negative.    Skin: Negative.    Psychiatric/Behavioral: Negative.         Past Medical History:   Diagnosis Date    ADD (attention deficit disorder)     Breast cancer 03/08/2023    LEFT SIDE, HX BILATERAL MASTECTOMY, LAST CHEMO 8/2023, LAST RADIATION 10/23/23  - TARGETED THERAPY EVERY 3 WEEKS CURRENTLY - FOLLOWED BY [facility]    GERD (gastroesophageal reflux disease)     Graves disease     RADIOACTIVE IODINE TREAMENT IN PAST    History of COVID-19 2022    History of gestational diabetes 2010    History of melanoma     History of radioactive iodine thyroid ablation 2012    History of vertigo     Hyperlipidemia     DIET CONTROLLED    Hypertension     Hypothyroidism     Uterine fibroid          Past Surgical History:   Procedure Laterality Date    BREAST RECONSTRUCTION Bilateral 3/8/2023    Procedure: BILATERAL PLACEMENT  TISSUE EXPANDER AND ALLODERM;  Surgeon: Eamon Stone MD;  Location: [facility];  Service: Plastics;  Laterality: Bilateral;    D & C CERVICAL BIOPSY  01/2006    INTRAUTERINE DEVICE INSERTION  2010    Mirena    INTRAUTERINE DEVICE INSERTION  2015    Mirena exchange, old IUD removal and new one inserted by Dr Carr, Lot # [id number] 09/17.mar    MASTECTOMY W/  SENTINEL NODE BIOPSY Bilateral 3/8/2023    Procedure: Bilateral total mastectomy, left axillary sentinel lymph node biopsy;  Surgeon: Kell Hi MD;  Location: [facility] MAIN OR;  Service: General;  Laterality: Bilateral;    SCAR REVISION BREAST Right 4/3/2023    Procedure: RIGHT MASTECTOMY REVISION;  Surgeon: Eamon Stone MD;  Location: [facility] MAIN OR;  Service: Plastics;  Laterality: Right;    TOTAL LAPAROSCOPIC HYSTERECTOMY Bilateral 1/3/2024    Procedure: TOTAL LAPAROSCOPIC HYSTERECTOMY, BILATERAL SALPIGO-OOPHORECTOMY, CYSTOSCOPY;  Surgeon: Rocio Manning MD;  Location: [facility] MAIN OR;  Service: Obstetrics/Gynecology;  Laterality: Bilateral;    VENOUS ACCESS DEVICE (PORT) INSERTION Right 4/13/2023    Procedure: right port placement;  Surgeon: Kell Hi MD;  Location: [facility] MAIN OR;  Service: General;  Laterality: Right;    WISDOM TOOTH EXTRACTION           Social History     Socioeconomic History    Marital status:      Spouse name: Parish    Number of children: 2   Tobacco Use    Smoking status: Never    Smokeless tobacco: Never   Vaping Use    Vaping Use: Never used   Substance and Sexual Activity    Alcohol use: Not Currently     Comment: RARELY    Drug use: Never    Sexual activity: Not Currently     Partners: Male     Birth control/protection: Condom, Hysterectomy     Comment:           Family History   Problem Relation Age of Onset    Diabetes Father     Hyperlipidemia Father     Arthritis Father     Hypertension Father     Heart disease Father         S/P stents    Heart attack Father 41    Colon polyps Father     Melanoma Father     Prostate cancer Father     Alcohol abuse Father     Hyperlipidemia Sister     Hypertension Sister     Heart attack Sister     Diabetes Sister     Thyroid cancer Paternal Aunt     Lung cancer Maternal Grandmother     Cancer Maternal Grandfather         Bladder cancer    Lung cancer Maternal Grandfather     Heart failure Paternal  Grandmother     Transient ischemic attack Paternal Grandmother     Heart disease Paternal Grandfather     COPD Sister         Heavy smoker    Heart disease Sister     Malig Hyperthermia Neg Hx           Objective    Physical Exam  Constitutional:       Appearance: Normal appearance.   Chest:          Comments: Bilateral breasts surgically absent, no suspicious lesions or nodules, skin looks great, expander in place, no axillary adenopathy  Neurological:      Mental Status: She is alert.         Current Outpatient Medications on File Prior to Visit   Medication Sig Dispense Refill    BIOTIN PO Take  by mouth Daily.      Black Currant Seed Oil 500 MG capsule Take 1 capsule by mouth Daily.      Cholecalciferol (Vitamin D3) 250 MCG (70283 UT) tablet Take 10,000 Units by mouth Daily.      COLLAGEN PO Take 1 tablet by mouth Daily. TO HOLD 1 WEEK PRIOR TO OR      Glucosamine 500 MG capsule Take 1 capsule by mouth Daily. TO HOLD 1 WEEK PRIOR TO OR      ibuprofen (ADVIL,MOTRIN) 600 MG tablet Take 1 tablet by mouth Every 6 (Six) Hours As Needed for Mild Pain. 30 tablet 0    letrozole (FEMARA) 2.5 MG tablet Take 1 tablet by mouth Daily. 30 tablet 3    levothyroxine (SYNTHROID, LEVOTHROID) 125 MCG tablet Take 1 tablet by mouth Daily. Except none on Sundays.      lidocaine-prilocaine (EMLA) 2.5-2.5 % cream Apply 1 application topically to the appropriate area as directed As Needed for Mild Pain (apply pea-sized amount to port site 30 minutes prior to port being accessed). 30 g 3    lisinopril (PRINIVIL,ZESTRIL) 2.5 MG tablet Take 1 tablet by mouth once daily (Patient taking differently: Take 1 tablet by mouth As Needed (SBP > 120). Take if SBP above 120) 30 tablet 5    loratadine (CLARITIN) 10 MG tablet Take 1 tablet by mouth As Needed.      magnesium oxide (MAG-OX) 400 MG tablet Take 1 tablet by mouth Every Night.      ondansetron (Zofran) 4 MG tablet Take 1 tablet by mouth Daily As Needed for Nausea or Vomiting. 30 tablet 1     pantoprazole (PROTONIX) 40 MG EC tablet Take 1 tablet by mouth Daily. 30 tablet 3    Probiotic Product (PROBIOTIC ADVANCED PO) Take 1 tablet by mouth Daily.      vitamin C (ASCORBIC ACID) 250 MG tablet Take 1 tablet by mouth Daily.      Zinc 50 MG tablet Take 1 tablet by mouth Daily.       No current facility-administered medications on file prior to visit.       ALLERGIES:    Allergies   Allergen Reactions    Erythromycin GI Intolerance     Pt reports    Penicillins Rash       There were no vitals taken for this visit.         1/11/2024     8:12 AM   Current Status   ECOG score 0         Assessment & Plan     48 year old female with mpT3N0 invasive lobular carcinoma of left breast, ER VA positive Her 2 positive in the larger tumor with the largest tumor measuring 60mm now 3 months out from radiation to left chest wall and doing well.  She will see Dr. Stone her plastic surgeon next month and plans for reconstruction in may. She will have regular follow up with her medical oncologist.  I will see her back in one year for follow up.     I personally spent greater than 20 minutes today assessing, managing, discussing and documenting my visit with the patient. That time includes review of records, imaging and pathology reports, obtaining my own history, performing a medically appropriate evaluation, counseling and educating the patient, discussing goals, logistics, alternatives and risks of my recommendations, surveillance options and potential outcomes. It also includes the time documenting the clinical information in the EMR and communicating my recommendations to the other involved physicians.              Thank you very much for allowing me to participate in the care of this very pleasant patient.    Sincerely,      Fay Hines MD

## 2024-01-30 RX ORDER — LISINOPRIL 2.5 MG/1
2.5 TABLET ORAL DAILY
Qty: 30 TABLET | Refills: 5 | Status: SHIPPED | OUTPATIENT
Start: 2024-01-30

## 2024-02-01 ENCOUNTER — INFUSION (OUTPATIENT)
Dept: ONCOLOGY | Facility: HOSPITAL | Age: 49
End: 2024-02-01
Payer: COMMERCIAL

## 2024-02-01 ENCOUNTER — OFFICE VISIT (OUTPATIENT)
Dept: ONCOLOGY | Facility: CLINIC | Age: 49
End: 2024-02-01
Payer: COMMERCIAL

## 2024-02-01 VITALS
DIASTOLIC BLOOD PRESSURE: 87 MMHG | TEMPERATURE: 97.7 F | WEIGHT: 175.8 LBS | OXYGEN SATURATION: 99 % | BODY MASS INDEX: 30.01 KG/M2 | SYSTOLIC BLOOD PRESSURE: 125 MMHG | HEART RATE: 67 BPM | HEIGHT: 64 IN | RESPIRATION RATE: 18 BRPM

## 2024-02-01 DIAGNOSIS — C50.812 MALIGNANT NEOPLASM OF OVERLAPPING SITES OF LEFT BREAST IN FEMALE, ESTROGEN RECEPTOR POSITIVE: Primary | ICD-10-CM

## 2024-02-01 DIAGNOSIS — C50.812 MALIGNANT NEOPLASM OF OVERLAPPING SITES OF LEFT BREAST IN FEMALE, ESTROGEN RECEPTOR POSITIVE: ICD-10-CM

## 2024-02-01 DIAGNOSIS — Z17.0 MALIGNANT NEOPLASM OF OVERLAPPING SITES OF LEFT BREAST IN FEMALE, ESTROGEN RECEPTOR POSITIVE: Primary | ICD-10-CM

## 2024-02-01 DIAGNOSIS — Z79.899 HIGH RISK MEDICATION USE: ICD-10-CM

## 2024-02-01 DIAGNOSIS — Z17.0 MALIGNANT NEOPLASM OF OVERLAPPING SITES OF LEFT BREAST IN FEMALE, ESTROGEN RECEPTOR POSITIVE: ICD-10-CM

## 2024-02-01 LAB
ALBUMIN SERPL-MCNC: 4.3 G/DL (ref 3.5–5.2)
ALBUMIN/GLOB SERPL: 1.7 G/DL
ALP SERPL-CCNC: 117 U/L (ref 39–117)
ALT SERPL W P-5'-P-CCNC: 57 U/L (ref 1–33)
ANION GAP SERPL CALCULATED.3IONS-SCNC: 10.4 MMOL/L (ref 5–15)
AST SERPL-CCNC: 39 U/L (ref 1–32)
BASOPHILS # BLD AUTO: 0.05 10*3/MM3 (ref 0–0.2)
BASOPHILS NFR BLD AUTO: 0.9 % (ref 0–1.5)
BILIRUB SERPL-MCNC: 0.6 MG/DL (ref 0–1.2)
BUN SERPL-MCNC: 17 MG/DL (ref 6–20)
BUN/CREAT SERPL: 19.8 (ref 7–25)
CALCIUM SPEC-SCNC: 9.9 MG/DL (ref 8.6–10.5)
CHLORIDE SERPL-SCNC: 102 MMOL/L (ref 98–107)
CO2 SERPL-SCNC: 26.6 MMOL/L (ref 22–29)
CREAT SERPL-MCNC: 0.86 MG/DL (ref 0.57–1)
DEPRECATED RDW RBC AUTO: 40.4 FL (ref 37–54)
EGFRCR SERPLBLD CKD-EPI 2021: 84.5 ML/MIN/1.73
EOSINOPHIL # BLD AUTO: 0.37 10*3/MM3 (ref 0–0.4)
EOSINOPHIL NFR BLD AUTO: 6.8 % (ref 0.3–6.2)
ERYTHROCYTE [DISTWIDTH] IN BLOOD BY AUTOMATED COUNT: 12.4 % (ref 12.3–15.4)
GLOBULIN UR ELPH-MCNC: 2.5 GM/DL
GLUCOSE SERPL-MCNC: 119 MG/DL (ref 65–99)
HCT VFR BLD AUTO: 37.3 % (ref 34–46.6)
HGB BLD-MCNC: 12.8 G/DL (ref 12–15.9)
IMM GRANULOCYTES # BLD AUTO: 0.01 10*3/MM3 (ref 0–0.05)
IMM GRANULOCYTES NFR BLD AUTO: 0.2 % (ref 0–0.5)
LYMPHOCYTES # BLD AUTO: 1.94 10*3/MM3 (ref 0.7–3.1)
LYMPHOCYTES NFR BLD AUTO: 35.6 % (ref 19.6–45.3)
MCH RBC QN AUTO: 30.9 PG (ref 26.6–33)
MCHC RBC AUTO-ENTMCNC: 34.3 G/DL (ref 31.5–35.7)
MCV RBC AUTO: 90.1 FL (ref 79–97)
MONOCYTES # BLD AUTO: 0.4 10*3/MM3 (ref 0.1–0.9)
MONOCYTES NFR BLD AUTO: 7.3 % (ref 5–12)
NEUTROPHILS NFR BLD AUTO: 2.68 10*3/MM3 (ref 1.7–7)
NEUTROPHILS NFR BLD AUTO: 49.2 % (ref 42.7–76)
NRBC BLD AUTO-RTO: 0 /100 WBC (ref 0–0.2)
PLATELET # BLD AUTO: 206 10*3/MM3 (ref 140–450)
PMV BLD AUTO: 9.9 FL (ref 6–12)
POTASSIUM SERPL-SCNC: 3.9 MMOL/L (ref 3.5–5.2)
PROT SERPL-MCNC: 6.8 G/DL (ref 6–8.5)
RBC # BLD AUTO: 4.14 10*6/MM3 (ref 3.77–5.28)
SODIUM SERPL-SCNC: 139 MMOL/L (ref 136–145)
WBC NRBC COR # BLD AUTO: 5.45 10*3/MM3 (ref 3.4–10.8)

## 2024-02-01 PROCEDURE — 99214 OFFICE O/P EST MOD 30 MIN: CPT | Performed by: NURSE PRACTITIONER

## 2024-02-01 PROCEDURE — 25810000003 SODIUM CHLORIDE 0.9 % SOLUTION 250 ML FLEX CONT: Performed by: NURSE PRACTITIONER

## 2024-02-01 PROCEDURE — 25010000002 TRASTUZUMAB PER 10 MG: Performed by: NURSE PRACTITIONER

## 2024-02-01 PROCEDURE — 25010000002 DIPHENHYDRAMINE PER 50 MG: Performed by: NURSE PRACTITIONER

## 2024-02-01 PROCEDURE — 25810000003 SODIUM CHLORIDE 0.9 % SOLUTION: Performed by: NURSE PRACTITIONER

## 2024-02-01 PROCEDURE — 85025 COMPLETE CBC W/AUTO DIFF WBC: CPT

## 2024-02-01 PROCEDURE — 96417 CHEMO IV INFUS EACH ADDL SEQ: CPT

## 2024-02-01 PROCEDURE — 80053 COMPREHEN METABOLIC PANEL: CPT

## 2024-02-01 PROCEDURE — 25010000002 PERTUZUMAB 420 MG/14ML SOLUTION 420 MG VIAL: Performed by: NURSE PRACTITIONER

## 2024-02-01 PROCEDURE — 96375 TX/PRO/DX INJ NEW DRUG ADDON: CPT

## 2024-02-01 PROCEDURE — 96413 CHEMO IV INFUSION 1 HR: CPT

## 2024-02-01 RX ORDER — FAMOTIDINE 10 MG/ML
20 INJECTION, SOLUTION INTRAVENOUS ONCE
Status: CANCELLED | OUTPATIENT
Start: 2024-02-01

## 2024-02-01 RX ORDER — FAMOTIDINE 10 MG/ML
20 INJECTION, SOLUTION INTRAVENOUS ONCE
Status: COMPLETED | OUTPATIENT
Start: 2024-02-01 | End: 2024-02-01

## 2024-02-01 RX ORDER — SODIUM CHLORIDE 9 MG/ML
250 INJECTION, SOLUTION INTRAVENOUS ONCE
Status: CANCELLED | OUTPATIENT
Start: 2024-02-01

## 2024-02-01 RX ORDER — SODIUM CHLORIDE 9 MG/ML
250 INJECTION, SOLUTION INTRAVENOUS ONCE
Status: COMPLETED | OUTPATIENT
Start: 2024-02-01 | End: 2024-02-01

## 2024-02-01 RX ADMIN — SODIUM CHLORIDE 250 ML: 9 INJECTION, SOLUTION INTRAVENOUS at 09:14

## 2024-02-01 RX ADMIN — FAMOTIDINE 20 MG: 10 INJECTION INTRAVENOUS at 09:14

## 2024-02-01 RX ADMIN — SODIUM CHLORIDE 420 MG: 9 INJECTION, SOLUTION INTRAVENOUS at 09:29

## 2024-02-01 RX ADMIN — SODIUM CHLORIDE 470 MG: 9 INJECTION, SOLUTION INTRAVENOUS at 10:34

## 2024-02-01 RX ADMIN — DIPHENHYDRAMINE HYDROCHLORIDE 25 MG: 50 INJECTION, SOLUTION INTRAMUSCULAR; INTRAVENOUS at 09:14

## 2024-02-01 NOTE — PROGRESS NOTES
Subjective     REASON FOR FOLLOW-UP:  1.  Invasive lobular carcinoma multifocal,  Left breast anterolateral stereotactic biopsy invasive lobular carcinoma, Mraie score of 6, grade 2, 4 mm, ER 95%, MO 95%, HER2/cheryl 0 negative with Ki-67 of 5%.  Positive for atypical lobular hyperplasia  Left breast 1 o'clock position 10 cm from the nipple ultrasound-guided biopsy consistent with invasive lobular carcinoma grade 2 Windsor score of 6, 4 mm with lobular carcinoma in situ, ER 90%, %, HER2/cheryl 0  Left breast upper stereotactic guided core biopsy invasive lobular carcinoma grade 2 Windsor score of six 3 mm with lobular carcinoma in situ, ER 90%, %, HER2/cheryl equivocal 2+, FISH shows her to 5.7 with CEP 1.6 and ratio HER2 nu/KEIRA-17 ratio of 3.5.  It is amplified.    2.  March 8, 2023:p T3 N0 M0 invasive lobular carcinoma s/p bilateral mastectomy with left deep axillary sentinel lymph node biopsy, with bilateral placement of prepectoral tissue expanders for reconstruction and bilateral placement of AlloDerm.  Pathology shows multifocal tumor in  upper outer quadrant and upper inner quadrant, invasive lobular carcinoma grade 2 with a Windsor score  score of 7  , total of 4 foci were seen, largest tumor being 60 mm, 20 mm, 2 mm, 1 mm.  No DCIS identified.  Lobular carcinoma in situ present.  No lymphatic or vascular channel invasion, no dermal lymphovascular space invasion all margins were negative for invasive carcinoma 4 lymph nodes were all negative for malignancy its pathologic T3N0  ER %, MO %, HER2/cheryl 2+ with Ki-67 of 5%  Previous biopsies had shown 2 of the tumors were ER/MO positive HER2/cheryl negative and one of them was ER/MO positive HER2 positive  On discussing with pathologist Dr. Serrano, she thinks that the larger tumor was ER MO positive and HER2 positive but unsure if it was the 60 mm tumor or 20 mm tumor.  Either way it was not the smaller tumors which was HER2  positive.  All tumors look-alike per pathologist and they are invasive lobular carcinoma.                  HISTORY OF PRESENT ILLNESS:         Patient is a 48 y.o. female with history of multifocal left breast cancer with MRI of the breast showing almost 15 lesions.  Biopsies on 3 different lesions were done and they were all consistent with invasive lobular carcinoma.  2 of the lesions where ER/UT positive HER2/cheyrl negative and one of the larger lesion was ER/UT positive HER2 2+ equivocal.  FISH testing was done and the HER2 copy number was 5.7 with HER2/CEP ratio of 3.5.  The FISH was amplified.    Patient underwent bilateral mastectomy    With left sentinel lymph node surgery..  The pathology shows tumor to be present in the left upper outer quadrant invasive lobular carcinoma with a Marie score of 7, grade 2.  There were 4 lesions the greatest size was 6 0 mm, 20 mm, 2 mm and 1 mm in size.  Lobular carcinoma in situ is present.  All margins are negative for invasive carcinoma.  4 lymph nodes negative.  Patient has T3N0 invasive lobular carcinoma.      Discussed with pathology in length Dr. Serrano, one of the larger lesions which was 60 mm was heterogeneous it was biopsied at 2 separate spots.  The left anterior lateral and left 1:00 core fragment was ER/UT positive HER2/cheryl negative the left upper core biopsy was ER/UT positive HER2/cheryl 2+ equivocal but HER2/cheryl FISH was positive.    On discussion with pathology the 60 mm tumor showed ER and UT positive and HER2 positive by FISH with a Ki-67 of 40% at the Top-Hat clip.  The HER2/cheryl copy number was 5.7 with a HER2/cheryl by CEP ratio 3.5 which is positive.  The Ki-67 on the lower part was 13%.  The 20 mm tumor had a Ki-67 of 9%.  So the larger lesion was triple positive and heterogeneous as 2 biopsies were done on the larger lesions and that was both triple positive and at the second biopsy was ER/UT positive HER2/cheryl negative so it was a heterogeneous tumor.   The 20 mm lesion was ER/MO positive HER2/cheryl negative.    Given that she had a large tumor with high Ki-67 and premenopausal status we discussed about giving Taxotere carboplatin Herceptin Perjeta.    Interval History:   Patient returns today for ongoing Herceptin/Perjeta.  She is tolerating treatment well overall thankfully.  She did undergo recent hysterectomy.  She is experiencing some discomfort in her upper bilateral breast and there is question if they have this might be related to the gas from the laparoscopic surgery.  She is going to follow-up with Dr. Stone next week just for peace of mind.    Given patient's ER/MO positive status she did start on letrozole just this past week and so far she is tolerating this fine.    She denies other concerns today.    Oncology history:  Patient is a 48 y.o. female who has been recently diagnosed with left breast cancer.  Patient has been compliant with her mammograms.    There is no family history of breast or ovarian cancer.  Her father had prostate cancer and melanoma.  A paternal aunt had thyroid cancer.      Details are as follows.    November 29, 2022: Screening bilateral mammogram showed 8 mm asymmetry in the central left breast posterior one third.  This appears to be in the superior left breast on the MLO view.  Right breast was negative.  A left diagnostic mammogram and ultrasound was recommended.    January January 3, 2023: Left breast diagnostic mammogram showed the spiculated areas within the central posterior and lateral anterior superior left breast where redemonstrated.  Targeted ultrasound was done.  At 1 o'clock position 10 cm from the nipple there is a shadowing mass with peripheral spiculations which is concordant with the mammographic abnormality and suspicious for malignancy.  The more centrally located spiculation on the cc view posterior to the glandular tissue cannot be clearly identified on the ultrasound.    Impression was 2 separate  suspicious spiculated lesion in the left breast.  One of the lesions is seen well on the ultrasound and should be amenable to ultrasound-guided core biopsy.  The other lesion which was seen in the CC projection was amicable to stereotactic guided tissue sampling.    January 3, 2023 patient underwent ultrasound-guided left breast biopsy    The ultrasound-guided biopsy of left breast 10 cm from the nipple at 1 o'clock position showed invasive lobular carcinoma moderately differentiated with a [place] grade of 2 and score of 6 measuring 4 mm.  There was focal lobular carcinoma in situ.    Left breast anterior lateral stereotactic core needle biopsy showed invasive lobular carcinoma moderately differentiated grade 2 with a Marie score of 6 measuring 4 mm.  There was atypical lobular hyperplasia.  Estrogen receptor was %, progesterone receptor was %, HER2/cheryl 0, Ki-67 5%.    Left breast upper stereotactic guided core biopsy showed invasive lobular carcinoma moderately differentiated grade 2 with Marie score of 6 with measuring 3 mm with lobular carcinoma in situ present.  I do not see the ER/NM or HER2 on the ultrasound-guided biopsy of the left breast lesion as well as the left upper stereotactic biopsy.  We will check with pathologist    January 23, 2023: Patient underwent  MRI of the breast bilateral    Right breast: Negative    Left breast: At 1:00 anterior left breast 4 cm from the nipple there is a 1.5 x 1.6 x 1.3 cm irregular enhancing mass which is biopsy-proven malignancy.  The biopsy clip is located within 0.9 x 1 x 1.1 cm postbiopsy hematoma along the inferior margin of the mass.  And there is additional hematoma along the lateral margin of the mass which measures 1.2 x 1.9 x 0.9 cm.    At 1:00 in the middle to posterior left breast 7.4 cm from the nipple there is a 1.3 x 1 cm x 1 cm irregular enhancing mass mass with a focus from a biopsy clip along the inferior margin which also  represents the biopsy-proven malignancy    At 12:00 in the posterior left breast 8.5 cm from the nipple there is a 1.9 x 1.8 x 2.2 cm irregular enhancing mass with a corresponding 2.9 cm biopsy cavity with a biopsy clip which represents the biopsy-proven malignancy    In addition there are multiple at least 12 similar-appearing irregular enhancing masses and foci are identified throughout the left breast predominantly involving the upper breast but also involving the lower outer quadrant.  At 11-12 o'clock in the middle and posterior left breast there are 3 adjacent reference masses together measuring 5.5 cm but individually measuring 1.2 x 0.9 x 1 cm.  At 1:00 in the far posterior left breast/axillary tail 13.7 cm posterior to the nipple is a 1.3 x 1.1 x 1 cm irregular enhancing mass which is located 0.8 cm from the anterolateral margin of the pectoralis Muscle.  Together the enhancing masses span approximately 11.7 x 8 x 7.8 cm.  There is no suspicious enhancement in the left nipple or chest wall.  Focal areas of skin enhancement likely reflect skin entry point from recent biopsies.  There are no pathologically enlarged internal mammary chain lymph nodes.    Impression    .  At least 15 similar-appearing irregular enhancing masses and foci  scattered throughout the left breast, together measuring up to 11.7 cm  in maximum dimension, encompass 3 sites of biopsy-proven malignancy and  are consistent with multicentric malignancy. Oncologic and surgical  management are recommended.  2.  No MRI evidence of malignancy in the right breast.    Patient is a 47-year-old female with multifocal invasive lobular carcinoma who on screening mammogram showed 8 mm asymmetry in the central left breast posterior one third.  In the cc view.  It appears to be superior left breast on the MLO view.  There was also architectural distortion in the lateral left breast one third on the left cc view.    On diagnostic mammogram the spiculated  areas within the central posterior and the lateral anterior superior left breast where redemonstrated.  Targeted ultrasound was done.  At 1 o'clock position 10 cm from the nipple there is a mass with spiculations which is suspicious.  The more centrally located spiculation on the cc view.  The more centrally located spiculation on the cc view cannot be clearly identified on ultrasound.  So there impression was there were 2 separate suspicious spiculated lesion in the left breast.  1 was seen well on the ultrasound and amenable to ultrasound-guided biopsy the other is seen well on mammography and a stereotactic guided biopsy suggested.    Patient subsequently underwent stereotactic biopsy of 1 lesion and ultrasound-guided biopsy of the 1 cm mass at 1 o'clock position 10 cm from the nipple.  A total of 3 different areas of the left breast were biopsied and specimens were sent to pathology.  The third biopsy was performed at 12 o'clock position in the posterior one third of the left breast.  All of the 3 sites were consistent with invasive lobular carcinoma at site A, B, and C.    MRI of the breast showed at 1 o'clock position of the left breast anteriorly 4 cm posterior to the nipple there is a 1.5 x 1.6 x 1.3 cm irregular enhancing mass.  There is a small hematoma along the inferior margin of the mass.  An additional hematoma along the lateral margin of the mass which is measuring 1.2 x 0.9 x 0.9 cm    At 1:00 in the middle to posterior left breast 7.4 cm posterior to the nipple there is a 1.3 x 1 x 1 cm enhancing mass with a biopsy clip.    At 12:00 posterior left breast 8.5 cm posterior to the nipple there is a 1.5 x 1.8 x 2.2 cm irregular enhancing mass with a corresponding 2.9 cm biopsy cavity with a clip which represents the biopsy site malignancy    Multiple at least 12 similar-appearing irregular enhancing masses and foci are identified throughout the left breast predominantly involving the upper breast but  also involving the lower outer quadrant.  At 11-12 o'clock in the middle and posterior left breast there is a 3 adjacent reference masses measuring 5.5 cm in AP dimension and individually measuring 1.2 x 0.9 x 1 cm.  At 1:00 in the far posterior left breast axillary tail 13.7 cm from the nipple there is a 1.3 x 1.1 x 1 cm craniocaudal dimension irregular enhancing mass which is located 0.8 cm from the anterolateral margin of the pectoralis muscle.     Per the MRI at least 15 similar-appearing irregular enhancing masses and foci scattered throughout the left breast together measuring 11.7 cm in maximum dimension which encompasses 3 sites of biopsy-proven malignancy and a consistent with multicentric malignancy.  MRI of the right breast is negative    INVITAE genetic testing showed variation of uncertain significance of LUCAS  Gene.    Patient was suggested left total mastectomy, left axillary sentinel lymph node biopsy possible node dissection and possible reconstruction.    However patient called Dr. Hi and decided to go for upfront bilateral total mastectomy, left axillary sentinel lymph node biopsy and possible axillary lymph node dissection and reconstruction.     I presented the case in the breast cancer conference today.  Patient had multiple nodules per MRI on the left breast Dr. Todd looked at it and felt there were multiple nodules and the largest one was 2.2 cm.  The discussion was to go ahead and upfront do the surgery and subsequently treat with chemo/ endocrine therapy as needed.  The left breast MRI findings are slightly atypical and that it is not contiguous involvement over 11.5 cm area but multiple nodules.  Surgical pathology will clarify.  If it is 1 big lesion or multiple sma      Past Medical History:   Diagnosis Date    ADD (attention deficit disorder)     Breast cancer 03/08/2023    LEFT SIDE, HX BILATERAL MASTECTOMY, LAST CHEMO 8/2023, LAST RADIATION 10/23/23  - TARGETED THERAPY EVERY 3  WEEKS CURRENTLY - FOLLOWED BY [facility]    GERD (gastroesophageal reflux disease)     Graves disease     RADIOACTIVE IODINE TREAMENT IN PAST    History of COVID-19 2022    History of gestational diabetes 2010    History of melanoma     History of radioactive iodine thyroid ablation 2012    History of vertigo     Hyperlipidemia     DIET CONTROLLED    Hypertension     Hypothyroidism     Uterine fibroid         Past Surgical History:   Procedure Laterality Date    BREAST RECONSTRUCTION Bilateral 3/8/2023    Procedure: BILATERAL PLACEMENT  TISSUE EXPANDER AND ALLODERM;  Surgeon: Eamon Stone MD;  Location: [facility];  Service: Plastics;  Laterality: Bilateral;    D & C CERVICAL BIOPSY  01/2006    INTRAUTERINE DEVICE INSERTION  2010    Mirena    INTRAUTERINE DEVICE INSERTION  2015    Mirena exchange, old IUD removal and new one inserted by Dr Carr, Lot # [id number] 09/17.mar    MASTECTOMY W/ SENTINEL NODE BIOPSY Bilateral 3/8/2023    Procedure: Bilateral total mastectomy, left axillary sentinel lymph node biopsy;  Surgeon: Kell Hi MD;  Location: [facility];  Service: General;  Laterality: Bilateral;    SCAR REVISION BREAST Right 4/3/2023    Procedure: RIGHT MASTECTOMY REVISION;  Surgeon: Eamon Stone MD;  Location: [facility];  Service: Plastics;  Laterality: Right;    TOTAL LAPAROSCOPIC HYSTERECTOMY Bilateral 1/3/2024    Procedure: TOTAL LAPAROSCOPIC HYSTERECTOMY, BILATERAL SALPIGO-OOPHORECTOMY, CYSTOSCOPY;  Surgeon: Rocio Manning MD;  Location: [facility];  Service: Obstetrics/Gynecology;  Laterality: Bilateral;    VENOUS ACCESS DEVICE (PORT) INSERTION Right 4/13/2023    Procedure: right port placement;  Surgeon: Kell Hi MD;  Location: [facility];  Service: General;  Laterality: Right;    WISDOM TOOTH EXTRACTION          Current Outpatient Medications on File Prior to Visit   Medication Sig Dispense Refill    BIOTIN PO Take  by mouth Daily.       Black Currant Seed Oil 500 MG capsule Take 1 capsule by mouth Daily.      Cholecalciferol (Vitamin D3) 250 MCG (76378 UT) tablet Take 10,000 Units by mouth Daily.      COLLAGEN PO Take 1 tablet by mouth Daily. TO HOLD 1 WEEK PRIOR TO OR      Glucosamine 500 MG capsule Take 1 capsule by mouth Daily. TO HOLD 1 WEEK PRIOR TO OR      ibuprofen (ADVIL,MOTRIN) 600 MG tablet Take 1 tablet by mouth Every 6 (Six) Hours As Needed for Mild Pain. 30 tablet 0    letrozole (FEMARA) 2.5 MG tablet Take 1 tablet by mouth Daily. 30 tablet 3    levothyroxine (SYNTHROID, LEVOTHROID) 125 MCG tablet Take 1 tablet by mouth Daily. Except none on Sundays.      lidocaine-prilocaine (EMLA) 2.5-2.5 % cream Apply 1 application topically to the appropriate area as directed As Needed for Mild Pain (apply pea-sized amount to port site 30 minutes prior to port being accessed). 30 g 3    lisinopril (PRINIVIL,ZESTRIL) 2.5 MG tablet Take 1 tablet by mouth Daily. 30 tablet 5    loratadine (CLARITIN) 10 MG tablet Take 1 tablet by mouth As Needed.      magnesium oxide (MAG-OX) 400 MG tablet Take 1 tablet by mouth Every Night.      ondansetron (Zofran) 4 MG tablet Take 1 tablet by mouth Daily As Needed for Nausea or Vomiting. 30 tablet 1    pantoprazole (PROTONIX) 40 MG EC tablet Take 1 tablet by mouth Daily. 30 tablet 3    Probiotic Product (PROBIOTIC ADVANCED PO) Take 1 tablet by mouth Daily.      vitamin C (ASCORBIC ACID) 250 MG tablet Take 1 tablet by mouth Daily.      Zinc 50 MG tablet Take 1 tablet by mouth Daily.       Current Facility-Administered Medications on File Prior to Visit   Medication Dose Route Frequency Provider Last Rate Last Admin    [COMPLETED] diphenhydrAMINE (BENADRYL) IVPB 25 mg  25 mg Intravenous Once Deepa Amaya, APRLUPE   Stopped at 02/01/24 0929    [COMPLETED] famotidine (PEPCID) injection 20 mg  20 mg Intravenous Once Deepa Amaya, APRN   20 mg at 02/01/24 0914    pertuzumab (PERJETA) 420 mg in sodium  chloride 0.9 % 264 mL chemo IVPB  420 mg Intravenous Once Deepa Amaya APRN 580 mL/hr at 02/01/24 0929 420 mg at 02/01/24 0929    [COMPLETED] sodium chloride 0.9 % infusion 250 mL  250 mL Intravenous Once Deepa Amaya APRN 20 mL/hr at 02/01/24 0914 250 mL at 02/01/24 0914    Trastuzumab (HERCEPTIN) 470 mg in sodium chloride 0.9 % 250 mL chemo IVPB  6 mg/kg (Treatment Plan Recorded) Intravenous Once Deepa Amaya APRN            ALLERGIES:    Allergies   Allergen Reactions    Erythromycin GI Intolerance     Pt reports    Penicillins Rash        Social History     Socioeconomic History    Marital status:      Spouse name: Parish    Number of children: 2   Tobacco Use    Smoking status: Never    Smokeless tobacco: Never   Vaping Use    Vaping Use: Never used   Substance and Sexual Activity    Alcohol use: Not Currently     Comment: RARELY    Drug use: Never    Sexual activity: Not Currently     Partners: Male     Birth control/protection: Condom, Hysterectomy     Comment:          Family History   Problem Relation Age of Onset    Diabetes Father     Hyperlipidemia Father     Arthritis Father     Hypertension Father     Heart disease Father         S/P stents    Heart attack Father 41    Colon polyps Father     Melanoma Father     Prostate cancer Father     Alcohol abuse Father     Hyperlipidemia Sister     Hypertension Sister     Heart attack Sister     Diabetes Sister     Thyroid cancer Paternal Aunt     Lung cancer Maternal Grandmother     Cancer Maternal Grandfather         Bladder cancer    Lung cancer Maternal Grandfather     Heart failure Paternal Grandmother     Transient ischemic attack Paternal Grandmother     Heart disease Paternal Grandfather     COPD Sister         Heavy smoker    Heart disease Sister     Malig Hyperthermia Neg Hx       Family history: Maternal great aunt had breast cancer, unsure of the age .  Father had melanoma and prostate cancer, paternal  "aunt had thyroid cancer, paternal grandfather had bladder cancer maternal grandfather had bladder cancer maternal grandmother had lung cancer    Past medical history is consistent with Graves' disease    OB/GYN history:  Age of menarche: 14  Patient is premenopausal and had an IUD which has been now removed   2 para 2 no miscarriages  Age at first childbirth 30  Patient did breast-feed 24 months  Length of taking birth control pills none      Objective     Vitals:    24 0850   BP: 125/87   Pulse: 67   Resp: 18   Temp: 97.7 °F (36.5 °C)   TempSrc: Temporal   SpO2: 99%   Weight: 79.7 kg (175 lb 12.8 oz)   Height: 162.6 cm (64.02\")   PainSc: 0-No pain           2024     8:29 AM   Current Status   ECOG score 0         Physical Exam      GENERAL:  Well-developed, Patient  in no acute distress.   SKIN:  Warm, dry ,NO purpura ,no rash.  HEENT:  Pupils were equal and reactive to light and accomodation, conjunctivae noninjected,  normal visual acuity.   LYMPHATICS:  No cervical, NO supraclavicular, NO axillary adenopathies.  BREASTS: S/p bilateral mastectomy with expanders in place; no chest wall lesions, no evidence of bilateral axillary adenopathy or supraclavicular adenopathy  CARDIAC: Normal rate and rhythm, no murmurs gallops or rubs.  LUNGS: Clear to auscultation bilaterally.   ABDOMEN:  Soft, nontender with bowel sounds active.   EXTREMITIES: No cyanosis clubbing or edema.  NEUROLOGICAL:  Patient was awake, alert, oriented to time, person and place.    RECENT LABS:  Results from last 7 days   Lab Units 24  0834   WBC 10*3/mm3 5.45   NEUTROS ABS 10*3/mm3 2.68   HEMOGLOBIN g/dL 12.8   HEMATOCRIT % 37.3   PLATELETS 10*3/mm3 206       Results from last 7 days   Lab Units 24  0834   SODIUM mmol/L 139   POTASSIUM mmol/L 3.9   CHLORIDE mmol/L 102   CO2 mmol/L 26.6   BUN mg/dL 17   CREATININE mg/dL 0.86   CALCIUM mg/dL 9.9   ALBUMIN g/dL 4.3   BILIRUBIN mg/dL 0.6   ALK PHOS U/L 117   ALT (SGPT) " U/L 57*   AST (SGOT) U/L 39*   GLUCOSE mg/dL 119*                   Assessment & Plan     *Pathologic T3N0 invasive lobular carcinoma of the left breast, upper inner and upper outer quadrant, multifocal, 60 mm, 20 mm, 2 mm, 1 mm, grade 2, [place] score of 7, 2 of them are ER/NV positive, HER2/cheryl negative.  One of the larger lesions was ER/NV positive HER2 positive.  Ki-67 is very low 5%,    Invasive lobular carcinoma of the left breast recently diagnosed, multifocal with 3 lesions biopsied in the left breast.  Patient has multicentric disease with 15 similar-appearing irregular enhancing masses in the left breast together measuring 11.7 cm in maximum dimension which encompasses 3 of the 3 biopsy proven malignancies.  All the 3 very invasive lobular carcinoma, grade 2 with [place] score of 6 but 2 of the lesions where ER/NV positive HER2 negative and the third lesion was ER/NV positive HER2 2+ with FISH showing HER2 amplified.      1.  Estrogen receptor 95%, progesterone receptor 95%, HER2/cheryl 0, Ki-67 5%    2 left breast 10 o'clock position 10 cm from the nipple ultrasound core guided biopsy showed ER 90%, %, HER2/cheryl 0    3.  Left breast upper stereotactic core biopsy showed estrogen receptor 90%, progesterone receptor 100%, HER2/cheryl equivocal 2+  FISH testing showed her to as 5.7 with a KEIRA 17 of 1.6 with a ratio HER2/KEIRA 17 of  INVITAE genetic testing showed variation of uncertain significance of LUCAS gene  We discussed the multidisciplinary approach in this patient's and I also discussed in length that that invasive lobular carcinomas do benefit from endocrine therapy and discussed in length about side effects of all endocrine therapies  Pending the results of the surgery she may need to be a candidate for chemotherapy as well and subsequently radiation if the tumor is large locally  Patient is keen on getting bilateral oophorectomy after her breast surgery is done and we discussed about medical  oophorectomy with Lupron as well  February 24, 2023: Presented patient in the breast cancer conference.  Even though it appears to be a heterogeneous tumor, since it is difficult to really appreciate by clinical exam and the fact that it is invasive lobular carcinoma, it was felt best to do upfront surgery.  March 8, 2023:p T3 N0 M0 invasive lobular carcinoma s/p bilateral mastectomy with left deep axillary sentinel lymph node biopsy, with bilateral placement of prepectoral tissue expanders for reconstruction and bilateral placement of AlloDerm.  Pathology shows multifocal tumor in  upper outer quadrant and upper inner quadrant, invasive lobular carcinoma grade 2 with a Marie score  score of 7  , total of 4 foci were seen, largest tumor being 60 mm, 20 mm, 2 mm, 1 mm.  No DCIS identified.  Lobular carcinoma in situ present.  No lymphatic or vascular channel invasion, no dermal lymphovascular space invasion all margins were negative for invasive carcinoma 4 lymph nodes were all negative for malignancy its pathologic T3N0  ER %, IN %, HER2/cheryl 2+ with Ki-67 of 5%  Previous biopsies had shown 2 of the tumors were ER/IN positive HER2/cheryl negative and one of them was ER/IN positive HER2 positive  On discussing with pathologist Dr. Serrano, she thinks that the larger tumor was ER IN positive and HER2 positive.  HER2 copy number of 5.7 with HER2/CEP ratio of 3.5 positive and this was on the larger tumor.  Given that patient has heterogeneous tumor with the larger lesion being ER/IN positive HER2 positive and a Ki-67 of 40%, being premenopausal we will treat with Taxotere carboplatin Herceptin Perjeta.  April 20, 2023: Patient was to start cycle 1 today but unfortunately her liver function tests were extremely high.  Discussed about alcohol and she had drank and taken Tylenol.  Will hold chemo today  April 28, 2023: Reviewed her liver function test which are completely normalized.  Discussed with her not to  drink alcohol.  Here for cycle 1 day 1 TCHP chemotherapy.  Reviewed echo which showed ejection fraction of 64% with strain pattern of -21.  Patient understands all the side effects\  5/4/2023: Patient tolerated Cycle 1 TCHP well. She has had diarrhea, nausea and fatigue that have been well managed.   5/18/2023: C2 TCHP  5/22/2023: Here for close follow-up and possible IV fluids after treatment last Friday, 5/18/2023.  Patient had a fairly good weekend with good oral intake, weight that is notably stable, and very little nausea.  She only had 1 loose stool this morning and therefore no significant diarrhea.  CMP is WNL.  Per discussion with patient we will give just 500 mL normal saline.  May 25, 2023: Patient has diarrhea.  She is 1 week out of cycle 2 Taxotere carboplatin Herceptin Perjeta.  She has some abdominal cramping.  We will give her IV fluids today.  Given carboplatinum is not available she will receive Taxotere Herceptin Perjeta for 2 more cycles prior to going to Adriamycin Cytoxan.  Cycle #3 TCHP 6/8/2023  Patient returns 6/12/2023 for toxicity check.  She is eating and drinking sufficiently at this point.  She does not feel she has benefited from the IV fluids in the past and therefore will not do that today.  She denies any fevers or chills.  June 29, 2023: Cycle 4 TCHP.  She will need referral to radiation at the completion of 6 cycles of TCHP  07/20/2023 proceed with cycle 5 of carboplatin, Taxotere, Herceptin, Perjeta with Neulasta support.  August 10, 2023: Final cycle 6 TCHP.  Thereafter plan to continue every 3-week Herceptin/Perjeta for total of 1 year.   We have discussed potentially beginning Lupron followed by aromatase inhibitor therapy following radiation however patient wants to discuss potential hysterectomy and bilateral oophorectomy with her GYN.    September 21, 2023: Patient started radiation has completed 2 treatments so far out of 25.  Patient has history of depression in the  family but not herself.  We discussed about consideration of Lupron with aromatase inhibitor but she wants to undergo hysterectomy with bilateral oophorectomy because of her fibroids.  In which case we could consider starting tamoxifen until she has oophorectomy and subsequently start AI.  She is tolerating Herceptin Perjeta very well  10/12/23 Here for Hepcetin and Perjeta today. She is currently on 18 of 25 radiation treatments and tolerating well.  She has no new concerns today.  10/23/2023 completed radiation under the direction of Dr. Hines.  November 3, 2023: Patient to receive Herceptin Perjeta.  She is due to get oophorectomy following which we will start aromatase inhibitor Arimidex.  November 28, 2023: Here for Herceptin Perjeta.  She is due to receive oophorectomy as of January 4, 2024.  Subsequent to that we will start Arimidex.  Patient is premenopausal and did not want to start Lupron and prefer to go to oophorectomy.  We discussed about starting tamoxifen until oophorectomy is done but patient prefers to not take tamoxifen and wants to wait till oophorectomy is done and subsequently we can start aromatase inhibitor  S/p hysterectomy with bilateral salpingo-oophorectomy January 3, 2024.  Pathology is benign.  She had benign left follicle cyst and inclusion cyst.  There is a subserosal 4 cm uterine leiomyoma.  2/1/2024 continuing Herceptin/Perjeta with good tolerance.  Just started letrozole a few days ago the setting of ER/DC positivity.  Tolerating well.      *S/p  IUD removal  Experiencing hot flashes with chemo likely triggering menopausal changes.    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 She did recommend she start taking her  gabapentin again to help with her anxiety and irritability/anger from her decadron with treatment.   Patient continues follow-up with supportive oncology, RADHA Rolle. Patient is encouraged to continue to follow with Cierra.   Much improved.    *  Port study negative    *Intermittent mild elevation of LFTs.    Patient has previously reported intermittent use of Tylenol and alcohol.    8/31/2023 AST 35, ALT 41, alk phos 100.  Patient denies any alcohol intake or Tylenol.  This is felt to be drug related.  Stable to improved. Monitor.  10/12/2023 AST 93, .  Patient has had 1 joe this past week however denies any other alcohol or Tylenol intake.  Reviewed with Dr. TRINIDAD and we will treat.  2/1/2024 stable to improved, AST 39, ALT 57, alk phos 117, T. bili 0.6    *Cardiac monitoring  Seen by cardiology today, 7/27/2023.  Echo stable.  Plan for continued echocardiogram every 3 months while on therapy.  Continue low-dose lisinopril  Continues follow-up with cardio oncology.    PLAN:  Proceed with Herceptin and Perjeta today  Repeat echocardiogram 2/8/2024.  Continue letrozole 2.5 mg daily.    Return in 3 and 6 weeks for NP follow-up and Herceptin/Perjeta.    Patient will return in 9 weeks for follow-up with Dr. Frederick and Herceptin/Perjeta.  She should complete all Herceptin/Perjeta therapy as of the end of April.    Following completion of therapy patient will undergo final reconstructive surgery with Dr. Stone.        This patient is on high risk drug therapy requiring intensive monitoring for toxicity.          RADHA Woods Dr.

## 2024-02-08 ENCOUNTER — HOSPITAL ENCOUNTER (OUTPATIENT)
Dept: CARDIOLOGY | Facility: HOSPITAL | Age: 49
Discharge: HOME | End: 2024-02-08
Admitting: INTERNAL MEDICINE
Payer: COMMERCIAL

## 2024-02-08 VITALS
WEIGHT: 175 LBS | OXYGEN SATURATION: 98 % | HEART RATE: 84 BPM | HEIGHT: 64 IN | SYSTOLIC BLOOD PRESSURE: 122 MMHG | BODY MASS INDEX: 29.88 KG/M2 | DIASTOLIC BLOOD PRESSURE: 78 MMHG

## 2024-02-08 DIAGNOSIS — Z79.899 ENCOUNTER FOR MONITORING CARDIOTOXIC DRUG THERAPY: ICD-10-CM

## 2024-02-08 DIAGNOSIS — Z51.81 ENCOUNTER FOR MONITORING CARDIOTOXIC DRUG THERAPY: ICD-10-CM

## 2024-02-08 LAB
BH CV ECHO LEFT VENTRICLE GLOBAL LONGITUDINAL STRAIN: -22.2 %
BH CV ECHO MEAS - EDV(CUBED): 85.2 ML
BH CV ECHO MEAS - EDV(MOD-SP2): 53 ML
BH CV ECHO MEAS - EDV(MOD-SP4): 74 ML
BH CV ECHO MEAS - EF(MOD-BP): 67.4 %
BH CV ECHO MEAS - EF(MOD-SP2): 62.3 %
BH CV ECHO MEAS - EF(MOD-SP4): 67.6 %
BH CV ECHO MEAS - ESV(CUBED): 15 ML
BH CV ECHO MEAS - ESV(MOD-SP2): 20 ML
BH CV ECHO MEAS - ESV(MOD-SP4): 24 ML
BH CV ECHO MEAS - FS: 43.9 %
BH CV ECHO MEAS - IVS/LVPW: 1 CM
BH CV ECHO MEAS - IVSD: 0.8 CM
BH CV ECHO MEAS - LAT PEAK E' VEL: 11.9 CM/SEC
BH CV ECHO MEAS - LV DIASTOLIC VOL/BSA (35-75): 40 CM2
BH CV ECHO MEAS - LV MASS(C)D: 109.4 GRAMS
BH CV ECHO MEAS - LV SYSTOLIC VOL/BSA (12-30): 13 CM2
BH CV ECHO MEAS - LVIDD: 4.4 CM
BH CV ECHO MEAS - LVIDS: 2.47 CM
BH CV ECHO MEAS - LVPWD: 0.8 CM
BH CV ECHO MEAS - MED PEAK E' VEL: 12.2 CM/SEC
BH CV ECHO MEAS - MV A DUR: 0.11 SEC
BH CV ECHO MEAS - MV A MAX VEL: 63.4 CM/SEC
BH CV ECHO MEAS - MV DEC SLOPE: 411.2 CM/SEC2
BH CV ECHO MEAS - MV DEC TIME: 0.18 SEC
BH CV ECHO MEAS - MV E MAX VEL: 62.6 CM/SEC
BH CV ECHO MEAS - MV E/A: 0.99
BH CV ECHO MEAS - MV MAX PG: 2.7 MMHG
BH CV ECHO MEAS - MV MEAN PG: 1.13 MMHG
BH CV ECHO MEAS - MV P1/2T: 55.1 MSEC
BH CV ECHO MEAS - MV V2 VTI: 16.5 CM
BH CV ECHO MEAS - MVA(P1/2T): 4 CM2
BH CV ECHO MEAS - PULM A REVS DUR: 0.12 SEC
BH CV ECHO MEAS - PULM A REVS VEL: 25.7 CM/SEC
BH CV ECHO MEAS - PULM DIAS VEL: 27.4 CM/SEC
BH CV ECHO MEAS - PULM S/D: 1.52
BH CV ECHO MEAS - PULM SYS VEL: 41.6 CM/SEC
BH CV ECHO MEAS - RAP SYSTOLE: 3 MMHG
BH CV ECHO MEAS - RVSP: 20 MMHG
BH CV ECHO MEAS - SI(MOD-SP2): 17.9 ML/M2
BH CV ECHO MEAS - SI(MOD-SP4): 27.1 ML/M2
BH CV ECHO MEAS - SV(MOD-SP2): 33 ML
BH CV ECHO MEAS - SV(MOD-SP4): 50 ML
BH CV ECHO MEAS - TAPSE (>1.6): 1.98 CM
BH CV ECHO MEAS - TR MAX PG: 17 MMHG
BH CV ECHO MEAS - TR MAX VEL: 206.3 CM/SEC
BH CV ECHO MEASUREMENTS AVERAGE E/E' RATIO: 5.2
BH CV XLRA - TDI S': 10.2 CM/SEC
LEFT ATRIUM VOLUME INDEX: 24.8 ML/M2

## 2024-02-08 PROCEDURE — 93325 DOPPLER ECHO COLOR FLOW MAPG: CPT

## 2024-02-08 PROCEDURE — 93321 DOPPLER ECHO F-UP/LMTD STD: CPT

## 2024-02-08 PROCEDURE — 93308 TTE F-UP OR LMTD: CPT

## 2024-02-08 PROCEDURE — 93356 MYOCRD STRAIN IMG SPCKL TRCK: CPT

## 2024-02-08 PROCEDURE — 25510000001 PERFLUTREN (DEFINITY) 8.476 MG IN SODIUM CHLORIDE (PF) 0.9 % 10 ML INJECTION: Performed by: INTERNAL MEDICINE

## 2024-02-08 RX ADMIN — PERFLUTREN 1.5 ML: 6.52 INJECTION, SUSPENSION INTRAVENOUS at 15:27

## 2024-02-09 ENCOUNTER — TELEPHONE (OUTPATIENT)
Dept: CARDIOLOGY | Facility: CLINIC | Age: 49
End: 2024-02-09
Payer: COMMERCIAL

## 2024-02-09 NOTE — TELEPHONE ENCOUNTER
Please let her know that echo looks good.  Her heart is still strong and functioning well.  No change from prior echo or baseline echo.  Continue current medications and current chemotherapy regimen.  Keep appointment for April echocardiogram and appointment with Dr. Fermin as currently scheduled.

## 2024-02-09 NOTE — TELEPHONE ENCOUNTER
Called Ursula Kulkarni to review results and recommendations, however there was no answer and the voicemail box is full.  Will attempt to call patient again at a later time.    HUB: please transfer to Triage if patient returns call     Thank you,  [place]alia CABRAL RN  [place] [facility] [place]   08:46 EST

## 2024-02-12 NOTE — TELEPHONE ENCOUNTER
Reviewed results and recommendations with Ursula Kulkarni.  Patient verbalized understanding of results and recommendations.    Thank you,  Italia CABRAL RN  Triage Nurse [facility] [place]   09:21 EST

## 2024-02-22 ENCOUNTER — INFUSION (OUTPATIENT)
Dept: ONCOLOGY | Facility: HOSPITAL | Age: 49
End: 2024-02-22
Payer: COMMERCIAL

## 2024-02-22 ENCOUNTER — OFFICE VISIT (OUTPATIENT)
Dept: ONCOLOGY | Facility: CLINIC | Age: 49
End: 2024-02-22
Payer: COMMERCIAL

## 2024-02-22 VITALS
TEMPERATURE: 98.2 F | HEART RATE: 69 BPM | WEIGHT: 173.5 LBS | RESPIRATION RATE: 18 BRPM | HEIGHT: 64 IN | SYSTOLIC BLOOD PRESSURE: 129 MMHG | DIASTOLIC BLOOD PRESSURE: 87 MMHG | OXYGEN SATURATION: 100 % | BODY MASS INDEX: 29.62 KG/M2

## 2024-02-22 DIAGNOSIS — Z17.0 MALIGNANT NEOPLASM OF OVERLAPPING SITES OF LEFT BREAST IN FEMALE, ESTROGEN RECEPTOR POSITIVE: Primary | ICD-10-CM

## 2024-02-22 DIAGNOSIS — Z45.2 ENCOUNTER FOR ADJUSTMENT OR MANAGEMENT OF VASCULAR ACCESS DEVICE: ICD-10-CM

## 2024-02-22 DIAGNOSIS — C50.812 MALIGNANT NEOPLASM OF OVERLAPPING SITES OF LEFT BREAST IN FEMALE, ESTROGEN RECEPTOR POSITIVE: ICD-10-CM

## 2024-02-22 DIAGNOSIS — Z17.0 MALIGNANT NEOPLASM OF OVERLAPPING SITES OF LEFT BREAST IN FEMALE, ESTROGEN RECEPTOR POSITIVE: ICD-10-CM

## 2024-02-22 DIAGNOSIS — U07.1 COVID-19 VIRUS INFECTION: ICD-10-CM

## 2024-02-22 DIAGNOSIS — Z79.899 HIGH RISK MEDICATION USE: ICD-10-CM

## 2024-02-22 DIAGNOSIS — C50.812 MALIGNANT NEOPLASM OF OVERLAPPING SITES OF LEFT BREAST IN FEMALE, ESTROGEN RECEPTOR POSITIVE: Primary | ICD-10-CM

## 2024-02-22 LAB
ALBUMIN SERPL-MCNC: 4.7 G/DL (ref 3.5–5.2)
ALBUMIN/GLOB SERPL: 2 G/DL
ALP SERPL-CCNC: 122 U/L (ref 39–117)
ALT SERPL W P-5'-P-CCNC: 41 U/L (ref 1–33)
ANION GAP SERPL CALCULATED.3IONS-SCNC: 11.2 MMOL/L (ref 5–15)
AST SERPL-CCNC: 31 U/L (ref 1–32)
BASOPHILS # BLD AUTO: 0.03 10*3/MM3 (ref 0–0.2)
BASOPHILS NFR BLD AUTO: 0.4 % (ref 0–1.5)
BILIRUB SERPL-MCNC: 0.6 MG/DL (ref 0–1.2)
BUN SERPL-MCNC: 16 MG/DL (ref 6–20)
BUN/CREAT SERPL: 17.8 (ref 7–25)
CALCIUM SPEC-SCNC: 10.1 MG/DL (ref 8.6–10.5)
CHLORIDE SERPL-SCNC: 103 MMOL/L (ref 98–107)
CO2 SERPL-SCNC: 26.8 MMOL/L (ref 22–29)
CREAT SERPL-MCNC: 0.9 MG/DL (ref 0.57–1)
DEPRECATED RDW RBC AUTO: 39.5 FL (ref 37–54)
EGFRCR SERPLBLD CKD-EPI 2021: 80 ML/MIN/1.73
EOSINOPHIL # BLD AUTO: 0.47 10*3/MM3 (ref 0–0.4)
EOSINOPHIL NFR BLD AUTO: 6.4 % (ref 0.3–6.2)
ERYTHROCYTE [DISTWIDTH] IN BLOOD BY AUTOMATED COUNT: 12.2 % (ref 12.3–15.4)
GLOBULIN UR ELPH-MCNC: 2.4 GM/DL
GLUCOSE SERPL-MCNC: 109 MG/DL (ref 65–99)
HCT VFR BLD AUTO: 39.2 % (ref 34–46.6)
HGB BLD-MCNC: 13.6 G/DL (ref 12–15.9)
IMM GRANULOCYTES # BLD AUTO: 0.02 10*3/MM3 (ref 0–0.05)
IMM GRANULOCYTES NFR BLD AUTO: 0.3 % (ref 0–0.5)
LYMPHOCYTES # BLD AUTO: 2.24 10*3/MM3 (ref 0.7–3.1)
LYMPHOCYTES NFR BLD AUTO: 30.6 % (ref 19.6–45.3)
MCH RBC QN AUTO: 30.7 PG (ref 26.6–33)
MCHC RBC AUTO-ENTMCNC: 34.7 G/DL (ref 31.5–35.7)
MCV RBC AUTO: 88.5 FL (ref 79–97)
MONOCYTES # BLD AUTO: 0.42 10*3/MM3 (ref 0.1–0.9)
MONOCYTES NFR BLD AUTO: 5.7 % (ref 5–12)
NEUTROPHILS NFR BLD AUTO: 4.15 10*3/MM3 (ref 1.7–7)
NEUTROPHILS NFR BLD AUTO: 56.6 % (ref 42.7–76)
NRBC BLD AUTO-RTO: 0 /100 WBC (ref 0–0.2)
PLATELET # BLD AUTO: 224 10*3/MM3 (ref 140–450)
PMV BLD AUTO: 9.6 FL (ref 6–12)
POTASSIUM SERPL-SCNC: 3.9 MMOL/L (ref 3.5–5.2)
PROT SERPL-MCNC: 7.1 G/DL (ref 6–8.5)
RBC # BLD AUTO: 4.43 10*6/MM3 (ref 3.77–5.28)
SODIUM SERPL-SCNC: 141 MMOL/L (ref 136–145)
WBC NRBC COR # BLD AUTO: 7.33 10*3/MM3 (ref 3.4–10.8)

## 2024-02-22 PROCEDURE — 25010000002 TRASTUZUMAB PER 10 MG: Performed by: NURSE PRACTITIONER

## 2024-02-22 PROCEDURE — 25810000003 SODIUM CHLORIDE 0.9 % SOLUTION: Performed by: NURSE PRACTITIONER

## 2024-02-22 PROCEDURE — 96375 TX/PRO/DX INJ NEW DRUG ADDON: CPT

## 2024-02-22 PROCEDURE — 25010000002 PERTUZUMAB 420 MG/14ML SOLUTION 420 MG VIAL: Performed by: NURSE PRACTITIONER

## 2024-02-22 PROCEDURE — 85025 COMPLETE CBC W/AUTO DIFF WBC: CPT

## 2024-02-22 PROCEDURE — 96413 CHEMO IV INFUSION 1 HR: CPT

## 2024-02-22 PROCEDURE — 25810000003 SODIUM CHLORIDE 0.9 % SOLUTION 250 ML FLEX CONT: Performed by: NURSE PRACTITIONER

## 2024-02-22 PROCEDURE — 25010000002 HEPARIN LOCK FLUSH PER 10 UNITS: Performed by: INTERNAL MEDICINE

## 2024-02-22 PROCEDURE — 80053 COMPREHEN METABOLIC PANEL: CPT

## 2024-02-22 PROCEDURE — 96417 CHEMO IV INFUS EACH ADDL SEQ: CPT

## 2024-02-22 PROCEDURE — 25010000002 DIPHENHYDRAMINE PER 50 MG: Performed by: NURSE PRACTITIONER

## 2024-02-22 RX ORDER — HEPARIN SODIUM (PORCINE) LOCK FLUSH IV SOLN 100 UNIT/ML 100 UNIT/ML
500 SOLUTION INTRAVENOUS AS NEEDED
Status: DISCONTINUED | OUTPATIENT
Start: 2024-02-22 | End: 2024-02-22 | Stop reason: HOSPADM

## 2024-02-22 RX ORDER — SODIUM CHLORIDE 0.9 % (FLUSH) 0.9 %
10 SYRINGE (ML) INJECTION AS NEEDED
OUTPATIENT
Start: 2024-02-22

## 2024-02-22 RX ORDER — FAMOTIDINE 10 MG/ML
20 INJECTION, SOLUTION INTRAVENOUS ONCE
Status: CANCELLED | OUTPATIENT
Start: 2024-02-22

## 2024-02-22 RX ORDER — HEPARIN SODIUM (PORCINE) LOCK FLUSH IV SOLN 100 UNIT/ML 100 UNIT/ML
500 SOLUTION INTRAVENOUS AS NEEDED
OUTPATIENT
Start: 2024-02-22

## 2024-02-22 RX ORDER — SODIUM CHLORIDE 0.9 % (FLUSH) 0.9 %
10 SYRINGE (ML) INJECTION AS NEEDED
Status: DISCONTINUED | OUTPATIENT
Start: 2024-02-22 | End: 2024-02-22 | Stop reason: HOSPADM

## 2024-02-22 RX ORDER — SODIUM CHLORIDE 9 MG/ML
250 INJECTION, SOLUTION INTRAVENOUS ONCE
Status: CANCELLED | OUTPATIENT
Start: 2024-02-22

## 2024-02-22 RX ORDER — FAMOTIDINE 10 MG/ML
20 INJECTION, SOLUTION INTRAVENOUS ONCE
Status: COMPLETED | OUTPATIENT
Start: 2024-02-22 | End: 2024-02-22

## 2024-02-22 RX ORDER — SODIUM CHLORIDE 9 MG/ML
250 INJECTION, SOLUTION INTRAVENOUS ONCE
Status: COMPLETED | OUTPATIENT
Start: 2024-02-22 | End: 2024-02-22

## 2024-02-22 RX ADMIN — Medication 500 UNITS: at 11:26

## 2024-02-22 RX ADMIN — DIPHENHYDRAMINE HYDROCHLORIDE 25 MG: 50 INJECTION, SOLUTION INTRAMUSCULAR; INTRAVENOUS at 08:57

## 2024-02-22 RX ADMIN — PERTUZUMAB 420 MG: 30 INJECTION, SOLUTION, CONCENTRATE INTRAVENOUS at 09:43

## 2024-02-22 RX ADMIN — FAMOTIDINE 20 MG: 10 INJECTION INTRAVENOUS at 08:57

## 2024-02-22 RX ADMIN — Medication 10 ML: at 11:26

## 2024-02-22 RX ADMIN — TRASTUZUMAB 470 MG: 150 INJECTION, POWDER, LYOPHILIZED, FOR SOLUTION INTRAVENOUS at 10:48

## 2024-02-22 RX ADMIN — SODIUM CHLORIDE 250 ML: 9 INJECTION, SOLUTION INTRAVENOUS at 08:56

## 2024-02-22 NOTE — PROGRESS NOTES
Subjective     REASON FOR FOLLOW-UP:  1.  Invasive lobular carcinoma multifocal,  Left breast anterolateral stereotactic biopsy invasive lobular carcinoma, Marie score of 6, grade 2, 4 mm, ER 95%, DC 95%, HER2/cheryl 0 negative with Ki-67 of 5%.  Positive for atypical lobular hyperplasia  Left breast 1 o'clock position 10 cm from the nipple ultrasound-guided biopsy consistent with invasive lobular carcinoma grade 2 Summit score of 6, 4 mm with lobular carcinoma in situ, ER 90%, %, HER2/cheryl 0  Left breast upper stereotactic guided core biopsy invasive lobular carcinoma grade 2 Summit score of six 3 mm with lobular carcinoma in situ, ER 90%, %, HER2/cheryl equivocal 2+, FISH shows her to 5.7 with CEP 1.6 and ratio HER2 nu/KEIRA-17 ratio of 3.5.  It is amplified.    2.  March 8, 2023:p T3 N0 M0 invasive lobular carcinoma s/p bilateral mastectomy with left deep axillary sentinel lymph node biopsy, with bilateral placement of prepectoral tissue expanders for reconstruction and bilateral placement of AlloDerm.  Pathology shows multifocal tumor in  upper outer quadrant and upper inner quadrant, invasive lobular carcinoma grade 2 with a Summit score  score of 7  , total of 4 foci were seen, largest tumor being 60 mm, 20 mm, 2 mm, 1 mm.  No DCIS identified.  Lobular carcinoma in situ present.  No lymphatic or vascular channel invasion, no dermal lymphovascular space invasion all margins were negative for invasive carcinoma 4 lymph nodes were all negative for malignancy its pathologic T3N0  ER %, DC %, HER2/cheryl 2+ with Ki-67 of 5%  Previous biopsies had shown 2 of the tumors were ER/DC positive HER2/cheryl negative and one of them was ER/DC positive HER2 positive  On discussing with pathologist Dr. Serrano, she thinks that the larger tumor was ER DC positive and HER2 positive but unsure if it was the 60 mm tumor or 20 mm tumor.  Either way it was not the smaller tumors which was HER2  positive.  All tumors look-alike per pathologist and they are invasive lobular carcinoma.                  HISTORY OF PRESENT ILLNESS:         Patient is a 48 y.o. female with history of multifocal left breast cancer with MRI of the breast showing almost 15 lesions.  Biopsies on 3 different lesions were done and they were all consistent with invasive lobular carcinoma.  2 of the lesions where ER/KY positive HER2/cheryl negative and one of the larger lesion was ER/KY positive HER2 2+ equivocal.  FISH testing was done and the HER2 copy number was 5.7 with HER2/CEP ratio of 3.5.  The FISH was amplified.    Patient underwent bilateral mastectomy    With left sentinel lymph node surgery..  The pathology shows tumor to be present in the left upper outer quadrant invasive lobular carcinoma with a Marie score of 7, grade 2.  There were 4 lesions the greatest size was 6 0 mm, 20 mm, 2 mm and 1 mm in size.  Lobular carcinoma in situ is present.  All margins are negative for invasive carcinoma.  4 lymph nodes negative.  Patient has T3N0 invasive lobular carcinoma.      Discussed with pathology in length Dr. Serrano, one of the larger lesions which was 60 mm was heterogeneous it was biopsied at 2 separate spots.  The left anterior lateral and left 1:00 core fragment was ER/KY positive HER2/cheryl negative the left upper core biopsy was ER/KY positive HER2/cheryl 2+ equivocal but HER2/cheryl FISH was positive.    On discussion with pathology the 60 mm tumor showed ER and KY positive and HER2 positive by FISH with a Ki-67 of 40% at the Top-Hat clip.  The HER2/cheryl copy number was 5.7 with a HER2/cheryl by CEP ratio 3.5 which is positive.  The Ki-67 on the lower part was 13%.  The 20 mm tumor had a Ki-67 of 9%.  So the larger lesion was triple positive and heterogeneous as 2 biopsies were done on the larger lesions and that was both triple positive and at the second biopsy was ER/KY positive HER2/cheryl negative so it was a heterogeneous tumor.   The 20 mm lesion was ER/ND positive HER2/cheryl negative.    Given that she had a large tumor with high Ki-67 and premenopausal status we discussed about giving Taxotere carboplatin Herceptin Perjeta.    Interval History:   Patient returns today for ongoing Herceptin/Perjeta.  She is tolerating this well.    Patient underwent hysterectomy last month and has fully recovered thankfully.  She also had recent echocardiogram with Dr. Fermin which showed normal findings including EF of 67%.    Patient has been experiencing some fleeting zinger sensations in her upper and lateral chest.  She did follow-up with Dr. Stone last week who felt like her nerves were beginning to wake up.  Thankfully this is not significantly painful and again is fleeting.    Given patient's ER/ND positive status we did start her recently on letrozole as well.  She is about 4 weeks into this.  So far she thinks she is tolerating this well.  She has had a few episodes of perhaps being a little bit more irritable but she and her  are both trying to be cognizant of it.    She denies other concerns today.    Oncology history:  Patient is a 48 y.o. female who has been recently diagnosed with left breast cancer.  Patient has been compliant with her mammograms.    There is no family history of breast or ovarian cancer.  Her father had prostate cancer and melanoma.  A paternal aunt had thyroid cancer.      Details are as follows.    November 29, 2022: Screening bilateral mammogram showed 8 mm asymmetry in the central left breast posterior one third.  This appears to be in the superior left breast on the MLO view.  Right breast was negative.  A left diagnostic mammogram and ultrasound was recommended.    January January 3, 2023: Left breast diagnostic mammogram showed the spiculated areas within the central posterior and lateral anterior superior left breast where redemonstrated.  Targeted ultrasound was done.  At 1 o'clock position 10 cm from the  nipple there is a shadowing mass with peripheral spiculations which is concordant with the mammographic abnormality and suspicious for malignancy.  The more centrally located spiculation on the cc view posterior to the glandular tissue cannot be clearly identified on the ultrasound.    Impression was 2 separate suspicious spiculated lesion in the left breast.  One of the lesions is seen well on the ultrasound and should be amenable to ultrasound-guided core biopsy.  The other lesion which was seen in the CC projection was amicable to stereotactic guided tissue sampling.    January 3, 2023 patient underwent ultrasound-guided left breast biopsy    The ultrasound-guided biopsy of left breast 10 cm from the nipple at 1 o'clock position showed invasive lobular carcinoma moderately differentiated with a Marie grade of 2 and score of 6 measuring 4 mm.  There was focal lobular carcinoma in situ.    Left breast anterior lateral stereotactic core needle biopsy showed invasive lobular carcinoma moderately differentiated grade 2 with a Pe[place] score of 6 measuring 4 mm.  There was atypical lobular hyperplasia.  Estrogen receptor was %, progesterone receptor was %, HER2/cheryl 0, Ki-67 5%.    Left breast upper stereotactic guided core biopsy showed invasive lobular carcinoma moderately differentiated grade 2 with Marie score of 6 with measuring 3 mm with lobular carcinoma in situ present.  I do not see the ER/RI or HER2 on the ultrasound-guided biopsy of the left breast lesion as well as the left upper stereotactic biopsy.  We will check with pathologist    January 23, 2023: Patient underwent  MRI of the breast bilateral    Right breast: Negative    Left breast: At 1:00 anterior left breast 4 cm from the nipple there is a 1.5 x 1.6 x 1.3 cm irregular enhancing mass which is biopsy-proven malignancy.  The biopsy clip is located within 0.9 x 1 x 1.1 cm postbiopsy hematoma along the inferior margin of the  mass.  And there is additional hematoma along the lateral margin of the mass which measures 1.2 x 1.9 x 0.9 cm.    At 1:00 in the middle to posterior left breast 7.4 cm from the nipple there is a 1.3 x 1 cm x 1 cm irregular enhancing mass mass with a focus from a biopsy clip along the inferior margin which also represents the biopsy-proven malignancy    At 12:00 in the posterior left breast 8.5 cm from the nipple there is a 1.9 x 1.8 x 2.2 cm irregular enhancing mass with a corresponding 2.9 cm biopsy cavity with a biopsy clip which represents the biopsy-proven malignancy    In addition there are multiple at least 12 similar-appearing irregular enhancing masses and foci are identified throughout the left breast predominantly involving the upper breast but also involving the lower outer quadrant.  At 11-12 o'clock in the middle and posterior left breast there are 3 adjacent reference masses together measuring 5.5 cm but individually measuring 1.2 x 0.9 x 1 cm.  At 1:00 in the far posterior left breast/axillary tail 13.7 cm posterior to the nipple is a 1.3 x 1.1 x 1 cm irregular enhancing mass which is located 0.8 cm from the anterolateral margin of the pectoralis Muscle.  Together the enhancing masses span approximately 11.7 x 8 x 7.8 cm.  There is no suspicious enhancement in the left nipple or chest wall.  Focal areas of skin enhancement likely reflect skin entry point from recent biopsies.  There are no pathologically enlarged internal mammary chain lymph nodes.    Impression    .  At least 15 similar-appearing irregular enhancing masses and foci  scattered throughout the left breast, together measuring up to 11.7 cm  in maximum dimension, encompass 3 sites of biopsy-proven malignancy and  are consistent with multicentric malignancy. Oncologic and surgical  management are recommended.  2.  No MRI evidence of malignancy in the right breast.    Patient is a 47-year-old female with multifocal invasive lobular  carcinoma who on screening mammogram showed 8 mm asymmetry in the central left breast posterior one third.  In the cc view.  It appears to be superior left breast on the MLO view.  There was also architectural distortion in the lateral left breast one third on the left cc view.    On diagnostic mammogram the spiculated areas within the central posterior and the lateral anterior superior left breast where redemonstrated.  Targeted ultrasound was done.  At 1 o'clock position 10 cm from the nipple there is a mass with spiculations which is suspicious.  The more centrally located spiculation on the cc view.  The more centrally located spiculation on the cc view cannot be clearly identified on ultrasound.  So there impression was there were 2 separate suspicious spiculated lesion in the left breast.  1 was seen well on the ultrasound and amenable to ultrasound-guided biopsy the other is seen well on mammography and a stereotactic guided biopsy suggested.    Patient subsequently underwent stereotactic biopsy of 1 lesion and ultrasound-guided biopsy of the 1 cm mass at 1 o'clock position 10 cm from the nipple.  A total of 3 different areas of the left breast were biopsied and specimens were sent to pathology.  The third biopsy was performed at 12 o'clock position in the posterior one third of the left breast.  All of the 3 sites were consistent with invasive lobular carcinoma at site A, B, and C.    MRI of the breast showed at 1 o'clock position of the left breast anteriorly 4 cm posterior to the nipple there is a 1.5 x 1.6 x 1.3 cm irregular enhancing mass.  There is a small hematoma along the inferior margin of the mass.  An additional hematoma along the lateral margin of the mass which is measuring 1.2 x 0.9 x 0.9 cm    At 1:00 in the middle to posterior left breast 7.4 cm posterior to the nipple there is a 1.3 x 1 x 1 cm enhancing mass with a biopsy clip.    At 12:00 posterior left breast 8.5 cm posterior to the nipple  there is a 1.5 x 1.8 x 2.2 cm irregular enhancing mass with a corresponding 2.9 cm biopsy cavity with a clip which represents the biopsy site malignancy    Multiple at least 12 similar-appearing irregular enhancing masses and foci are identified throughout the left breast predominantly involving the upper breast but also involving the lower outer quadrant.  At 11-12 o'clock in the middle and posterior left breast there is a 3 adjacent reference masses measuring 5.5 cm in AP dimension and individually measuring 1.2 x 0.9 x 1 cm.  At 1:00 in the far posterior left breast axillary tail 13.7 cm from the nipple there is a 1.3 x 1.1 x 1 cm craniocaudal dimension irregular enhancing mass which is located 0.8 cm from the anterolateral margin of the pectoralis muscle.     Per the MRI at least 15 similar-appearing irregular enhancing masses and foci scattered throughout the left breast together measuring 11.7 cm in maximum dimension which encompasses 3 sites of biopsy-proven malignancy and a consistent with multicentric malignancy.  MRI of the right breast is negative    INVITAE genetic testing showed variation of uncertain significance of LUCAS  Gene.    Patient was suggested left total mastectomy, left axillary sentinel lymph node biopsy possible node dissection and possible reconstruction.    However patient called Dr. Hi and decided to go for upfront bilateral total mastectomy, left axillary sentinel lymph node biopsy and possible axillary lymph node dissection and reconstruction.     I presented the case in the breast cancer conference today.  Patient had multiple nodules per MRI on the left breast Dr. Todd looked at it and felt there were multiple nodules and the largest one was 2.2 cm.  The discussion was to go ahead and upfront do the surgery and subsequently treat with chemo/ endocrine therapy as needed.  The left breast MRI findings are slightly atypical and that it is not contiguous involvement over 11.5 cm  area but multiple nodules.  Surgical pathology will clarify.  If it is 1 big lesion or multiple sma      Past Medical History:   Diagnosis Date    ADD (attention deficit disorder)     Breast cancer 03/08/2023    LEFT SIDE, HX BILATERAL MASTECTOMY, LAST CHEMO 8/2023, LAST RADIATION 10/23/23  - TARGETED THERAPY EVERY 3 WEEKS CURRENTLY - FOLLOWED BY [facility]    GERD (gastroesophageal reflux disease)     Graves disease     RADIOACTIVE IODINE TREAMENT IN PAST    History of COVID-19 2022    History of gestational diabetes 2010    History of melanoma     History of radioactive iodine thyroid ablation 2012    History of vertigo     Hyperlipidemia     DIET CONTROLLED    Hypertension     Hypothyroidism     Uterine fibroid         Past Surgical History:   Procedure Laterality Date    BREAST RECONSTRUCTION Bilateral 3/8/2023    Procedure: BILATERAL PLACEMENT  TISSUE EXPANDER AND ALLODERM;  Surgeon: Eamon Stone MD;  Location: [facility];  Service: Plastics;  Laterality: Bilateral;    D & C CERVICAL BIOPSY  01/2006    INTRAUTERINE DEVICE INSERTION  2010    Mirena    INTRAUTERINE DEVICE INSERTION  2015    Mirena exchange, old IUD removal and new one inserted by Dr Carr, Lot # [id number] 09/17.mar    MASTECTOMY W/ SENTINEL NODE BIOPSY Bilateral 3/8/2023    Procedure: Bilateral total mastectomy, left axillary sentinel lymph node biopsy;  Surgeon: Kell Hi MD;  Location: [facility];  Service: General;  Laterality: Bilateral;    SCAR REVISION BREAST Right 4/3/2023    Procedure: RIGHT MASTECTOMY REVISION;  Surgeon: Eamon Stone MD;  Location: [facility];  Service: Plastics;  Laterality: Right;    TOTAL LAPAROSCOPIC HYSTERECTOMY Bilateral 1/3/2024    Procedure: TOTAL LAPAROSCOPIC HYSTERECTOMY, BILATERAL SALPIGO-OOPHORECTOMY, CYSTOSCOPY;  Surgeon: Rocio Manning MD;  Location: [facility];  Service: Obstetrics/Gynecology;  Laterality: Bilateral;    VENOUS ACCESS DEVICE (PORT) INSERTION  Right 4/13/2023    Procedure: right port placement;  Surgeon: Kell Hi MD;  Location: [facility] OR;  Service: General;  Laterality: Right;    WISDOM TOOTH EXTRACTION          Current Outpatient Medications on File Prior to Visit   Medication Sig Dispense Refill    BIOTIN PO Take  by mouth Daily.      Black Currant Seed Oil 500 MG capsule Take 1 capsule by mouth Daily.      Cholecalciferol (Vitamin D3) 250 MCG (62370 UT) tablet Take 10,000 Units by mouth Daily.      COLLAGEN PO Take 1 tablet by mouth Daily. TO HOLD 1 WEEK PRIOR TO OR      Glucosamine 500 MG capsule Take 1 capsule by mouth Daily. TO HOLD 1 WEEK PRIOR TO OR      ibuprofen (ADVIL,MOTRIN) 600 MG tablet Take 1 tablet by mouth Every 6 (Six) Hours As Needed for Mild Pain. 30 tablet 0    letrozole (FEMARA) 2.5 MG tablet Take 1 tablet by mouth Daily. 30 tablet 3    levothyroxine (SYNTHROID, LEVOTHROID) 125 MCG tablet Take 1 tablet by mouth Daily. Except none on Sundays.      lidocaine-prilocaine (EMLA) 2.5-2.5 % cream Apply 1 application topically to the appropriate area as directed As Needed for Mild Pain (apply pea-sized amount to port site 30 minutes prior to port being accessed). 30 g 3    lisinopril (PRINIVIL,ZESTRIL) 2.5 MG tablet Take 1 tablet by mouth Daily. 30 tablet 5    loratadine (CLARITIN) 10 MG tablet Take 1 tablet by mouth As Needed.      magnesium oxide (MAG-OX) 400 MG tablet Take 1 tablet by mouth Every Night.      ondansetron (Zofran) 4 MG tablet Take 1 tablet by mouth Daily As Needed for Nausea or Vomiting. 30 tablet 1    pantoprazole (PROTONIX) 40 MG EC tablet Take 1 tablet by mouth Daily. 30 tablet 3    Probiotic Product (PROBIOTIC ADVANCED PO) Take 1 tablet by mouth Daily.      vitamin C (ASCORBIC ACID) 250 MG tablet Take 1 tablet by mouth Daily.      Zinc 50 MG tablet Take 1 tablet by mouth Daily.       No current facility-administered medications on file prior to visit.        ALLERGIES:    Allergies   Allergen Reactions     Erythromycin GI Intolerance     Pt reports    Penicillins Rash        Social History     Socioeconomic History    Marital status:      Spouse name: Parish    Number of children: 2   Tobacco Use    Smoking status: Never    Smokeless tobacco: Never   Vaping Use    Vaping Use: Never used   Substance and Sexual Activity    Alcohol use: Not Currently     Comment: RARELY    Drug use: Never    Sexual activity: Not Currently     Partners: Male     Birth control/protection: Condom, Hysterectomy     Comment:          Family History   Problem Relation Age of Onset    Diabetes Father     Hyperlipidemia Father     Arthritis Father     Hypertension Father     Heart disease Father         S/P stents    Heart attack Father 41    Colon polyps Father     Melanoma Father     Prostate cancer Father     Alcohol abuse Father     Hyperlipidemia Sister     Hypertension Sister     Heart attack Sister     Diabetes Sister     Thyroid cancer Paternal Aunt     Lung cancer Maternal Grandmother     Cancer Maternal Grandfather         Bladder cancer    Lung cancer Maternal Grandfather     Heart failure Paternal Grandmother     Transient ischemic attack Paternal Grandmother     Heart disease Paternal Grandfather     COPD Sister         Heavy smoker    Heart disease Sister     Malig Hyperthermia Neg Hx       Family history: Maternal great aunt had breast cancer, unsure of the age .  Father had melanoma and prostate cancer, paternal aunt had thyroid cancer, paternal grandfather had bladder cancer maternal grandfather had bladder cancer maternal grandmother had lung cancer    Past medical history is consistent with Graves' disease    OB/GYN history:  Age of menarche: 14  Patient is premenopausal and had an IUD which has been now removed   2 para 2 no miscarriages  Age at first childbirth 30  Patient did breast-feed 24 months  Length of taking birth control pills none      Objective     Vitals:    24 0832   BP: 129/87  "  Pulse: 69   Resp: 18   Temp: 98.2 °F (36.8 °C)   TempSrc: Temporal   SpO2: 100%   Weight: 78.7 kg (173 lb 8 oz)   Height: 162.6 cm (64.02\")   PainSc: 0-No pain             2/22/2024     8:15 AM   Current Status   ECOG score 0         Physical Exam      GENERAL:  Well-developed, Patient  in no acute distress.   SKIN:  Warm, dry ,NO purpura ,no rash.  HEENT:  Pupils were equal and reactive to light and accomodation, conjunctivae noninjected,  normal visual acuity.   LYMPHATICS:  No cervical, NO supraclavicular, NO axillary adenopathies.  BREASTS: S/p bilateral mastectomy with expanders in place; no chest wall lesions, no evidence of bilateral axillary adenopathy or supraclavicular adenopathy  CARDIAC: Normal rate and rhythm, no murmurs gallops or rubs.  LUNGS: Clear to auscultation bilaterally.   ABDOMEN:  Soft, nontender with bowel sounds active.   EXTREMITIES: No cyanosis clubbing or edema.  NEUROLOGICAL:  Patient was awake, alert, oriented to time, person and place.    I have reexamined the patient and the results are consistent with the previously documented exam. Deepa Amaya, APRN       RECENT LABS:  Results from last 7 days   Lab Units 02/22/24  0817   WBC 10*3/mm3 7.33   NEUTROS ABS 10*3/mm3 4.15   HEMOGLOBIN g/dL 13.6   HEMATOCRIT % 39.2   PLATELETS 10*3/mm3 224         Results from last 7 days   Lab Units 02/22/24  0817   SODIUM mmol/L 141   POTASSIUM mmol/L 3.9   CHLORIDE mmol/L 103   CO2 mmol/L 26.8   BUN mg/dL 16   CREATININE mg/dL 0.90   CALCIUM mg/dL 10.1   ALBUMIN g/dL 4.7   BILIRUBIN mg/dL 0.6   ALK PHOS U/L 122*   ALT (SGPT) U/L 41*   AST (SGOT) U/L 31   GLUCOSE mg/dL 109*                     Assessment & Plan     *Pathologic T3N0 invasive lobular carcinoma of the left breast, upper inner and upper outer quadrant, multifocal, 60 mm, 20 mm, 2 mm, 1 mm, grade 2, [place] score of 7, 2 of them are ER/ID positive, HER2/cheryl negative.  One of the larger lesions was ER/ID positive HER2 " positive.  Ki-67 is very low 5%,    Invasive lobular carcinoma of the left breast recently diagnosed, multifocal with 3 lesions biopsied in the left breast.  Patient has multicentric disease with 15 similar-appearing irregular enhancing masses in the left breast together measuring 11.7 cm in maximum dimension which encompasses 3 of the 3 biopsy proven malignancies.  All the 3 very invasive lobular carcinoma, grade 2 with Marie score of 6 but 2 of the lesions where ER/IA positive HER2 negative and the third lesion was ER/IA positive HER2 2+ with FISH showing HER2 amplified.      1.  Estrogen receptor 95%, progesterone receptor 95%, HER2/cheryl 0, Ki-67 5%    2 left breast 10 o'clock position 10 cm from the nipple ultrasound core guided biopsy showed ER 90%, %, HER2/cheryl 0    3.  Left breast upper stereotactic core biopsy showed estrogen receptor 90%, progesterone receptor 100%, HER2/cheryl equivocal 2+  FISH testing showed her to as 5.7 with a KEIRA 17 of 1.6 with a ratio HER2/KEIRA 17 of  INVITAE genetic testing showed variation of uncertain significance of LUCAS gene  We discussed the multidisciplinary approach in this patient's and I also discussed in length that that invasive lobular carcinomas do benefit from endocrine therapy and discussed in length about side effects of all endocrine therapies  Pending the results of the surgery she may need to be a candidate for chemotherapy as well and subsequently radiation if the tumor is large locally  Patient is keen on getting bilateral oophorectomy after her breast surgery is done and we discussed about medical oophorectomy with Lupron as well  February 24, 2023: Presented patient in the breast cancer conference.  Even though it appears to be a heterogeneous tumor, since it is difficult to really appreciate by clinical exam and the fact that it is invasive lobular carcinoma, it was felt best to do upfront surgery.  March 8, 2023:p T3 N0 M0 invasive lobular carcinoma s/p  bilateral mastectomy with left deep axillary sentinel lymph node biopsy, with bilateral placement of prepectoral tissue expanders for reconstruction and bilateral placement of AlloDerm.  Pathology shows multifocal tumor in  upper outer quadrant and upper inner quadrant, invasive lobular carcinoma grade 2 with a Marie score  score of 7  , total of 4 foci were seen, largest tumor being 60 mm, 20 mm, 2 mm, 1 mm.  No DCIS identified.  Lobular carcinoma in situ present.  No lymphatic or vascular channel invasion, no dermal lymphovascular space invasion all margins were negative for invasive carcinoma 4 lymph nodes were all negative for malignancy its pathologic T3N0  ER %, WA %, HER2/cheryl 2+ with Ki-67 of 5%  Previous biopsies had shown 2 of the tumors were ER/WA positive HER2/cheryl negative and one of them was ER/WA positive HER2 positive  On discussing with pathologist Dr. 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  HER2 copy number of 5.7 with HER2/CEP ratio of 3.5 positive and this was on the larger tumor.  Given that patient has heterogeneous tumor with the larger lesion being ER/WA positive HER2 positive and a Ki-67 of 40%, being premenopausal we will treat with Taxotere carboplatin Herceptin Perjeta.  April 20, 2023: Patient was to start cycle 1 today but unfortunately her liver function tests were extremely high.  Discussed about alcohol and she had drank and taken Tylenol.  Will hold chemo today  April 28, 2023: Reviewed her liver function test which are completely normalized.  Discussed with her not to drink alcohol.  Here for cycle 1 day 1 TCHP chemotherapy.  Reviewed echo which showed ejection fraction of 64% with strain pattern of -21.  Patient understands all the side effects\  5/4/2023: Patient tolerated Cycle 1 TCHP well. She has had diarrhea, nausea and fatigue that have been well managed.   5/18/2023: C2 TCHP  5/22/2023: Here for close follow-up and  possible IV fluids after treatment last Friday, 5/18/2023.  Patient had a fairly good weekend with good oral intake, weight that is notably stable, and very little nausea.  She only had 1 loose stool this morning and therefore no significant diarrhea.  CMP is WNL.  Per discussion with patient we will give just 500 mL normal saline.  May 25, 2023: Patient has diarrhea.  She is 1 week out of cycle 2 Taxotere carboplatin Herceptin Perjeta.  She has some abdominal cramping.  We will give her IV fluids today.  Given carboplatinum is not available she will receive Taxotere Herceptin Perjeta for 2 more cycles prior to going to Adriamycin Cytoxan.  Cycle #3 TCHP 6/8/2023  Patient returns 6/12/2023 for toxicity check.  She is eating and drinking sufficiently at this point.  She does not feel she has benefited from the IV fluids in the past and therefore will not do that today.  She denies any fevers or chills.  June 29, 2023: Cycle 4 TCHP.  She will need referral to radiation at the completion of 6 cycles of TCHP  07/20/2023 proceed with cycle 5 of carboplatin, Taxotere, Herceptin, Perjeta with Neulasta support.  August 10, 2023: Final cycle 6 TCHP.  Thereafter plan to continue every 3-week Herceptin/Perjeta for total of 1 year.   We have discussed potentially beginning Lupron followed by aromatase inhibitor therapy following radiation however patient wants to discuss potential hysterectomy and bilateral oophorectomy with her GYN.    September 21, 2023: Patient started radiation has completed 2 treatments so far out of 25.  Patient has history of depression in the family but not herself.  We discussed about consideration of Lupron with aromatase inhibitor but she wants to undergo hysterectomy with bilateral oophorectomy because of her fibroids.  In which case we could consider starting tamoxifen until she has oophorectomy and subsequently start AI.  She is tolerating Herceptin Perjeta very well  10/12/23 Here for Tamaraetin  and Perjeta today. She is currently on 18 of 25 radiation treatments and tolerating well.  She has no new concerns today.  10/23/2023 completed radiation under the direction of Dr. Hines.  November 3, 2023: Patient to receive Herceptin Perjeta.  She is due to get oophorectomy following which we will start aromatase inhibitor Arimidex.  November 28, 2023: Here for Herceptin Perjeta.  She is due to receive oophorectomy as of January 4, 2024.  Subsequent to that we will start Arimidex.  Patient is premenopausal and did not want to start Lupron and prefer to go to oophorectomy.  We discussed about starting tamoxifen until oophorectomy is done but patient prefers to not take tamoxifen and wants to wait till oophorectomy is done and subsequently we can start aromatase inhibitor  S/p hysterectomy with bilateral salpingo-oophorectomy January 3, 2024.  Pathology is benign.  She had benign left follicle cyst and inclusion cyst.  There is a subserosal 4 cm uterine leiomyoma.  2/2024 initiating letrozole in the setting of ER/AR positivity.  2/22/2024 continuing Herceptin/Perjeta with good tolerance.     *S/p IUD removal  Experiencing hot flashes with chemo likely triggering menopausal changes.  Hysterectomy 1/2024 with Dr. 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 She did recommend she start taking her gabapentin again to help with her anxiety and irritability/anger from her decadron with treatment.   Patient continues follow-up with supportive oncology, RADHA Rolle. Patient is encouraged to continue to follow with Cierra.   Much improved.    *  Port study negative    *Intermittent mild elevation of LFTs.    Patient has previously  reported intermittent use of Tylenol and alcohol.    8/31/2023 AST 35, ALT 41, alk phos 100.  Patient denies any alcohol intake or Tylenol.  This is felt to be drug related.  Stable to improved. Monitor.  10/12/2023 AST 93, .  Patient has had 1 joe this past week however denies any other alcohol or Tylenol intake.  Reviewed with Dr. TRINIDAD and we will treat.  2/22/2024 stable to improved, AST 31, ALT 41, alk phos 122, T. bili 0.6    *Cardiac monitoring  Seen by cardiology today, 7/27/2023.  Echo stable.  Plan for continued echocardiogram every 3 months while on therapy.  Continue low-dose lisinopril  2/8/2024 Echo stable with EF 67%  Continues follow-up with cardio oncology.    PLAN:  Proceed with Herceptin and Perjeta today  Recent echo reviewed.  Continue letrozole 2.5 mg daily.  Tolerating well.  Return in 3 weeks for NP follow-up and Herceptin/Perjeta.    Patient will return in 6 weeks for follow-up with Dr. Frederick and Herceptin/Perjeta.  She should complete all Herceptin/Perjeta therapy as of the end of April.    Following completion of therapy patient will undergo final reconstructive surgery with Dr. Stone.        This patient is on high risk drug therapy requiring intensive monitoring for toxicity.          RADHA Woods Dr.

## 2024-02-25 DIAGNOSIS — E89.0 POSTABLATIVE HYPOTHYROIDISM: ICD-10-CM

## 2024-02-26 DIAGNOSIS — Z12.11 ENCOUNTER FOR SCREENING FOR MALIGNANT NEOPLASM OF COLON: ICD-10-CM

## 2024-02-26 DIAGNOSIS — R13.10 DYSPHAGIA, UNSPECIFIED TYPE: ICD-10-CM

## 2024-02-26 DIAGNOSIS — R12 HEARTBURN: Primary | ICD-10-CM

## 2024-02-26 RX ORDER — LEVOTHYROXINE SODIUM 0.12 MG/1
125 TABLET ORAL DAILY
Qty: 90 TABLET | Refills: 1 | Status: SHIPPED | OUTPATIENT
Start: 2024-02-26

## 2024-02-26 NOTE — TELEPHONE ENCOUNTER
Rx Refill Note  Requested Prescriptions     Pending Prescriptions Disp Refills    levothyroxine (SYNTHROID, LEVOTHROID) 125 MCG tablet 90 tablet 1     Sig: Take 1 tablet by mouth Daily. Except none on Sundays.      Last office visit with prescribing clinician: 1/8/2024   Last telemedicine visit with prescribing clinician: Visit date not found   Next office visit with prescribing clinician: Visit date not found                         Would you like a call back once the refill request has been completed: [] Yes [] No    If the office needs to give you a call back, can they leave a voicemail: [] Yes [] No    Sarah Joshua MA  02/26/24, 08:49 EST

## 2024-03-13 ENCOUNTER — TELEPHONE (OUTPATIENT)
Dept: GASTROENTEROLOGY | Facility: CLINIC | Age: 49
End: 2024-03-13
Payer: COMMERCIAL

## 2024-03-13 ENCOUNTER — OFFICE VISIT (OUTPATIENT)
Dept: GASTROENTEROLOGY | Facility: CLINIC | Age: 49
End: 2024-03-13
Payer: COMMERCIAL

## 2024-03-13 VITALS
DIASTOLIC BLOOD PRESSURE: 91 MMHG | HEIGHT: 63 IN | WEIGHT: 173.2 LBS | HEART RATE: 77 BPM | TEMPERATURE: 97.3 F | SYSTOLIC BLOOD PRESSURE: 132 MMHG | BODY MASS INDEX: 30.69 KG/M2

## 2024-03-13 DIAGNOSIS — R13.10 DYSPHAGIA, UNSPECIFIED TYPE: Primary | ICD-10-CM

## 2024-03-13 DIAGNOSIS — Z12.11 ENCOUNTER FOR SCREENING FOR MALIGNANT NEOPLASM OF COLON: ICD-10-CM

## 2024-03-13 DIAGNOSIS — K21.9 GASTROESOPHAGEAL REFLUX DISEASE, UNSPECIFIED WHETHER ESOPHAGITIS PRESENT: ICD-10-CM

## 2024-03-13 RX ORDER — BROMPHENIRAMINE MALEATE, PSEUDOEPHEDRINE HYDROCHLORIDE, AND DEXTROMETHORPHAN HYDROBROMIDE 2; 30; 10 MG/5ML; MG/5ML; MG/5ML
SYRUP ORAL
COMMUNITY
Start: 2024-03-07

## 2024-03-13 NOTE — PROGRESS NOTES
"Chief Complaint  Heartburn, Difficulty Swallowing, and Constipation    Subjective          History Of Present Illness:    Ursula Kulkarni is a  48 y.o. female patient of Dr. Sawant who presents as a follow up for dysphagia and GERD.  Patient has a history of breast cancer status post chemo and radiation and bilateral mastectomy, thyroidism, hypertension.  Patient is new to me.    Patient reports dysphagia with solid foods and pills. No issues with fluids. Patient generally can pass the food with drinking water but will occasionally have to vomit the food/pills up. Patient will get occasional indigestion and reflux. She is taking pantoprazole daily.  Patient denies poor appetite or weight loss. Patient used to have a bowel movement about every 2-3 days but is now having them more frequently. Patient used to be on prune juice daily but no longer needs to take this. She reports more loose bowels but attributes this to her breast cancer treatments. No melena or hematochezia.    Labs reviewed with elevated LFTs with ALT, AST 31, .     Denies a family history of colon cancer but does report colon polyps in her father.    Objective   Vital Signs:   /91   Pulse 77   Temp 97.3 °F (36.3 °C)   Ht 160 cm (63\")   Wt 78.6 kg (173 lb 3.2 oz)   BMI 30.68 kg/m²       Physical Exam  Vitals reviewed.   Constitutional:       General: She is not in acute distress.     Appearance: Normal appearance. She is not ill-appearing.   HENT:      Head: Normocephalic and atraumatic.      Nose: Nose normal.      Mouth/Throat:      Pharynx: Oropharynx is clear.   Eyes:      Extraocular Movements: Extraocular movements intact.      Conjunctiva/sclera: Conjunctivae normal.      Pupils: Pupils are equal, round, and reactive to light.   Pulmonary:      Effort: Pulmonary effort is normal.   Abdominal:      General: There is no distension.      Palpations: Abdomen is soft. There is no mass.      Tenderness: There is no abdominal " tenderness.   Musculoskeletal:         General: No swelling. Normal range of motion.      Cervical back: Normal range of motion.   Skin:     General: Skin is warm and dry.      Findings: No bruising or rash.   Neurological:      General: No focal deficit present.      Mental Status: She is alert and oriented to person, place, and time.      Motor: No weakness.      Gait: Gait normal.   Psychiatric:         Mood and Affect: Mood normal.          Result Review :   The following data was reviewed by: Letty Mckinnon PA-C on 03/13/2024:  CMP          1/11/2024    07:46 2/1/2024    08:34 2/22/2024    08:17   CMP   Glucose 109  119  109    BUN 13  17  16    Creatinine 0.82  0.86  0.90    EGFR 89.5  84.5  80.0    Sodium 140  139  141    Potassium 3.7  3.9  3.9    Chloride 105  102  103    Calcium 9.2  9.9  10.1    Total Protein 6.7  6.8  7.1    Albumin 4.1  4.3  4.7    Globulin 2.6  2.5  2.4    Total Bilirubin 0.3  0.6  0.6    Alkaline Phosphatase 194  117  122    AST (SGOT) 50  39  31    ALT (SGPT) 78  57  41    Albumin/Globulin Ratio 1.6  1.7  2.0    BUN/Creatinine Ratio 15.9  19.8  17.8    Anion Gap 10.3  10.4  11.2      CBC          1/11/2024    07:46 2/1/2024    08:34 2/22/2024    08:17   CBC   WBC 6.12  5.45  7.33    RBC 4.01  4.14  4.43    Hemoglobin 12.6  12.8  13.6    Hematocrit 36.1  37.3  39.2    MCV 90.0  90.1  88.5    MCH 31.4  30.9  30.7    MCHC 34.9  34.3  34.7    RDW 12.1  12.4  12.2    Platelets 220  206  224            Assessment and Plan    Diagnoses and all orders for this visit:    1. Dysphagia, unspecified type (Primary)  -     Case Request; Standing  -     Implement Anesthesia orders day of procedure.; Standing  -     Verify bowel prep was successful; Standing  -     Give tap water enema if bowel prep was insufficient; Standing  -     Prepare and Witness Surgical Consent Form; Standing  -     Case Request    2. Gastroesophageal reflux disease, unspecified whether esophagitis present  -     Case  Request; Standing  -     Implement Anesthesia orders day of procedure.; Standing  -     Verify bowel prep was successful; Standing  -     Give tap water enema if bowel prep was insufficient; Standing  -     Prepare and Witness Surgical Consent Form; Standing  -     Case Request    3. Encounter for screening for malignant neoplasm of colon  -     Case Request; Standing  -     Implement Anesthesia orders day of procedure.; Standing  -     Verify bowel prep was successful; Standing  -     Give tap water enema if bowel prep was insufficient; Standing  -     Prepare and Witness Surgical Consent Form; Standing  -     Case Request       I recommend proceeding with upper endoscopy to rule out gastritis, esophagitis, AVM, peptic ulcer disease, Long's esophagus, hiatal hernia, H. pylori infection, celiac disease, EOE or mass/malignancy. Risks (including, but not limited to perforation, bleeding, sedation-related complications, and death), benefits, and alternatives to the procedure were discussed with the patient and all questions were answered. Patient is agreeable to plan of care.   Recommend she eats slow, chews well, take small sips of water in between bites.  Patient is additionally due for screening colonoscopy and we will perform this at the time of her EGD  Continue pantoprazole 40 mg daily     Follow Up   Return for EGD/Colonoscopy.    Dragon dictation used throughout this note.            Letty Arana PA-C   [facility]  [address] [place] [address]  Office: [phone], JOANNE EVANS

## 2024-03-13 NOTE — TELEPHONE ENCOUNTER
Advised that [facility] will call with final arrival time minimum 24 hrs before procedure. IF they do not get a phone call, arrival time will stay the same as given on instructions [facility] FOR - 04/24/2024  OK FOR HUB TO READ

## 2024-03-14 ENCOUNTER — INFUSION (OUTPATIENT)
Dept: ONCOLOGY | Facility: HOSPITAL | Age: 49
End: 2024-03-14
Payer: COMMERCIAL

## 2024-03-14 ENCOUNTER — OFFICE VISIT (OUTPATIENT)
Dept: ONCOLOGY | Facility: CLINIC | Age: 49
End: 2024-03-14
Payer: COMMERCIAL

## 2024-03-14 ENCOUNTER — TELEPHONE (OUTPATIENT)
Dept: GASTROENTEROLOGY | Facility: CLINIC | Age: 49
End: 2024-03-14
Payer: COMMERCIAL

## 2024-03-14 VITALS
WEIGHT: 172.3 LBS | SYSTOLIC BLOOD PRESSURE: 124 MMHG | HEART RATE: 77 BPM | DIASTOLIC BLOOD PRESSURE: 86 MMHG | HEIGHT: 63 IN | OXYGEN SATURATION: 96 % | RESPIRATION RATE: 18 BRPM | BODY MASS INDEX: 30.53 KG/M2 | TEMPERATURE: 98.2 F

## 2024-03-14 DIAGNOSIS — R74.8 ELEVATED ALKALINE PHOSPHATASE LEVEL: ICD-10-CM

## 2024-03-14 DIAGNOSIS — C50.812 MALIGNANT NEOPLASM OF OVERLAPPING SITES OF LEFT BREAST IN FEMALE, ESTROGEN RECEPTOR POSITIVE: Primary | ICD-10-CM

## 2024-03-14 DIAGNOSIS — Z17.0 MALIGNANT NEOPLASM OF OVERLAPPING SITES OF LEFT BREAST IN FEMALE, ESTROGEN RECEPTOR POSITIVE: Primary | ICD-10-CM

## 2024-03-14 DIAGNOSIS — C50.812 MALIGNANT NEOPLASM OF OVERLAPPING SITES OF LEFT BREAST IN FEMALE, ESTROGEN RECEPTOR POSITIVE: ICD-10-CM

## 2024-03-14 DIAGNOSIS — T45.1X5A AROMATASE INHIBITOR-ASSOCIATED ARTHRALGIA: ICD-10-CM

## 2024-03-14 DIAGNOSIS — M25.50 AROMATASE INHIBITOR-ASSOCIATED ARTHRALGIA: ICD-10-CM

## 2024-03-14 DIAGNOSIS — Z45.2 ENCOUNTER FOR ADJUSTMENT OR MANAGEMENT OF VASCULAR ACCESS DEVICE: ICD-10-CM

## 2024-03-14 DIAGNOSIS — Z79.899 HIGH RISK MEDICATION USE: ICD-10-CM

## 2024-03-14 DIAGNOSIS — U07.1 COVID-19 VIRUS INFECTION: ICD-10-CM

## 2024-03-14 DIAGNOSIS — Z17.0 MALIGNANT NEOPLASM OF OVERLAPPING SITES OF LEFT BREAST IN FEMALE, ESTROGEN RECEPTOR POSITIVE: ICD-10-CM

## 2024-03-14 LAB
ALBUMIN SERPL-MCNC: 4.4 G/DL (ref 3.5–5.2)
ALBUMIN/GLOB SERPL: 1.6 G/DL
ALP SERPL-CCNC: 264 U/L (ref 39–117)
ALT SERPL W P-5'-P-CCNC: 81 U/L (ref 1–33)
ANION GAP SERPL CALCULATED.3IONS-SCNC: 13.9 MMOL/L (ref 5–15)
AST SERPL-CCNC: 35 U/L (ref 1–32)
BASOPHILS # BLD AUTO: 0.05 10*3/MM3 (ref 0–0.2)
BASOPHILS NFR BLD AUTO: 0.5 % (ref 0–1.5)
BILIRUB SERPL-MCNC: 0.4 MG/DL (ref 0–1.2)
BUN SERPL-MCNC: 16 MG/DL (ref 6–20)
BUN/CREAT SERPL: 16.3 (ref 7–25)
CALCIUM SPEC-SCNC: 9.9 MG/DL (ref 8.6–10.5)
CHLORIDE SERPL-SCNC: 102 MMOL/L (ref 98–107)
CO2 SERPL-SCNC: 26.1 MMOL/L (ref 22–29)
CREAT SERPL-MCNC: 0.98 MG/DL (ref 0.57–1)
DEPRECATED RDW RBC AUTO: 39.5 FL (ref 37–54)
EGFRCR SERPLBLD CKD-EPI 2021: 72.2 ML/MIN/1.73
EOSINOPHIL # BLD AUTO: 0.51 10*3/MM3 (ref 0–0.4)
EOSINOPHIL NFR BLD AUTO: 5.4 % (ref 0.3–6.2)
ERYTHROCYTE [DISTWIDTH] IN BLOOD BY AUTOMATED COUNT: 12.3 % (ref 12.3–15.4)
GLOBULIN UR ELPH-MCNC: 2.7 GM/DL
GLUCOSE SERPL-MCNC: 97 MG/DL (ref 65–99)
HCT VFR BLD AUTO: 38.6 % (ref 34–46.6)
HGB BLD-MCNC: 13.6 G/DL (ref 12–15.9)
IMM GRANULOCYTES # BLD AUTO: 0.06 10*3/MM3 (ref 0–0.05)
IMM GRANULOCYTES NFR BLD AUTO: 0.6 % (ref 0–0.5)
LYMPHOCYTES # BLD AUTO: 2.57 10*3/MM3 (ref 0.7–3.1)
LYMPHOCYTES NFR BLD AUTO: 27.3 % (ref 19.6–45.3)
MCH RBC QN AUTO: 31 PG (ref 26.6–33)
MCHC RBC AUTO-ENTMCNC: 35.2 G/DL (ref 31.5–35.7)
MCV RBC AUTO: 87.9 FL (ref 79–97)
MONOCYTES # BLD AUTO: 0.47 10*3/MM3 (ref 0.1–0.9)
MONOCYTES NFR BLD AUTO: 5 % (ref 5–12)
NEUTROPHILS NFR BLD AUTO: 5.75 10*3/MM3 (ref 1.7–7)
NEUTROPHILS NFR BLD AUTO: 61.2 % (ref 42.7–76)
NRBC BLD AUTO-RTO: 0 /100 WBC (ref 0–0.2)
PLATELET # BLD AUTO: 256 10*3/MM3 (ref 140–450)
PMV BLD AUTO: 9.4 FL (ref 6–12)
POTASSIUM SERPL-SCNC: 4.2 MMOL/L (ref 3.5–5.2)
PROT SERPL-MCNC: 7.1 G/DL (ref 6–8.5)
RBC # BLD AUTO: 4.39 10*6/MM3 (ref 3.77–5.28)
SODIUM SERPL-SCNC: 142 MMOL/L (ref 136–145)
WBC NRBC COR # BLD AUTO: 9.41 10*3/MM3 (ref 3.4–10.8)

## 2024-03-14 PROCEDURE — 25810000003 SODIUM CHLORIDE 0.9 % SOLUTION: Performed by: NURSE PRACTITIONER

## 2024-03-14 PROCEDURE — 25010000002 HEPARIN LOCK FLUSH PER 10 UNITS: Performed by: INTERNAL MEDICINE

## 2024-03-14 PROCEDURE — 85025 COMPLETE CBC W/AUTO DIFF WBC: CPT

## 2024-03-14 PROCEDURE — 25010000002 TRASTUZUMAB PER 10 MG: Performed by: NURSE PRACTITIONER

## 2024-03-14 PROCEDURE — 96417 CHEMO IV INFUS EACH ADDL SEQ: CPT

## 2024-03-14 PROCEDURE — 96375 TX/PRO/DX INJ NEW DRUG ADDON: CPT

## 2024-03-14 PROCEDURE — 96413 CHEMO IV INFUSION 1 HR: CPT

## 2024-03-14 PROCEDURE — 25010000002 DIPHENHYDRAMINE PER 50 MG: Performed by: NURSE PRACTITIONER

## 2024-03-14 PROCEDURE — 25810000003 SODIUM CHLORIDE 0.9 % SOLUTION 250 ML FLEX CONT: Performed by: NURSE PRACTITIONER

## 2024-03-14 PROCEDURE — 80053 COMPREHEN METABOLIC PANEL: CPT

## 2024-03-14 PROCEDURE — 25010000002 PERTUZUMAB 420 MG/14ML SOLUTION 420 MG VIAL: Performed by: NURSE PRACTITIONER

## 2024-03-14 RX ORDER — SODIUM CHLORIDE 0.9 % (FLUSH) 0.9 %
10 SYRINGE (ML) INJECTION AS NEEDED
OUTPATIENT
Start: 2024-03-14

## 2024-03-14 RX ORDER — SODIUM CHLORIDE 9 MG/ML
250 INJECTION, SOLUTION INTRAVENOUS ONCE
Status: CANCELLED | OUTPATIENT
Start: 2024-03-14

## 2024-03-14 RX ORDER — HEPARIN SODIUM (PORCINE) LOCK FLUSH IV SOLN 100 UNIT/ML 100 UNIT/ML
500 SOLUTION INTRAVENOUS AS NEEDED
OUTPATIENT
Start: 2024-03-14

## 2024-03-14 RX ORDER — HEPARIN SODIUM (PORCINE) LOCK FLUSH IV SOLN 100 UNIT/ML 100 UNIT/ML
500 SOLUTION INTRAVENOUS AS NEEDED
Status: DISCONTINUED | OUTPATIENT
Start: 2024-03-14 | End: 2024-03-14 | Stop reason: HOSPADM

## 2024-03-14 RX ORDER — FAMOTIDINE 10 MG/ML
20 INJECTION, SOLUTION INTRAVENOUS ONCE
Status: CANCELLED | OUTPATIENT
Start: 2024-03-14

## 2024-03-14 RX ORDER — SODIUM CHLORIDE 9 MG/ML
250 INJECTION, SOLUTION INTRAVENOUS ONCE
Status: COMPLETED | OUTPATIENT
Start: 2024-03-14 | End: 2024-03-14

## 2024-03-14 RX ORDER — SODIUM CHLORIDE 0.9 % (FLUSH) 0.9 %
10 SYRINGE (ML) INJECTION AS NEEDED
Status: DISCONTINUED | OUTPATIENT
Start: 2024-03-14 | End: 2024-03-14 | Stop reason: HOSPADM

## 2024-03-14 RX ORDER — FAMOTIDINE 10 MG/ML
20 INJECTION, SOLUTION INTRAVENOUS ONCE
Status: COMPLETED | OUTPATIENT
Start: 2024-03-14 | End: 2024-03-14

## 2024-03-14 RX ADMIN — DIPHENHYDRAMINE HYDROCHLORIDE 25 MG: 50 INJECTION, SOLUTION INTRAMUSCULAR; INTRAVENOUS at 08:37

## 2024-03-14 RX ADMIN — Medication 10 ML: at 10:25

## 2024-03-14 RX ADMIN — PERTUZUMAB 420 MG: 30 INJECTION, SOLUTION, CONCENTRATE INTRAVENOUS at 08:51

## 2024-03-14 RX ADMIN — TRASTUZUMAB 470 MG: 150 INJECTION, POWDER, LYOPHILIZED, FOR SOLUTION INTRAVENOUS at 09:53

## 2024-03-14 RX ADMIN — Medication 500 UNITS: at 10:25

## 2024-03-14 RX ADMIN — SODIUM CHLORIDE 250 ML: 9 INJECTION, SOLUTION INTRAVENOUS at 08:37

## 2024-03-14 RX ADMIN — FAMOTIDINE 20 MG: 10 INJECTION INTRAVENOUS at 08:37

## 2024-03-14 NOTE — TELEPHONE ENCOUNTER
NO PERSONAL HX OF POLYPS     FAMILY HX OF POLYPS    NO FAMILY HX OF COLON CA    NO ASA OR BLOOD THINNERS        LIST OF  MEDICATIONS  LISINOPRIL   LEVOTHYROXINE  BLACK SEED OIL  VITAMIN D3  PROBIOTIC  DARIUSZ  LETROZOLE              OA QUESTIONNAIRE SCANNED IN MEDIA

## 2024-03-14 NOTE — PROGRESS NOTES
Subjective     REASON FOR FOLLOW-UP:  1.  Invasive lobular carcinoma multifocal,  Left breast anterolateral stereotactic biopsy invasive lobular carcinoma, Marie score of 6, grade 2, 4 mm, ER 95%, NV 95%, HER2/cheryl 0 negative with Ki-67 of 5%.  Positive for atypical lobular hyperplasia  Left breast 1 o'clock position 10 cm from the nipple ultrasound-guided biopsy consistent with invasive lobular carcinoma grade 2 [place] score of 6, 4 mm with lobular carcinoma in situ, ER 90%, %, HER2/cheryl 0  Left breast upper stereotactic guided core biopsy invasive lobular carcinoma grade 2 [place] score of six 3 mm with lobular carcinoma in situ, ER 90%, %, HER2/cheryl equivocal 2+, FISH shows her to 5.7 with CEP 1.6 and ratio HER2 nu/KEIRA-17 ratio of 3.5.  It is amplified.    2.  March 8, 2023:p T3 N0 M0 invasive lobular carcinoma s/p bilateral mastectomy with left deep axillary sentinel lymph node biopsy, with bilateral placement of prepectoral tissue expanders for reconstruction and bilateral placement of AlloDerm.  Pathology shows multifocal tumor in  upper outer quadrant and upper inner quadrant, invasive lobular carcinoma grade 2 with a [place] score  score of 7  , total of 4 foci were seen, largest tumor being 60 mm, 20 mm, 2 mm, 1 mm.  No DCIS identified.  Lobular carcinoma in situ present.  No lymphatic or vascular channel invasion, no dermal lymphovascular space invasion all margins were negative for invasive carcinoma 4 lymph nodes were all negative for malignancy its pathologic T3N0  ER %, NV %, HER2/cheryl 2+ with Ki-67 of 5%  Previous biopsies had shown 2 of the tumors were ER/NV positive HER2/cheryl negative and one of them was ER/NV positive HER2 positive  On discussing with pathologist Dr. Serrano, she thinks that the larger tumor was ER NV positive and HER2 positive but unsure if it was the 60 mm tumor or 20 mm tumor.  Either way it was not the smaller tumors which was HER2  positive.  All tumors look-alike per pathologist and they are invasive lobular carcinoma.                  HISTORY OF PRESENT ILLNESS:         Patient is a 48 y.o. female with history of multifocal left breast cancer with MRI of the breast showing almost 15 lesions.  Biopsies on 3 different lesions were done and they were all consistent with invasive lobular carcinoma.  2 of the lesions where ER/MA positive HER2/cheryl negative and one of the larger lesion was ER/MA positive HER2 2+ equivocal.  FISH testing was done and the HER2 copy number was 5.7 with HER2/CEP ratio of 3.5.  The FISH was amplified.    Patient underwent bilateral mastectomy    With left sentinel lymph node surgery..  The pathology shows tumor to be present in the left upper outer quadrant invasive lobular carcinoma with a Marie score of 7, grade 2.  There were 4 lesions the greatest size was 6 0 mm, 20 mm, 2 mm and 1 mm in size.  Lobular carcinoma in situ is present.  All margins are negative for invasive carcinoma.  4 lymph nodes negative.  Patient has T3N0 invasive lobular carcinoma.      Discussed with pathology in length Dr. Serrano, one of the larger lesions which was 60 mm was heterogeneous it was biopsied at 2 separate spots.  The left anterior lateral and left 1:00 core fragment was ER/MA positive HER2/cheryl negative the left upper core biopsy was ER/MA positive HER2/cheryl 2+ equivocal but HER2/cheryl FISH was positive.    On discussion with pathology the 60 mm tumor showed ER and MA positive and HER2 positive by FISH with a Ki-67 of 40% at the Top-Hat clip.  The HER2/cheryl copy number was 5.7 with a HER2/cheryl by CEP ratio 3.5 which is positive.  The Ki-67 on the lower part was 13%.  The 20 mm tumor had a Ki-67 of 9%.  So the larger lesion was triple positive and heterogeneous as 2 biopsies were done on the larger lesions and that was both triple positive and at the second biopsy was ER/MA positive HER2/cheryl negative so it was a heterogeneous tumor.   The 20 mm lesion was ER/[place] positive HER2/cheryl negative.    Given that she had a large tumor with high Ki-67 and premenopausal status we discussed about giving Taxotere carboplatin Herceptin Perjeta.    Interval History:   She returns the office today, 3/14/2024 in anticipation of Herceptin Perjeta which she continues to receive every 3 weeks.  She is having excellent tolerance to therapy.  She has very occasional diarrhea though this is managed with over-the-counter medications as needed.  She is eager to undergo reconstruction which is scheduled for May 2024.  She has no new pain.  She is eating and drinking adequately.  She has no lower extremity swelling or shortness of breath.  Her most recent echocardiogram was February 2024 with a stable ejection fraction.  She reports she is tolerating letrozole very well.  She does have some generalized arthralgias from therapy.  She has no hot flashes or vaginal dryness.    Oncology history:  Patient is a 48 y.o. female who has been recently diagnosed with left breast cancer.  Patient has been compliant with her mammograms.    There is no family history of breast or ovarian cancer.  Her father had prostate cancer and melanoma.  A paternal aunt had thyroid cancer.      Details are as follows.    November 29, 2022: Screening bilateral mammogram showed 8 mm asymmetry in the central left breast posterior one third.  This appears to be in the superior left breast on the MLO view.  Right breast was negative.  A left diagnostic mammogram and ultrasound was recommended.    January January 3, 2023: Left breast diagnostic mammogram showed the spiculated areas within the central posterior and lateral anterior superior left breast where redemonstrated.  Targeted ultrasound was done.  At 1 o'clock position 10 cm from the nipple there is a shadowing mass with peripheral spiculations which is concordant with the mammographic abnormality and suspicious for malignancy.  The more centrally  located spiculation on the cc view posterior to the glandular tissue cannot be clearly identified on the ultrasound.    Impression was 2 separate suspicious spiculated lesion in the left breast.  One of the lesions is seen well on the ultrasound and should be amenable to ultrasound-guided core biopsy.  The other lesion which was seen in the CC projection was amicable to stereotactic guided tissue sampling.    January 3, 2023 patient underwent ultrasound-guided left breast biopsy    The ultrasound-guided biopsy of left breast 10 cm from the nipple at 1 o'clock position showed invasive lobular carcinoma moderately differentiated with a Marie grade of 2 and score of 6 measuring 4 mm.  There was focal lobular carcinoma in situ.    Left breast anterior lateral stereotactic core needle biopsy showed invasive lobular carcinoma moderately differentiated grade 2 with a [place] score of 6 measuring 4 mm.  There was atypical lobular hyperplasia.  Estrogen receptor was %, progesterone receptor was %, HER2/cheryl 0, Ki-67 5%.    Left breast upper stereotactic guided core biopsy showed invasive lobular carcinoma moderately differentiated grade 2 with [place] score of 6 with measuring 3 mm with lobular carcinoma in situ present.  I do not see the ER/NE or HER2 on the ultrasound-guided biopsy of the left breast lesion as well as the left upper stereotactic biopsy.  We will check with pathologist    January 23, 2023: Patient underwent  MRI of the breast bilateral    Right breast: Negative    Left breast: At 1:00 anterior left breast 4 cm from the nipple there is a 1.5 x 1.6 x 1.3 cm irregular enhancing mass which is biopsy-proven malignancy.  The biopsy clip is located within 0.9 x 1 x 1.1 cm postbiopsy hematoma along the inferior margin of the mass.  And there is additional hematoma along the lateral margin of the mass which measures 1.2 x 1.9 x 0.9 cm.    At 1:00 in the middle to posterior left breast 7.4 cm  from the nipple there is a 1.3 x 1 cm x 1 cm irregular enhancing mass mass with a focus from a biopsy clip along the inferior margin which also represents the biopsy-proven malignancy    At 12:00 in the posterior left breast 8.5 cm from the nipple there is a 1.9 x 1.8 x 2.2 cm irregular enhancing mass with a corresponding 2.9 cm biopsy cavity with a biopsy clip which represents the biopsy-proven malignancy    In addition there are multiple at least 12 similar-appearing irregular enhancing masses and foci are identified throughout the left breast predominantly involving the upper breast but also involving the lower outer quadrant.  At 11-12 o'clock in the middle and posterior left breast there are 3 adjacent reference masses together measuring 5.5 cm but individually measuring 1.2 x 0.9 x 1 cm.  At 1:00 in the far posterior left breast/axillary tail 13.7 cm posterior to the nipple is a 1.3 x 1.1 x 1 cm irregular enhancing mass which is located 0.8 cm from the anterolateral margin of the pectoralis Muscle.  Together the enhancing masses span approximately 11.7 x 8 x 7.8 cm.  There is no suspicious enhancement in the left nipple or chest wall.  Focal areas of skin enhancement likely reflect skin entry point from recent biopsies.  There are no pathologically enlarged internal mammary chain lymph nodes.    Impression    .  At least 15 similar-appearing irregular enhancing masses and foci  scattered throughout the left breast, together measuring up to 11.7 cm  in maximum dimension, encompass 3 sites of biopsy-proven malignancy and  are consistent with multicentric malignancy. Oncologic and surgical  management are recommended.  2.  No MRI evidence of malignancy in the right breast.    Patient is a 47-year-old female with multifocal invasive lobular carcinoma who on screening mammogram showed 8 mm asymmetry in the central left breast posterior one third.  In the cc view.  It appears to be superior left breast on the MLO  view.  There was also architectural distortion in the lateral left breast one third on the left cc view.    On diagnostic mammogram the spiculated areas within the central posterior and the lateral anterior superior left breast where redemonstrated.  Targeted ultrasound was done.  At 1 o'clock position 10 cm from the nipple there is a mass with spiculations which is suspicious.  The more centrally located spiculation on the cc view.  The more centrally located spiculation on the cc view cannot be clearly identified on ultrasound.  So there impression was there were 2 separate suspicious spiculated lesion in the left breast.  1 was seen well on the ultrasound and amenable to ultrasound-guided biopsy the other is seen well on mammography and a stereotactic guided biopsy suggested.    Patient subsequently underwent stereotactic biopsy of 1 lesion and ultrasound-guided biopsy of the 1 cm mass at 1 o'clock position 10 cm from the nipple.  A total of 3 different areas of the left breast were biopsied and specimens were sent to pathology.  The third biopsy was performed at 12 o'clock position in the posterior one third of the left breast.  All of the 3 sites were consistent with invasive lobular carcinoma at site A, B, and C.    MRI of the breast showed at 1 o'clock position of the left breast anteriorly 4 cm posterior to the nipple there is a 1.5 x 1.6 x 1.3 cm irregular enhancing mass.  There is a small hematoma along the inferior margin of the mass.  An additional hematoma along the lateral margin of the mass which is measuring 1.2 x 0.9 x 0.9 cm    At 1:00 in the middle to posterior left breast 7.4 cm posterior to the nipple there is a 1.3 x 1 x 1 cm enhancing mass with a biopsy clip.    At 12:00 posterior left breast 8.5 cm posterior to the nipple there is a 1.5 x 1.8 x 2.2 cm irregular enhancing mass with a corresponding 2.9 cm biopsy cavity with a clip which represents the biopsy site malignancy    Multiple at least  12 similar-appearing irregular enhancing masses and foci are identified throughout the left breast predominantly involving the upper breast but also involving the lower outer quadrant.  At 11-12 o'clock in the middle and posterior left breast there is a 3 adjacent reference masses measuring 5.5 cm in AP dimension and individually measuring 1.2 x 0.9 x 1 cm.  At 1:00 in the far posterior left breast axillary tail 13.7 cm from the nipple there is a 1.3 x 1.1 x 1 cm craniocaudal dimension irregular enhancing mass which is located 0.8 cm from the anterolateral margin of the pectoralis muscle.     Per the MRI at least 15 similar-appearing irregular enhancing masses and foci scattered throughout the left breast together measuring 11.7 cm in maximum dimension which encompasses 3 sites of biopsy-proven malignancy and a consistent with multicentric malignancy.  MRI of the right breast is negative    INVITAE genetic testing showed variation of uncertain significance of LUCAS  Gene.    Patient was suggested left total mastectomy, left axillary sentinel lymph node biopsy possible node dissection and possible reconstruction.    However patient called Dr. Hi and decided to go for upfront bilateral total mastectomy, left axillary sentinel lymph node biopsy and possible axillary lymph node dissection and reconstruction.     I presented the case in the breast cancer conference today.  Patient had multiple nodules per MRI on the left breast Dr. Todd looked at it and felt there were multiple nodules and the largest one was 2.2 cm.  The discussion was to go ahead and upfront do the surgery and subsequently treat with chemo/ endocrine therapy as needed.  The left breast MRI findings are slightly atypical and that it is not contiguous involvement over 11.5 cm area but multiple nodules.  Surgical pathology will clarify.  If it is 1 big lesion or multiple sma      Past Medical History:   Diagnosis Date    ADD (attention deficit  disorder)     Breast cancer 03/08/2023    LEFT SIDE, HX BILATERAL MASTECTOMY, LAST CHEMO 8/2023, LAST RADIATION 10/23/23  - TARGETED THERAPY EVERY 3 WEEKS CURRENTLY - FOLLOWED BY [facility]    GERD (gastroesophageal reflux disease)     Graves disease     RADIOACTIVE IODINE TREAMENT IN PAST    History of COVID-19 2022    History of gestational diabetes 2010    History of melanoma     History of radioactive iodine thyroid ablation 2012    History of vertigo     Hyperlipidemia     DIET CONTROLLED    Hypertension     Hypothyroidism     Uterine fibroid         Past Surgical History:   Procedure Laterality Date    BREAST RECONSTRUCTION Bilateral 3/8/2023    Procedure: BILATERAL PLACEMENT  TISSUE EXPANDER AND ALLODERM;  Surgeon: Eamon Stone MD;  Location: [facility] OR;  Service: Plastics;  Laterality: Bilateral;    D & C CERVICAL BIOPSY  01/2006    INTRAUTERINE DEVICE INSERTION  2010    Mirena    INTRAUTERINE DEVICE INSERTION  2015    Mirena exchange, old IUD removal and new one inserted by Dr Carr, Lot # [id number] 09/17.mar    MASTECTOMY W/ SENTINEL NODE BIOPSY Bilateral 3/8/2023    Procedure: Bilateral total mastectomy, left axillary sentinel lymph node biopsy;  Surgeon: Kell Hi MD;  Location: [facility] OR;  Service: General;  Laterality: Bilateral;    SCAR REVISION BREAST Right 4/3/2023    Procedure: RIGHT MASTECTOMY REVISION;  Surgeon: Eamon Stone MD;  Location: [facility] OR;  Service: Plastics;  Laterality: Right;    TOTAL LAPAROSCOPIC HYSTERECTOMY Bilateral 1/3/2024    Procedure: TOTAL LAPAROSCOPIC HYSTERECTOMY, BILATERAL SALPIGO-OOPHORECTOMY, CYSTOSCOPY;  Surgeon: Rocio Manning MD;  Location: [facility] OR;  Service: Obstetrics/Gynecology;  Laterality: Bilateral;    VENOUS ACCESS DEVICE (PORT) INSERTION Right 4/13/2023    Procedure: right port placement;  Surgeon: Kell Hi MD;  Location: [facility] OR;  Service: General;  Laterality: Right;    WISDOM TOOTH  EXTRACTION          Current Outpatient Medications on File Prior to Visit   Medication Sig Dispense Refill    BIOTIN PO Take  by mouth Daily.      Black Currant Seed Oil 500 MG capsule Take 1 capsule by mouth Daily.      brompheniramine-pseudoephedrine-DM 30-2-10 MG/5ML syrup       Cholecalciferol (Vitamin D3) 250 MCG (25335 UT) tablet Take 10,000 Units by mouth Daily.      COLLAGEN PO Take 1 tablet by mouth Daily. TO HOLD 1 WEEK PRIOR TO OR      letrozole (FEMARA) 2.5 MG tablet Take 1 tablet by mouth Daily. 30 tablet 3    levothyroxine (SYNTHROID, LEVOTHROID) 125 MCG tablet Take 1 tablet by mouth Daily. Except none on Sundays. 90 tablet 1    lidocaine-prilocaine (EMLA) 2.5-2.5 % cream Apply 1 application topically to the appropriate area as directed As Needed for Mild Pain (apply pea-sized amount to port site 30 minutes prior to port being accessed). 30 g 3    lisinopril (PRINIVIL,ZESTRIL) 2.5 MG tablet Take 1 tablet by mouth Daily. 30 tablet 5    loratadine (CLARITIN) 10 MG tablet Take 1 tablet by mouth As Needed.      magnesium oxide (MAG-OX) 400 MG tablet Take 1 tablet by mouth Every Night.      pantoprazole (PROTONIX) 40 MG EC tablet Take 1 tablet by mouth Daily. 30 tablet 3    Probiotic Product (PROBIOTIC ADVANCED PO) Take 1 tablet by mouth Daily.      vitamin C (ASCORBIC ACID) 250 MG tablet Take 1 tablet by mouth Daily.      Zinc 50 MG tablet Take 1 tablet by mouth Daily.       No current facility-administered medications on file prior to visit.        ALLERGIES:    Allergies   Allergen Reactions    Erythromycin GI Intolerance     Pt reports    Penicillins Rash        Social History     Socioeconomic History    Marital status:      Spouse name: Parish    Number of children: 2   Tobacco Use    Smoking status: Never    Smokeless tobacco: Never   Vaping Use    Vaping status: Never Used   Substance and Sexual Activity    Alcohol use: Yes     Alcohol/week: 4.0 standard drinks of alcohol     Types: 4 Drinks  "containing 0.5 oz of alcohol per week     Comment: Maybe have a couple Friday and Saturday evening but not alwa    Drug use: Never    Sexual activity: Yes     Partners: Male     Birth control/protection: Post-menopausal, Hysterectomy     Comment:          Family History   Problem Relation Age of Onset    Diabetes Father     Hyperlipidemia Father     Arthritis Father     Hypertension Father     Heart disease Father         S/P stents    Heart attack Father 41    Colon polyps Father     Melanoma Father     Prostate cancer Father     Alcohol abuse Father     Hyperlipidemia Sister     Hypertension Sister     Heart attack Sister     Diabetes Sister     Thyroid cancer Paternal Aunt     Lung cancer Maternal Grandmother     Cancer Maternal Grandfather         Bladder cancer    Lung cancer Maternal Grandfather     Heart failure Paternal Grandmother     Transient ischemic attack Paternal Grandmother     Heart disease Paternal Grandfather     COPD Sister         Heavy smoker    Heart disease Sister     Malig Hyperthermia Neg Hx       Family history: Maternal great aunt had breast cancer, unsure of the age .  Father had melanoma and prostate cancer, paternal aunt had thyroid cancer, paternal grandfather had bladder cancer maternal grandfather had bladder cancer maternal grandmother had lung cancer    Past medical history is consistent with Graves' disease    OB/GYN history:  Age of menarche: 14  Patient is premenopausal and had an IUD which has been now removed   2 para 2 no miscarriages  Age at first childbirth 30  Patient did breast-feed 24 months  Length of taking birth control pills none      Objective     Vitals:    24 0812   BP: 124/86   Pulse: 77   Resp: 18   Temp: 98.2 °F (36.8 °C)   TempSrc: Temporal   SpO2: 96%   Weight: 78.2 kg (172 lb 4.8 oz)   Height: 160 cm (62.99\")   PainSc: 0-No pain             3/14/2024     8:05 AM   Current Status   ECOG score 0         Physical Exam      GENERAL:  " Well-developed, Patient  in no acute distress.   SKIN:  Warm, dry ,NO purpura ,no rash.  HEENT:  Pupils were equal and reactive to light and accomodation, conjunctivae noninjected,  normal visual acuity.   LYMPHATICS:  No cervical, NO supraclavicular, NO axillary adenopathies.  BREASTS: S/p bilateral mastectomy with expanders in place; no chest wall lesions, no evidence of bilateral axillary adenopathy or supraclavicular adenopathy  CARDIAC: Normal rate and rhythm, no murmurs gallops or rubs.  LUNGS: Clear to auscultation bilaterally.   ABDOMEN:  Soft, nontender with bowel sounds active.   EXTREMITIES: No cyanosis clubbing or edema.  NEUROLOGICAL:  Patient was awake, alert, oriented to time, person and place.    I have reexamined the patient and the results are consistent with the previously documented exam. Tia Ledezma, RADHA       RECENT LABS:  Results from last 7 days   Lab Units 03/14/24  0801   WBC 10*3/mm3 9.41   NEUTROS ABS 10*3/mm3 5.75   HEMOGLOBIN g/dL 13.6   HEMATOCRIT % 38.6   PLATELETS 10*3/mm3 256         Results from last 7 days   Lab Units 03/14/24  0801   SODIUM mmol/L 142   POTASSIUM mmol/L 4.2   CHLORIDE mmol/L 102   CO2 mmol/L 26.1   BUN mg/dL 16   CREATININE mg/dL 0.98   CALCIUM mg/dL 9.9   ALBUMIN g/dL 4.4   BILIRUBIN mg/dL 0.4   ALK PHOS U/L 264*   ALT (SGPT) U/L 81*   AST (SGOT) U/L 35*   GLUCOSE mg/dL 97             Assessment & Plan     *Pathologic T3N0 invasive lobular carcinoma of the left breast, upper inner and upper outer quadrant, multifocal, 60 mm, 20 mm, 2 mm, 1 mm, grade 2, Marie score of 7, 2 of them are ER/IL positive, HER2/cheryl negative.  One of the larger lesions was ER/IL positive HER2 positive.  Ki-67 is very low 5%,    Invasive lobular carcinoma of the left breast recently diagnosed, multifocal with 3 lesions biopsied in the left breast.  Patient has multicentric disease with 15 similar-appearing irregular enhancing masses in the left breast together measuring  11.7 cm in maximum dimension which encompasses 3 of the 3 biopsy proven malignancies.  All the 3 very invasive lobular carcinoma, grade 2 with [place] score of 6 but 2 of the lesions where ER/[place] positive HER2 negative and the third lesion was ER/[place] positive HER2 2+ with FISH showing HER2 amplified.      1.  Estrogen receptor 95%, progesterone receptor 95%, HER2/cheryl 0, Ki-67 5%    2 left breast 10 o'clock position 10 cm from the nipple ultrasound core guided biopsy showed ER 90%, %, HER2/cheryl 0    3.  Left breast upper stereotactic core biopsy showed estrogen receptor 90%, progesterone receptor 100%, HER2/cheryl equivocal 2+  FISH testing showed her to as 5.7 with a KEIRA 17 of 1.6 with a ratio HER2/KEIRA 17 of  INVITAE genetic testing showed variation of uncertain significance of LUCAS gene  We discussed the multidisciplinary approach in this patient's and I also discussed in length that that invasive lobular carcinomas do benefit from endocrine therapy and discussed in length about side effects of all endocrine therapies  Pending the results of the surgery she may need to be a candidate for chemotherapy as well and subsequently radiation if the tumor is large locally  Patient is keen on getting bilateral oophorectomy after her breast surgery is done and we discussed about medical oophorectomy with Lupron as well  February 24, 2023: Presented patient in the breast cancer conference.  Even though it appears to be a heterogeneous tumor, since it is difficult to really appreciate by clinical exam and the fact that it is invasive lobular carcinoma, it was felt best to do upfront surgery.  March 8, 2023:p T3 N0 M0 invasive lobular carcinoma s/p bilateral mastectomy with left deep axillary sentinel lymph node biopsy, with bilateral placement of prepectoral tissue expanders for reconstruction and bilateral placement of AlloDerm.  Pathology shows multifocal tumor in  upper outer quadrant and upper inner quadrant, invasive  lobular carcinoma grade 2 with a Marie score  score of 7  , total of 4 foci were seen, largest tumor being 60 mm, 20 mm, 2 mm, 1 mm.  No DCIS identified.  Lobular carcinoma in situ present.  No lymphatic or vascular channel invasion, no dermal lymphovascular space invasion all margins were negative for invasive carcinoma 4 lymph nodes were all negative for malignancy its pathologic T3N0  ER %, IA %, HER2/cheryl 2+ with Ki-67 of 5%  Previous biopsies had shown 2 of the tumors were ER/[place] positive HER2/cheryl negative and one of them was ER/IA positive HER2 positive  On discussing with pathologist Dr. Serrano, she thinks that the larger tumor was ER IA positive and HER2 positive.  HER2 copy number of 5.7 with HER2/CEP ratio of 3.5 positive and this was on the larger tumor.  Given that patient has heterogeneous tumor with the larger lesion being ER/IA positive HER2 positive and a Ki-67 of 40%, being premenopausal we will treat with Taxotere carboplatin Herceptin Perjeta.  April 20, 2023: Patient was to start cycle 1 today but unfortunately her liver function tests were extremely high.  Discussed about alcohol and she had drank and taken Tylenol.  Will hold chemo today  April 28, 2023: Reviewed her liver function test which are completely normalized.  Discussed with her not to drink alcohol.  Here for cycle 1 day 1 TCHP chemotherapy.  Reviewed echo which showed ejection fraction of 64% with strain pattern of -21.  Patient understands all the side effects\  5/4/2023: Patient tolerated Cycle 1 TCHP well. She has had diarrhea, nausea and fatigue that have been well managed.   5/18/2023: C2 TCHP  5/22/2023: Here for close follow-up and possible IV fluids after treatment last Friday, 5/18/2023.  Patient had a fairly good weekend with good oral intake, weight that is notably stable, and very little nausea.  She only had 1 loose stool this morning and therefore no significant diarrhea.  CMP is WNL.  Per discussion with  patient we will give just 500 mL normal saline.  May 25, 2023: Patient has diarrhea.  She is 1 week out of cycle 2 Taxotere carboplatin Herceptin Perjeta.  She has some abdominal cramping.  We will give her IV fluids today.  Given carboplatinum is not available she will receive Taxotere Herceptin Perjeta for 2 more cycles prior to going to Adriamycin Cytoxan.  Cycle #3 TCHP 6/8/2023  Patient returns 6/12/2023 for toxicity check.  She is eating and drinking sufficiently at this point.  She does not feel she has benefited from the IV fluids in the past and therefore will not do that today.  She denies any fevers or chills.  June 29, 2023: Cycle 4 TCHP.  She will need referral to radiation at the completion of 6 cycles of TCHP  07/20/2023 proceed with cycle 5 of carboplatin, Taxotere, Herceptin, Perjeta with Neulasta support.  August 10, 2023: Final cycle 6 TCHP.  Thereafter plan to continue every 3-week Herceptin/Perjeta for total of 1 year.   We have discussed potentially beginning Lupron followed by aromatase inhibitor therapy following radiation however patient wants to discuss potential hysterectomy and bilateral oophorectomy with her GYN.    September 21, 2023: Patient started radiation has completed 2 treatments so far out of 25.  Patient has history of depression in the family but not herself.  We discussed about consideration of Lupron with aromatase inhibitor but she wants to undergo hysterectomy with bilateral oophorectomy because of her fibroids.  In which case we could consider starting tamoxifen until she has oophorectomy and subsequently start AI.  She is tolerating Herceptin Perjeta very well  10/12/23 Here for Hepcetin and Perjeta today. She is currently on 18 of 25 radiation treatments and tolerating well.  She has no new concerns today.  10/23/2023 completed radiation under the direction of Dr. Hines.  November 3, 2023: Patient to receive Herceptin Perjeta.  She is due to get oophorectomy following  which we will start aromatase inhibitor Arimidex.  November 28, 2023: Here for Herceptin Perjeta.  She is due to receive oophorectomy as of January 4, 2024.  Subsequent to that we will start Arimidex.  Patient is premenopausal and did not want to start Lupron and prefer to go to oophorectomy.  We discussed about starting tamoxifen until oophorectomy is done but patient prefers to not take tamoxifen and wants to wait till oophorectomy is done and subsequently we can start aromatase inhibitor  S/p hysterectomy with bilateral salpingo-oophorectomy January 3, 2024.  Pathology is benign.  She had benign left follicle cyst and inclusion cyst.  There is a subserosal 4 cm uterine leiomyoma.  2/2024 initiating letrozole in the setting of ER/CA positivity.  3/14/2024 proceed with Herceptin Perjeta which is continued every 3 weeks.  She will complete therapy April 2024    *S/p IUD removal  Experiencing hot flashes with chemo likely triggering menopausal changes.  Hysterectomy 1/2024 with Dr. 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 She did recommend she start taking her gabapentin again to help with her anxiety and irritability/anger from her decadron with treatment.   Patient continues follow-up with supportive oncology, RADHA Rolle. Patient is encouraged to continue to follow with Cierra.   Much improved.    *  Port study negative    *Intermittent mild elevation of LFTs.    Patient has previously reported intermittent use of Tylenol and alcohol.    8/31/2023 AST 35, ALT 41, alk phos 100.  Patient denies any alcohol intake or Tylenol.  This is felt to be drug related.  Stable to improved. Monitor.  10/12/2023 AST 93, .  Patient  has had 1 joe this past week however denies any other alcohol or Tylenol intake.  Reviewed with Dr. TRINIDAD and we will treat.  2/22/2024 stable to improved, AST 31, ALT 41, alk phos 122, T. bili 0.6  Slight elevation of LFTs today with AST 35, ALT 81, alkaline phosphatase 264.  Patient was encouraged to avoid alcohol and Tylenol.  We will repeat labs at scheduled follow-up in 3 weeks    *Cardiac monitoring  Seen by cardiology today, 7/27/2023.  Echo stable.  Plan for continued echocardiogram every 3 months while on therapy.  Continue low-dose lisinopril  2/8/2024 Echo stable with EF 67%  Continues follow-up with cardio oncology.    PLAN:  Proceed with Herceptin and Perjeta today, continued every 3 weeks through the end of April 2024  Continue letrozole 2.5 mg daily  Discussed a trial of tart cherry Gummies for arthralgias from aromatase inhibitor.  The patient was reminded to avoid alcohol and Tylenol  3-week follow-up with Dr. Frederick with CBC, CMP and continued Herceptin Perjeta  Following completion of therapy patient will undergo final reconstructive surgery with Dr. Stone.      This patient is on high risk drug therapy requiring intensive monitoring for toxicity.      Tia Ledezma, APRN  03/14/2024      Dr. Kell Hi

## 2024-04-03 NOTE — PROGRESS NOTES
Subjective     REASON FOR FOLLOW-UP:  1.  Invasive lobular carcinoma multifocal,  Left breast anterolateral stereotactic biopsy invasive lobular carcinoma, Marie score of 6, grade 2, 4 mm, ER 95%, SC 95%, HER2/cheryl 0 negative with Ki-67 of 5%.  Positive for atypical lobular hyperplasia  Left breast 1 o'clock position 10 cm from the nipple ultrasound-guided biopsy consistent with invasive lobular carcinoma grade 2 Kirby score of 6, 4 mm with lobular carcinoma in situ, ER 90%, %, HER2/cheryl 0  Left breast upper stereotactic guided core biopsy invasive lobular carcinoma grade 2 Kirby score of six 3 mm with lobular carcinoma in situ, ER 90%, %, HER2/cheryl equivocal 2+, FISH shows her to 5.7 with CEP 1.6 and ratio HER2 nu/KEIRA-17 ratio of 3.5.  It is amplified.    2.  March 8, 2023:p T3 N0 M0 invasive lobular carcinoma s/p bilateral mastectomy with left deep axillary sentinel lymph node biopsy, with bilateral placement of prepectoral tissue expanders for reconstruction and bilateral placement of AlloDerm.  Pathology shows multifocal tumor in  upper outer quadrant and upper inner quadrant, invasive lobular carcinoma grade 2 with a Kirby score  score of 7  , total of 4 foci were seen, largest tumor being 60 mm, 20 mm, 2 mm, 1 mm.  No DCIS identified.  Lobular carcinoma in situ present.  No lymphatic or vascular channel invasion, no dermal lymphovascular space invasion all margins were negative for invasive carcinoma 4 lymph nodes were all negative for malignancy its pathologic T3N0  ER %, SC %, HER2/cheryl 2+ with Ki-67 of 5%  Previous biopsies had shown 2 of the tumors were ER/SC positive HER2/cheryl negative and one of them was ER/SC positive HER2 positive  On discussing with pathologist Dr. Serrano, she thinks that the larger tumor was ER SC positive and HER2 positive but unsure if it was the 60 mm tumor or 20 mm tumor.  Either way it was not the smaller tumors which was HER2  positive.  All tumors look-alike per pathologist and they are invasive lobular carcinoma.                  HISTORY OF PRESENT ILLNESS:         Patient is a 48 y.o. female with history of multifocal left breast cancer with MRI of the breast showing almost 15 lesions.  Biopsies on 3 different lesions were done and they were all consistent with invasive lobular carcinoma.  2 of the lesions where ER/SC positive HER2/cheryl negative and one of the larger lesion was ER/SC positive HER2 2+ equivocal.  FISH testing was done and the HER2 copy number was 5.7 with HER2/CEP ratio of 3.5.  The FISH was amplified.    Patient underwent bilateral mastectomy    With left sentinel lymph node surgery..  The pathology shows tumor to be present in the left upper outer quadrant invasive lobular carcinoma with a Marie score of 7, grade 2.  There were 4 lesions the greatest size was 6 0 mm, 20 mm, 2 mm and 1 mm in size.  Lobular carcinoma in situ is present.  All margins are negative for invasive carcinoma.  4 lymph nodes negative.  Patient has T3N0 invasive lobular carcinoma.      Discussed with pathology in length Dr. Serrano, one of the larger lesions which was 60 mm was heterogeneous it was biopsied at 2 separate spots.  The left anterior lateral and left 1:00 core fragment was ER/SC positive HER2/cheryl negative the left upper core biopsy was ER/SC positive HER2/cheryl 2+ equivocal but HER2/cheryl FISH was positive.    On discussion with pathology the 60 mm tumor showed ER and SC positive and HER2 positive by FISH with a Ki-67 of 40% at the Top-Hat clip.  The HER2/cheryl copy number was 5.7 with a HER2/cheryl by CEP ratio 3.5 which is positive.  The Ki-67 on the lower part was 13%.  The 20 mm tumor had a Ki-67 of 9%.  So the larger lesion was triple positive and heterogeneous as 2 biopsies were done on the larger lesions and that was both triple positive and at the second biopsy was ER/SC positive HER2/cheryl negative so it was a heterogeneous tumor.   The 20 mm lesion was ER/AL positive HER2/cheryl negative.    Given that she had a large tumor with high Ki-67 and premenopausal status we discussed about giving Taxotere carboplatin Herceptin Perjeta.    Interval History:   She returns the office today, 3/14/2024 in anticipation of Herceptin Perjeta which she continues to receive every 3 weeks.  She is having excellent tolerance to therapy.  She has very occasional diarrhea though this is managed with over-the-counter medications as needed.  She is eager to undergo reconstruction which is scheduled for May 2024.  She has no new pain.  She is eating and drinking adequately.  She has no lower extremity swelling or shortness of breath.  Her most recent echocardiogram was February 2024 with a stable ejection fraction.  She reports she is tolerating letrozole very well.  She does have some generalized arthralgias from therapy.  She has no hot flashes or vaginal dryness.    April 4, 2024: Patient is here for Herceptin Perjeta she is tolerating it very well her liver function tests have slightly worsened further.  She was sick 2 weeks ago and she took something which contained Tylenol and it.  She is quit taking it we have also discussed with her to stop the biotin collagen and black current seed oil.  We will treat her with Herceptin Perjeta today she is otherwise asymptomatic.  She was tested for COVID in the past which was negative.  She does not have fever or chills and she does not take too much alcohol except rarely    Oncology history:  Patient is a 48 y.o. female who has been recently diagnosed with left breast cancer.  Patient has been compliant with her mammograms.    There is no family history of breast or ovarian cancer.  Her father had prostate cancer and melanoma.  A paternal aunt had thyroid cancer.      Details are as follows.    November 29, 2022: Screening bilateral mammogram showed 8 mm asymmetry in the central left breast posterior one third.  This appears  to be in the superior left breast on the MLO view.  Right breast was negative.  A left diagnostic mammogram and ultrasound was recommended.    January January 3, 2023: Left breast diagnostic mammogram showed the spiculated areas within the central posterior and lateral anterior superior left breast where redemonstrated.  Targeted ultrasound was done.  At 1 o'clock position 10 cm from the nipple there is a shadowing mass with peripheral spiculations which is concordant with the mammographic abnormality and suspicious for malignancy.  The more centrally located spiculation on the cc view posterior to the glandular tissue cannot be clearly identified on the ultrasound.    Impression was 2 separate suspicious spiculated lesion in the left breast.  One of the lesions is seen well on the ultrasound and should be amenable to ultrasound-guided core biopsy.  The other lesion which was seen in the CC projection was amicable to stereotactic guided tissue sampling.    January 3, 2023 patient underwent ultrasound-guided left breast biopsy    The ultrasound-guided biopsy of left breast 10 cm from the nipple at 1 o'clock position showed invasive lobular carcinoma moderately differentiated with a [place] grade of 2 and score of 6 measuring 4 mm.  There was focal lobular carcinoma in situ.    Left breast anterior lateral stereotactic core needle biopsy showed invasive lobular carcinoma moderately differentiated grade 2 with a Marie score of 6 measuring 4 mm.  There was atypical lobular hyperplasia.  Estrogen receptor was %, progesterone receptor was %, HER2/cheryl 0, Ki-67 5%.    Left breast upper stereotactic guided core biopsy showed invasive lobular carcinoma moderately differentiated grade 2 with Marie score of 6 with measuring 3 mm with lobular carcinoma in situ present.  I do not see the ER/ID or HER2 on the ultrasound-guided biopsy of the left breast lesion as well as the left upper stereotactic biopsy.   We will check with pathologist    January 23, 2023: Patient underwent  MRI of the breast bilateral    Right breast: Negative    Left breast: At 1:00 anterior left breast 4 cm from the nipple there is a 1.5 x 1.6 x 1.3 cm irregular enhancing mass which is biopsy-proven malignancy.  The biopsy clip is located within 0.9 x 1 x 1.1 cm postbiopsy hematoma along the inferior margin of the mass.  And there is additional hematoma along the lateral margin of the mass which measures 1.2 x 1.9 x 0.9 cm.    At 1:00 in the middle to posterior left breast 7.4 cm from the nipple there is a 1.3 x 1 cm x 1 cm irregular enhancing mass mass with a focus from a biopsy clip along the inferior margin which also represents the biopsy-proven malignancy    At 12:00 in the posterior left breast 8.5 cm from the nipple there is a 1.9 x 1.8 x 2.2 cm irregular enhancing mass with a corresponding 2.9 cm biopsy cavity with a biopsy clip which represents the biopsy-proven malignancy    In addition there are multiple at least 12 similar-appearing irregular enhancing masses and foci are identified throughout the left breast predominantly involving the upper breast but also involving the lower outer quadrant.  At 11-12 o'clock in the middle and posterior left breast there are 3 adjacent reference masses together measuring 5.5 cm but individually measuring 1.2 x 0.9 x 1 cm.  At 1:00 in the far posterior left breast/axillary tail 13.7 cm posterior to the nipple is a 1.3 x 1.1 x 1 cm irregular enhancing mass which is located 0.8 cm from the anterolateral margin of the pectoralis Muscle.  Together the enhancing masses span approximately 11.7 x 8 x 7.8 cm.  There is no suspicious enhancement in the left nipple or chest wall.  Focal areas of skin enhancement likely reflect skin entry point from recent biopsies.  There are no pathologically enlarged internal mammary chain lymph nodes.    Impression    .  At least 15 similar-appearing irregular enhancing  masses and foci  scattered throughout the left breast, together measuring up to 11.7 cm  in maximum dimension, encompass 3 sites of biopsy-proven malignancy and  are consistent with multicentric malignancy. Oncologic and surgical  management are recommended.  2.  No MRI evidence of malignancy in the right breast.    Patient is a 47-year-old female with multifocal invasive lobular carcinoma who on screening mammogram showed 8 mm asymmetry in the central left breast posterior one third.  In the cc view.  It appears to be superior left breast on the MLO view.  There was also architectural distortion in the lateral left breast one third on the left cc view.    On diagnostic mammogram the spiculated areas within the central posterior and the lateral anterior superior left breast where redemonstrated.  Targeted ultrasound was done.  At 1 o'clock position 10 cm from the nipple there is a mass with spiculations which is suspicious.  The more centrally located spiculation on the cc view.  The more centrally located spiculation on the cc view cannot be clearly identified on ultrasound.  So there impression was there were 2 separate suspicious spiculated lesion in the left breast.  1 was seen well on the ultrasound and amenable to ultrasound-guided biopsy the other is seen well on mammography and a stereotactic guided biopsy suggested.    Patient subsequently underwent stereotactic biopsy of 1 lesion and ultrasound-guided biopsy of the 1 cm mass at 1 o'clock position 10 cm from the nipple.  A total of 3 different areas of the left breast were biopsied and specimens were sent to pathology.  The third biopsy was performed at 12 o'clock position in the posterior one third of the left breast.  All of the 3 sites were consistent with invasive lobular carcinoma at site A, B, and C.    MRI of the breast showed at 1 o'clock position of the left breast anteriorly 4 cm posterior to the nipple there is a 1.5 x 1.6 x 1.3 cm irregular  enhancing mass.  There is a small hematoma along the inferior margin of the mass.  An additional hematoma along the lateral margin of the mass which is measuring 1.2 x 0.9 x 0.9 cm    At 1:00 in the middle to posterior left breast 7.4 cm posterior to the nipple there is a 1.3 x 1 x 1 cm enhancing mass with a biopsy clip.    At 12:00 posterior left breast 8.5 cm posterior to the nipple there is a 1.5 x 1.8 x 2.2 cm irregular enhancing mass with a corresponding 2.9 cm biopsy cavity with a clip which represents the biopsy site malignancy    Multiple at least 12 similar-appearing irregular enhancing masses and foci are identified throughout the left breast predominantly involving the upper breast but also involving the lower outer quadrant.  At 11-12 o'clock in the middle and posterior left breast there is a 3 adjacent reference masses measuring 5.5 cm in AP dimension and individually measuring 1.2 x 0.9 x 1 cm.  At 1:00 in the far posterior left breast axillary tail 13.7 cm from the nipple there is a 1.3 x 1.1 x 1 cm craniocaudal dimension irregular enhancing mass which is located 0.8 cm from the anterolateral margin of the pectoralis muscle.     Per the MRI at least 15 similar-appearing irregular enhancing masses and foci scattered throughout the left breast together measuring 11.7 cm in maximum dimension which encompasses 3 sites of biopsy-proven malignancy and a consistent with multicentric malignancy.  MRI of the right breast is negative    INVITAE genetic testing showed variation of uncertain significance of LUCSA  Gene.    Patient was suggested left total mastectomy, left axillary sentinel lymph node biopsy possible node dissection and possible reconstruction.    However patient called Dr. Hi and decided to go for upfront bilateral total mastectomy, left axillary sentinel lymph node biopsy and possible axillary lymph node dissection and reconstruction.     I presented the case in the breast cancer conference  today.  Patient had multiple nodules per MRI on the left breast Dr. Todd looked at it and felt there were multiple nodules and the largest one was 2.2 cm.  The discussion was to go ahead and upfront do the surgery and subsequently treat with chemo/ endocrine therapy as needed.  The left breast MRI findings are slightly atypical and that it is not contiguous involvement over 11.5 cm area but multiple nodules.  Surgical pathology will clarify.  If it is 1 big lesion or multiple sma      Past Medical History:   Diagnosis Date    ADD (attention deficit disorder)     Breast cancer 03/08/2023    LEFT SIDE, HX BILATERAL MASTECTOMY, LAST CHEMO 8/2023, LAST RADIATION 10/23/23  - TARGETED THERAPY EVERY 3 WEEKS CURRENTLY - FOLLOWED BY [facility]    GERD (gastroesophageal reflux disease)     Graves disease     RADIOACTIVE IODINE TREAMENT IN PAST    History of COVID-19 2022    History of gestational diabetes 2010    History of melanoma     History of radioactive iodine thyroid ablation 2012    History of vertigo     Hyperlipidemia     DIET CONTROLLED    Hypertension     Hypothyroidism     Uterine fibroid         Past Surgical History:   Procedure Laterality Date    BREAST RECONSTRUCTION Bilateral 3/8/2023    Procedure: BILATERAL PLACEMENT  TISSUE EXPANDER AND ALLODERM;  Surgeon: Eamon Stone MD;  Location: [facility];  Service: Plastics;  Laterality: Bilateral;    D & C CERVICAL BIOPSY  01/2006    INTRAUTERINE DEVICE INSERTION  2010    Mirena    INTRAUTERINE DEVICE INSERTION  2015    Mirena exchange, old IUD removal and new one inserted by Dr Carr, Lot # [id number] 09/17.mar    MASTECTOMY W/ SENTINEL NODE BIOPSY Bilateral 3/8/2023    Procedure: Bilateral total mastectomy, left axillary sentinel lymph node biopsy;  Surgeon: Kell Hi MD;  Location: [facility];  Service: General;  Laterality: Bilateral;    SCAR REVISION BREAST Right 4/3/2023    Procedure: RIGHT MASTECTOMY REVISION;  Surgeon:  Eamon Stone MD;  Location: [facility] OR;  Service: Plastics;  Laterality: Right;    TOTAL LAPAROSCOPIC HYSTERECTOMY Bilateral 1/3/2024    Procedure: TOTAL LAPAROSCOPIC HYSTERECTOMY, BILATERAL SALPIGO-OOPHORECTOMY, CYSTOSCOPY;  Surgeon: Rocio Manning MD;  Location: [facility] OR;  Service: Obstetrics/Gynecology;  Laterality: Bilateral;    VENOUS ACCESS DEVICE (PORT) INSERTION Right 4/13/2023    Procedure: right port placement;  Surgeon: Kell Hi MD;  Location: [facility] OR;  Service: General;  Laterality: Right;    WISDOM TOOTH EXTRACTION          Current Outpatient Medications on File Prior to Visit   Medication Sig Dispense Refill    BIOTIN PO Take  by mouth Daily.      Black Currant Seed Oil 500 MG capsule Take 1 capsule by mouth Daily.      Cholecalciferol (Vitamin D3) 250 MCG (65928 UT) tablet Take 10,000 Units by mouth Daily.      COLLAGEN PO Take 1 tablet by mouth Daily. TO HOLD 1 WEEK PRIOR TO OR      levothyroxine (SYNTHROID, LEVOTHROID) 125 MCG tablet Take 1 tablet by mouth Daily. Except none on Sundays. 90 tablet 1    lidocaine-prilocaine (EMLA) 2.5-2.5 % cream Apply 1 application topically to the appropriate area as directed As Needed for Mild Pain (apply pea-sized amount to port site 30 minutes prior to port being accessed). 30 g 3    loratadine (CLARITIN) 10 MG tablet Take 1 tablet by mouth As Needed.      magnesium oxide (MAG-OX) 400 MG tablet Take 1 tablet by mouth Every Night.      pantoprazole (PROTONIX) 40 MG EC tablet Take 1 tablet by mouth Daily. 30 tablet 3    Probiotic Product (PROBIOTIC ADVANCED PO) Take 1 tablet by mouth Daily.      [DISCONTINUED] letrozole (FEMARA) 2.5 MG tablet Take 1 tablet by mouth Daily. 30 tablet 3    [DISCONTINUED] lisinopril (PRINIVIL,ZESTRIL) 2.5 MG tablet Take 1 tablet by mouth Daily. 30 tablet 5    [DISCONTINUED] brompheniramine-pseudoephedrine-DM 30-2-10 MG/5ML syrup       [DISCONTINUED] vitamin C (ASCORBIC ACID) 250 MG tablet Take 1  tablet by mouth Daily.      [DISCONTINUED] Zinc 50 MG tablet Take 1 tablet by mouth Daily.       No current facility-administered medications on file prior to visit.        ALLERGIES:    Allergies   Allergen Reactions    Erythromycin GI Intolerance     Pt reports    Penicillins Rash        Social History     Socioeconomic History    Marital status:      Spouse name: Parish    Number of children: 2   Tobacco Use    Smoking status: Never    Smokeless tobacco: Never   Vaping Use    Vaping status: Never Used   Substance and Sexual Activity    Alcohol use: Yes     Alcohol/week: 4.0 standard drinks of alcohol     Types: 4 Drinks containing 0.5 oz of alcohol per week     Comment: Maybe have a couple Friday and Saturday evening but not alwa    Drug use: Never    Sexual activity: Yes     Partners: Male     Birth control/protection: Post-menopausal, Hysterectomy     Comment:          Family History   Problem Relation Age of Onset    Diabetes Father     Hyperlipidemia Father     Arthritis Father     Hypertension Father     Heart disease Father         S/P stents    Heart attack Father 41    Colon polyps Father     Melanoma Father     Prostate cancer Father     Alcohol abuse Father     Hyperlipidemia Sister     Hypertension Sister     Heart attack Sister     Diabetes Sister     Thyroid cancer Paternal Aunt     Lung cancer Maternal Grandmother     Cancer Maternal Grandfather         Bladder cancer    Lung cancer Maternal Grandfather     Heart failure Paternal Grandmother     Transient ischemic attack Paternal Grandmother     Heart disease Paternal Grandfather     COPD Sister         Heavy smoker    Heart disease Sister     Malig Hyperthermia Neg Hx       Family history: Maternal great aunt had breast cancer, unsure of the age .  Father had melanoma and prostate cancer, paternal aunt had thyroid cancer, paternal grandfather had bladder cancer maternal grandfather had bladder cancer maternal grandmother had lung  "cancer    Past medical history is consistent with Graves' disease    OB/GYN history:  Age of menarche: 14  Patient is premenopausal and had an IUD which has been now removed   2 para 2 no miscarriages  Age at first childbirth 30  Patient did breast-feed 24 months  Length of taking birth control pills none      Objective     Vitals:    24 0807   BP: 143/86   Pulse: 73   Resp: 18   Temp: 98.2 °F (36.8 °C)   TempSrc: Temporal   SpO2: 96%   Weight: 79.5 kg (175 lb 3.2 oz)   Height: 160 cm (62.99\")   PainSc: 0-No pain               2024     7:49 AM   Current Status   ECOG score 0         Physical Exam      GENERAL:  Well-developed, Patient  in no acute distress.   SKIN:  Warm, dry ,NO purpura ,no rash.  HEENT:  Pupils were equal and reactive to light and accomodation, conjunctivae noninjected,  normal visual acuity.   LYMPHATICS:  No cervical, NO supraclavicular, NO axillary adenopathies.  BREASTS: S/p bilateral mastectomy with expanders in place; no chest wall lesions, no evidence of bilateral axillary adenopathy or supraclavicular adenopathy  CARDIAC: Normal rate and rhythm, no murmurs gallops or rubs.  LUNGS: Clear to auscultation bilaterally.   ABDOMEN:  Soft, nontender with bowel sounds active.   EXTREMITIES: No cyanosis clubbing or edema.  NEUROLOGICAL:  Patient was awake, alert, oriented to time, person and place.    I have reexamined the patient and the results are consistent with the previously documented exam. Kathrin Frederick MD       RECENT LABS:  Results from last 7 days   Lab Units 24  0730   WBC 10*3/mm3 6.71   NEUTROS ABS 10*3/mm3 3.41   HEMOGLOBIN g/dL 13.4   HEMATOCRIT % 39.4   PLATELETS 10*3/mm3 232           Results from last 7 days   Lab Units 24  0730   SODIUM mmol/L 141   POTASSIUM mmol/L 3.9   CHLORIDE mmol/L 103   CO2 mmol/L 25.1   BUN mg/dL 17   CREATININE mg/dL 0.89   CALCIUM mg/dL 9.8   ALBUMIN g/dL 4.8   BILIRUBIN mg/dL 0.4   ALK PHOS U/L 177*   ALT (SGPT) U/L 102* "   AST (SGOT) U/L 69*   GLUCOSE mg/dL 121*               Assessment & Plan     *Pathologic T3N0 invasive lobular carcinoma of the left breast, upper inner and upper outer quadrant, multifocal, 60 mm, 20 mm, 2 mm, 1 mm, grade 2, Humb[place] score of 7, 2 of them are ER/CA positive, HER2/cheryl negative.  One of the larger lesions was ER/CA positive HER2 positive.  Ki-67 is very low 5%,    Invasive lobular carcinoma of the left breast recently diagnosed, multifocal with 3 lesions biopsied in the left breast.  Patient has multicentric disease with 15 similar-appearing irregular enhancing masses in the left breast together measuring 11.7 cm in maximum dimension which encompasses 3 of the 3 biopsy proven malignancies.  All the 3 very invasive lobular carcinoma, grade 2 with Marie score of 6 but 2 of the lesions where ER/CA positive HER2 negative and the third lesion was ER/CA positive HER2 2+ with FISH showing HER2 amplified.      1.  Estrogen receptor 95%, progesterone receptor 95%, HER2/cheryl 0, Ki-67 5%    2 left breast 10 o'clock position 10 cm from the nipple ultrasound core guided biopsy showed ER 90%, %, HER2/cheryl 0    3.  Left breast upper stereotactic core biopsy showed estrogen receptor 90%, progesterone receptor 100%, HER2/cheryl equivocal 2+  FISH testing showed her to as 5.7 with a KEIRA 17 of 1.6 with a ratio HER2/KEIRA 17 of  INVITAE genetic testing showed variation of uncertain significance of LUCAS gene  We discussed the multidisciplinary approach in this patient's and I also discussed in length that that invasive lobular carcinomas do benefit from endocrine therapy and discussed in length about side effects of all endocrine therapies  Pending the results of the surgery she may need to be a candidate for chemotherapy as well and subsequently radiation if the tumor is large locally  Patient is keen on getting bilateral oophorectomy after her breast surgery is done and we discussed about medical oophorectomy  with Lupron as well  February 24, 2023: Presented patient in the breast cancer conference.  Even though it appears to be a heterogeneous tumor, since it is difficult to really appreciate by clinical exam and the fact that it is invasive lobular carcinoma, it was felt best to do upfront surgery.  March 8, 2023:p T3 N0 M0 invasive lobular carcinoma s/p bilateral mastectomy with left deep axillary sentinel lymph node biopsy, with bilateral placement of prepectoral tissue expanders for reconstruction and bilateral placement of AlloDerm.  Pathology shows multifocal tumor in  upper outer quadrant and upper inner quadrant, invasive lobular carcinoma grade 2 with a Marie score  score of 7  , total of 4 foci were seen, largest tumor being 60 mm, 20 mm, 2 mm, 1 mm.  No DCIS identified.  Lobular carcinoma in situ present.  No lymphatic or vascular channel invasion, no dermal lymphovascular space invasion all margins were negative for invasive carcinoma 4 lymph nodes were all negative for malignancy its pathologic T3N0  ER %, ID %, HER2/cheryl 2+ with Ki-67 of 5%  Previous biopsies had shown 2 of the tumors were ER/ID positive HER2/cheryl negative and one of them was ER/ID positive HER2 positive  On discussing with pathologist Dr. Serrano, she thinks that the larger tumor was ER ID positive and HER2 positive.  HER2 copy number of 5.7 with HER2/CEP ratio of 3.5 positive and this was on the larger tumor.  Given that patient has heterogeneous tumor with the larger lesion being ER/ID positive HER2 positive and a Ki-67 of 40%, being premenopausal we will treat with Taxotere carboplatin Herceptin Perjeta.  April 20, 2023: Patient was to start cycle 1 today but unfortunately her liver function tests were extremely high.  Discussed about alcohol and she had drank and taken Tylenol.  Will hold chemo today  April 28, 2023: Reviewed her liver function test which are completely normalized.  Discussed with her not to drink alcohol.   Here for cycle 1 day 1 TCHP chemotherapy.  Reviewed echo which showed ejection fraction of 64% with strain pattern of -21.  Patient understands all the side effects\  5/4/2023: Patient tolerated Cycle 1 TCHP well. She has had diarrhea, nausea and fatigue that have been well managed.   5/18/2023: C2 TCHP  5/22/2023: Here for close follow-up and possible IV fluids after treatment last Friday, 5/18/2023.  Patient had a fairly good weekend with good oral intake, weight that is notably stable, and very little nausea.  She only had 1 loose stool this morning and therefore no significant diarrhea.  CMP is WNL.  Per discussion with patient we will give just 500 mL normal saline.  May 25, 2023: Patient has diarrhea.  She is 1 week out of cycle 2 Taxotere carboplatin Herceptin Perjeta.  She has some abdominal cramping.  We will give her IV fluids today.  Given carboplatinum is not available she will receive Taxotere Herceptin Perjeta for 2 more cycles prior to going to Adriamycin Cytoxan.  Cycle #3 TCHP 6/8/2023  Patient returns 6/12/2023 for toxicity check.  She is eating and drinking sufficiently at this point.  She does not feel she has benefited from the IV fluids in the past and therefore will not do that today.  She denies any fevers or chills.  June 29, 2023: Cycle 4 TCHP.  She will need referral to radiation at the completion of 6 cycles of TCHP  07/20/2023 proceed with cycle 5 of carboplatin, Taxotere, Herceptin, Perjeta with Neulasta support.  August 10, 2023: Final cycle 6 TCHP.  Thereafter plan to continue every 3-week Herceptin/Perjeta for total of 1 year.   We have discussed potentially beginning Lupron followed by aromatase inhibitor therapy following radiation however patient wants to discuss potential hysterectomy and bilateral oophorectomy with her GYN.    September 21, 2023: Patient started radiation has completed 2 treatments so far out of 25.  Patient has history of depression in the family but not  herself.  We discussed about consideration of Lupron with aromatase inhibitor but she wants to undergo hysterectomy with bilateral oophorectomy because of her fibroids.  In which case we could consider starting tamoxifen until she has oophorectomy and subsequently start AI.  She is tolerating Herceptin Perjeta very well  10/12/23 Here for Hepcetin and Perjeta today. She is currently on 18 of 25 radiation treatments and tolerating well.  She has no new concerns today.  10/23/2023 completed radiation under the direction of Dr. Hines.  November 3, 2023: Patient to receive Herceptin Perjeta.  She is due to get oophorectomy following which we will start aromatase inhibitor Arimidex.  November 28, 2023: Here for Herceptin Perjeta.  She is due to receive oophorectomy as of January 4, 2024.  Subsequent to that we will start Arimidex.  Patient is premenopausal and did not want to start Lupron and prefer to go to oophorectomy.  We discussed about starting tamoxifen until oophorectomy is done but patient prefers to not take tamoxifen and wants to wait till oophorectomy is done and subsequently we can start aromatase inhibitor  S/p hysterectomy with bilateral salpingo-oophorectomy January 3, 2024.  Pathology is benign.  She had benign left follicle cyst and inclusion cyst.  There is a subserosal 4 cm uterine leiomyoma.  2/2024 initiating letrozole in the setting of ER/ND positivity.  3/14/2024 proceed with Herceptin Perjeta which is continued every 3 weeks.  She will complete therapy April 2024 April 4, 2024: Here for cycle 11 Herceptin Perjeta.  She will be completed in 3 weeks her echo in February was normal    *S/p IUD removal  Experiencing hot flashes with chemo likely triggering menopausal changes.  Hysterectomy 1/2024 with Dr. 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 She did recommend she start taking her gabapentin again to help with her anxiety and irritability/anger from her decadron with treatment.   Patient continues follow-up with supportive oncology, RADHA Rolle. Patient is encouraged to continue to follow with Cierra.   Much improved.    *  Port study negative    *Intermittent mild elevation of LFTs.    Patient has previously reported intermittent use of Tylenol and alcohol.    8/31/2023 AST 35, ALT 41, alk phos 100.  Patient denies any alcohol intake or Tylenol.  This is felt to be drug related.  Stable to improved. Monitor.  10/12/2023 AST 93, .  Patient has had 1 joe this past week however denies any other alcohol or Tylenol intake.  Reviewed with Dr. TRINIDAD and we will treat.  2/22/2024 stable to improved, AST 31, ALT 41, alk phos 122, T. bili 0.6  Slight elevation of LFTs today with AST 35, ALT 81, alkaline phosphatase 264.  Patient was encouraged to avoid alcohol and Tylenol.  We will repeat labs at scheduled follow-up in 3 weeks  April 4, 2024: AST 68, , alkaline phosphatase 177, total bilirubin normal likely secondary to the Tylenol she took in the cold medicine    *Cardiac monitoring  Seen by cardiology today, 7/27/2023.  Echo stable.  Plan for continued echocardiogram every 3 months while on therapy.  Continue low-dose lisinopril  2/8/2024 Echo stable with EF 67%  Continues follow-up with cardio oncology.    PLAN:  Proceed with Herceptin and Perjeta today, continued every 3 weeks through the end of April 2024  Continue letrozole 2.5 mg daily  Discussed a trial of tart cherry Gummies for arthralgias from aromatase inhibitor.  The patient was reminded to avoid alcohol and Tylenol  Reviewed echo normal from February 2024  Follow-up with me in 3 weeks at which time she will get the last dose of  Herceptin Perjeta  Following that patient has appointment with Dr. Stone to remove the port  Given worsening liver function tests have asked her to stop the biotin collagen as well as black current seed oil.  Will check liver function in 3 weeks    This patient is on high risk drug therapy requiring intensive monitoring for toxicity.        I spent 42 total minutes, face-to-face, caring for Ursula today. Greater than 50% of this time involved counseling and/or coordination of care as documented within this note.    Kathrin Frederick MD  04/04/2024      Dr. Kell Hi

## 2024-04-04 ENCOUNTER — INFUSION (OUTPATIENT)
Dept: ONCOLOGY | Facility: HOSPITAL | Age: 49
End: 2024-04-04
Payer: COMMERCIAL

## 2024-04-04 ENCOUNTER — OFFICE VISIT (OUTPATIENT)
Dept: ONCOLOGY | Facility: CLINIC | Age: 49
End: 2024-04-04
Payer: COMMERCIAL

## 2024-04-04 ENCOUNTER — PATIENT MESSAGE (OUTPATIENT)
Dept: CARDIOLOGY | Facility: CLINIC | Age: 49
End: 2024-04-04
Payer: COMMERCIAL

## 2024-04-04 VITALS
BODY MASS INDEX: 31.04 KG/M2 | WEIGHT: 175.2 LBS | OXYGEN SATURATION: 96 % | RESPIRATION RATE: 18 BRPM | SYSTOLIC BLOOD PRESSURE: 143 MMHG | HEIGHT: 63 IN | TEMPERATURE: 98.2 F | HEART RATE: 73 BPM | DIASTOLIC BLOOD PRESSURE: 86 MMHG

## 2024-04-04 DIAGNOSIS — Z17.0 MALIGNANT NEOPLASM OF OVERLAPPING SITES OF LEFT BREAST IN FEMALE, ESTROGEN RECEPTOR POSITIVE: Primary | ICD-10-CM

## 2024-04-04 DIAGNOSIS — Z17.0 MALIGNANT NEOPLASM OF OVERLAPPING SITES OF LEFT BREAST IN FEMALE, ESTROGEN RECEPTOR POSITIVE: ICD-10-CM

## 2024-04-04 DIAGNOSIS — C50.812 MALIGNANT NEOPLASM OF OVERLAPPING SITES OF LEFT BREAST IN FEMALE, ESTROGEN RECEPTOR POSITIVE: Primary | ICD-10-CM

## 2024-04-04 DIAGNOSIS — C50.812 MALIGNANT NEOPLASM OF OVERLAPPING SITES OF LEFT BREAST IN FEMALE, ESTROGEN RECEPTOR POSITIVE: ICD-10-CM

## 2024-04-04 LAB
ALBUMIN SERPL-MCNC: 4.8 G/DL (ref 3.5–5.2)
ALBUMIN/GLOB SERPL: 2 G/DL
ALP SERPL-CCNC: 177 U/L (ref 39–117)
ALT SERPL W P-5'-P-CCNC: 102 U/L (ref 1–33)
ANION GAP SERPL CALCULATED.3IONS-SCNC: 12.9 MMOL/L (ref 5–15)
AST SERPL-CCNC: 69 U/L (ref 1–32)
BASOPHILS # BLD AUTO: 0.05 10*3/MM3 (ref 0–0.2)
BASOPHILS NFR BLD AUTO: 0.7 % (ref 0–1.5)
BILIRUB SERPL-MCNC: 0.4 MG/DL (ref 0–1.2)
BUN SERPL-MCNC: 17 MG/DL (ref 6–20)
BUN/CREAT SERPL: 19.1 (ref 7–25)
CALCIUM SPEC-SCNC: 9.8 MG/DL (ref 8.6–10.5)
CHLORIDE SERPL-SCNC: 103 MMOL/L (ref 98–107)
CO2 SERPL-SCNC: 25.1 MMOL/L (ref 22–29)
CREAT SERPL-MCNC: 0.89 MG/DL (ref 0.57–1)
DEPRECATED RDW RBC AUTO: 41.5 FL (ref 37–54)
EGFRCR SERPLBLD CKD-EPI 2021: 81.1 ML/MIN/1.73
EOSINOPHIL # BLD AUTO: 0.33 10*3/MM3 (ref 0–0.4)
EOSINOPHIL NFR BLD AUTO: 4.9 % (ref 0.3–6.2)
ERYTHROCYTE [DISTWIDTH] IN BLOOD BY AUTOMATED COUNT: 12.7 % (ref 12.3–15.4)
GLOBULIN UR ELPH-MCNC: 2.4 GM/DL
GLUCOSE SERPL-MCNC: 121 MG/DL (ref 65–99)
HCT VFR BLD AUTO: 39.4 % (ref 34–46.6)
HGB BLD-MCNC: 13.4 G/DL (ref 12–15.9)
IMM GRANULOCYTES # BLD AUTO: 0.02 10*3/MM3 (ref 0–0.05)
IMM GRANULOCYTES NFR BLD AUTO: 0.3 % (ref 0–0.5)
LYMPHOCYTES # BLD AUTO: 2.43 10*3/MM3 (ref 0.7–3.1)
LYMPHOCYTES NFR BLD AUTO: 36.2 % (ref 19.6–45.3)
MCH RBC QN AUTO: 30.2 PG (ref 26.6–33)
MCHC RBC AUTO-ENTMCNC: 34 G/DL (ref 31.5–35.7)
MCV RBC AUTO: 88.7 FL (ref 79–97)
MONOCYTES # BLD AUTO: 0.47 10*3/MM3 (ref 0.1–0.9)
MONOCYTES NFR BLD AUTO: 7 % (ref 5–12)
NEUTROPHILS NFR BLD AUTO: 3.41 10*3/MM3 (ref 1.7–7)
NEUTROPHILS NFR BLD AUTO: 50.9 % (ref 42.7–76)
NRBC BLD AUTO-RTO: 0 /100 WBC (ref 0–0.2)
PLATELET # BLD AUTO: 232 10*3/MM3 (ref 140–450)
PMV BLD AUTO: 9.9 FL (ref 6–12)
POTASSIUM SERPL-SCNC: 3.9 MMOL/L (ref 3.5–5.2)
PROT SERPL-MCNC: 7.2 G/DL (ref 6–8.5)
RBC # BLD AUTO: 4.44 10*6/MM3 (ref 3.77–5.28)
SODIUM SERPL-SCNC: 141 MMOL/L (ref 136–145)
WBC NRBC COR # BLD AUTO: 6.71 10*3/MM3 (ref 3.4–10.8)

## 2024-04-04 PROCEDURE — 96413 CHEMO IV INFUSION 1 HR: CPT

## 2024-04-04 PROCEDURE — 85025 COMPLETE CBC W/AUTO DIFF WBC: CPT

## 2024-04-04 PROCEDURE — 25010000002 PERTUZUMAB 420 MG/14ML SOLUTION 420 MG VIAL: Performed by: INTERNAL MEDICINE

## 2024-04-04 PROCEDURE — 96375 TX/PRO/DX INJ NEW DRUG ADDON: CPT

## 2024-04-04 PROCEDURE — 25010000002 TRASTUZUMAB PER 10 MG: Performed by: INTERNAL MEDICINE

## 2024-04-04 PROCEDURE — 25810000003 SODIUM CHLORIDE 0.9 % SOLUTION 250 ML FLEX CONT: Performed by: INTERNAL MEDICINE

## 2024-04-04 PROCEDURE — 80053 COMPREHEN METABOLIC PANEL: CPT

## 2024-04-04 PROCEDURE — 25010000002 DIPHENHYDRAMINE PER 50 MG: Performed by: INTERNAL MEDICINE

## 2024-04-04 PROCEDURE — 25810000003 SODIUM CHLORIDE 0.9 % SOLUTION: Performed by: INTERNAL MEDICINE

## 2024-04-04 PROCEDURE — 96417 CHEMO IV INFUS EACH ADDL SEQ: CPT

## 2024-04-04 RX ORDER — SODIUM CHLORIDE 9 MG/ML
250 INJECTION, SOLUTION INTRAVENOUS ONCE
Status: COMPLETED | OUTPATIENT
Start: 2024-04-04 | End: 2024-04-04

## 2024-04-04 RX ORDER — FAMOTIDINE 10 MG/ML
20 INJECTION, SOLUTION INTRAVENOUS ONCE
Status: COMPLETED | OUTPATIENT
Start: 2024-04-04 | End: 2024-04-04

## 2024-04-04 RX ORDER — SODIUM CHLORIDE 9 MG/ML
250 INJECTION, SOLUTION INTRAVENOUS ONCE
Status: CANCELLED | OUTPATIENT
Start: 2024-04-04

## 2024-04-04 RX ORDER — LISINOPRIL 2.5 MG/1
2.5 TABLET ORAL DAILY
Qty: 90 TABLET | Refills: 3 | Status: SHIPPED | OUTPATIENT
Start: 2024-04-04

## 2024-04-04 RX ORDER — LETROZOLE 2.5 MG/1
2.5 TABLET, FILM COATED ORAL DAILY
Qty: 90 TABLET | Refills: 1 | Status: SHIPPED | OUTPATIENT
Start: 2024-04-04

## 2024-04-04 RX ORDER — FAMOTIDINE 10 MG/ML
20 INJECTION, SOLUTION INTRAVENOUS ONCE
Status: CANCELLED | OUTPATIENT
Start: 2024-04-04

## 2024-04-04 RX ADMIN — PERTUZUMAB 420 MG: 30 INJECTION, SOLUTION, CONCENTRATE INTRAVENOUS at 09:17

## 2024-04-04 RX ADMIN — DIPHENHYDRAMINE HYDROCHLORIDE 25 MG: 50 INJECTION, SOLUTION INTRAMUSCULAR; INTRAVENOUS at 08:53

## 2024-04-04 RX ADMIN — SODIUM CHLORIDE 250 ML: 9 INJECTION, SOLUTION INTRAVENOUS at 08:52

## 2024-04-04 RX ADMIN — TRASTUZUMAB 470 MG: 150 INJECTION, POWDER, LYOPHILIZED, FOR SOLUTION INTRAVENOUS at 10:24

## 2024-04-04 RX ADMIN — FAMOTIDINE 20 MG: 10 INJECTION INTRAVENOUS at 08:52

## 2024-04-04 NOTE — NURSING NOTE
Lab Results   Component Value Date    GLUCOSE 121 (H) 04/04/2024    BUN 17 04/04/2024    CREATININE 0.89 04/04/2024    EGFRRESULT 89 10/11/2022    EGFR 81.1 04/04/2024    BCR 19.1 04/04/2024    K 3.9 04/04/2024    CO2 25.1 04/04/2024    CALCIUM 9.8 04/04/2024    PROTENTOTREF 7.1 10/11/2022    ALBUMIN 4.8 04/04/2024    BILITOT 0.4 04/04/2024    AST 69 (H) 04/04/2024     (C) 04/04/2024    Okay to treat today with Perjeta/Herceptin with elevated LFTs per Dr. Frederick.

## 2024-04-04 NOTE — TELEPHONE ENCOUNTER
From: Ursula Kulkarni  To: Alisia Fermin  Sent: 4/4/2024 4:17 AM EDT  Subject: Pharmacy change    God morning,     My insurance now requires my lisinopril to go through the mail order. Can you please send a 90 day rx to [facility]?    Thank you

## 2024-04-24 ENCOUNTER — LAB REQUISITION (OUTPATIENT)
Dept: LAB | Facility: HOSPITAL | Age: 49
End: 2024-04-24
Payer: COMMERCIAL

## 2024-04-24 ENCOUNTER — OUTSIDE FACILITY SERVICE (OUTPATIENT)
Dept: GASTROENTEROLOGY | Facility: CLINIC | Age: 49
End: 2024-04-24
Payer: COMMERCIAL

## 2024-04-24 DIAGNOSIS — R13.13 PHARYNGEAL DYSPHAGIA: ICD-10-CM

## 2024-04-24 PROCEDURE — 43450 DILATE ESOPHAGUS 1/MULT PASS: CPT | Performed by: INTERNAL MEDICINE

## 2024-04-24 PROCEDURE — 88305 TISSUE EXAM BY PATHOLOGIST: CPT | Performed by: INTERNAL MEDICINE

## 2024-04-24 PROCEDURE — 45378 DIAGNOSTIC COLONOSCOPY: CPT | Performed by: INTERNAL MEDICINE

## 2024-04-24 PROCEDURE — 43239 EGD BIOPSY SINGLE/MULTIPLE: CPT | Performed by: INTERNAL MEDICINE

## 2024-04-25 ENCOUNTER — INFUSION (OUTPATIENT)
Dept: ONCOLOGY | Facility: HOSPITAL | Age: 49
End: 2024-04-25
Payer: COMMERCIAL

## 2024-04-25 ENCOUNTER — OFFICE VISIT (OUTPATIENT)
Dept: ONCOLOGY | Facility: CLINIC | Age: 49
End: 2024-04-25
Payer: COMMERCIAL

## 2024-04-25 VITALS
TEMPERATURE: 96.9 F | DIASTOLIC BLOOD PRESSURE: 80 MMHG | RESPIRATION RATE: 14 BRPM | BODY MASS INDEX: 30.02 KG/M2 | HEART RATE: 75 BPM | WEIGHT: 169.4 LBS | SYSTOLIC BLOOD PRESSURE: 115 MMHG | OXYGEN SATURATION: 96 % | HEIGHT: 63 IN

## 2024-04-25 DIAGNOSIS — Z17.0 MALIGNANT NEOPLASM OF OVERLAPPING SITES OF LEFT BREAST IN FEMALE, ESTROGEN RECEPTOR POSITIVE: ICD-10-CM

## 2024-04-25 DIAGNOSIS — C50.812 MALIGNANT NEOPLASM OF OVERLAPPING SITES OF LEFT BREAST IN FEMALE, ESTROGEN RECEPTOR POSITIVE: ICD-10-CM

## 2024-04-25 DIAGNOSIS — U07.1 COVID-19 VIRUS INFECTION: ICD-10-CM

## 2024-04-25 DIAGNOSIS — C50.812 MALIGNANT NEOPLASM OF OVERLAPPING SITES OF LEFT BREAST IN FEMALE, ESTROGEN RECEPTOR POSITIVE: Primary | ICD-10-CM

## 2024-04-25 DIAGNOSIS — Z45.2 ENCOUNTER FOR ADJUSTMENT OR MANAGEMENT OF VASCULAR ACCESS DEVICE: ICD-10-CM

## 2024-04-25 DIAGNOSIS — Z17.0 MALIGNANT NEOPLASM OF OVERLAPPING SITES OF LEFT BREAST IN FEMALE, ESTROGEN RECEPTOR POSITIVE: Primary | ICD-10-CM

## 2024-04-25 LAB
ALBUMIN SERPL-MCNC: 4.6 G/DL (ref 3.5–5.2)
ALBUMIN/GLOB SERPL: 1.6 G/DL
ALP SERPL-CCNC: 128 U/L (ref 39–117)
ALT SERPL W P-5'-P-CCNC: 25 U/L (ref 1–33)
ANION GAP SERPL CALCULATED.3IONS-SCNC: 14.1 MMOL/L (ref 5–15)
AST SERPL-CCNC: 24 U/L (ref 1–32)
BASOPHILS # BLD AUTO: 0.04 10*3/MM3 (ref 0–0.2)
BASOPHILS NFR BLD AUTO: 0.5 % (ref 0–1.5)
BILIRUB SERPL-MCNC: 0.7 MG/DL (ref 0–1.2)
BUN SERPL-MCNC: 17 MG/DL (ref 6–20)
BUN/CREAT SERPL: 18.9 (ref 7–25)
CALCIUM SPEC-SCNC: 10.2 MG/DL (ref 8.6–10.5)
CHLORIDE SERPL-SCNC: 102 MMOL/L (ref 98–107)
CO2 SERPL-SCNC: 24.9 MMOL/L (ref 22–29)
CREAT SERPL-MCNC: 0.9 MG/DL (ref 0.57–1)
DEPRECATED RDW RBC AUTO: 40.5 FL (ref 37–54)
EGFRCR SERPLBLD CKD-EPI 2021: 80 ML/MIN/1.73
EOSINOPHIL # BLD AUTO: 0.34 10*3/MM3 (ref 0–0.4)
EOSINOPHIL NFR BLD AUTO: 4.5 % (ref 0.3–6.2)
ERYTHROCYTE [DISTWIDTH] IN BLOOD BY AUTOMATED COUNT: 12.7 % (ref 12.3–15.4)
GLOBULIN UR ELPH-MCNC: 2.8 GM/DL
GLUCOSE SERPL-MCNC: 109 MG/DL (ref 65–99)
HCT VFR BLD AUTO: 38.5 % (ref 34–46.6)
HGB BLD-MCNC: 13.5 G/DL (ref 12–15.9)
IMM GRANULOCYTES # BLD AUTO: 0.02 10*3/MM3 (ref 0–0.05)
IMM GRANULOCYTES NFR BLD AUTO: 0.3 % (ref 0–0.5)
LAB AP CASE REPORT: NORMAL
LYMPHOCYTES # BLD AUTO: 2.06 10*3/MM3 (ref 0.7–3.1)
LYMPHOCYTES NFR BLD AUTO: 27.5 % (ref 19.6–45.3)
MCH RBC QN AUTO: 30.5 PG (ref 26.6–33)
MCHC RBC AUTO-ENTMCNC: 35.1 G/DL (ref 31.5–35.7)
MCV RBC AUTO: 86.9 FL (ref 79–97)
MONOCYTES # BLD AUTO: 0.46 10*3/MM3 (ref 0.1–0.9)
MONOCYTES NFR BLD AUTO: 6.1 % (ref 5–12)
NEUTROPHILS NFR BLD AUTO: 4.56 10*3/MM3 (ref 1.7–7)
NEUTROPHILS NFR BLD AUTO: 61.1 % (ref 42.7–76)
NRBC BLD AUTO-RTO: 0 /100 WBC (ref 0–0.2)
PATH REPORT.FINAL DX SPEC: NORMAL
PATH REPORT.GROSS SPEC: NORMAL
PLATELET # BLD AUTO: 241 10*3/MM3 (ref 140–450)
PMV BLD AUTO: 9.5 FL (ref 6–12)
POTASSIUM SERPL-SCNC: 3.8 MMOL/L (ref 3.5–5.2)
PROT SERPL-MCNC: 7.4 G/DL (ref 6–8.5)
RBC # BLD AUTO: 4.43 10*6/MM3 (ref 3.77–5.28)
SODIUM SERPL-SCNC: 141 MMOL/L (ref 136–145)
WBC NRBC COR # BLD AUTO: 7.48 10*3/MM3 (ref 3.4–10.8)

## 2024-04-25 PROCEDURE — 25810000003 SODIUM CHLORIDE 0.9 % SOLUTION 250 ML FLEX CONT: Performed by: INTERNAL MEDICINE

## 2024-04-25 PROCEDURE — 25810000003 SODIUM CHLORIDE 0.9 % SOLUTION: Performed by: INTERNAL MEDICINE

## 2024-04-25 PROCEDURE — 96417 CHEMO IV INFUS EACH ADDL SEQ: CPT

## 2024-04-25 PROCEDURE — 85025 COMPLETE CBC W/AUTO DIFF WBC: CPT

## 2024-04-25 PROCEDURE — 25010000002 PERTUZUMAB 420 MG/14ML SOLUTION 420 MG VIAL: Performed by: INTERNAL MEDICINE

## 2024-04-25 PROCEDURE — 80053 COMPREHEN METABOLIC PANEL: CPT

## 2024-04-25 PROCEDURE — 25010000002 HEPARIN LOCK FLUSH PER 10 UNITS: Performed by: INTERNAL MEDICINE

## 2024-04-25 PROCEDURE — 96361 HYDRATE IV INFUSION ADD-ON: CPT

## 2024-04-25 PROCEDURE — 25010000002 TRASTUZUMAB PER 10 MG: Performed by: INTERNAL MEDICINE

## 2024-04-25 PROCEDURE — 96413 CHEMO IV INFUSION 1 HR: CPT

## 2024-04-25 PROCEDURE — 25010000002 DIPHENHYDRAMINE PER 50 MG: Performed by: INTERNAL MEDICINE

## 2024-04-25 PROCEDURE — 96375 TX/PRO/DX INJ NEW DRUG ADDON: CPT

## 2024-04-25 RX ORDER — HEPARIN SODIUM (PORCINE) LOCK FLUSH IV SOLN 100 UNIT/ML 100 UNIT/ML
500 SOLUTION INTRAVENOUS AS NEEDED
Status: DISCONTINUED | OUTPATIENT
Start: 2024-04-25 | End: 2024-04-25 | Stop reason: HOSPADM

## 2024-04-25 RX ORDER — SODIUM CHLORIDE 0.9 % (FLUSH) 0.9 %
10 SYRINGE (ML) INJECTION AS NEEDED
Status: DISCONTINUED | OUTPATIENT
Start: 2024-04-25 | End: 2024-04-25 | Stop reason: HOSPADM

## 2024-04-25 RX ORDER — SODIUM CHLORIDE 9 MG/ML
250 INJECTION, SOLUTION INTRAVENOUS ONCE
Status: CANCELLED | OUTPATIENT
Start: 2024-04-25

## 2024-04-25 RX ORDER — FAMOTIDINE 10 MG/ML
20 INJECTION, SOLUTION INTRAVENOUS ONCE
Status: COMPLETED | OUTPATIENT
Start: 2024-04-25 | End: 2024-04-25

## 2024-04-25 RX ORDER — FAMOTIDINE 10 MG/ML
20 INJECTION, SOLUTION INTRAVENOUS ONCE
Status: CANCELLED | OUTPATIENT
Start: 2024-04-25

## 2024-04-25 RX ORDER — SODIUM CHLORIDE 9 MG/ML
250 INJECTION, SOLUTION INTRAVENOUS ONCE
Status: COMPLETED | OUTPATIENT
Start: 2024-04-25 | End: 2024-04-25

## 2024-04-25 RX ADMIN — Medication 500 UNITS: at 11:27

## 2024-04-25 RX ADMIN — DIPHENHYDRAMINE HYDROCHLORIDE 25 MG: 50 INJECTION, SOLUTION INTRAMUSCULAR; INTRAVENOUS at 09:32

## 2024-04-25 RX ADMIN — FAMOTIDINE 20 MG: 10 INJECTION INTRAVENOUS at 09:30

## 2024-04-25 RX ADMIN — TRASTUZUMAB 470 MG: 150 INJECTION, POWDER, LYOPHILIZED, FOR SOLUTION INTRAVENOUS at 10:54

## 2024-04-25 RX ADMIN — SODIUM CHLORIDE 250 ML: 9 INJECTION, SOLUTION INTRAVENOUS at 09:30

## 2024-04-25 RX ADMIN — Medication 10 ML: at 11:27

## 2024-04-25 RX ADMIN — PERTUZUMAB 420 MG: 30 INJECTION, SOLUTION, CONCENTRATE INTRAVENOUS at 09:48

## 2024-04-25 NOTE — PROGRESS NOTES
Subjective     REASON FOR FOLLOW-UP:  1.  Invasive lobular carcinoma multifocal,  Left breast anterolateral stereotactic biopsy invasive lobular carcinoma, Marie score of 6, grade 2, 4 mm, ER 95%, SD 95%, HER2/cheryl 0 negative with Ki-67 of 5%.  Positive for atypical lobular hyperplasia  Left breast 1 o'clock position 10 cm from the nipple ultrasound-guided biopsy consistent with invasive lobular carcinoma grade 2 Akron score of 6, 4 mm with lobular carcinoma in situ, ER 90%, %, HER2/cheryl 0  Left breast upper stereotactic guided core biopsy invasive lobular carcinoma grade 2 Akron score of six 3 mm with lobular carcinoma in situ, ER 90%, %, HER2/cheryl equivocal 2+, FISH shows her to 5.7 with CEP 1.6 and ratio HER2 nu/KEIRA-17 ratio of 3.5.  It is amplified.    2.  March 8, 2023:p T3 N0 M0 invasive lobular carcinoma s/p bilateral mastectomy with left deep axillary sentinel lymph node biopsy, with bilateral placement of prepectoral tissue expanders for reconstruction and bilateral placement of AlloDerm.  Pathology shows multifocal tumor in  upper outer quadrant and upper inner quadrant, invasive lobular carcinoma grade 2 with a Akron score  score of 7  , total of 4 foci were seen, largest tumor being 60 mm, 20 mm, 2 mm, 1 mm.  No DCIS identified.  Lobular carcinoma in situ present.  No lymphatic or vascular channel invasion, no dermal lymphovascular space invasion all margins were negative for invasive carcinoma 4 lymph nodes were all negative for malignancy its pathologic T3N0  ER %, SD %, HER2/cheryl 2+ with Ki-67 of 5%  Previous biopsies had shown 2 of the tumors were ER/SD positive HER2/cheryl negative and one of them was ER/SD positive HER2 positive  On discussing with pathologist Dr. Serrano, she thinks that the larger tumor was ER SD positive and HER2 positive but unsure if it was the 60 mm tumor or 20 mm tumor.  Either way it was not the smaller tumors which was HER2  positive.  All tumors look-alike per pathologist and they are invasive lobular carcinoma.                  HISTORY OF PRESENT ILLNESS:         Patient is a 48 y.o. female with history of multifocal left breast cancer with MRI of the breast showing almost 15 lesions.  Biopsies on 3 different lesions were done and they were all consistent with invasive lobular carcinoma.  2 of the lesions where ER/DE positive HER2/cheryl negative and one of the larger lesion was ER/DE positive HER2 2+ equivocal.  FISH testing was done and the HER2 copy number was 5.7 with HER2/CEP ratio of 3.5.  The FISH was amplified.    Patient underwent bilateral mastectomy    With left sentinel lymph node surgery..  The pathology shows tumor to be present in the left upper outer quadrant invasive lobular carcinoma with a Marie score of 7, grade 2.  There were 4 lesions the greatest size was 6 0 mm, 20 mm, 2 mm and 1 mm in size.  Lobular carcinoma in situ is present.  All margins are negative for invasive carcinoma.  4 lymph nodes negative.  Patient has T3N0 invasive lobular carcinoma.      Discussed with pathology in length Dr. Serrano, one of the larger lesions which was 60 mm was heterogeneous it was biopsied at 2 separate spots.  The left anterior lateral and left 1:00 core fragment was ER/DE positive HER2/cheryl negative the left upper core biopsy was ER/DE positive HER2/cheryl 2+ equivocal but HER2/cheryl FISH was positive.    On discussion with pathology the 60 mm tumor showed ER and DE positive and HER2 positive by FISH with a Ki-67 of 40% at the Top-Hat clip.  The HER2/cheryl copy number was 5.7 with a HER2/cheryl by CEP ratio 3.5 which is positive.  The Ki-67 on the lower part was 13%.  The 20 mm tumor had a Ki-67 of 9%.  So the larger lesion was triple positive and heterogeneous as 2 biopsies were done on the larger lesions and that was both triple positive and at the second biopsy was ER/DE positive HER2/cheryl negative so it was a heterogeneous tumor.   The 20 mm lesion was ER/MA positive HER2/cheryl negative.    Given that she had a large tumor with high Ki-67 and premenopausal status we discussed about giving Taxotere carboplatin Herceptin Perjeta.    Interval History:   She returns the office today, 3/14/2024 in anticipation of Herceptin Perjeta which she continues to receive every 3 weeks.  She is having excellent tolerance to therapy.  She has very occasional diarrhea though this is managed with over-the-counter medications as needed.  She is eager to undergo reconstruction which is scheduled for May 2024.  She has no new pain.  She is eating and drinking adequately.  She has no lower extremity swelling or shortness of breath.  Her most recent echocardiogram was February 2024 with a stable ejection fraction.  She reports she is tolerating letrozole very well.  She does have some generalized arthralgias from therapy.  She has no hot flashes or vaginal dryness.    April 4, 2024: Patient is here for Herceptin Perjeta she is tolerating it very well her liver function tests have slightly worsened further.  She was sick 2 weeks ago and she took something which contained Tylenol and it.  She is quit taking it we have also discussed with her to stop the biotin collagen and black current seed oil.  We will treat her with Herceptin Perjeta today she is otherwise asymptomatic.  She was tested for COVID in the past which was negative.  She does not have fever or chills and she does not take too much alcohol except rarely    April 24, 2024: Patient is here for her last dose of Herceptin Perjeta.  Her liver function tests have been elevated.  Today her liver function tests are normal with ALT 25 AST 24 and alkaline phosphatase of 128 with a total bilirubin of 0.7.  Patient is she is seeing cardio oncology Dr. Fermin with echocardiogram next week as her last echo was back 3 months ago.  Patient has no symptoms of shortness of breath lower or lower extremity edema.      Oncology  history:  Patient is a 48 y.o. female who has been recently diagnosed with left breast cancer.  Patient has been compliant with her mammograms.    There is no family history of breast or ovarian cancer.  Her father had prostate cancer and melanoma.  A paternal aunt had thyroid cancer.      Details are as follows.    November 29, 2022: Screening bilateral mammogram showed 8 mm asymmetry in the central left breast posterior one third.  This appears to be in the superior left breast on the MLO view.  Right breast was negative.  A left diagnostic mammogram and ultrasound was recommended.    January January 3, 2023: Left breast diagnostic mammogram showed the spiculated areas within the central posterior and lateral anterior superior left breast where redemonstrated.  Targeted ultrasound was done.  At 1 o'clock position 10 cm from the nipple there is a shadowing mass with peripheral spiculations which is concordant with the mammographic abnormality and suspicious for malignancy.  The more centrally located spiculation on the cc view posterior to the glandular tissue cannot be clearly identified on the ultrasound.    Impression was 2 separate suspicious spiculated lesion in the left breast.  One of the lesions is seen well on the ultrasound and should be amenable to ultrasound-guided core biopsy.  The other lesion which was seen in the CC projection was amicable to stereotactic guided tissue sampling.    January 3, 2023 patient underwent ultrasound-guided left breast biopsy    The ultrasound-guided biopsy of left breast 10 cm from the nipple at 1 o'clock position showed invasive lobular carcinoma moderately differentiated with a Marie grade of 2 and score of 6 measuring 4 mm.  There was focal lobular carcinoma in situ.    Left breast anterior lateral stereotactic core needle biopsy showed invasive lobular carcinoma moderately differentiated grade 2 with a Alzada score of 6 measuring 4 mm.  There was atypical  lobular hyperplasia.  Estrogen receptor was %, progesterone receptor was %, HER2/cheryl 0, Ki-67 5%.    Left breast upper stereotactic guided core biopsy showed invasive lobular carcinoma moderately differentiated grade 2 with Munden score of 6 with measuring 3 mm with lobular carcinoma in situ present.  I do not see the ER/MD or HER2 on the ultrasound-guided biopsy of the left breast lesion as well as the left upper stereotactic biopsy.  We will check with pathologist    January 23, 2023: Patient underwent  MRI of the breast bilateral    Right breast: Negative    Left breast: At 1:00 anterior left breast 4 cm from the nipple there is a 1.5 x 1.6 x 1.3 cm irregular enhancing mass which is biopsy-proven malignancy.  The biopsy clip is located within 0.9 x 1 x 1.1 cm postbiopsy hematoma along the inferior margin of the mass.  And there is additional hematoma along the lateral margin of the mass which measures 1.2 x 1.9 x 0.9 cm.    At 1:00 in the middle to posterior left breast 7.4 cm from the nipple there is a 1.3 x 1 cm x 1 cm irregular enhancing mass mass with a focus from a biopsy clip along the inferior margin which also represents the biopsy-proven malignancy    At 12:00 in the posterior left breast 8.5 cm from the nipple there is a 1.9 x 1.8 x 2.2 cm irregular enhancing mass with a corresponding 2.9 cm biopsy cavity with a biopsy clip which represents the biopsy-proven malignancy    In addition there are multiple at least 12 similar-appearing irregular enhancing masses and foci are identified throughout the left breast predominantly involving the upper breast but also involving the lower outer quadrant.  At 11-12 o'clock in the middle and posterior left breast there are 3 adjacent reference masses together measuring 5.5 cm but individually measuring 1.2 x 0.9 x 1 cm.  At 1:00 in the far posterior left breast/axillary tail 13.7 cm posterior to the nipple is a 1.3 x 1.1 x 1 cm irregular enhancing  mass which is located 0.8 cm from the anterolateral margin of the pectoralis Muscle.  Together the enhancing masses span approximately 11.7 x 8 x 7.8 cm.  There is no suspicious enhancement in the left nipple or chest wall.  Focal areas of skin enhancement likely reflect skin entry point from recent biopsies.  There are no pathologically enlarged internal mammary chain lymph nodes.    Impression    .  At least 15 similar-appearing irregular enhancing masses and foci  scattered throughout the left breast, together measuring up to 11.7 cm  in maximum dimension, encompass 3 sites of biopsy-proven malignancy and  are consistent with multicentric malignancy. Oncologic and surgical  management are recommended.  2.  No MRI evidence of malignancy in the right breast.    Patient is a 47-year-old female with multifocal invasive lobular carcinoma who on screening mammogram showed 8 mm asymmetry in the central left breast posterior one third.  In the cc view.  It appears to be superior left breast on the MLO view.  There was also architectural distortion in the lateral left breast one third on the left cc view.    On diagnostic mammogram the spiculated areas within the central posterior and the lateral anterior superior left breast where redemonstrated.  Targeted ultrasound was done.  At 1 o'clock position 10 cm from the nipple there is a mass with spiculations which is suspicious.  The more centrally located spiculation on the cc view.  The more centrally located spiculation on the cc view cannot be clearly identified on ultrasound.  So there impression was there were 2 separate suspicious spiculated lesion in the left breast.  1 was seen well on the ultrasound and amenable to ultrasound-guided biopsy the other is seen well on mammography and a stereotactic guided biopsy suggested.    Patient subsequently underwent stereotactic biopsy of 1 lesion and ultrasound-guided biopsy of the 1 cm mass at 1 o'clock position 10 cm from  the nipple.  A total of 3 different areas of the left breast were biopsied and specimens were sent to pathology.  The third biopsy was performed at 12 o'clock position in the posterior one third of the left breast.  All of the 3 sites were consistent with invasive lobular carcinoma at site A, B, and C.    MRI of the breast showed at 1 o'clock position of the left breast anteriorly 4 cm posterior to the nipple there is a 1.5 x 1.6 x 1.3 cm irregular enhancing mass.  There is a small hematoma along the inferior margin of the mass.  An additional hematoma along the lateral margin of the mass which is measuring 1.2 x 0.9 x 0.9 cm    At 1:00 in the middle to posterior left breast 7.4 cm posterior to the nipple there is a 1.3 x 1 x 1 cm enhancing mass with a biopsy clip.    At 12:00 posterior left breast 8.5 cm posterior to the nipple there is a 1.5 x 1.8 x 2.2 cm irregular enhancing mass with a corresponding 2.9 cm biopsy cavity with a clip which represents the biopsy site malignancy    Multiple at least 12 similar-appearing irregular enhancing masses and foci are identified throughout the left breast predominantly involving the upper breast but also involving the lower outer quadrant.  At 11-12 o'clock in the middle and posterior left breast there is a 3 adjacent reference masses measuring 5.5 cm in AP dimension and individually measuring 1.2 x 0.9 x 1 cm.  At 1:00 in the far posterior left breast axillary tail 13.7 cm from the nipple there is a 1.3 x 1.1 x 1 cm craniocaudal dimension irregular enhancing mass which is located 0.8 cm from the anterolateral margin of the pectoralis muscle.     Per the MRI at least 15 similar-appearing irregular enhancing masses and foci scattered throughout the left breast together measuring 11.7 cm in maximum dimension which encompasses 3 sites of biopsy-proven malignancy and a consistent with multicentric malignancy.  MRI of the right breast is negative    INVITAE genetic testing showed  variation of uncertain significance of LCUAS  Gene.    Patient was suggested left total mastectomy, left axillary sentinel lymph node biopsy possible node dissection and possible reconstruction.    However patient called Dr. Hi and decided to go for upfront bilateral total mastectomy, left axillary sentinel lymph node biopsy and possible axillary lymph node dissection and reconstruction.     I presented the case in the breast cancer conference today.  Patient had multiple nodules per MRI on the left breast Dr. Todd looked at it and felt there were multiple nodules and the largest one was 2.2 cm.  The discussion was to go ahead and upfront do the surgery and subsequently treat with chemo/ endocrine therapy as needed.  The left breast MRI findings are slightly atypical and that it is not contiguous involvement over 11.5 cm area but multiple nodules.  Surgical pathology will clarify.  If it is 1 big lesion or multiple sma      Past Medical History:   Diagnosis Date    ADD (attention deficit disorder)     Breast cancer 03/08/2023    LEFT SIDE, HX BILATERAL MASTECTOMY, LAST CHEMO 8/2023, LAST RADIATION 10/23/23  - TARGETED THERAPY EVERY 3 WEEKS CURRENTLY - FOLLOWED BY [facility]    GERD (gastroesophageal reflux disease)     Graves disease     RADIOACTIVE IODINE TREAMENT IN PAST    History of COVID-19 2022    History of gestational diabetes 2010    History of melanoma     History of radioactive iodine thyroid ablation 2012    History of vertigo     Hyperlipidemia     DIET CONTROLLED    Hypertension     Hypothyroidism     Uterine fibroid         Past Surgical History:   Procedure Laterality Date    BREAST RECONSTRUCTION Bilateral 03/08/2023    Procedure: BILATERAL PLACEMENT  TISSUE EXPANDER AND ALLODERM;  Surgeon: Eamon Stone MD;  Location: [facility];  Service: Plastics;  Laterality: Bilateral;    COLONOSCOPY  04/24/2024    non-bleeding internal and external hemorrhoids, repeat in 10 years    D & C  CERVICAL BIOPSY  01/2006    INTRAUTERINE DEVICE INSERTION  2010    Mirena    INTRAUTERINE DEVICE INSERTION  2015    Mirena exchange, old IUD removal and new one inserted by Dr Carr, Lot # [id number] 09/17.mar    MASTECTOMY W/ SENTINEL NODE BIOPSY Bilateral 03/08/2023    Procedure: Bilateral total mastectomy, left axillary sentinel lymph node biopsy;  Surgeon: Kell Hi MD;  Location: [facility]IN OR;  Service: General;  Laterality: Bilateral;    SCAR REVISION BREAST Right 04/03/2023    Procedure: RIGHT MASTECTOMY REVISION;  Surgeon: Eamon Stone MD;  Location: [facility] OR;  Service: Plastics;  Laterality: Right;    TOTAL LAPAROSCOPIC HYSTERECTOMY Bilateral 01/03/2024    Procedure: TOTAL LAPAROSCOPIC HYSTERECTOMY, BILATERAL SALPIGO-OOPHORECTOMY, CYSTOSCOPY;  Surgeon: Rocio Manning MD;  Location: [facility] OR;  Service: Obstetrics/Gynecology;  Laterality: Bilateral;    UPPER GASTROINTESTINAL ENDOSCOPY  04/24/2024    Schatzki ring dilated; small hiatal hernia    VENOUS ACCESS DEVICE (PORT) INSERTION Right 04/13/2023    Procedure: right port placement;  Surgeon: Kell Hi MD;  Location: [facility] OR;  Service: General;  Laterality: Right;    WISDOM TOOTH EXTRACTION          Current Outpatient Medications on File Prior to Visit   Medication Sig Dispense Refill    Cholecalciferol (Vitamin D3) 250 MCG (60624 UT) tablet Take 10,000 Units by mouth Daily.      letrozole (FEMARA) 2.5 MG tablet Take 1 tablet by mouth Daily. 90 tablet 1    levothyroxine (SYNTHROID, LEVOTHROID) 125 MCG tablet Take 1 tablet by mouth Daily. Except none on Sundays. 90 tablet 1    lidocaine-prilocaine (EMLA) 2.5-2.5 % cream Apply 1 application topically to the appropriate area as directed As Needed for Mild Pain (apply pea-sized amount to port site 30 minutes prior to port being accessed). 30 g 3    lisinopril (PRINIVIL,ZESTRIL) 2.5 MG tablet Take 1 tablet by mouth Daily. 90 tablet 3    loratadine (CLARITIN)  10 MG tablet Take 1 tablet by mouth As Needed.      pantoprazole (PROTONIX) 40 MG EC tablet Take 1 tablet by mouth Daily. 30 tablet 3    Probiotic Product (PROBIOTIC ADVANCED PO) Take 1 tablet by mouth Daily.      [DISCONTINUED] BIOTIN PO Take  by mouth Daily.      [DISCONTINUED] Black Currant Seed Oil 500 MG capsule Take 1 capsule by mouth Daily.      [DISCONTINUED] COLLAGEN PO Take 1 tablet by mouth Daily. TO HOLD 1 WEEK PRIOR TO OR      [DISCONTINUED] magnesium oxide (MAG-OX) 400 MG tablet Take 1 tablet by mouth Every Night.       No current facility-administered medications on file prior to visit.        ALLERGIES:    Allergies   Allergen Reactions    Erythromycin GI Intolerance     Pt reports    Penicillins Rash        Social History     Socioeconomic History    Marital status:      Spouse name: Parish    Number of children: 2   Tobacco Use    Smoking status: Never     Passive exposure: Never    Smokeless tobacco: Never   Vaping Use    Vaping status: Never Used   Substance and Sexual Activity    Alcohol use: Yes     Alcohol/week: 4.0 standard drinks of alcohol     Types: 4 Drinks containing 0.5 oz of alcohol per week     Comment: Maybe have a couple Friday and Saturday evening but not alwa    Drug use: Never    Sexual activity: Yes     Partners: Male     Birth control/protection: Post-menopausal, Hysterectomy     Comment:          Family History   Problem Relation Age of Onset    Diabetes Father     Hyperlipidemia Father     Arthritis Father     Hypertension Father     Heart disease Father         S/P stents    Heart attack Father 41    Colon polyps Father     Melanoma Father     Prostate cancer Father     Alcohol abuse Father     Hyperlipidemia Sister     Hypertension Sister     Heart attack Sister     Diabetes Sister     Thyroid cancer Paternal Aunt     Lung cancer Maternal Grandmother     Cancer Maternal Grandfather         Bladder cancer    Lung cancer Maternal Grandfather     Heart failure  "Paternal Grandmother     Transient ischemic attack Paternal Grandmother     Heart disease Paternal Grandfather     COPD Sister         Heavy smoker    Heart disease Sister     Malchanell Hyperthermia Neg Hx       Family history: Maternal great aunt had breast cancer, unsure of the age .  Father had melanoma and prostate cancer, paternal aunt had thyroid cancer, paternal grandfather had bladder cancer maternal grandfather had bladder cancer maternal grandmother had lung cancer    Past medical history is consistent with Graves' disease    OB/GYN history:  Age of menarche: 14  Patient is premenopausal and had an IUD which has been now removed   2 para 2 no miscarriages  Age at first childbirth 30  Patient did breast-feed 24 months  Length of taking birth control pills none      Objective     Vitals:    24 0850   BP: 115/80   Pulse: 75   Resp: 14   Temp: 96.9 °F (36.1 °C)   TempSrc: Temporal   SpO2: 96%   Weight: 76.8 kg (169 lb 6.4 oz)   Height: 160 cm (63\")   PainSc: 0-No pain               2024     8:48 AM   Current Status   ECOG score 0         Physical Exam      GENERAL:  Well-developed, Patient  in no acute distress.   SKIN:  Warm, dry ,NO purpura ,no rash.  HEENT:  Pupils were equal and reactive to light and accomodation, conjunctivae noninjected,  normal visual acuity.   LYMPHATICS:  No cervical, NO supraclavicular, NO axillary adenopathies.  CARDIAC: Normal rate and rhythm, no murmurs gallops or rubs.  LUNGS: Clear to auscultation bilaterally.   ABDOMEN:  Soft, nontender with bowel sounds active.   EXTREMITIES: No cyanosis clubbing or edema.  NEUROLOGICAL:  Patient was awake, alert, oriented to time, person and place.    I have reexamined the patient and the results are consistent with the previously documented exam. Kathrin Frederick MD       RECENT LABS:  Results from last 7 days   Lab Units 24  0833   WBC 10*3/mm3 7.48   NEUTROS ABS 10*3/mm3 4.56   HEMOGLOBIN g/dL 13.5   HEMATOCRIT % 38.5 "   PLATELETS 10*3/mm3 241           Results from last 7 days   Lab Units 04/25/24  0833   SODIUM mmol/L 141   POTASSIUM mmol/L 3.8   CHLORIDE mmol/L 102   CO2 mmol/L 24.9   BUN mg/dL 17   CREATININE mg/dL 0.90   CALCIUM mg/dL 10.2   ALBUMIN g/dL 4.6   BILIRUBIN mg/dL 0.7   ALK PHOS U/L 128*   ALT (SGPT) U/L 25   AST (SGOT) U/L 24   GLUCOSE mg/dL 109*                 Assessment & Plan     *Pathologic T3N0 invasive lobular carcinoma of the left breast, upper inner and upper outer quadrant, multifocal, 60 mm, 20 mm, 2 mm, 1 mm, grade 2, Loranger score of 7, 2 of them are ER/MA positive, HER2/cheryl negative.  One of the larger lesions was ER/MA positive HER2 positive.  Ki-67 is very low 5%,    Invasive lobular carcinoma of the left breast recently diagnosed, multifocal with 3 lesions biopsied in the left breast.  Patient has multicentric disease with 15 similar-appearing irregular enhancing masses in the left breast together measuring 11.7 cm in maximum dimension which encompasses 3 of the 3 biopsy proven malignancies.  All the 3 very invasive lobular carcinoma, grade 2 with Marie score of 6 but 2 of the lesions where ER/MA positive HER2 negative and the third lesion was ER/MA positive HER2 2+ with FISH showing HER2 amplified.      1.  Estrogen receptor 95%, progesterone receptor 95%, HER2/cheryl 0, Ki-67 5%    2 left breast 10 o'clock position 10 cm from the nipple ultrasound core guided biopsy showed ER 90%, %, HER2/cheryl 0    3.  Left breast upper stereotactic core biopsy showed estrogen receptor 90%, progesterone receptor 100%, HER2/cheryl equivocal 2+  FISH testing showed her to as 5.7 with a KEIRA 17 of 1.6 with a ratio HER2/KEIRA 17 of  INVITAE genetic testing showed variation of uncertain significance of LUCAS gene  We discussed the multidisciplinary approach in this patient's and I also discussed in length that that invasive lobular carcinomas do benefit from endocrine therapy and discussed in length about side  effects of all endocrine therapies  Pending the results of the surgery she may need to be a candidate for chemotherapy as well and subsequently radiation if the tumor is large locally  Patient is keen on getting bilateral oophorectomy after her breast surgery is done and we discussed about medical oophorectomy with Lupron as well  February 24, 2023: Presented patient in the breast cancer conference.  Even though it appears to be a heterogeneous tumor, since it is difficult to really appreciate by clinical exam and the fact that it is invasive lobular carcinoma, it was felt best to do upfront surgery.  March 8, 2023:p T3 N0 M0 invasive lobular carcinoma s/p bilateral mastectomy with left deep axillary sentinel lymph node biopsy, with bilateral placement of prepectoral tissue expanders for reconstruction and bilateral placement of AlloDerm.  Pathology shows multifocal tumor in  upper outer quadrant and upper inner quadrant, invasive lobular carcinoma grade 2 with a [place] score  score of 7  , total of 4 foci were seen, largest tumor being 60 mm, 20 mm, 2 mm, 1 mm.  No DCIS identified.  Lobular carcinoma in situ present.  No lymphatic or vascular channel invasion, no dermal lymphovascular space invasion all margins were negative for invasive carcinoma 4 lymph nodes were all negative for malignancy its pathologic T3N0  ER %, AL %, HER2/cheryl 2+ with Ki-67 of 5%  Previous biopsies had shown 2 of the tumors were ER/AL positive HER2/cheryl negative and one of them was ER/AL positive HER2 positive  On discussing with pathologist Dr. Serrano, she thinks that the larger tumor was ER AL positive and HER2 positive.  HER2 copy number of 5.7 with HER2/CEP ratio of 3.5 positive and this was on the larger tumor.  Given that patient has heterogeneous tumor with the larger lesion being ER/AL positive HER2 positive and a Ki-67 of 40%, being premenopausal we will treat with Taxotere carboplatin Herceptin Perjeta.  April 20,  2023: Patient was to start cycle 1 today but unfortunately her liver function tests were extremely high.  Discussed about alcohol and she had drank and taken Tylenol.  Will hold chemo today  April 28, 2023: Reviewed her liver function test which are completely normalized.  Discussed with her not to drink alcohol.  Here for cycle 1 day 1 TCHP chemotherapy.  Reviewed echo which showed ejection fraction of 64% with strain pattern of -21.  Patient understands all the side effects\  5/4/2023: Patient tolerated Cycle 1 TCHP well. She has had diarrhea, nausea and fatigue that have been well managed.   5/18/2023: C2 TCHP  5/22/2023: Here for close follow-up and possible IV fluids after treatment last Friday, 5/18/2023.  Patient had a fairly good weekend with good oral intake, weight that is notably stable, and very little nausea.  She only had 1 loose stool this morning and therefore no significant diarrhea.  CMP is WNL.  Per discussion with patient we will give just 500 mL normal saline.  May 25, 2023: Patient has diarrhea.  She is 1 week out of cycle 2 Taxotere carboplatin Herceptin Perjeta.  She has some abdominal cramping.  We will give her IV fluids today.  Given carboplatinum is not available she will receive Taxotere Herceptin Perjeta for 2 more cycles prior to going to Adriamycin Cytoxan.  Cycle #3 TCHP 6/8/2023  Patient returns 6/12/2023 for toxicity check.  She is eating and drinking sufficiently at this point.  She does not feel she has benefited from the IV fluids in the past and therefore will not do that today.  She denies any fevers or chills.  June 29, 2023: Cycle 4 TCHP.  She will need referral to radiation at the completion of 6 cycles of TCHP  07/20/2023 proceed with cycle 5 of carboplatin, Taxotere, Herceptin, Perjeta with Neulasta support.  August 10, 2023: Final cycle 6 TCHP.  Thereafter plan to continue every 3-week Herceptin/Perjeta for total of 1 year.   We have discussed potentially beginning  Lupron followed by aromatase inhibitor therapy following radiation however patient wants to discuss potential hysterectomy and bilateral oophorectomy with her GYN.    September 21, 2023: Patient started radiation has completed 2 treatments so far out of 25.  Patient has history of depression in the family but not herself.  We discussed about consideration of Lupron with aromatase inhibitor but she wants to undergo hysterectomy with bilateral oophorectomy because of her fibroids.  In which case we could consider starting tamoxifen until she has oophorectomy and subsequently start AI.  She is tolerating Herceptin Perjeta very well  10/12/23 Here for Hepcetin and Perjeta today. She is currently on 18 of 25 radiation treatments and tolerating well.  She has no new concerns today.  10/23/2023 completed radiation under the direction of Dr. Hines.  November 3, 2023: Patient to receive Herceptin Perjeta.  She is due to get oophorectomy following which we will start aromatase inhibitor Arimidex.  November 28, 2023: Here for Herceptin Perjeta.  She is due to receive oophorectomy as of January 4, 2024.  Subsequent to that we will start Arimidex.  Patient is premenopausal and did not want to start Lupron and prefer to go to oophorectomy.  We discussed about starting tamoxifen until oophorectomy is done but patient prefers to not take tamoxifen and wants to wait till oophorectomy is done and subsequently we can start aromatase inhibitor  S/p hysterectomy with bilateral salpingo-oophorectomy January 3, 2024.  Pathology is benign.  She had benign left follicle cyst and inclusion cyst.  There is a subserosal 4 cm uterine leiomyoma.  2/2024 initiating letrozole in the setting of ER/[place] positivity.  3/14/2024 proceed with Herceptin Perjeta which is continued every 3 weeks.  She will complete therapy April 2024 April 4, 2024: Here for cycle 11 Herceptin Perjeta.  She will be completed in 3 weeks her echo in February was  normal  April 24, 2024: Patient is here for cycle 12 Herceptin Perjeta.  This is her last treatment.  She is doing very well.    *S/p IUD removal  Experiencing hot flashes with chemo likely triggering menopausal changes.  Hysterectomy 1/2024 with Dr. 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 She did recommend she start taking her gabapentin again to help with her anxiety and irritability/anger from her decadron with treatment.   Patient continues follow-up with supportive oncology, RADHA Rolle. Patient is encouraged to continue to follow with Cierra.   Much improved.    *  Port study negative    *Intermittent mild elevation of LFTs.    Patient has previously reported intermittent use of Tylenol and alcohol.    8/31/2023 AST 35, ALT 41, alk phos 100.  Patient denies any alcohol intake or Tylenol.  This is felt to be drug related.  Stable to improved. Monitor.  10/12/2023 AST 93, .  Patient has had 1 joe this past week however denies any other alcohol or Tylenol intake.  Reviewed with Dr. TRINIDAD and we will treat.  2/22/2024 stable to improved, AST 31, ALT 41, alk phos 122, T. bili 0.6  Slight elevation of LFTs today with AST 35, ALT 81, alkaline phosphatase 264.  Patient was encouraged to avoid alcohol and Tylenol.  We will repeat labs at scheduled follow-up in 3 weeks  April 4, 2024: AST 68, , alkaline phosphatase 177, total bilirubin normal likely secondary to the Tylenol she took in the cold medicine    *Cardiac monitoring  Seen by cardiology today, 7/27/2023.  Echo stable.  Plan for continued echocardiogram every 3 months while on therapy.  Continue low-dose lisinopril  2/8/2024 Echo stable with EF  67%  Continues follow-up with cardio oncology.    PLAN:  Proceed with Herceptin and Perjeta today, this is her last dose.    Continue letrozole 2.5 mg daily continue calcium and vitamin D supplement  Discussed a trial of tart cherry Gummies for arthralgias from aromatase inhibitor.  The patient was reminded to avoid alcohol and Tylenol  Liver function tests are normal  Patient is scheduled for port removal as of May 8, 2024 by Dr. Stone at that time for reconstruction surgery  Follow-up with me in 4 weeks with labs        This patient is on high risk drug therapy requiring intensive monitoring for toxicity.        I spent 40  total minutes, face-to-face, caring for Ursula today. Greater than 50% of this time involved counseling and/or coordination of care as documented within this note.      Kathrin Frederick MD  04/25/2024      Dr. Kell Hi

## 2024-04-26 ENCOUNTER — OFFICE VISIT (OUTPATIENT)
Dept: CARDIOLOGY | Facility: CLINIC | Age: 49
End: 2024-04-26
Payer: COMMERCIAL

## 2024-04-26 ENCOUNTER — HOSPITAL ENCOUNTER (OUTPATIENT)
Dept: CARDIOLOGY | Facility: HOSPITAL | Age: 49
Discharge: HOME | End: 2024-04-26
Admitting: INTERNAL MEDICINE
Payer: COMMERCIAL

## 2024-04-26 VITALS
BODY MASS INDEX: 29.95 KG/M2 | WEIGHT: 169 LBS | HEIGHT: 63 IN | OXYGEN SATURATION: 98 % | HEART RATE: 75 BPM | SYSTOLIC BLOOD PRESSURE: 120 MMHG | DIASTOLIC BLOOD PRESSURE: 80 MMHG

## 2024-04-26 VITALS
BODY MASS INDEX: 30.3 KG/M2 | DIASTOLIC BLOOD PRESSURE: 72 MMHG | HEART RATE: 69 BPM | HEIGHT: 63 IN | WEIGHT: 171 LBS | SYSTOLIC BLOOD PRESSURE: 122 MMHG

## 2024-04-26 DIAGNOSIS — Z79.899 ENCOUNTER FOR MONITORING CARDIOTOXIC DRUG THERAPY: ICD-10-CM

## 2024-04-26 DIAGNOSIS — Z51.81 ENCOUNTER FOR MONITORING CARDIOTOXIC DRUG THERAPY: Primary | ICD-10-CM

## 2024-04-26 DIAGNOSIS — E78.5 MILD HYPERLIPIDEMIA: ICD-10-CM

## 2024-04-26 DIAGNOSIS — Z79.899 ENCOUNTER FOR MONITORING CARDIOTOXIC DRUG THERAPY: Primary | ICD-10-CM

## 2024-04-26 DIAGNOSIS — I10 PRIMARY HYPERTENSION: ICD-10-CM

## 2024-04-26 DIAGNOSIS — Z17.0 MALIGNANT NEOPLASM OF OVERLAPPING SITES OF LEFT BREAST IN FEMALE, ESTROGEN RECEPTOR POSITIVE: ICD-10-CM

## 2024-04-26 DIAGNOSIS — Z51.81 ENCOUNTER FOR MONITORING CARDIOTOXIC DRUG THERAPY: ICD-10-CM

## 2024-04-26 DIAGNOSIS — C50.812 MALIGNANT NEOPLASM OF OVERLAPPING SITES OF LEFT BREAST IN FEMALE, ESTROGEN RECEPTOR POSITIVE: ICD-10-CM

## 2024-04-26 LAB
BH CV ECHO LEFT VENTRICLE GLOBAL LONGITUDINAL STRAIN: -22.6 %
BH CV ECHO MEAS - EDV(CUBED): 91.1 ML
BH CV ECHO MEAS - EDV(MOD-SP2): 78 ML
BH CV ECHO MEAS - EDV(MOD-SP4): 61 ML
BH CV ECHO MEAS - EF(MOD-BP): 62.8 %
BH CV ECHO MEAS - EF(MOD-SP2): 61.5 %
BH CV ECHO MEAS - EF(MOD-SP4): 62.3 %
BH CV ECHO MEAS - ESV(CUBED): 14.1 ML
BH CV ECHO MEAS - ESV(MOD-SP2): 30 ML
BH CV ECHO MEAS - ESV(MOD-SP4): 23 ML
BH CV ECHO MEAS - FS: 46.3 %
BH CV ECHO MEAS - IVS/LVPW: 1 CM
BH CV ECHO MEAS - IVSD: 0.8 CM
BH CV ECHO MEAS - LAT PEAK E' VEL: 8.6 CM/SEC
BH CV ECHO MEAS - LV DIASTOLIC VOL/BSA (35-75): 33.9 CM2
BH CV ECHO MEAS - LV MASS(C)D: 113.6 GRAMS
BH CV ECHO MEAS - LV SYSTOLIC VOL/BSA (12-30): 12.8 CM2
BH CV ECHO MEAS - LVIDD: 4.5 CM
BH CV ECHO MEAS - LVIDS: 2.42 CM
BH CV ECHO MEAS - LVPWD: 0.8 CM
BH CV ECHO MEAS - MED PEAK E' VEL: 8.4 CM/SEC
BH CV ECHO MEAS - MR MAX PG: 51.8 MMHG
BH CV ECHO MEAS - MR MAX VEL: 359.7 CM/SEC
BH CV ECHO MEAS - MV A DUR: 0.11 SEC
BH CV ECHO MEAS - MV A MAX VEL: 83.1 CM/SEC
BH CV ECHO MEAS - MV DEC SLOPE: 532.7 CM/SEC2
BH CV ECHO MEAS - MV DEC TIME: 0.18 SEC
BH CV ECHO MEAS - MV E MAX VEL: 74.6 CM/SEC
BH CV ECHO MEAS - MV E/A: 0.9
BH CV ECHO MEAS - MV MAX PG: 2.23 MMHG
BH CV ECHO MEAS - MV MEAN PG: 1.32 MMHG
BH CV ECHO MEAS - MV P1/2T: 44 MSEC
BH CV ECHO MEAS - MV V2 VTI: 20 CM
BH CV ECHO MEAS - MVA(P1/2T): 5 CM2
BH CV ECHO MEAS - PULM A REVS DUR: 0.11 SEC
BH CV ECHO MEAS - PULM A REVS VEL: 24.3 CM/SEC
BH CV ECHO MEAS - PULM DIAS VEL: 37.6 CM/SEC
BH CV ECHO MEAS - PULM S/D: 1.47
BH CV ECHO MEAS - PULM SYS VEL: 55.2 CM/SEC
BH CV ECHO MEAS - RAP SYSTOLE: 3 MMHG
BH CV ECHO MEAS - RVSP: 22.1 MMHG
BH CV ECHO MEAS - SV(MOD-SP2): 48 ML
BH CV ECHO MEAS - SV(MOD-SP4): 38 ML
BH CV ECHO MEAS - SVI(MOD-SP2): 26.7 ML/M2
BH CV ECHO MEAS - SVI(MOD-SP4): 21.1 ML/M2
BH CV ECHO MEAS - TAPSE (>1.6): 2.03 CM
BH CV ECHO MEAS - TR MAX PG: 19.1 MMHG
BH CV ECHO MEAS - TR MAX VEL: 218.5 CM/SEC
BH CV ECHO MEASUREMENTS AVERAGE E/E' RATIO: 8.78
BH CV XLRA - RV BASE: 2.26 CM
BH CV XLRA - RV LENGTH: 7.4 CM
BH CV XLRA - RV MID: 2.17 CM
BH CV XLRA - TDI S': 11.5 CM/SEC
LEFT ATRIUM VOLUME INDEX: 20.6 ML/M2

## 2024-04-26 PROCEDURE — 25510000001 PERFLUTREN (DEFINITY) 8.476 MG IN SODIUM CHLORIDE (PF) 0.9 % 10 ML INJECTION: Performed by: INTERNAL MEDICINE

## 2024-04-26 PROCEDURE — 93356 MYOCRD STRAIN IMG SPCKL TRCK: CPT

## 2024-04-26 PROCEDURE — 93308 TTE F-UP OR LMTD: CPT

## 2024-04-26 PROCEDURE — 93325 DOPPLER ECHO COLOR FLOW MAPG: CPT

## 2024-04-26 PROCEDURE — 93321 DOPPLER ECHO F-UP/LMTD STD: CPT

## 2024-04-26 RX ADMIN — PERFLUTREN 1.5 ML: 6.52 INJECTION, SUSPENSION INTRAVENOUS at 11:34

## 2024-04-30 ENCOUNTER — TELEPHONE (OUTPATIENT)
Dept: GASTROENTEROLOGY | Facility: CLINIC | Age: 49
End: 2024-04-30
Payer: COMMERCIAL

## 2024-04-30 NOTE — TELEPHONE ENCOUNTER
Pt reviewed results via [employer].     Sent pt [employer] msg advising of results and recommendations. Advised to call if any questions.     Colonoscopy placed in  and recall for 4/24/34.

## 2024-04-30 NOTE — TELEPHONE ENCOUNTER
----- Message from Niranjan Sawant MD  sent at 4/26/2024 10:26 AM EDT -----  No evidence of EOE or Long's  No H. pylori  No celiac disease  Colonoscopy recall 10 years

## 2024-05-01 NOTE — PRE-PROCEDURE INSTRUCTIONS
PAT call complete. Education provided to the patient on the following:    - Nothing to eat after 2300 the night before your procedure, water and black coffee okay up to 4:00 AM, 2 hours before arrival time.  - If diabetic and procedure is after noon: No food 8 hours prior to arrival time, and only then only clear liquids 2 hours before arrival time.   - You will need to have someone drive you home after your procedure and remain with you for 24 hours after. The  will need to remain on site during your visit.  - Please remove all jewelry, including body piercing's, and leave any valuables at home. Only bring your drivers license and insurance card on day of procedure.  - Do not wear contact lenses; wear glasses and bring your case.  - Do not use any tobacco products on morning of procedure.  - Wash with antibacterial soap (such as Dial) the night before and morning of procedure.  - Be prepared to provide your last dose of all home medications.  - Coffee and vending available on the 1st and 5th floors; no cafeteria on site.  - You will need to arrive at 0600 on 5/08/2024 at [facility] located at [address]. We are located on the 5th floor.  -Please be aware that arrival times may be subject to change up until the day of surgery.   - Feel free to contact us at: [phone] with any additional questions/concerns.     Patient instructed to hold lisinopril day of surgery. Patient instructed she may take Synthroid and Protonix AM of surgery with a sip of water. Verbalized understanding.

## 2024-05-08 ENCOUNTER — HOSPITAL ENCOUNTER (OUTPATIENT)
Age: 49
Setting detail: HOSPITAL OUTPATIENT SURGERY
Discharge: HOME | End: 2024-05-08
Attending: PLASTIC SURGERY | Admitting: PLASTIC SURGERY
Payer: COMMERCIAL

## 2024-05-08 ENCOUNTER — ANESTHESIA (OUTPATIENT)
Age: 49
End: 2024-05-08
Payer: COMMERCIAL

## 2024-05-08 ENCOUNTER — ANESTHESIA EVENT (OUTPATIENT)
Age: 49
End: 2024-05-08
Payer: COMMERCIAL

## 2024-05-08 VITALS
DIASTOLIC BLOOD PRESSURE: 82 MMHG | HEART RATE: 83 BPM | SYSTOLIC BLOOD PRESSURE: 133 MMHG | OXYGEN SATURATION: 100 % | RESPIRATION RATE: 12 BRPM | BODY MASS INDEX: 29.95 KG/M2 | HEIGHT: 63 IN | WEIGHT: 169 LBS | TEMPERATURE: 98.1 F

## 2024-05-08 DIAGNOSIS — C50.919 BREAST CANCER IN FEMALE: ICD-10-CM

## 2024-05-08 DIAGNOSIS — C50.812 MALIGNANT NEOPLASM OF OVERLAPPING SITES OF LEFT BREAST IN FEMALE, ESTROGEN RECEPTOR POSITIVE: Primary | ICD-10-CM

## 2024-05-08 DIAGNOSIS — Z17.0 MALIGNANT NEOPLASM OF OVERLAPPING SITES OF LEFT BREAST IN FEMALE, ESTROGEN RECEPTOR POSITIVE: Primary | ICD-10-CM

## 2024-05-08 PROCEDURE — 25010000002 FENTANYL CITRATE (PF) 50 MCG/ML SOLUTION: Performed by: ANESTHESIOLOGY

## 2024-05-08 PROCEDURE — 25010000002 MAGNESIUM SULFATE PER 500 MG OF MAGNESIUM: Performed by: ANESTHESIOLOGY

## 2024-05-08 PROCEDURE — 25010000002 CEFAZOLIN PER 500 MG: Performed by: PLASTIC SURGERY

## 2024-05-08 PROCEDURE — 25810000003 LACTATED RINGERS PER 1000 ML: Performed by: PLASTIC SURGERY

## 2024-05-08 PROCEDURE — 25010000002 PHENYLEPHRINE 10 MG/ML SOLUTION: Performed by: ANESTHESIOLOGY

## 2024-05-08 PROCEDURE — 87075 CULTR BACTERIA EXCEPT BLOOD: CPT | Performed by: PLASTIC SURGERY

## 2024-05-08 PROCEDURE — 87070 CULTURE OTHR SPECIMN AEROBIC: CPT | Performed by: PLASTIC SURGERY

## 2024-05-08 PROCEDURE — 87205 SMEAR GRAM STAIN: CPT | Performed by: PLASTIC SURGERY

## 2024-05-08 PROCEDURE — C1789 PROSTHESIS, BREAST, IMP: HCPCS | Performed by: PLASTIC SURGERY

## 2024-05-08 PROCEDURE — 25010000002 DEXAMETHASONE PER 1 MG: Performed by: ANESTHESIOLOGY

## 2024-05-08 PROCEDURE — 63710000001 ONDANSETRON PER 8 MG: Performed by: PLASTIC SURGERY

## 2024-05-08 PROCEDURE — 87015 SPECIMEN INFECT AGNT CONCNTJ: CPT | Performed by: PLASTIC SURGERY

## 2024-05-08 PROCEDURE — 25010000002 PROPOFOL 10 MG/ML EMULSION: Performed by: ANESTHESIOLOGY

## 2024-05-08 PROCEDURE — 25010000002 GENTAMICIN PER 80 MG: Performed by: PLASTIC SURGERY

## 2024-05-08 PROCEDURE — 25010000002 ONDANSETRON PER 1 MG: Performed by: ANESTHESIOLOGY

## 2024-05-08 DEVICE — SMOOTH MODERATE PLUS PROFILE, 545CC  SMOOTH ROUND SILICONE
Type: IMPLANTABLE DEVICE | Site: BREAST | Status: FUNCTIONAL
Brand: MENTOR MEMORYGEL BOOST BREAST IMPLANT

## 2024-05-08 RX ORDER — ACETAMINOPHEN 500 MG
1000 TABLET ORAL ONCE
Status: COMPLETED | OUTPATIENT
Start: 2024-05-08 | End: 2024-05-08

## 2024-05-08 RX ORDER — SODIUM CHLORIDE, SODIUM LACTATE, POTASSIUM CHLORIDE, CALCIUM CHLORIDE 600; 310; 30; 20 MG/100ML; MG/100ML; MG/100ML; MG/100ML
9 INJECTION, SOLUTION INTRAVENOUS CONTINUOUS
Status: DISCONTINUED | OUTPATIENT
Start: 2024-05-08 | End: 2024-05-08 | Stop reason: HOSPADM

## 2024-05-08 RX ORDER — HYDROCODONE BITARTRATE AND ACETAMINOPHEN 5; 325 MG/1; MG/1
1 TABLET ORAL ONCE AS NEEDED
Status: DISCONTINUED | OUTPATIENT
Start: 2024-05-08 | End: 2024-05-08 | Stop reason: HOSPADM

## 2024-05-08 RX ORDER — DOXYCYCLINE 100 MG/1
100 CAPSULE ORAL 2 TIMES DAILY
Qty: 10 CAPSULE | Refills: 0 | Status: SHIPPED | OUTPATIENT
Start: 2024-05-08 | End: 2024-05-13

## 2024-05-08 RX ORDER — MAGNESIUM HYDROXIDE 1200 MG/15ML
LIQUID ORAL AS NEEDED
Status: DISCONTINUED | OUTPATIENT
Start: 2024-05-08 | End: 2024-05-08 | Stop reason: HOSPADM

## 2024-05-08 RX ORDER — GABAPENTIN 100 MG/1
100 CAPSULE ORAL EVERY 8 HOURS
Qty: 60 CAPSULE | Refills: 1 | Status: SHIPPED | OUTPATIENT
Start: 2024-05-08 | End: 2025-05-08

## 2024-05-08 RX ORDER — TRANEXAMIC ACID 100 MG/ML
INJECTION, SOLUTION INTRAVENOUS AS NEEDED
Status: DISCONTINUED | OUTPATIENT
Start: 2024-05-08 | End: 2024-05-08 | Stop reason: HOSPADM

## 2024-05-08 RX ORDER — OXYCODONE AND ACETAMINOPHEN 7.5; 325 MG/1; MG/1
1 TABLET ORAL EVERY 4 HOURS PRN
Status: DISCONTINUED | OUTPATIENT
Start: 2024-05-08 | End: 2024-05-08 | Stop reason: HOSPADM

## 2024-05-08 RX ORDER — HYDROCODONE BITARTRATE AND ACETAMINOPHEN 5; 325 MG/1; MG/1
1 TABLET ORAL EVERY 4 HOURS PRN
Qty: 15 TABLET | Refills: 0 | Status: SHIPPED | OUTPATIENT
Start: 2024-05-08

## 2024-05-08 RX ORDER — ONDANSETRON HYDROCHLORIDE 8 MG/1
8 TABLET, FILM COATED ORAL ONCE
Status: COMPLETED | OUTPATIENT
Start: 2024-05-08 | End: 2024-05-08

## 2024-05-08 RX ORDER — SODIUM CHLORIDE 0.9 % (FLUSH) 0.9 %
3-10 SYRINGE (ML) INJECTION AS NEEDED
Status: DISCONTINUED | OUTPATIENT
Start: 2024-05-08 | End: 2024-05-08 | Stop reason: HOSPADM

## 2024-05-08 RX ORDER — LIDOCAINE HYDROCHLORIDE 20 MG/ML
INJECTION, SOLUTION INFILTRATION; PERINEURAL AS NEEDED
Status: DISCONTINUED | OUTPATIENT
Start: 2024-05-08 | End: 2024-05-08 | Stop reason: SURG

## 2024-05-08 RX ORDER — DOCUSATE SODIUM 250 MG
250 CAPSULE ORAL 2 TIMES DAILY PRN
Qty: 15 CAPSULE | Refills: 1 | Status: SHIPPED | OUTPATIENT
Start: 2024-05-08

## 2024-05-08 RX ORDER — MAGNESIUM SULFATE HEPTAHYDRATE 500 MG/ML
INJECTION, SOLUTION INTRAMUSCULAR; INTRAVENOUS AS NEEDED
Status: DISCONTINUED | OUTPATIENT
Start: 2024-05-08 | End: 2024-05-08 | Stop reason: SURG

## 2024-05-08 RX ORDER — ACETAMINOPHEN 325 MG/1
325 TABLET ORAL EVERY 4 HOURS PRN
Qty: 30 TABLET | Refills: 1 | Status: SHIPPED | OUTPATIENT
Start: 2024-05-08

## 2024-05-08 RX ORDER — SCOLOPAMINE TRANSDERMAL SYSTEM 1 MG/1
1 PATCH, EXTENDED RELEASE TRANSDERMAL ONCE
Status: DISCONTINUED | OUTPATIENT
Start: 2024-05-08 | End: 2024-05-08 | Stop reason: HOSPADM

## 2024-05-08 RX ORDER — DEXAMETHASONE SODIUM PHOSPHATE 4 MG/ML
INJECTION, SOLUTION INTRA-ARTICULAR; INTRALESIONAL; INTRAMUSCULAR; INTRAVENOUS; SOFT TISSUE AS NEEDED
Status: DISCONTINUED | OUTPATIENT
Start: 2024-05-08 | End: 2024-05-08 | Stop reason: SURG

## 2024-05-08 RX ORDER — ONDANSETRON 2 MG/ML
INJECTION INTRAMUSCULAR; INTRAVENOUS AS NEEDED
Status: DISCONTINUED | OUTPATIENT
Start: 2024-05-08 | End: 2024-05-08 | Stop reason: SURG

## 2024-05-08 RX ORDER — FLUMAZENIL 0.1 MG/ML
0.2 INJECTION INTRAVENOUS AS NEEDED
Status: DISCONTINUED | OUTPATIENT
Start: 2024-05-08 | End: 2024-05-08 | Stop reason: HOSPADM

## 2024-05-08 RX ORDER — LABETALOL HYDROCHLORIDE 5 MG/ML
5 INJECTION, SOLUTION INTRAVENOUS
Status: DISCONTINUED | OUTPATIENT
Start: 2024-05-08 | End: 2024-05-08 | Stop reason: HOSPADM

## 2024-05-08 RX ORDER — GABAPENTIN 300 MG/1
300 CAPSULE ORAL ONCE
Status: COMPLETED | OUTPATIENT
Start: 2024-05-08 | End: 2024-05-08

## 2024-05-08 RX ORDER — SODIUM CHLORIDE, SODIUM LACTATE, POTASSIUM CHLORIDE, CALCIUM CHLORIDE 600; 310; 30; 20 MG/100ML; MG/100ML; MG/100ML; MG/100ML
125 INJECTION, SOLUTION INTRAVENOUS CONTINUOUS
Status: DISCONTINUED | OUTPATIENT
Start: 2024-05-08 | End: 2024-05-08 | Stop reason: HOSPADM

## 2024-05-08 RX ORDER — FENTANYL CITRATE 50 UG/ML
INJECTION, SOLUTION INTRAMUSCULAR; INTRAVENOUS AS NEEDED
Status: DISCONTINUED | OUTPATIENT
Start: 2024-05-08 | End: 2024-05-08 | Stop reason: SURG

## 2024-05-08 RX ORDER — FAMOTIDINE 10 MG/ML
20 INJECTION, SOLUTION INTRAVENOUS ONCE
Status: COMPLETED | OUTPATIENT
Start: 2024-05-08 | End: 2024-05-08

## 2024-05-08 RX ORDER — PROMETHAZINE HYDROCHLORIDE 12.5 MG/1
25 TABLET ORAL ONCE AS NEEDED
Status: DISCONTINUED | OUTPATIENT
Start: 2024-05-08 | End: 2024-05-08 | Stop reason: HOSPADM

## 2024-05-08 RX ORDER — SODIUM CHLORIDE 0.9 % (FLUSH) 0.9 %
3 SYRINGE (ML) INJECTION EVERY 12 HOURS SCHEDULED
Status: DISCONTINUED | OUTPATIENT
Start: 2024-05-08 | End: 2024-05-08 | Stop reason: HOSPADM

## 2024-05-08 RX ORDER — DROPERIDOL 2.5 MG/ML
0.62 INJECTION, SOLUTION INTRAMUSCULAR; INTRAVENOUS
Status: DISCONTINUED | OUTPATIENT
Start: 2024-05-08 | End: 2024-05-08 | Stop reason: HOSPADM

## 2024-05-08 RX ORDER — DIPHENHYDRAMINE HYDROCHLORIDE 50 MG/ML
12.5 INJECTION INTRAMUSCULAR; INTRAVENOUS
Status: DISCONTINUED | OUTPATIENT
Start: 2024-05-08 | End: 2024-05-08 | Stop reason: HOSPADM

## 2024-05-08 RX ORDER — EPHEDRINE SULFATE 50 MG/ML
INJECTION, SOLUTION INTRAVENOUS AS NEEDED
Status: DISCONTINUED | OUTPATIENT
Start: 2024-05-08 | End: 2024-05-08 | Stop reason: SURG

## 2024-05-08 RX ORDER — MIDAZOLAM HYDROCHLORIDE 1 MG/ML
1 INJECTION INTRAMUSCULAR; INTRAVENOUS
Status: DISCONTINUED | OUTPATIENT
Start: 2024-05-08 | End: 2024-05-08 | Stop reason: HOSPADM

## 2024-05-08 RX ORDER — PROPOFOL 10 MG/ML
VIAL (ML) INTRAVENOUS AS NEEDED
Status: DISCONTINUED | OUTPATIENT
Start: 2024-05-08 | End: 2024-05-08 | Stop reason: SURG

## 2024-05-08 RX ORDER — ONDANSETRON 8 MG/1
8 TABLET, ORALLY DISINTEGRATING ORAL EVERY 8 HOURS PRN
Qty: 10 TABLET | Refills: 1 | Status: SHIPPED | OUTPATIENT
Start: 2024-05-08

## 2024-05-08 RX ORDER — OXYCODONE HYDROCHLORIDE 5 MG/1
10 TABLET ORAL ONCE
Status: COMPLETED | OUTPATIENT
Start: 2024-05-08 | End: 2024-05-08

## 2024-05-08 RX ORDER — HYDRALAZINE HYDROCHLORIDE 20 MG/ML
5 INJECTION INTRAMUSCULAR; INTRAVENOUS
Status: DISCONTINUED | OUTPATIENT
Start: 2024-05-08 | End: 2024-05-08 | Stop reason: HOSPADM

## 2024-05-08 RX ORDER — HYDROMORPHONE HYDROCHLORIDE 1 MG/ML
0.5 INJECTION, SOLUTION INTRAMUSCULAR; INTRAVENOUS; SUBCUTANEOUS
Status: DISCONTINUED | OUTPATIENT
Start: 2024-05-08 | End: 2024-05-08 | Stop reason: HOSPADM

## 2024-05-08 RX ORDER — CELECOXIB 200 MG/1
400 CAPSULE ORAL ONCE
Status: COMPLETED | OUTPATIENT
Start: 2024-05-08 | End: 2024-05-08

## 2024-05-08 RX ORDER — FENTANYL CITRATE 50 UG/ML
50 INJECTION, SOLUTION INTRAMUSCULAR; INTRAVENOUS
Status: DISCONTINUED | OUTPATIENT
Start: 2024-05-08 | End: 2024-05-08 | Stop reason: HOSPADM

## 2024-05-08 RX ORDER — NALOXONE HCL 0.4 MG/ML
0.2 VIAL (ML) INJECTION AS NEEDED
Status: DISCONTINUED | OUTPATIENT
Start: 2024-05-08 | End: 2024-05-08 | Stop reason: HOSPADM

## 2024-05-08 RX ORDER — ONDANSETRON 2 MG/ML
4 INJECTION INTRAMUSCULAR; INTRAVENOUS ONCE AS NEEDED
Status: DISCONTINUED | OUTPATIENT
Start: 2024-05-08 | End: 2024-05-08 | Stop reason: HOSPADM

## 2024-05-08 RX ORDER — AMOXICILLIN 250 MG
2 CAPSULE ORAL DAILY PRN
Qty: 30 TABLET | Refills: 1 | Status: SHIPPED | OUTPATIENT
Start: 2024-05-08 | End: 2025-05-08

## 2024-05-08 RX ORDER — DOXYCYCLINE 100 MG/1
200 CAPSULE ORAL ONCE
Status: COMPLETED | OUTPATIENT
Start: 2024-05-08 | End: 2024-05-08

## 2024-05-08 RX ORDER — PHENYLEPHRINE HYDROCHLORIDE 10 MG/ML
INJECTION INTRAVENOUS AS NEEDED
Status: DISCONTINUED | OUTPATIENT
Start: 2024-05-08 | End: 2024-05-08 | Stop reason: SURG

## 2024-05-08 RX ORDER — PROMETHAZINE HYDROCHLORIDE 25 MG/1
25 SUPPOSITORY RECTAL ONCE AS NEEDED
Status: DISCONTINUED | OUTPATIENT
Start: 2024-05-08 | End: 2024-05-08 | Stop reason: HOSPADM

## 2024-05-08 RX ADMIN — PHENYLEPHRINE HYDROCHLORIDE 100 MCG: 10 INJECTION INTRAVENOUS at 08:10

## 2024-05-08 RX ADMIN — ONDANSETRON HYDROCHLORIDE 8 MG: 8 TABLET, FILM COATED ORAL at 06:31

## 2024-05-08 RX ADMIN — OXYCODONE HYDROCHLORIDE 10 MG: 5 TABLET ORAL at 06:42

## 2024-05-08 RX ADMIN — CELECOXIB 400 MG: 200 CAPSULE ORAL at 06:32

## 2024-05-08 RX ADMIN — GABAPENTIN 300 MG: 300 CAPSULE ORAL at 06:42

## 2024-05-08 RX ADMIN — PHENYLEPHRINE HYDROCHLORIDE 100 MCG: 10 INJECTION INTRAVENOUS at 08:29

## 2024-05-08 RX ADMIN — DOXYCYCLINE 200 MG: 100 CAPSULE ORAL at 06:32

## 2024-05-08 RX ADMIN — ACETAMINOPHEN 1000 MG: 500 TABLET ORAL at 06:31

## 2024-05-08 RX ADMIN — EPHEDRINE SULFATE 5 MG: 50 INJECTION INTRAVENOUS at 07:32

## 2024-05-08 RX ADMIN — DEXAMETHASONE SODIUM PHOSPHATE 6 MG: 4 INJECTION, SOLUTION INTRA-ARTICULAR; INTRALESIONAL; INTRAMUSCULAR; INTRAVENOUS; SOFT TISSUE at 07:18

## 2024-05-08 RX ADMIN — EPHEDRINE SULFATE 5 MG: 50 INJECTION INTRAVENOUS at 07:23

## 2024-05-08 RX ADMIN — MAGNESIUM SULFATE HEPTAHYDRATE 1 G: 500 INJECTION, SOLUTION INTRAMUSCULAR; INTRAVENOUS at 07:33

## 2024-05-08 RX ADMIN — FAMOTIDINE 20 MG: 10 INJECTION INTRAVENOUS at 06:47

## 2024-05-08 RX ADMIN — SCOPALAMINE 1 PATCH: 1 PATCH, EXTENDED RELEASE TRANSDERMAL at 06:48

## 2024-05-08 RX ADMIN — SODIUM CHLORIDE, POTASSIUM CHLORIDE, SODIUM LACTATE AND CALCIUM CHLORIDE 125 ML/HR: 600; 310; 30; 20 INJECTION, SOLUTION INTRAVENOUS at 06:31

## 2024-05-08 RX ADMIN — LIDOCAINE HYDROCHLORIDE 60 MG: 20 INJECTION, SOLUTION INFILTRATION; PERINEURAL at 07:08

## 2024-05-08 RX ADMIN — EPHEDRINE SULFATE 5 MG: 50 INJECTION INTRAVENOUS at 07:26

## 2024-05-08 RX ADMIN — PHENYLEPHRINE HYDROCHLORIDE 100 MCG: 10 INJECTION INTRAVENOUS at 07:41

## 2024-05-08 RX ADMIN — PROPOFOL 180 MG: 10 INJECTION, EMULSION INTRAVENOUS at 07:08

## 2024-05-08 RX ADMIN — PHENYLEPHRINE HYDROCHLORIDE 200 MCG: 10 INJECTION INTRAVENOUS at 07:52

## 2024-05-08 RX ADMIN — EPHEDRINE SULFATE 5 MG: 50 INJECTION INTRAVENOUS at 07:42

## 2024-05-08 RX ADMIN — ONDANSETRON 4 MG: 2 INJECTION INTRAMUSCULAR; INTRAVENOUS at 08:35

## 2024-05-08 RX ADMIN — PHENYLEPHRINE HYDROCHLORIDE 100 MCG: 10 INJECTION INTRAVENOUS at 07:38

## 2024-05-08 RX ADMIN — FENTANYL CITRATE 50 MCG: 50 INJECTION, SOLUTION INTRAMUSCULAR; INTRAVENOUS at 07:07

## 2024-05-08 NOTE — H&P
Allergies  Reviewed Allergies  PENICILLINS   Medications  Reviewed Medications  acetaminophen 325 mg tablet  04/03/23   filled surescripts  COMPOUND DRUG  08/10/23   filled surescripts  dexAMETHasone 4 mg tablet  TAKE 2 TABLETS BY MOUTH TWICE A DAY FOR 3 DAYS BEDINNING THE DAY BEFORE CHEMOTHERAPY AND CONTINUE FOR 6 DOSES  07/08/23   filled surescripts  docusate sodium 250 mg capsule  04/03/23   filled surescripts  doxycycline hyclate 100 mg tablet  TAKE 1 TABLET BY MOUTH TWICE DAILY FOR 7 DAYS  03/22/23   filled surescripts  doxycycline monohydrate 100 mg capsule  04/03/23   filled surescripts  escitalopram 10 mg tablet  TAKE 1 TABLET BY MOUTH ONCE DAILY  05/10/23   filled surescripts  fluconazole 100 mg tablet  TAKE 2 TABLETS (200 MG) BY MOUTH ONCE DAILY ON DAY 1, THEN TAKE 1 TABLET (100 MG) ONCE DAILY FOR THE REMAINING 6 DAYS.  05/04/23   filled surescripts  gabapentin 100 mg capsule  04/03/23   filled surescripts  HYDROcodone 5 mg-acetaminophen 325 mg tablet  01/03/24   filled surescripts  ibuprofen 600 mg tablet  01/03/24   filled surescripts  letrozole 2.5 mg tablet  01/25/24   filled surescripts  levothyroxine 125 mcg tablet  TAKE 1 TABLET BY MOUTH ONCE DAILY  11/04/23   filled surescripts  lidocaine-prilocaine 2.5 %-2.5 % topical cream  APPLY APPLICATION TOPICALLY TO THE APPROPRIATE AREA AS DIRECTED AS NEEDED FOR MILD PAIN. APPLY A PEA SIZED AMOUNT TO PORT SITE 30 MINUTES PRIOR TO PORT BEING ACCESSED.  08/01/23   filled surescripts  lisinopriL 2.5 mg tablet  TAKE 1 TABLET BY MOUTH ONCE DAILY  01/30/24   filled surescripts  LORazepam 0.5 mg tablet  TAKE 1 TABLET BY MOUTH AT NIGHT AS NEEDED FOR ANXIETY  05/09/23   filled surescripts  mupirocin 2 % topical ointment  APPLY A SMALL AMOUNT TO THE AFFECTED AREA THREE TIMES DAILY  03/28/23   filled surescripts  OLANZapine 5 mg tablet  TAKE 1 TABLET BY MOUTH ONCE DAILY NIGHTLY TAKE ON DAYS 2,3, AND 4 AFTER CHEMOTHERAPY  07/08/23   filled surescripts  omeprazole 20 mg  capsule,delayed release  TAKE 1 CAPSULE BY MOUTH ONCE DAILY  07/05/23   filled surescripts  ondansetron HCL 4 mg tablet  01/03/24   filled surescripts  ondansetron HCL 8 mg tablet  TAKE 1 TABLET BY MOUTH THREE TIMES DAILY AS NEEDED FOR NAUSEA OR VOMITING  07/27/23   filled surescripts  pantoprazole 40 mg tablet,delayed release  TAKE 1 TABLET BY MOUTH ONCE DAILY  08/31/23   filled surescripts  Stool Softener-Laxative 8.6 mg-50 mg tablet  04/03/23   filled surescripts  tetracaine (bulk) 100 % wax  12/15/23   filled surescripts  Problems  Problems not reviewed (last reviewed 06/20/2023)  Family History  Family History not reviewed (last reviewed 04/25/2023)  Mother - Arthritis    - Obesity  Father - Myocardial infarction    - Arthritis    - Hypertensive disorder    - Malignant tumor of prostate    - Heart disease    - Diabetes mellitus  Brother - Substance abuse  Sister - Chronic obstructive lung disease    - Myocardial infarction    - Hypertensive disorder    - Obesity    - Emphysema    - Heart disease    - Diabetes mellitus  Social History  Social History not reviewed (last reviewed 04/25/2023)  Public Health and Travel  Have you been to an area known to be high risk for COVID-19?: No  In the 14 days before symptom onset, have you had close contact with a person who is under investigation for COVID-19 while that person was ill?: No  Substance Use  Do you or have you ever smoked tobacco?: Never smoker  How much tobacco do you smoke?: None  Do you or have you ever used e-cigarettes or vape?: Never used electronic cigarettes  Do you or have you ever used smokeless tobacco?: Never used smokeless tobacco  How much tobacco do you chew?: none  What is your level of alcohol consumption?: Occasional  Have you used IV drugs?: No  Activities of Daily Living  Are you blind or do you have difficulty seeing?: No  Are you deaf or do you have serious difficulty hearing? 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  HPI  Has completed radiation in October and is recovering well. She has not noticed any additional changes to the expanders on the left side seems to be in the appropriate position. Has been continuing massage to the right side to keep that deflated expander pocket soft  ROS  Patient reports change in skin color but reports no abnormal mole, no non-healing areas, and no breast lump. She reports no fever, no night sweats, no significant weight gain, and no significant weight loss. She reports no dry eyes and no vision change. She reports no difficulty hearing. She reports no frequent nosebleeds. She reports no sore throat and no bleeding gums. She reports no chest pain, no arm pain on exertion, and no shortness of breath when walking. She reports no cough, no wheezing, and no shortness of breath. She reports no abdominal pain, no nausea, and no vomiting. She reports no muscle aches and no muscle weakness. She reports no gait dysfunction and no paralysis. She reports no dementia and no delirium.  Physical Exam  Chaperone: Chaperone: present.    Constitutional: General Appearance: healthy-appearing, well-nourished, and well-developed. Level of Distress: NAD. Ambulation: ambulating normally.    Psychiatric: Mental Status: normal mood and affect and active and alert.    Head: Head: normocephalic and atraumatic.    Eyes: Lids and Conjunctivae: non-injected. Pupils: PERRLA. EOM: EOMI.    Cardiovascular: Heart Auscultation: RRR.    Breast: Breast Exam: Right expander deflated but  still retained definition to the fold, left expander in good position with good fold definition and appropriate fill volume, no fluid collection or erythema, mild skin color changes, capsule gr 3.    Musculoskeletal:: Motor Strength and Tone: normal tone and motor strength. Joints, Bones, and Muscles: normal movement of all extremities. Extremities: no cyanosis or edema.    Neurologic: Gait and Station: normal gait and station.    Skin: Inspection and palpation: no rash or lesions.  Assessment / Plan  Will proceed with removal of expanders and placement of implants and right port removal. Discussed risks of bleeding, infection, asymmetry.  Will try to get as large implants as possible given the soft tissue limitations.  Discussed silicone cohesive gel implants in her prepectoral position.

## 2024-05-11 LAB
BACTERIA SPEC AEROBE CULT: NORMAL
GRAM STN SPEC: NORMAL

## 2024-05-12 PROBLEM — I10 PRIMARY HYPERTENSION: Status: ACTIVE | Noted: 2024-05-12

## 2024-05-13 DIAGNOSIS — E89.0 POSTABLATIVE HYPOTHYROIDISM: Primary | ICD-10-CM

## 2024-05-13 LAB — BACTERIA SPEC ANAEROBE CULT: NORMAL

## 2024-05-20 DIAGNOSIS — E89.0 POSTABLATIVE HYPOTHYROIDISM: ICD-10-CM

## 2024-05-20 RX ORDER — LEVOTHYROXINE SODIUM 0.12 MG/1
125 TABLET ORAL DAILY
Qty: 90 TABLET | Refills: 0 | Status: SHIPPED | OUTPATIENT
Start: 2024-05-20

## 2024-05-20 NOTE — TELEPHONE ENCOUNTER
I went to  a refill of my levothyroxine and found out my insurance is requiring all my maintenance meds to go through [facility]. Can you or someone send a 90 day script to them?     LOV             1/8/2024   NOV             (around 7/8/2024) for Recheck.    Last RF        2/26/24  Protocol       not met    BHAKTI Otto/THALIAR

## 2024-05-22 NOTE — OP NOTE
Pre-Operative Diagnosis: Acquired absence bilateral breast     Post-Operative Diagnosis: Same     Procedure Performed:   1.  Removal of bilateral breast tissue expanders and placement of permanent implants ([facility][id number])     Surgeon: GONZALEZ Stone MD     Assistant: None     Anesthesia: General     Estimated Blood Loss: <5cc     Specimens: None     Complications: None     Indications: She is completed her expansion process.  Surgery procedure second stage surgery.  We discussed placement of smooth round silicone gel implants in her current pocket.     We discussed risks, benefits and alternatives including but not limited to: bleeding, infection, asymmetry, poor or slow wound healing, need for further surgery, possible recurrence.  The patient elected to proceed.     Description of Procedure: The patient was met in the preoperative holding area.  All questions were answered and informed consent was assured.       Breast landmarks were drawn and she was transferred to the operating room.  She was placed supine on table with arms padded and secured.     After induction of appropriate anesthesia, a timeout was performed correctly identifying the patient, operative site, and procedure to be performed.  All present were in agreement.     Local anesthetic was infiltrated in the chest was prepped draped in sterile fashion.  Bilateral 5 cm IMF incisions were made and the expanders were evacuated and removed.  There is excellent integration of the AlloDerm and no periprosthetic fluid.  On the right hte contracted space with incised throughout the pocket to re-expand the skin envelope.  Sizers were placed and the overall volume appeared symmetrical. Additional anterior release of the right side was limited by thin mastectomy flap.  Sizers were removed and the pockets copiously irrigated and immaculate hemostasis achieved.  Gloves were changed and skin reprepped.  50% Betadine solution was left dwell in both pockets.   It was rinsed clear after several minutes.  The implants were brought onto the field and irrigated with antibiotic irrigation and placed into the pocket with a Gonzalez funnel and a no touch technique.  Closure was performed in layered fashion with 2-0 Vicryl in a running fashion of the capsule, 2-0 Vicryl in the subcutaneous layer, 3-0 Monocryl in the deep dermal layer and 4-0 Monocryl in the intracuticular.  Incisions were dressed with Steri-Strip and Tegaderm.  She was placed in a well-padded Ace wrap.     The patient was then aroused from anesthesia with ease and transferred to the postoperative care area in good condition. All sponge, needle, and instrument counts were correct.

## 2024-05-23 ENCOUNTER — LAB (OUTPATIENT)
Dept: LAB | Facility: HOSPITAL | Age: 49
End: 2024-05-23
Payer: COMMERCIAL

## 2024-05-23 ENCOUNTER — OFFICE VISIT (OUTPATIENT)
Dept: ONCOLOGY | Facility: CLINIC | Age: 49
End: 2024-05-23
Payer: COMMERCIAL

## 2024-05-23 VITALS
WEIGHT: 169.4 LBS | TEMPERATURE: 98.2 F | HEART RATE: 67 BPM | SYSTOLIC BLOOD PRESSURE: 121 MMHG | OXYGEN SATURATION: 98 % | HEIGHT: 63 IN | DIASTOLIC BLOOD PRESSURE: 84 MMHG | RESPIRATION RATE: 18 BRPM | BODY MASS INDEX: 30.02 KG/M2

## 2024-05-23 DIAGNOSIS — Z17.0 MALIGNANT NEOPLASM OF OVERLAPPING SITES OF LEFT BREAST IN FEMALE, ESTROGEN RECEPTOR POSITIVE: Primary | ICD-10-CM

## 2024-05-23 DIAGNOSIS — C50.812 MALIGNANT NEOPLASM OF OVERLAPPING SITES OF LEFT BREAST IN FEMALE, ESTROGEN RECEPTOR POSITIVE: ICD-10-CM

## 2024-05-23 DIAGNOSIS — Z17.0 MALIGNANT NEOPLASM OF OVERLAPPING SITES OF LEFT BREAST IN FEMALE, ESTROGEN RECEPTOR POSITIVE: ICD-10-CM

## 2024-05-23 DIAGNOSIS — C50.812 MALIGNANT NEOPLASM OF OVERLAPPING SITES OF LEFT BREAST IN FEMALE, ESTROGEN RECEPTOR POSITIVE: Primary | ICD-10-CM

## 2024-05-23 LAB
ALBUMIN SERPL-MCNC: 4.8 G/DL (ref 3.5–5.2)
ALBUMIN/GLOB SERPL: 1.7 G/DL
ALP SERPL-CCNC: 125 U/L (ref 39–117)
ALT SERPL W P-5'-P-CCNC: 18 U/L (ref 1–33)
ANION GAP SERPL CALCULATED.3IONS-SCNC: 15.7 MMOL/L (ref 5–15)
AST SERPL-CCNC: 25 U/L (ref 1–32)
BASOPHILS # BLD AUTO: 0.08 10*3/MM3 (ref 0–0.2)
BASOPHILS NFR BLD AUTO: 0.9 % (ref 0–1.5)
BILIRUB SERPL-MCNC: 0.4 MG/DL (ref 0–1.2)
BUN SERPL-MCNC: 14 MG/DL (ref 6–20)
BUN/CREAT SERPL: 14 (ref 7–25)
CALCIUM SPEC-SCNC: 10.1 MG/DL (ref 8.6–10.5)
CHLORIDE SERPL-SCNC: 98 MMOL/L (ref 98–107)
CO2 SERPL-SCNC: 24.3 MMOL/L (ref 22–29)
CREAT SERPL-MCNC: 1 MG/DL (ref 0.57–1)
DEPRECATED RDW RBC AUTO: 42.7 FL (ref 37–54)
EGFRCR SERPLBLD CKD-EPI 2021: 70.5 ML/MIN/1.73
EOSINOPHIL # BLD AUTO: 1.06 10*3/MM3 (ref 0–0.4)
EOSINOPHIL NFR BLD AUTO: 11.7 % (ref 0.3–6.2)
ERYTHROCYTE [DISTWIDTH] IN BLOOD BY AUTOMATED COUNT: 12.7 % (ref 12.3–15.4)
GLOBULIN UR ELPH-MCNC: 2.9 GM/DL
GLUCOSE SERPL-MCNC: 102 MG/DL (ref 65–99)
HCT VFR BLD AUTO: 43.9 % (ref 34–46.6)
HGB BLD-MCNC: 14.4 G/DL (ref 12–15.9)
IMM GRANULOCYTES # BLD AUTO: 0.03 10*3/MM3 (ref 0–0.05)
IMM GRANULOCYTES NFR BLD AUTO: 0.3 % (ref 0–0.5)
LYMPHOCYTES # BLD AUTO: 2.77 10*3/MM3 (ref 0.7–3.1)
LYMPHOCYTES NFR BLD AUTO: 30.6 % (ref 19.6–45.3)
MCH RBC QN AUTO: 30 PG (ref 26.6–33)
MCHC RBC AUTO-ENTMCNC: 32.8 G/DL (ref 31.5–35.7)
MCV RBC AUTO: 91.5 FL (ref 79–97)
MONOCYTES # BLD AUTO: 0.36 10*3/MM3 (ref 0.1–0.9)
MONOCYTES NFR BLD AUTO: 4 % (ref 5–12)
NEUTROPHILS NFR BLD AUTO: 4.76 10*3/MM3 (ref 1.7–7)
NEUTROPHILS NFR BLD AUTO: 52.5 % (ref 42.7–76)
NRBC BLD AUTO-RTO: 0 /100 WBC (ref 0–0.2)
PLATELET # BLD AUTO: 246 10*3/MM3 (ref 140–450)
PMV BLD AUTO: 10.2 FL (ref 6–12)
POTASSIUM SERPL-SCNC: 3.9 MMOL/L (ref 3.5–5.2)
PROT SERPL-MCNC: 7.7 G/DL (ref 6–8.5)
RBC # BLD AUTO: 4.8 10*6/MM3 (ref 3.77–5.28)
SODIUM SERPL-SCNC: 138 MMOL/L (ref 136–145)
WBC NRBC COR # BLD AUTO: 9.06 10*3/MM3 (ref 3.4–10.8)

## 2024-05-23 PROCEDURE — 99214 OFFICE O/P EST MOD 30 MIN: CPT | Performed by: INTERNAL MEDICINE

## 2024-05-23 PROCEDURE — 80053 COMPREHEN METABOLIC PANEL: CPT

## 2024-05-23 PROCEDURE — 85025 COMPLETE CBC W/AUTO DIFF WBC: CPT

## 2024-05-23 PROCEDURE — 36415 COLL VENOUS BLD VENIPUNCTURE: CPT

## 2024-05-23 NOTE — PROGRESS NOTES
Subjective     REASON FOR FOLLOW-UP:  1.  Invasive lobular carcinoma multifocal,  Left breast anterolateral stereotactic biopsy invasive lobular carcinoma, Marie score of 6, grade 2, 4 mm, ER 95%, AK 95%, HER2/cheryl 0 negative with Ki-67 of 5%.  Positive for atypical lobular hyperplasia  Left breast 1 o'clock position 10 cm from the nipple ultrasound-guided biopsy consistent with invasive lobular carcinoma grade 2 [place] score of 6, 4 mm with lobular carcinoma in situ, ER 90%, %, HER2/cheryl 0  Left breast upper stereotactic guided core biopsy invasive lobular carcinoma grade 2 [place] score of six 3 mm with lobular carcinoma in situ, ER 90%, %, HER2/cheryl equivocal 2+, FISH shows her to 5.7 with CEP 1.6 and ratio HER2 nu/KEIRA-17 ratio of 3.5.  It is amplified.    2.  March 8, 2023:p T3 N0 M0 invasive lobular carcinoma s/p bilateral mastectomy with left deep axillary sentinel lymph node biopsy, with bilateral placement of prepectoral tissue expanders for reconstruction and bilateral placement of AlloDerm.  Pathology shows multifocal tumor in  upper outer quadrant and upper inner quadrant, invasive lobular carcinoma grade 2 with a [place] score  score of 7  , total of 4 foci were seen, largest tumor being 60 mm, 20 mm, 2 mm, 1 mm.  No DCIS identified.  Lobular carcinoma in situ present.  No lymphatic or vascular channel invasion, no dermal lymphovascular space invasion all margins were negative for invasive carcinoma 4 lymph nodes were all negative for malignancy its pathologic T3N0  ER %, AK %, HER2/cheryl 2+ with Ki-67 of 5%  Previous biopsies had shown 2 of the tumors were ER/AK positive HER2/cheryl negative and one of them was ER/AK positive HER2 positive  On discussing with pathologist Dr. Serrano, she thinks that the larger tumor was ER AK positive and HER2 positive but unsure if it was the 60 mm tumor or 20 mm tumor.  Either way it was not the smaller tumors which was HER2  positive.  All tumors look-alike per pathologist and they are invasive lobular carcinoma.                  HISTORY OF PRESENT ILLNESS:         Patient is a 48 y.o. female with history of multifocal left breast cancer with MRI of the breast showing almost 15 lesions.  Biopsies on 3 different lesions were done and they were all consistent with invasive lobular carcinoma.  2 of the lesions where ER/KY positive HER2/cheryl negative and one of the larger lesion was ER/KY positive HER2 2+ equivocal.  FISH testing was done and the HER2 copy number was 5.7 with HER2/CEP ratio of 3.5.  The FISH was amplified.    Patient underwent bilateral mastectomy    With left sentinel lymph node surgery..  The pathology shows tumor to be present in the left upper outer quadrant invasive lobular carcinoma with a Marie score of 7, grade 2.  There were 4 lesions the greatest size was 6 0 mm, 20 mm, 2 mm and 1 mm in size.  Lobular carcinoma in situ is present.  All margins are negative for invasive carcinoma.  4 lymph nodes negative.  Patient has T3N0 invasive lobular carcinoma.      Discussed with pathology in length Dr. Serrano, one of the larger lesions which was 60 mm was heterogeneous it was biopsied at 2 separate spots.  The left anterior lateral and left 1:00 core fragment was ER/KY positive HER2/cheryl negative the left upper core biopsy was ER/KY positive HER2/cheryl 2+ equivocal but HER2/cheryl FISH was positive.    On discussion with pathology the 60 mm tumor showed ER and KY positive and HER2 positive by FISH with a Ki-67 of 40% at the Top-Hat clip.  The HER2/cheryl copy number was 5.7 with a HER2/cheryl by CEP ratio 3.5 which is positive.  The Ki-67 on the lower part was 13%.  The 20 mm tumor had a Ki-67 of 9%.  So the larger lesion was triple positive and heterogeneous as 2 biopsies were done on the larger lesions and that was both triple positive and at the second biopsy was ER/KY positive HER2/cheryl negative so it was a heterogeneous tumor.   The 20 mm lesion was ER/FL positive HER2/cheryl negative.    Given that she had a large tumor with high Ki-67 and premenopausal status we discussed about giving Taxotere carboplatin Herceptin Perjeta.    Interval History:   She returns the office today, 3/14/2024 in anticipation of Herceptin Perjeta which she continues to receive every 3 weeks.  She is having excellent tolerance to therapy.  She has very occasional diarrhea though this is managed with over-the-counter medications as needed.  She is eager to undergo reconstruction which is scheduled for May 2024.  She has no new pain.  She is eating and drinking adequately.  She has no lower extremity swelling or shortness of breath.  Her most recent echocardiogram was February 2024 with a stable ejection fraction.  She reports she is tolerating letrozole very well.  She does have some generalized arthralgias from therapy.  She has no hot flashes or vaginal dryness.    April 4, 2024: Patient is here for Herceptin Perjeta she is tolerating it very well her liver function tests have slightly worsened further.  She was sick 2 weeks ago and she took something which contained Tylenol and it.  She is quit taking it we have also discussed with her to stop the biotin collagen and black current seed oil.  We will treat her with Herceptin Perjeta today she is otherwise asymptomatic.  She was tested for COVID in the past which was negative.  She does not have fever or chills and she does not take too much alcohol except rarely    April 24, 2024: Patient is here for her last dose of Herceptin Perjeta.  Her liver function tests have been elevated.  Today her liver function tests are normal with ALT 25 AST 24 and alkaline phosphatase of 128 with a total bilirubin of 0.7.  Patient is she is seeing cardio oncology Dr. Fermin with echocardiogram next week as her last echo was back 3 months ago.  Patient has no symptoms of shortness of breath lower or lower extremity edema.    May 23, 2024:  Patient has completed all Herceptin Perjeta.  Currently she was doing well without any complaints.  No more diarrhea.  She follows up with Dr. Fermin.  Currently on Femara and tolerating very well.      Oncology history:  Patient is a 48 y.o. female who has been recently diagnosed with left breast cancer.  Patient has been compliant with her mammograms.    There is no family history of breast or ovarian cancer.  Her father had prostate cancer and melanoma.  A paternal aunt had thyroid cancer.      Details are as follows.    November 29, 2022: Screening bilateral mammogram showed 8 mm asymmetry in the central left breast posterior one third.  This appears to be in the superior left breast on the MLO view.  Right breast was negative.  A left diagnostic mammogram and ultrasound was recommended.    January January 3, 2023: Left breast diagnostic mammogram showed the spiculated areas within the central posterior and lateral anterior superior left breast where redemonstrated.  Targeted ultrasound was done.  At 1 o'clock position 10 cm from the nipple there is a shadowing mass with peripheral spiculations which is concordant with the mammographic abnormality and suspicious for malignancy.  The more centrally located spiculation on the cc view posterior to the glandular tissue cannot be clearly identified on the ultrasound.    Impression was 2 separate suspicious spiculated lesion in the left breast.  One of the lesions is seen well on the ultrasound and should be amenable to ultrasound-guided core biopsy.  The other lesion which was seen in the CC projection was amicable to stereotactic guided tissue sampling.    January 3, 2023 patient underwent ultrasound-guided left breast biopsy    The ultrasound-guided biopsy of left breast 10 cm from the nipple at 1 o'clock position showed invasive lobular carcinoma moderately differentiated with a [place] grade of 2 and score of 6 measuring 4 mm.  There was focal lobular carcinoma  in situ.    Left breast anterior lateral stereotactic core needle biopsy showed invasive lobular carcinoma moderately differentiated grade 2 with a Marie score of 6 measuring 4 mm.  There was atypical lobular hyperplasia.  Estrogen receptor was %, progesterone receptor was %, HER2/cheryl 0, Ki-67 5%.    Left breast upper stereotactic guided core biopsy showed invasive lobular carcinoma moderately differentiated grade 2 with [place] score of 6 with measuring 3 mm with lobular carcinoma in situ present.  I do not see the ER/OK or HER2 on the ultrasound-guided biopsy of the left breast lesion as well as the left upper stereotactic biopsy.  We will check with pathologist    January 23, 2023: Patient underwent  MRI of the breast bilateral    Right breast: Negative    Left breast: At 1:00 anterior left breast 4 cm from the nipple there is a 1.5 x 1.6 x 1.3 cm irregular enhancing mass which is biopsy-proven malignancy.  The biopsy clip is located within 0.9 x 1 x 1.1 cm postbiopsy hematoma along the inferior margin of the mass.  And there is additional hematoma along the lateral margin of the mass which measures 1.2 x 1.9 x 0.9 cm.    At 1:00 in the middle to posterior left breast 7.4 cm from the nipple there is a 1.3 x 1 cm x 1 cm irregular enhancing mass mass with a focus from a biopsy clip along the inferior margin which also represents the biopsy-proven malignancy    At 12:00 in the posterior left breast 8.5 cm from the nipple there is a 1.9 x 1.8 x 2.2 cm irregular enhancing mass with a corresponding 2.9 cm biopsy cavity with a biopsy clip which represents the biopsy-proven malignancy    In addition there are multiple at least 12 similar-appearing irregular enhancing masses and foci are identified throughout the left breast predominantly involving the upper breast but also involving the lower outer quadrant.  At 11-12 o'clock in the middle and posterior left breast there are 3 adjacent reference  masses together measuring 5.5 cm but individually measuring 1.2 x 0.9 x 1 cm.  At 1:00 in the far posterior left breast/axillary tail 13.7 cm posterior to the nipple is a 1.3 x 1.1 x 1 cm irregular enhancing mass which is located 0.8 cm from the anterolateral margin of the pectoralis Muscle.  Together the enhancing masses span approximately 11.7 x 8 x 7.8 cm.  There is no suspicious enhancement in the left nipple or chest wall.  Focal areas of skin enhancement likely reflect skin entry point from recent biopsies.  There are no pathologically enlarged internal mammary chain lymph nodes.    Impression    .  At least 15 similar-appearing irregular enhancing masses and foci  scattered throughout the left breast, together measuring up to 11.7 cm  in maximum dimension, encompass 3 sites of biopsy-proven malignancy and  are consistent with multicentric malignancy. Oncologic and surgical  management are recommended.  2.  No MRI evidence of malignancy in the right breast.    Patient is a 47-year-old female with multifocal invasive lobular carcinoma who on screening mammogram showed 8 mm asymmetry in the central left breast posterior one third.  In the cc view.  It appears to be superior left breast on the MLO view.  There was also architectural distortion in the lateral left breast one third on the left cc view.    On diagnostic mammogram the spiculated areas within the central posterior and the lateral anterior superior left breast where redemonstrated.  Targeted ultrasound was done.  At 1 o'clock position 10 cm from the nipple there is a mass with spiculations which is suspicious.  The more centrally located spiculation on the cc view.  The more centrally located spiculation on the cc view cannot be clearly identified on ultrasound.  So there impression was there were 2 separate suspicious spiculated lesion in the left breast.  1 was seen well on the ultrasound and amenable to ultrasound-guided biopsy the other is seen  well on mammography and a stereotactic guided biopsy suggested.    Patient subsequently underwent stereotactic biopsy of 1 lesion and ultrasound-guided biopsy of the 1 cm mass at 1 o'clock position 10 cm from the nipple.  A total of 3 different areas of the left breast were biopsied and specimens were sent to pathology.  The third biopsy was performed at 12 o'clock position in the posterior one third of the left breast.  All of the 3 sites were consistent with invasive lobular carcinoma at site A, B, and C.    MRI of the breast showed at 1 o'clock position of the left breast anteriorly 4 cm posterior to the nipple there is a 1.5 x 1.6 x 1.3 cm irregular enhancing mass.  There is a small hematoma along the inferior margin of the mass.  An additional hematoma along the lateral margin of the mass which is measuring 1.2 x 0.9 x 0.9 cm    At 1:00 in the middle to posterior left breast 7.4 cm posterior to the nipple there is a 1.3 x 1 x 1 cm enhancing mass with a biopsy clip.    At 12:00 posterior left breast 8.5 cm posterior to the nipple there is a 1.5 x 1.8 x 2.2 cm irregular enhancing mass with a corresponding 2.9 cm biopsy cavity with a clip which represents the biopsy site malignancy    Multiple at least 12 similar-appearing irregular enhancing masses and foci are identified throughout the left breast predominantly involving the upper breast but also involving the lower outer quadrant.  At 11-12 o'clock in the middle and posterior left breast there is a 3 adjacent reference masses measuring 5.5 cm in AP dimension and individually measuring 1.2 x 0.9 x 1 cm.  At 1:00 in the far posterior left breast axillary tail 13.7 cm from the nipple there is a 1.3 x 1.1 x 1 cm craniocaudal dimension irregular enhancing mass which is located 0.8 cm from the anterolateral margin of the pectoralis muscle.     Per the MRI at least 15 similar-appearing irregular enhancing masses and foci scattered throughout the left breast together  measuring 11.7 cm in maximum dimension which encompasses 3 sites of biopsy-proven malignancy and a consistent with multicentric malignancy.  MRI of the right breast is negative    INVITAE genetic testing showed variation of uncertain significance of LUCAS  Gene.    Patient was suggested left total mastectomy, left axillary sentinel lymph node biopsy possible node dissection and possible reconstruction.    However patient called Dr. Hi and decided to go for upfront bilateral total mastectomy, left axillary sentinel lymph node biopsy and possible axillary lymph node dissection and reconstruction.     I presented the case in the breast cancer conference today.  Patient had multiple nodules per MRI on the left breast Dr. Todd looked at it and felt there were multiple nodules and the largest one was 2.2 cm.  The discussion was to go ahead and upfront do the surgery and subsequently treat with chemo/ endocrine therapy as needed.  The left breast MRI findings are slightly atypical and that it is not contiguous involvement over 11.5 cm area but multiple nodules.  Surgical pathology will clarify.  If it is 1 big lesion or multiple sma      Past Medical History:   Diagnosis Date    ADD (attention deficit disorder)     Breast cancer 03/08/2023    LEFT SIDE, HX BILATERAL MASTECTOMY, LAST CHEMO 8/2023, LAST RADIATION 10/23/23  - TARGETED THERAPY EVERY 3 WEEKS CURRENTLY - FOLLOWED BY [facility]    GERD (gastroesophageal reflux disease)     Graves disease     RADIOACTIVE IODINE TREAMENT IN PAST    History of COVID-19 2022    History of gestational diabetes 2010    History of melanoma     History of radioactive iodine thyroid ablation 2012    History of vertigo     Hyperlipidemia     DIET CONTROLLED    Hypertension     Hypothyroidism     Uterine fibroid         Past Surgical History:   Procedure Laterality Date    BREAST RECONSTRUCTION Bilateral 03/08/2023    Procedure: BILATERAL PLACEMENT  TISSUE EXPANDER AND ALLODERM;   Surgeon: Eamon Stone MD;  Location: [facility] MAIN OR;  Service: Plastics;  Laterality: Bilateral;    COLONOSCOPY  04/24/2024    non-bleeding internal and external hemorrhoids, repeat in 10 years    D & C CERVICAL BIOPSY  01/2006    INTRAUTERINE DEVICE INSERTION  2010    Mirena    INTRAUTERINE DEVICE INSERTION  2015    Mirena exchange, old IUD removal and new one inserted by Dr Carr, Lot # [id number] 09/17.mar    MASTECTOMY W/ SENTINEL NODE BIOPSY Bilateral 03/08/2023    Procedure: Bilateral total mastectomy, left axillary sentinel lymph node biopsy;  Surgeon: Kell Hi MD;  Location: [facility] MAIN OR;  Service: General;  Laterality: Bilateral;    SCAR REVISION BREAST Right 04/03/2023    Procedure: RIGHT MASTECTOMY REVISION;  Surgeon: Eamon Stone MD;  Location: [facility] MAIN OR;  Service: Plastics;  Laterality: Right;    TOTAL LAPAROSCOPIC HYSTERECTOMY Bilateral 01/03/2024    Procedure: TOTAL LAPAROSCOPIC HYSTERECTOMY, BILATERAL SALPIGO-OOPHORECTOMY, CYSTOSCOPY;  Surgeon: Rocio Manning MD;  Location: [facility] OR;  Service: Obstetrics/Gynecology;  Laterality: Bilateral;    UPPER GASTROINTESTINAL ENDOSCOPY  04/24/2024    Schatzki ring dilated; small hiatal hernia    VENOUS ACCESS DEVICE (PORT) INSERTION Right 04/13/2023    Procedure: right port placement;  Surgeon: Kell Hi MD;  Location: [facility] MAIN OR;  Service: General;  Laterality: Right;    WISDOM TOOTH EXTRACTION          Current Outpatient Medications on File Prior to Visit   Medication Sig Dispense Refill    Cholecalciferol (Vitamin D3) 250 MCG (90308 UT) tablet Take 10,000 Units by mouth Daily.      letrozole (FEMARA) 2.5 MG tablet Take 1 tablet by mouth Daily. 90 tablet 1    levothyroxine (SYNTHROID, LEVOTHROID) 125 MCG tablet Take 1 tablet by mouth Daily. Except none on Sundays. 90 tablet 0    lisinopril (PRINIVIL,ZESTRIL) 2.5 MG tablet Take 1 tablet by mouth Daily. 90 tablet 3    loratadine (CLARITIN) 10 MG  tablet Take 1 tablet by mouth As Needed.      pantoprazole (PROTONIX) 40 MG EC tablet Take 1 tablet by mouth Daily. 30 tablet 3    Probiotic Product (PROBIOTIC ADVANCED PO) Take 1 tablet by mouth Daily.       No current facility-administered medications on file prior to visit.        ALLERGIES:    Allergies   Allergen Reactions    Erythromycin GI Intolerance     Pt reports    Penicillins Rash     Childhood. Unsure of reaction.        Social History     Socioeconomic History    Marital status:      Spouse name: Parish    Number of children: 2   Tobacco Use    Smoking status: Never     Passive exposure: Never    Smokeless tobacco: Never   Vaping Use    Vaping status: Never Used   Substance and Sexual Activity    Alcohol use: Yes     Alcohol/week: 4.0 standard drinks of alcohol     Types: 4 Drinks containing 0.5 oz of alcohol per week     Comment: Maybe have a couple Friday and Saturday evening but not alwa    Drug use: Never    Sexual activity: Yes     Partners: Male     Birth control/protection: Post-menopausal, Hysterectomy     Comment:          Family History   Problem Relation Age of Onset    Diabetes Father     Hyperlipidemia Father     Arthritis Father     Hypertension Father     Heart disease Father         S/P stents    Heart attack Father 41    Colon polyps Father     Melanoma Father     Prostate cancer Father     Alcohol abuse Father     Hyperlipidemia Sister     Hypertension Sister     Heart attack Sister     Diabetes Sister     COPD Sister         Heavy smoker    Heart disease Sister     Thyroid cancer Paternal Aunt     Lung cancer Maternal Grandmother     Cancer Maternal Grandfather         Bladder cancer    Lung cancer Maternal Grandfather     Heart failure Paternal Grandmother     Transient ischemic attack Paternal Grandmother     Heart disease Paternal Grandfather     Malig Hyperthermia Neg Hx       Family history: Maternal great aunt had breast cancer, unsure of the age .  Father had  "melanoma and prostate cancer, paternal aunt had thyroid cancer, paternal grandfather had bladder cancer maternal grandfather had bladder cancer maternal grandmother had lung cancer    Past medical history is consistent with Graves' disease    OB/GYN history:  Age of menarche: 14  Patient is premenopausal and had an IUD which has been now removed   2 para 2 no miscarriages  Age at first childbirth 30  Patient did breast-feed 24 months  Length of taking birth control pills none      Objective     Vitals:    24 0819   BP: 121/84   Pulse: 67   Resp: 18   Temp: 98.2 °F (36.8 °C)   TempSrc: Oral   SpO2: 98%   Weight: 76.8 kg (169 lb 6.4 oz)   Height: 160 cm (62.99\")   PainSc: 0-No pain               2024     8:18 AM   Current Status   ECOG score 0         Physical Exam      GENERAL:  Well-developed, Patient  in no acute distress.   SKIN:  Warm, dry ,NO purpura ,no rash.  HEENT:  Pupils were equal and reactive to light and accomodation, conjunctivae noninjected,  normal visual acuity.   LYMPHATICS:  No cervical, NO supraclavicular, NO axillary adenopathies.  CARDIAC: Normal rate and rhythm, no murmurs gallops or rubs.  LUNGS: Clear to auscultation bilaterally.   ABDOMEN:  Soft, nontender with bowel sounds active.   EXTREMITIES: No cyanosis clubbing or edema.  NEUROLOGICAL:  Patient was awake, alert, oriented to time, person and place.    I have reexamined the patient and the results are consistent with the previously documented exam. Kathrin Frederick MD       RECENT LABS:  Results from last 7 days   Lab Units 24  0805   WBC 10*3/mm3 9.06   NEUTROS ABS 10*3/mm3 4.76   HEMOGLOBIN g/dL 14.4   HEMATOCRIT % 43.9   PLATELETS 10*3/mm3 246           Results from last 7 days   Lab Units 24  0805   SODIUM mmol/L 138   POTASSIUM mmol/L 3.9   CHLORIDE mmol/L 98   CO2 mmol/L 24.3   BUN mg/dL 14   CREATININE mg/dL 1.00   CALCIUM mg/dL 10.1   ALBUMIN g/dL 4.8   BILIRUBIN mg/dL 0.4   ALK PHOS U/L 125*   ALT " (SGPT) U/L 18   AST (SGOT) U/L 25   GLUCOSE mg/dL 102*                   Assessment & Plan     *Pathologic T3N0 invasive lobular carcinoma of the left breast, upper inner and upper outer quadrant, multifocal, 60 mm, 20 mm, 2 mm, 1 mm, grade 2, Marie score of 7, 2 of them are ER/NC positive, HER2/cheryl negative.  One of the larger lesions was ER/NC positive HER2 positive.  Ki-67 is very low 5%,    Invasive lobular carcinoma of the left breast recently diagnosed, multifocal with 3 lesions biopsied in the left breast.  Patient has multicentric disease with 15 similar-appearing irregular enhancing masses in the left breast together measuring 11.7 cm in maximum dimension which encompasses 3 of the 3 biopsy proven malignancies.  All the 3 very invasive lobular carcinoma, grade 2 with Marie score of 6 but 2 of the lesions where ER/NC positive HER2 negative and the third lesion was ER/NC positive HER2 2+ with FISH showing HER2 amplified.      1.  Estrogen receptor 95%, progesterone receptor 95%, HER2/cheryl 0, Ki-67 5%    2 left breast 10 o'clock position 10 cm from the nipple ultrasound core guided biopsy showed ER 90%, %, HER2/cheryl 0    3.  Left breast upper stereotactic core biopsy showed estrogen receptor 90%, progesterone receptor 100%, HER2/cheryl equivocal 2+  FISH testing showed her to as 5.7 with a KEIRA 17 of 1.6 with a ratio HER2/KEIRA 17 of  INVITAE genetic testing showed variation of uncertain significance of LUCAS gene  We discussed the multidisciplinary approach in this patient's and I also discussed in length that that invasive lobular carcinomas do benefit from endocrine therapy and discussed in length about side effects of all endocrine therapies  Pending the results of the surgery she may need to be a candidate for chemotherapy as well and subsequently radiation if the tumor is large locally  Patient is keen on getting bilateral oophorectomy after her breast surgery is done and we discussed about medical  oophorectomy with Lupron as well  February 24, 2023: Presented patient in the breast cancer conference.  Even though it appears to be a heterogeneous tumor, since it is difficult to really appreciate by clinical exam and the fact that it is invasive lobular carcinoma, it was felt best to do upfront surgery.  March 8, 2023:p T3 N0 M0 invasive lobular carcinoma s/p bilateral mastectomy with left deep axillary sentinel lymph node biopsy, with bilateral placement of prepectoral tissue expanders for reconstruction and bilateral placement of AlloDerm.  Pathology shows multifocal tumor in  upper outer quadrant and upper inner quadrant, invasive lobular carcinoma grade 2 with a Marie score  score of 7  , total of 4 foci were seen, largest tumor being 60 mm, 20 mm, 2 mm, 1 mm.  No DCIS identified.  Lobular carcinoma in situ present.  No lymphatic or vascular channel invasion, no dermal lymphovascular space invasion all margins were negative for invasive carcinoma 4 lymph nodes were all negative for malignancy its pathologic T3N0  ER %, DE %, HER2/cheryl 2+ with Ki-67 of 5%  Previous biopsies had shown 2 of the tumors were ER/DE positive HER2/cheryl negative and one of them was ER/DE positive HER2 positive  On discussing with pathologist Dr. Serrano, she thinks that the larger tumor was ER DE positive and HER2 positive.  HER2 copy number of 5.7 with HER2/CEP ratio of 3.5 positive and this was on the larger tumor.  Given that patient has heterogeneous tumor with the larger lesion being ER/DE positive HER2 positive and a Ki-67 of 40%, being premenopausal we will treat with Taxotere carboplatin Herceptin Perjeta.  April 20, 2023: Patient was to start cycle 1 today but unfortunately her liver function tests were extremely high.  Discussed about alcohol and she had drank and taken Tylenol.  Will hold chemo today  April 28, 2023: Reviewed her liver function test which are completely normalized.  Discussed with her not to  drink alcohol.  Here for cycle 1 day 1 TCHP chemotherapy.  Reviewed echo which showed ejection fraction of 64% with strain pattern of -21.  Patient understands all the side effects\  5/4/2023: Patient tolerated Cycle 1 TCHP well. She has had diarrhea, nausea and fatigue that have been well managed.   5/18/2023: C2 TCHP  5/22/2023: Here for close follow-up and possible IV fluids after treatment last Friday, 5/18/2023.  Patient had a fairly good weekend with good oral intake, weight that is notably stable, and very little nausea.  She only had 1 loose stool this morning and therefore no significant diarrhea.  CMP is WNL.  Per discussion with patient we will give just 500 mL normal saline.  May 25, 2023: Patient has diarrhea.  She is 1 week out of cycle 2 Taxotere carboplatin Herceptin Perjeta.  She has some abdominal cramping.  We will give her IV fluids today.  Given carboplatinum is not available she will receive Taxotere Herceptin Perjeta for 2 more cycles prior to going to Adriamycin Cytoxan.  Cycle #3 TCHP 6/8/2023  Patient returns 6/12/2023 for toxicity check.  She is eating and drinking sufficiently at this point.  She does not feel she has benefited from the IV fluids in the past and therefore will not do that today.  She denies any fevers or chills.  June 29, 2023: Cycle 4 TCHP.  She will need referral to radiation at the completion of 6 cycles of TCHP  07/20/2023 proceed with cycle 5 of carboplatin, Taxotere, Herceptin, Perjeta with Neulasta support.  August 10, 2023: Final cycle 6 TCHP.  Thereafter plan to continue every 3-week Herceptin/Perjeta for total of 1 year.   We have discussed potentially beginning Lupron followed by aromatase inhibitor therapy following radiation however patient wants to discuss potential hysterectomy and bilateral oophorectomy with her GYN.    September 21, 2023: Patient started radiation has completed 2 treatments so far out of 25.  Patient has history of depression in the  family but not herself.  We discussed about consideration of Lupron with aromatase inhibitor but she wants to undergo hysterectomy with bilateral oophorectomy because of her fibroids.  In which case we could consider starting tamoxifen until she has oophorectomy and subsequently start AI.  She is tolerating Herceptin Perjeta very well  10/12/23 Here for Hepcetin and Perjeta today. She is currently on 18 of 25 radiation treatments and tolerating well.  She has no new concerns today.  10/23/2023 completed radiation under the direction of Dr. Hines.  November 3, 2023: Patient to receive Herceptin Perjeta.  She is due to get oophorectomy following which we will start aromatase inhibitor Arimidex.  November 28, 2023: Here for Herceptin Perjeta.  She is due to receive oophorectomy as of January 4, 2024.  Subsequent to that we will start Arimidex.  Patient is premenopausal and did not want to start Lupron and prefer to go to oophorectomy.  We discussed about starting tamoxifen until oophorectomy is done but patient prefers to not take tamoxifen and wants to wait till oophorectomy is done and subsequently we can start aromatase inhibitor  S/p hysterectomy with bilateral salpingo-oophorectomy January 3, 2024.  Pathology is benign.  She had benign left follicle cyst and inclusion cyst.  There is a subserosal 4 cm uterine leiomyoma.  2/2024 initiating letrozole in the setting of ER/MI positivity.  3/14/2024 proceed with Herceptin Perjeta which is continued every 3 weeks.  She will complete therapy April 2024 April 4, 2024: Here for cycle 11 Herceptin Perjeta.  She will be completed in 3 weeks her echo in February was normal  April 24, 2024: Patient is here for cycle 12 Herceptin Perjeta.  This is her last treatment.  She is doing very well.  May 23, 2024: Tolerating Femara    *S/p IUD removal  Experiencing hot flashes with chemo likely triggering menopausal changes.  Hysterectomy 1/2024 with Dr. 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 She did recommend she start taking her gabapentin again to help with her anxiety and irritability/anger from her decadron with treatment.   Patient continues follow-up with supportive oncology, RADHA Rolle. Patient is encouraged to continue to follow with Cierra.   Much improved.    *  Port study negative    *Intermittent mild elevation of LFTs.    Patient has previously reported intermittent use of Tylenol and alcohol.    8/31/2023 AST 35, ALT 41, alk phos 100.  Patient denies any alcohol intake or Tylenol.  This is felt to be drug related.  Stable to improved. Monitor.  10/12/2023 AST 93, .  Patient has had 1 joe this past week however denies any other alcohol or Tylenol intake.  Reviewed with Dr. TRINIDAD and we will treat.  2/22/2024 stable to improved, AST 31, ALT 41, alk phos 122, T. bili 0.6  Slight elevation of LFTs today with AST 35, ALT 81, alkaline phosphatase 264.  Patient was encouraged to avoid alcohol and Tylenol.  We will repeat labs at scheduled follow-up in 3 weeks  April 4, 2024: AST 68, , alkaline phosphatase 177, total bilirubin normal likely secondary to the Tylenol she took in the cold medicine    *Cardiac monitoring  Seen by cardiology today, 7/27/2023.  Echo stable.  Plan for continued echocardiogram every 3 months while on therapy.  Continue low-dose lisinopril  2/8/2024 Echo stable with EF 67%  Continues follow-up with cardio oncology.    PLAN:  Completed Herceptin Perjeta   continue letrozole 2.5 mg daily continue calcium and vitamin D supplement  Discussed a trial of tart cherry Gummies for arthralgias from aromatase inhibitor.  The patient was reminded to avoid alcohol  and Tylenol  Liver function tests are normal  S/p port removal  Follow-up in 2 months with labs and if stable could see her back every 3 months for the first year, every 4 months for the second year and then every 6 months from years 3-5        This patient is on high risk drug therapy requiring intensive monitoring for toxicity.            Kathrin Frederick MD  05/23/2024      Dr. Kell Hi

## 2024-07-23 ENCOUNTER — OFFICE VISIT (OUTPATIENT)
Dept: ONCOLOGY | Facility: CLINIC | Age: 49
End: 2024-07-23
Payer: COMMERCIAL

## 2024-07-23 ENCOUNTER — LAB (OUTPATIENT)
Dept: LAB | Facility: HOSPITAL | Age: 49
End: 2024-07-23
Payer: COMMERCIAL

## 2024-07-23 VITALS
DIASTOLIC BLOOD PRESSURE: 95 MMHG | HEART RATE: 67 BPM | TEMPERATURE: 98.3 F | WEIGHT: 175.2 LBS | SYSTOLIC BLOOD PRESSURE: 133 MMHG | HEIGHT: 63 IN | RESPIRATION RATE: 16 BRPM | BODY MASS INDEX: 31.04 KG/M2 | OXYGEN SATURATION: 99 %

## 2024-07-23 DIAGNOSIS — Z17.0 MALIGNANT NEOPLASM OF OVERLAPPING SITES OF LEFT BREAST IN FEMALE, ESTROGEN RECEPTOR POSITIVE: ICD-10-CM

## 2024-07-23 DIAGNOSIS — C50.812 MALIGNANT NEOPLASM OF OVERLAPPING SITES OF LEFT BREAST IN FEMALE, ESTROGEN RECEPTOR POSITIVE: ICD-10-CM

## 2024-07-23 DIAGNOSIS — Z79.899 HIGH RISK MEDICATION USE: Primary | ICD-10-CM

## 2024-07-23 LAB
ALBUMIN SERPL-MCNC: 4.9 G/DL (ref 3.5–5.2)
ALBUMIN/GLOB SERPL: 2 G/DL
ALP SERPL-CCNC: 137 U/L (ref 39–117)
ALT SERPL W P-5'-P-CCNC: 35 U/L (ref 1–33)
ANION GAP SERPL CALCULATED.3IONS-SCNC: 10 MMOL/L (ref 5–15)
AST SERPL-CCNC: 32 U/L (ref 1–32)
BASOPHILS # BLD AUTO: 0.06 10*3/MM3 (ref 0–0.2)
BASOPHILS NFR BLD AUTO: 0.8 % (ref 0–1.5)
BILIRUB SERPL-MCNC: 0.6 MG/DL (ref 0–1.2)
BUN SERPL-MCNC: 18 MG/DL (ref 6–20)
BUN/CREAT SERPL: 17.3 (ref 7–25)
CALCIUM SPEC-SCNC: 10.5 MG/DL (ref 8.6–10.5)
CHLORIDE SERPL-SCNC: 103 MMOL/L (ref 98–107)
CO2 SERPL-SCNC: 30 MMOL/L (ref 22–29)
CREAT SERPL-MCNC: 1.04 MG/DL (ref 0.57–1)
DEPRECATED RDW RBC AUTO: 41.1 FL (ref 37–54)
EGFRCR SERPLBLD CKD-EPI 2021: 66.8 ML/MIN/1.73
EOSINOPHIL # BLD AUTO: 0.54 10*3/MM3 (ref 0–0.4)
EOSINOPHIL NFR BLD AUTO: 7.4 % (ref 0.3–6.2)
ERYTHROCYTE [DISTWIDTH] IN BLOOD BY AUTOMATED COUNT: 12.4 % (ref 12.3–15.4)
GLOBULIN UR ELPH-MCNC: 2.4 GM/DL
GLUCOSE SERPL-MCNC: 76 MG/DL (ref 65–99)
HCT VFR BLD AUTO: 43.6 % (ref 34–46.6)
HGB BLD-MCNC: 14.2 G/DL (ref 12–15.9)
IMM GRANULOCYTES # BLD AUTO: 0.03 10*3/MM3 (ref 0–0.05)
IMM GRANULOCYTES NFR BLD AUTO: 0.4 % (ref 0–0.5)
LYMPHOCYTES # BLD AUTO: 2.6 10*3/MM3 (ref 0.7–3.1)
LYMPHOCYTES NFR BLD AUTO: 35.4 % (ref 19.6–45.3)
MCH RBC QN AUTO: 29.6 PG (ref 26.6–33)
MCHC RBC AUTO-ENTMCNC: 32.6 G/DL (ref 31.5–35.7)
MCV RBC AUTO: 91 FL (ref 79–97)
MONOCYTES # BLD AUTO: 0.4 10*3/MM3 (ref 0.1–0.9)
MONOCYTES NFR BLD AUTO: 5.4 % (ref 5–12)
NEUTROPHILS NFR BLD AUTO: 3.71 10*3/MM3 (ref 1.7–7)
NEUTROPHILS NFR BLD AUTO: 50.6 % (ref 42.7–76)
NRBC BLD AUTO-RTO: 0 /100 WBC (ref 0–0.2)
PLATELET # BLD AUTO: 283 10*3/MM3 (ref 140–450)
PMV BLD AUTO: 9.9 FL (ref 6–12)
POTASSIUM SERPL-SCNC: 4.3 MMOL/L (ref 3.5–5.2)
PROT SERPL-MCNC: 7.3 G/DL (ref 6–8.5)
RBC # BLD AUTO: 4.79 10*6/MM3 (ref 3.77–5.28)
SODIUM SERPL-SCNC: 143 MMOL/L (ref 136–145)
WBC NRBC COR # BLD AUTO: 7.34 10*3/MM3 (ref 3.4–10.8)

## 2024-07-23 PROCEDURE — 36415 COLL VENOUS BLD VENIPUNCTURE: CPT

## 2024-07-23 PROCEDURE — 80050 GENERAL HEALTH PANEL: CPT

## 2024-07-23 PROCEDURE — 99214 OFFICE O/P EST MOD 30 MIN: CPT | Performed by: INTERNAL MEDICINE

## 2024-07-23 NOTE — PROGRESS NOTES
Subjective     REASON FOR FOLLOW-UP:  1.  Invasive lobular carcinoma multifocal,  Left breast anterolateral stereotactic biopsy invasive lobular carcinoma, Marie score of 6, grade 2, 4 mm, ER 95%, MI 95%, HER2/cheryl 0 negative with Ki-67 of 5%.  Positive for atypical lobular hyperplasia  Left breast 1 o'clock position 10 cm from the nipple ultrasound-guided biopsy consistent with invasive lobular carcinoma grade 2 Oxford score of 6, 4 mm with lobular carcinoma in situ, ER 90%, %, HER2/cheryl 0  Left breast upper stereotactic guided core biopsy invasive lobular carcinoma grade 2 Oxford score of six 3 mm with lobular carcinoma in situ, ER 90%, %, HER2/cheryl equivocal 2+, FISH shows her to 5.7 with CEP 1.6 and ratio HER2 nu/KEIRA-17 ratio of 3.5.  It is amplified.    2.  March 8, 2023:p T3 N0 M0 invasive lobular carcinoma s/p bilateral mastectomy with left deep axillary sentinel lymph node biopsy, with bilateral placement of prepectoral tissue expanders for reconstruction and bilateral placement of AlloDerm.  Pathology shows multifocal tumor in  upper outer quadrant and upper inner quadrant, invasive lobular carcinoma grade 2 with a Oxford score  score of 7  , total of 4 foci were seen, largest tumor being 60 mm, 20 mm, 2 mm, 1 mm.  No DCIS identified.  Lobular carcinoma in situ present.  No lymphatic or vascular channel invasion, no dermal lymphovascular space invasion all margins were negative for invasive carcinoma 4 lymph nodes were all negative for malignancy its pathologic T3N0  ER %, MI %, HER2/cheryl 2+ with Ki-67 of 5%  Previous biopsies had shown 2 of the tumors were ER/MI positive HER2/cheryl negative and one of them was ER/MI positive HER2 positive  On discussing with pathologist Dr. Serrano, she thinks that the larger tumor was ER MI positive and HER2 positive but unsure if it was the 60 mm tumor or 20 mm tumor.  Either way it was not the smaller tumors which was HER2  positive.  All tumors look-alike per pathologist and they are invasive lobular carcinoma.                  HISTORY OF PRESENT ILLNESS:         Patient is a 49 y.o. female with history of multifocal left breast cancer with MRI of the breast showing almost 15 lesions.  Biopsies on 3 different lesions were done and they were all consistent with invasive lobular carcinoma.  2 of the lesions where ER/MA positive HER2/cheryl negative and one of the larger lesion was ER/MA positive HER2 2+ equivocal.  FISH testing was done and the HER2 copy number was 5.7 with HER2/CEP ratio of 3.5.  The FISH was amplified.    Patient underwent bilateral mastectomy    With left sentinel lymph node surgery..  The pathology shows tumor to be present in the left upper outer quadrant invasive lobular carcinoma with a Marie score of 7, grade 2.  There were 4 lesions the greatest size was 6 0 mm, 20 mm, 2 mm and 1 mm in size.  Lobular carcinoma in situ is present.  All margins are negative for invasive carcinoma.  4 lymph nodes negative.  Patient has T3N0 invasive lobular carcinoma.      Discussed with pathology in length Dr. Serrano, one of the larger lesions which was 60 mm was heterogeneous it was biopsied at 2 separate spots.  The left anterior lateral and left 1:00 core fragment was ER/MA positive HER2/cheryl negative the left upper core biopsy was ER/MA positive HER2/cheryl 2+ equivocal but HER2/cheryl FISH was positive.    On discussion with pathology the 60 mm tumor showed ER and MA positive and HER2 positive by FISH with a Ki-67 of 40% at the Top-Hat clip.  The HER2/cheryl copy number was 5.7 with a HER2/cheryl by CEP ratio 3.5 which is positive.  The Ki-67 on the lower part was 13%.  The 20 mm tumor had a Ki-67 of 9%.  So the larger lesion was triple positive and heterogeneous as 2 biopsies were done on the larger lesions and that was both triple positive and at the second biopsy was ER/MA positive HER2/cheryl negative so it was a heterogeneous tumor.   The 20 mm lesion was ER/WA positive HER2/cheryl negative.    Given that she had a large tumor with high Ki-67 and premenopausal status we discussed about giving Taxotere carboplatin Herceptin Perjeta.    Interval History:   She returns the office today, 3/14/2024 in anticipation of Herceptin Perjeta which she continues to receive every 3 weeks.  She is having excellent tolerance to therapy.  She has very occasional diarrhea though this is managed with over-the-counter medications as needed.  She is eager to undergo reconstruction which is scheduled for May 2024.  She has no new pain.  She is eating and drinking adequately.  She has no lower extremity swelling or shortness of breath.  Her most recent echocardiogram was February 2024 with a stable ejection fraction.  She reports she is tolerating letrozole very well.  She does have some generalized arthralgias from therapy.  She has no hot flashes or vaginal dryness.    April 4, 2024: Patient is here for Herceptin Perjeta she is tolerating it very well her liver function tests have slightly worsened further.  She was sick 2 weeks ago and she took something which contained Tylenol and it.  She is quit taking it we have also discussed with her to stop the biotin collagen and black current seed oil.  We will treat her with Herceptin Perjeta today she is otherwise asymptomatic.  She was tested for COVID in the past which was negative.  She does not have fever or chills and she does not take too much alcohol except rarely    April 24, 2024: Patient is here for her last dose of Herceptin Perjeta.  Her liver function tests have been elevated.  Today her liver function tests are normal with ALT 25 AST 24 and alkaline phosphatase of 128 with a total bilirubin of 0.7.  Patient is she is seeing cardio oncology Dr. Fermin with echocardiogram next week as her last echo was back 3 months ago.  Patient has no symptoms of shortness of breath lower or lower extremity edema.    May 23  patient is currently doing well patient has completed all Herceptin Perjeta.  Currently she was doing well without any complaints.  No more diarrhea.  She follows up with Dr. Fermin.  Currently on Femara and tolerating very well.    July 23rd 2024: Patient is doing well.  Weekly on letrozole.      Oncology history:  Patient is a 49 y.o. female who has been recently diagnosed with left breast cancer.  Patient has been compliant with her mammograms.    There is no family history of breast or ovarian cancer.  Her father had prostate cancer and melanoma.  A paternal aunt had thyroid cancer.      Details are as follows.    November 29, 2022: Screening bilateral mammogram showed 8 mm asymmetry in the central left breast posterior one third.  This appears to be in the superior left breast on the MLO view.  Right breast was negative.  A left diagnostic mammogram and ultrasound was recommended.    January January 3, 2023: Left breast diagnostic mammogram showed the spiculated areas within the central posterior and lateral anterior superior left breast where redemonstrated.  Targeted ultrasound was done.  At 1 o'clock position 10 cm from the nipple there is a shadowing mass with peripheral spiculations which is concordant with the mammographic abnormality and suspicious for malignancy.  The more centrally located spiculation on the cc view posterior to the glandular tissue cannot be clearly identified on the ultrasound.    Impression was 2 separate suspicious spiculated lesion in the left breast.  One of the lesions is seen well on the ultrasound and should be amenable to ultrasound-guided core biopsy.  The other lesion which was seen in the CC projection was amicable to stereotactic guided tissue sampling.    January 3, 2023 patient underwent ultrasound-guided left breast biopsy    The ultrasound-guided biopsy of left breast 10 cm from the nipple at 1 o'clock position showed invasive lobular carcinoma moderately differentiated  with a Marie grade of 2 and score of 6 measuring 4 mm.  There was focal lobular carcinoma in situ.    Left breast anterior lateral stereotactic core needle biopsy showed invasive lobular carcinoma moderately differentiated grade 2 with a Marie score of 6 measuring 4 mm.  There was atypical lobular hyperplasia.  Estrogen receptor was %, progesterone receptor was %, HER2/cheryl 0, Ki-67 5%.    Left breast upper stereotactic guided core biopsy showed invasive lobular carcinoma moderately differentiated grade 2 with Marie score of 6 with measuring 3 mm with lobular carcinoma in situ present.  I do not see the ER/KY or HER2 on the ultrasound-guided biopsy of the left breast lesion as well as the left upper stereotactic biopsy.  We will check with pathologist    January 23, 2023: Patient underwent  MRI of the breast bilateral    Right breast: Negative    Left breast: At 1:00 anterior left breast 4 cm from the nipple there is a 1.5 x 1.6 x 1.3 cm irregular enhancing mass which is biopsy-proven malignancy.  The biopsy clip is located within 0.9 x 1 x 1.1 cm postbiopsy hematoma along the inferior margin of the mass.  And there is additional hematoma along the lateral margin of the mass which measures 1.2 x 1.9 x 0.9 cm.    At 1:00 in the middle to posterior left breast 7.4 cm from the nipple there is a 1.3 x 1 cm x 1 cm irregular enhancing mass mass with a focus from a biopsy clip along the inferior margin which also represents the biopsy-proven malignancy    At 12:00 in the posterior left breast 8.5 cm from the nipple there is a 1.9 x 1.8 x 2.2 cm irregular enhancing mass with a corresponding 2.9 cm biopsy cavity with a biopsy clip which represents the biopsy-proven malignancy    In addition there are multiple at least 12 similar-appearing irregular enhancing masses and foci are identified throughout the left breast predominantly involving the upper breast but also involving the lower outer quadrant.   At 11-12 o'clock in the middle and posterior left breast there are 3 adjacent reference masses together measuring 5.5 cm but individually measuring 1.2 x 0.9 x 1 cm.  At 1:00 in the far posterior left breast/axillary tail 13.7 cm posterior to the nipple is a 1.3 x 1.1 x 1 cm irregular enhancing mass which is located 0.8 cm from the anterolateral margin of the pectoralis Muscle.  Together the enhancing masses span approximately 11.7 x 8 x 7.8 cm.  There is no suspicious enhancement in the left nipple or chest wall.  Focal areas of skin enhancement likely reflect skin entry point from recent biopsies.  There are no pathologically enlarged internal mammary chain lymph nodes.    Impression    .  At least 15 similar-appearing irregular enhancing masses and foci  scattered throughout the left breast, together measuring up to 11.7 cm  in maximum dimension, encompass 3 sites of biopsy-proven malignancy and  are consistent with multicentric malignancy. Oncologic and surgical  management are recommended.  2.  No MRI evidence of malignancy in the right breast.    Patient is a 47-year-old female with multifocal invasive lobular carcinoma who on screening mammogram showed 8 mm asymmetry in the central left breast posterior one third.  In the cc view.  It appears to be superior left breast on the MLO view.  There was also architectural distortion in the lateral left breast one third on the left cc view.    On diagnostic mammogram the spiculated areas within the central posterior and the lateral anterior superior left breast where redemonstrated.  Targeted ultrasound was done.  At 1 o'clock position 10 cm from the nipple there is a mass with spiculations which is suspicious.  The more centrally located spiculation on the cc view.  The more centrally located spiculation on the cc view cannot be clearly identified on ultrasound.  So there impression was there were 2 separate suspicious spiculated lesion in the left breast.  1 was  seen well on the ultrasound and amenable to ultrasound-guided biopsy the other is seen well on mammography and a stereotactic guided biopsy suggested.    Patient subsequently underwent stereotactic biopsy of 1 lesion and ultrasound-guided biopsy of the 1 cm mass at 1 o'clock position 10 cm from the nipple.  A total of 3 different areas of the left breast were biopsied and specimens were sent to pathology.  The third biopsy was performed at 12 o'clock position in the posterior one third of the left breast.  All of the 3 sites were consistent with invasive lobular carcinoma at site A, B, and C.    MRI of the breast showed at 1 o'clock position of the left breast anteriorly 4 cm posterior to the nipple there is a 1.5 x 1.6 x 1.3 cm irregular enhancing mass.  There is a small hematoma along the inferior margin of the mass.  An additional hematoma along the lateral margin of the mass which is measuring 1.2 x 0.9 x 0.9 cm    At 1:00 in the middle to posterior left breast 7.4 cm posterior to the nipple there is a 1.3 x 1 x 1 cm enhancing mass with a biopsy clip.    At 12:00 posterior left breast 8.5 cm posterior to the nipple there is a 1.5 x 1.8 x 2.2 cm irregular enhancing mass with a corresponding 2.9 cm biopsy cavity with a clip which represents the biopsy site malignancy    Multiple at least 12 similar-appearing irregular enhancing masses and foci are identified throughout the left breast predominantly involving the upper breast but also involving the lower outer quadrant.  At 11-12 o'clock in the middle and posterior left breast there is a 3 adjacent reference masses measuring 5.5 cm in AP dimension and individually measuring 1.2 x 0.9 x 1 cm.  At 1:00 in the far posterior left breast axillary tail 13.7 cm from the nipple there is a 1.3 x 1.1 x 1 cm craniocaudal dimension irregular enhancing mass which is located 0.8 cm from the anterolateral margin of the pectoralis muscle.     Per the MRI at least 15  similar-appearing irregular enhancing masses and foci scattered throughout the left breast together measuring 11.7 cm in maximum dimension which encompasses 3 sites of biopsy-proven malignancy and a consistent with multicentric malignancy.  MRI of the right breast is negative    INVITAE genetic testing showed variation of uncertain significance of LUCAS  Gene.    Patient was suggested left total mastectomy, left axillary sentinel lymph node biopsy possible node dissection and possible reconstruction.    However patient called Dr. Hi and decided to go for upfront bilateral total mastectomy, left axillary sentinel lymph node biopsy and possible axillary lymph node dissection and reconstruction.     I presented the case in the breast cancer conference today.  Patient had multiple nodules per MRI on the left breast Dr. Todd looked at it and felt there were multiple nodules and the largest one was 2.2 cm.  The discussion was to go ahead and upfront do the surgery and subsequently treat with chemo/ endocrine therapy as needed.  The left breast MRI findings are slightly atypical and that it is not contiguous involvement over 11.5 cm area but multiple nodules.  Surgical pathology will clarify.  If it is 1 big lesion or multiple sma      Past Medical History:   Diagnosis Date    ADD (attention deficit disorder)     Breast cancer 03/08/2023    LEFT SIDE, HX BILATERAL MASTECTOMY, LAST CHEMO 8/2023, LAST RADIATION 10/23/23  - TARGETED THERAPY EVERY 3 WEEKS CURRENTLY - FOLLOWED BY [facility]    GERD (gastroesophageal reflux disease)     Graves disease     RADIOACTIVE IODINE TREAMENT IN PAST    History of COVID-19 2022    History of gestational diabetes 2010    History of melanoma     History of radioactive iodine thyroid ablation 2012    History of vertigo     Hyperlipidemia     DIET CONTROLLED    Hypertension     Hypothyroidism     Uterine fibroid         Past Surgical History:   Procedure Laterality Date    BREAST  RECONSTRUCTION Bilateral 03/08/2023    Procedure: BILATERAL PLACEMENT  TISSUE EXPANDER AND ALLODERM;  Surgeon: Eamon Stone MD;  Location: [facility] OR;  Service: Plastics;  Laterality: Bilateral;    BREAST TISSUE EXPANDER REMOVAL INSERTION OF IMPLANT Bilateral 5/8/2024    Procedure: BILATERAL REMOVAL TISSUE EXPANDERS AND PLACEMENT OF IMPLANTS, REMOVAL MEDIPORT RIGHT CHEST;  Surgeon: Eamon Stone MD;  Location: [facility] OR;  Service: Plastics;  Laterality: Bilateral;    COLONOSCOPY  04/24/2024    non-bleeding internal and external hemorrhoids, repeat in 10 years    D & C CERVICAL BIOPSY  01/2006    INTRAUTERINE DEVICE INSERTION  2010    Mirena    INTRAUTERINE DEVICE INSERTION  2015    Mirena exchange, old IUD removal and new one inserted by Dr Carr, Lot # [id number] exp 09/17.mar    MASTECTOMY W/ SENTINEL NODE BIOPSY Bilateral 03/08/2023    Procedure: Bilateral total mastectomy, left axillary sentinel lymph node biopsy;  Surgeon: Kell Hi MD;  Location: [facility];  Service: General;  Laterality: Bilateral;    SCAR REVISION BREAST Right 04/03/2023    Procedure: RIGHT MASTECTOMY REVISION;  Surgeon: Eamon Stone MD;  Location: [facility] OR;  Service: Plastics;  Laterality: Right;    TOTAL LAPAROSCOPIC HYSTERECTOMY Bilateral 01/03/2024    Procedure: TOTAL LAPAROSCOPIC HYSTERECTOMY, BILATERAL SALPIGO-OOPHORECTOMY, CYSTOSCOPY;  Surgeon: Rocio Manning MD;  Location: [facility] OR;  Service: Obstetrics/Gynecology;  Laterality: Bilateral;    UPPER GASTROINTESTINAL ENDOSCOPY  04/24/2024    Schatzki ring dilated; small hiatal hernia    VENOUS ACCESS DEVICE (PORT) INSERTION Right 04/13/2023    Procedure: right port placement;  Surgeon: Kell Hi MD;  Location: [facility];  Service: General;  Laterality: Right;    WISDOM TOOTH EXTRACTION          Current Outpatient Medications on File Prior to Visit   Medication Sig Dispense Refill    Cholecalciferol (Vitamin  D3) 250 MCG (52142 UT) tablet Take 10,000 Units by mouth Daily.      letrozole (FEMARA) 2.5 MG tablet Take 1 tablet by mouth Daily. 90 tablet 1    levothyroxine (SYNTHROID, LEVOTHROID) 125 MCG tablet Take 1 tablet by mouth Daily. Except none on Sundays. 90 tablet 0    lisinopril (PRINIVIL,ZESTRIL) 2.5 MG tablet Take 1 tablet by mouth Daily. 90 tablet 3    loratadine (CLARITIN) 10 MG tablet Take 1 tablet by mouth As Needed.      pantoprazole (PROTONIX) 40 MG EC tablet Take 1 tablet by mouth Daily. 30 tablet 3    Probiotic Product (PROBIOTIC ADVANCED PO) Take 1 tablet by mouth Daily.       No current facility-administered medications on file prior to visit.        ALLERGIES:    Allergies   Allergen Reactions    Erythromycin GI Intolerance     Pt reports    Penicillins Rash     Childhood. Unsure of reaction.        Social History     Socioeconomic History    Marital status:      Spouse name: Parish    Number of children: 2   Tobacco Use    Smoking status: Never     Passive exposure: Never    Smokeless tobacco: Never   Vaping Use    Vaping status: Never Used   Substance and Sexual Activity    Alcohol use: Yes     Alcohol/week: 4.0 standard drinks of alcohol     Types: 4 Drinks containing 0.5 oz of alcohol per week     Comment: Maybe have a couple Friday and Saturday evening but not alwa    Drug use: Never    Sexual activity: Yes     Partners: Male     Birth control/protection: Post-menopausal, Hysterectomy     Comment:          Family History   Problem Relation Age of Onset    Diabetes Father     Hyperlipidemia Father     Arthritis Father     Hypertension Father     Heart disease Father         S/P stents    Heart attack Father 41    Colon polyps Father     Melanoma Father     Prostate cancer Father     Alcohol abuse Father     Hyperlipidemia Sister     Hypertension Sister     Heart attack Sister     Diabetes Sister     COPD Sister         Heavy smoker    Heart disease Sister     Thyroid cancer Paternal  "Aunt     Lung cancer Maternal Grandmother     Cancer Maternal Grandfather         Bladder cancer    Lung cancer Maternal Grandfather     Heart failure Paternal Grandmother     Transient ischemic attack Paternal Grandmother     Heart disease Paternal Grandfather     Malig Hyperthermia Neg Hx       Family history: Maternal great aunt had breast cancer, unsure of the age .  Father had melanoma and prostate cancer, paternal aunt had thyroid cancer, paternal grandfather had bladder cancer maternal grandfather had bladder cancer maternal grandmother had lung cancer    Past medical history is consistent with Graves' disease    OB/GYN history:  Age of menarche: 14  Patient is premenopausal and had an IUD which has been now removed   2 para 2 no miscarriages  Age at first childbirth 30  Patient did breast-feed 24 months  Length of taking birth control pills none      Objective     Vitals:    24 0745   BP: 133/95   Pulse: 67   Resp: 16   Temp: 98.3 °F (36.8 °C)   TempSrc: Oral   SpO2: 99%   Weight: 79.5 kg (175 lb 3.2 oz)   Height: 160 cm (62.99\")   PainSc: 0-No pain               2024     7:49 AM   Current Status   ECOG score 0         Physical Exam      GENERAL:  Well-developed, Patient  in no acute distress.   SKIN:  Warm, dry ,NO purpura ,no rash.  HEENT:  Pupils were equal and reactive to light and accomodation, conjunctivae noninjected,  normal visual acuity.   LYMPHATICS:  No cervical, NO supraclavicular, NO axillary adenopathies.  CARDIAC: Normal rate and rhythm, no murmurs gallops or rubs.  LUNGS: Clear to auscultation bilaterally.   ABDOMEN:  Soft, nontender with bowel sounds active.   EXTREMITIES: No cyanosis clubbing or edema.  NEUROLOGICAL:  Patient was awake, alert, oriented to time, person and place.    I have reexamined the patient and the results are consistent with the previously documented exam. Kathrin Frederick MD       RECENT LABS:  Results from last 7 days   Lab Units 24  0730 "   WBC 10*3/mm3 7.34   NEUTROS ABS 10*3/mm3 3.71   HEMOGLOBIN g/dL 14.2   HEMATOCRIT % 43.6   PLATELETS 10*3/mm3 283                               Assessment & Plan     *Pathologic T3N0 invasive lobular carcinoma of the left breast, upper inner and upper outer quadrant, multifocal, 60 mm, 20 mm, 2 mm, 1 mm, grade 2, Marie score of 7, 2 of them are ER/ID positive, HER2/cheryl negative.  One of the larger lesions was ER/ID positive HER2 positive.  Ki-67 is very low 5%,    Invasive lobular carcinoma of the left breast recently diagnosed, multifocal with 3 lesions biopsied in the left breast.  Patient has multicentric disease with 15 similar-appearing irregular enhancing masses in the left breast together measuring 11.7 cm in maximum dimension which encompasses 3 of the 3 biopsy proven malignancies.  All the 3 very invasive lobular carcinoma, grade 2 with Crowley score of 6 but 2 of the lesions where ER/ID positive HER2 negative and the third lesion was ER/ID positive HER2 2+ with FISH showing HER2 amplified.      1.  Estrogen receptor 95%, progesterone receptor 95%, HER2/cheryl 0, Ki-67 5%    2 left breast 10 o'clock position 10 cm from the nipple ultrasound core guided biopsy showed ER 90%, %, HER2/cheryl 0    3.  Left breast upper stereotactic core biopsy showed estrogen receptor 90%, progesterone receptor 100%, HER2/cheryl equivocal 2+  FISH testing showed her to as 5.7 with a KEIRA 17 of 1.6 with a ratio HER2/KEIRA 17 of  INVITAE genetic testing showed variation of uncertain significance of LUCAS gene  We discussed the multidisciplinary approach in this patient's and I also discussed in length that that invasive lobular carcinomas do benefit from endocrine therapy and discussed in length about side effects of all endocrine therapies  Pending the results of the surgery she may need to be a candidate for chemotherapy as well and subsequently radiation if the tumor is large locally  Patient is keen on getting bilateral  oophorectomy after her breast surgery is done and we discussed about medical oophorectomy with Lupron as well  February 24, 2023: Presented patient in the breast cancer conference.  Even though it appears to be a heterogeneous tumor, since it is difficult to really appreciate by clinical exam and the fact that it is invasive lobular carcinoma, it was felt best to do upfront surgery.  March 8, 2023:p T3 N0 M0 invasive lobular carcinoma s/p bilateral mastectomy with left deep axillary sentinel lymph node biopsy, with bilateral placement of prepectoral tissue expanders for reconstruction and bilateral placement of AlloDerm.  Pathology shows multifocal tumor in  upper outer quadrant and upper inner quadrant, invasive lobular carcinoma grade 2 with a [place] score  score of 7  , total of 4 foci were seen, largest tumor being 60 mm, 20 mm, 2 mm, 1 mm.  No DCIS identified.  Lobular carcinoma in situ present.  No lymphatic or vascular channel invasion, no dermal lymphovascular space invasion all margins were negative for invasive carcinoma 4 lymph nodes were all negative for malignancy its pathologic T3N0  ER %, CA %, HER2/cheryl 2+ with Ki-67 of 5%  Previous biopsies had shown 2 of the tumors were ER/CA positive HER2/cheryl negative and one of them was ER/CA positive HER2 positive  On discussing with pathologist Dr. Serrano, she thinks that the larger tumor was ER CA positive and HER2 positive.  HER2 copy number of 5.7 with HER2/CEP ratio of 3.5 positive and this was on the larger tumor.  Given that patient has heterogeneous tumor with the larger lesion being ER/CA positive HER2 positive and a Ki-67 of 40%, being premenopausal we will treat with Taxotere carboplatin Herceptin Perjeta.  April 20, 2023: Patient was to start cycle 1 today but unfortunately her liver function tests were extremely high.  Discussed about alcohol and she had drank and taken Tylenol.  Will hold chemo today  April 28, 2023: Reviewed her liver  function test which are completely normalized.  Discussed with her not to drink alcohol.  Here for cycle 1 day 1 TCHP chemotherapy.  Reviewed echo which showed ejection fraction of 64% with strain pattern of -21.  Patient understands all the side effects\  5/4/2023: Patient tolerated Cycle 1 TCHP well. She has had diarrhea, nausea and fatigue that have been well managed.   5/18/2023: C2 TCHP  5/22/2023: Here for close follow-up and possible IV fluids after treatment last Friday, 5/18/2023.  Patient had a fairly good weekend with good oral intake, weight that is notably stable, and very little nausea.  She only had 1 loose stool this morning and therefore no significant diarrhea.  CMP is WNL.  Per discussion with patient we will give just 500 mL normal saline.  May 25, 2023: Patient has diarrhea.  She is 1 week out of cycle 2 Taxotere carboplatin Herceptin Perjeta.  She has some abdominal cramping.  We will give her IV fluids today.  Given carboplatinum is not available she will receive Taxotere Herceptin Perjeta for 2 more cycles prior to going to Adriamycin Cytoxan.  Cycle #3 TCHP 6/8/2023  Patient returns 6/12/2023 for toxicity check.  She is eating and drinking sufficiently at this point.  She does not feel she has benefited from the IV fluids in the past and therefore will not do that today.  She denies any fevers or chills.  June 29, 2023: Cycle 4 TCHP.  She will need referral to radiation at the completion of 6 cycles of TCHP  07/20/2023 proceed with cycle 5 of carboplatin, Taxotere, Herceptin, Perjeta with Neulasta support.  August 10, 2023: Final cycle 6 TCHP.  Thereafter plan to continue every 3-week Herceptin/Perjeta for total of 1 year.   We have discussed potentially beginning Lupron followed by aromatase inhibitor therapy following radiation however patient wants to discuss potential hysterectomy and bilateral oophorectomy with her GYN.    September 21, 2023: Patient started radiation has completed 2  treatments so far out of 25.  Patient has history of depression in the family but not herself.  We discussed about consideration of Lupron with aromatase inhibitor but she wants to undergo hysterectomy with bilateral oophorectomy because of her fibroids.  In which case we could consider starting tamoxifen until she has oophorectomy and subsequently start AI.  She is tolerating Herceptin Perjeta very well  10/12/23 Here for Hepcetin and Perjeta today. She is currently on 18 of 25 radiation treatments and tolerating well.  She has no new concerns today.  10/23/2023 completed radiation under the direction of Dr. Hines.  November 3, 2023: Patient to receive Herceptin Perjeta.  She is due to get oophorectomy following which we will start aromatase inhibitor Arimidex.  November 28, 2023: Here for Herceptin Perjeta.  She is due to receive oophorectomy as of January 4, 2024.  Subsequent to that we will start Arimidex.  Patient is premenopausal and did not want to start Lupron and prefer to go to oophorectomy.  We discussed about starting tamoxifen until oophorectomy is done but patient prefers to not take tamoxifen and wants to wait till oophorectomy is done and subsequently we can start aromatase inhibitor  S/p hysterectomy with bilateral salpingo-oophorectomy January 3, 2024.  Pathology is benign.  She had benign left follicle cyst and inclusion cyst.  There is a subserosal 4 cm uterine leiomyoma.  2/2024 initiating letrozole in the setting of ER/WV positivity.  3/14/2024 proceed with Herceptin Perjeta which is continued every 3 weeks.  She will complete therapy April 2024 April 4, 2024: Here for cycle 11 Herceptin Perjeta.  She will be completed in 3 weeks her echo in February was normal  April 24, 2024: Patient is here for cycle 12 Herceptin Perjeta.  This is her last treatment.  She is doing very well.  May 23, 2024: Tolerating Femara  July 23, 2024: Tolerating Femara    *S/p IUD removal  Experiencing hot flashes  with chemo likely triggering menopausal changes.  Hysterectomy 1/2024 with Dr. 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 She did recommend she start taking her gabapentin again to help with her anxiety and irritability/anger from her decadron with treatment.   Patient continues follow-up with supportive oncology, RADHA Rolle. Patient is encouraged to continue to follow with Cierra.   Much improved.    *  Port study negative    *Intermittent mild elevation of LFTs.    Patient has previously reported intermittent use of Tylenol and alcohol.    8/31/2023 AST 35, ALT 41, alk phos 100.  Patient denies any alcohol intake or Tylenol.  This is felt to be drug related.  Stable to improved. Monitor.  10/12/2023 AST 93, .  Patient has had 1 joe this past week however denies any other alcohol or Tylenol intake.  Reviewed with Dr. TRINIDAD and we will treat.  2/22/2024 stable to improved, AST 31, ALT 41, alk phos 122, T. bili 0.6  Slight elevation of LFTs today with AST 35, ALT 81, alkaline phosphatase 264.  Patient was encouraged to avoid alcohol and Tylenol.  We will repeat labs at scheduled follow-up in 3 weeks  April 4, 2024: AST 68, , alkaline phosphatase 177, total bilirubin normal likely secondary to the Tylenol she took in the cold medicine    *Cardiac monitoring  Seen by cardiology today, 7/27/2023.  Echo stable.  Plan for continued echocardiogram every 3 months while on therapy.  Continue low-dose lisinopril  2/8/2024 Echo stable with EF 67%  Continues follow-up with cardio oncology.    PLAN:  Completed Herceptin Perjeta   continue letrozole 2.5 mg daily continue calcium and vitamin D supplement  Discussed a trial of  tart cherry Gummies for arthralgias from aromatase inhibitor.  The patient was reminded to avoid alcohol and Tylenol  Liver function tests are normal  S/p port removal  Follow-up with Dr. Stone in 4 months with labs    This patient is on high risk drug therapy requiring intensive monitoring for toxicity.            Kathrin Frederick MD  07/23/2024      Dr. Kell Hi

## 2024-07-24 DIAGNOSIS — E89.0 POSTABLATIVE HYPOTHYROIDISM: ICD-10-CM

## 2024-07-24 DIAGNOSIS — E78.5 MILD HYPERLIPIDEMIA: Primary | ICD-10-CM

## 2024-07-24 RX ORDER — LEVOTHYROXINE SODIUM 0.12 MG/1
125 TABLET ORAL DAILY
Qty: 90 TABLET | Refills: 1 | Status: SHIPPED | OUTPATIENT
Start: 2024-07-24

## 2024-08-22 ENCOUNTER — HOSPITAL ENCOUNTER (OUTPATIENT)
Dept: PHYSICAL THERAPY | Facility: HOSPITAL | Age: 49
Setting detail: THERAPIES SERIES
Discharge: HOME | End: 2024-08-22
Payer: COMMERCIAL

## 2024-08-22 DIAGNOSIS — C50.912 BREAST CARCINOMA, LOBULAR, LEFT: ICD-10-CM

## 2024-08-22 DIAGNOSIS — Z91.89 AT RISK FOR LYMPHEDEMA: Primary | ICD-10-CM

## 2024-08-22 DIAGNOSIS — Z90.13 S/P BILATERAL MASTECTOMY: ICD-10-CM

## 2024-08-22 PROCEDURE — 97535 SELF CARE MNGMENT TRAINING: CPT

## 2024-08-22 PROCEDURE — 93702 BIS XTRACELL FLUID ANALYSIS: CPT

## 2024-08-22 NOTE — THERAPY RE-EVALUATION
Physical Therapy Lymphedema Re-Evaluation  [facility]     Patient Name: Ursula Kulkarni  : 1975  MRN: [id number]  Today's Date: 2024      Visit Date: 2024    Visit Dx:    ICD-10-CM ICD-9-CM   1. At risk for lymphedema  Z91.89 V49.89   2. S/P bilateral mastectomy  Z90.13 V45.71   3. Breast carcinoma, lobular, left  C50.912 174.9       Patient Active Problem List   Diagnosis    Hypothyroidism    Anxiety    Mild hyperlipidemia    Heartburn    Elevated blood pressure reading without diagnosis of hypertension    Thyroid nodule    Allergic rhinitis    COVID-19 virus infection    Non-recurrent acute suppurative otitis media of right ear without spontaneous rupture of tympanic membrane    Rash    Pharyngeal dysphagia    Indigestion    Fatigue    Abnormal mammogram of left breast    Malignant neoplasm of overlapping sites of left breast in female, estrogen receptor positive    Breast carcinoma, lobular, left    High risk medication use    Encounter for adjustment or management of vascular access device    Elevated alkaline phosphatase level    Diarrhea    Primary hypertension        Past Medical History:   Diagnosis Date    ADD (attention deficit disorder)     Breast cancer 2023    LEFT SIDE, HX BILATERAL MASTECTOMY, LAST CHEMO 2023, LAST RADIATION 10/23/23  - TARGETED THERAPY EVERY 3 WEEKS CURRENTLY - FOLLOWED BY [facility]    GERD (gastroesophageal reflux disease)     Graves disease     RADIOACTIVE IODINE TREAMENT IN PAST    History of COVID-2022    History of gestational diabetes     History of melanoma     History of radioactive iodine thyroid ablation     History of vertigo     Hyperlipidemia     DIET CONTROLLED    Hypertension     Hypothyroidism     Uterine fibroid         Past Surgical History:   Procedure Laterality Date    BREAST RECONSTRUCTION Bilateral 2023    Procedure: BILATERAL PLACEMENT  TISSUE EXPANDER AND ALLODERM;  Surgeon: Eamon Stone MD;   Location: [facility];  Service: Plastics;  Laterality: Bilateral;    BREAST TISSUE EXPANDER REMOVAL INSERTION OF IMPLANT Bilateral 5/8/2024    Procedure: BILATERAL REMOVAL TISSUE EXPANDERS AND PLACEMENT OF IMPLANTS, REMOVAL MEDIPORT RIGHT CHEST;  Surgeon: Eamon Stone MD;  Location: [facility] OR;  Service: Plastics;  Laterality: Bilateral;    COLONOSCOPY  04/24/2024    non-bleeding internal and external hemorrhoids, repeat in 10 years    D & C CERVICAL BIOPSY  01/2006    INTRAUTERINE DEVICE INSERTION  2010    Mirena    INTRAUTERINE DEVICE INSERTION  2015    Mirena exchange, old IUD removal and new one inserted by Dr Carr, Lot # [id number] 09/17.mar    MASTECTOMY W/ SENTINEL NODE BIOPSY Bilateral 03/08/2023    Procedure: Bilateral total mastectomy, left axillary sentinel lymph node biopsy;  Surgeon: Kell Hi MD;  Location: [facility];  Service: General;  Laterality: Bilateral;    SCAR REVISION BREAST Right 04/03/2023    Procedure: RIGHT MASTECTOMY REVISION;  Surgeon: Eamon Stone MD;  Location: [facility];  Service: Plastics;  Laterality: Right;    TOTAL LAPAROSCOPIC HYSTERECTOMY Bilateral 01/03/2024    Procedure: TOTAL LAPAROSCOPIC HYSTERECTOMY, BILATERAL SALPIGO-OOPHORECTOMY, CYSTOSCOPY;  Surgeon: Rocio Manning MD;  Location: [facility];  Service: Obstetrics/Gynecology;  Laterality: Bilateral;    UPPER GASTROINTESTINAL ENDOSCOPY  04/24/2024    Schatzki ring dilated; small hiatal hernia    VENOUS ACCESS DEVICE (PORT) INSERTION Right 04/13/2023    Procedure: right port placement;  Surgeon: Kell Hi MD;  Location: [facility];  Service: General;  Laterality: Right;    WISDOM TOOTH EXTRACTION         Visit Dx:    ICD-10-CM ICD-9-CM   1. At risk for lymphedema  Z91.89 V49.89   2. S/P bilateral mastectomy  Z90.13 V45.71   3. Breast carcinoma, lobular, left  C50.912 174.9            Lymphedema       Row Name 08/22/24 0700             Subjective Pain     Able to rate subjective pain? yes  -LB      Pre-Treatment Pain Level 2  -LB         Subjective    Subjective Comments I am having alot of joint pain. I can hardly move when I get out of bed or up from a chair. I am going to the gym 3-5 days/week. I am going to start tart cherry soon.  -LB         Lymphedema Assessment    Lymphedema Classification LUE:;at risk/stage 0  -LB      Lymphedema Cancer Related Sx bilateral;modified radical mastectomy  -LB      Lymphedema Surgery Comments 3/8/23; re-excision 4/3/23  -LB      Lymph Nodes Removed # 4  -LB      Positive Lymph Nodes # 0  -LB      Chemo Received yes  -LB      Radiation Therapy Received yes  -LB      Infections or Cellulitis? no  -LB         L-Dex Bioimpedence Screening    L-Dex Measurement Extremity LUE  -LB      L-Dex Patient Position Standing  -LB      L-Dex UE Dominate Side Right  -LB      L-Dex UE At Risk Side Left  -LB      L-Dex UE Pre Surgical Value Yes  -LB      L-Dex UE Score -0.2  -LB      L-Dex UE Baseline Score 2.4  -LB      L-Dex UE Value Change -2.6  -LB      L-Dex UE Comment WNL; stable  -LB      $ L-Dex Charge yes  -LB                User Key  (r) = Recorded By, (t) = Taken By, (c) = Cosigned By      Initials Name Provider Type    Rocio Biggs PT Physical Therapist                                    Therapy Education  Education Details: reviewed s/s of lymphedema, steps to prevention, bioimpedance results and interpretation, joint pain management  Given: Symptoms/condition management, Edema management  Program: Reinforced  How Provided: Verbal  Provided to: Patient  Level of Understanding: Verbalized  91410 - PT Self Care/Mgmt Minutes: 10       OP Exercises       Row Name 08/22/24 0700             Subjective    Subjective Comments I am having alot of joint pain. I can hardly move when I get out of bed or up from a chair. I am going to the gym 3-5 days/week. I am going to start tart cherry soon.  -LB         Subjective Pain    Able to rate  subjective pain? yes  -LB      Pre-Treatment Pain Level 2  -LB                User Key  (r) = Recorded By, (t) = Taken By, (c) = Cosigned By      Initials Name Provider Type    Rocio Biggs, PT Physical Therapist                                 PT OP Goals       Row Name 08/22/24 1000          Long Term Goals    LTG Date to Achieve 03/11/23  -LB     LTG 1 Pt will maintain LDex bioimpedance WNL.  -LB     LTG 1 Progress Ongoing  -LB     LTG 1 Progress Comments WNL and stable today at -.2  -LB     LTG 2 Pt will demonstrate full AROM BUE post-operatively.  -LB     LTG 2 Progress Met  -LB     LTG 3 Pt will acquire appropriately fitted compression garment and verbalize appropriate wear schedule.  -LB     LTG 3 Progress Met  -LB               User Key  (r) = Recorded By, (t) = Taken By, (c) = Cosigned By      Initials Name Provider Type    Rocio Biggs, PT Physical Therapist                     PT Assessment/Plan       Row Name 08/22/24 1047          PT Assessment    Functional Limitations Other (comment);Limitations in functional capacity and performance  at risk for lymphedema  -LB     Impairments Impaired flexibility;Impaired lymphatic circulation  -LB     Assessment Comments Pt returns for 7month bioimpedance follow up reporting return to 3-5 days/week workout with fair tolerance. No issues with UE swelling and no signs of lymphedema today. Pt does report inc joint pain throughout entire body with difficulty with AM, first few steps, movement after rest. This is affecting her life and increases with too much activity. She has resumed full return to household and job tasks. She demonstrates full AROM BUE. Repeat bioimpedance testing WNL and stable at -.2. She has met 2/3 LTGs and continues to make good progress. We reviewed s/s of lymphedema and discussed, considerations for exercise/joint pain, use of compression sleeve. Pt remains appropriate for continued skilled PT services to address deficits as needed and  continue to monitor for lymphedema.  -LB     Rehab Potential Good  -LB     Patient/caregiver participated in establishment of treatment plan and goals Yes  -LB     Patient would benefit from skilled therapy intervention Yes  -LB        PT Plan    PT Frequency Other (comment)  -LB     Predicted Duration of Therapy Intervention (PT) repeat bioimpedance in 6 months  -LB     Planned CPT's? PT EVAL LOW COMPLEXITY: 15787;PT RE-EVAL: 49466;PT THER PROC EA 15 MIN: 83387;PT THER ACT EA 15 MIN: 03302;PT MANUAL THERAPY EA 15 MIN: 59969;PT NEUROMUSC RE-EDUCATION EA 15 MIN: 43558;PT SELF CARE/HOME MGMT/TRAIN EA 15: 75041;PT BIS XTRACELL FLUID ANALYSIS: 09441  -LB     PT Plan Comments repeat bioimpedance in 6 months  -LB               User Key  (r) = Recorded By, (t) = Taken By, (c) = Cosigned By      Initials Name Provider Type    LB Rocio Daniels, PT Physical Therapist                                 Time Calculation:   Start Time: 0730  Stop Time: 0745  Time Calculation (min): 15 min  Total Timed Code Minutes- PT: 10 minute(s)  Timed Charges  85228 - PT Self Care/Mgmt Minutes: 10  Total Minutes  Timed Charges Total Minutes: 10   Total Minutes: 10   Therapy Charges for Today       Code Description Service Date Service Provider Modifiers Qty    80801874324 HC PT BIS XTRACELL FLUID ANALYSIS 8/22/2024 Rocio Daniels, PT  1    60660569378 HC PT SELF CARE/MGMT/TRAIN EA 15 MIN 8/22/2024 Rocio Daniels, PT GP 1                      Rocio Daniels PT  8/22/2024

## 2024-09-05 ENCOUNTER — TELEMEDICINE (OUTPATIENT)
Dept: PSYCHIATRY | Facility: HOSPITAL | Age: 49
End: 2024-09-05
Payer: COMMERCIAL

## 2024-09-05 DIAGNOSIS — Z17.0 MALIGNANT NEOPLASM OF OVERLAPPING SITES OF LEFT BREAST IN FEMALE, ESTROGEN RECEPTOR POSITIVE: ICD-10-CM

## 2024-09-05 DIAGNOSIS — F43.23 ADJUSTMENT DISORDER WITH MIXED ANXIETY AND DEPRESSED MOOD: Primary | ICD-10-CM

## 2024-09-05 DIAGNOSIS — C50.812 MALIGNANT NEOPLASM OF OVERLAPPING SITES OF LEFT BREAST IN FEMALE, ESTROGEN RECEPTOR POSITIVE: ICD-10-CM

## 2024-09-05 DIAGNOSIS — F51.02 ADJUSTMENT INSOMNIA: ICD-10-CM

## 2024-09-05 RX ORDER — GABAPENTIN 300 MG/1
300 CAPSULE ORAL TAKE AS DIRECTED
Qty: 90 CAPSULE | Refills: 2 | Status: SHIPPED | OUTPATIENT
Start: 2024-09-05 | End: 2025-09-05

## 2024-09-05 NOTE — PROGRESS NOTES
Behavioral Oncology Services  Video visit conducted via [facility] Video Visit  You have chosen to receive care through a telehealth visit.  Do you consent to use a video/audio connection for your medical care today? YES  Telehealth provided in patient's home.  Location of Provider: [place], [place]     Subjective  Patient ID: Ursula Kulkarni is a 49 y.o. female is seen via telehealth in the Behavioral Oncology Clinic.    CC: Anxiety    HPI/ Interval History:   Pt is seen in follow up regarding ongoing impact of anxiety, discomfort, ongoing grief in survivorship of breast cancer. Since initial visit, patient has completed Herceptin/ Perjeta, now continuing on Femara. States she is doing generally well, although with intrusive joint aches that have been persistent since radiation in October. Did not necessary feel this was worsened with initiation of letrozole, nor has it improved since discontinuing Perjeta. States this is uncomfortable, specifically hurting when getting up from seated or laying position, although able to work through this and OK. Continues with goal of finding non pharmacological strategies for managing this vs taking a medication. Has added tart cherry with questionable benefit. Has also taken Mg, intermittently helpful with reportedly 'terrible' sleep. Is adhering to caffeine curfew. Sleeping well appx 3 nights weekly, although appx 4/7 nights estimates 2-4 hours throughout 8 hour time in bed. Difficult to initiate and fragmented due to hot flashes vs using restroom vs general restlessness. Denies nightmares. Notes impact of fatigue on emotionality, lessened ability to handle daily stressors. Specifically notes interpersonal conflict exacerbated by body dysmorphia, grief surrounding adjusted normal, etc.    Pt does report trial of gabapentin 200 mg q hs as previously discussed although without benefit to sleep and thus DCd.    Exam: As above    Recent Labs Reviewed:  Lab on 07/23/2024    Component Date Value    Glucose 07/23/2024 76     BUN 07/23/2024 18     Creatinine 07/23/2024 1.04 (H)     Sodium 07/23/2024 143     Potassium 07/23/2024 4.3     Chloride 07/23/2024 103     CO2 07/23/2024 30.0 (H)     Calcium 07/23/2024 10.5     Total Protein 07/23/2024 7.3     Albumin 07/23/2024 4.9     ALT (SGPT) 07/23/2024 35 (H)     AST (SGOT) 07/23/2024 32     Alkaline Phosphatase 07/23/2024 137 (H)     Total Bilirubin 07/23/2024 0.6     Globulin 07/23/2024 2.4     A/G Ratio 07/23/2024 2.0     BUN/Creatinine Ratio 07/23/2024 17.3     Anion Gap 07/23/2024 10.0     eGFR 07/23/2024 66.8     WBC 07/23/2024 7.34     RBC 07/23/2024 4.79     Hemoglobin 07/23/2024 14.2     Hematocrit 07/23/2024 43.6     MCV 07/23/2024 91.0     MCH 07/23/2024 29.6     MCHC 07/23/2024 32.6     RDW 07/23/2024 12.4     RDW-SD 07/23/2024 41.1     MPV 07/23/2024 9.9     Platelets 07/23/2024 283     Neutrophil % 07/23/2024 50.6     Lymphocyte % 07/23/2024 35.4     Monocyte % 07/23/2024 5.4     Eosinophil % 07/23/2024 7.4 (H)     Basophil % 07/23/2024 0.8     Immature Grans % 07/23/2024 0.4     Neutrophils, Absolute 07/23/2024 3.71     Lymphocytes, Absolute 07/23/2024 2.60     Monocytes, Absolute 07/23/2024 0.40     Eosinophils, Absolute 07/23/2024 0.54 (H)     Basophils, Absolute 07/23/2024 0.06     Immature Grans, Absolute 07/23/2024 0.03     nRBC 07/23/2024 0.0    Labs review    Current Psychotropic Medications:  No psychotropic medications    Plan of Care/ Medical Decision Making  Retry gabapentin, considering potential need for higher dosing, likely 300-900 mg for benefit to sx of hot flashes, sleep, and pain.  Additionally discussed non pharmacological strategies, specifically considering lowering temp in room with discussion of potential cooling products. Focused on sleep as targetable sx to assist with emotional lability, grief experience.  FU scheduled in 4 weeks.     Diagnoses and all orders for this visit:    1. Adjustment  disorder with mixed anxiety and depressed mood (Primary)    2. Adjustment insomnia    3. Malignant neoplasm of overlapping sites of left breast in female, estrogen receptor positive    I spent 47 minutes caring for Ursula on this date of service. This time includes time spent by me in the following activities: preparing for the visit, reviewing tests, obtaining and/or reviewing a separately obtained history, performing a medically appropriate examination and/or evaluation, counseling and educating the patient/family/caregiver, ordering medications, tests, or procedures, and documenting information in the medical record.

## 2024-09-09 DIAGNOSIS — C50.812 MALIGNANT NEOPLASM OF OVERLAPPING SITES OF LEFT BREAST IN FEMALE, ESTROGEN RECEPTOR POSITIVE: ICD-10-CM

## 2024-09-09 DIAGNOSIS — Z17.0 MALIGNANT NEOPLASM OF OVERLAPPING SITES OF LEFT BREAST IN FEMALE, ESTROGEN RECEPTOR POSITIVE: ICD-10-CM

## 2024-09-09 RX ORDER — LETROZOLE 2.5 MG/1
2.5 TABLET, FILM COATED ORAL DAILY
Qty: 90 TABLET | Refills: 1 | Status: SHIPPED | OUTPATIENT
Start: 2024-09-09

## 2024-09-11 ENCOUNTER — APPOINTMENT (OUTPATIENT)
Dept: GENERAL RADIOLOGY | Facility: HOSPITAL | Age: 49
End: 2024-09-11
Payer: COMMERCIAL

## 2024-09-11 ENCOUNTER — HOSPITAL ENCOUNTER (EMERGENCY)
Facility: HOSPITAL | Age: 49
Discharge: HOME | End: 2024-09-11
Attending: EMERGENCY MEDICINE
Payer: COMMERCIAL

## 2024-09-11 VITALS
HEART RATE: 67 BPM | RESPIRATION RATE: 16 BRPM | SYSTOLIC BLOOD PRESSURE: 114 MMHG | OXYGEN SATURATION: 97 % | TEMPERATURE: 97.4 F | DIASTOLIC BLOOD PRESSURE: 80 MMHG

## 2024-09-11 DIAGNOSIS — E86.0 DEHYDRATION: ICD-10-CM

## 2024-09-11 DIAGNOSIS — R73.9 HYPERGLYCEMIA: ICD-10-CM

## 2024-09-11 DIAGNOSIS — R11.2 NAUSEA AND VOMITING, UNSPECIFIED VOMITING TYPE: ICD-10-CM

## 2024-09-11 DIAGNOSIS — R19.7 DIARRHEA, UNSPECIFIED TYPE: ICD-10-CM

## 2024-09-11 DIAGNOSIS — U07.1 COVID-19: Primary | ICD-10-CM

## 2024-09-11 LAB
ALBUMIN SERPL-MCNC: 4.8 G/DL (ref 3.5–5.2)
ALBUMIN/GLOB SERPL: 1.8 G/DL
ALP SERPL-CCNC: 103 U/L (ref 39–117)
ALT SERPL W P-5'-P-CCNC: 13 U/L (ref 1–33)
ANION GAP SERPL CALCULATED.3IONS-SCNC: 10.1 MMOL/L (ref 5–15)
AST SERPL-CCNC: 16 U/L (ref 1–32)
BASOPHILS # BLD AUTO: 0.04 10*3/MM3 (ref 0–0.2)
BASOPHILS NFR BLD AUTO: 0.3 % (ref 0–1.5)
BILIRUB SERPL-MCNC: 1.2 MG/DL (ref 0–1.2)
BUN SERPL-MCNC: 22 MG/DL (ref 6–20)
BUN/CREAT SERPL: 16.7 (ref 7–25)
CALCIUM SPEC-SCNC: 9.7 MG/DL (ref 8.6–10.5)
CHLORIDE SERPL-SCNC: 104 MMOL/L (ref 98–107)
CO2 SERPL-SCNC: 22.9 MMOL/L (ref 22–29)
CREAT SERPL-MCNC: 1.32 MG/DL (ref 0.57–1)
DEPRECATED RDW RBC AUTO: 40.8 FL (ref 37–54)
EGFRCR SERPLBLD CKD-EPI 2021: 50.2 ML/MIN/1.73
EOSINOPHIL # BLD AUTO: 0 10*3/MM3 (ref 0–0.4)
EOSINOPHIL NFR BLD AUTO: 0 % (ref 0.3–6.2)
ERYTHROCYTE [DISTWIDTH] IN BLOOD BY AUTOMATED COUNT: 12.5 % (ref 12.3–15.4)
GLOBULIN UR ELPH-MCNC: 2.6 GM/DL
GLUCOSE SERPL-MCNC: 118 MG/DL (ref 65–99)
HCT VFR BLD AUTO: 40.8 % (ref 34–46.6)
HGB BLD-MCNC: 13.6 G/DL (ref 12–15.9)
HOLD SPECIMEN: NORMAL
HOLD SPECIMEN: NORMAL
IMM GRANULOCYTES # BLD AUTO: 0.13 10*3/MM3 (ref 0–0.05)
IMM GRANULOCYTES NFR BLD AUTO: 0.8 % (ref 0–0.5)
LIPASE SERPL-CCNC: 62 U/L (ref 13–60)
LYMPHOCYTES # BLD AUTO: 2.3 10*3/MM3 (ref 0.7–3.1)
LYMPHOCYTES NFR BLD AUTO: 14.9 % (ref 19.6–45.3)
MCH RBC QN AUTO: 30.1 PG (ref 26.6–33)
MCHC RBC AUTO-ENTMCNC: 33.3 G/DL (ref 31.5–35.7)
MCV RBC AUTO: 90.3 FL (ref 79–97)
MONOCYTES # BLD AUTO: 1.12 10*3/MM3 (ref 0.1–0.9)
MONOCYTES NFR BLD AUTO: 7.3 % (ref 5–12)
NEUTROPHILS NFR BLD AUTO: 11.8 10*3/MM3 (ref 1.7–7)
NEUTROPHILS NFR BLD AUTO: 76.7 % (ref 42.7–76)
NRBC BLD AUTO-RTO: 0 /100 WBC (ref 0–0.2)
PLATELET # BLD AUTO: 199 10*3/MM3 (ref 140–450)
PMV BLD AUTO: 10 FL (ref 6–12)
POTASSIUM SERPL-SCNC: 4 MMOL/L (ref 3.5–5.2)
PROT SERPL-MCNC: 7.4 G/DL (ref 6–8.5)
RBC # BLD AUTO: 4.52 10*6/MM3 (ref 3.77–5.28)
SODIUM SERPL-SCNC: 137 MMOL/L (ref 136–145)
WBC NRBC COR # BLD AUTO: 15.39 10*3/MM3 (ref 3.4–10.8)
WHOLE BLOOD HOLD COAG: NORMAL
WHOLE BLOOD HOLD SPECIMEN: NORMAL

## 2024-09-11 PROCEDURE — 36415 COLL VENOUS BLD VENIPUNCTURE: CPT

## 2024-09-11 PROCEDURE — 25010000002 ONDANSETRON PER 1 MG: Performed by: EMERGENCY MEDICINE

## 2024-09-11 PROCEDURE — 99283 EMERGENCY DEPT VISIT LOW MDM: CPT

## 2024-09-11 PROCEDURE — 85025 COMPLETE CBC W/AUTO DIFF WBC: CPT

## 2024-09-11 PROCEDURE — 83690 ASSAY OF LIPASE: CPT

## 2024-09-11 PROCEDURE — 71045 X-RAY EXAM CHEST 1 VIEW: CPT

## 2024-09-11 PROCEDURE — 80053 COMPREHEN METABOLIC PANEL: CPT

## 2024-09-11 PROCEDURE — 96361 HYDRATE IV INFUSION ADD-ON: CPT

## 2024-09-11 PROCEDURE — 96374 THER/PROPH/DIAG INJ IV PUSH: CPT

## 2024-09-11 PROCEDURE — 25810000003 SODIUM CHLORIDE 0.9 % SOLUTION: Performed by: EMERGENCY MEDICINE

## 2024-09-11 RX ORDER — PROMETHAZINE HYDROCHLORIDE 25 MG/1
25 SUPPOSITORY RECTAL EVERY 6 HOURS PRN
Qty: 12 SUPPOSITORY | Refills: 0 | Status: SHIPPED | OUTPATIENT
Start: 2024-09-11

## 2024-09-11 RX ORDER — ONDANSETRON 2 MG/ML
8 INJECTION INTRAMUSCULAR; INTRAVENOUS ONCE
Status: COMPLETED | OUTPATIENT
Start: 2024-09-11 | End: 2024-09-11

## 2024-09-11 RX ORDER — SODIUM CHLORIDE 0.9 % (FLUSH) 0.9 %
10 SYRINGE (ML) INJECTION AS NEEDED
Status: DISCONTINUED | OUTPATIENT
Start: 2024-09-11 | End: 2024-09-11 | Stop reason: HOSPADM

## 2024-09-11 RX ORDER — ONDANSETRON 8 MG/1
8 TABLET, ORALLY DISINTEGRATING ORAL EVERY 8 HOURS PRN
Qty: 15 TABLET | Refills: 0 | Status: SHIPPED | OUTPATIENT
Start: 2024-09-11

## 2024-09-11 RX ADMIN — SODIUM CHLORIDE 1000 ML: 9 INJECTION, SOLUTION INTRAVENOUS at 13:38

## 2024-09-11 RX ADMIN — ONDANSETRON 8 MG: 2 INJECTION, SOLUTION INTRAMUSCULAR; INTRAVENOUS at 13:40

## 2024-09-11 NOTE — ED PROVIDER NOTES
EMERGENCY DEPARTMENT ENCOUNTER  Room Number:  17/17  Date of encounter:  9/11/2024  PCP: Phuong Wild APRN  Patient Care Team:  Phuong Wild APRN as PCP - General (Family Medicine)  Kathrin Frederick MD as Consulting Physician (Hematology and Oncology)  Fay Hines MD as Consulting Physician (Radiation Oncology)  Eamon Stone MD as Attending Provider (Plastic Surgery)  Kell Hi MD as Surgeon (Breast Surgery)  Phuong Wild APRN as Referring Physician (Family Medicine)  Alisia Fermin MD as Consulting Physician (Cardiology)     HPI:  Context: Ursula Kulkarni is a 49 y.o. female who presents to the ED c/o chief complaint of nausea vomiting.  History supplied by patient and patient's significant other.  Patient began feeling ill on Sunday, was diagnosed with COVID yesterday.  Patient's greatest concern today is nausea vomiting, reports 4 times emesis today, emesis nonbloody nonbilious.  Patient reports 2-3 episodes of diarrhea, diarrhea has no blood or mucus.  Patient has had cough, cough is minimally productive.  No chest pain or shortness of breath.  Patient does endorse fevers.  Patient reports history of breast cancer in the past, not currently undergoing treatment.  Patient has not been vaccinated against COVID-19.    MEDICAL HISTORY REVIEW  Reviewed in EPIC    PAST MEDICAL HISTORY  Active Ambulatory Problems     Diagnosis Date Noted    Hypothyroidism     Anxiety 07/13/2020    Mild hyperlipidemia 07/13/2020    Heartburn 07/13/2020    Elevated blood pressure reading without diagnosis of hypertension 07/13/2020    Thyroid nodule 07/13/2020    Allergic rhinitis 12/23/2020    COVID-19 virus infection 10/28/2021    Non-recurrent acute suppurative otitis media of right ear without spontaneous rupture of tympanic membrane 04/20/2022    Rash 06/17/2022    Pharyngeal dysphagia 10/12/2022    Indigestion 10/12/2022    Fatigue 10/12/2022    Abnormal mammogram of left breast 12/02/2022     Malignant neoplasm of overlapping sites of left breast in female, estrogen receptor positive 02/03/2023    Breast carcinoma, lobular, left 02/08/2023    High risk medication use 03/31/2023    Encounter for adjustment or management of vascular access device 04/14/2023    Elevated alkaline phosphatase level 04/22/2023    Diarrhea 05/25/2023    Primary hypertension 05/12/2024     Resolved Ambulatory Problems     Diagnosis Date Noted    Positive depression screening 07/13/2020    Bilateral impacted cerumen 07/13/2020     Past Medical History:   Diagnosis Date    ADD (attention deficit disorder)     Breast cancer 03/08/2023    GERD (gastroesophageal reflux disease)     Graves disease     History of COVID-19 2022    History of gestational diabetes 2010    History of melanoma     History of radioactive iodine thyroid ablation 2012    History of vertigo     Hyperlipidemia     Hypertension     Uterine fibroid        PAST SURGICAL HISTORY  Past Surgical History:   Procedure Laterality Date    BREAST RECONSTRUCTION Bilateral 03/08/2023    Procedure: BILATERAL PLACEMENT  TISSUE EXPANDER AND ALLODERM;  Surgeon: Eamon Stone MD;  Location: [facility] OR;  Service: Plastics;  Laterality: Bilateral;    BREAST TISSUE EXPANDER REMOVAL INSERTION OF IMPLANT Bilateral 5/8/2024    Procedure: BILATERAL REMOVAL TISSUE EXPANDERS AND PLACEMENT OF IMPLANTS, REMOVAL MEDIPORT RIGHT CHEST;  Surgeon: Eamon Stone MD;  Location: [facility] MAIN OR;  Service: Plastics;  Laterality: Bilateral;    COLONOSCOPY  04/24/2024    non-bleeding internal and external hemorrhoids, repeat in 10 years    D & C CERVICAL BIOPSY  01/2006    INTRAUTERINE DEVICE INSERTION  2010    Mirena    INTRAUTERINE DEVICE INSERTION  2015    Mirena exchange, old IUD removal and new one inserted by Dr Carr, Lot # [id number] 09/17.mar    MASTECTOMY W/ SENTINEL NODE BIOPSY Bilateral 03/08/2023    Procedure: Bilateral total mastectomy, left axillary sentinel lymph  node biopsy;  Surgeon: Kell Hi MD;  Location: [facility] MAIN OR;  Service: General;  Laterality: Bilateral;    SCAR REVISION BREAST Right 04/03/2023    Procedure: RIGHT MASTECTOMY REVISION;  Surgeon: Eamon Stone MD;  Location: [facility] MAIN OR;  Service: Plastics;  Laterality: Right;    TOTAL LAPAROSCOPIC HYSTERECTOMY Bilateral 01/03/2024    Procedure: TOTAL LAPAROSCOPIC HYSTERECTOMY, BILATERAL SALPIGO-OOPHORECTOMY, CYSTOSCOPY;  Surgeon: Rocio Manning MD;  Location: [facility] MAIN OR;  Service: Obstetrics/Gynecology;  Laterality: Bilateral;    UPPER GASTROINTESTINAL ENDOSCOPY  04/24/2024    Schatzki ring dilated; small hiatal hernia    VENOUS ACCESS DEVICE (PORT) INSERTION Right 04/13/2023    Procedure: right port placement;  Surgeon: Kell Hi MD;  Location: [facility] MAIN OR;  Service: General;  Laterality: Right;    WISDOM TOOTH EXTRACTION         FAMILY HISTORY  Family History   Problem Relation Age of Onset    Diabetes Father     Hyperlipidemia Father     Arthritis Father     Hypertension Father     Heart disease Father         S/P stents    Heart attack Father 41    Colon polyps Father     Melanoma Father     Prostate cancer Father     Alcohol abuse Father     Hyperlipidemia Sister     Hypertension Sister     Heart attack Sister     Diabetes Sister     COPD Sister         Heavy smoker    Heart disease Sister     Thyroid cancer Paternal Aunt     Lung cancer Maternal Grandmother     Cancer Maternal Grandfather         Bladder cancer    Lung cancer Maternal Grandfather     Heart failure Paternal Grandmother     Transient ischemic attack Paternal Grandmother     Heart disease Paternal Grandfather     Malig Hyperthermia Neg Hx        SOCIAL HISTORY  Social History     Socioeconomic History    Marital status:      Spouse name: Parish    Number of children: 2   Tobacco Use    Smoking status: Never     Passive exposure: Never    Smokeless tobacco: Never   Vaping Use    Vaping status:  Never Used   Substance and Sexual Activity    Alcohol use: Yes     Alcohol/week: 4.0 standard drinks of alcohol     Types: 4 Drinks containing 0.5 oz of alcohol per week     Comment: Maybe have a couple Friday and Saturday evening but not alwa    Drug use: Never    Sexual activity: Yes     Partners: Male     Birth control/protection: Post-menopausal, Hysterectomy     Comment:         ALLERGIES  Erythromycin and Penicillins    The patient's allergies have been reviewed    REVIEW OF SYSTEMS  All systems reviewed and negative except for those discussed in HPI.     PHYSICAL EXAM  I have reviewed the triage vital signs and nursing notes.  ED Triage Vitals   Temp Heart Rate Resp BP SpO2   09/11/24 1249 09/11/24 1249 09/11/24 1249 09/11/24 1251 09/11/24 1249   97.4 °F (36.3 °C) 94 16 114/85 95 %      Temp src Heart Rate Source Patient Position BP Location FiO2 (%)   -- -- 09/11/24 1251 09/11/24 1251 --     Sitting Right arm        General: No acute distress.  HENT: NCAT, PERRL, Nares patent.  Eyes: no scleral icterus.  Neck: trachea midline, no ROM limitations.  CV: regular rhythm, regular rate.  Respiratory: normal effort, CTAB.  Abdomen: soft, nondistended, NTTP, no rebound tenderness, no guarding or rigidity.  Musculoskeletal: no deformity.  Neuro: alert, moves all extremities, follows commands.  Skin: warm, dry.    LAB RESULTS  Recent Results (from the past 24 hour(s))   Comprehensive Metabolic Panel    Collection Time: 09/11/24 12:56 PM    Specimen: Arm, Right; Blood   Result Value Ref Range    Glucose 118 (H) 65 - 99 mg/dL    BUN 22 (H) 6 - 20 mg/dL    Creatinine 1.32 (H) 0.57 - 1.00 mg/dL    Sodium 137 136 - 145 mmol/L    Potassium 4.0 3.5 - 5.2 mmol/L    Chloride 104 98 - 107 mmol/L    CO2 22.9 22.0 - 29.0 mmol/L    Calcium 9.7 8.6 - 10.5 mg/dL    Total Protein 7.4 6.0 - 8.5 g/dL    Albumin 4.8 3.5 - 5.2 g/dL    ALT (SGPT) 13 1 - 33 U/L    AST (SGOT) 16 1 - 32 U/L    Alkaline Phosphatase 103 39 - 117 U/L     Total Bilirubin 1.2 0.0 - 1.2 mg/dL    Globulin 2.6 gm/dL    A/G Ratio 1.8 g/dL    BUN/Creatinine Ratio 16.7 7.0 - 25.0    Anion Gap 10.1 5.0 - 15.0 mmol/L    eGFR 50.2 (L) >60.0 mL/min/1.73   Lipase    Collection Time: 09/11/24 12:56 PM    Specimen: Arm, Right; Blood   Result Value Ref Range    Lipase 62 (H) 13 - 60 U/L   Green Top (Gel)    Collection Time: 09/11/24 12:56 PM   Result Value Ref Range    Extra Tube Hold for add-ons.    Lavender Top    Collection Time: 09/11/24 12:56 PM   Result Value Ref Range    Extra Tube hold for add-on    Gold Top - SST    Collection Time: 09/11/24 12:56 PM   Result Value Ref Range    Extra Tube Hold for add-ons.    Light Blue Top    Collection Time: 09/11/24 12:56 PM   Result Value Ref Range    Extra Tube Hold for add-ons.    CBC Auto Differential    Collection Time: 09/11/24 12:56 PM    Specimen: Arm, Right; Blood   Result Value Ref Range    WBC 15.39 (H) 3.40 - 10.80 10*3/mm3    RBC 4.52 3.77 - 5.28 10*6/mm3    Hemoglobin 13.6 12.0 - 15.9 g/dL    Hematocrit 40.8 34.0 - 46.6 %    MCV 90.3 79.0 - 97.0 fL    MCH 30.1 26.6 - 33.0 pg    MCHC 33.3 31.5 - 35.7 g/dL    RDW 12.5 12.3 - 15.4 %    RDW-SD 40.8 37.0 - 54.0 fl    MPV 10.0 6.0 - 12.0 fL    Platelets 199 140 - 450 10*3/mm3    Neutrophil % 76.7 (H) 42.7 - 76.0 %    Lymphocyte % 14.9 (L) 19.6 - 45.3 %    Monocyte % 7.3 5.0 - 12.0 %    Eosinophil % 0.0 (L) 0.3 - 6.2 %    Basophil % 0.3 0.0 - 1.5 %    Immature Grans % 0.8 (H) 0.0 - 0.5 %    Neutrophils, Absolute 11.80 (H) 1.70 - 7.00 10*3/mm3    Lymphocytes, Absolute 2.30 0.70 - 3.10 10*3/mm3    Monocytes, Absolute 1.12 (H) 0.10 - 0.90 10*3/mm3    Eosinophils, Absolute 0.00 0.00 - 0.40 10*3/mm3    Basophils, Absolute 0.04 0.00 - 0.20 10*3/mm3    Immature Grans, Absolute 0.13 (H) 0.00 - 0.05 10*3/mm3    nRBC 0.0 0.0 - 0.2 /100 WBC       I ordered the above labs and reviewed the results.    RADIOLOGY  XR Chest 1 View    Result Date: 9/11/2024  XR CHEST 1 VW-  HISTORY: Female who is  49 years-old, COVID-positive  TECHNIQUE: Frontal view of the chest  COMPARISON: 4/13/2023  FINDINGS: Heart, mediastinum and pulmonary vasculature are unremarkable. No focal pulmonary consolidation, pleural effusion, or pneumothorax. No acute osseous process.      No focal pulmonary consolidation. Follow-up as clinical indications persist.  This report was finalized on 9/11/2024 2:21 PM by Dr. Mani Addison M.D on Workstation: [id number]       I ordered the above noted radiological studies. I reviewed the images and results. I agree with the radiologist interpretation.    PROCEDURES  Procedures    MEDICATIONS GIVEN IN ER  Medications   sodium chloride 0.9 % flush 10 mL (has no administration in time range)   sodium chloride 0.9 % bolus 1,000 mL (0 mL Intravenous Stopped 9/11/24 1437)   ondansetron (ZOFRAN) injection 8 mg (8 mg Intravenous Given 9/11/24 1340)       PROGRESS, DATA ANALYSIS, CONSULTS, AND MEDICAL DECISION MAKING  A complete history and physical exam have been performed.  All available laboratory and imaging results have been reviewed by myself prior to disposition.    MDM    After the initial H&P, I discussed pertinent information from history and physical exam with patient/family.  Discussed differential diagnosis.  Discussed plan for ED evaluation/workup/treatment.  All questions answered.  Patient/family is agreeable with plan.  ED Course as of 09/11/24 1451   Wed Sep 11, 2024   1340   Family reports patient had symptom improvement with monoclonal antibodies in the past.  Discussed with family that monoclonal antibodies are no longer approved for this indication.  Discussed if patient has hypoxia she will be admitted to the hospital for steroids and remdesivir, discussed with patient is not hypoxic and does not require admission to the hospital, patient does not qualify for Paxlovid treatment. [JG]   1446 I reviewed chest x-ray in PACS, no pulmonary infiltrates per my read. [JG]   1446 Patient is  COVID-positive, denies any shortness of breath, chest x-ray unremarkable, patient satting fine on room air. [JG]   1447 Patient afebrile, vital signs stable.  Patient does have some leukocytosis but chest x-ray shows no sign of pneumonia.  Patient is mildly dehydrated with creatinine of 1.32, BUN 22.  Patient has had no emesis here, status post liter of IV fluids.  Patient is well-appearing appears appropriate for discharge with outpatient follow-up. [JG]   1447 Patient reassessed, discussed ED workup and results.  Discussed plan for discharge.  Discussed need to quarantine.  Discussed need for close follow-up with primary care, given extensive discussion return precautions, discharging. [JG]   1447 Family requesting treatment for COVID-19. [JG]      ED Course User Index  [JG] Bernardo Hauser MD       AS OF 14:51 EDT VITALS:    BP - 114/80  HR - 67  TEMP - 97.4 °F (36.3 °C)  O2 SATS - 97%    DIAGNOSIS  Final diagnoses:   COVID-19   Nausea and vomiting, unspecified vomiting type   Diarrhea, unspecified type   Dehydration   Hyperglycemia         DISPOSITION  DISCHARGE    Patient discharged in stable condition.    Reviewed implications of results, diagnosis, meds, responsibility to follow up, warning signs and symptoms of possible worsening, potential complications and reasons to return to ER.    Patient/Family voiced understanding of above instructions.    Discussed plan for discharge, as there is no emergent indication for admission. Patient referred to primary care provider for BP management due to today's BP. Pt/family is agreeable and understands need for follow up and repeat testing.  Pt is aware that discharge does not mean that nothing is wrong but it indicates no emergency is present that requires admission and they must continue care with follow-up as given below or physician of their choice.     FOLLOW-UP  Phuong Wild, APRN  [address]  [facility] [address] [phone]    Schedule an  appointment as soon as possible for a visit   as needed         Medication List        New Prescriptions      ondansetron ODT 8 MG disintegrating tablet  Commonly known as: ZOFRAN-ODT  Place 1 tablet on the tongue Every 8 (Eight) Hours As Needed for Nausea or Vomiting.     promethazine 25 MG suppository  Commonly known as: PHENERGAN  Insert 1 suppository into the rectum Every 6 (Six) Hours As Needed for Nausea or Vomiting.               Where to Get Your Medications        These medications were sent to [facility] [address] [place] [address] [address] [facility] [phone] [facility] [phone]   [address] [facility] [address]      Phone: [phone]   ondansetron ODT 8 MG disintegrating tablet  promethazine 25 MG suppository            Bernardo Hauser MD  09/11/24 2275

## 2024-10-03 ENCOUNTER — TELEMEDICINE (OUTPATIENT)
Dept: PSYCHIATRY | Facility: HOSPITAL | Age: 49
End: 2024-10-03
Payer: COMMERCIAL

## 2024-10-03 DIAGNOSIS — F43.23 ADJUSTMENT DISORDER WITH MIXED ANXIETY AND DEPRESSED MOOD: ICD-10-CM

## 2024-10-03 DIAGNOSIS — Z17.0 MALIGNANT NEOPLASM OF OVERLAPPING SITES OF LEFT BREAST IN FEMALE, ESTROGEN RECEPTOR POSITIVE: Primary | ICD-10-CM

## 2024-10-03 DIAGNOSIS — C50.812 MALIGNANT NEOPLASM OF OVERLAPPING SITES OF LEFT BREAST IN FEMALE, ESTROGEN RECEPTOR POSITIVE: Primary | ICD-10-CM

## 2024-10-03 NOTE — PROGRESS NOTES
Behavioral Oncology Services  Video visit conducted via [employer] Video Visit  You have chosen to receive care through a telehealth visit.  Do you consent to use a video/audio connection for your medical care today? YES  Telehealth provided in patient's home.  Location of Provider: [place], [place]     Subjective  Patient ID: Ursula Kulkarni is a 49 y.o. female is seen via telehealth in the Behavioral Oncology Clinic.    CC: Fatigue, sleep disruption, grief surrounding diagnosis    HPI/ Interval History:   Pt is seen in follow up regarding ongoing sx of grief, sleep disruption, and difficulty managing new normal in cancer survivorship.    Since last apt, pt notes significant improvement in various realms. Has greatly appreciated improved communication with spouse, allowing each other to have complicated conversations, work through grief together. Acknowledges triggering of previous traumas, while noting ongoing support and love in current relationship.    Sleep remains labile. States after last visit, pt became positive with Covid requiring two ED visits. This is her third Covid infection, historically down for two weeks following. This time was the worst, reporting 5 days of emesis and extreme sleep, and now having difficulty re regulating prior sleep and medication regiments. Has since resumed Mg, tart cherry, and gabapentin. Does feel gabapentin has been helpful at 300 mg q hs, hopeful to regain this response.    Exam: As above    Recent Labs Reviewed:  Admission on 09/11/2024, Discharged on 09/11/2024   Component Date Value    Glucose 09/11/2024 118 (H)     BUN 09/11/2024 22 (H)     Creatinine 09/11/2024 1.32 (H)     Sodium 09/11/2024 137     Potassium 09/11/2024 4.0     Chloride 09/11/2024 104     CO2 09/11/2024 22.9     Calcium 09/11/2024 9.7     Total Protein 09/11/2024 7.4     Albumin 09/11/2024 4.8     ALT (SGPT) 09/11/2024 13     AST (SGOT) 09/11/2024 16     Alkaline Phosphatase 09/11/2024 103      Total Bilirubin 09/11/2024 1.2     Globulin 09/11/2024 2.6     A/G Ratio 09/11/2024 1.8     BUN/Creatinine Ratio 09/11/2024 16.7     Anion Gap 09/11/2024 10.1     eGFR 09/11/2024 50.2 (L)     Lipase 09/11/2024 62 (H)     Extra Tube 09/11/2024 Hold for add-ons.     Extra Tube 09/11/2024 hold for add-on     Extra Tube 09/11/2024 Hold for add-ons.     Extra Tube 09/11/2024 Hold for add-ons.     WBC 09/11/2024 15.39 (H)     RBC 09/11/2024 4.52     Hemoglobin 09/11/2024 13.6     Hematocrit 09/11/2024 40.8     MCV 09/11/2024 90.3     MCH 09/11/2024 30.1     MCHC 09/11/2024 33.3     RDW 09/11/2024 12.5     RDW-SD 09/11/2024 40.8     MPV 09/11/2024 10.0     Platelets 09/11/2024 199     Neutrophil % 09/11/2024 76.7 (H)     Lymphocyte % 09/11/2024 14.9 (L)     Monocyte % 09/11/2024 7.3     Eosinophil % 09/11/2024 0.0 (L)     Basophil % 09/11/2024 0.3     Immature Grans % 09/11/2024 0.8 (H)     Neutrophils, Absolute 09/11/2024 11.80 (H)     Lymphocytes, Absolute 09/11/2024 2.30     Monocytes, Absolute 09/11/2024 1.12 (H)     Eosinophils, Absolute 09/11/2024 0.00     Basophils, Absolute 09/11/2024 0.04     Immature Grans, Absolute 09/11/2024 0.13 (H)     nRBC 09/11/2024 0.0    Labs reviewed    Current Psychotropic Medications:  Gabapentin 300 mg q hs    Plan of Care/ Medical Decision Making  Supported continuation of gabapentin 300 mg q hs.  Encouraged ongoing conversation with spouse and loved ones, recognizing triggers and allowing space to stay in the here and now.  Considered benefits of peer support. Pt interested and has been scheduled in follow up via Teams group in 4-6 weeks.    Diagnoses and all orders for this visit:    1. Malignant neoplasm of overlapping sites of left breast in female, estrogen receptor positive (Primary)    2. Adjustment disorder with mixed anxiety and depressed mood    I spent 33 minutes caring for Ursula on this date of service. This time includes time spent by me in the following activities:  preparing for the visit, reviewing tests, obtaining and/or reviewing a separately obtained history, performing a medically appropriate examination and/or evaluation, counseling and educating the patient/family/caregiver, ordering medications, tests, or procedures, and documenting information in the medical record.

## 2024-10-31 ENCOUNTER — HOSPITAL ENCOUNTER (OUTPATIENT)
Dept: ULTRASOUND IMAGING | Facility: HOSPITAL | Age: 49
Discharge: HOME | End: 2024-10-31
Admitting: NURSE PRACTITIONER
Payer: COMMERCIAL

## 2024-10-31 DIAGNOSIS — E04.1 THYROID NODULE: ICD-10-CM

## 2024-10-31 PROCEDURE — 76536 US EXAM OF HEAD AND NECK: CPT

## 2024-11-05 NOTE — PROGRESS NOTES
Date of Office Visit: 2024  Encounter Provider: RADHA Olivia  Place of Service: [facility] CARDIOLOGY  Patient Name: Ursula Kulkarni  :1975    Chief complaint  Cardio oncology care     History of Present Illness  Patient is a 49 y.o. year old female  patient of Dr. Fermin. Past medical history includes Graves' disease (s/p radioactive ablation), iatrogenic hypothyroidism, ADD, hyperlipidemia 2023 diagnosed with left-sided breast cancer.  On 3/2023 she underwent bilateral mastectomy and lymph node biopsy and placement of tissue expanders.  She she completed radiation therapy 10/2023.  She had a nephrectomy 2024.  She then started letrozole 2024.  She completed Herceptin Perjeta therapy 2024     Baseline echo on 2023 showed an ejection fraction 64.7% GLS -21% with normal diastolic function and trivial valve regurgitation.  Echo 2023 showed an ejection fraction of 60.5%, GLS -22.7%.  Echo on 10/19/2023 (today) shows an ejection fraction of 66% with GLS -22.9%, normal left ventricular wall thickness, grade 1 diastolic dysfunction and no significant pulmonary hypertension.  Echocardiogram on 2024 with EF 63%, GLS -22.6%, normal diastolic function and trivial valve regurgitation.  Overall unchanged.    Interval history  Patient presents today for routine follow-up.  I will visit with her today and have reviewed her medical record.  Since last visit she has been doing well.  She had episodes of palpitations once or twice since last visit due to dehydration and increased caffeine intake.  She denies shortness of breath, edema, dizziness, chest pain or chest pressure, syncope or presyncope.  She is using the elliptical and lifting weights 3 to 5 days/week and denies exertional symptoms.  Blood pressure today is well-controlled and she reports similar readings at home. She has questions today about receiving COVID vaccine as she has tested positive for  COVID 3 times with significant symptoms each time.    Past Medical History:   Diagnosis Date    ADD (attention deficit disorder)     Breast cancer 03/08/2023    LEFT SIDE, HX BILATERAL MASTECTOMY, LAST CHEMO 8/2023, LAST RADIATION 10/23/23  - TARGETED THERAPY EVERY 3 WEEKS CURRENTLY - FOLLOWED BY [facility]    GERD (gastroesophageal reflux disease)     Graves disease     RADIOACTIVE IODINE TREAMENT IN PAST    History of COVID-19 2022    History of gestational diabetes 2010    History of melanoma     History of radioactive iodine thyroid ablation 2012    History of vertigo     Hyperlipidemia     DIET CONTROLLED    Hypertension     Hypothyroidism     Uterine fibroid      Past Surgical History:   Procedure Laterality Date    BREAST RECONSTRUCTION Bilateral 03/08/2023    Procedure: BILATERAL PLACEMENT  TISSUE EXPANDER AND ALLODERM;  Surgeon: Eamon Stone MD;  Location: [facility];  Service: Plastics;  Laterality: Bilateral;    BREAST TISSUE EXPANDER REMOVAL INSERTION OF IMPLANT Bilateral 5/8/2024    Procedure: BILATERAL REMOVAL TISSUE EXPANDERS AND PLACEMENT OF IMPLANTS, REMOVAL MEDIPORT RIGHT CHEST;  Surgeon: Eamon Stone MD;  Location: [facility] OR;  Service: Plastics;  Laterality: Bilateral;    COLONOSCOPY  04/24/2024    non-bleeding internal and external hemorrhoids, repeat in 10 years    D & C CERVICAL BIOPSY  01/2006    INTRAUTERINE DEVICE INSERTION  2010    Mirena    INTRAUTERINE DEVICE INSERTION  2015    Mirena exchange, old IUD removal and new one inserted by Dr Carr, Lot # [id number] 09/17.mar    MASTECTOMY W/ SENTINEL NODE BIOPSY Bilateral 03/08/2023    Procedure: Bilateral total mastectomy, left axillary sentinel lymph node biopsy;  Surgeon: Kell Hi MD;  Location: [facility];  Service: General;  Laterality: Bilateral;    SCAR REVISION BREAST Right 04/03/2023    Procedure: RIGHT MASTECTOMY REVISION;  Surgeon: Eamon Stone MD;  Location: [facility];   Service: Plastics;  Laterality: Right;    TOTAL LAPAROSCOPIC HYSTERECTOMY Bilateral 01/03/2024    Procedure: TOTAL LAPAROSCOPIC HYSTERECTOMY, BILATERAL SALPIGO-OOPHORECTOMY, CYSTOSCOPY;  Surgeon: Rocio Manning MD;  Location: [facility];  Service: Obstetrics/Gynecology;  Laterality: Bilateral;    UPPER GASTROINTESTINAL ENDOSCOPY  04/24/2024    Schatzki ring dilated; small hiatal hernia    VENOUS ACCESS DEVICE (PORT) INSERTION Right 04/13/2023    Procedure: right port placement;  Surgeon: Kell Hi MD;  Location: [facility];  Service: General;  Laterality: Right;    WISDOM TOOTH EXTRACTION       Outpatient Medications Prior to Visit   Medication Sig Dispense Refill    Cholecalciferol (Vitamin D3) 250 MCG (71080 UT) tablet Take 10,000 Units by mouth Daily.      gabapentin (Neurontin) 300 MG capsule Take 1 capsule by mouth Take As Directed. 300 mg PO q evening meal, 600 mg po q hs as tolerated 90 capsule 2    letrozole (FEMARA) 2.5 MG tablet TAKE 1 TABLET DAILY 90 tablet 1    levothyroxine (SYNTHROID, LEVOTHROID) 125 MCG tablet Take 1 tablet by mouth Daily. Except none on Sundays. 90 tablet 1    lisinopril (PRINIVIL,ZESTRIL) 2.5 MG tablet Take 1 tablet by mouth Daily. 90 tablet 3    loratadine (CLARITIN) 10 MG tablet Take 1 tablet by mouth As Needed.      pantoprazole (PROTONIX) 40 MG EC tablet Take 1 tablet by mouth Daily. 30 tablet 3    Probiotic Product (PROBIOTIC ADVANCED PO) Take 1 tablet by mouth Daily.      ondansetron ODT (ZOFRAN-ODT) 8 MG disintegrating tablet Place 1 tablet on the tongue Every 8 (Eight) Hours As Needed for Nausea or Vomiting. 15 tablet 0    promethazine (PHENERGAN) 25 MG suppository Insert 1 suppository into the rectum Every 6 (Six) Hours As Needed for Nausea or Vomiting. 12 suppository 0     No facility-administered medications prior to visit.       Allergies as of 11/07/2024 - Reviewed 11/07/2024   Allergen Reaction Noted    Erythromycin GI Intolerance 01/27/2023     Penicillins Rash 07/13/2020     Social History     Socioeconomic History    Marital status:      Spouse name: Parish    Number of children: 2   Tobacco Use    Smoking status: Never     Passive exposure: Never    Smokeless tobacco: Never   Vaping Use    Vaping status: Never Used   Substance and Sexual Activity    Alcohol use: Yes     Alcohol/week: 4.0 standard drinks of alcohol     Types: 4 Drinks containing 0.5 oz of alcohol per week     Comment: Maybe have a couple Friday and Saturday evening but not alwa    Drug use: Never    Sexual activity: Yes     Partners: Male     Birth control/protection: Post-menopausal, Hysterectomy     Comment:       Family History   Problem Relation Age of Onset    Diabetes Father     Hyperlipidemia Father     Arthritis Father     Hypertension Father     Heart disease Father         S/P stents    Heart attack Father 41    Colon polyps Father     Melanoma Father     Prostate cancer Father     Alcohol abuse Father     Hyperlipidemia Sister     Hypertension Sister     Heart attack Sister     Diabetes Sister     COPD Sister         Heavy smoker    Heart disease Sister     Thyroid cancer Paternal Aunt     Lung cancer Maternal Grandmother     Cancer Maternal Grandfather         Bladder cancer    Lung cancer Maternal Grandfather     Heart failure Paternal Grandmother     Transient ischemic attack Paternal Grandmother     Heart disease Paternal Grandfather     Malig Hyperthermia Neg Hx      Review of Systems   Constitutional: Positive for malaise/fatigue.   Cardiovascular:  Positive for palpitations. Negative for chest pain, claudication, dyspnea on exertion, leg swelling, near-syncope, orthopnea, paroxysmal nocturnal dyspnea and syncope.   Respiratory:  Negative for shortness of breath.    Neurological:  Negative for brief paralysis, dizziness, headaches and light-headedness.   All other systems reviewed and are negative.       Objective:     Vitals:    11/07/24 0817   BP: 130/80  "  BP Location: Right arm   Patient Position: Sitting   Cuff Size: Small Adult   Pulse: 61   Weight: 78 kg (172 lb)   Height: 160 cm (62.99\")     Body mass index is 30.48 kg/m².    Vitals reviewed.   Constitutional:       General: Not in acute distress.     Appearance: Well-developed and not in distress. Not diaphoretic.   HENT:      Head: Normocephalic.   Pulmonary:      Effort: Pulmonary effort is normal. No respiratory distress.      Breath sounds: Normal breath sounds. No wheezing. No rhonchi. No rales.   Cardiovascular:      Normal rate. Regular rhythm.      Murmurs: There is no murmur.   Pulses:     Radial: 2+ bilaterally.  Edema:     Peripheral edema absent.   Skin:     General: Skin is warm and dry. There is no cyanosis.      Findings: No rash.   Neurological:      Mental Status: Alert and oriented to person, place, and time.   Psychiatric:         Behavior: Behavior normal. Behavior is cooperative.         Thought Content: Thought content normal.         Judgment: Judgment normal.       Lab Review:     Lab Results   Component Value Date     09/11/2024     07/23/2024    K 4.0 09/11/2024    K 4.3 07/23/2024     09/11/2024     07/23/2024    CO2 22.9 09/11/2024    CO2 30.0 (H) 07/23/2024    BUN 22 (H) 09/11/2024    BUN 18 07/23/2024    CREATININE 1.32 (H) 09/11/2024    CREATININE 1.04 (H) 07/23/2024    EGFRIFNONA 79 10/05/2021    EGFRIFNONA 77 12/23/2020    EGFRIFAFRI 91 10/05/2021    EGFRIFAFRI 88 12/23/2020    GLUCOSE 118 (H) 09/11/2024    GLUCOSE 76 07/23/2024    CALCIUM 9.7 09/11/2024    CALCIUM 10.5 07/23/2024    PROTENTOTREF 7.1 10/11/2022    PROTENTOTREF 6.8 04/19/2022    ALBUMIN 4.8 09/11/2024    ALBUMIN 4.9 07/23/2024    BILITOT 1.2 09/11/2024    BILITOT 0.6 07/23/2024    AST 16 09/11/2024    AST 32 07/23/2024    ALT 13 09/11/2024    ALT 35 (H) 07/23/2024     Lab Results   Component Value Date    WBC 15.39 (H) 09/11/2024    WBC 7.34 07/23/2024    HGB 13.6 09/11/2024    HGB 14.2 " 07/23/2024    HCT 40.8 09/11/2024    HCT 43.6 07/23/2024    MCV 90.3 09/11/2024    MCV 91.0 07/23/2024     09/11/2024     07/23/2024     Lab Results   Component Value Date    PROBNP 16.3 04/10/2023     Lab Results   Component Value Date    TROPONINT 7 04/10/2023     Lab Results   Component Value Date    TSH 3.020 07/23/2024    TSH 0.051 (L) 01/11/2024      Lipid Panel          1/11/2024    07:47   Lipid Panel   Total Cholesterol 187    Triglycerides 146    HDL Cholesterol 41    VLDL Cholesterol 26    LDL Cholesterol  120    LDL/HDL Ratio 2.9           ECG 12 Lead    Date/Time: 11/7/2024 9:53 AM  Performed by: Tia Adames APRN    Authorized by: Tia Adames APRN  Comparison: compared with previous ECG   Similar to previous ECG  Rhythm: sinus rhythm  Rate: normal  BPM: 61  QRS axis: normal  Comments: Similar to prior        Assessment:       Diagnosis Plan   1. Encounter for monitoring cardiotoxic drug therapy  Adult Transthoracic Echo Complete w/ Color, Spectral and Contrast if Necessary Per Protocol      2. Elevated blood pressure reading without diagnosis of hypertension  Adult Transthoracic Echo Complete w/ Color, Spectral and Contrast if Necessary Per Protocol      3. Mild hyperlipidemia  Adult Transthoracic Echo Complete w/ Color, Spectral and Contrast if Necessary Per Protocol      4. Malignant neoplasm of overlapping sites of left breast in female, estrogen receptor positive  Adult Transthoracic Echo Complete w/ Color, Spectral and Contrast if Necessary Per Protocol        Plan:       1.  Left-sided breast cancer, status post bilateral mastectomy (3/2023) radiation therapy (completed 10/2023), Herceptin Perjeta (completed 4/2024).  Prior normal proBNP.  Echo completed today with results pending. Going forward she will need echo imaging every 6 months for 2 years.  In addition, given left-sided radiation therapy echo every subsequent 5 years and stress test every 10 years as long as  she remains stable clinically.  Plan on annual cardiology visits at that time.  2.  Hypothyroidism  3.  Dyslipidemia.  Lipids in January had improved modestly from prior study 10/2022.  We discussed statin therapy but she remains hesitant to do so.  She will continue with additional lifestyle changes increase exercise and follow-up with RADHA Wild.  I recommended if LDL remains greater than 100 at that point, would add low-dose statin therapy such as Lipitor 5 mg a day.  4.  Hypertension controlled.  5.  ADD  6.  Elevated glucose.  Strongly recommended she increase the frequency of exercise and stay on a low carbohydrate diet and follow-up with RADHA Wild regarding additional follow-up labs and recommendations.  7.  Discussed with her today that COVID-vaccine is safe for her from a cardiac standpoint.  She will discuss this further with Dr. Stone at upcoming appointment.        Time Spent: I spent 30 minutes caring for Ursula on this date of service. This time includes time spent by me in the following activities: preparing for the visit, reviewing tests, performing a medically appropriate examination and/or evaluations, counseling and educating the patient/family/caregiver, ordering medications, tests, or procedures, documenting information in the medical record, and independently interpreting results and communicating that information with the patient/family/caregiver.   I spent 1 minutes on the separately reported service of ECG. This time is not included in the time used to support the E/M service also reported today.        Your medication list            Accurate as of November 7, 2024  9:53 AM. If you have any questions, ask your nurse or doctor.                CONTINUE taking these medications        Instructions Last Dose Given Next Dose Due   gabapentin 300 MG capsule  Commonly known as: Neurontin      Take 1 capsule by mouth Take As Directed. 300 mg PO q evening meal, 600 mg po q hs as tolerated        letrozole 2.5 MG tablet  Commonly known as: FEMARA      TAKE 1 TABLET DAILY       levothyroxine 125 MCG tablet  Commonly known as: SYNTHROID, LEVOTHROID      Take 1 tablet by mouth Daily. Except none on Sundays.       lisinopril 2.5 MG tablet  Commonly known as: PRINIVIL,ZESTRIL      Take 1 tablet by mouth Daily.       loratadine 10 MG tablet  Commonly known as: CLARITIN      Take 1 tablet by mouth As Needed.       pantoprazole 40 MG EC tablet  Commonly known as: PROTONIX      Take 1 tablet by mouth Daily.       PROBIOTIC ADVANCED PO      Take 1 tablet by mouth Daily.       Vitamin D3 250 MCG (23148 UT) tablet      Take 10,000 Units by mouth Daily.                Patient is no longer taking -.  I corrected the med list to reflect this.  I did not stop these medications.    Return in about 6 months (around 5/7/2025) for with Dr. Fermin with echo.      Dictated utilizing Dragon dictation

## 2024-11-07 ENCOUNTER — OFFICE VISIT (OUTPATIENT)
Dept: CARDIOLOGY | Facility: CLINIC | Age: 49
End: 2024-11-07
Payer: COMMERCIAL

## 2024-11-07 ENCOUNTER — HOSPITAL ENCOUNTER (OUTPATIENT)
Dept: CARDIOLOGY | Facility: HOSPITAL | Age: 49
Discharge: HOME | End: 2024-11-07
Admitting: INTERNAL MEDICINE
Payer: COMMERCIAL

## 2024-11-07 VITALS
HEIGHT: 63 IN | BODY MASS INDEX: 31.01 KG/M2 | WEIGHT: 175 LBS | HEART RATE: 70 BPM | DIASTOLIC BLOOD PRESSURE: 78 MMHG | SYSTOLIC BLOOD PRESSURE: 120 MMHG

## 2024-11-07 VITALS
SYSTOLIC BLOOD PRESSURE: 130 MMHG | HEART RATE: 61 BPM | DIASTOLIC BLOOD PRESSURE: 80 MMHG | BODY MASS INDEX: 30.48 KG/M2 | HEIGHT: 63 IN | WEIGHT: 172 LBS

## 2024-11-07 DIAGNOSIS — Z79.899 ENCOUNTER FOR MONITORING CARDIOTOXIC DRUG THERAPY: Primary | ICD-10-CM

## 2024-11-07 DIAGNOSIS — Z51.81 ENCOUNTER FOR MONITORING CARDIOTOXIC DRUG THERAPY: Primary | ICD-10-CM

## 2024-11-07 DIAGNOSIS — Z51.81 ENCOUNTER FOR MONITORING CARDIOTOXIC DRUG THERAPY: ICD-10-CM

## 2024-11-07 DIAGNOSIS — C50.812 MALIGNANT NEOPLASM OF OVERLAPPING SITES OF LEFT BREAST IN FEMALE, ESTROGEN RECEPTOR POSITIVE: ICD-10-CM

## 2024-11-07 DIAGNOSIS — Z79.899 ENCOUNTER FOR MONITORING CARDIOTOXIC DRUG THERAPY: ICD-10-CM

## 2024-11-07 DIAGNOSIS — Z17.0 MALIGNANT NEOPLASM OF OVERLAPPING SITES OF LEFT BREAST IN FEMALE, ESTROGEN RECEPTOR POSITIVE: ICD-10-CM

## 2024-11-07 DIAGNOSIS — R03.0 ELEVATED BLOOD PRESSURE READING WITHOUT DIAGNOSIS OF HYPERTENSION: ICD-10-CM

## 2024-11-07 DIAGNOSIS — E78.5 MILD HYPERLIPIDEMIA: ICD-10-CM

## 2024-11-07 LAB
AORTIC DIMENSIONLESS INDEX: 0.9 (DI)
ASCENDING AORTA: 2.8 CM
BH CV ECHO LEFT VENTRICLE GLOBAL LONGITUDINAL STRAIN: -23.7 %
BH CV ECHO MEAS - ACS: 1.9 CM
BH CV ECHO MEAS - AO MAX PG: 7.1 MMHG
BH CV ECHO MEAS - AO MEAN PG: 4 MMHG
BH CV ECHO MEAS - AO ROOT AREA (BSA CORRECTED): 1.7 CM2
BH CV ECHO MEAS - AO ROOT DIAM: 3 CM
BH CV ECHO MEAS - AO V2 MAX: 133 CM/SEC
BH CV ECHO MEAS - AO V2 VTI: 26.4 CM
BH CV ECHO MEAS - AVA(I,D): 2.8 CM2
BH CV ECHO MEAS - EDV(CUBED): 85.2 ML
BH CV ECHO MEAS - EDV(MOD-SP2): 95 ML
BH CV ECHO MEAS - EDV(MOD-SP4): 95 ML
BH CV ECHO MEAS - EF(MOD-BP): 64.4 %
BH CV ECHO MEAS - EF(MOD-SP2): 64.2 %
BH CV ECHO MEAS - EF(MOD-SP4): 66.3 %
BH CV ECHO MEAS - ESV(CUBED): 18.7 ML
BH CV ECHO MEAS - ESV(MOD-SP2): 34 ML
BH CV ECHO MEAS - ESV(MOD-SP4): 32 ML
BH CV ECHO MEAS - FS: 39.7 %
BH CV ECHO MEAS - IVS/LVPW: 0.89 CM
BH CV ECHO MEAS - IVSD: 0.8 CM
BH CV ECHO MEAS - LAT PEAK E' VEL: 9.9 CM/SEC
BH CV ECHO MEAS - LV DIASTOLIC VOL/BSA (35-75): 52 CM2
BH CV ECHO MEAS - LV MASS(C)D: 118.6 GRAMS
BH CV ECHO MEAS - LV MAX PG: 6.3 MMHG
BH CV ECHO MEAS - LV MEAN PG: 3 MMHG
BH CV ECHO MEAS - LV SYSTOLIC VOL/BSA (12-30): 17.5 CM2
BH CV ECHO MEAS - LV V1 MAX: 125 CM/SEC
BH CV ECHO MEAS - LV V1 VTI: 24.2 CM
BH CV ECHO MEAS - LVIDD: 4.4 CM
BH CV ECHO MEAS - LVIDS: 2.7 CM
BH CV ECHO MEAS - LVOT AREA: 3 CM2
BH CV ECHO MEAS - LVOT DIAM: 1.96 CM
BH CV ECHO MEAS - LVPWD: 0.9 CM
BH CV ECHO MEAS - MED PEAK E' VEL: 12 CM/SEC
BH CV ECHO MEAS - MR MAX PG: 21.6 MMHG
BH CV ECHO MEAS - MR MAX VEL: 232.2 CM/SEC
BH CV ECHO MEAS - MV A DUR: 0.1 SEC
BH CV ECHO MEAS - MV A MAX VEL: 73.8 CM/SEC
BH CV ECHO MEAS - MV DEC SLOPE: 630.6 CM/SEC2
BH CV ECHO MEAS - MV DEC TIME: 0.17 SEC
BH CV ECHO MEAS - MV E MAX VEL: 71.1 CM/SEC
BH CV ECHO MEAS - MV E/A: 0.96
BH CV ECHO MEAS - MV MAX PG: 2.5 MMHG
BH CV ECHO MEAS - MV MEAN PG: 1.17 MMHG
BH CV ECHO MEAS - MV P1/2T: 37.8 MSEC
BH CV ECHO MEAS - MV V2 VTI: 20 CM
BH CV ECHO MEAS - MVA(P1/2T): 5.8 CM2
BH CV ECHO MEAS - MVA(VTI): 3.7 CM2
BH CV ECHO MEAS - PA V2 MAX: 99.1 CM/SEC
BH CV ECHO MEAS - PULM A REVS DUR: 0.09 SEC
BH CV ECHO MEAS - PULM A REVS VEL: 35.7 CM/SEC
BH CV ECHO MEAS - PULM DIAS VEL: 28.1 CM/SEC
BH CV ECHO MEAS - PULM S/D: 1.59
BH CV ECHO MEAS - PULM SYS VEL: 44.8 CM/SEC
BH CV ECHO MEAS - RAP SYSTOLE: 3 MMHG
BH CV ECHO MEAS - RV MAX PG: 1.79 MMHG
BH CV ECHO MEAS - RV V1 MAX: 66.8 CM/SEC
BH CV ECHO MEAS - RV V1 VTI: 10.5 CM
BH CV ECHO MEAS - RVSP: 15 MMHG
BH CV ECHO MEAS - SV(LVOT): 73.1 ML
BH CV ECHO MEAS - SV(MOD-SP2): 61 ML
BH CV ECHO MEAS - SV(MOD-SP4): 63 ML
BH CV ECHO MEAS - SVI(LVOT): 40 ML/M2
BH CV ECHO MEAS - SVI(MOD-SP2): 33.4 ML/M2
BH CV ECHO MEAS - SVI(MOD-SP4): 34.5 ML/M2
BH CV ECHO MEAS - TAPSE (>1.6): 2.28 CM
BH CV ECHO MEAS - TR MAX PG: 12.3 MMHG
BH CV ECHO MEAS - TR MAX VEL: 175.1 CM/SEC
BH CV ECHO MEASUREMENTS AVERAGE E/E' RATIO: 6.49
BH CV XLRA - RV BASE: 3.5 CM
BH CV XLRA - RV LENGTH: 6 CM
BH CV XLRA - RV MID: 2.6 CM
BH CV XLRA - TDI S': 13.3 CM/SEC
LEFT ATRIUM VOLUME INDEX: 20.5 ML/M2
SINUS: 3 CM
STJ: 2.8 CM

## 2024-11-07 PROCEDURE — 93306 TTE W/DOPPLER COMPLETE: CPT

## 2024-11-07 PROCEDURE — 93356 MYOCRD STRAIN IMG SPCKL TRCK: CPT

## 2024-11-07 PROCEDURE — 25510000001 PERFLUTREN 6.52 MG/ML SUSPENSION 2 ML VIAL: Performed by: INTERNAL MEDICINE

## 2024-11-07 RX ADMIN — PERFLUTREN 1 ML: 6.52 INJECTION, SUSPENSION INTRAVENOUS at 08:14

## 2024-11-08 ENCOUNTER — TELEPHONE (OUTPATIENT)
Dept: CARDIOLOGY | Facility: CLINIC | Age: 49
End: 2024-11-08
Payer: COMMERCIAL

## 2024-11-08 NOTE — TELEPHONE ENCOUNTER
Results and recommendations called to pt.  Instructed to call with any further questions or concerns.  Verbalized understanding.    Keesha Begum RN  Triage Nurse, [facility]  11/08/24 08:33 EST

## 2024-11-08 NOTE — TELEPHONE ENCOUNTER
Please let her know that echo looks good.  Her heart is strong and functioning well.  Very similar to prior echo.  There is a small amount of leakage from her mitral valve and tricuspid valve that we will continue to follow.  Would not make any medication changes based on this.  Would continue current follow-up.

## 2024-11-11 ENCOUNTER — LAB (OUTPATIENT)
Dept: LAB | Facility: HOSPITAL | Age: 49
End: 2024-11-11
Payer: COMMERCIAL

## 2024-11-11 ENCOUNTER — OFFICE VISIT (OUTPATIENT)
Dept: ONCOLOGY | Facility: CLINIC | Age: 49
End: 2024-11-11
Payer: COMMERCIAL

## 2024-11-11 VITALS
HEIGHT: 63 IN | WEIGHT: 171.6 LBS | TEMPERATURE: 98.3 F | RESPIRATION RATE: 16 BRPM | DIASTOLIC BLOOD PRESSURE: 77 MMHG | HEART RATE: 76 BPM | OXYGEN SATURATION: 98 % | BODY MASS INDEX: 30.41 KG/M2 | SYSTOLIC BLOOD PRESSURE: 124 MMHG

## 2024-11-11 DIAGNOSIS — M25.50 AROMATASE INHIBITOR-ASSOCIATED ARTHRALGIA: ICD-10-CM

## 2024-11-11 DIAGNOSIS — C50.812 MALIGNANT NEOPLASM OF OVERLAPPING SITES OF LEFT BREAST IN FEMALE, ESTROGEN RECEPTOR POSITIVE: Primary | ICD-10-CM

## 2024-11-11 DIAGNOSIS — T45.1X5A AROMATASE INHIBITOR-ASSOCIATED ARTHRALGIA: ICD-10-CM

## 2024-11-11 DIAGNOSIS — Z17.0 MALIGNANT NEOPLASM OF OVERLAPPING SITES OF LEFT BREAST IN FEMALE, ESTROGEN RECEPTOR POSITIVE: ICD-10-CM

## 2024-11-11 DIAGNOSIS — Z17.0 MALIGNANT NEOPLASM OF OVERLAPPING SITES OF LEFT BREAST IN FEMALE, ESTROGEN RECEPTOR POSITIVE: Primary | ICD-10-CM

## 2024-11-11 DIAGNOSIS — C50.812 MALIGNANT NEOPLASM OF OVERLAPPING SITES OF LEFT BREAST IN FEMALE, ESTROGEN RECEPTOR POSITIVE: ICD-10-CM

## 2024-11-11 LAB
ALBUMIN SERPL-MCNC: 4.7 G/DL (ref 3.5–5.2)
ALBUMIN/GLOB SERPL: 1.6 G/DL
ALP SERPL-CCNC: 139 U/L (ref 39–117)
ALT SERPL W P-5'-P-CCNC: 63 U/L (ref 1–33)
ANION GAP SERPL CALCULATED.3IONS-SCNC: 15.2 MMOL/L (ref 5–15)
AST SERPL-CCNC: 60 U/L (ref 1–32)
BASOPHILS # BLD AUTO: 0.06 10*3/MM3 (ref 0–0.2)
BASOPHILS NFR BLD AUTO: 0.8 % (ref 0–1.5)
BILIRUB SERPL-MCNC: 0.6 MG/DL (ref 0–1.2)
BUN SERPL-MCNC: 17 MG/DL (ref 6–20)
BUN/CREAT SERPL: 18.7 (ref 7–25)
CALCIUM SPEC-SCNC: 10.1 MG/DL (ref 8.6–10.5)
CHLORIDE SERPL-SCNC: 102 MMOL/L (ref 98–107)
CO2 SERPL-SCNC: 25.8 MMOL/L (ref 22–29)
CREAT SERPL-MCNC: 0.91 MG/DL (ref 0.57–1)
DEPRECATED RDW RBC AUTO: 41.2 FL (ref 37–54)
EGFRCR SERPLBLD CKD-EPI 2021: 78.5 ML/MIN/1.73
EOSINOPHIL # BLD AUTO: 0.33 10*3/MM3 (ref 0–0.4)
EOSINOPHIL NFR BLD AUTO: 4.5 % (ref 0.3–6.2)
ERYTHROCYTE [DISTWIDTH] IN BLOOD BY AUTOMATED COUNT: 12.5 % (ref 12.3–15.4)
GLOBULIN UR ELPH-MCNC: 3 GM/DL
GLUCOSE SERPL-MCNC: 103 MG/DL (ref 65–99)
HCT VFR BLD AUTO: 41.3 % (ref 34–46.6)
HGB BLD-MCNC: 13.7 G/DL (ref 12–15.9)
IMM GRANULOCYTES # BLD AUTO: 0.04 10*3/MM3 (ref 0–0.05)
IMM GRANULOCYTES NFR BLD AUTO: 0.5 % (ref 0–0.5)
LYMPHOCYTES # BLD AUTO: 2.36 10*3/MM3 (ref 0.7–3.1)
LYMPHOCYTES NFR BLD AUTO: 32 % (ref 19.6–45.3)
MCH RBC QN AUTO: 29.8 PG (ref 26.6–33)
MCHC RBC AUTO-ENTMCNC: 33.2 G/DL (ref 31.5–35.7)
MCV RBC AUTO: 90 FL (ref 79–97)
MONOCYTES # BLD AUTO: 0.45 10*3/MM3 (ref 0.1–0.9)
MONOCYTES NFR BLD AUTO: 6.1 % (ref 5–12)
NEUTROPHILS NFR BLD AUTO: 4.14 10*3/MM3 (ref 1.7–7)
NEUTROPHILS NFR BLD AUTO: 56.1 % (ref 42.7–76)
NRBC BLD AUTO-RTO: 0 /100 WBC (ref 0–0.2)
PLATELET # BLD AUTO: 235 10*3/MM3 (ref 140–450)
PMV BLD AUTO: 9.9 FL (ref 6–12)
POTASSIUM SERPL-SCNC: 4.3 MMOL/L (ref 3.5–5.2)
PROT SERPL-MCNC: 7.7 G/DL (ref 6–8.5)
RBC # BLD AUTO: 4.59 10*6/MM3 (ref 3.77–5.28)
SODIUM SERPL-SCNC: 143 MMOL/L (ref 136–145)
WBC NRBC COR # BLD AUTO: 7.38 10*3/MM3 (ref 3.4–10.8)

## 2024-11-11 PROCEDURE — 85025 COMPLETE CBC W/AUTO DIFF WBC: CPT

## 2024-11-11 PROCEDURE — 36415 COLL VENOUS BLD VENIPUNCTURE: CPT

## 2024-11-11 PROCEDURE — 80053 COMPREHEN METABOLIC PANEL: CPT

## 2024-11-11 NOTE — PROGRESS NOTES
Subjective   Ursula Kulkarni is a 49 y.o. female.  With pT3 N0 M0 invasive lobular carcinoma of the left breast status post bilateral mastectomy and left axillary sentinel lymph node biopsy.    History of Present Illness     Patient is a 49-year-old lady with multifocal left breast invasive lobular carcinoma who underwent a bilateral mastectomy.  The pathology shows 4 lesions with the largest tumor measuring 16 mm, the tumor is measuring 20 mm, 2 mm and 1 mm respectively.  LCIS noted.  All the margins were negative.  4 sentinel lymph nodes were negative.  pT3 N0 M0, ER/DE positive, HER2 2+ on immunohistochemistry but amplified on FISH with a Ki-67 of 40%.  HER2/CEP17 ratio 3.5.  The tumor was thought to be heterogeneous with the lower part of the tumor having a lower Ki-67 of 13%.  20 mm tumor was HER2 negative with a Ki-67 of 9%.    Patient received 6 cycles of TCHP between 5/4/2023 to 8/10/2023.  Completed adjuvant radiation.  Initially on tamoxifen but subsequently underwent hysterectomy and bilateral salpingo-oophorectomy in January 2024 and treatment was changed to letrozole.  Completed adjuvant Herceptin and Perjeta on 4/24/2024.    Interval history  Patient returns today for follow-up.  She continues on Femara.  She reports tolerating the medication fairly well with no new issues.  No new chest wall lesions.    The following portions of the patient's history were reviewed and updated as appropriate: allergies, current medications, past family history, past medical history, past social history, past surgical history, and problem list.    Past Medical History:   Diagnosis Date    ADD (attention deficit disorder)     Breast cancer 03/08/2023    LEFT SIDE, HX BILATERAL MASTECTOMY, LAST CHEMO 8/2023, LAST RADIATION 10/23/23  - TARGETED THERAPY EVERY 3 WEEKS CURRENTLY - FOLLOWED BY [facility]    GERD (gastroesophageal reflux disease)     Graves disease     RADIOACTIVE IODINE TREAMENT IN PAST    History of  COVID-19 2022    History of gestational diabetes 2010    History of melanoma     History of radioactive iodine thyroid ablation 2012    History of vertigo     Hyperlipidemia     DIET CONTROLLED    Hypertension     Hypothyroidism     Uterine fibroid         Past Surgical History:   Procedure Laterality Date    BREAST RECONSTRUCTION Bilateral 03/08/2023    Procedure: BILATERAL PLACEMENT  TISSUE EXPANDER AND ALLODERM;  Surgeon: Eamon Stone MD;  Location: [facility];  Service: Plastics;  Laterality: Bilateral;    BREAST TISSUE EXPANDER REMOVAL INSERTION OF IMPLANT Bilateral 5/8/2024    Procedure: BILATERAL REMOVAL TISSUE EXPANDERS AND PLACEMENT OF IMPLANTS, REMOVAL MEDIPORT RIGHT CHEST;  Surgeon: Eamon Stone MD;  Location: [facility] OR;  Service: Plastics;  Laterality: Bilateral;    COLONOSCOPY  04/24/2024    non-bleeding internal and external hemorrhoids, repeat in 10 years    D & C CERVICAL BIOPSY  01/2006    INTRAUTERINE DEVICE INSERTION  2010    Mirena    INTRAUTERINE DEVICE INSERTION  2015    Mirena exchange, old IUD removal and new one inserted by Dr Carr, Lot # [id number] exp 09/17.mar    MASTECTOMY W/ SENTINEL NODE BIOPSY Bilateral 03/08/2023    Procedure: Bilateral total mastectomy, left axillary sentinel lymph node biopsy;  Surgeon: Kell Hi MD;  Location: [facility];  Service: General;  Laterality: Bilateral;    SCAR REVISION BREAST Right 04/03/2023    Procedure: RIGHT MASTECTOMY REVISION;  Surgeon: Eamon Stone MD;  Location: [facility];  Service: Plastics;  Laterality: Right;    TOTAL LAPAROSCOPIC HYSTERECTOMY Bilateral 01/03/2024    Procedure: TOTAL LAPAROSCOPIC HYSTERECTOMY, BILATERAL SALPIGO-OOPHORECTOMY, CYSTOSCOPY;  Surgeon: Rocio Manning MD;  Location: [facility];  Service: Obstetrics/Gynecology;  Laterality: Bilateral;    UPPER GASTROINTESTINAL ENDOSCOPY  04/24/2024    Schatzki ring dilated; small hiatal hernia    VENOUS ACCESS DEVICE  (PORT) INSERTION Right 04/13/2023    Procedure: right port placement;  Surgeon: Kell Hi MD;  Location: [facility] OR;  Service: General;  Laterality: Right;    WISDOM TOOTH EXTRACTION          Family History   Problem Relation Age of Onset    Diabetes Father     Hyperlipidemia Father     Arthritis Father     Hypertension Father     Heart disease Father         S/P stents    Heart attack Father 41    Colon polyps Father     Melanoma Father     Prostate cancer Father     Alcohol abuse Father     Hyperlipidemia Sister     Hypertension Sister     Heart attack Sister     Diabetes Sister     COPD Sister         Heavy smoker    Heart disease Sister     Thyroid cancer Paternal Aunt     Lung cancer Maternal Grandmother     Cancer Maternal Grandfather         Bladder cancer    Lung cancer Maternal Grandfather     Heart failure Paternal Grandmother     Transient ischemic attack Paternal Grandmother     Heart disease Paternal Grandfather     Malig Hyperthermia Neg Hx         Social History     Socioeconomic History    Marital status:      Spouse name: Parish    Number of children: 2   Tobacco Use    Smoking status: Never     Passive exposure: Never    Smokeless tobacco: Never   Vaping Use    Vaping status: Never Used   Substance and Sexual Activity    Alcohol use: Yes     Alcohol/week: 4.0 standard drinks of alcohol     Types: 4 Drinks containing 0.5 oz of alcohol per week     Comment: Maybe have a couple Friday and Saturday evening but not alwa    Drug use: Never    Sexual activity: Yes     Partners: Male     Birth control/protection: Post-menopausal, Hysterectomy     Comment:          OB History    No obstetric history on file.          Allergies   Allergen Reactions    Erythromycin GI Intolerance     Pt reports    Penicillins Rash     Childhood. Unsure of reaction.            Review of Systems  Review of systems as mentioned in the HPI otherwise negative    Objective   Blood pressure 124/77,  "pulse 76, temperature 98.3 °F (36.8 °C), temperature source Oral, resp. rate 16, height 160 cm (63\"), weight 77.8 kg (171 lb 9.6 oz), last menstrual period 05/01/2023, SpO2 98%, not currently breastfeeding.   Physical Exam  Vitals reviewed.   Constitutional:       Appearance: Normal appearance. She is normal weight.   HENT:      Head: Normocephalic.      Mouth/Throat:      Pharynx: Oropharynx is clear.   Eyes:      Conjunctiva/sclera: Conjunctivae normal.   Cardiovascular:      Rate and Rhythm: Normal rate.      Pulses: Normal pulses.   Pulmonary:      Effort: Pulmonary effort is normal.   Abdominal:      General: Abdomen is flat.   Musculoskeletal:         General: Normal range of motion.   Skin:     General: Skin is warm.   Neurological:      Mental Status: She is alert.   Psychiatric:         Mood and Affect: Mood normal.         Behavior: Behavior normal.         Thought Content: Thought content normal.         Judgment: Judgment normal.       Breast Exam: Status post bilateral mastectomy with reconstruction.  No new palpable abnormalities on the chest wall.  No axillary lymphadenopathy.    Lab on 11/11/2024   Component Date Value Ref Range Status    Glucose 11/11/2024 103 (H)  65 - 99 mg/dL Final    BUN 11/11/2024 17  6 - 20 mg/dL Final    Creatinine 11/11/2024 0.91  0.57 - 1.00 mg/dL Final    Sodium 11/11/2024 143  136 - 145 mmol/L Final    Potassium 11/11/2024 4.3  3.5 - 5.2 mmol/L Final    Chloride 11/11/2024 102  98 - 107 mmol/L Final    CO2 11/11/2024 25.8  22.0 - 29.0 mmol/L Final    Calcium 11/11/2024 10.1  8.6 - 10.5 mg/dL Final    Total Protein 11/11/2024 7.7  6.0 - 8.5 g/dL Final    Albumin 11/11/2024 4.7  3.5 - 5.2 g/dL Final    ALT (SGPT) 11/11/2024 63 (H)  1 - 33 U/L Final    AST (SGOT) 11/11/2024 60 (H)  1 - 32 U/L Final    Alkaline Phosphatase 11/11/2024 139 (H)  39 - 117 U/L Final    Total Bilirubin 11/11/2024 0.6  0.0 - 1.2 mg/dL Final    Globulin 11/11/2024 3.0  gm/dL Final    A/G Ratio " 11/11/2024 1.6  g/dL Final    BUN/Creatinine Ratio 11/11/2024 18.7  7.0 - 25.0 Final    Anion Gap 11/11/2024 15.2 (H)  5.0 - 15.0 mmol/L Final    eGFR 11/11/2024 78.5  >60.0 mL/min/1.73 Final    WBC 11/11/2024 7.38  3.40 - 10.80 10*3/mm3 Final    RBC 11/11/2024 4.59  3.77 - 5.28 10*6/mm3 Final    Hemoglobin 11/11/2024 13.7  12.0 - 15.9 g/dL Final    Hematocrit 11/11/2024 41.3  34.0 - 46.6 % Final    MCV 11/11/2024 90.0  79.0 - 97.0 fL Final    MCH 11/11/2024 29.8  26.6 - 33.0 pg Final    MCHC 11/11/2024 33.2  31.5 - 35.7 g/dL Final    RDW 11/11/2024 12.5  12.3 - 15.4 % Final    RDW-SD 11/11/2024 41.2  37.0 - 54.0 fl Final    MPV 11/11/2024 9.9  6.0 - 12.0 fL Final    Platelets 11/11/2024 235  140 - 450 10*3/mm3 Final    Neutrophil % 11/11/2024 56.1  42.7 - 76.0 % Final    Lymphocyte % 11/11/2024 32.0  19.6 - 45.3 % Final    Monocyte % 11/11/2024 6.1  5.0 - 12.0 % Final    Eosinophil % 11/11/2024 4.5  0.3 - 6.2 % Final    Basophil % 11/11/2024 0.8  0.0 - 1.5 % Final    Immature Grans % 11/11/2024 0.5  0.0 - 0.5 % Final    Neutrophils, Absolute 11/11/2024 4.14  1.70 - 7.00 10*3/mm3 Final    Lymphocytes, Absolute 11/11/2024 2.36  0.70 - 3.10 10*3/mm3 Final    Monocytes, Absolute 11/11/2024 0.45  0.10 - 0.90 10*3/mm3 Final    Eosinophils, Absolute 11/11/2024 0.33  0.00 - 0.40 10*3/mm3 Final    Basophils, Absolute 11/11/2024 0.06  0.00 - 0.20 10*3/mm3 Final    Immature Grans, Absolute 11/11/2024 0.04  0.00 - 0.05 10*3/mm3 Final    nRBC 11/11/2024 0.0  0.0 - 0.2 /100 WBC Final   Hospital Outpatient Visit on 11/07/2024   Component Date Value Ref Range Status    EF(MOD-bp) 11/07/2024 64.4  % Final    LV GLOBAL STRAIN  11/07/2024 -23.7  % Final    LVIDd 11/07/2024 4.4  cm Final    LVIDs 11/07/2024 2.7  cm Final    IVSd 11/07/2024 0.80  cm Final    LVPWd 11/07/2024 0.90  cm Final    FS 11/07/2024 39.7  % Final    IVS/LVPW 11/07/2024 0.89  cm Final    LV Sys Vol (BSA corrected) 11/07/2024 17.5  cm2 Final    EDV(cubed)  11/07/2024 85.2  ml Final    LV Michaud Vol (BSA corrected) 11/07/2024 52.0  cm2 Final    LV mass(C)d 11/07/2024 118.6  grams Final    LVOT area 11/07/2024 3.0  cm2 Final    LVOT diam 11/07/2024 1.96  cm Final    EDV(MOD-sp2) 11/07/2024 95.0  ml Final    EDV(MOD-sp4) 11/07/2024 95.0  ml Final    ESV(MOD-sp2) 11/07/2024 34.0  ml Final    ESV(MOD-sp4) 11/07/2024 32.0  ml Final    SV(MOD-sp2) 11/07/2024 61.0  ml Final    SV(MOD-sp4) 11/07/2024 63.0  ml Final    SVi(MOD-SP2) 11/07/2024 33.4  ml/m2 Final    SVi(MOD-SP4) 11/07/2024 34.5  ml/m2 Final    SVi (LVOT) 11/07/2024 40.0  ml/m2 Final    EF(MOD-sp2) 11/07/2024 64.2  % Final    EF(MOD-sp4) 11/07/2024 66.3  % Final    MV E max yaya 11/07/2024 71.1  cm/sec Final    MV A max yaya 11/07/2024 73.8  cm/sec Final    MV dec time 11/07/2024 0.17  sec Final    MV E/A 11/07/2024 0.96   Final    Pulm A Revs Dur 11/07/2024 0.09  sec Final    MV A dur 11/07/2024 0.10  sec Final    LA ESV Index (BP) 11/07/2024 20.5  ml/m2 Final    Med Peak E' Yaya 11/07/2024 12.0  cm/sec Final    Lat Peak E' Yaya 11/07/2024 9.9  cm/sec Final    TR max yaya 11/07/2024 175.1  cm/sec Final    Avg E/e' ratio 11/07/2024 6.49   Final    SV(LVOT) 11/07/2024 73.1  ml Final    RV Base 11/07/2024 3.5  cm Final    RV Mid 11/07/2024 2.6  cm Final    RV Length 11/07/2024 6.0  cm Final    TAPSE (>1.6) 11/07/2024 2.28  cm Final    RV S' 11/07/2024 13.3  cm/sec Final    Pulm Sys Yaya 11/07/2024 44.8  cm/sec Final    Pulm Michaud Yaya 11/07/2024 28.1  cm/sec Final    Pulm S/D 11/07/2024 1.59   Final    Pulm A Revs Yaya 11/07/2024 35.7  cm/sec Final    LV V1 max 11/07/2024 125.0  cm/sec Final    LV V1 max PG 11/07/2024 6.3  mmHg Final    LV V1 mean PG 11/07/2024 3.0  mmHg Final    LV V1 VTI 11/07/2024 24.2  cm Final    Ao pk yaya 11/07/2024 133.0  cm/sec Final    Ao max PG 11/07/2024 7.1  mmHg Final    Ao mean PG 11/07/2024 4.0  mmHg Final    Ao V2 VTI 11/07/2024 26.4  cm Final    SELVIN(I,D) 11/07/2024 2.8  cm2 Final    MV max PG  11/07/2024 2.5  mmHg Final    MV mean PG 11/07/2024 1.17  mmHg Final    MV V2 VTI 11/07/2024 20.0  cm Final    MV P1/2t 11/07/2024 37.8  msec Final    MVA(P1/2t) 11/07/2024 5.8  cm2 Final    MVA(VTI) 11/07/2024 3.7  cm2 Final    MV dec slope 11/07/2024 630.6  cm/sec2 Final    MR max kristin 11/07/2024 232.2  cm/sec Final    MR max PG 11/07/2024 21.6  mmHg Final    TR max PG 11/07/2024 12.3  mmHg Final    RV V1 max PG 11/07/2024 1.79  mmHg Final    RV V1 max 11/07/2024 66.8  cm/sec Final    RV V1 VTI 11/07/2024 10.5  cm Final    PA V2 max 11/07/2024 99.1  cm/sec Final    Sinus 11/07/2024 3.0  cm Final    STJ 11/07/2024 2.8  cm Final    Dimensionless Index 11/07/2024 0.90  (DI) Final    RVSP(TR) 11/07/2024 15  mmHg Final    RAP systole 11/07/2024 3  mmHg Final    Ao root diam 11/07/2024 3.0  cm Final    Ao root area (BSA corrected) 11/07/2024 1.7  cm2 Final    ACS 11/07/2024 1.9  cm Final    Ascending aorta 11/07/2024 2.8  cm Final    ESV(cubed) 11/07/2024 18.7  ml Final        US Thyroid    Result Date: 11/1/2024  Impression: 1. Stable 1.4 cm left thyroid nodule. TI-RADS: TR3 - Size less than 1.5 cm. No follow up needed. Electronically Signed: Alton Velasco MD  11/1/2024 4:47 PM EDT  Workstation ID: [id number]        Assessment & Plan       *Invasive lobular carcinoma of the left breast  pT3 N0 M0, invasive lobular carcinoma, multifocal with 4 lesions.  Largest measuring 16 mm, others 20 mm, 2 mm and 1 mm, grade 2.  All the tumors are ER/CO positive with a 60 mm lesion is HER2 2+ on immunohistochemistry but amplified on FISH with a ratio of 3.5.  Ki-67 elevated at greater than 40 for the 60 mm lesion the other lesions have a low Ki-67.  Status post adjuvant chemotherapy with 6 cycles of TCHP between 4/20/2023 to 8/10/2023.  Completed adjuvant Herceptin and Perjeta 4/24/2024  Hysterectomy and bilateral salpingo-oophorectomy performed 1/3/2024  Started letrozole in February 2024  Tolerating letrozole fairly well.  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    *Anxiety  Continue follow-up with supportive oncology with Cierra Degroot    *Intermittent elevation of LFTs    *Cardiac monitoring  Continue follow-up with cardio oncology  Most recent echocardiogram from 2/8/2024 with a normal ejection fraction of 67%    *DEXA scan will be obtained prior to follow-up      Plan  Continue letrozole 2.5 mg daily  Obtain DEXA prior to follow-up  APRN in 4 months and MD in 8 months  Labs on follow-up      41 minutes total spent on the encounter on the same day reviewing the medical records, reviewing the pathology from the past, face-to-face time, documentation on the same day.  Patient is new to me and all issues are new to me today.

## 2024-11-14 ENCOUNTER — TELEPHONE (OUTPATIENT)
Dept: ONCOLOGY | Facility: CLINIC | Age: 49
End: 2024-11-14
Payer: COMMERCIAL

## 2024-11-14 DIAGNOSIS — R74.8 ELEVATED LIVER ENZYMES: ICD-10-CM

## 2024-11-14 DIAGNOSIS — C50.812 MALIGNANT NEOPLASM OF OVERLAPPING SITES OF LEFT BREAST IN FEMALE, ESTROGEN RECEPTOR POSITIVE: Primary | ICD-10-CM

## 2024-11-14 DIAGNOSIS — Z17.0 MALIGNANT NEOPLASM OF OVERLAPPING SITES OF LEFT BREAST IN FEMALE, ESTROGEN RECEPTOR POSITIVE: Primary | ICD-10-CM

## 2024-11-14 NOTE — TELEPHONE ENCOUNTER
----- Message from Ariadne Stone sent at 11/12/2024  6:21 AM EST -----  Pls let pt know that LFTs are slightly elevated, they have been elevated in the past. Will do labs on follow up - cbc, cmp.  ----- Message -----  From: Lab, Background User  Sent: 11/11/2024   7:54 AM EST  To: Ariadne Stone MD

## 2024-11-14 NOTE — TELEPHONE ENCOUNTER
Reviewed Dr Stone's note with Ursula by phone. Advised her will recheck at her follow up in March.  She voiced understanding.

## 2024-11-18 ENCOUNTER — HOSPITAL ENCOUNTER (OUTPATIENT)
Dept: BONE DENSITY | Facility: HOSPITAL | Age: 49
Discharge: HOME | End: 2024-11-18
Admitting: INTERNAL MEDICINE
Payer: COMMERCIAL

## 2024-11-18 DIAGNOSIS — Z17.0 MALIGNANT NEOPLASM OF OVERLAPPING SITES OF LEFT BREAST IN FEMALE, ESTROGEN RECEPTOR POSITIVE: ICD-10-CM

## 2024-11-18 DIAGNOSIS — C50.812 MALIGNANT NEOPLASM OF OVERLAPPING SITES OF LEFT BREAST IN FEMALE, ESTROGEN RECEPTOR POSITIVE: ICD-10-CM

## 2024-11-18 DIAGNOSIS — M25.50 AROMATASE INHIBITOR-ASSOCIATED ARTHRALGIA: ICD-10-CM

## 2024-11-18 DIAGNOSIS — T45.1X5A AROMATASE INHIBITOR-ASSOCIATED ARTHRALGIA: ICD-10-CM

## 2024-11-18 PROCEDURE — 77080 DXA BONE DENSITY AXIAL: CPT

## 2024-12-05 NOTE — PROGRESS NOTES
----- Message from Alberto Mendez PA-C sent at 11/29/2024 10:44 AM CST -----  Abnormal urinalysis suggestive of bladder infection.  Please advise her to follow up with PCP office for retesting and treatment as needed   Date of Office Visit: 04/10/2023  Encounter Provider: Alisia Fermin MD  Place of Service: [facility] CARDIOLOGY  Patient Name: Ursula Kulkarni  :1975    Chief complaint  Consult requested by Dr Frederick for cardiac collagen care.    History of Present Illness  Patient is a 47-year-old female with history of Graves' disease (s/p radioactive ablation), iatrogenic hypothyroidism, ADD, hyperlipidemia 2023 diagnosed with left-sided breast cancer.  She underwent bilateral mastectomy and lymph node biopsy and placement of tissue expanders.  On 3/8/2023.  There is now a discussion regarding chemotherapy options including AC followed by [facility] versus [facility].    Patient states up until diagnosis of breast cancer several months ago she had been very active walking 15-20,000 steps a day without chest pain shortness of breath palpitations.  Blood pressures controlled.  She has borderline dyslipidemia and seems to stay on a low lustral diet.    Blood work 3/31/2023 includes CMP with glucose of 126 ALT mildly elevated, CBC unremarkable on 10/2022 nonfasting , HDL 47, triglycerides 87      Past Medical History:   Diagnosis Date   • ADD (attention deficit disorder)    • Bilateral impacted cerumen 2020   • Breast cancer 2023    LUCIO MASTECTOMY   • Foot fracture, left    • History of COVID-19    • History of gestational diabetes    • History of Graves' disease    • History of radioactive iodine thyroid ablation    • History of vertigo    • Hyperlipidemia    • Hypothyroidism    • Melanoma of back    • Positive depression screening 2020     Past Surgical History:   Procedure Laterality Date   • BREAST RECONSTRUCTION Bilateral 3/8/2023    Procedure: BILATERAL PLACEMENT  TISSUE EXPANDER AND ALLODERM;  Surgeon: Eamon Stone MD;  Location: [facility];  Service: Plastics;  Laterality: Bilateral;   • D & C CERVICAL BIOPSY  2006   • INTRAUTERINE DEVICE  INSERTION  2010    Mirena   • INTRAUTERINE DEVICE INSERTION  2015    Mirena exchange, old IUD removal and new one inserted by Dr Carr, Lot # [id number] 09/17.mar   • MASTECTOMY W/ SENTINEL NODE BIOPSY Bilateral 3/8/2023    Procedure: Bilateral total mastectomy, left axillary sentinel lymph node biopsy;  Surgeon: Kell Hi MD;  Location: [facility];  Service: General;  Laterality: Bilateral;   • SCAR REVISION BREAST Right 4/3/2023    Procedure: RIGHT MASTECTOMY REVISION;  Surgeon: Eamon Stone MD;  Location: [facility];  Service: Plastics;  Laterality: Right;   • WISDOM TOOTH EXTRACTION       Outpatient Medications Prior to Visit   Medication Sig Dispense Refill   • acetaminophen (TYLENOL) 325 MG tablet Take 2 tablets by mouth Every 4 (Four) Hours As Needed for Mild Pain. 60 tablet 0   • cetirizine (zyrTEC) 10 MG tablet Take 1 tablet by mouth Daily.     • COLLAGEN PO Take 1 tablet by mouth Daily.     • doxycycline (MONODOX) 100 MG capsule Take 1 capsule by mouth 2 (Two) Times a Day for 7 days. 14 capsule 0   • levothyroxine (SYNTHROID, LEVOTHROID) 125 MCG tablet Take 1 tablet by mouth Daily. 90 tablet 1   • omeprazole (priLOSEC) 20 MG capsule Take 1 capsule by mouth Daily. 90 capsule 1   • Probiotic Product (PROBIOTIC ADVANCED PO) Take 1 tablet by mouth Daily.     • docusate sodium (COLACE) 250 MG capsule Take 1 capsule by mouth 2 (Two) Times a Day As Needed for Constipation. 60 capsule 1   • gabapentin (Neurontin) 100 MG capsule Take 1 capsule by mouth Every 8 (Eight) Hours. (Patient taking differently: Take 1 capsule by mouth As Needed.) 60 capsule 2   • HYDROcodone-acetaminophen (NORCO) 5-325 MG per tablet Take 1-2 tablets by mouth Every 4 (Four) Hours As Needed (Pain). 20 tablet 0   • ondansetron ODT (ZOFRAN-ODT) 8 MG disintegrating tablet Place 1 tablet on the tongue Every 8 (Eight) Hours As Needed for Nausea or Vomiting. 10 tablet 1   • sennosides-docusate (PERICOLACE) 8.6-50 MG  per tablet Take 2 tablets by mouth Daily As Needed for Constipation. 30 tablet 1     No facility-administered medications prior to visit.       Allergies as of 04/10/2023 - Reviewed 04/10/2023   Allergen Reaction Noted   • Erythromycin GI Intolerance 01/27/2023   • Penicillins Rash 07/13/2020     Social History     Socioeconomic History   • Marital status:      Spouse name: Parish   • Number of children: 2   Tobacco Use   • Smoking status: Never   • Smokeless tobacco: Never   Vaping Use   • Vaping Use: Never used   Substance and Sexual Activity   • Alcohol use: Not Currently     Alcohol/week: 1.0 standard drink     Types: 1 Glasses of wine per week     Comment: Daily caffeine use   • Drug use: Never   • Sexual activity: Yes     Partners: Male     Comment:       Family History   Problem Relation Age of Onset   • Diabetes Father    • Hyperlipidemia Father    • Arthritis Father    • Hypertension Father    • Heart disease Father         S/P stents   • Heart attack Father 41   • Colon polyps Father    • Melanoma Father    • Prostate cancer Father    • Hyperlipidemia Sister    • Hypertension Sister    • Heart attack Sister    • Diabetes Sister    • Thyroid cancer Paternal Aunt    • Lung cancer Maternal Grandmother    • Cancer Maternal Grandfather         Bladder cancer   • Lung cancer Maternal Grandfather    • Heart failure Paternal Grandmother    • Transient ischemic attack Paternal Grandmother    • Heart disease Paternal Grandfather    • Malig Hyperthermia Neg Hx      Review of Systems   Constitutional: Negative for chills, fever, weight gain and weight loss.   Cardiovascular: Negative for leg swelling.   Respiratory: Negative for cough, snoring and wheezing.    Hematologic/Lymphatic: Negative for bleeding problem. Does not bruise/bleed easily.   Skin: Negative for color change.   Musculoskeletal: Negative for falls, joint pain and myalgias.   Gastrointestinal: Negative for melena.   Genitourinary:  "Negative for hematuria.   Neurological: Negative for excessive daytime sleepiness.   Psychiatric/Behavioral: Negative for depression. The patient is not nervous/anxious.         Objective:     Vitals:    04/10/23 1019   BP: 110/70   Pulse: 81   Weight: 78.5 kg (173 lb)   Height: 160 cm (63\")     Body mass index is 30.65 kg/m².    Vitals reviewed.   Constitutional:       Appearance: Well-developed. Obese.   Eyes:      General: No scleral icterus.        Right eye: No discharge.      Conjunctiva/sclera: Conjunctivae normal.      Pupils: Pupils are equal, round, and reactive to light.   HENT:      Head: Normocephalic.      Nose: Nose normal.   Neck:      Thyroid: No thyromegaly.      Vascular: No JVD.   Pulmonary:      Effort: Pulmonary effort is normal. No respiratory distress.      Breath sounds: Normal breath sounds. No wheezing. No rales.   Cardiovascular:      Normal rate. Regular rhythm. Normal S1. Normal S2.      Murmurs: There is no murmur.      No gallop.   Pulses:     Intact distal pulses.   Edema:     Peripheral edema absent.   Abdominal:      General: Bowel sounds are normal. There is no distension.      Palpations: Abdomen is soft.      Tenderness: There is no abdominal tenderness. There is no rebound.   Musculoskeletal: Normal range of motion.         General: No tenderness.      Cervical back: Normal range of motion and neck supple. Skin:     General: Skin is warm and dry.      Findings: No erythema or rash.   Neurological:      Mental Status: Alert and oriented to person, place, and time.   Psychiatric:         Behavior: Behavior normal.         Thought Content: Thought content normal.         Judgment: Judgment normal.       Lab Review:     ECG 12 Lead    Date/Time: 4/10/2023 11:11 PM  Performed by: Alisia Fermin MD  Authorized by: Alisia Fermin MD   Comparison: compared with previous ECG   Comparison to previous ECG: Left atrial enlargement is present  Rhythm: sinus rhythm  Other findings: left atrial " "abnormality    Clinical impression: abnormal EKG          Assessment:       Diagnosis Plan   1. Encounter for monitoring cardiotoxic drug therapy  Adult Transthoracic Echo Complete w/ Color, Spectral and Contrast if Necessary Per Protocol    Troponin I    proBNP    Adult Transthoracic Echo Limited W/ Cont if Necessary Per Protocol      2. Elevated blood pressure reading without diagnosis of hypertension        3. Mild hyperlipidemia          Plan:       1.  Left-sided breast cancer, status post bilateral mastectomy.  With plans for chemoradiation therapy.  Choice of regimen to be determined on Friday..  Troponin and proBNP echo.  Depending on these results consider low-dose ACE inhibitor therapy.  2.  Hypothyroidism  3.  Dyslipidemia.  Lipids and 10/2022 with , HDL 47, triglycerides normal.  She will try to pursue more aggressive low-cholesterol diet.  Recheck labs in 3 months consider statin therapy depending on results  4.  ADD    Addendum: Troponin and proBNP normal.  Echo with normal global longitudinal LV strain and ejection fraction.  We will have patient track her blood pressure over the next 1 week and if this is to low systolic blood pressure greater than 110 mmHg) consider low-dose      STOP-Bang Score  Have you been diagnosed with Sleep Apnea?: no  Snoring?: no  Tired?: no  Observed?: no  Pressure?: no  Stop Score: 0  Body Mass Index more than 35 kg/m2?: no  Age older than 50 year old?: no  Neck large? \">17\"/43cm-M, >16\"/41cm-F: no  Gender=Male?: no  Total Stop-Bang Score: 0    Time Spent: I spent 66 minutes caring for Ursula on this date of service. This time includes time spent by me in the following activities: preparing for the visit, reviewing tests, obtaining and/or reviewing a separately obtained history, performing a medically appropriate examination and/or evaluation, counseling and educating the patient/family/caregiver, ordering medications, tests, or procedures, documenting information " in the medical record and independently interpreting results and communicating that information with the patient/family/caregiver.   I spent 3 minutes on the separately reported service of ECG and Echo. This time is not included in the time used to support the E/M service also reported today.        Your medication list          Accurate as of April 10, 2023 11:20 PM. If you have any questions, ask your nurse or doctor.            CONTINUE taking these medications      Instructions Last Dose Given Next Dose Due   acetaminophen 325 MG tablet  Commonly known as: TYLENOL      Take 2 tablets by mouth Every 4 (Four) Hours As Needed for Mild Pain.       cetirizine 10 MG tablet  Commonly known as: zyrTEC      Take 1 tablet by mouth Daily.       COLLAGEN PO      Take 1 tablet by mouth Daily.       doxycycline 100 MG capsule  Commonly known as: MONODOX      Take 1 capsule by mouth 2 (Two) Times a Day for 7 days.       levothyroxine 125 MCG tablet  Commonly known as: SYNTHROID, LEVOTHROID      Take 1 tablet by mouth Daily.       omeprazole 20 MG capsule  Commonly known as: priLOSEC      Take 1 capsule by mouth Daily.       PROBIOTIC ADVANCED PO      Take 1 tablet by mouth Daily.              Patient is no longer taking -.  I corrected the med list to reflect this.  I did not stop these medications.      Dictated utilizing Dragon dictation

## 2025-01-22 ENCOUNTER — TELEPHONE (OUTPATIENT)
Dept: RADIATION ONCOLOGY | Facility: HOSPITAL | Age: 50
End: 2025-01-22
Payer: COMMERCIAL

## 2025-01-23 ENCOUNTER — OFFICE VISIT (OUTPATIENT)
Dept: RADIATION ONCOLOGY | Facility: HOSPITAL | Age: 50
End: 2025-01-23
Payer: COMMERCIAL

## 2025-01-23 VITALS
HEART RATE: 85 BPM | BODY MASS INDEX: 30.82 KG/M2 | WEIGHT: 174 LBS | DIASTOLIC BLOOD PRESSURE: 83 MMHG | SYSTOLIC BLOOD PRESSURE: 130 MMHG | OXYGEN SATURATION: 98 %

## 2025-01-23 DIAGNOSIS — C50.812 MALIGNANT NEOPLASM OF OVERLAPPING SITES OF LEFT BREAST IN FEMALE, ESTROGEN RECEPTOR POSITIVE: Primary | ICD-10-CM

## 2025-01-23 DIAGNOSIS — Z17.0 MALIGNANT NEOPLASM OF OVERLAPPING SITES OF LEFT BREAST IN FEMALE, ESTROGEN RECEPTOR POSITIVE: Primary | ICD-10-CM

## 2025-01-23 PROCEDURE — G0463 HOSPITAL OUTPT CLINIC VISIT: HCPCS | Performed by: RADIOLOGY

## 2025-01-23 NOTE — PROGRESS NOTES
Cancer Staging mpT3N0 left breast cancer    CC: 15 month follow up for mpT3N0 left breast cancer                                   S:     I had the pleasure of seeing Ursula Kulkarni  [facility].  She is a pleasant 49 year old female with mpT3N0 invasive lobular carcinoma of left breast, ER [place] positive Her 2 positive in the larger tumor with the largest tumor measuring 60mm. She returns today for follow up now 15 months out from completion of her radiation therapy to the left chest wall.  She completed a dose of 50Gy in 25 fractions on 10/23/23.   She has been doing well since I last saw her.  She had her expanders replaced with implants and is undergoing a revision surgery in the near future.     Review of Systems   Constitutional: Negative.    HENT: Negative.     Musculoskeletal:  Positive for arthralgias.   Skin: Negative.        Past Medical History:   Diagnosis Date    ADD (attention deficit disorder)     Breast cancer 03/08/2023    LEFT SIDE, HX BILATERAL MASTECTOMY, LAST CHEMO 8/2023, LAST RADIATION 10/23/23  - TARGETED THERAPY EVERY 3 WEEKS CURRENTLY - FOLLOWED BY [facility]    GERD (gastroesophageal reflux disease)     Graves disease     RADIOACTIVE IODINE TREAMENT IN PAST    History of COVID-19 2022    History of gestational diabetes 2010    History of melanoma     History of radioactive iodine thyroid ablation 2012    History of vertigo     Hyperlipidemia     DIET CONTROLLED    Hypertension     Hypothyroidism     Uterine fibroid          Past Surgical History:   Procedure Laterality Date    BREAST RECONSTRUCTION Bilateral 03/08/2023    Procedure: BILATERAL PLACEMENT  TISSUE EXPANDER AND ALLODERM;  Surgeon: Eamon Stone MD;  Location: [facility];  Service: Plastics;  Laterality: Bilateral;    BREAST TISSUE EXPANDER REMOVAL INSERTION OF IMPLANT Bilateral 5/8/2024    Procedure: BILATERAL REMOVAL TISSUE EXPANDERS AND PLACEMENT OF IMPLANTS, REMOVAL MEDIPORT RIGHT CHEST;   Surgeon: Eamon Stone MD;  Location: [facility] MAIN OR;  Service: Plastics;  Laterality: Bilateral;    COLONOSCOPY  04/24/2024    non-bleeding internal and external hemorrhoids, repeat in 10 years    D & C CERVICAL BIOPSY  01/2006    INTRAUTERINE DEVICE INSERTION  2010    Mirena    INTRAUTERINE DEVICE INSERTION  2015    Mirena exchange, old IUD removal and new one inserted by Dr Carr, Lot # [id number] 09/17.mar    MASTECTOMY W/ SENTINEL NODE BIOPSY Bilateral 03/08/2023    Procedure: Bilateral total mastectomy, left axillary sentinel lymph node biopsy;  Surgeon: Kell Hi MD;  Location: [facility] OR;  Service: General;  Laterality: Bilateral;    SCAR REVISION BREAST Right 04/03/2023    Procedure: RIGHT MASTECTOMY REVISION;  Surgeon: Eamon Stone MD;  Location: [facility] OR;  Service: Plastics;  Laterality: Right;    TOTAL LAPAROSCOPIC HYSTERECTOMY Bilateral 01/03/2024    Procedure: TOTAL LAPAROSCOPIC HYSTERECTOMY, BILATERAL SALPIGO-OOPHORECTOMY, CYSTOSCOPY;  Surgeon: Rocio Manning MD;  Location: [facility] OR;  Service: Obstetrics/Gynecology;  Laterality: Bilateral;    UPPER GASTROINTESTINAL ENDOSCOPY  04/24/2024    Schatzki ring dilated; small hiatal hernia    VENOUS ACCESS DEVICE (PORT) INSERTION Right 04/13/2023    Procedure: right port placement;  Surgeon: Kell Hi MD;  Location: [facility];  Service: General;  Laterality: Right;    WISDOM TOOTH EXTRACTION           Social History     Socioeconomic History    Marital status:      Spouse name: Parish    Number of children: 2   Tobacco Use    Smoking status: Never     Passive exposure: Never    Smokeless tobacco: Never   Vaping Use    Vaping status: Never Used   Substance and Sexual Activity    Alcohol use: Yes     Alcohol/week: 4.0 standard drinks of alcohol     Types: 4 Drinks containing 0.5 oz of alcohol per week     Comment: Maybe have a couple Friday and Saturday evening but not alwa    Drug use:  Never    Sexual activity: Yes     Partners: Male     Birth control/protection: Post-menopausal, Hysterectomy     Comment:           Family History   Problem Relation Age of Onset    Diabetes Father     Hyperlipidemia Father     Arthritis Father     Hypertension Father     Heart disease Father         S/P stents    Heart attack Father 41    Colon polyps Father     Melanoma Father     Prostate cancer Father     Alcohol abuse Father     Hyperlipidemia Sister     Hypertension Sister     Heart attack Sister     Diabetes Sister     COPD Sister         Heavy smoker    Heart disease Sister     Thyroid cancer Paternal Aunt     Lung cancer Maternal Grandmother     Cancer Maternal Grandfather         Bladder cancer    Lung cancer Maternal Grandfather     Heart failure Paternal Grandmother     Transient ischemic attack Paternal Grandmother     Heart disease Paternal Grandfather     Malig Hyperthermia Neg Hx           Objective    Physical Exam  Constitutional:       Appearance: Normal appearance.   Chest:          Comments: Bilateral implants in place, left slightly smaller than right, no suspicious nodules or brittany,s no axillary adenopathy  Neurological:      General: No focal deficit present.      Mental Status: She is alert.   Psychiatric:         Mood and Affect: Mood normal.         Behavior: Behavior normal.         Current Outpatient Medications on File Prior to Visit   Medication Sig Dispense Refill    Cholecalciferol (Vitamin D3) 250 MCG (77820 UT) tablet Take 10,000 Units by mouth Daily.      gabapentin (Neurontin) 300 MG capsule Take 1 capsule by mouth Take As Directed. 300 mg PO q evening meal, 600 mg po q hs as tolerated 90 capsule 2    letrozole (FEMARA) 2.5 MG tablet TAKE 1 TABLET DAILY 90 tablet 1    levothyroxine (SYNTHROID, LEVOTHROID) 125 MCG tablet Take 1 tablet by mouth Daily. Except none on Sundays. 90 tablet 1    lisinopril (PRINIVIL,ZESTRIL) 2.5 MG tablet Take 1 tablet by mouth Daily. 90 tablet 3     loratadine (CLARITIN) 10 MG tablet Take 1 tablet by mouth As Needed.      pantoprazole (PROTONIX) 40 MG EC tablet Take 1 tablet by mouth Daily. 30 tablet 3    Probiotic Product (PROBIOTIC ADVANCED PO) Take 1 tablet by mouth Daily.       No current facility-administered medications on file prior to visit.       ALLERGIES:    Allergies   Allergen Reactions    Erythromycin GI Intolerance     Pt reports    Penicillins Rash     Childhood. Unsure of reaction.       LMP 05/01/2023 11/11/2024     7:58 AM   Current Status   ECOG score 0         Assessment & Plan      49 year old female with mpT3N0 invasive lobular carcinoma of left breast, ER [place] positive Her 2 positive in the larger tumor with the largest tumor measuring 60mm now 15 months out from radiation to left chest wall and doing well.  She will see Dr. Stone her plastic surgeon next month and plans for revision in near future. She will have regular follow up with her medical oncologist.  I have not scheduled a follow up with me but I asked her to call with any questions or concerns in the future.     I personally spent greater than 25 minutes today assessing, managing, discussing and documenting my visit with the patient. That time includes review of records, imaging and pathology reports, obtaining my own history, performing a medically appropriate evaluation, counseling and educating the patient, discussing goals, logistics, alternatives and risks of my recommendations, surveillance options and potential outcomes. It also includes the time documenting the clinical information in the EMR and communicating my recommendations to the other involved physicians.        Thank you very much for allowing me to participate in the care of this very pleasant patient.    Sincerely,      Fay Hines MD

## 2025-02-10 DIAGNOSIS — E89.0 POSTABLATIVE HYPOTHYROIDISM: ICD-10-CM

## 2025-02-10 RX ORDER — LEVOTHYROXINE SODIUM 125 UG/1
TABLET ORAL
Qty: 15 TABLET | Refills: 0 | Status: SHIPPED | OUTPATIENT
Start: 2025-02-10

## 2025-02-17 DIAGNOSIS — E78.5 MILD HYPERLIPIDEMIA: ICD-10-CM

## 2025-02-17 DIAGNOSIS — E89.0 POSTABLATIVE HYPOTHYROIDISM: Primary | ICD-10-CM

## 2025-02-19 ENCOUNTER — TRANSCRIBE ORDERS (OUTPATIENT)
Dept: PHYSICAL THERAPY | Facility: HOSPITAL | Age: 50
End: 2025-02-19
Payer: COMMERCIAL

## 2025-02-19 DIAGNOSIS — Z91.89 AT RISK FOR LYMPHEDEMA: Primary | ICD-10-CM

## 2025-02-19 DIAGNOSIS — C50.912 MALIGNANT NEOPLASM OF LEFT FEMALE BREAST, UNSPECIFIED ESTROGEN RECEPTOR STATUS, UNSPECIFIED SITE OF BREAST: ICD-10-CM

## 2025-02-24 ENCOUNTER — HOSPITAL ENCOUNTER (OUTPATIENT)
Dept: PHYSICAL THERAPY | Facility: HOSPITAL | Age: 50
Setting detail: THERAPIES SERIES
Discharge: HOME | End: 2025-02-24
Payer: COMMERCIAL

## 2025-02-24 DIAGNOSIS — C50.912 BREAST CARCINOMA, LOBULAR, LEFT: ICD-10-CM

## 2025-02-24 DIAGNOSIS — Z91.89 AT RISK FOR LYMPHEDEMA: Primary | ICD-10-CM

## 2025-02-24 DIAGNOSIS — Z90.13 S/P BILATERAL MASTECTOMY: ICD-10-CM

## 2025-02-24 PROCEDURE — 93702 BIS XTRACELL FLUID ANALYSIS: CPT

## 2025-02-24 NOTE — THERAPY DISCHARGE NOTE
Outpatient Physical Therapy Lymphedema Treatment Note/Discharge Summary  [facility]     Patient Name: Ursula Kulkarni  : 1975  MRN: [id number]  Today's Date: 2025      Visit Date: 2025    Visit Dx:    ICD-10-CM ICD-9-CM   1. At risk for lymphedema  Z91.89 V49.89   2. S/P bilateral mastectomy  Z90.13 V45.71   3. Breast carcinoma, lobular, left  C50.912 174.9       Patient Active Problem List   Diagnosis    Hypothyroidism    Anxiety    Mild hyperlipidemia    Heartburn    Elevated blood pressure reading without diagnosis of hypertension    Thyroid nodule    Allergic rhinitis    COVID-19 virus infection    Non-recurrent acute suppurative otitis media of right ear without spontaneous rupture of tympanic membrane    Rash    Pharyngeal dysphagia    Indigestion    Fatigue    Abnormal mammogram of left breast    Malignant neoplasm of overlapping sites of left breast in female, estrogen receptor positive    Breast carcinoma, lobular, left    High risk medication use    Encounter for adjustment or management of vascular access device    Elevated alkaline phosphatase level    Diarrhea    Primary hypertension         Lymphedema       Row Name 25 0800             Subjective Pain    Able to rate subjective pain? yes  -LB      Pre-Treatment Pain Level 0  -LB      Post-Treatment Pain Level 0  -LB         Subjective    Subjective Comments I am doing well. I am still going to the gym 5 days/week.  -LB         Lymphedema Assessment    Lymphedema Classification LUE:;at risk/stage 0  -LB      Lymphedema Cancer Related Sx bilateral;modified radical mastectomy  -LB      Lymphedema Surgery Comments 3/8/23; re-excision 4/3/23  -LB      Lymph Nodes Removed # 4  -LB      Positive Lymph Nodes # 0  -LB      Chemo Received yes  -LB      Radiation Therapy Received yes  -LB      Infections or Cellulitis? no  -LB         L-Dex Bioimpedence Screening    L-Dex Measurement Extremity LUE  -LB      L-Dex Patient Position  Standing  -LB      L-Dex UE Dominate Side Right  -LB      L-Dex UE At Risk Side Left  -LB      L-Dex UE Pre Surgical Value Yes  -LB      L-Dex UE Score -2.9  -LB      L-Dex UE Baseline Score 2.4  -LB      L-Dex UE Value Change -5.3  -LB      L-Dex UE Comment WNL; stable  -LB      $ L-Dex Charge yes  -LB                User Key  (r) = Recorded By, (t) = Taken By, (c) = Cosigned By      Initials Name Provider Type    Rocio Biggs, PT Physical Therapist                                   PT Assessment/Plan       Row Name 02/24/25 0812          PT Assessment    Functional Limitations Other (comment);Limitations in functional capacity and performance  -LB     Impairments Impaired flexibility;Impaired lymphatic circulation  -LB     Assessment Comments Pt returns for 6 month bioimpedance follow up reporting continued 5 days/week workout with good tolerance. No issues with UE swelling and no signs of lymphedema today. Repeat bioimpedance testing WNL and stable at -2.9. She has met all goals and continues to make good progress. We reviewed s/s of lymphedema and encouraged pt to call if she has any future concerns or issues. She will d/c today due to independence and 2 years of consistent bioimpedance testing.  -LB     Rehab Potential Good  -LB     Patient/caregiver participated in establishment of treatment plan and goals Yes  -LB        PT Plan    PT Plan Comments d/c  -LB               User Key  (r) = Recorded By, (t) = Taken By, (c) = Cosigned By      Initials Name Provider Type    Rocio Biggs, PT Physical Therapist                          OP Exercises       Row Name 02/24/25 0800             Subjective    Subjective Comments I am doing well. I am still going to the gym 5 days/week.  -LB         Subjective Pain    Able to rate subjective pain? yes  -LB      Pre-Treatment Pain Level 0  -LB      Post-Treatment Pain Level 0  -LB                User Key  (r) = Recorded By, (t) = Taken By, (c) = Cosigned By       Initials Name Provider Type    Rocio Biggs, PT Physical Therapist                                   PT OP Goals       Row Name 02/24/25 0800          Long Term Goals    LTG Date to Achieve 03/11/23  -LB     LTG 1 Pt will maintain LDex bioimpedance WNL.  -LB     LTG 1 Progress Met  -LB     LTG 1 Progress Comments WNL and consistently stable x last 2 years; today -2.9  -LB     LTG 2 Pt will demonstrate full AROM BUE post-operatively.  -LB     LTG 2 Progress Met  -LB     LTG 3 Pt will acquire appropriately fitted compression garment and verbalize appropriate wear schedule.  -LB     LTG 3 Progress Met  -LB               User Key  (r) = Recorded By, (t) = Taken By, (c) = Cosigned By      Initials Name Provider Type    Rocio Biggs PT Physical Therapist                    Therapy Education  Education Details: reviewed s/s of lymphedema, steps to prevention, bioimpedance results and interpretation, use of compression sleeve  Given: Symptoms/condition management, Edema management  Program: Reinforced  How Provided: Verbal  Provided to: Patient  Level of Understanding: Verbalized              Time Calculation:   Start Time: 0745  Stop Time: 0800  Time Calculation (min): 15 min     Therapy Charges for Today       Code Description Service Date Service Provider Modifiers Qty    93264576099 HC PT BIS XTRACELL FLUID ANALYSIS 2/24/2025 Rocio Daniels, PT  1                          Rocio Daniels PT  2/24/2025

## 2025-03-03 ENCOUNTER — OFFICE VISIT (OUTPATIENT)
Dept: LAB | Facility: HOSPITAL | Age: 50
End: 2025-03-03
Payer: COMMERCIAL

## 2025-03-03 ENCOUNTER — OFFICE VISIT (OUTPATIENT)
Dept: ONCOLOGY | Facility: CLINIC | Age: 50
End: 2025-03-03
Payer: COMMERCIAL

## 2025-03-03 VITALS
HEART RATE: 74 BPM | TEMPERATURE: 98.1 F | SYSTOLIC BLOOD PRESSURE: 123 MMHG | DIASTOLIC BLOOD PRESSURE: 86 MMHG | BODY MASS INDEX: 30.71 KG/M2 | WEIGHT: 173.3 LBS | OXYGEN SATURATION: 96 % | HEIGHT: 63 IN

## 2025-03-03 DIAGNOSIS — Z79.899 HIGH RISK MEDICATION USE: ICD-10-CM

## 2025-03-03 DIAGNOSIS — Z17.0 MALIGNANT NEOPLASM OF OVERLAPPING SITES OF LEFT BREAST IN FEMALE, ESTROGEN RECEPTOR POSITIVE: ICD-10-CM

## 2025-03-03 DIAGNOSIS — Z17.0 MALIGNANT NEOPLASM OF OVERLAPPING SITES OF LEFT BREAST IN FEMALE, ESTROGEN RECEPTOR POSITIVE: Primary | ICD-10-CM

## 2025-03-03 DIAGNOSIS — R74.8 ELEVATED LIVER ENZYMES: ICD-10-CM

## 2025-03-03 DIAGNOSIS — T45.1X5A AROMATASE INHIBITOR-ASSOCIATED ARTHRALGIA: ICD-10-CM

## 2025-03-03 DIAGNOSIS — C50.812 MALIGNANT NEOPLASM OF OVERLAPPING SITES OF LEFT BREAST IN FEMALE, ESTROGEN RECEPTOR POSITIVE: Primary | ICD-10-CM

## 2025-03-03 DIAGNOSIS — E78.5 MILD HYPERLIPIDEMIA: ICD-10-CM

## 2025-03-03 DIAGNOSIS — M25.50 AROMATASE INHIBITOR-ASSOCIATED ARTHRALGIA: ICD-10-CM

## 2025-03-03 DIAGNOSIS — E89.0 POSTABLATIVE HYPOTHYROIDISM: ICD-10-CM

## 2025-03-03 DIAGNOSIS — C50.812 MALIGNANT NEOPLASM OF OVERLAPPING SITES OF LEFT BREAST IN FEMALE, ESTROGEN RECEPTOR POSITIVE: ICD-10-CM

## 2025-03-03 LAB
ALBUMIN SERPL-MCNC: 4.6 G/DL (ref 3.5–5.2)
ALBUMIN/GLOB SERPL: 1.8 G/DL
ALP SERPL-CCNC: 122 U/L (ref 39–117)
ALT SERPL W P-5'-P-CCNC: 49 U/L (ref 1–33)
ANION GAP SERPL CALCULATED.3IONS-SCNC: 11.6 MMOL/L (ref 5–15)
AST SERPL-CCNC: 38 U/L (ref 1–32)
BILIRUB SERPL-MCNC: 0.7 MG/DL (ref 0–1.2)
BUN SERPL-MCNC: 15 MG/DL (ref 6–20)
BUN/CREAT SERPL: 17.2 (ref 7–25)
CALCIUM SPEC-SCNC: 10 MG/DL (ref 8.6–10.5)
CHLORIDE SERPL-SCNC: 104 MMOL/L (ref 98–107)
CO2 SERPL-SCNC: 28.4 MMOL/L (ref 22–29)
CREAT SERPL-MCNC: 0.87 MG/DL (ref 0.57–1)
EGFRCR SERPLBLD CKD-EPI 2021: 82.8 ML/MIN/1.73
GLOBULIN UR ELPH-MCNC: 2.6 GM/DL
GLUCOSE SERPL-MCNC: 98 MG/DL (ref 65–99)
POTASSIUM SERPL-SCNC: 4.1 MMOL/L (ref 3.5–5.2)
PROT SERPL-MCNC: 7.2 G/DL (ref 6–8.5)
SODIUM SERPL-SCNC: 144 MMOL/L (ref 136–145)

## 2025-03-03 PROCEDURE — 80053 COMPREHEN METABOLIC PANEL: CPT

## 2025-03-03 PROCEDURE — 36415 COLL VENOUS BLD VENIPUNCTURE: CPT

## 2025-03-03 NOTE — PROGRESS NOTES
Subjective   Ursula Kulkarni is a 49 y.o. female.  With pT3 N0 M0 invasive lobular carcinoma of the left breast status post bilateral mastectomy and left axillary sentinel lymph node biopsy.    History of Present Illness     Patient is a 49-year-old lady with multifocal left breast invasive lobular carcinoma who underwent a bilateral mastectomy.  The pathology shows 4 lesions with the largest tumor measuring 16 mm, the tumor is measuring 20 mm, 2 mm and 1 mm respectively.  LCIS noted.  All the margins were negative.  4 sentinel lymph nodes were negative.  pT3 N0 M0, ER/WV positive, HER2 2+ on immunohistochemistry but amplified on FISH with a Ki-67 of 40%.  HER2/CEP17 ratio 3.5.  The tumor was thought to be heterogeneous with the lower part of the tumor having a lower Ki-67 of 13%.  20 mm tumor was HER2 negative with a Ki-67 of 9%.    Patient received 6 cycles of TCHP between 5/4/2023 to 8/10/2023.  Completed adjuvant radiation.  Initially on tamoxifen but subsequently underwent hysterectomy and bilateral salpingo-oophorectomy in January 2024 and treatment was changed to letrozole.  Completed adjuvant Herceptin and Perjeta on 4/24/2024.    Interval history  Patient returns today for follow-up continuing on Femara.  She continues with infections but states they were mainly present prior to initiation of the Femara.  She continues weight training 5 to 6 days a week with some cardio.  She is frustrated that she is not losing any weight.  She is making some dietary adjustments recently by limiting sugars.  She denies new pain, increased shortness of breath, or signs of lymphedema.  She is planning a implant exchange in a few months.    The following portions of the patient's history were reviewed and updated as appropriate: allergies, current medications, past family history, past medical history, past social history, past surgical history, and problem list.    Past Medical History:   Diagnosis Date    ADD (attention  deficit disorder)     Breast cancer 03/08/2023    LEFT SIDE, HX BILATERAL MASTECTOMY, LAST CHEMO 8/2023, LAST RADIATION 10/23/23  - TARGETED THERAPY EVERY 3 WEEKS CURRENTLY - FOLLOWED BY [facility]    GERD (gastroesophageal reflux disease)     Graves disease     RADIOACTIVE IODINE TREAMENT IN PAST    History of COVID-19 2022    History of gestational diabetes 2010    History of melanoma     History of radioactive iodine thyroid ablation 2012    History of vertigo     Hyperlipidemia     DIET CONTROLLED    Hypertension     Hypothyroidism     Uterine fibroid         Past Surgical History:   Procedure Laterality Date    BREAST RECONSTRUCTION Bilateral 03/08/2023    Procedure: BILATERAL PLACEMENT  TISSUE EXPANDER AND ALLODERM;  Surgeon: Eamon Stone MD;  Location: [facility];  Service: Plastics;  Laterality: Bilateral;    BREAST TISSUE EXPANDER REMOVAL INSERTION OF IMPLANT Bilateral 5/8/2024    Procedure: BILATERAL REMOVAL TISSUE EXPANDERS AND PLACEMENT OF IMPLANTS, REMOVAL MEDIPORT RIGHT CHEST;  Surgeon: Eamon Stone MD;  Location: [facility] OR;  Service: Plastics;  Laterality: Bilateral;    COLONOSCOPY  04/24/2024    non-bleeding internal and external hemorrhoids, repeat in 10 years    D & C CERVICAL BIOPSY  01/2006    INTRAUTERINE DEVICE INSERTION  2010    Mirena    INTRAUTERINE DEVICE INSERTION  2015    Mirena exchange, old IUD removal and new one inserted by Dr Carr, Lot # [id number] 09/17.mar    MASTECTOMY W/ SENTINEL NODE BIOPSY Bilateral 03/08/2023    Procedure: Bilateral total mastectomy, left axillary sentinel lymph node biopsy;  Surgeon: Kell Hi MD;  Location: [facility];  Service: General;  Laterality: Bilateral;    SCAR REVISION BREAST Right 04/03/2023    Procedure: RIGHT MASTECTOMY REVISION;  Surgeon: Eamon Stone MD;  Location: [facility];  Service: Plastics;  Laterality: Right;    TOTAL LAPAROSCOPIC HYSTERECTOMY Bilateral 01/03/2024    Procedure:  TOTAL LAPAROSCOPIC HYSTERECTOMY, BILATERAL SALPIGO-OOPHORECTOMY, CYSTOSCOPY;  Surgeon: Rocio Manning MD;  Location: [facility] MAIN OR;  Service: Obstetrics/Gynecology;  Laterality: Bilateral;    UPPER GASTROINTESTINAL ENDOSCOPY  04/24/2024    Schatzki ring dilated; small hiatal hernia    VENOUS ACCESS DEVICE (PORT) INSERTION Right 04/13/2023    Procedure: right port placement;  Surgeon: Kell Hi MD;  Location: [facility] MAIN OR;  Service: General;  Laterality: Right;    WISDOM TOOTH EXTRACTION          Family History   Problem Relation Age of Onset    Diabetes Father     Hyperlipidemia Father     Arthritis Father     Hypertension Father     Heart disease Father         S/P stents    Heart attack Father 41    Colon polyps Father     Melanoma Father     Prostate cancer Father     Alcohol abuse Father     Hyperlipidemia Sister     Hypertension Sister     Heart attack Sister     Diabetes Sister     COPD Sister         Heavy smoker    Heart disease Sister     Thyroid cancer Paternal Aunt     Lung cancer Maternal Grandmother     Cancer Maternal Grandfather         Bladder cancer    Lung cancer Maternal Grandfather     Heart failure Paternal Grandmother     Transient ischemic attack Paternal Grandmother     Heart disease Paternal Grandfather     Malig Hyperthermia Neg Hx         Social History     Socioeconomic History    Marital status:      Spouse name: Parish    Number of children: 2   Tobacco Use    Smoking status: Never     Passive exposure: Never    Smokeless tobacco: Never   Vaping Use    Vaping status: Never Used   Substance and Sexual Activity    Alcohol use: Yes     Alcohol/week: 4.0 standard drinks of alcohol     Types: 4 Drinks containing 0.5 oz of alcohol per week     Comment: Maybe have a couple Friday and Saturday evening but not alwa    Drug use: Never    Sexual activity: Yes     Partners: Male     Birth control/protection: Post-menopausal, Hysterectomy     Comment:          OB  History    No obstetric history on file.          Allergies   Allergen Reactions    Erythromycin GI Intolerance     Pt reports    Penicillins Rash     Childhood. Unsure of reaction.            Review of Systems  Review of systems as mentioned in the HPI otherwise negative    Objective   Last menstrual period 05/01/2023, not currently breastfeeding.   Physical Exam  Vitals reviewed.   Constitutional:       Appearance: Normal appearance. She is normal weight.   HENT:      Head: Normocephalic.      Mouth/Throat:      Pharynx: Oropharynx is clear.   Eyes:      Conjunctiva/sclera: Conjunctivae normal.   Cardiovascular:      Rate and Rhythm: Normal rate.      Pulses: Normal pulses.   Pulmonary:      Effort: Pulmonary effort is normal.   Abdominal:      General: Abdomen is flat.   Musculoskeletal:         General: Normal range of motion.   Skin:     General: Skin is warm.   Neurological:      Mental Status: She is alert.   Psychiatric:         Mood and Affect: Mood normal.         Behavior: Behavior normal.         Thought Content: Thought content normal.         Judgment: Judgment normal.       Breast Exam: Status post bilateral mastectomy with reconstruction.  No new palpable abnormalities on the chest wall.  No axillary lymphadenopathy.    No visits with results within 30 Day(s) from this visit.   Latest known visit with results is:   Lab on 11/11/2024   Component Date Value Ref Range Status    Glucose 11/11/2024 103 (H)  65 - 99 mg/dL Final    BUN 11/11/2024 17  6 - 20 mg/dL Final    Creatinine 11/11/2024 0.91  0.57 - 1.00 mg/dL Final    Sodium 11/11/2024 143  136 - 145 mmol/L Final    Potassium 11/11/2024 4.3  3.5 - 5.2 mmol/L Final    Chloride 11/11/2024 102  98 - 107 mmol/L Final    CO2 11/11/2024 25.8  22.0 - 29.0 mmol/L Final    Calcium 11/11/2024 10.1  8.6 - 10.5 mg/dL Final    Total Protein 11/11/2024 7.7  6.0 - 8.5 g/dL Final    Albumin 11/11/2024 4.7  3.5 - 5.2 g/dL Final    ALT (SGPT) 11/11/2024 63 (H)  1 -  33 U/L Final    AST (SGOT) 11/11/2024 60 (H)  1 - 32 U/L Final    Alkaline Phosphatase 11/11/2024 139 (H)  39 - 117 U/L Final    Total Bilirubin 11/11/2024 0.6  0.0 - 1.2 mg/dL Final    Globulin 11/11/2024 3.0  gm/dL Final    A/G Ratio 11/11/2024 1.6  g/dL Final    BUN/Creatinine Ratio 11/11/2024 18.7  7.0 - 25.0 Final    Anion Gap 11/11/2024 15.2 (H)  5.0 - 15.0 mmol/L Final    eGFR 11/11/2024 78.5  >60.0 mL/min/1.73 Final    WBC 11/11/2024 7.38  3.40 - 10.80 10*3/mm3 Final    RBC 11/11/2024 4.59  3.77 - 5.28 10*6/mm3 Final    Hemoglobin 11/11/2024 13.7  12.0 - 15.9 g/dL Final    Hematocrit 11/11/2024 41.3  34.0 - 46.6 % Final    MCV 11/11/2024 90.0  79.0 - 97.0 fL Final    MCH 11/11/2024 29.8  26.6 - 33.0 pg Final    MCHC 11/11/2024 33.2  31.5 - 35.7 g/dL Final    RDW 11/11/2024 12.5  12.3 - 15.4 % Final    RDW-SD 11/11/2024 41.2  37.0 - 54.0 fl Final    MPV 11/11/2024 9.9  6.0 - 12.0 fL Final    Platelets 11/11/2024 235  140 - 450 10*3/mm3 Final    Neutrophil % 11/11/2024 56.1  42.7 - 76.0 % Final    Lymphocyte % 11/11/2024 32.0  19.6 - 45.3 % Final    Monocyte % 11/11/2024 6.1  5.0 - 12.0 % Final    Eosinophil % 11/11/2024 4.5  0.3 - 6.2 % Final    Basophil % 11/11/2024 0.8  0.0 - 1.5 % Final    Immature Grans % 11/11/2024 0.5  0.0 - 0.5 % Final    Neutrophils, Absolute 11/11/2024 4.14  1.70 - 7.00 10*3/mm3 Final    Lymphocytes, Absolute 11/11/2024 2.36  0.70 - 3.10 10*3/mm3 Final    Monocytes, Absolute 11/11/2024 0.45  0.10 - 0.90 10*3/mm3 Final    Eosinophils, Absolute 11/11/2024 0.33  0.00 - 0.40 10*3/mm3 Final    Basophils, Absolute 11/11/2024 0.06  0.00 - 0.20 10*3/mm3 Final    Immature Grans, Absolute 11/11/2024 0.04  0.00 - 0.05 10*3/mm3 Final    nRBC 11/11/2024 0.0  0.0 - 0.2 /100 WBC Final        No radiology results for the last 30 days.       Assessment & Plan       *Invasive lobular carcinoma of the left breast  pT3 N0 M0, invasive lobular carcinoma, multifocal with 4 lesions.  Largest measuring 16  mm, others 20 mm, 2 mm and 1 mm, grade 2.  All the tumors are ER/WA positive with a 60 mm lesion is HER2 2+ on immunohistochemistry but amplified on FISH with a ratio of 3.5.  Ki-67 elevated at greater than 40 for the 60 mm lesion the other lesions have a low Ki-67.  Status post adjuvant chemotherapy with 6 cycles of TCHP between 4/20/2023 to 8/10/2023.  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  CMP noted today    *Cardiac monitoring  Continue follow-up with cardio oncology  Most recent echocardiogram from 2/8/2024 with a normal ejection fraction of 67%    *DEXA scan November 2024 with osteopenia      Plan  Continue letrozole 2.5 mg daily  CMP today  Repeat DEXA due November 2026  Encourage patient to continue routine physical activity along with dietary modifications  MD in 4 months with CMP

## 2025-03-04 DIAGNOSIS — E89.0 POSTABLATIVE HYPOTHYROIDISM: ICD-10-CM

## 2025-03-04 LAB
CHOLEST SERPL-MCNC: 242 MG/DL (ref 100–199)
HDLC SERPL-MCNC: 55 MG/DL
LDLC SERPL CALC-MCNC: 162 MG/DL (ref 0–99)
LDLC/HDLC SERPL: 2.9 RATIO (ref 0–3.2)
T4 FREE SERPL-MCNC: 1.49 NG/DL (ref 0.82–1.77)
TRIGL SERPL-MCNC: 140 MG/DL (ref 0–149)
TSH SERPL DL<=0.005 MIU/L-ACNC: 0.37 UIU/ML (ref 0.45–4.5)
VLDLC SERPL CALC-MCNC: 25 MG/DL (ref 5–40)

## 2025-03-04 RX ORDER — LEVOTHYROXINE SODIUM 100 UG/1
100 TABLET ORAL
Qty: 90 TABLET | Refills: 1 | Status: SHIPPED | OUTPATIENT
Start: 2025-03-04 | End: 2025-03-04 | Stop reason: SDUPTHER

## 2025-03-04 RX ORDER — LEVOTHYROXINE SODIUM 100 UG/1
100 TABLET ORAL
Qty: 7 TABLET | Refills: 0 | Status: SHIPPED | OUTPATIENT
Start: 2025-03-04

## 2025-03-04 RX ORDER — LEVOTHYROXINE SODIUM 125 UG/1
125 TABLET ORAL
Qty: 90 TABLET | Refills: 0 | Status: SHIPPED | OUTPATIENT
Start: 2025-03-04 | End: 2025-03-04 | Stop reason: ALTCHOICE

## 2025-03-10 DIAGNOSIS — C50.812 MALIGNANT NEOPLASM OF OVERLAPPING SITES OF LEFT BREAST IN FEMALE, ESTROGEN RECEPTOR POSITIVE: ICD-10-CM

## 2025-03-10 DIAGNOSIS — Z17.0 MALIGNANT NEOPLASM OF OVERLAPPING SITES OF LEFT BREAST IN FEMALE, ESTROGEN RECEPTOR POSITIVE: ICD-10-CM

## 2025-03-10 RX ORDER — LISINOPRIL 2.5 MG/1
2.5 TABLET ORAL DAILY
Qty: 90 TABLET | Refills: 3 | Status: SHIPPED | OUTPATIENT
Start: 2025-03-10

## 2025-03-10 RX ORDER — LETROZOLE 2.5 MG/1
2.5 TABLET, FILM COATED ORAL DAILY
Qty: 90 TABLET | Refills: 3 | Status: SHIPPED | OUTPATIENT
Start: 2025-03-10

## 2025-03-10 NOTE — TELEPHONE ENCOUNTER
Child Life Note       02/05/24 0757   Reason for Consult   Reason for Consult Developmental play;Diagnosis/procedure education   Diagnosis/Procedure Procedural/surgery   Assisting During Procedure Surgery   Patient Intervention(s)   Type of Intervention Performed Normalizing and coping;Preparation   Normalizing and Coping Intervention(s) Activities to promote developmental play  (CCLS provided interactive toys and books for normalization of environment and positive coping)   Diagnosis/Procedure Education Medical play/developmentally appropriate demonstration to address misconceptions of healthcare experience;Pre-procedure teaching for patient/family - individually   Medical Play Tools Utilized Familiarization of medical equipmemt   Anxiety Level   Anxiety Level No distress noted or observed   Evaluation   Patient Behaviors Pre-Interventions Appropriate for age;Appropriate for developmental level;Makes eye contact;Playful;Calm;Interactive   Patient Behaviors During Interventions Appropriate for age;Appropriate for developmental level;Makes eye contact;Playful;Calm;Interactive   Patient Behaviors Post-Intervention(s) Appropriate for age;Appropriate for developmental level;Playful;Makes eye contact;Calm;Interactive   Persons Present Family   Evaluation/Plan of Care Patient/family receptive;Provide ongoing support         CCLS will continue to provide support in PACU, as needed.    Anne Dodd MS, CCLS  Certified Child Life Specialist  [address] [place] [address] ()   Lv 11/7/24 SW   Nxt 5/19/25 MKD  Labs 3/3/25    Return in about 6 months (around 5/7/2025) for with Dr. Fermin with echo.

## 2025-03-12 DIAGNOSIS — F43.23 ADJUSTMENT DISORDER WITH MIXED ANXIETY AND DEPRESSED MOOD: ICD-10-CM

## 2025-03-12 DIAGNOSIS — F51.02 ADJUSTMENT INSOMNIA: ICD-10-CM

## 2025-03-12 RX ORDER — GABAPENTIN 300 MG/1
CAPSULE ORAL
Qty: 90 CAPSULE | Refills: 2 | OUTPATIENT
Start: 2025-03-12

## 2025-03-12 NOTE — TELEPHONE ENCOUNTER
Patient states she is going to contact her PCP or oncologist for med refill.  If patient need refill from [facility] she will need to make an appt.

## 2025-03-12 NOTE — TELEPHONE ENCOUNTER
Can you check in with patient to schedule and see how she is taking this? Can refill for 30 days until able to see. Thank you!

## 2025-03-18 ENCOUNTER — OFFICE VISIT (OUTPATIENT)
Dept: FAMILY MEDICINE CLINIC | Facility: CLINIC | Age: 50
End: 2025-03-18
Payer: COMMERCIAL

## 2025-03-18 VITALS
SYSTOLIC BLOOD PRESSURE: 100 MMHG | WEIGHT: 171.4 LBS | HEIGHT: 63 IN | TEMPERATURE: 98 F | DIASTOLIC BLOOD PRESSURE: 82 MMHG | OXYGEN SATURATION: 96 % | HEART RATE: 87 BPM | BODY MASS INDEX: 30.37 KG/M2

## 2025-03-18 DIAGNOSIS — E89.0 POSTABLATIVE HYPOTHYROIDISM: ICD-10-CM

## 2025-03-18 DIAGNOSIS — Z00.00 ENCOUNTER FOR ANNUAL PHYSICAL EXAM: Primary | ICD-10-CM

## 2025-03-18 DIAGNOSIS — I10 PRIMARY HYPERTENSION: ICD-10-CM

## 2025-03-18 DIAGNOSIS — E78.2 MIXED HYPERLIPIDEMIA: ICD-10-CM

## 2025-03-18 DIAGNOSIS — J30.9 ALLERGIC RHINITIS, UNSPECIFIED SEASONALITY, UNSPECIFIED TRIGGER: ICD-10-CM

## 2025-03-18 DIAGNOSIS — E66.811 CLASS 1 OBESITY WITH SERIOUS COMORBIDITY AND BODY MASS INDEX (BMI) OF 30.0 TO 30.9 IN ADULT, UNSPECIFIED OBESITY TYPE: ICD-10-CM

## 2025-03-18 DIAGNOSIS — M85.80 OSTEOPENIA, UNSPECIFIED LOCATION: ICD-10-CM

## 2025-03-18 DIAGNOSIS — C50.912 BREAST CARCINOMA, LOBULAR, LEFT: ICD-10-CM

## 2025-03-18 DIAGNOSIS — R12 HEARTBURN: ICD-10-CM

## 2025-03-18 PROCEDURE — 99396 PREV VISIT EST AGE 40-64: CPT | Performed by: NURSE PRACTITIONER

## 2025-03-18 RX ORDER — CETIRIZINE HYDROCHLORIDE 10 MG/1
10 TABLET ORAL DAILY
COMMUNITY

## 2025-03-18 RX ORDER — PHENOL 1.4 %
1200 AEROSOL, SPRAY (ML) MUCOUS MEMBRANE DAILY
COMMUNITY

## 2025-03-18 NOTE — PROGRESS NOTES
Subjective   Ursula Kulkarni is a 49 y.o. female.     Chief Complaint   Patient presents with    Annual Exam     Blood work        History of Present Illness   Patient presents for CPE; had recent labs.  Nutrition:  pretty healthy diet at this point; for the last 3 weeks, has been avoiding processed foods; has been doing meal deliveries to cook fresh; has been trying to watch macros and eat healthy lean protein; eats tuna or salmon 2-3 times per week; has been trying to eat more vegetables.  Exercise:  regular exercise, has been doing cardio for 10 min and 45 min to 1 hour of strength training 5-6 times per week.  Dental:  no recent dental visits, plans to schedule  Vision:  last eye exam about 2 months ago, wears glasses to read  Hearing:  no problems hearing   Immunizations:  will come back for COVID-19 vaccine, declines flu vaccine; will consider PCV20  PAP:  last PAP 1/2023, then had total hysterectomy 1/2024 and told does not need any additional PAP  Mammo:  no longer indicated due to bilateral mastectomy; paternal aunt recently diagnosed with breast cancer  Colonoscopy/Cologuard:  4/2024, to repeat in 10 years; no family history of colon cancer     Had bone density 11/2024 and noted osteopenia.    F/U Hyperlipidemia: last labs elevated; has been doing better with healthy diet and exercise since lab results.    F/U Hypothyroidism: takes Levothyroxine daily on empty stomach; previously taking Levothyroxine 125 mcg daily 6 days per week and changed to 100 mcg daily due to labs slightly out of range.    F/U breast cancer: had bilateral mastectomy; finished chemo/radiation; sees oncology every 3 months; gets labs every 3 months to monitor liver enzymes; also had hysterectomy.     Saw cardiologist and started low dose Lisinopril to protect heart; still taking Lisinopril daily; has follow up in May; monitors BP on occasion, BP has been good when has checked; no headaches; no orthostasis; no swelling.    Has been  seeing plastic surgery for breast reconstruction.     F/U heartburn: takes Pantoprazole daily and works well; will have symptoms if misses a dose and eats certain foods; was having trouble swallowing and had dilation with EGD 4/2024 and helped; rare sensation of trouble swallowing at this point; will sometime miss dose of Pantoprazole and likely contributing to symptoms.    Had low magnesium with radiation and had IV infusion and then started daily magnesium supplement; takes magnesium at night to help sleep and helps if does not take regularly; will take 3 times per week and works well.     Also, takes Gabapentin nightly as needed to help sleep and hot flashes and helps some; will take on occasion; also started Gabapentin for some joint aches, but did not seem to help much.    Has had cough since sitting outside on 3/14/25; has clear drainage; typically has trouble with change in seasons; has been taking Zyrtec more consistently last several days; also tried nighttime cold and flu and daytime cold and flu yesterday; supposed to be leaving town on Saturday.    Had COVID-19 virus 3 times and again in fall 2024; it took 2 trips to ER and 2 rounds of fluids to get well; had not previously had vaccine, but planning to get at this point when feeling better.      The following portions of the patient's history were reviewed and updated as appropriate: allergies, current medications, past family history, past medical history, past social history, past surgical history and problem list.    Current Outpatient Medications on File Prior to Visit   Medication Sig    calcium carbonate (OS-NELLA) 600 MG tablet Take 2 tablets by mouth Daily.    cetirizine (zyrTEC) 10 MG tablet Take 1 tablet by mouth Daily.    gabapentin (Neurontin) 300 MG capsule Take 1 capsule by mouth Take As Directed. 300 mg PO q evening meal, 600 mg po q hs as tolerated    letrozole (FEMARA) 2.5 MG tablet TAKE 1 TABLET DAILY    levothyroxine (Synthroid) 100 MCG  tablet Take 1 tablet by mouth Every Morning.    lisinopril (PRINIVIL,ZESTRIL) 2.5 MG tablet TAKE 1 TABLET DAILY    pantoprazole (PROTONIX) 40 MG EC tablet Take 1 tablet by mouth Daily.    Probiotic Product (PROBIOTIC ADVANCED PO) Take 1 tablet by mouth Daily.    vitamin D3 125 MCG (5000 UT) capsule capsule Take 1 capsule by mouth Daily.     No current facility-administered medications on file prior to visit.        Past Medical History:   Diagnosis Date    ADD (attention deficit disorder)     Breast cancer 03/08/2023    LEFT SIDE, HX BILATERAL MASTECTOMY, LAST CHEMO 8/2023, LAST RADIATION 10/23/23  - TARGETED THERAPY EVERY 3 WEEKS CURRENTLY - FOLLOWED BY [facility]    GERD (gastroesophageal reflux disease)     Graves disease     RADIOACTIVE IODINE TREAMENT IN PAST    History of COVID-19 2022    History of gestational diabetes 2010    History of melanoma     History of radioactive iodine thyroid ablation 2012    History of vertigo     Hyperlipidemia     DIET CONTROLLED    Hypertension     Hypothyroidism     Uterine fibroid        Past Surgical History:   Procedure Laterality Date    BREAST RECONSTRUCTION Bilateral 03/08/2023    Procedure: BILATERAL PLACEMENT  TISSUE EXPANDER AND ALLODERM;  Surgeon: Eamon Stone MD;  Location: [facility] MAIN OR;  Service: Plastics;  Laterality: Bilateral;    BREAST TISSUE EXPANDER REMOVAL INSERTION OF IMPLANT Bilateral 5/8/2024    Procedure: BILATERAL REMOVAL TISSUE EXPANDERS AND PLACEMENT OF IMPLANTS, REMOVAL MEDIPORT RIGHT CHEST;  Surgeon: Eamon Stone MD;  Location: [facility] MAIN OR;  Service: Plastics;  Laterality: Bilateral;    COLONOSCOPY  04/24/2024    non-bleeding internal and external hemorrhoids, repeat in 10 years    D & C CERVICAL BIOPSY  01/2006    INTRAUTERINE DEVICE INSERTION  2010    Mirena    INTRAUTERINE DEVICE INSERTION  2015    Mirena exchange, old IUD removal and new one inserted by Dr Carr, Lot # [id number] 09/17.mar    MASTECTOMY W/ SENTINEL NODE  BIOPSY Bilateral 03/08/2023    Procedure: Bilateral total mastectomy, left axillary sentinel lymph node biopsy;  Surgeon: Kell Hi MD;  Location:  [facility] MAIN OR;  Service: General;  Laterality: Bilateral;    SCAR REVISION BREAST Right 04/03/2023    Procedure: RIGHT MASTECTOMY REVISION;  Surgeon: Eamon Stone MD;  [facility];  Service: Plastics;  Laterality: Right;    TOTAL LAPAROSCOPIC HYSTERECTOMY Bilateral 01/03/2024    Procedure: TOTAL LAPAROSCOPIC HYSTERECTOMY, BILATERAL SALPIGO-OOPHORECTOMY, CYSTOSCOPY;  Surgeon: Rocio Manning MD;  Location: [facility] MAIN OR;  Service: Obstetrics/Gynecology;  Laterality: Bilateral;    UPPER GASTROINTESTINAL ENDOSCOPY  04/24/2024    Schatzki ring dilated; small hiatal hernia    VENOUS ACCESS DEVICE (PORT) INSERTION Right 04/13/2023    Procedure: right port placement;  Surgeon: Kell Hi MD;  Location: [facility] MAIN OR;  Service: General;  Laterality: Right;    WISDOM TOOTH EXTRACTION         Family History   Problem Relation Age of Onset    Diabetes Father     Hyperlipidemia Father     Arthritis Father     Hypertension Father     Heart disease Father         S/P stents    Heart attack Father 41    Colon polyps Father     Melanoma Father     Prostate cancer Father     Alcohol abuse Father     Hyperlipidemia Sister     Hypertension Sister     Heart attack Sister     Diabetes Sister     COPD Sister         Heavy smoker    Heart disease Sister     Thyroid cancer Paternal Aunt     Lung cancer Maternal Grandmother     Cancer Maternal Grandfather         Bladder cancer    Lung cancer Maternal Grandfather     Heart failure Paternal Grandmother     Transient ischemic attack Paternal Grandmother     Heart disease Paternal Grandfather     Malig Hyperthermia Neg Hx        Social History     Socioeconomic History    Marital status:      Spouse name: Parish    Number of children: 2   Tobacco Use    Smoking status: Never     Passive  "exposure: Never    Smokeless tobacco: Never   Vaping Use    Vaping status: Never Used   Substance and Sexual Activity    Alcohol use: Yes     Alcohol/week: 4.0 standard drinks of alcohol     Types: 4 Drinks containing 0.5 oz of alcohol per week     Comment: Maybe have a couple Friday and Saturday evening but not alwa    Drug use: Never    Sexual activity: Yes     Partners: Male     Birth control/protection: Post-menopausal, Hysterectomy     Comment:         Review of Systems   Constitutional:  Positive for fatigue. Negative for appetite change, chills, fever, unexpected weight gain and unexpected weight loss.   HENT:  Positive for rhinorrhea (clear drainage). Negative for ear pain, sinus pressure, sore throat and trouble swallowing.    Eyes:  Negative for blurred vision and discharge.   Respiratory:  Positive for cough (occasional productive cough--clear color). Negative for chest tightness and shortness of breath.    Cardiovascular:  Negative for chest pain and palpitations.   Gastrointestinal:  Negative for abdominal pain, blood in stool, constipation and diarrhea.   Endocrine: Negative for cold intolerance and polydipsia. Heat intolerance: only with hot flashes.  Genitourinary:  Negative for dysuria and frequency.   Musculoskeletal:  Positive for arthralgias (mostly in hip, knees, feet; attributes to Letrozole; improves with activity). Negative for back pain.   Skin:  Negative for rash and skin lesions.   Neurological:  Negative for syncope and weakness.   Hematological:  Does not bruise/bleed easily.   Psychiatric/Behavioral:  Negative for depressed mood. The patient is not nervous/anxious.        Objective   Vitals:    03/18/25 1500   BP: 100/82   BP Location: Left arm   Patient Position: Sitting   Cuff Size: Adult   Pulse: 87   Temp: 98 °F (36.7 °C)   SpO2: 96%   Weight: 77.7 kg (171 lb 6.4 oz)   Height: 160 cm (62.99\")     Body mass index is 30.37 kg/m².    Physical Exam  Vitals and nursing note " reviewed.   Constitutional:       General: She is not in acute distress.     Appearance: She is well-developed and well-groomed. She is not diaphoretic.   HENT:      Head: Normocephalic and atraumatic.      Jaw: No tenderness or pain on movement.      Right Ear: External ear normal. No decreased hearing noted. There is impacted cerumen.      Left Ear: Tympanic membrane and external ear normal. No decreased hearing noted.      Nose: Nose normal.      Right Sinus: No maxillary sinus tenderness or frontal sinus tenderness.      Left Sinus: No maxillary sinus tenderness or frontal sinus tenderness.      Mouth/Throat:      Mouth: Mucous membranes are moist.      Pharynx: No oropharyngeal exudate or posterior oropharyngeal erythema.   Eyes:      Extraocular Movements: Extraocular movements intact.      Conjunctiva/sclera: Conjunctivae normal.      Pupils: Pupils are equal, round, and reactive to light.   Neck:      Thyroid: No thyromegaly.      Vascular: No carotid bruit.      Trachea: No tracheal deviation.   Cardiovascular:      Rate and Rhythm: Normal rate and regular rhythm.      Pulses: Normal pulses.      Heart sounds: Normal heart sounds. No murmur heard.  Pulmonary:      Effort: Pulmonary effort is normal. No respiratory distress.      Breath sounds: Normal breath sounds. No decreased breath sounds, wheezing, rhonchi or rales.   Abdominal:      General: Bowel sounds are normal.      Palpations: Abdomen is soft. There is no hepatomegaly or splenomegaly.      Tenderness: There is no abdominal tenderness. There is no guarding.   Musculoskeletal:         General: Normal range of motion.      Cervical back: Normal range of motion and neck supple. No bony tenderness.      Thoracic back: No bony tenderness.      Lumbar back: No bony tenderness.      Right lower leg: No edema.      Left lower leg: No edema.   Lymphadenopathy:      Cervical: No cervical adenopathy.   Skin:     General: Skin is warm and dry.      Findings:  No rash.   Neurological:      Mental Status: She is alert and oriented to person, place, and time.      Cranial Nerves: No cranial nerve deficit.      Motor: Motor function is intact.      Coordination: Coordination normal.      Gait: Gait normal.      Deep Tendon Reflexes: Reflexes are normal and symmetric.   Psychiatric:         Mood and Affect: Mood normal.         Behavior: Behavior normal.         Thought Content: Thought content normal.         Cognition and Memory: Cognition normal.         Judgment: Judgment normal.         Lab Results   Component Value Date    WBC 7.38 11/11/2024    RBC 4.59 11/11/2024    HGB 13.7 11/11/2024    HCT 41.3 11/11/2024    MCV 90.0 11/11/2024    MCH 29.8 11/11/2024    MCHC 33.2 11/11/2024    RDW 12.5 11/11/2024    RDWSD 41.2 11/11/2024    MPV 9.9 11/11/2024     11/11/2024    NEUTRORELPCT 56.1 11/11/2024    LYMPHORELPCT 32.0 11/11/2024    MONORELPCT 6.1 11/11/2024    EOSRELPCT 4.5 11/11/2024    BASORELPCT 0.8 11/11/2024    AUTOIGPER 0.5 11/11/2024    NEUTROABS 4.14 11/11/2024    LYMPHSABS 2.36 11/11/2024    MONOSABS 0.45 11/11/2024    EOSABS 0.33 11/11/2024    BASOSABS 0.06 11/11/2024    AUTOIGNUM 0.04 11/11/2024    NRBC 0.0 11/11/2024     Lab Results   Component Value Date    GLUCOSE 98 03/03/2025    BUN 15 03/03/2025    CREATININE 0.87 03/03/2025    EGFRIFNONA 79 10/05/2021    EGFRIFAFRI 91 10/05/2021    BCR 17.2 03/03/2025    K 4.1 03/03/2025    CO2 28.4 03/03/2025    CALCIUM 10.0 03/03/2025    ALBUMIN 4.6 03/03/2025    AST 38 (H) 03/03/2025    ALT 49 (H) 03/03/2025      Lab Results   Component Value Date    CHLPL 242 (H) 03/03/2025    TRIG 140 03/03/2025    HDL 55 03/03/2025    VLDL 25 03/03/2025     (H) 03/03/2025     Lab Results   Component Value Date    TSH 0.370 (L) 03/03/2025         Assessment    Problem List Items Addressed This Visit       Hypothyroidism    Current Assessment & Plan   Continue Levothyroxine daily.  Get repeat labs drawn in June.           Relevant Orders    T4, Free    TSH    Mixed hyperlipidemia    Current Assessment & Plan   Continue to work on healthy diet and exercise.         Relevant Orders    Lipid Panel With LDL / HDL Ratio    Heartburn    Current Assessment & Plan   Stable.  Continue Pantoprazole daily.         Allergic rhinitis    Current Assessment & Plan   Continue Zyrtec daily.         Breast carcinoma, lobular, left    Overview   Added automatically from request for surgery [id number]  S/P chemo/radiation         Current Assessment & Plan   Follow-up as scheduled with oncology.         Primary hypertension    Current Assessment & Plan   Hypertension is stable and controlled  Continue current treatment regimen.  Weight loss.  Regular aerobic exercise.  Ambulatory blood pressure monitoring.  Blood pressure will be reassessed in 6 months.  Continue Lisinopril daily.  Follow-up as scheduled with cardiology specialist.         Osteopenia    Current Assessment & Plan   Continue calcium and Vitamin D daily.  Continue weight bearing exercise, such as walking.         Relevant Medications    vitamin D3 125 MCG (5000 UT) capsule capsule    calcium carbonate (OS-NELLA) 600 MG tablet     Other Visit Diagnoses         Encounter for annual physical exam    -  Primary            Return in about 6 months (around 9/18/2025) for Recheck.or sooner if problems or concerns.  Impression: Health maintenance visit.  Currently, eats a healthy diet recently and has a regular exercise routine.  Cervical cancer screening: no longer indicated due to hysterectomy.  Breast cancer screening: no longer indicated due to bilateral mastectomy.  Colorectal cancer screening: UTD.  Screening lab work includes: CMP, lipid recently.  Immunizations: will come back for COVID-19 vaccine, declines flu vaccine; will consider PCV20; risks and benefits of immunizations were discussed with patient.  Patient was advised to be evaluated by dentist.  Advice and education were given regarding  nutrition and aerobic exercise.

## 2025-03-20 PROBLEM — R03.0 ELEVATED BLOOD PRESSURE READING WITHOUT DIAGNOSIS OF HYPERTENSION: Status: RESOLVED | Noted: 2020-07-13 | Resolved: 2025-03-20

## 2025-03-20 PROBLEM — R21 RASH: Status: RESOLVED | Noted: 2022-06-17 | Resolved: 2025-03-20

## 2025-03-20 PROBLEM — E66.811 CLASS 1 OBESITY WITH SERIOUS COMORBIDITY AND BODY MASS INDEX (BMI) OF 30.0 TO 30.9 IN ADULT: Status: ACTIVE | Noted: 2025-03-20

## 2025-03-20 PROBLEM — M85.80 OSTEOPENIA: Status: ACTIVE | Noted: 2025-03-20

## 2025-03-20 PROBLEM — E78.2 MIXED HYPERLIPIDEMIA: Status: ACTIVE | Noted: 2020-07-13

## 2025-03-20 PROBLEM — H66.001 NON-RECURRENT ACUTE SUPPURATIVE OTITIS MEDIA OF RIGHT EAR WITHOUT SPONTANEOUS RUPTURE OF TYMPANIC MEMBRANE: Status: RESOLVED | Noted: 2022-04-20 | Resolved: 2025-03-20

## 2025-03-20 NOTE — ASSESSMENT & PLAN NOTE
Patient's (Body mass index is 30.37 kg/m².) indicates that they are obese (BMI >30) with health conditions that include hypertension, dyslipidemias, and GERD . Weight is unchanged. BMI  is above average; BMI management plan is completed. We discussed low calorie, low carb based diet program, portion control, and increasing exercise.

## 2025-03-20 NOTE — ASSESSMENT & PLAN NOTE
Hypertension is stable and controlled  Continue current treatment regimen.  Weight loss.  Regular aerobic exercise.  Ambulatory blood pressure monitoring.  Blood pressure will be reassessed in 6 months.  Continue Lisinopril daily.  Follow-up as scheduled with cardiology specialist.

## 2025-04-11 DIAGNOSIS — N95.1 VASOMOTOR SYMPTOMS DUE TO MENOPAUSE: Primary | ICD-10-CM

## 2025-04-11 RX ORDER — PAROXETINE 10 MG/1
10 TABLET, FILM COATED ORAL NIGHTLY
Qty: 30 TABLET | Refills: 1 | Status: SHIPPED | OUTPATIENT
Start: 2025-04-11

## 2025-05-19 ENCOUNTER — HOSPITAL ENCOUNTER (OUTPATIENT)
Dept: CARDIOLOGY | Facility: HOSPITAL | Age: 50
Discharge: HOME | End: 2025-05-19
Admitting: NURSE PRACTITIONER
Payer: COMMERCIAL

## 2025-05-19 VITALS
HEIGHT: 63 IN | DIASTOLIC BLOOD PRESSURE: 76 MMHG | WEIGHT: 171 LBS | SYSTOLIC BLOOD PRESSURE: 118 MMHG | HEART RATE: 70 BPM | BODY MASS INDEX: 30.3 KG/M2

## 2025-05-19 DIAGNOSIS — R03.0 ELEVATED BLOOD PRESSURE READING WITHOUT DIAGNOSIS OF HYPERTENSION: ICD-10-CM

## 2025-05-19 DIAGNOSIS — C50.812 MALIGNANT NEOPLASM OF OVERLAPPING SITES OF LEFT BREAST IN FEMALE, ESTROGEN RECEPTOR POSITIVE: ICD-10-CM

## 2025-05-19 DIAGNOSIS — E78.5 MILD HYPERLIPIDEMIA: ICD-10-CM

## 2025-05-19 DIAGNOSIS — Z51.81 ENCOUNTER FOR MONITORING CARDIOTOXIC DRUG THERAPY: ICD-10-CM

## 2025-05-19 DIAGNOSIS — Z17.0 MALIGNANT NEOPLASM OF OVERLAPPING SITES OF LEFT BREAST IN FEMALE, ESTROGEN RECEPTOR POSITIVE: ICD-10-CM

## 2025-05-19 DIAGNOSIS — Z79.899 ENCOUNTER FOR MONITORING CARDIOTOXIC DRUG THERAPY: ICD-10-CM

## 2025-05-19 LAB
AORTIC DIMENSIONLESS INDEX: 1.05 (DI)
ASCENDING AORTA: 3 CM
AV MEAN PRESS GRAD SYS DOP V1V2: 2.5 MMHG
AV VMAX SYS DOP: 110.7 CM/SEC
BH CV ECHO LEFT VENTRICLE GLOBAL LONGITUDINAL STRAIN: -23.9 %
BH CV ECHO MEAS - ACS: 1.95 CM
BH CV ECHO MEAS - AO MAX PG: 4.9 MMHG
BH CV ECHO MEAS - AO ROOT AREA (BSA CORRECTED): 1.5 CM2
BH CV ECHO MEAS - AO ROOT DIAM: 2.7 CM
BH CV ECHO MEAS - AO V2 VTI: 21.6 CM
BH CV ECHO MEAS - AVA(I,D): 3.3 CM2
BH CV ECHO MEAS - EDV(CUBED): 86.9 ML
BH CV ECHO MEAS - EDV(MOD-SP2): 70 ML
BH CV ECHO MEAS - EDV(MOD-SP4): 114 ML
BH CV ECHO MEAS - EF(MOD-SP2): 68.6 %
BH CV ECHO MEAS - EF(MOD-SP4): 64 %
BH CV ECHO MEAS - ESV(MOD-SP2): 22 ML
BH CV ECHO MEAS - ESV(MOD-SP4): 41 ML
BH CV ECHO MEAS - FS: 32.1 %
BH CV ECHO MEAS - IVS/LVPW: 0.92 CM
BH CV ECHO MEAS - IVSD: 0.82 CM
BH CV ECHO MEAS - LAT PEAK E' VEL: 11.2 CM/SEC
BH CV ECHO MEAS - LV DIASTOLIC VOL/BSA (35-75): 63 CM2
BH CV ECHO MEAS - LV MASS(C)D: 120.4 GRAMS
BH CV ECHO MEAS - LV MAX PG: 4.9 MMHG
BH CV ECHO MEAS - LV MEAN PG: 2.31 MMHG
BH CV ECHO MEAS - LV SYSTOLIC VOL/BSA (12-30): 22.7 CM2
BH CV ECHO MEAS - LV V1 MAX: 110.2 CM/SEC
BH CV ECHO MEAS - LV V1 VTI: 22.6 CM
BH CV ECHO MEAS - LVIDD: 4.4 CM
BH CV ECHO MEAS - LVIDS: 3 CM
BH CV ECHO MEAS - LVOT AREA: 3.2 CM2
BH CV ECHO MEAS - LVOT DIAM: 2.01 CM
BH CV ECHO MEAS - LVPWD: 0.89 CM
BH CV ECHO MEAS - MED PEAK E' VEL: 11.7 CM/SEC
BH CV ECHO MEAS - MR MAX PG: 22.5 MMHG
BH CV ECHO MEAS - MR MAX VEL: 237.1 CM/SEC
BH CV ECHO MEAS - MV A DUR: 0.14 SEC
BH CV ECHO MEAS - MV A MAX VEL: 61.8 CM/SEC
BH CV ECHO MEAS - MV DEC SLOPE: 469.9 CM/SEC2
BH CV ECHO MEAS - MV DEC TIME: 0.14 SEC
BH CV ECHO MEAS - MV E MAX VEL: 71.3 CM/SEC
BH CV ECHO MEAS - MV E/A: 1.15
BH CV ECHO MEAS - MV MAX PG: 2.1 MMHG
BH CV ECHO MEAS - MV MEAN PG: 0.71 MMHG
BH CV ECHO MEAS - MV P1/2T: 46.3 MSEC
BH CV ECHO MEAS - MV V2 VTI: 20.3 CM
BH CV ECHO MEAS - MVA(P1/2T): 4.8 CM2
BH CV ECHO MEAS - MVA(VTI): 3.5 CM2
BH CV ECHO MEAS - PA V2 MAX: 83.8 CM/SEC
BH CV ECHO MEAS - PULM A REVS DUR: 0.13 SEC
BH CV ECHO MEAS - PULM A REVS VEL: 22.8 CM/SEC
BH CV ECHO MEAS - PULM DIAS VEL: 29.2 CM/SEC
BH CV ECHO MEAS - PULM S/D: 1.59
BH CV ECHO MEAS - PULM SYS VEL: 46.5 CM/SEC
BH CV ECHO MEAS - QP/QS: 0.41
BH CV ECHO MEAS - RAP SYSTOLE: 3 MMHG
BH CV ECHO MEAS - RV MAX PG: 1.19 MMHG
BH CV ECHO MEAS - RV V1 MAX: 54.5 CM/SEC
BH CV ECHO MEAS - RV V1 VTI: 10.4 CM
BH CV ECHO MEAS - RVOT DIAM: 1.9 CM
BH CV ECHO MEAS - RVSP: 25.2 MMHG
BH CV ECHO MEAS - SV(LVOT): 71.9 ML
BH CV ECHO MEAS - SV(MOD-SP2): 48 ML
BH CV ECHO MEAS - SV(MOD-SP4): 73 ML
BH CV ECHO MEAS - SV(RVOT): 29.5 ML
BH CV ECHO MEAS - SVI(LVOT): 39.8 ML/M2
BH CV ECHO MEAS - SVI(MOD-SP2): 26.5 ML/M2
BH CV ECHO MEAS - SVI(MOD-SP4): 40.3 ML/M2
BH CV ECHO MEAS - TAPSE (>1.6): 2.24 CM
BH CV ECHO MEAS - TR MAX PG: 22.2 MMHG
BH CV ECHO MEAS - TR MAX VEL: 235.8 CM/SEC
BH CV ECHO MEASUREMENTS AVERAGE E/E' RATIO: 6.23
BH CV XLRA - RV BASE: 2.8 CM
BH CV XLRA - RV LENGTH: 6.2 CM
BH CV XLRA - RV MID: 1.83 CM
BH CV XLRA - TDI S': 8.2 CM/SEC
LEFT ATRIUM VOLUME INDEX: 19.8 ML/M2
LV EF 3D SEGMENTATION: 65 %
LV EF BIPLANE MOD: 65 %
SINUS: 2.9 CM
STJ: 2.8 CM

## 2025-05-19 PROCEDURE — 93306 TTE W/DOPPLER COMPLETE: CPT

## 2025-05-19 PROCEDURE — 93356 MYOCRD STRAIN IMG SPCKL TRCK: CPT

## 2025-05-19 PROCEDURE — 25510000001 PERFLUTREN 6.52 MG/ML SUSPENSION 2 ML VIAL: Performed by: NURSE PRACTITIONER

## 2025-05-19 RX ADMIN — PERFLUTREN 1 ML: 6.52 INJECTION, SUSPENSION INTRAVENOUS at 07:53

## 2025-05-20 ENCOUNTER — RESULTS FOLLOW-UP (OUTPATIENT)
Dept: CARDIOLOGY | Age: 50
End: 2025-05-20
Payer: COMMERCIAL

## 2025-05-20 NOTE — TELEPHONE ENCOUNTER
Please let her know that echocardiogram looks good.  Her heart is strong and functioning well.  Would not make any changes based on this.  This is very similar to both her previous echo as well as her baseline.  Would keep July appointment.

## 2025-06-30 ENCOUNTER — LAB (OUTPATIENT)
Dept: LAB | Facility: HOSPITAL | Age: 50
End: 2025-06-30
Payer: COMMERCIAL

## 2025-06-30 ENCOUNTER — OFFICE VISIT (OUTPATIENT)
Dept: ONCOLOGY | Facility: CLINIC | Age: 50
End: 2025-06-30
Payer: COMMERCIAL

## 2025-06-30 VITALS
OXYGEN SATURATION: 100 % | SYSTOLIC BLOOD PRESSURE: 138 MMHG | HEART RATE: 61 BPM | HEIGHT: 63 IN | DIASTOLIC BLOOD PRESSURE: 85 MMHG | BODY MASS INDEX: 31.43 KG/M2 | TEMPERATURE: 97.3 F | WEIGHT: 177.4 LBS

## 2025-06-30 DIAGNOSIS — C50.812 MALIGNANT NEOPLASM OF OVERLAPPING SITES OF LEFT BREAST IN FEMALE, ESTROGEN RECEPTOR POSITIVE: ICD-10-CM

## 2025-06-30 DIAGNOSIS — Z17.0 MALIGNANT NEOPLASM OF OVERLAPPING SITES OF LEFT BREAST IN FEMALE, ESTROGEN RECEPTOR POSITIVE: Primary | ICD-10-CM

## 2025-06-30 DIAGNOSIS — R74.8 ELEVATED LIVER ENZYMES: ICD-10-CM

## 2025-06-30 DIAGNOSIS — T45.1X5A AROMATASE INHIBITOR-ASSOCIATED ARTHRALGIA: ICD-10-CM

## 2025-06-30 DIAGNOSIS — M25.50 AROMATASE INHIBITOR-ASSOCIATED ARTHRALGIA: ICD-10-CM

## 2025-06-30 DIAGNOSIS — C50.812 MALIGNANT NEOPLASM OF OVERLAPPING SITES OF LEFT BREAST IN FEMALE, ESTROGEN RECEPTOR POSITIVE: Primary | ICD-10-CM

## 2025-06-30 DIAGNOSIS — Z17.0 MALIGNANT NEOPLASM OF OVERLAPPING SITES OF LEFT BREAST IN FEMALE, ESTROGEN RECEPTOR POSITIVE: ICD-10-CM

## 2025-06-30 LAB
ALBUMIN SERPL-MCNC: 5 G/DL (ref 3.5–5.2)
ALBUMIN/GLOB SERPL: 2.5 G/DL
ALP SERPL-CCNC: 107 U/L (ref 39–117)
ALT SERPL W P-5'-P-CCNC: 44 U/L (ref 1–33)
ANION GAP SERPL CALCULATED.3IONS-SCNC: 9.4 MMOL/L (ref 5–15)
AST SERPL-CCNC: 31 U/L (ref 1–32)
BASOPHILS # BLD AUTO: 0.05 10*3/MM3 (ref 0–0.2)
BASOPHILS NFR BLD AUTO: 0.7 % (ref 0–1.5)
BILIRUB SERPL-MCNC: 0.7 MG/DL (ref 0–1.2)
BUN SERPL-MCNC: 11.8 MG/DL (ref 6–20)
BUN/CREAT SERPL: 14.4 (ref 7–25)
CALCIUM SPEC-SCNC: 9.8 MG/DL (ref 8.6–10.5)
CHLORIDE SERPL-SCNC: 104 MMOL/L (ref 98–107)
CO2 SERPL-SCNC: 25.6 MMOL/L (ref 22–29)
CREAT SERPL-MCNC: 0.82 MG/DL (ref 0.57–1)
DEPRECATED RDW RBC AUTO: 41.5 FL (ref 37–54)
EGFRCR SERPLBLD CKD-EPI 2021: 88.4 ML/MIN/1.73
EOSINOPHIL # BLD AUTO: 0.21 10*3/MM3 (ref 0–0.4)
EOSINOPHIL NFR BLD AUTO: 3 % (ref 0.3–6.2)
ERYTHROCYTE [DISTWIDTH] IN BLOOD BY AUTOMATED COUNT: 12.6 % (ref 12.3–15.4)
GLOBULIN UR ELPH-MCNC: 2 GM/DL
GLUCOSE SERPL-MCNC: 87 MG/DL (ref 65–99)
HCT VFR BLD AUTO: 39 % (ref 34–46.6)
HGB BLD-MCNC: 13.1 G/DL (ref 12–15.9)
IMM GRANULOCYTES # BLD AUTO: 0.02 10*3/MM3 (ref 0–0.05)
IMM GRANULOCYTES NFR BLD AUTO: 0.3 % (ref 0–0.5)
LYMPHOCYTES # BLD AUTO: 2.61 10*3/MM3 (ref 0.7–3.1)
LYMPHOCYTES NFR BLD AUTO: 37.7 % (ref 19.6–45.3)
MCH RBC QN AUTO: 29.8 PG (ref 26.6–33)
MCHC RBC AUTO-ENTMCNC: 33.6 G/DL (ref 31.5–35.7)
MCV RBC AUTO: 88.8 FL (ref 79–97)
MONOCYTES # BLD AUTO: 0.43 10*3/MM3 (ref 0.1–0.9)
MONOCYTES NFR BLD AUTO: 6.2 % (ref 5–12)
NEUTROPHILS NFR BLD AUTO: 3.61 10*3/MM3 (ref 1.7–7)
NEUTROPHILS NFR BLD AUTO: 52.1 % (ref 42.7–76)
NRBC BLD AUTO-RTO: 0 /100 WBC (ref 0–0.2)
PLATELET # BLD AUTO: 220 10*3/MM3 (ref 140–450)
PMV BLD AUTO: 9.7 FL (ref 6–12)
POTASSIUM SERPL-SCNC: 3.6 MMOL/L (ref 3.5–5.2)
PROT SERPL-MCNC: 7 G/DL (ref 6–8.5)
RBC # BLD AUTO: 4.39 10*6/MM3 (ref 3.77–5.28)
SODIUM SERPL-SCNC: 139 MMOL/L (ref 136–145)
WBC NRBC COR # BLD AUTO: 6.93 10*3/MM3 (ref 3.4–10.8)

## 2025-06-30 PROCEDURE — 85025 COMPLETE CBC W/AUTO DIFF WBC: CPT

## 2025-06-30 PROCEDURE — 80053 COMPREHEN METABOLIC PANEL: CPT

## 2025-06-30 NOTE — PROGRESS NOTES
Subjective   Ursula Kulkarni is a 50 y.o. female.  With pT3 N0 M0 invasive lobular carcinoma of the left breast status post bilateral mastectomy and left axillary sentinel lymph node biopsy.    History of Present Illness     Patient is a 49-year-old lady with multifocal left breast invasive lobular carcinoma who underwent a bilateral mastectomy.  The pathology shows 4 lesions with the largest tumor measuring 16 mm, the tumor is measuring 20 mm, 2 mm and 1 mm respectively.  LCIS noted.  All the margins were negative.  4 sentinel lymph nodes were negative.  pT3 N0 M0, ER/AK positive, HER2 2+ on immunohistochemistry but amplified on FISH with a Ki-67 of 40%.  HER2/CEP17 ratio 3.5.  The tumor was thought to be heterogeneous with the lower part of the tumor having a lower Ki-67 of 13%.  20 mm tumor was HER2 negative with a Ki-67 of 9%.    Patient received 6 cycles of TCHP between 5/4/2023 to 8/10/2023.  Completed adjuvant radiation.  Initially on tamoxifen but subsequently underwent hysterectomy and bilateral salpingo-oophorectomy in January 2024 and treatment was changed to letrozole.  Completed adjuvant Herceptin and Perjeta on 4/24/2024.    Interval history  Patient returns today for follow-up.  She continues on letrozole and tolerating that fairly well except for severe hot flashes.  She has tried Effexor as well as gabapentin in the past but has not helped with the hot flashes.  She is requesting if Veozah would be an option.  Other than that she has no new complaints.  Denies any new bone pains, cough, abdominal pain nausea vomiting    The following portions of the patient's history were reviewed and updated as appropriate: allergies, current medications, past family history, past medical history, past social history, past surgical history, and problem list.    Past Medical History:   Diagnosis Date    ADD (attention deficit disorder)     Breast cancer 03/08/2023    LEFT SIDE, HX BILATERAL MASTECTOMY, LAST CHEMO  8/2023, LAST RADIATION 10/23/23  - TARGETED THERAPY EVERY 3 WEEKS CURRENTLY - FOLLOWED BY [facility]    Class 1 obesity with serious comorbidity and body mass index (BMI) of 30.0 to 30.9 in adult 3/20/2025    GERD (gastroesophageal reflux disease)     Graves disease     RADIOACTIVE IODINE TREAMENT IN PAST    History of COVID-19 2022    History of gestational diabetes 2010    History of melanoma     History of radioactive iodine thyroid ablation 2012    History of vertigo     Hyperlipidemia     DIET CONTROLLED    Hypertension     Hypothyroidism     Uterine fibroid         Past Surgical History:   Procedure Laterality Date    BREAST RECONSTRUCTION Bilateral 03/08/2023    Procedure: BILATERAL PLACEMENT  TISSUE EXPANDER AND ALLODERM;  Surgeon: Eamon Stone MD;  Location: [facility];  Service: Plastics;  Laterality: Bilateral;    BREAST TISSUE EXPANDER REMOVAL INSERTION OF IMPLANT Bilateral 5/8/2024    Procedure: BILATERAL REMOVAL TISSUE EXPANDERS AND PLACEMENT OF IMPLANTS, REMOVAL MEDIPORT RIGHT CHEST;  Surgeon: Eamon Stone MD;  Location: [facility];  Service: Plastics;  Laterality: Bilateral;    COLONOSCOPY  04/24/2024    non-bleeding internal and external hemorrhoids, repeat in 10 years    D & C CERVICAL BIOPSY  01/2006    INTRAUTERINE DEVICE INSERTION  2010    Mirena    INTRAUTERINE DEVICE INSERTION  2015    Mirena exchange, old IUD removal and new one inserted by Dr Carr, Lot # [id number] exp 09/17.mar    MASTECTOMY W/ SENTINEL NODE BIOPSY Bilateral 03/08/2023    Procedure: Bilateral total mastectomy, left axillary sentinel lymph node biopsy;  Surgeon: Kell Hi MD;  Location: [facility];  Service: General;  Laterality: Bilateral;    SCAR REVISION BREAST Right 04/03/2023    Procedure: RIGHT MASTECTOMY REVISION;  Surgeon: Eamon Stone MD;  Location: [facility];  Service: Plastics;  Laterality: Right;    TOTAL LAPAROSCOPIC HYSTERECTOMY Bilateral 01/03/2024     Procedure: TOTAL LAPAROSCOPIC HYSTERECTOMY, BILATERAL SALPIGO-OOPHORECTOMY, CYSTOSCOPY;  Surgeon: Rocio Manning MD;  Location: [facility] MAIN OR;  Service: Obstetrics/Gynecology;  Laterality: Bilateral;    UPPER GASTROINTESTINAL ENDOSCOPY  04/24/2024    Schatzki ring dilated; small hiatal hernia    VENOUS ACCESS DEVICE (PORT) INSERTION Right 04/13/2023    Procedure: right port placement;  Surgeon: Kell Hi MD;  Location: [facility] MAIN OR;  Service: General;  Laterality: Right;    WISDOM TOOTH EXTRACTION          Family History   Problem Relation Age of Onset    Diabetes Father     Hyperlipidemia Father     Arthritis Father     Hypertension Father     Heart disease Father         S/P stents    Heart attack Father 41    Colon polyps Father     Melanoma Father     Prostate cancer Father     Alcohol abuse Father     Hyperlipidemia Sister     Hypertension Sister     Heart attack Sister     Diabetes Sister     COPD Sister         Heavy smoker    Heart disease Sister     Lung cancer Maternal Grandmother     Cancer Maternal Grandfather         Bladder cancer    Lung cancer Maternal Grandfather     Heart failure Paternal Grandmother     Transient ischemic attack Paternal Grandmother     Heart disease Paternal Grandfather     Thyroid cancer Paternal Aunt     Breast cancer Paternal Aunt     Malig Hyperthermia Neg Hx         Social History     Socioeconomic History    Marital status:      Spouse name: Parish    Number of children: 2   Tobacco Use    Smoking status: Never     Passive exposure: Never    Smokeless tobacco: Never   Vaping Use    Vaping status: Never Used   Substance and Sexual Activity    Alcohol use: Yes     Alcohol/week: 4.0 standard drinks of alcohol     Types: 4 Drinks containing 0.5 oz of alcohol per week     Comment: Maybe have a couple Friday and Saturday evening but not alwa    Drug use: Never    Sexual activity: Yes     Partners: Male     Birth control/protection: Post-menopausal,  "Hysterectomy     Comment:          OB History    No obstetric history on file.          Allergies   Allergen Reactions    Erythromycin GI Intolerance     Pt reports    Penicillins Rash     Childhood. Unsure of reaction.            Review of Systems  Review of systems as mentioned in the HPI otherwise negative    Objective   Blood pressure 138/85, pulse 61, temperature 97.3 °F (36.3 °C), temperature source Skin, height 160 cm (62.99\"), weight 80.5 kg (177 lb 6.4 oz), last menstrual period 05/01/2023, SpO2 100%, not currently breastfeeding.   Physical Exam  Vitals reviewed.   Constitutional:       Appearance: Normal appearance. She is normal weight.   HENT:      Head: Normocephalic.      Mouth/Throat:      Pharynx: Oropharynx is clear.   Eyes:      Conjunctiva/sclera: Conjunctivae normal.   Cardiovascular:      Rate and Rhythm: Normal rate.      Pulses: Normal pulses.   Pulmonary:      Effort: Pulmonary effort is normal.   Abdominal:      General: Abdomen is flat.   Musculoskeletal:         General: Normal range of motion.   Skin:     General: Skin is warm.   Neurological:      Mental Status: She is alert.   Psychiatric:         Mood and Affect: Mood normal.         Behavior: Behavior normal.         Thought Content: Thought content normal.         Judgment: Judgment normal.       Breast Exam: Status post bilateral mastectomy with reconstruction.  No new palpable abnormalities on the chest wall.  No axillary lymphadenopathy.    I have reexamined the patient and the results are consistent with the previously documented exam. Ariadne Stone MD      Lab on 06/30/2025   Component Date Value Ref Range Status    Glucose 06/30/2025 87  65 - 99 mg/dL Final    BUN 06/30/2025 11.8  6.0 - 20.0 mg/dL Final    Creatinine 06/30/2025 0.82  0.57 - 1.00 mg/dL Final    Sodium 06/30/2025 139  136 - 145 mmol/L Final    Potassium 06/30/2025 3.6  3.5 - 5.2 mmol/L Final    Chloride 06/30/2025 104  98 - 107 mmol/L Final    CO2 " 06/30/2025 25.6  22.0 - 29.0 mmol/L Final    Calcium 06/30/2025 9.8  8.6 - 10.5 mg/dL Final    Total Protein 06/30/2025 7.0  6.0 - 8.5 g/dL Final    Albumin 06/30/2025 5.0  3.5 - 5.2 g/dL Final    ALT (SGPT) 06/30/2025 44 (H)  1 - 33 U/L Final    AST (SGOT) 06/30/2025 31  1 - 32 U/L Final    Alkaline Phosphatase 06/30/2025 107  39 - 117 U/L Final    Total Bilirubin 06/30/2025 0.7  0.0 - 1.2 mg/dL Final    Globulin 06/30/2025 2.0  gm/dL Final    A/G Ratio 06/30/2025 2.5  g/dL Final    BUN/Creatinine Ratio 06/30/2025 14.4  7.0 - 25.0 Final    Anion Gap 06/30/2025 9.4  5.0 - 15.0 mmol/L Final    eGFR 06/30/2025 88.4  >60.0 mL/min/1.73 Final    WBC 06/30/2025 6.93  3.40 - 10.80 10*3/mm3 Final    RBC 06/30/2025 4.39  3.77 - 5.28 10*6/mm3 Final    Hemoglobin 06/30/2025 13.1  12.0 - 15.9 g/dL Final    Hematocrit 06/30/2025 39.0  34.0 - 46.6 % Final    MCV 06/30/2025 88.8  79.0 - 97.0 fL Final    MCH 06/30/2025 29.8  26.6 - 33.0 pg Final    MCHC 06/30/2025 33.6  31.5 - 35.7 g/dL Final    RDW 06/30/2025 12.6  12.3 - 15.4 % Final    RDW-SD 06/30/2025 41.5  37.0 - 54.0 fl Final    MPV 06/30/2025 9.7  6.0 - 12.0 fL Final    Platelets 06/30/2025 220  140 - 450 10*3/mm3 Final    Neutrophil % 06/30/2025 52.1  42.7 - 76.0 % Final    Lymphocyte % 06/30/2025 37.7  19.6 - 45.3 % Final    Monocyte % 06/30/2025 6.2  5.0 - 12.0 % Final    Eosinophil % 06/30/2025 3.0  0.3 - 6.2 % Final    Basophil % 06/30/2025 0.7  0.0 - 1.5 % Final    Immature Grans % 06/30/2025 0.3  0.0 - 0.5 % Final    Neutrophils, Absolute 06/30/2025 3.61  1.70 - 7.00 10*3/mm3 Final    Lymphocytes, Absolute 06/30/2025 2.61  0.70 - 3.10 10*3/mm3 Final    Monocytes, Absolute 06/30/2025 0.43  0.10 - 0.90 10*3/mm3 Final    Eosinophils, Absolute 06/30/2025 0.21  0.00 - 0.40 10*3/mm3 Final    Basophils, Absolute 06/30/2025 0.05  0.00 - 0.20 10*3/mm3 Final    Immature Grans, Absolute 06/30/2025 0.02  0.00 - 0.05 10*3/mm3 Final    nRBC 06/30/2025 0.0  0.0 - 0.2 /100 WBC Final         No radiology results for the last 30 days.       Assessment & Plan       *Invasive lobular carcinoma of the left breast  pT3 N0 M0, invasive lobular carcinoma, multifocal with 4 lesions.  Largest measuring 16 mm, others 20 mm, 2 mm and 1 mm, grade 2.  All the tumors are ER/VA positive with a 60 mm lesion is HER2 2+ on immunohistochemistry but amplified on FISH with a ratio of 3.5.  Ki-67 elevated at greater than 40 for the 60 mm lesion the other lesions have a low Ki-67.  Status post adjuvant chemotherapy with 6 cycles of TCHP between 4/20/2023 to 8/10/2023.  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  LFTs overall stable today  Monitor closely after starting Veozah    *Cardiac monitoring  Continue follow-up with cardio oncology  Most recent echocardiogram from 2/8/2024 with a normal ejection fraction of 67%    *DEXA scan November 2024 with osteopenia  Repeat DEXA November 2026      Plan  Continue letrozole 2.5 mg daily  Start Veozah for hot flashes  Repeat DEXA due November 2026  She has been practicing intermittent fasting and also exercising regularly.  Congratulated on her efforts.      RADHA in 3 months with [facility] and MD in 8 months with [facility]    Patient is on medications requiring close monitoring for toxicities and experiencing severe hot flashes secondary to letrozole.

## 2025-07-01 ENCOUNTER — RESULTS FOLLOW-UP (OUTPATIENT)
Dept: FAMILY MEDICINE CLINIC | Facility: CLINIC | Age: 50
End: 2025-07-01
Payer: COMMERCIAL

## 2025-07-22 ENCOUNTER — OFFICE VISIT (OUTPATIENT)
Dept: CARDIOLOGY | Age: 50
End: 2025-07-22
Payer: COMMERCIAL

## 2025-07-22 VITALS
DIASTOLIC BLOOD PRESSURE: 78 MMHG | SYSTOLIC BLOOD PRESSURE: 110 MMHG | BODY MASS INDEX: 32.94 KG/M2 | HEART RATE: 61 BPM | WEIGHT: 179 LBS | HEIGHT: 62 IN

## 2025-07-22 DIAGNOSIS — Z17.0 MALIGNANT NEOPLASM OF OVERLAPPING SITES OF LEFT BREAST IN FEMALE, ESTROGEN RECEPTOR POSITIVE: ICD-10-CM

## 2025-07-22 DIAGNOSIS — Z13.6 ENCOUNTER FOR SCREENING FOR VASCULAR DISEASE: ICD-10-CM

## 2025-07-22 DIAGNOSIS — E78.2 MIXED HYPERLIPIDEMIA: ICD-10-CM

## 2025-07-22 DIAGNOSIS — R00.2 PALPITATIONS: ICD-10-CM

## 2025-07-22 DIAGNOSIS — C50.812 MALIGNANT NEOPLASM OF OVERLAPPING SITES OF LEFT BREAST IN FEMALE, ESTROGEN RECEPTOR POSITIVE: ICD-10-CM

## 2025-07-22 DIAGNOSIS — E78.5 MILD HYPERLIPIDEMIA: ICD-10-CM

## 2025-07-22 DIAGNOSIS — Z79.899 ENCOUNTER FOR MONITORING CARDIOTOXIC DRUG THERAPY: Primary | ICD-10-CM

## 2025-07-22 DIAGNOSIS — I10 PRIMARY HYPERTENSION: ICD-10-CM

## 2025-07-22 DIAGNOSIS — Z51.81 ENCOUNTER FOR MONITORING CARDIOTOXIC DRUG THERAPY: Primary | ICD-10-CM

## 2025-08-01 ENCOUNTER — TELEPHONE (OUTPATIENT)
Dept: CARDIOLOGY | Age: 50
End: 2025-08-01
Payer: COMMERCIAL

## 2025-08-04 ENCOUNTER — HOSPITAL ENCOUNTER (OUTPATIENT)
Dept: CARDIOLOGY | Facility: HOSPITAL | Age: 50
Discharge: HOME | End: 2025-08-04
Admitting: INTERNAL MEDICINE
Payer: COMMERCIAL

## 2025-08-04 VITALS
BODY MASS INDEX: 32.94 KG/M2 | SYSTOLIC BLOOD PRESSURE: 132 MMHG | HEIGHT: 62 IN | HEART RATE: 70 BPM | WEIGHT: 179 LBS | DIASTOLIC BLOOD PRESSURE: 62 MMHG

## 2025-08-04 DIAGNOSIS — Z79.899 ENCOUNTER FOR MONITORING CARDIOTOXIC DRUG THERAPY: ICD-10-CM

## 2025-08-04 DIAGNOSIS — Z51.81 ENCOUNTER FOR MONITORING CARDIOTOXIC DRUG THERAPY: ICD-10-CM

## 2025-08-04 LAB
AORTIC ARCH: 2.9 CM
AORTIC DIMENSIONLESS INDEX: 0.78 (DI)
ASCENDING AORTA: 2.8 CM
AV MEAN PRESS GRAD SYS DOP V1V2: 3 MMHG
AV VMAX SYS DOP: 125 CM/SEC
BH CV ECHO LEFT VENTRICLE GLOBAL LONGITUDINAL STRAIN: -23 %
BH CV ECHO MEAS - ACS: 1.72 CM
BH CV ECHO MEAS - AO MAX PG: 6.3 MMHG
BH CV ECHO MEAS - AO ROOT AREA (BSA CORRECTED): 1.5 CM2
BH CV ECHO MEAS - AO ROOT DIAM: 2.8 CM
BH CV ECHO MEAS - AO V2 VTI: 25.8 CM
BH CV ECHO MEAS - AVA(I,D): 2.37 CM2
BH CV ECHO MEAS - EDV(CUBED): 64.6 ML
BH CV ECHO MEAS - EDV(MOD-SP2): 92 ML
BH CV ECHO MEAS - EDV(MOD-SP4): 111 ML
BH CV ECHO MEAS - EF(MOD-SP2): 68.5 %
BH CV ECHO MEAS - EF(MOD-SP4): 64.9 %
BH CV ECHO MEAS - ESV(CUBED): 9.9 ML
BH CV ECHO MEAS - ESV(MOD-SP2): 29 ML
BH CV ECHO MEAS - ESV(MOD-SP4): 39 ML
BH CV ECHO MEAS - FS: 46.4 %
BH CV ECHO MEAS - IVS/LVPW: 1.34 CM
BH CV ECHO MEAS - IVSD: 1.2 CM
BH CV ECHO MEAS - LA 3D VOL INDEX: 34
BH CV ECHO MEAS - LAT PEAK E' VEL: 14.6 CM/SEC
BH CV ECHO MEAS - LV DIASTOLIC VOL/BSA (35-75): 60.9 CM2
BH CV ECHO MEAS - LV MASS(C)D: 136.2 GRAMS
BH CV ECHO MEAS - LV MAX PG: 4.5 MMHG
BH CV ECHO MEAS - LV MEAN PG: 2 MMHG
BH CV ECHO MEAS - LV SYSTOLIC VOL/BSA (12-30): 21.4 CM2
BH CV ECHO MEAS - LV V1 MAX: 106 CM/SEC
BH CV ECHO MEAS - LV V1 VTI: 20.2 CM
BH CV ECHO MEAS - LVIDD: 4 CM
BH CV ECHO MEAS - LVIDS: 2.15 CM
BH CV ECHO MEAS - LVOT AREA: 3 CM2
BH CV ECHO MEAS - LVOT DIAM: 1.96 CM
BH CV ECHO MEAS - LVPWD: 0.89 CM
BH CV ECHO MEAS - MED PEAK E' VEL: 9.6 CM/SEC
BH CV ECHO MEAS - MV A DUR: 0.15 SEC
BH CV ECHO MEAS - MV A MAX VEL: 71.6 CM/SEC
BH CV ECHO MEAS - MV DEC SLOPE: 342.7 CM/SEC2
BH CV ECHO MEAS - MV DEC TIME: 0.18 SEC
BH CV ECHO MEAS - MV E MAX VEL: 62.6 CM/SEC
BH CV ECHO MEAS - MV E/A: 0.87
BH CV ECHO MEAS - MV MAX PG: 2.31 MMHG
BH CV ECHO MEAS - MV MEAN PG: 0.85 MMHG
BH CV ECHO MEAS - MV P1/2T: 68.5 MSEC
BH CV ECHO MEAS - MV V2 VTI: 20.7 CM
BH CV ECHO MEAS - MVA(P1/2T): 3.2 CM2
BH CV ECHO MEAS - MVA(VTI): 3 CM2
BH CV ECHO MEAS - PA ACC TIME: 0.24 SEC
BH CV ECHO MEAS - PA V2 MAX: 67.9 CM/SEC
BH CV ECHO MEAS - PULM A REVS DUR: 0.17 SEC
BH CV ECHO MEAS - PULM A REVS VEL: 31.9 CM/SEC
BH CV ECHO MEAS - PULM DIAS VEL: 36.2 CM/SEC
BH CV ECHO MEAS - PULM S/D: 1.25
BH CV ECHO MEAS - PULM SYS VEL: 45.2 CM/SEC
BH CV ECHO MEAS - QP/QS: 0.55
BH CV ECHO MEAS - RAP SYSTOLE: 3 MMHG
BH CV ECHO MEAS - RV MAX PG: 1.09 MMHG
BH CV ECHO MEAS - RV V1 MAX: 52.3 CM/SEC
BH CV ECHO MEAS - RV V1 VTI: 13.1 CM
BH CV ECHO MEAS - RVOT DIAM: 1.81 CM
BH CV ECHO MEAS - RVSP: 21.9 MMHG
BH CV ECHO MEAS - SUP REN AO DIAM: 2.1 CM
BH CV ECHO MEAS - SV(LVOT): 61.2 ML
BH CV ECHO MEAS - SV(MOD-SP2): 63 ML
BH CV ECHO MEAS - SV(MOD-SP4): 72 ML
BH CV ECHO MEAS - SV(RVOT): 33.7 ML
BH CV ECHO MEAS - SVI(LVOT): 33.6 ML/M2
BH CV ECHO MEAS - SVI(MOD-SP2): 34.5 ML/M2
BH CV ECHO MEAS - SVI(MOD-SP4): 39.5 ML/M2
BH CV ECHO MEAS - TAPSE (>1.6): 2.48 CM
BH CV ECHO MEAS - TR MAX PG: 18.9 MMHG
BH CV ECHO MEAS - TR MAX VEL: 217.2 CM/SEC
BH CV ECHO MEASUREMENTS AVERAGE E/E' RATIO: 5.17
BH CV XLRA - RV BASE: 2.9 CM
BH CV XLRA - RV LENGTH: 7.1 CM
BH CV XLRA - RV MID: 2.05 CM
BH CV XLRA - TDI S': 9 CM/SEC
LEFT ATRIUM VOLUME INDEX: 22.4 ML/M2
LV EF 3D SEGMENTATION: 67 %
LV EF BIPLANE MOD: 67.4 %
SINUS: 2.8 CM
STJ: 2.45 CM

## 2025-08-04 PROCEDURE — 93356 MYOCRD STRAIN IMG SPCKL TRCK: CPT

## 2025-08-04 PROCEDURE — 93356 MYOCRD STRAIN IMG SPCKL TRCK: CPT | Performed by: INTERNAL MEDICINE

## 2025-08-04 PROCEDURE — 93306 TTE W/DOPPLER COMPLETE: CPT

## 2025-08-04 PROCEDURE — 25510000001 PERFLUTREN 6.52 MG/ML SUSPENSION 2 ML VIAL: Performed by: INTERNAL MEDICINE

## 2025-08-04 PROCEDURE — 93306 TTE W/DOPPLER COMPLETE: CPT | Performed by: INTERNAL MEDICINE

## 2025-08-04 RX ADMIN — PERFLUTREN 2 ML: 6.52 INJECTION, SUSPENSION INTRAVENOUS at 08:21

## 2025-08-05 ENCOUNTER — RESULTS FOLLOW-UP (OUTPATIENT)
Dept: CARDIOLOGY | Age: 50
End: 2025-08-05
Payer: COMMERCIAL

## 2025-08-13 DIAGNOSIS — E89.0 POSTABLATIVE HYPOTHYROIDISM: ICD-10-CM

## 2025-08-13 RX ORDER — LEVOTHYROXINE SODIUM 100 UG/1
100 TABLET ORAL EVERY MORNING
Qty: 90 TABLET | Refills: 3 | OUTPATIENT
Start: 2025-08-13

## 2025-08-15 ENCOUNTER — TELEPHONE (OUTPATIENT)
Dept: FAMILY MEDICINE CLINIC | Facility: CLINIC | Age: 50
End: 2025-08-15
Payer: COMMERCIAL

## (undated) DEVICE — JACKSON-PRATT 100CC BULB RESERVOIR: Brand: CARDINAL HEALTH

## (undated) DEVICE — DRSNG SURESITE HNDL 4X5

## (undated) DEVICE — ANTIBACTERIAL UNDYED BRAIDED (POLYGLACTIN 910), SYNTHETIC ABSORBABLE SUTURE: Brand: COATED VICRYL

## (undated) DEVICE — DEV DEL BRST/IMP GEL KELLER2 FUNNEL EA/5

## (undated) DEVICE — THE STERILE LIGHT HANDLE COVER IS USED WITH STERIS SURGICAL LIGHTING AND VISUALIZATION SYSTEMS.

## (undated) DEVICE — TOTAL TRAY, 16FR 10ML SIL FOLEY, URN: Brand: MEDLINE

## (undated) DEVICE — SYR LUERLOK 30CC

## (undated) DEVICE — TOWEL,OR,DSP,ST,BLUE,STD,4/PK,20PK/CS: Brand: MEDLINE

## (undated) DEVICE — GLV SURG BIOGEL LTX PF 8 1/2

## (undated) DEVICE — DRSNG SURESITE WNDW 4X4.5

## (undated) DEVICE — SOL NACL 0.9PCT 100ML SGL

## (undated) DEVICE — GLV SURG SENSICARE PI MIC PF SZ6.5 LF STRL

## (undated) DEVICE — GLV SURG BIOGEL LTX PF 7 1/2

## (undated) DEVICE — IRRIGATOR BULB ASEPTO 60CC STRL

## (undated) DEVICE — SUTURING DEVICE: Brand: ENDO STITCH

## (undated) DEVICE — SOL NACL 0.9PCT 1000ML

## (undated) DEVICE — GLV SURG BIOGEL LTX PF 7

## (undated) DEVICE — STRIP,CLOSURE,WOUND,MEDI-STRIP,1/2X4: Brand: MEDLINE

## (undated) DEVICE — DEV SUT GRSPR CLOSUR 15CM 14G

## (undated) DEVICE — BLANKT WARM LOWR/BDY 100X120CM

## (undated) DEVICE — SUT SILK 3/0 TIES 18IN A184H

## (undated) DEVICE — TBG PENCL TELESCP MEGADYNE SMOKE EVAC 10FT

## (undated) DEVICE — BNDG ELAS ELITE V/CLOSE 6IN 5YD LF STRL

## (undated) DEVICE — TRAP FLD MINIVAC MEGADYNE 100ML

## (undated) DEVICE — LINER SURG CANSTR SXN S/RIGD 1500CC

## (undated) DEVICE — ENDOPATH XCEL BLADELESS TROCARS WITH STABILITY SLEEVES: Brand: ENDOPATH XCEL

## (undated) DEVICE — SMOKE EVACUATION TUBING WITH 7/8 IN TO 1/4 IN REDUCER: Brand: BUFFALO FILTER

## (undated) DEVICE — DRP C/ARM 41X74IN

## (undated) DEVICE — ELECTRD BLD EZ CLN MOD XLNG 2.75IN

## (undated) DEVICE — COVER,MAYO STAND,STERILE: Brand: MEDLINE

## (undated) DEVICE — 2, DISPOSABLE SUCTION/IRRIGATOR WITH DISPOSABLE TIP: Brand: STRYKEFLOW

## (undated) DEVICE — BNDG,ELSTC,MATRIX,STRL,6"X5YD,LF,HOOK&LP: Brand: MEDLINE

## (undated) DEVICE — SUT ETHLN 3/0 PS1 18IN 1663H

## (undated) DEVICE — APPL CHLORAPREP HI/LITE 26ML ORNG

## (undated) DEVICE — DECANTER BAG 9": Brand: MEDLINE INDUSTRIES, INC.

## (undated) DEVICE — SPNG GZ WOVN 4X4IN 12PLY 10/BX STRL

## (undated) DEVICE — GLV SURG SENSICARE PI MIC PF SZ7 LF STRL

## (undated) DEVICE — INTENDED FOR TISSUE SEPARATION, AND OTHER PROCEDURES THAT REQUIRE A SHARP SURGICAL BLADE TO PUNCTURE OR CUT.: Brand: BARD-PARKER ® STAINLESS STEEL BLADES

## (undated) DEVICE — SKIN PREP TRAY W/CHG: Brand: MEDLINE INDUSTRIES, INC.

## (undated) DEVICE — ENDOPATH XCEL BLUNT TIP TROCARS WITH SMOOTH SLEEVES: Brand: ENDOPATH XCEL

## (undated) DEVICE — SUT PDS 3/0 PS2 18IN CLR Z497G

## (undated) DEVICE — VIOLET BRAIDED (POLYGLACTIN 910), SYNTHETIC ABSORBABLE SUTURE: Brand: COATED VICRYL

## (undated) DEVICE — CONN TBG Y 5 IN 1 LF STRL

## (undated) DEVICE — MANIP UTER ADVINCULA DELINEATOR 3.5CM

## (undated) DEVICE — STRIP CLS WND CURAD MEDI/STRIP HYPOALLERG 1X5IN STRL EA/PK/4

## (undated) DEVICE — RETRACTABLE L-HOOK LAPAROSCOPIC SEALER/DIVIDER: Brand: LIGASURE

## (undated) DEVICE — BANDAGE,GAUZE,BULKEE II,4.5"X4.1YD,STRL: Brand: MEDLINE

## (undated) DEVICE — Device

## (undated) DEVICE — 4-PORT MANIFOLD: Brand: NEPTUNE 2

## (undated) DEVICE — SUT MNCRYL PLS ANTIB UD 4/0 PS2 18IN

## (undated) DEVICE — UNDRPD BREATH 23X36 BG/10

## (undated) DEVICE — LAPAROVUE VISIBILITY SYSTEM LAPAROSCOPIC SOLUTIONS: Brand: LAPAROVUE

## (undated) DEVICE — SYR 10ML

## (undated) DEVICE — ADHS SKIN SURG TISS VISC PREMIERPRO EXOFIN HI/VISC FAST/DRY

## (undated) DEVICE — INTENDED FOR TISSUE SEPARATION, AND OTHER PROCEDURES THAT REQUIRE A SHARP SURGICAL BLADE TO PUNCTURE OR CUT.: Brand: BARD-PARKER ® CARBON RIB-BACK BLADES

## (undated) DEVICE — PK UNIV COMPL 40

## (undated) DEVICE — PK CHST BRST 40

## (undated) DEVICE — SUT PROLN 2/0 CT2 30IN 8411H

## (undated) DEVICE — NDL FLTR 19G 1 1/2 LF

## (undated) DEVICE — UNDYED BRAIDED (POLYGLACTIN 910), SYNTHETIC ABSORBABLE SUTURE: Brand: COATED VICRYL

## (undated) DEVICE — NEEDLE, QUINCKE, 20GX3.5": Brand: MEDLINE

## (undated) DEVICE — NEEDLE, QUINCKE 22GX3.5": Brand: MEDLINE INDUSTRIES, INC.

## (undated) DEVICE — CULT AER/ANAEROB FASTIDIOUS BACT

## (undated) DEVICE — LOU GYN LAPAROSCOPY: Brand: MEDLINE INDUSTRIES, INC.

## (undated) DEVICE — DRSNG SURESITE WNDW 2.38X2.75

## (undated) DEVICE — TRAP FLD MEGADYNE

## (undated) DEVICE — GLV SURG SENSICARE PI PF LF 7 GRN STRL

## (undated) DEVICE — SUT MNCRYL 3/0 PS2 18IN MCP497G

## (undated) DEVICE — CVR TRANSD CIV FLX TPR 11.9 TO 3.8X61CM

## (undated) DEVICE — POSITIONER,HEAD,MULTIRING,36CS: Brand: MEDLINE

## (undated) DEVICE — NDL HYPO PRECISIONGLIDE REG 25G 1 1/2

## (undated) DEVICE — GOWN,NON-REINFORCED,SIRUS,SET IN SLV,XL: Brand: MEDLINE

## (undated) DEVICE — Device: Brand: FABCO

## (undated) DEVICE — SPNG LAP 18X18IN LF STRL PK/5

## (undated) DEVICE — MODERATE PLUS PROFILE BOOST, FOR USE WITH SMPB-545: Brand: MENTOR MEMORYGEL BOOST RESTERILIZABLE GEL SIZER

## (undated) DEVICE — PATIENT RETURN ELECTRODE, SINGLE-USE, CONTACT QUALITY MONITORING, ADULT, WITH 9FT CORD, FOR PATIENTS WEIGING OVER 33LBS. (15KG): Brand: MEGADYNE

## (undated) DEVICE — PREP SOL POVIDONE/IODINE BT 4OZ

## (undated) DEVICE — LAPAROSCOPIC SMOKE FILTRATION SYSTEM: Brand: PALL LAPAROSHIELD® PLUS LAPAROSCOPIC SMOKE FILTRATION SYSTEM

## (undated) DEVICE — MODERATE PLUS PROFILE BOOST, FOR USE WITH SMPB-480: Brand: MENTOR MEMORYGEL BOOST RESTERILIZABLE GEL SIZER

## (undated) DEVICE — SUT VIC 3/0 TIES 18IN J110T

## (undated) DEVICE — SOL NS 500ML

## (undated) DEVICE — STPLR SKIN VISISTAT WD 35CT

## (undated) DEVICE — 3M™ IOBAN™ 2 ANTIMICROBIAL INCISE DRAPE 6650EZ: Brand: IOBAN™ 2

## (undated) DEVICE — SYR LL TP 10ML STRL

## (undated) DEVICE — LOU MINOR PROCEDURE: Brand: MEDLINE INDUSTRIES, INC.

## (undated) DEVICE — SUT PDS 2/0 SH 27IN Z317H

## (undated) DEVICE — ENDOCUT SCISSOR TIP, DISPOSABLE: Brand: RENEW

## (undated) DEVICE — SYR LL SFT/PK 60ML

## (undated) DEVICE — BANDAGE ROLL,100% COTTON, 6 PLY, LARGE: Brand: KERLIX